# Patient Record
Sex: MALE | Race: WHITE | URBAN - METROPOLITAN AREA
[De-identification: names, ages, dates, MRNs, and addresses within clinical notes are randomized per-mention and may not be internally consistent; named-entity substitution may affect disease eponyms.]

---

## 2017-01-26 ENCOUNTER — TRANSFERRED RECORDS (OUTPATIENT)
Dept: HEALTH INFORMATION MANAGEMENT | Facility: CLINIC | Age: 57
End: 2017-01-26

## 2017-02-01 ENCOUNTER — TELEPHONE (OUTPATIENT)
Dept: TRANSPLANT | Facility: CLINIC | Age: 57
End: 2017-02-01

## 2017-02-01 NOTE — TELEPHONE ENCOUNTER
Follow up all placed to Dr. Arita. He would [facility] to [facility] to pursue TP-IAT again. He reports having to cancel last February due to the death of his father-in-law. 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 He has been there for 9 months now. He reports feeling quite ill last week which prompted his call to this RN. He has not had any new imaging, ERCP, EUS, MRI/MRCP, CT scans. He indicated that he may have some elevated lipase levels drawn at his PCP clinic. Dr. Arita reports seeing his local GI last week and was told that he has reached the end of medical management and his last option would be TP-IAT. He is having his narcotics managed through his local pain provider. He is given 120 Oxycodone pills for every  days. He is taking on most days one 5 mg tablet. He also is prescribed 37.5 mcg Fentanyl patches that he reports cutting to the size he feels he needs for pain relief. He is no longer taking Gabapentin as he felt no pain relief from this med.  He has a new insurance so will need to have this reviewed. Will update his COLIN.

## 2017-03-02 ENCOUNTER — TRANSFERRED RECORDS (OUTPATIENT)
Dept: HEALTH INFORMATION MANAGEMENT | Facility: CLINIC | Age: 57
End: 2017-03-02

## 2017-03-07 ENCOUNTER — TELEPHONE (OUTPATIENT)
Dept: TRANSPLANT | Facility: CLINIC | Age: 57
End: 2017-03-07

## 2017-03-07 NOTE — TELEPHONE ENCOUNTER
Received a call from [facility] . TP-IAT approved on [id number] review with Dr. Jaquez. Authorization number #[id number]. Requests call back when surgery date is schedule with admission date. Her contact information is [phone].

## 2017-03-15 DIAGNOSIS — K86.1 CHRONIC PANCREATITIS (H): Primary | ICD-10-CM

## 2017-03-22 ENCOUNTER — TELEPHONE (OUTPATIENT)
Dept: TRANSPLANT | Facility: CLINIC | Age: 57
End: 2017-03-22

## 2017-05-02 ENCOUNTER — TELEPHONE (OUTPATIENT)
Dept: TRANSPLANT | Facility: CLINIC | Age: 57
End: 2017-05-02

## 2017-05-02 NOTE — TELEPHONE ENCOUNTER
Spoke with Tanner to review his immunizations. Noted he has not had coverage for Meningococcal B serogroup. Asked him to look into getting this vaccine and to fax documentation to me. Provided fax number. Tanner verbalized understanding and has no questions at this time. He is at work and will look into getting this taken care of today.

## 2017-05-04 ENCOUNTER — TRANSFERRED RECORDS (OUTPATIENT)
Dept: HEALTH INFORMATION MANAGEMENT | Facility: CLINIC | Age: 57
End: 2017-05-04

## 2017-05-05 DIAGNOSIS — K85.90 PANCREATITIS: Primary | ICD-10-CM

## 2017-05-10 ENCOUNTER — RESULTS ONLY (OUTPATIENT)
Dept: LAB | Age: 57
End: 2017-05-10

## 2017-05-10 ENCOUNTER — ANESTHESIA EVENT (OUTPATIENT)
Dept: SURGERY | Facility: CLINIC | Age: 57
DRG: 406 | End: 2017-05-10
Payer: COMMERCIAL

## 2017-05-10 ENCOUNTER — OFFICE VISIT (OUTPATIENT)
Dept: SURGERY | Facility: CLINIC | Age: 57
End: 2017-05-10

## 2017-05-10 ENCOUNTER — ALLIED HEALTH/NURSE VISIT (OUTPATIENT)
Dept: SURGERY | Facility: CLINIC | Age: 57
End: 2017-05-10

## 2017-05-10 ENCOUNTER — ALLIED HEALTH/NURSE VISIT (OUTPATIENT)
Dept: EDUCATION SERVICES | Facility: CLINIC | Age: 57
End: 2017-05-10

## 2017-05-10 ENCOUNTER — TELEPHONE (OUTPATIENT)
Dept: TRANSPLANT | Facility: CLINIC | Age: 57
End: 2017-05-10

## 2017-05-10 VITALS
RESPIRATION RATE: 18 BRPM | OXYGEN SATURATION: 95 % | HEIGHT: 74 IN | TEMPERATURE: 98.1 F | HEART RATE: 61 BPM | BODY MASS INDEX: 29.2 KG/M2 | DIASTOLIC BLOOD PRESSURE: 78 MMHG | SYSTOLIC BLOOD PRESSURE: 124 MMHG | WEIGHT: 227.5 LBS

## 2017-05-10 DIAGNOSIS — Z01.818 PREOP GENERAL PHYSICAL EXAM: Primary | ICD-10-CM

## 2017-05-10 DIAGNOSIS — K85.90 PANCREATITIS: ICD-10-CM

## 2017-05-10 DIAGNOSIS — K86.1 CHRONIC PANCREATITIS (H): ICD-10-CM

## 2017-05-10 DIAGNOSIS — K86.1 OTHER CHRONIC PANCREATITIS (H): ICD-10-CM

## 2017-05-10 DIAGNOSIS — K85.80 OTHER ACUTE PANCREATITIS WITHOUT INFECTION OR NECROSIS: Primary | ICD-10-CM

## 2017-05-10 LAB
ALBUMIN SERPL-MCNC: 3.5 G/DL (ref 3.4–5)
ALBUMIN UR-MCNC: NEGATIVE MG/DL
ALP SERPL-CCNC: 79 U/L (ref 40–150)
ALT SERPL W P-5'-P-CCNC: 21 U/L (ref 0–70)
ANION GAP SERPL CALCULATED.3IONS-SCNC: 9 MMOL/L (ref 3–14)
APPEARANCE UR: CLEAR
AST SERPL W P-5'-P-CCNC: 14 U/L (ref 0–45)
BASOPHILS # BLD AUTO: 0 10E9/L (ref 0–0.2)
BASOPHILS NFR BLD AUTO: 0.7 %
BILIRUB SERPL-MCNC: 0.4 MG/DL (ref 0.2–1.3)
BILIRUB UR QL STRIP: NEGATIVE
BUN SERPL-MCNC: 14 MG/DL (ref 7–30)
C PEPTIDE SERPL-MCNC: 13.2 NG/ML (ref 0.9–6.9)
C PEPTIDE SERPL-MCNC: 3.6 NG/ML (ref 0.9–6.9)
C PEPTIDE SERPL-MCNC: 7.4 NG/ML (ref 0.9–6.9)
CALCIUM SERPL-MCNC: 8.9 MG/DL (ref 8.5–10.1)
CEA SERPL-MCNC: 0.5 UG/L (ref 0–2.5)
CHLORIDE SERPL-SCNC: 109 MMOL/L (ref 94–109)
CHOLEST SERPL-MCNC: 157 MG/DL
CO2 SERPL-SCNC: 24 MMOL/L (ref 20–32)
COLOR UR AUTO: YELLOW
CREAT SERPL-MCNC: 0.93 MG/DL (ref 0.66–1.25)
CREAT UR-MCNC: 178 MG/DL
D DIMER PPP FEU-MCNC: NORMAL UG/ML FEU (ref 0–0.5)
DIFFERENTIAL METHOD BLD: NORMAL
EOSINOPHIL # BLD AUTO: 0.2 10E9/L (ref 0–0.7)
EOSINOPHIL NFR BLD AUTO: 2.9 %
ERYTHROCYTE [DISTWIDTH] IN BLOOD BY AUTOMATED COUNT: 14.1 % (ref 10–15)
GFR SERPL CREATININE-BSD FRML MDRD: 84 ML/MIN/1.7M2
GLUCOSE SERPL-MCNC: 125 MG/DL (ref 70–99)
GLUCOSE SERPL-MCNC: 86 MG/DL (ref 70–99)
GLUCOSE SERPL-MCNC: 88 MG/DL (ref 70–99)
GLUCOSE SERPL-MCNC: 88 MG/DL (ref 70–99)
GLUCOSE UR STRIP-MCNC: NEGATIVE MG/DL
HBA1C MFR BLD: 5.6 % (ref 4.3–6)
HCT VFR BLD AUTO: 45.1 % (ref 40–53)
HDLC SERPL-MCNC: 43 MG/DL
HGB BLD-MCNC: 15.2 G/DL (ref 13.3–17.7)
HGB UR QL STRIP: NEGATIVE
IMM GRANULOCYTES # BLD: 0 10E9/L (ref 0–0.4)
IMM GRANULOCYTES NFR BLD: 0.3 %
INR PPP: 1.04 (ref 0.86–1.14)
KETONES UR STRIP-MCNC: NEGATIVE MG/DL
LDLC SERPL CALC-MCNC: 94 MG/DL
LEUKOCYTE ESTERASE UR QL STRIP: NEGATIVE
LYMPHOCYTES # BLD AUTO: 2.3 10E9/L (ref 0.8–5.3)
LYMPHOCYTES NFR BLD AUTO: 38.9 %
MCH RBC QN AUTO: 28.7 PG (ref 26.5–33)
MCHC RBC AUTO-ENTMCNC: 33.7 G/DL (ref 31.5–36.5)
MCV RBC AUTO: 85 FL (ref 78–100)
MICROALBUMIN UR-MCNC: 7 MG/L
MICROALBUMIN/CREAT UR: 4.12 MG/G CR (ref 0–17)
MONOCYTES # BLD AUTO: 0.5 10E9/L (ref 0–1.3)
MONOCYTES NFR BLD AUTO: 8.5 %
MUCOUS THREADS #/AREA URNS LPF: PRESENT /LPF
NEUTROPHILS # BLD AUTO: 2.9 10E9/L (ref 1.6–8.3)
NEUTROPHILS NFR BLD AUTO: 48.7 %
NITRATE UR QL: NEGATIVE
NONHDLC SERPL-MCNC: 113 MG/DL
NRBC # BLD AUTO: 0 10*3/UL
NRBC BLD AUTO-RTO: 0 /100
PH UR STRIP: 6.5 PH (ref 5–7)
PLATELET # BLD AUTO: 224 10E9/L (ref 150–450)
POTASSIUM SERPL-SCNC: 3.8 MMOL/L (ref 3.4–5.3)
PREALB SERPL IA-MCNC: 30 MG/DL (ref 15–45)
PROT SERPL-MCNC: 7.3 G/DL (ref 6.8–8.8)
RBC # BLD AUTO: 5.29 10E12/L (ref 4.4–5.9)
RBC #/AREA URNS AUTO: 2 /HPF (ref 0–2)
SODIUM SERPL-SCNC: 142 MMOL/L (ref 133–144)
SP GR UR STRIP: 1.02 (ref 1–1.03)
TRANS CELLS #/AREA URNS HPF: <1 /HPF (ref 0–1)
TRIGL SERPL-MCNC: 98 MG/DL
URN SPEC COLLECT METH UR: ABNORMAL
UROBILINOGEN UR STRIP-MCNC: NORMAL MG/DL (ref 0–2)
WBC # BLD AUTO: 5.9 10E9/L (ref 4–11)
WBC #/AREA URNS AUTO: <1 /HPF (ref 0–2)

## 2017-05-10 PROCEDURE — 82378 CARCINOEMBRYONIC ANTIGEN: CPT | Performed by: TRANSPLANT SURGERY

## 2017-05-10 PROCEDURE — 83036 HEMOGLOBIN GLYCOSYLATED A1C: CPT | Performed by: TRANSPLANT SURGERY

## 2017-05-10 PROCEDURE — 80053 COMPREHEN METABOLIC PANEL: CPT | Performed by: TRANSPLANT SURGERY

## 2017-05-10 PROCEDURE — 86900 BLOOD TYPING SEROLOGIC ABO: CPT | Performed by: TRANSPLANT SURGERY

## 2017-05-10 PROCEDURE — 82306 VITAMIN D 25 HYDROXY: CPT | Performed by: TRANSPLANT SURGERY

## 2017-05-10 PROCEDURE — 85025 COMPLETE CBC W/AUTO DIFF WBC: CPT | Performed by: TRANSPLANT SURGERY

## 2017-05-10 PROCEDURE — 86850 RBC ANTIBODY SCREEN: CPT | Performed by: TRANSPLANT SURGERY

## 2017-05-10 PROCEDURE — 82947 ASSAY GLUCOSE BLOOD QUANT: CPT | Performed by: TRANSPLANT SURGERY

## 2017-05-10 PROCEDURE — 81001 URINALYSIS AUTO W/SCOPE: CPT | Performed by: TRANSPLANT SURGERY

## 2017-05-10 PROCEDURE — 86901 BLOOD TYPING SEROLOGIC RH(D): CPT | Performed by: TRANSPLANT SURGERY

## 2017-05-10 PROCEDURE — 84681 ASSAY OF C-PEPTIDE: CPT | Performed by: TRANSPLANT SURGERY

## 2017-05-10 PROCEDURE — 85610 PROTHROMBIN TIME: CPT | Performed by: TRANSPLANT SURGERY

## 2017-05-10 PROCEDURE — 84590 ASSAY OF VITAMIN A: CPT | Performed by: TRANSPLANT SURGERY

## 2017-05-10 PROCEDURE — 82043 UR ALBUMIN QUANTITATIVE: CPT | Performed by: TRANSPLANT SURGERY

## 2017-05-10 PROCEDURE — 86301 IMMUNOASSAY TUMOR CA 19-9: CPT | Performed by: TRANSPLANT SURGERY

## 2017-05-10 PROCEDURE — 84134 ASSAY OF PREALBUMIN: CPT | Performed by: TRANSPLANT SURGERY

## 2017-05-10 PROCEDURE — 80061 LIPID PANEL: CPT | Performed by: TRANSPLANT SURGERY

## 2017-05-10 PROCEDURE — 87086 URINE CULTURE/COLONY COUNT: CPT | Performed by: TRANSPLANT SURGERY

## 2017-05-10 PROCEDURE — 84446 ASSAY OF VITAMIN E: CPT | Performed by: TRANSPLANT SURGERY

## 2017-05-10 ASSESSMENT — LIFESTYLE VARIABLES: TOBACCO_USE: 0

## 2017-05-10 NOTE — H&P
Pre-Operative H & P     CC:  Preoperative exam to assess for increased cardiopulmonary risk while undergoing surgery and anesthesia.    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 Jaquez on 5/15/17 at [facility].  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  He was first evaluated here at the [facility] in 2015 by Dr. Lopez in GI.  According to this note, DrJanel Arita  had quite extensive GI interventions without sustained relief before coming to the [facility] .   He reported that his quality of life deteriorated dramatically since a surgical sphincteroplasty done in [place] in 2014.  He was scheduled to have the pancreatectomy and auto islet cell transplant 1-1/2 years ago at [facility],but needed to cancel due to a death in the family.      He has a history of atypical chest pain associated with necrotizing pancreatitis in 2015.  The cardiac work-up included a normal stress test.  He reports no SOB, CP or ADDISON.  He reported having an EKG today done for research that indicated NSR.  Other history is thyroid cancer with a thyroidectomy in 2011.  He has been on levothyroxine since this time.    History is obtained from the patient.     Past Medical History  Past Medical History:   Diagnosis Date     Depression      Eye injury, penetrating 1978    left     Pancreatic disease     pancreatitis     Pancreatitis 2012    outside notes indicate lipase elevations; labs available: 4/11/12 lipase 563 (); 5/7/12 1224 ()     Sphincter of Oddi dysfunction      Thyroid cancer (H)      Thyroid disease     thyroid cancer         Past Surgical History  Past Surgical History:   Procedure Laterality Date     CHOLECYSTECTOMY  4/2012     [place], [place]     debribement eye Left 1978    for eye injury     ENDOSCOPIC RETROGRADE CHOLANGIOPANCREATOGRAM  4/18/2012    with sphincterotomy     ENDOSCOPIC ULTRASOUND UPPER GASTROINTESTINAL TRACT (GI) N/A 11/11/2015    Procedure: ENDOSCOPIC ULTRASOUND, ESOPHAGOSCOPY / UPPER GASTROINTESTINAL TRACT (GI);  Surgeon: Emeka Mcleod MD;  Location: [facility] OR     FEEDING TUBE REPLACEMENT  2/3/2015     HERNIA REPAIR, INGUINAL RT/LT Left 1980     LAPAROTOMY EXPLORATORY  12/31/2014    WITH sphincterotom (transduodenal), resection of sphincter of Oddi, lysis of adhesions     NERVE BLOCK PERIPHERAL  12/31/2014    U/S guided transversus abdominus plane peripheral field nerve block     THYROIDECTOMY  12/28/2011     transduodenal sphincteroplasty  12/31/2014         Prior to Admission Medications  Current Outpatient Prescriptions   Medication Sig Dispense Refill     LORAZEPAM PO Take 1 mg by mouth daily as needed for anxiety       LEVOTHYROXINE SODIUM PO Take 175 mcg by mouth daily       gabapentin (GRALISE) 300 MG 24hr Ext Release tab Take 300 mg by mouth 3 times daily       FLUoxetine HCl (PROZAC PO) Take 10 mg by mouth daily       fentaNYL (DURAGESIC) 25 mcg/hr patch 72 hr Place 1 patch onto the skin every 72 hours Patient uses one 25 mcg and one 12.5 mcg patch every 72 hours (37.5 mcg)       OXYCODONE HCL PO Take 5 mg by mouth 4 times daily        amylase-lipase-protease (CREON) 61684 UNITS CPEP Take 2 capsules by mouth 3 times daily (with meals) Take 1 capsule with snack.           Allergies  Review of patient's allergies indicates no known allergies.     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  He is a non-smoker and has never drank alcohol. He is a twin.            Family History  Family History   Problem Relation Age of Onset     Pancreatitis No family hx of      DIABETES No family hx of         ROS  10 point review of systems performed.  All pertinent positives noted, otherwise Negative.    1/2015 Normal vasodilator stress test. No substrate for ischemia. No defect to suggest infarction. Normal LV wall motion, volumes and function. LVEF 70%    Labs: (personally reviewed):  Lab Results   Component Value Date    HGB 13.9 11/11/2015     11/11/2015    WBC 5.7 11/11/2015    HCT 41.7 11/11/2015     11/11/2015    CRISTOBAL 8.7 11/11/2015    GLC 84 11/11/2015    CR 0.85 11/11/2015    BUN 20 11/11/2015    CO2 26 11/11/2015    ALT 24 11/11/2015    AST 12 11/11/2015    ALKPHOS 80 11/11/2015         Physical Exam:  Vital signs:  Extended Vitals not filed for this encounter.   Constitutional: Awake, alert, cooperative, no apparent distress, and appears stated age.  Eyes: Pupils equal, round and reactive to light, sclera clear, conjunctiva normal.  HENT: Normocephalic, oral pharynx with moist mucus membranes, good dentition. No goiter appreciated.   Respiratory: Clear to auscultation bilaterally, no crackles or wheezing.  Cardiovascular: Regular rate and rhythm, normal S1 and S2, and no murmur noted.   No edema. Palpable pulses to radial  DP and PT arteries.   GI: Normal bowel sounds, soft, non-distended, non-tender, no masses palpated, no hepatosplenomegaly.    Skin: Warm and dry.  No rashes at anticipated surgical site.   Musculoskeletal: Full ROM of neck. There is no redness, warmth, or swelling of the joints. Gross motor strength is normal.    Neurologic: Awake, alert, oriented to name, place and time.  Gait is normal.   Neuropsychiatric: Calm, cooperative. Normal affect.      Assessment/Plan  Sohan Arita is a 56 year old male  who is scheduled for a open pancreatectomy, auto islet cell transplant on 5/15/17 with Dr. Jaquez for chronic pancreatitis.  PAC referral for risk assessment and optimization for anesthesia with comorbid conditions of:    Pre-operative considerations:  1.  Cardiac:  Functional status MET > 4  Risk of Major Adverse Cardiac event:  0.9%  History of chest pain with necrotizing pancreatis in 2015 , cardiac work up negative at that time.   Patient denies CP or SOB with activity.    2.  Pulm:   MAGDALENE risk: High Risk.  Negative sleep study 3 years ago.  3.  GI:  Risk of PONV score = 2.  If > 2, anti-emetic intervention recommended.  4.  Chronic Pain:  Chronic opioid use:  Fentanyl 12 mcg patch-- uses 1/2 patch every 72 hours    Patient is optimized and is acceptable candidate for the proposed procedure.  No further diagnostic evaluation is needed.     Amy Flores MS, PA-C   Preoperative Assessment Center  [facility]  Phone: [phone]  Fax: [phone]

## 2017-05-10 NOTE — MR AVS SNAPSHOT
After Visit Summary   5/10/2017    Sohan Arita    MRN: [id number]           Patient Information     Date Of Birth          1960        Visit Information        Provider Department      5/10/2017 1:30 PM Radha Saavedra RN [facility]        Today's Diagnoses     Other acute pancreatitis without infection or necrosis    -  1       Follow-ups after your visit        Your next 10 appointments already scheduled     May 11, 2017 11:30 AM CDT   (Arrive by 11:15 AM)   Return Auto Islet with Rodrigo Jaquez MD   [facility] Solid Organ Transplant ([facility])    [address] [place] [id number] [phone]            May 15, 2017   Procedure with Rodrigo Jaquez MD   [facility] [place], Same Day Surgery (--)    [address] [address]   [phone]            Jun 05, 2017  9:30 AM CDT   (Arrive by 9:15 AM)   Office Visit with Evelyne Diez RD   [facility] ([facility])    [address] [place] [id number] [phone]           Bring a current list of meds and any records pertaining to this visit.  For Physicals, please bring immunization records and any forms needing to be filled out.  Please arrive 10 minutes early to complete paperwork.            Jun 05, 2017 10:30 AM CDT   (Arrive by 10:15 AM)   Office Visit with Radha Saavedra RN   [facility] ([facility])    [address] [place] [id number] [phone]           Bring a current list of meds and any records pertaining to this visit.  For Physicals, please bring immunization records and any forms needing to be filled out.  Please arrive 10 minutes early to complete paperwork.              Who to contact     Please call your clinic at [phone] to:    Ask questions about your health    Make or cancel appointments    Discuss your  medicines    Learn about your test results    Speak to your doctor   If you have compliments or concerns about an experience at your clinic, or if you wish to file a complaint, please contact [facility] Patient Relations at [phone] or email us at [email or web address]         Additional Information About Your Visit        [employer] Information     [employer] is an electronic gateway that provides easy, online access to your medical records. With [employer], you can request a clinic appointment, read your test results, renew a prescription or communicate with your care team.     To sign up for [employer] visit the website at [email or web address]   You will be asked to enter the access code listed below, as well as some personal information. Please follow the directions to create your username and password.     Your access code is: [email or web address]  Expires: 2017  6:30 AM     Your access code will  in 90 days. If you need help or a new code, please contact your [facility] or call [phone] for assistance.        Care EveryWhere ID     This is your Care EveryWhere ID. This could be used by other organizations to access your [facility] medical records  [id number]         Blood Pressure from Last 3 Encounters:   05/10/17 124/78   11/11/15 132/82   11/10/15 120/78    Weight from Last 3 Encounters:   05/10/17 103.2 kg (227 lb 8 oz)   11/11/15 100.2 kg (220 lb 14.4 oz)   11/10/15 98.8 kg (217 lb 14.4 oz)              Today, you had the following     No orders found for display       Primary Care Provider Office Phone # Fax #    Frandy Frye [phone]       [facility] [address]  [facility] [address]        Thank you!     Thank you for choosing [facility]  for your care. Our goal is always to provide you with excellent care. Hearing back from our patients is one way we can continue to improve our  services. Please take a few minutes to complete the written survey that you may receive in the mail after your visit with us. Thank you!             Your Updated Medication List - Protect others around you: Learn how to safely use, store and throw away your medicines at www.disposemymeds.org.          This list is accurate as of: 5/10/17  5:02 PM.  Always use your most recent med list.                   Brand Name Dispense Instructions for use    fentaNYL 25 mcg/hr 72 hr patch    DURAGESIC     Place 1 patch onto the skin every 72 hours Patient uses 12 mcg patch cut in half every 72 hours.       LEVOTHYROXINE SODIUM PO      Take 150 mcg by mouth every morning       PROZAC PO      Take 10 mg by mouth every morning       ZENPEP 56424 UNITS Cpep   Generic drug:  Pancrelipase (Lip-Prot-Amyl)      Take 80,000 Units by mouth 3 times daily

## 2017-05-10 NOTE — TELEPHONE ENCOUNTER
This RN faxed the extended type and screen to the listed [facility] blood bank. Fax successfully sent.

## 2017-05-10 NOTE — TELEPHONE ENCOUNTER
Spoke with Tanner between his preoperative appointments. He reports everything is going well. He and wife Maribell are staying at the [facility] in [place].  Tanner will request his last immunization Men Bexaro be faxed to me.  Tanner has no current questions of concerns.

## 2017-05-10 NOTE — ANESTHESIA PREPROCEDURE EVALUATION
Anesthesia Evaluation     . Pt has had prior anesthetic. Type: General and MAC           ROS/MED HX    ENT/Pulmonary: Comment: Neg sleep study.    (+)MAGDALENE risk factors snores loudly, daytime somnolence, , . .   (-) tobacco use   Neurologic: Comment: Occasional significant HAs - neg neurologic ROS     Cardiovascular:  - neg cardiovascular ROS   (+) ----. : . . . :. . Previous cardiac testing date:results:Stress Testdate:1/2015 results:Normal vasodilator stress test. No substrate for ischemia. No defect to suggest infarction. Normal LV wall motion, volumes and function. 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 Casanova in PAC.     date: results:          METS/Exercise Tolerance:  4 - Raking leaves, gardening   Hematologic:  - neg hematologic  ROS       Musculoskeletal:  - neg musculoskeletal ROS (+) , , other musculoskeletal- Chronic back pain. right leg numbness since 1998      GI/Hepatic:     (+) Other GI/Hepatic chronic pancreatitis      Renal/Genitourinary:  - ROS Renal section negative       Endo: Comment: A1C 5.4    (+) thyroid problem hypothyroidism, .      Psychiatric:     (+) psychiatric history depression      Infectious Disease:  - neg infectious disease ROS       Malignancy:   (+) Malignancy History of Other  Other CA thyroid, thyroidectomy 2011 Remission status post Surgery         Other:    (+) No chance of pregnancy C-spine cleared: N/A, H/O Chronic Pain,H/O chronic opiod use , no other significant disability                    Physical Exam  Normal systems: cardiovascular and pulmonary    Airway   Mallampati: I  TM distance: >3 FB  Neck ROM: full    Dental   (+) caps    Cardiovascular   Rhythm and rate: regular and normal      Pulmonary    breath sounds clear to auscultation    Other findings: Lab Results       Component                Value               Date                        HGB                      15.2                05/10/2017                 PLT                      224                 05/10/2017                 WBC                      5.9                 05/10/2017                 HCT                      45.1                05/10/2017                 INR                      1.04                05/10/2017                 NA                       142                 05/10/2017                 CRISTOBAL                      8.9                 05/10/2017                 GLC                      88                  05/10/2017                 CR                       0.93                05/10/2017                 BUN                      14                  05/10/2017                 CO2                      24                  05/10/2017                 ALT                      21                  05/10/2017                 AST                      14                  05/10/2017                 ALKPHOS                  79                  05/10/2017                         PAC Discussion and Assessment    ASA Classification: 3  Case is suitable for: [place]  Anesthetic techniques and relevant risks discussed: GA with regional block for post-op pain control  Invasive monitoring and risk discussed: No  Types:   Possibility and Risk of blood transfusion discussed: No  NPO instructions given:   Additional anesthetic preparation and risks discussed:   Needs early admission to pre-op area:   Other:     PAC Resident/NP Anesthesia Assessment:  Sohan Arita is a 56 year old male  who is scheduled for a open pancreatectomy, auto islet cell transplant on 5/15/17 with Dr. Jaquez for chronic pancreatitis.  PAC referral for risk assessment and optimization for anesthesia with comorbid conditions of:    Pre-operative considerations:  1.  Cardiac:  Functional status MET > 4  Risk of Major Adverse Cardiac event:  0.9%  History of chest pain with necrotizing pancreatis in 2015 , cardiac work  up negative at that time.   Patient denies CP or SOB with activity.    2.  Pulm:   MAGDALENE risk: Intermediate Risk.  Negative sleep study 3 years ago.  3.  GI:  Risk of PONV score = 2.  If > 2, anti-emetic intervention recommended.  4.  Chronic Pain:  Chronic opioid use:  Fentanyl 12 mcg patch-- uses 1/2 patch every 72 hours    Previous anesthesia note [facility] 11/11/15:    Peripheral IV Tolerated well   ETT  Mask Ventilation: Easy; Ease of Intubation: Easy; Airway       Patient is optimized and is acceptable candidate for the proposed procedure.  No further diagnostic evaluation is needed.     Patient also evaluated by Dr. Casanova. See recommendations below.     Amy Flores MS PA-C  05/10/17 1:19 PM            Mid-Level Provider/Resident:   Date:   Time:     Attending Anesthesiologist Anesthesia Assessment:  Pt seen qs answered.  Discussed multimodal analgesia...    Reviewed and Signed by PAC Anesthesiologist  Anesthesiologist: julien  Date: 5/11/17  Time:   Pass/Fail:   Disposition:     PAC Pharmacist Assessment:        Pharmacist:   Date:   Time:      Anesthesia Plan      History & Physical Review  History and physical reviewed and following examination; no interval change.    ASA Status:  3 .    NPO Status:  > 8 hours    Plan for General and ETT with Intravenous induction. Maintenance will be Balanced.    PONV prophylaxis:  Ondansetron (or other 5HT-3) and Dexamethasone or Solumedrol  Additional equipment: 2nd IV, Arterial Line and Central Line I have seen the patient and reviewed the chart  Plan to follow the autoislet protocol  Ketamine gtt  Sufenta gtt  Insulin gtt  Paravertebral blocks  Blood immediately available  Heparin in room  FloTrack to monitor goal directed fluid therapy    Boni Chen MD        Postoperative Care  Postoperative pain management:  IV analgesics and Peripheral nerve block (Continuous).      Consents  Anesthetic plan, risks, benefits and alternatives discussed with:  Patient.  Use of blood  products discussed: Yes.   Consented to blood products.  .                          .

## 2017-05-10 NOTE — PATIENT INSTRUCTIONS
Preparing for Your Surgery      Name:  Sohan Arita   MRN:  [id number]   :  1960   Today's Date:  5/10/2017     Arriving for surgery:  Auto islet cell transplant   Surgery date:  5/15/2017  Surgery time:  7:00 am  Arrival time:  5:00 am  Please come to:       [facility] [address]  [address] [place]  [address]    -   parking is available in front of the hospital from 5:15 am to 8:00 pm    -  Stop at the Information Desk in the lobby    -   Inform the information person that you are here for surgery. An escort to 3c will be provided. If you would not like an escort, please proceed to 3C on the 3rd floor. [phone]     What can I eat or drink?  -  You may have solid food or milk products until 8 hours prior to your surgery.  Nothing after 11 pm   -  You may have water, apple juice or 7up/Sprite until 2 hours prior to your surgery. Until 5 am arrival time     Which medicines can I take?  -  Do NOT take these medications in the morning, the day of surgery:          Adam Pep                    HOLD VITAMINS SUPPLEMENTS AND ASPIRIN FOR 1 WEEK BEFORE SURGERY       -  Please take these medications the day of surgery:        Levothyroxine        Fluoxetine          Fentanyl Patch             How do I prepare myself?  -  Take two showers: one the night before surgery; and one the morning of surgery.         Use Scrubcare or Hibiclens to wash from neck down.  You may use your own shampoo and conditioner. No other hair products.   -  Do NOT use lotion, powder, deodorant, or antiperspirant the day of your surgery.  -  Do NOT wear any makeup, fingernail polish or jewelry.  -  Begin using Incentive Spirometer 1 week prior to surgery.  Use 4 times per day, up to 5-10 breaths each time.  Bring Incentive Spirometer to hospital.  -Do not bring your own medications to the hospital, except for inhalers and eye drops.  -  Bring your ID and insurance  card.    Questions or Concerns:  If you have questions or concerns, please call the  Preoperative Assessment Center, Monday-Friday 7AM-7PM:  [phone]          AFTER YOUR SURGERY  Breathing exercises   Breathing exercises help you recover faster. Take deep breaths and let the air out slowly. This will:     Help you wake up after surgery.    Help prevent complications like pneumonia.  Preventing complications will help you go home sooner.   We may give you a breathing device (incentive spirometer) to encourage you to breathe deeply.   Nausea and vomiting   You may feel sick to your stomach after surgery; if so, let your nurse know.    Pain control:  After surgery, you may have pain. Our goal is to help you manage your pain. Pain medicine will help you feel comfortable enough to do activities that will help you heal.  These activities may include breathing exercises, walking and physical therapy.   To help your health care team treat your pain we will ask: 1) If you have pain  2) where it is located 3) describe your pain in your words  Methods of pain control include medications given by mouth, vein or by nerve block for some surgeries.  We may give you a pain control pump that will:  1) Deliver the medicine through a tube placed in your vein  2) Control the amount of medicine you receive  3) Allow you to push a button to deliver a dose of pain medicine  Sequential Compression Device (SCD) or Pneumo Boots:  You may need to wear SCD S on your legs or feet. These are wraps connected to a machine that pumps in air and releases it. The repeated pumping helps prevent blood clots from forming.   Using an Incentive Spirometer  Soon after your surgery, a nurse or therapist will teach you breathing exercises. These keep your lungs clear, strengthen your breathing muscles, and help prevent complications.  The exercises include doing a deep-breathing exercise using a device called an incentive spirometer.  To do these  exercises, you will breathe in through your mouth and not your nose. The incentive spirometer only works correctly if you breathe in through your mouth.  Four steps to clear lungs     Deep breathing expands the lungs, aids circulation, and helps prevent pneumonia.   1. Exhale normally.    Relax and breathe out.  2. Place your lips tightly around the mouthpiece.    Make sure the device is upright and not tilted.  3. Inhale as much air as you can through the mouthpiece (don't breath through your nose).    Inhale slowly and deeply.    Hold your breath long enough to keep the balls or disk raised for at least 3 seconds.    If you re inhaling too quickly, your device may make a tone. If you hear this tone, inhale more slowly.  4. Repeat the exercise regularly.    Do this exercise every hour while you're awake, or as your health care provider instructs.    You will also be taught coughing exercises and be asked to do them regularly on your own.    2491-2211 [employer]. [address] [place] [address], [place] [address]. All rights reserved. This information is not intended as a substitute for professional medical care. Always follow your healthcare professional's instructions.

## 2017-05-10 NOTE — PROGRESS NOTES
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  Sohan Arita is accompanied by spouse, Maribell    No previous history of diabetes.  Most recent a1c was 5.7 indicating pre-diabetes.   Chronic pancreatitis diagnosed about five years ago, but he thinks he has had it longer than that.     ASSESSMENT:  Patient glucose self monitoring as follows: BG meter taught today.  Name of glucose meter:  Patient has a glucose meter that he picked up prior to coming here.  He does not recall the brand name.   Todays Blood Glucose result was 95 on Accu-Chek Trudy meter.     PATIENT CONCERNS:   TP-AIT planned for Monday, May 15.  Surgeon Dr. Jaquez, Transplant coordinator Soni Whitaker       EDUCATION and INSTRUCTION PROVIDED AT THIS VISIT:     Expectations following surgery related to taking insulin, checking blood glucose, and need for excellent control in the period immediately following surgery and beyond.   Reviewed the normal endocrine function of the pancreas, and how exogenous insulin differs from endogenous insulin   Explained the difference between short and long acting insulin, including onset, duration, and action.   Explained the difference between meal dosing insulin and correction insulin, and explained how she will be using both a long and short acting insulin to control BG's following surgery.   Demonstrated and had patient do return demonstration of administration of insulin using an insulin pen. Patient was able to do this successfully.   Shown how to use a glucose meter, although the one he will be using is not here today.  Shown how to use the Accucheck Fastclix lancing device and given one to bring with him to the hospital.    Reviewed BG normals and BG goals and emphasized the importance of contacting medical team if goals are not being met.   Explained the recognition and treatment of  hypoglycemia . Will instruct in glucagon use during  followup visit after discharge from the hospital.   Patient-stated goal written and given to Sohan Arita.  Verbalized and demonstrated understanding of instructions.     PLAN:  See patient instructions  AVS printed and given to patient    FOLLOW-UP:      Follow up visit with RN and RD CDE's scheduled for 3 weeks from date of surgery.  Goal at that visit with be to teach carbohydrate counting and administration of glucagon, as well as other diabetes education    Time spent with patient at today's visit was 60 minutes.

## 2017-05-10 NOTE — MR AVS SNAPSHOT
After Visit Summary   5/10/2017    Sohan Arita    MRN: [id number]           Patient Information     Date Of Birth          1960        Visit Information        Provider Department      5/10/2017 11:30 AM Rn, [facility] Preoperative Assessment Center        Today's Diagnoses     Chronic pancreatitis (H)        Pancreatitis          Care Instructions    Preparing for Your Surgery      Name:  Sohan Arita   MRN:  [id number]   :  1960   Today's Date:  5/10/2017     Arriving for surgery:  Auto islet cell transplant   Surgery date:  5/15/2017  Surgery time:  7:00 am  Arrival time:  5:00 am  Please come to:       [facility] [address]  [address] [place]  [address]    -   parking is available in front of the hospital from 5:15 am to 8:00 pm    -  Stop at the Information Desk in the lobby    -   Inform the information person that you are here for surgery. An escort to 3c will be provided. If you would not like an escort, please proceed to 3C on the 3rd floor. [phone]     What can I eat or drink?  -  You may have solid food or milk products until 8 hours prior to your surgery.  Nothing after 11 pm   -  You may have water, apple juice or 7up/Sprite until 2 hours prior to your surgery. Until 5 am arrival time     Which medicines can I take?  -  Do NOT take these medications in the morning, the day of surgery:          Adam Pep                    HOLD VITAMINS SUPPLEMENTS AND ASPIRIN FOR 1 WEEK BEFORE SURGERY       -  Please take these medications the day of surgery:        Levothyroxine        Fluoxetine          Fentanyl Patch             How do I prepare myself?  -  Take two showers: one the night before surgery; and one the morning of surgery.         Use Scrubcare or Hibiclens to wash from neck down.  You may use your own shampoo and conditioner. No other hair products.   -  Do NOT use lotion, powder, deodorant, or  antiperspirant the day of your surgery.  -  Do NOT wear any makeup, fingernail polish or jewelry.  -  Begin using Incentive Spirometer 1 week prior to surgery.  Use 4 times per day, up to 5-10 breaths each time.  Bring Incentive Spirometer to hospital.  -Do not bring your own medications to the hospital, except for inhalers and eye drops.  -  Bring your ID and insurance card.    Questions or Concerns:  If you have questions or concerns, please call the  Preoperative Assessment Center, Monday-Friday 7AM-7PM:  [phone]          AFTER YOUR SURGERY  Breathing exercises   Breathing exercises help you recover faster. Take deep breaths and let the air out slowly. This will:     Help you wake up after surgery.    Help prevent complications like pneumonia.  Preventing complications will help you go home sooner.   We may give you a breathing device (incentive spirometer) to encourage you to breathe deeply.   Nausea and vomiting   You may feel sick to your stomach after surgery; if so, let your nurse know.    Pain control:  After surgery, you may have pain. Our goal is to help you manage your pain. Pain medicine will help you feel comfortable enough to do activities that will help you heal.  These activities may include breathing exercises, walking and physical therapy.   To help your health care team treat your pain we will ask: 1) If you have pain  2) where it is located 3) describe your pain in your words  Methods of pain control include medications given by mouth, vein or by nerve block for some surgeries.  We may give you a pain control pump that will:  1) Deliver the medicine through a tube placed in your vein  2) Control the amount of medicine you receive  3) Allow you to push a button to deliver a dose of pain medicine  Sequential Compression Device (SCD) or Pneumo Boots:  You may need to wear SCD S on your legs or feet. These are wraps connected to a machine that pumps in air and releases it. The repeated pumping  helps prevent blood clots from forming.   Using an Incentive Spirometer  Soon after your surgery, a nurse or therapist will teach you breathing exercises. These keep your lungs clear, strengthen your breathing muscles, and help prevent complications.  The exercises include doing a deep-breathing exercise using a device called an incentive spirometer.  To do these exercises, you will breathe in through your mouth and not your nose. The incentive spirometer only works correctly if you breathe in through your mouth.  Four steps to clear lungs     Deep breathing expands the lungs, aids circulation, and helps prevent pneumonia.   1. Exhale normally.    Relax and breathe out.  2. Place your lips tightly around the mouthpiece.    Make sure the device is upright and not tilted.  3. Inhale as much air as you can through the mouthpiece (don't breath through your nose).    Inhale slowly and deeply.    Hold your breath long enough to keep the balls or disk raised for at least 3 seconds.    If you re inhaling too quickly, your device may make a tone. If you hear this tone, inhale more slowly.  4. Repeat the exercise regularly.    Do this exercise every hour while you're awake, or as your health care provider instructs.    You will also be taught coughing exercises and be asked to do them regularly on your own.    3699-0573 [employer]. [address] [place] [address], [place] [address]. All rights reserved. This information is not intended as a substitute for professional medical care. Always follow your healthcare professional's instructions.              Follow-ups after your visit        Your next 10 appointments already scheduled     May 10, 2017 12:00 PM CDT   (Arrive by 11:45 AM)   PAC Anesthesia Consult with  Pac Anesthesiologist   [facility] Preoperative Assessment Center ([facility])    [address] [place] [address] [id number] [phone]            May 10, 2017  1:30  PM CDT   (Arrive by 1:15 PM)   Diabetic Education with Radha Saavedra RN   [facility] Diabetes ([facility])    [address] [place] [address]  3rd [facility] [phone]           Please bring to your appointment: 1)  2 - 3 day food journal. Write down everything you eat and drink for 2 - 3 days prior to your appointment. 2)  Your insurance card. 3)  A blood glucose meter. If you do not have one, contact your pharmacy or clinic prior to your appointment. Your pharmacist can assist you with choosing a meter suitable to your insurance needs. 4)  A list of your medications, including prescription, over the counter and herbal. Include dosage and frequency where appropriate.            May 10, 2017  2:40 PM CDT   (Arrive by 2:25 PM)   CT ABDOMEN PELVIS W CONTRAST with UCCT2   [facility] CT ([facility])    [address] [place] [address] [id number] [phone]           Please bring any scans or X-rays taken at other hospitals, if similar tests were done. Also bring a list of your medicines, including vitamins, minerals and over-the-counter drugs. It is safest to leave personal items at home.  Be sure to tell your doctor:   If you have any allergies.   If there s any chance you are pregnant.   If you are breastfeeding.   If you have any special needs.  You may have contrast for this exam. To prepare:   Do not eat or drink for 2 hours before your exam. If you need to take medicine, you may take it with small sips of water. (We may ask you to take liquid medicine as well.)   The day before your exam, drink extra fluids at least six 8-ounce glasses (unless your doctor tells you to restrict your fluids).  Patients over 70 or patients with diabetes or kidney problems:   If you haven t had a blood test (creatinine test) within the last 30 days, go to your clinic or Diagnostic Imaging Department for this test.  If you have diabetes:    If your kidney function is normal, continue taking your metformin (Avandamet, Glucophage, Glucovance, Metaglip) on the day of your exam.   If your kidney function is abnormal, wait 48 hours before restarting this medicine.  You will have oral contrast for this exam:   You will drink the contrast at home. Get this from your clinic or Diagnostic Imaging Department. Please follow the directions given.  Please wear loose clothing, such as a sweat suit or jogging clothes. Avoid snaps, zippers and other metal. We may ask you to undress and put on a hospital gown.  If you have any questions, please call the Imaging Department where you will have your exam.            May 11, 2017 11:30 AM CDT   (Arrive by 11:15 AM)   Return Auto Islet with Rodrigo Jaquez MD   [facility] Solid Organ Transplant ([facility])    [address] [place] [phone]   [phone]            May 15, 2017   Procedure with Rodrigo Jaquez MD   [facility], [place], Same Day Surgery (--)    [address] [facility] [address]   [phone]            Jun 05, 2017  9:30 AM CDT   (Arrive by 9:15 AM)   Office Visit with Evelyne Diez RD   [facility] Diabetes ([facility])    [address] [place] [id number] [phone]           Bring a current list of meds and any records pertaining to this visit.  For Physicals, please bring immunization records and any forms needing to be filled out.  Please arrive 10 minutes early to complete paperwork.            Jun 05, 2017 10:30 AM CDT   (Arrive by 10:15 AM)   Office Visit with Radha Saavedra RN   [facility] Diabetes ([facility])    [address] [place] [address] [phone]           Bring a current list of meds and any records pertaining to this visit.  For Physicals, please bring immunization records and any forms needing to be filled out.  Please  arrive 10 minutes early to complete paperwork.              Who to contact     Please call your clinic at [phone] to:    Ask questions about your health    Make or cancel appointments    Discuss your medicines    Learn about your test results    Speak to your doctor   If you have compliments or concerns about an experience at your clinic, or if you wish to file a complaint, please contact [facility] Patient Relations at [phone] or email us at [email or web address]         Additional Information About Your Visit        [employer] Information     [employer] is an electronic gateway that provides easy, online access to your medical records. With [employer], you can request a clinic appointment, read your test results, renew a prescription or communicate with your care team.     To sign up for [employer] visit the website at [email or web address]   You will be asked to enter the access code listed below, as well as some personal information. Please follow the directions to create your username and password.     Your access code is: [email or web address]  Expires: 2017  6:30 AM     Your access code will  in 90 days. If you need help or a new code, please contact your [facility] Clinic or call [phone] for assistance.        Care EveryWhere ID     This is your Care EveryWhere ID. This could be used by other organizations to access your [place] medical records  [id number]         Blood Pressure from Last 3 Encounters:   05/10/17 124/78   11/11/15 132/82   11/10/15 120/78    Weight from Last 3 Encounters:   05/10/17 103.2 kg (227 lb 8 oz)   11/11/15 100.2 kg (220 lb 14.4 oz)   11/10/15 98.8 kg (217 lb 14.4 oz)               Primary Care Provider Office Phone # Fax #    Frandy Frye [phone]       [address]  [facility] [address]        Thank you!     Thank you for choosing [facility] PREOPERATIVE  ASSESSMENT CENTER  for your care. Our goal is always to provide you with excellent care. Hearing back from our patients is one way we can continue to improve our services. Please take a few minutes to complete the written survey that you may receive in the mail after your visit with us. Thank you!             Your Updated Medication List - Protect others around you: Learn how to safely use, store and throw away your medicines at www.disposemymeds.org.          This list is accurate as of: 5/10/17 11:45 AM.  Always use your most recent med list.                   Brand Name Dispense Instructions for use    fentaNYL 25 mcg/hr 72 hr patch    DURAGESIC     Place 1 patch onto the skin every 72 hours Patient uses 12 mcg patch cut in half every 72 hours.       LEVOTHYROXINE SODIUM PO      Take 150 mcg by mouth every morning       PROZAC PO      Take 10 mg by mouth every morning       ZENPEP 10466 UNITS Cpep   Generic drug:  Pancrelipase (Lip-Prot-Amyl)      Take 80,000 Units by mouth 3 times daily

## 2017-05-11 ENCOUNTER — OFFICE VISIT (OUTPATIENT)
Dept: TRANSPLANT | Facility: CLINIC | Age: 57
End: 2017-05-11
Attending: TRANSPLANT SURGERY
Payer: COMMERCIAL

## 2017-05-11 DIAGNOSIS — K86.1 CHRONIC PANCREATITIS, UNSPECIFIED PANCREATITIS TYPE (H): Primary | ICD-10-CM

## 2017-05-11 DIAGNOSIS — Z01.818 PREOPERATIVE TESTING: ICD-10-CM

## 2017-05-11 LAB
A1AT SERPL-MCNC: 136 MG/DL (ref 80–200)
BACTERIA SPEC CULT: NO GROWTH
CANCER AG19-9 SERPL-ACNC: 4
DEPRECATED CALCIDIOL+CALCIFEROL SERPL-MC: ABNORMAL UG/L (ref 20–75)
HBV SURFACE AB SERPL IA-ACNC: 66.24 M[IU]/ML
HBV SURFACE AG SERPL QL IA: NONREACTIVE
HCV AB SERPL QL IA: NORMAL
HIV 1+2 AB+HIV1 P24 AG SERPL QL IA: NORMAL
IGA SERPL-MCNC: 224 MG/DL (ref 70–380)
Lab: NORMAL
M TB TUBERC IFN-G BLD QL: ABNORMAL
M TB TUBERC IFN-G/MITOGEN IGNF BLD: 3.07 IU/ML
MICRO REPORT STATUS: NORMAL
SPECIMEN SOURCE: NORMAL
VITAMIN D2 SERPL-MCNC: <5 UG/L
VITAMIN D3 SERPL-MCNC: 13 UG/L

## 2017-05-11 NOTE — LETTER
5/11/2017       RE: Sohan Arita  [address]  [facility] [id number]     Dear Colleague,    Thank you for referring your patient, Sohan Arita, to the [facility] at [facility]. Please see a copy of my visit note below.    Transplant Surgery H&P                                                        HPI:                                                      Mr. Arita is a 56 year old [unfilled] who comes to clinic today for preop prior to planned TPIAT. The patient was previously reviewed by the multidisciplinary selection committee and found to be medically and psychosocially appropriate for  TPIAT.        Special considerations:  PONV: no  Amish: No    MEDICAL HISTORY:                                                    There are no active problems to display for this patient.     Past Medical History:   Diagnosis Date     Depression      Eye injury, penetrating 1978    left eye, blurry vision long distance      Pancreatic disease     pancreatitis     Pancreatitis 2012    outside notes indicate lipase elevations; labs available: 4/11/12 lipase 563 (); 5/7/12 1224 ()     Thyroid cancer (H)      Thyroid disease     thyroid cancer     Ventral hernia 2017    Present for a year, no pain     Past Surgical History:   Procedure Laterality Date     CHOLECYSTECTOMY  4/2012    [place], [place]     debribement eye Left 1978    for eye injury     ENDOSCOPIC RETROGRADE CHOLANGIOPANCREATOGRAM  4/18/2012    with sphincterotomy     ENDOSCOPIC ULTRASOUND UPPER GASTROINTESTINAL TRACT (GI) N/A 11/11/2015    Procedure: ENDOSCOPIC ULTRASOUND, ESOPHAGOSCOPY / UPPER GASTROINTESTINAL TRACT (GI);  Surgeon: Emeka Mcleod MD;  Location: [facility] OR     FEEDING TUBE REPLACEMENT  2/3/2015     HERNIA REPAIR, INGUINAL RT/LT Left 1980     LAPAROTOMY EXPLORATORY  12/31/2014    WITH sphincterotom (transduodenal), resection of sphincter  of Oddi, lysis of adhesions     NERVE BLOCK PERIPHERAL  12/31/2014    U/S guided transversus abdominus plane peripheral field nerve block     THYROIDECTOMY  12/28/2011     transduodenal sphincteroplasty  12/31/2014     Current Outpatient Prescriptions   Medication Sig Dispense Refill     Pancrelipase, Lip-Prot-Amyl, (ZENPEP) 99126 UNITS CPEP Take 80,000 Units by mouth 3 times daily       LEVOTHYROXINE SODIUM PO Take 150 mcg by mouth every morning        FLUoxetine HCl (PROZAC PO) Take 10 mg by mouth every morning        fentaNYL (DURAGESIC) 25 mcg/hr patch 72 hr Place 1 patch onto the skin every 72 hours Patient uses 12 mcg patch cut in half every 72 hours.       OTC products: None, except as noted above    No Known Allergies     Social History   Substance Use Topics     Smoking status: Never Smoker     Smokeless tobacco: Never Used     Alcohol use No     OR  Social History     Social History     Marital status:      Spouse name: N/A     Number of children: N/A     Years of education: N/A     Occupational History     MD      Social History Main Topics     Smoking status: Never Smoker     Smokeless tobacco: Never Used     Alcohol use No     Drug use: No     Sexual activity: Not on file     Other Topics Concern     Not on file     Social History Narrative    Sohan is , 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  He is a non-smoker and has never drank alcohol. He is a twin.         History   Drug Use No       Family History   Problem Relation Age of Onset     Pancreatitis No family hx of      DIABETES No family hx of        REVIEW OF SYSTEMS:                                                    C: NEGATIVE for fever, chills, change in weight  I: NEGATIVE for worrisome rashes, moles or lesions  E: NEGATIVE for vision changes or irritation  E/M: NEGATIVE for ear, mouth and throat problems  R: NEGATIVE for significant cough or SOB  B: NEGATIVE for masses, tenderness or discharge  CV: NEGATIVE for chest pain,  palpitations or peripheral edema  GI: POSITIVE for abdominal pain generalized, constipation, diarrhea and poor appetite  : NEGATIVE for frequency, dysuria, or hematuria  M: NEGATIVE for significant arthralgias or myalgia  N: NEGATIVE for weakness, dizziness or paresthesias  E: NEGATIVE for temperature intolerance, skin/hair changes  H: NEGATIVE for bleeding problems  P: NEGATIVE for changes in mood or affect    EXAM:                                                         GENERAL APPEARANCE: healthy, alert and no distress     EYES: EOMI,  PERRL     HENT: ear canals and TM's normal and nose and mouth without ulcers or lesions     NECK: no adenopathy, no asymmetry, masses, or scars and thyroid normal to palpation     RESP: lungs clear to auscultation - no rales, rhonchi or wheezes     CV: regular rates and rhythm, normal S1 S2, no S3 or S4 and no murmur, click or rub     ABDOMEN:  soft, nontender, no HSM or masses and bowel sounds normal     MS: extremities normal- no gross deformities noted, no evidence of inflammation in joints, FROM in all extremities.     SKIN: no suspicious lesions or rashes     NEURO: Normal strength and tone, sensory exam grossly normal, mentation intact and speech normal     PSYCH: mentation appears normal. and affect normal/bright     LYMPHATICS: No axillary, cervical, or supraclavicular nodes      DIAGNOSTICS:                                                       Recent Results (from the past 672 hour(s))   CBC with platelets differential    Collection Time: 05/10/17  8:05 AM   Result Value Ref Range    WBC 5.9 4.0 - 11.0 10e9/L    RBC Count 5.29 4.4 - 5.9 10e12/L    Hemoglobin 15.2 13.3 - 17.7 g/dL    Hematocrit 45.1 40.0 - 53.0 %    MCV 85 78 - 100 fl    MCH 28.7 26.5 - 33.0 pg    MCHC 33.7 31.5 - 36.5 g/dL    RDW 14.1 10.0 - 15.0 %    Platelet Count 224 150 - 450 10e9/L    Diff Method Automated Method     % Neutrophils 48.7 %    % Lymphocytes 38.9 %    % Monocytes 8.5 %    % Eosinophils  2.9 %    % Basophils 0.7 %    % Immature Granulocytes 0.3 %    Nucleated RBCs 0 0 /100    Absolute Neutrophil 2.9 1.6 - 8.3 10e9/L    Absolute Lymphocytes 2.3 0.8 - 5.3 10e9/L    Absolute Monocytes 0.5 0.0 - 1.3 10e9/L    Absolute Eosinophils 0.2 0.0 - 0.7 10e9/L    Absolute Basophils 0.0 0.0 - 0.2 10e9/L    Abs Immature Granulocytes 0.0 0 - 0.4 10e9/L    Absolute Nucleated RBC 0.0    Comprehensive metabolic panel    Collection Time: 05/10/17  8:05 AM   Result Value Ref Range    Sodium 142 133 - 144 mmol/L    Potassium 3.8 3.4 - 5.3 mmol/L    Chloride 109 94 - 109 mmol/L    Carbon Dioxide 24 20 - 32 mmol/L    Anion Gap 9 3 - 14 mmol/L    Glucose 88 70 - 99 mg/dL    Urea Nitrogen 14 7 - 30 mg/dL    Creatinine 0.93 0.66 - 1.25 mg/dL    GFR Estimate 84 >60 mL/min/1.7m2    GFR Estimate If Black >90   GFR Calc   >60 mL/min/1.7m2    Calcium 8.9 8.5 - 10.1 mg/dL    Bilirubin Total 0.4 0.2 - 1.3 mg/dL    Albumin 3.5 3.4 - 5.0 g/dL    Protein Total 7.3 6.8 - 8.8 g/dL    Alkaline Phosphatase 79 40 - 150 U/L    ALT 21 0 - 70 U/L    AST 14 0 - 45 U/L   Hemoglobin A1c    Collection Time: 05/10/17  8:05 AM   Result Value Ref Range    Hemoglobin A1C 5.6 4.3 - 6.0 %   Prealbumin    Collection Time: 05/10/17  8:05 AM   Result Value Ref Range    Prealbumin 30 15 - 45 mg/dL   Lipid Profile    Collection Time: 05/10/17  8:05 AM   Result Value Ref Range    Cholesterol 157 <200 mg/dL    Triglycerides 98 <150 mg/dL    HDL Cholesterol 43 >39 mg/dL    LDL Cholesterol Calculated 94 <100 mg/dL    Non HDL Cholesterol 113 <130 mg/dL   25 Hydroxyvitamin D2 and D3    Collection Time: 05/10/17  8:05 AM   Result Value Ref Range    25 OH Vit D2 <5 ug/L    25 OH Vit D3 13 ug/L    25 OH Vit D total (L) 20 - 75 ug/L     <18  Season, race, dietary intake, and treatment affect the concentration of   25-hydroxy-Vitamin D. Values may decrease during winter months and increase   during summer months. Values 20-29 ug/L may indicate Vitamin D  insufficiency   and values <20 ug/L may indicate Vitamin D deficiency.   This test was developed and its performance characteristics determined by the   [facility],  [facility]. It has   not been cleared or approved by the FDA. The laboratory is regulated under CLIA   as qualified to perform high-complexity testing. This test is used for clinical   purposes. It should not be regarded as investigational or for research.     CEA    Collection Time: 05/10/17  8:05 AM   Result Value Ref Range    CEA 0.5 0 - 2.5 ug/L   ABO/Rh type and screen    Collection Time: 05/10/17  8:05 AM   Result Value Ref Range    ABO O     RH(D)  Pos     Antibody Screen Neg     Test Valid Only At       [facility]    Specimen Expires 05/13/2017     Blood Bank Comment       This specimen was not labeled according to requirements for establishing a blood   type for transfusion purposes.  The patient can be transfused with type O red   cells until a new specimen is obtained to confirm the patient's blood type, at   which time type-specific blood can be transfused.  Blood bank preadmit extension form received on 5/10/17. JA     INR    Collection Time: 05/10/17  8:05 AM   Result Value Ref Range    INR 1.04 0.86 - 1.14   Alpha-1-antitrypsin total    Collection Time: 05/10/17  8:05 AM   Result Value Ref Range    Alpha 1 Antitrypsin 136 80 - 200 mg/dL   Hepatitis B Surface Antibody    Collection Time: 05/10/17  8:05 AM   Result Value Ref Range    Hepatitis B Surface Antibody 66.24 (H) <8.00 m[IU]/mL   D dimer quantitative    Collection Time: 05/10/17  8:05 AM   Result Value Ref Range    D Dimer  0.0 - 0.50 ug/ml FEU     <0.3  This D-dimer assay is intended for use in conjuntion with a clinical pretest   probability assessment model to exclude pulmonary embolism (PE) and as an aid   in the diagnosis of deep venous thrombosis (DVT) in outpatients suspected of PE    or DVT. The cut-off value is 0.5 g/mL FEU.     Hepatitis B surface antigen    Collection Time: 05/10/17  8:05 AM   Result Value Ref Range    Hep B Surface Agn Nonreactive NR   Hepatitis C antibody    Collection Time: 05/10/17  8:05 AM   Result Value Ref Range    Hepatitis C Antibody  NR     Nonreactive   Assay performance characteristics have not been established for newborns,   infants, and children     HIV Antigen Antibody Combo    Collection Time: 05/10/17  8:05 AM   Result Value Ref Range    HIV Antigen Antibody Combo  NR     Nonreactive   HIV-1 p24 Ag & HIV-1/HIV-2 Ab Not Detected     IgA    Collection Time: 05/10/17  8:05 AM   Result Value Ref Range     70 - 380 mg/dL   M Tuberculosis by Quantiferon    Collection Time: 05/10/17  8:05 AM   Result Value Ref Range    M Tuberculosis Result (A) NEG     Positive   The magnitude of the measured IFN-gamma level cannot be correlated to stage or   degree of infection, level of immune responsiveness, or likelihood for   progression to active disease.      M Tuberculosis Antigen Value 3.07 IU/mL   C-peptide    Collection Time: 05/10/17  8:19 AM   Result Value Ref Range    C Peptide 3.6 0.9 - 6.9 ng/mL   Glucose    Collection Time: 05/10/17  8:19 AM   Result Value Ref Range    Glucose 88 70 - 99 mg/dL   C-peptide    Collection Time: 05/10/17  9:20 AM   Result Value Ref Range    C Peptide 13.2 (H) 0.9 - 6.9 ng/mL   Glucose    Collection Time: 05/10/17  9:20 AM   Result Value Ref Range    Glucose 125 (H) 70 - 99 mg/dL   Routine UA with microscopic    Collection Time: 05/10/17 10:15 AM   Result Value Ref Range    Color Urine Yellow     Appearance Urine Clear     Glucose Urine Negative NEG mg/dL    Bilirubin Urine Negative NEG    Ketones Urine Negative NEG mg/dL    Specific Gravity Urine 1.022 1.003 - 1.035    Blood Urine Negative NEG    pH Urine 6.5 5.0 - 7.0 pH    Protein Albumin Urine Negative NEG mg/dL    Urobilinogen mg/dL Normal 0.0 - 2.0 mg/dL    Nitrite Urine  Negative NEG    Leukocyte Esterase Urine Negative NEG    Source Midstream Urine     WBC Urine <1 0 - 2 /HPF    RBC Urine 2 0 - 2 /HPF    Transitional Epi <1 0 - 1 /HPF    Mucous Urine Present (A) NEG /LPF   Microalbumin quantitative random urine    Collection Time: 05/10/17 10:15 AM   Result Value Ref Range    Creatinine Urine 178 mg/dL    Albumin Urine mg/L 7 mg/L    Albumin Urine mg/g Cr 4.12 0 - 17 mg/g Cr   Urine Culture Aerobic Bacterial    Collection Time: 05/10/17 10:15 AM   Result Value Ref Range    Specimen Description Midstream Urine     Special Requests Specimen received in preservative     Culture Micro No growth     Micro Report Status FINAL 05/11/2017    C-peptide    Collection Time: 05/10/17 10:20 AM   Result Value Ref Range    C Peptide 7.4 (H) 0.9 - 6.9 ng/mL   Glucose    Collection Time: 05/10/17 10:20 AM   Result Value Ref Range    Glucose 86 70 - 99 mg/dL     Labs Resulted Today:   Results for orders placed or performed in visit on 05/10/17   CT Abdomen Pelvis w Contrast    Narrative    EXAMINATION: CT ABDOMEN PELVIS W CONTRAST, 5/10/2017 3:02 PM    TECHNIQUE:  Helical CT images from the lung bases through the  symphysis pubis were obtained with IV contrast. Contrast dose: Isovue  370 135cc    COMPARISON: CT abdomen 9/9/2015.    HISTORY: Other chronic pancreatitis    FINDINGS:    Abdomen and pelvis: Persistent predominantly left intrahepatic biliary  ducts and common bile duct pneumobilia, secondary to sphincterotomy.  Gallbladder is surgically absent. Liver, spleen, adrenal glands,  kidneys are within normal limits. Unchanged right simple renal cyst.  Mild pancreatic tail and body atrophy without calcification or  enhancing lesion. No overt pancreatic duct dilatation. No bowel  obstruction. No focal bowel wall thickening. Sigmoid diverticulosis  without acute diverticulitis. No suspicious retroperitoneal or pelvic  adenopathy.    Lung bases: No pleural effusion. Stable 4 mm left lower  pulmonary  nodule, since 9/9/2015, statistically benign (series 2, image 11).    Bones and soft tissues: Small fat-containing periumbilical hernia. No  suspicious osseous lesion. Moderate degenerative changes of lower  lumbar spine with multiple anterior osteophyte of the lower thoracic  spine.       Impression    IMPRESSION:   1. Persistent left intrahepatic biliary and common bile duct  pneumobilia, secondary to sphincterotomy.  2. Mild atrophy of pancreatic tail and body without pancreatic  calcification or ductal dilatation.    I have personally reviewed the examination and initial interpretation  and I agree with the findings.    SEFERINO LAYTON MD     Labs Drawn and in Process:   Unresulted Labs Ordered in the Past 30 Days of this Admission     Date and Time Order Name Status Description    5/10/2017 0843 VITAMIN E In process     5/10/2017 0843 CANCER ANTIGEN 19-9 In process     5/10/2017 0843 VITAMIN A In process           Recent Labs   Lab Test  05/10/17   0805  11/11/15   1342  09/10/15   0910   HGB  15.2  13.9  14.3   PLT  224  176  198   INR  1.04   --    --    NA  142  141  141   POTASSIUM  3.8  4.0  4.2   CR  0.93  0.85  0.92   A1C  5.6   --   5.4     Assessment/Plan                                                      Mr. Arita is a 56 year old male who is appropriate for TPIAT    1. Labs reviewed. Findings requiring additional evaluation before surgery: No  2. Consent: Done  3. 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  He continues to feel miserable with CP pain and nausea.  He has no more questions.  Will proceed on Monday with TPIAT, reconstruction, splenectomy and G-J tube.  Consented.  See Eileen Santana note.    Again, thank you for allowing me to participate in the care of your patient.       Sincerely,    Rodrigo Jaquez MD

## 2017-05-11 NOTE — PROGRESS NOTES
Transplant Surgery H&P                                                        HPI:                                                      Mr. Arita is a 56 year old [unfilled] who comes to clinic today for preop prior to planned TPIAT. The patient was previously reviewed by the multidisciplinary selection committee and found to be medically and psychosocially appropriate for  TPIAT.        Special considerations:  PONV: no  Confucianist: No    MEDICAL HISTORY:                                                    There are no active problems to display for this patient.     Past Medical History:   Diagnosis Date     Depression      Eye injury, penetrating 1978    left eye, blurry vision long distance      Pancreatic disease     pancreatitis     Pancreatitis 2012    outside notes indicate lipase elevations; labs available: 4/11/12 lipase 563 (); 5/7/12 1224 ()     Thyroid cancer (H)      Thyroid disease     thyroid cancer     Ventral hernia 2017    Present for a year, no pain     Past Surgical History:   Procedure Laterality Date     CHOLECYSTECTOMY  4/2012    [facility] eye Left 1978    for eye injury     ENDOSCOPIC RETROGRADE CHOLANGIOPANCREATOGRAM  4/18/2012    with sphincterotomy     ENDOSCOPIC ULTRASOUND UPPER GASTROINTESTINAL TRACT (GI) N/A 11/11/2015    Procedure: ENDOSCOPIC ULTRASOUND, ESOPHAGOSCOPY / UPPER GASTROINTESTINAL TRACT (GI);  Surgeon: Emeka Mcleod MD;  Location: [facility] OR     FEEDING TUBE REPLACEMENT  2/3/2015     HERNIA REPAIR, INGUINAL RT/LT Left 1980     LAPAROTOMY EXPLORATORY  12/31/2014    WITH sphincterotom (transduodenal), resection of sphincter of Oddi, lysis of adhesions     NERVE BLOCK PERIPHERAL  12/31/2014    U/S guided transversus abdominus plane peripheral field nerve block     THYROIDECTOMY  12/28/2011     transduodenal sphincteroplasty  12/31/2014     Current Outpatient Prescriptions   Medication Sig Dispense Refill     Pancrelipase,  Lip-Prot-Amyl, (ZENPEP) 06149 UNITS CPEP Take 80,000 Units by mouth 3 times daily       LEVOTHYROXINE SODIUM PO Take 150 mcg by mouth every morning        FLUoxetine HCl (PROZAC PO) Take 10 mg by mouth every morning        fentaNYL (DURAGESIC) 25 mcg/hr patch 72 hr Place 1 patch onto the skin every 72 hours Patient uses 12 mcg patch cut in half every 72 hours.       OTC products: None, except as noted above    No Known Allergies     Social History   Substance Use Topics     Smoking status: Never Smoker     Smokeless tobacco: Never Used     Alcohol use No     OR  Social History     Social History     Marital status:      Spouse name: N/A     Number of children: N/A     Years of education: N/A     Occupational History     MD      Social History Main Topics     Smoking status: Never Smoker     Smokeless tobacco: Never Used     Alcohol use No     Drug use: No     Sexual activity: Not on file     Other Topics Concern     Not on file     Social History Narrative    Sohan is , 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  He is a non-smoker and has never drank alcohol. He is a twin.         History   Drug Use No       Family History   Problem Relation Age of Onset     Pancreatitis No family hx of      DIABETES No family hx of        REVIEW OF SYSTEMS:                                                    C: NEGATIVE for fever, chills, change in weight  I: NEGATIVE for worrisome rashes, moles or lesions  E: NEGATIVE for vision changes or irritation  E/M: NEGATIVE for ear, mouth and throat problems  R: NEGATIVE for significant cough or SOB  B: NEGATIVE for masses, tenderness or discharge  CV: NEGATIVE for chest pain, palpitations or peripheral edema  GI: POSITIVE for abdominal pain generalized, constipation, diarrhea and poor appetite  : NEGATIVE for frequency, dysuria, or hematuria  M: NEGATIVE for significant arthralgias or myalgia  N: NEGATIVE for weakness, dizziness or paresthesias  E: NEGATIVE for temperature  intolerance, skin/hair changes  H: NEGATIVE for bleeding problems  P: NEGATIVE for changes in mood or affect    EXAM:                                                         GENERAL APPEARANCE: healthy, alert and no distress     EYES: EOMI,  PERRL     HENT: ear canals and TM's normal and nose and mouth without ulcers or lesions     NECK: no adenopathy, no asymmetry, masses, or scars and thyroid normal to palpation     RESP: lungs clear to auscultation - no rales, rhonchi or wheezes     CV: regular rates and rhythm, normal S1 S2, no S3 or S4 and no murmur, click or rub     ABDOMEN:  soft, nontender, no HSM or masses and bowel sounds normal     MS: extremities normal- no gross deformities noted, no evidence of inflammation in joints, FROM in all extremities.     SKIN: no suspicious lesions or rashes     NEURO: Normal strength and tone, sensory exam grossly normal, mentation intact and speech normal     PSYCH: mentation appears normal. and affect normal/bright     LYMPHATICS: No axillary, cervical, or supraclavicular nodes      DIAGNOSTICS:                                                       Recent Results (from the past 672 hour(s))   CBC with platelets differential    Collection Time: 05/10/17  8:05 AM   Result Value Ref Range    WBC 5.9 4.0 - 11.0 10e9/L    RBC Count 5.29 4.4 - 5.9 10e12/L    Hemoglobin 15.2 13.3 - 17.7 g/dL    Hematocrit 45.1 40.0 - 53.0 %    MCV 85 78 - 100 fl    MCH 28.7 26.5 - 33.0 pg    MCHC 33.7 31.5 - 36.5 g/dL    RDW 14.1 10.0 - 15.0 %    Platelet Count 224 150 - 450 10e9/L    Diff Method Automated Method     % Neutrophils 48.7 %    % Lymphocytes 38.9 %    % Monocytes 8.5 %    % Eosinophils 2.9 %    % Basophils 0.7 %    % Immature Granulocytes 0.3 %    Nucleated RBCs 0 0 /100    Absolute Neutrophil 2.9 1.6 - 8.3 10e9/L    Absolute Lymphocytes 2.3 0.8 - 5.3 10e9/L    Absolute Monocytes 0.5 0.0 - 1.3 10e9/L    Absolute Eosinophils 0.2 0.0 - 0.7 10e9/L    Absolute Basophils 0.0 0.0 - 0.2  10e9/L    Abs Immature Granulocytes 0.0 0 - 0.4 10e9/L    Absolute Nucleated RBC 0.0    Comprehensive metabolic panel    Collection Time: 05/10/17  8:05 AM   Result Value Ref Range    Sodium 142 133 - 144 mmol/L    Potassium 3.8 3.4 - 5.3 mmol/L    Chloride 109 94 - 109 mmol/L    Carbon Dioxide 24 20 - 32 mmol/L    Anion Gap 9 3 - 14 mmol/L    Glucose 88 70 - 99 mg/dL    Urea Nitrogen 14 7 - 30 mg/dL    Creatinine 0.93 0.66 - 1.25 mg/dL    GFR Estimate 84 >60 mL/min/1.7m2    GFR Estimate If Black >90   GFR Calc   >60 mL/min/1.7m2    Calcium 8.9 8.5 - 10.1 mg/dL    Bilirubin Total 0.4 0.2 - 1.3 mg/dL    Albumin 3.5 3.4 - 5.0 g/dL    Protein Total 7.3 6.8 - 8.8 g/dL    Alkaline Phosphatase 79 40 - 150 U/L    ALT 21 0 - 70 U/L    AST 14 0 - 45 U/L   Hemoglobin A1c    Collection Time: 05/10/17  8:05 AM   Result Value Ref Range    Hemoglobin A1C 5.6 4.3 - 6.0 %   Prealbumin    Collection Time: 05/10/17  8:05 AM   Result Value Ref Range    Prealbumin 30 15 - 45 mg/dL   Lipid Profile    Collection Time: 05/10/17  8:05 AM   Result Value Ref Range    Cholesterol 157 <200 mg/dL    Triglycerides 98 <150 mg/dL    HDL Cholesterol 43 >39 mg/dL    LDL Cholesterol Calculated 94 <100 mg/dL    Non HDL Cholesterol 113 <130 mg/dL   25 Hydroxyvitamin D2 and D3    Collection Time: 05/10/17  8:05 AM   Result Value Ref Range    25 OH Vit D2 <5 ug/L    25 OH Vit D3 13 ug/L    25 OH Vit D total (L) 20 - 75 ug/L     <18  Season, race, dietary intake, and treatment affect the concentration of   25-hydroxy-Vitamin D. Values may decrease during winter months and increase   during summer months. Values 20-29 ug/L may indicate Vitamin D insufficiency   and values <20 ug/L may indicate Vitamin D deficiency.   This test was developed and its performance characteristics determined by the   [facility],  Special [facility] Laboratory. It has   not been cleared or approved by the FDA. The laboratory is  regulated under CLIA   as qualified to perform high-complexity testing. This test is used for clinical   purposes. It should not be regarded as investigational or for research.     CEA    Collection Time: 05/10/17  8:05 AM   Result Value Ref Range    CEA 0.5 0 - 2.5 ug/L   ABO/Rh type and screen    Collection Time: 05/10/17  8:05 AM   Result Value Ref Range    ABO O     RH(D)  Pos     Antibody Screen Neg     Test Valid Only At       [facility]    Specimen Expires 05/13/2017     Blood Bank Comment       This specimen was not labeled according to requirements for establishing a blood   type for transfusion purposes.  The patient can be transfused with type O red   cells until a new specimen is obtained to confirm the patient's blood type, at   which time type-specific blood can be transfused.  Blood bank preadmit extension form received on 5/10/17. WAQAR     INR    Collection Time: 05/10/17  8:05 AM   Result Value Ref Range    INR 1.04 0.86 - 1.14   Alpha-1-antitrypsin total    Collection Time: 05/10/17  8:05 AM   Result Value Ref Range    Alpha 1 Antitrypsin 136 80 - 200 mg/dL   Hepatitis B Surface Antibody    Collection Time: 05/10/17  8:05 AM   Result Value Ref Range    Hepatitis B Surface Antibody 66.24 (H) <8.00 m[IU]/mL   D dimer quantitative    Collection Time: 05/10/17  8:05 AM   Result Value Ref Range    D Dimer  0.0 - 0.50 ug/ml FEU     <0.3  This D-dimer assay is intended for use in conjuntion with a clinical pretest   probability assessment model to exclude pulmonary embolism (PE) and as an aid   in the diagnosis of deep venous thrombosis (DVT) in outpatients suspected of PE   or DVT. The cut-off value is 0.5 g/mL FEU.     Hepatitis B surface antigen    Collection Time: 05/10/17  8:05 AM   Result Value Ref Range    Hep B Surface Agn Nonreactive NR   Hepatitis C antibody    Collection Time: 05/10/17  8:05 AM   Result Value Ref Range    Hepatitis C Antibody  NR      Nonreactive   Assay performance characteristics have not been established for newborns,   infants, and children     HIV Antigen Antibody Combo    Collection Time: 05/10/17  8:05 AM   Result Value Ref Range    HIV Antigen Antibody Combo  NR     Nonreactive   HIV-1 p24 Ag & HIV-1/HIV-2 Ab Not Detected     IgA    Collection Time: 05/10/17  8:05 AM   Result Value Ref Range     70 - 380 mg/dL   M Tuberculosis by Quantiferon    Collection Time: 05/10/17  8:05 AM   Result Value Ref Range    M Tuberculosis Result (A) NEG     Positive   The magnitude of the measured IFN-gamma level cannot be correlated to stage or   degree of infection, level of immune responsiveness, or likelihood for   progression to active disease.      M Tuberculosis Antigen Value 3.07 IU/mL   C-peptide    Collection Time: 05/10/17  8:19 AM   Result Value Ref Range    C Peptide 3.6 0.9 - 6.9 ng/mL   Glucose    Collection Time: 05/10/17  8:19 AM   Result Value Ref Range    Glucose 88 70 - 99 mg/dL   C-peptide    Collection Time: 05/10/17  9:20 AM   Result Value Ref Range    C Peptide 13.2 (H) 0.9 - 6.9 ng/mL   Glucose    Collection Time: 05/10/17  9:20 AM   Result Value Ref Range    Glucose 125 (H) 70 - 99 mg/dL   Routine UA with microscopic    Collection Time: 05/10/17 10:15 AM   Result Value Ref Range    Color Urine Yellow     Appearance Urine Clear     Glucose Urine Negative NEG mg/dL    Bilirubin Urine Negative NEG    Ketones Urine Negative NEG mg/dL    Specific Gravity Urine 1.022 1.003 - 1.035    Blood Urine Negative NEG    pH Urine 6.5 5.0 - 7.0 pH    Protein Albumin Urine Negative NEG mg/dL    Urobilinogen mg/dL Normal 0.0 - 2.0 mg/dL    Nitrite Urine Negative NEG    Leukocyte Esterase Urine Negative NEG    Source Midstream Urine     WBC Urine <1 0 - 2 /HPF    RBC Urine 2 0 - 2 /HPF    Transitional Epi <1 0 - 1 /HPF    Mucous Urine Present (A) NEG /LPF   Microalbumin quantitative random urine    Collection Time: 05/10/17 10:15 AM   Result  Value Ref Range    Creatinine Urine 178 mg/dL    Albumin Urine mg/L 7 mg/L    Albumin Urine mg/g Cr 4.12 0 - 17 mg/g Cr   Urine Culture Aerobic Bacterial    Collection Time: 05/10/17 10:15 AM   Result Value Ref Range    Specimen Description Midstream Urine     Special Requests Specimen received in preservative     Culture Micro No growth     Micro Report Status FINAL 05/11/2017    C-peptide    Collection Time: 05/10/17 10:20 AM   Result Value Ref Range    C Peptide 7.4 (H) 0.9 - 6.9 ng/mL   Glucose    Collection Time: 05/10/17 10:20 AM   Result Value Ref Range    Glucose 86 70 - 99 mg/dL     Labs Resulted Today:   Results for orders placed or performed in visit on 05/10/17   CT Abdomen Pelvis w Contrast    Narrative    EXAMINATION: CT ABDOMEN PELVIS W CONTRAST, 5/10/2017 3:02 PM    TECHNIQUE:  Helical CT images from the lung bases through the  symphysis pubis were obtained with IV contrast. Contrast dose: Isovue  370 135cc    COMPARISON: CT abdomen 9/9/2015.    HISTORY: Other chronic pancreatitis    FINDINGS:    Abdomen and pelvis: Persistent predominantly left intrahepatic biliary  ducts and common bile duct pneumobilia, secondary to sphincterotomy.  Gallbladder is surgically absent. Liver, spleen, adrenal glands,  kidneys are within normal limits. Unchanged right simple renal cyst.  Mild pancreatic tail and body atrophy without calcification or  enhancing lesion. No overt pancreatic duct dilatation. No bowel  obstruction. No focal bowel wall thickening. Sigmoid diverticulosis  without acute diverticulitis. No suspicious retroperitoneal or pelvic  adenopathy.    Lung bases: No pleural effusion. Stable 4 mm left lower pulmonary  nodule, since 9/9/2015, statistically benign (series 2, image 11).    Bones and soft tissues: Small fat-containing periumbilical hernia. No  suspicious osseous lesion. Moderate degenerative changes of lower  lumbar spine with multiple anterior osteophyte of the lower thoracic  spine.        Impression    IMPRESSION:   1. Persistent left intrahepatic biliary and common bile duct  pneumobilia, secondary to sphincterotomy.  2. Mild atrophy of pancreatic tail and body without pancreatic  calcification or ductal dilatation.    I have personally reviewed the examination and initial interpretation  and I agree with the findings.    SEFERINO LAYTON MD     Labs Drawn and in Process:   Unresulted Labs Ordered in the Past 30 Days of this Admission     Date and Time Order Name Status Description    5/10/2017 0843 VITAMIN E In process     5/10/2017 0843 CANCER ANTIGEN 19-9 In process     5/10/2017 0843 VITAMIN A In process           Recent Labs   Lab Test  05/10/17   0805  11/11/15   1342  09/10/15   0910   HGB  15.2  13.9  14.3   PLT  224  176  198   INR  1.04   --    --    NA  142  141  141   POTASSIUM  3.8  4.0  4.2   CR  0.93  0.85  0.92   A1C  5.6   --   5.4     Assessment/Plan                                                      Mr. Arita is a 56 year old male who is appropriate for TPIAT    1. Labs reviewed. Findings requiring additional evaluation before surgery: No  2. Consent: Done  3. Outstanding issues: None    Signed Electronically by: Eileen Santana, CNP

## 2017-05-11 NOTE — MR AVS SNAPSHOT
After Visit Summary   5/11/2017    Sohan Arita    MRN: [id number]           Patient Information     Date Of Birth          1960        Visit Information        Provider Department      5/11/2017 11:30 AM Rodrigo Jaquez MD [facility] Solid Organ Transplant        Today's Diagnoses     Chronic pancreatitis, unspecified pancreatitis type (H)    -  1    Preoperative testing           Follow-ups after your visit        Your next 10 appointments already scheduled     Jun 22, 2017 12:00 PM CDT   (Arrive by 11:45 AM)   Post-Op with Rodrigo Jaquez MD   [facility] Solid Organ Transplant ([facility])    [address] [place] [id number]   [phone]            Jul 12, 2017  9:30 AM CDT   (Arrive by 9:15 AM)   Office Visit with Evelyne Diez RD   [facility] Diabetes ([facility])    [address] [place] [id number] [phone]           Bring a current list of meds and any records pertaining to this visit.  For Physicals, please bring immunization records and any forms needing to be filled out.  Please arrive 10 minutes early to complete paperwork.            Jul 18, 2017  8:30 AM CDT   (Arrive by 8:15 AM)   RETURN DIABETES with Lexy Brunson PA-C   [facility] Endocrinology ([facility])    [address] [place] [id number] [phone]              Future tests that were ordered for you today     Open Future Orders        Priority Expected Expires Ordered    INR Routine  7/19/2017 6/19/2017    CT Abdomen Pelvis w Contrast Routine  6/19/2018 6/19/2017    US Lower Extremity Venous Duplex Bilateral Today  6/19/2018 6/19/2017    TSH with free T4 reflex Routine  6/19/2018 6/19/2017            Who to contact     If you have questions or need follow up information about today's clinic visit or your schedule please contact [facility]  "[facility] directly at [phone].  Normal or non-critical lab and imaging results will be communicated to you by MyChart, letter or phone within 4 business days after the clinic has received the results. If you do not hear from us within 7 days, please contact the clinic through [employer] or phone. If you have a critical or abnormal lab result, we will notify you by phone as soon as possible.  Submit refill requests through Snap[employer] or call your pharmacy and they will forward the refill request to us. Please allow 3 business days for your refill to be completed.          Additional Information About Your Visit        [employer] Mobile Information     [employer]apdeal lets you send messages to your doctor, view your test results, renew your prescriptions, schedule appointments and more. To sign up, go to [email or web address] . Click on \"Log in\" on the left side of the screen, which will take you to the Welcome page. Then click on \"Sign up Now\" on the right side of the page.     You will be asked to enter the access code listed below, as well as some personal information. Please follow the directions to create your username and password.     Your access code is: [email or web address]  Expires: 2017  6:30 AM     Your access code will  in 90 days. If you need help or a new code, please call your [facility] clinic or [phone].        Care EveryWhere ID     This is your Care EveryWhere ID. This could be used by other organizations to access your [facility] medical records  [id number]         Blood Pressure from Last 3 Encounters:   17 128/79   06/15/17 123/78   17 126/74    Weight from Last 3 Encounters:   17 100.9 kg (222 lb 8 oz)   17 100.9 kg (222 lb 6.4 oz)   17 104.9 kg (231 lb 4.8 oz)              Today, you had the following     No orders found for display       Primary Care Provider Office Phone # Fax #    Frandy Frye [phone]       [address]  [facility] [address]        Thank you!     Thank you for choosing [facility]  for your care. Our goal is always to provide you with excellent care. Hearing back from our patients is one way we can continue to improve our services. Please take a few minutes to complete the written survey that you may receive in the mail after your visit with us. Thank you!             Your Updated Medication List - Protect others around you: Learn how to safely use, store and throw away your medicines at www.disposemymeds.org.          This list is accurate as of: 5/11/17 11:59 PM.  Always use your most recent med list.                   Brand Name Dispense Instructions for use    amylase-lipase-protease 65482 UNITS Cpep    CREON 12    270 capsule    Take 3-4 capsules (36,000-48,000 Units) by mouth every hour as needed (with snacks)       BD SHARPS  Misc     1 each    1 Container as needed       blood glucose monitoring lancets     1 Box    Use to test blood sugar 8 times daily or as directed.       FLUoxetine 20 MG/5ML solution    PROzac    75 mL    2.5 mLs (10 mg) by Per J Tube route daily       glucose 40 % Gel gel     3 Tube    Take 15-30 g by mouth every 15 minutes as needed for low blood sugar       insulin pen needle 32G X 4 MM     100 each    Use 8 pen needles daily or as directed.       levothyroxine 25 mcg/mL Susp    SYNTHROID    180 mL    6 mLs (150 mcg) by Per J Tube route every morning (before breakfast)       magnesium hydroxide 400 MG/5ML suspension    MILK OF MAGNESIA    105 mL    30 mLs by Per Feeding Tube route 2 times daily as needed for constipation       pantoprazole Susp suspension    PROTONIX    1200 mL    20 mLs (40 mg) by Oral or J tube route 2 times daily       sennosides 8.8 MG/5ML syrup    SENOKOT    600 mL    10 mLs by Per Feeding Tube route 2 times daily

## 2017-05-13 LAB
A-TOCOPHEROL VIT E SERPL-MCNC: 8.2
ANNOTATION COMMENT IMP: NORMAL
BETA+GAMMA TOCOPHEROL SERPL-MCNC: 1.4 MG/DL
RETINYL PALMITATE SERPL-MCNC: NORMAL UG/ML
VIT A SERPL-MCNC: 0.62 UG/ML

## 2017-05-15 ENCOUNTER — HOSPITAL ENCOUNTER (INPATIENT)
Facility: CLINIC | Age: 57
LOS: 11 days | Discharge: HOME HEALTH | DRG: 406 | End: 2017-05-26
Attending: TRANSPLANT SURGERY | Admitting: ANESTHESIOLOGY
Payer: COMMERCIAL

## 2017-05-15 ENCOUNTER — APPOINTMENT (OUTPATIENT)
Dept: GENERAL RADIOLOGY | Facility: CLINIC | Age: 57
DRG: 406 | End: 2017-05-15
Attending: TRANSPLANT SURGERY
Payer: COMMERCIAL

## 2017-05-15 ENCOUNTER — ANESTHESIA (OUTPATIENT)
Dept: SURGERY | Facility: CLINIC | Age: 57
DRG: 406 | End: 2017-05-15
Payer: COMMERCIAL

## 2017-05-15 DIAGNOSIS — Z90.410 POST-PANCREATECTOMY DIABETES (H): ICD-10-CM

## 2017-05-15 DIAGNOSIS — Z90.410 HISTORY OF PANCREATECTOMY: ICD-10-CM

## 2017-05-15 DIAGNOSIS — E89.1 POST-PANCREATECTOMY DIABETES (H): ICD-10-CM

## 2017-05-15 DIAGNOSIS — Z90.410 ACQUIRED TOTAL ABSENCE OF PANCREAS: ICD-10-CM

## 2017-05-15 DIAGNOSIS — K59.09 OTHER CONSTIPATION: ICD-10-CM

## 2017-05-15 DIAGNOSIS — Z78.9 ON ENTERAL NUTRITION: ICD-10-CM

## 2017-05-15 DIAGNOSIS — K86.1 CHRONIC PANCREATITIS, UNSPECIFIED PANCREATITIS TYPE (H): Primary | ICD-10-CM

## 2017-05-15 DIAGNOSIS — E83.39 HYPOPHOSPHATEMIA: ICD-10-CM

## 2017-05-15 DIAGNOSIS — E13.9 POST-PANCREATECTOMY DIABETES (H): ICD-10-CM

## 2017-05-15 LAB
ABO + RH BLD: NORMAL
ALBUMIN SERPL-MCNC: 3 G/DL (ref 3.4–5)
ALP SERPL-CCNC: 34 U/L (ref 40–150)
ALT SERPL W P-5'-P-CCNC: 163 U/L (ref 0–70)
ANION GAP SERPL CALCULATED.3IONS-SCNC: 8 MMOL/L (ref 3–14)
APTT PPP: 104 SEC (ref 22–37)
AST SERPL W P-5'-P-CCNC: 164 U/L (ref 0–45)
BASE DEFICIT BLDA-SCNC: 0.8 MMOL/L
BASE DEFICIT BLDA-SCNC: 1.9 MMOL/L
BASE DEFICIT BLDA-SCNC: 3.2 MMOL/L
BASE DEFICIT BLDA-SCNC: 3.9 MMOL/L
BASE DEFICIT BLDA-SCNC: 4.6 MMOL/L
BASE DEFICIT BLDA-SCNC: 5.6 MMOL/L
BASE DEFICIT BLDA-SCNC: 6.9 MMOL/L
BASE DEFICIT BLDA-SCNC: 7.1 MMOL/L
BILIRUB SERPL-MCNC: 1.5 MG/DL (ref 0.2–1.3)
BLD GP AB SCN SERPL QL: NORMAL
BLD GP AB SCN SERPL QL: NORMAL
BLOOD BANK CMNT PATIENT-IMP: NORMAL
BUN SERPL-MCNC: 16 MG/DL (ref 7–30)
CA-I BLD-MCNC: 4.3 MG/DL (ref 4.4–5.2)
CA-I BLD-MCNC: 4.4 MG/DL (ref 4.4–5.2)
CA-I BLD-MCNC: 4.5 MG/DL (ref 4.4–5.2)
CA-I BLD-MCNC: 4.5 MG/DL (ref 4.4–5.2)
CA-I BLD-MCNC: 4.6 MG/DL (ref 4.4–5.2)
CA-I BLD-MCNC: 4.7 MG/DL (ref 4.4–5.2)
CALCIUM SERPL-MCNC: 7.4 MG/DL (ref 8.5–10.1)
CHLORIDE SERPL-SCNC: 116 MMOL/L (ref 94–109)
CO2 SERPL-SCNC: 21 MMOL/L (ref 20–32)
CREAT SERPL-MCNC: 0.93 MG/DL (ref 0.66–1.25)
ERYTHROCYTE [DISTWIDTH] IN BLOOD BY AUTOMATED COUNT: 13.8 % (ref 10–15)
FIBRINOGEN PPP-MCNC: 262 MG/DL (ref 200–420)
GFR SERPL CREATININE-BSD FRML MDRD: 84 ML/MIN/1.7M2
GLUCOSE BLD-MCNC: 101 MG/DL (ref 70–99)
GLUCOSE BLD-MCNC: 114 MG/DL (ref 70–99)
GLUCOSE BLD-MCNC: 115 MG/DL (ref 70–99)
GLUCOSE BLD-MCNC: 115 MG/DL (ref 70–99)
GLUCOSE BLD-MCNC: 118 MG/DL (ref 70–99)
GLUCOSE BLD-MCNC: 119 MG/DL (ref 70–99)
GLUCOSE BLD-MCNC: 130 MG/DL (ref 70–99)
GLUCOSE BLD-MCNC: 98 MG/DL (ref 70–99)
GLUCOSE BLDC GLUCOMTR-MCNC: 101 MG/DL (ref 70–99)
GLUCOSE BLDC GLUCOMTR-MCNC: 103 MG/DL (ref 70–99)
GLUCOSE BLDC GLUCOMTR-MCNC: 106 MG/DL (ref 70–99)
GLUCOSE BLDC GLUCOMTR-MCNC: 111 MG/DL (ref 70–99)
GLUCOSE BLDC GLUCOMTR-MCNC: 120 MG/DL (ref 70–99)
GLUCOSE BLDC GLUCOMTR-MCNC: 131 MG/DL (ref 70–99)
GLUCOSE BLDC GLUCOMTR-MCNC: 87 MG/DL (ref 70–99)
GLUCOSE BLDC GLUCOMTR-MCNC: 98 MG/DL (ref 70–99)
GLUCOSE BLDC GLUCOMTR-MCNC: 99 MG/DL (ref 70–99)
GLUCOSE SERPL-MCNC: 95 MG/DL (ref 70–99)
GRAM STN SPEC: NORMAL
GRAM STN SPEC: NORMAL
HCO3 BLD-SCNC: 18 MMOL/L (ref 21–28)
HCO3 BLD-SCNC: 19 MMOL/L (ref 21–28)
HCO3 BLD-SCNC: 19 MMOL/L (ref 21–28)
HCO3 BLD-SCNC: 21 MMOL/L (ref 21–28)
HCO3 BLD-SCNC: 23 MMOL/L (ref 21–28)
HCO3 BLD-SCNC: 24 MMOL/L (ref 21–28)
HCT VFR BLD AUTO: 33.1 % (ref 40–53)
HGB BLD-MCNC: 10.3 G/DL (ref 13.3–17.7)
HGB BLD-MCNC: 10.6 G/DL (ref 13.3–17.7)
HGB BLD-MCNC: 10.7 G/DL (ref 13.3–17.7)
HGB BLD-MCNC: 11.2 G/DL (ref 13.3–17.7)
HGB BLD-MCNC: 11.5 G/DL (ref 13.3–17.7)
HGB BLD-MCNC: 11.6 G/DL (ref 13.3–17.7)
HGB BLD-MCNC: 11.7 G/DL (ref 13.3–17.7)
HGB BLD-MCNC: 12.6 G/DL (ref 13.3–17.7)
HGB BLD-MCNC: 13.7 G/DL (ref 13.3–17.7)
INR PPP: 1.66 (ref 0.86–1.14)
INTERPRETATION ECG - MUSE: NORMAL
LACTATE BLD-SCNC: 1.2 MMOL/L (ref 0.7–2.1)
LACTATE BLD-SCNC: 1.8 MMOL/L (ref 0.7–2.1)
LACTATE BLD-SCNC: 3.5 MMOL/L (ref 0.7–2.1)
LACTATE BLD-SCNC: 3.9 MMOL/L (ref 0.7–2.1)
MCH RBC QN AUTO: 28.1 PG (ref 26.5–33)
MCHC RBC AUTO-ENTMCNC: 32.3 G/DL (ref 31.5–36.5)
MCV RBC AUTO: 87 FL (ref 78–100)
MICRO REPORT STATUS: NORMAL
MICRO REPORT STATUS: NORMAL
O2/TOTAL GAS SETTING VFR VENT: 29 %
O2/TOTAL GAS SETTING VFR VENT: 30 %
O2/TOTAL GAS SETTING VFR VENT: 30 %
O2/TOTAL GAS SETTING VFR VENT: 31 %
O2/TOTAL GAS SETTING VFR VENT: 35 %
O2/TOTAL GAS SETTING VFR VENT: 60 %
PCO2 BLD: 34 MM HG (ref 35–45)
PCO2 BLD: 35 MM HG (ref 35–45)
PCO2 BLD: 36 MM HG (ref 35–45)
PCO2 BLD: 37 MM HG (ref 35–45)
PCO2 BLD: 38 MM HG (ref 35–45)
PCO2 BLD: 42 MM HG (ref 35–45)
PH BLD: 7.31 PH (ref 7.35–7.45)
PH BLD: 7.32 PH (ref 7.35–7.45)
PH BLD: 7.33 PH (ref 7.35–7.45)
PH BLD: 7.35 PH (ref 7.35–7.45)
PH BLD: 7.38 PH (ref 7.35–7.45)
PH BLD: 7.39 PH (ref 7.35–7.45)
PH BLD: 7.4 PH (ref 7.35–7.45)
PH BLD: 7.4 PH (ref 7.35–7.45)
PLATELET # BLD AUTO: 140 10E9/L (ref 150–450)
PO2 BLD: 123 MM HG (ref 80–105)
PO2 BLD: 131 MM HG (ref 80–105)
PO2 BLD: 135 MM HG (ref 80–105)
PO2 BLD: 140 MM HG (ref 80–105)
PO2 BLD: 152 MM HG (ref 80–105)
PO2 BLD: 152 MM HG (ref 80–105)
PO2 BLD: 231 MM HG (ref 80–105)
PO2 BLD: 40 MM HG (ref 80–105)
POTASSIUM BLD-SCNC: 3.6 MMOL/L (ref 3.4–5.3)
POTASSIUM BLD-SCNC: 3.6 MMOL/L (ref 3.4–5.3)
POTASSIUM BLD-SCNC: 3.8 MMOL/L (ref 3.4–5.3)
POTASSIUM BLD-SCNC: 4.1 MMOL/L (ref 3.4–5.3)
POTASSIUM BLD-SCNC: 4.4 MMOL/L (ref 3.4–5.3)
POTASSIUM BLD-SCNC: 4.5 MMOL/L (ref 3.4–5.3)
POTASSIUM SERPL-SCNC: 4.4 MMOL/L (ref 3.4–5.3)
PROT SERPL-MCNC: 4.7 G/DL (ref 6.8–8.8)
RBC # BLD AUTO: 3.81 10E12/L (ref 4.4–5.9)
SODIUM BLD-SCNC: 139 MMOL/L (ref 133–144)
SODIUM BLD-SCNC: 140 MMOL/L (ref 133–144)
SODIUM BLD-SCNC: 141 MMOL/L (ref 133–144)
SODIUM SERPL-SCNC: 145 MMOL/L (ref 133–144)
SPECIMEN EXP DATE BLD: NORMAL
SPECIMEN EXP DATE BLD: NORMAL
SPECIMEN SOURCE: NORMAL
SPECIMEN SOURCE: NORMAL
WBC # BLD AUTO: 10.9 10E9/L (ref 4–11)

## 2017-05-15 PROCEDURE — 37000008 ZZH ANESTHESIA TECHNICAL FEE, 1ST 30 MIN: Performed by: TRANSPLANT SURGERY

## 2017-05-15 PROCEDURE — 25000128 H RX IP 250 OP 636: Performed by: STUDENT IN AN ORGANIZED HEALTH CARE EDUCATION/TRAINING PROGRAM

## 2017-05-15 PROCEDURE — 82330 ASSAY OF CALCIUM: CPT | Performed by: TRANSPLANT SURGERY

## 2017-05-15 PROCEDURE — 87103 BLOOD FUNGUS CULTURE: CPT | Performed by: TRANSPLANT SURGERY

## 2017-05-15 PROCEDURE — 87040 BLOOD CULTURE FOR BACTERIA: CPT | Performed by: TRANSPLANT SURGERY

## 2017-05-15 PROCEDURE — 88307 TISSUE EXAM BY PATHOLOGIST: CPT | Performed by: TRANSPLANT SURGERY

## 2017-05-15 PROCEDURE — 25000128 H RX IP 250 OP 636: Performed by: NURSE ANESTHETIST, CERTIFIED REGISTERED

## 2017-05-15 PROCEDURE — 36000062 ZZH SURGERY LEVEL 4 1ST 30 MIN - UMMC: Performed by: TRANSPLANT SURGERY

## 2017-05-15 PROCEDURE — 85730 THROMBOPLASTIN TIME PARTIAL: CPT | Performed by: SURGERY

## 2017-05-15 PROCEDURE — 87186 SC STD MICRODIL/AGAR DIL: CPT | Performed by: TRANSPLANT SURGERY

## 2017-05-15 PROCEDURE — 25000125 ZZHC RX 250: Performed by: ANESTHESIOLOGY

## 2017-05-15 PROCEDURE — 25000125 ZZHC RX 250: Performed by: NURSE ANESTHETIST, CERTIFIED REGISTERED

## 2017-05-15 PROCEDURE — 40000014 ZZH STATISTIC ARTERIAL MONITORING DAILY

## 2017-05-15 PROCEDURE — 0FTG0ZZ RESECTION OF PANCREAS, OPEN APPROACH: ICD-10-PCS | Performed by: TRANSPLANT SURGERY

## 2017-05-15 PROCEDURE — 93010 ELECTROCARDIOGRAM REPORT: CPT | Performed by: INTERNAL MEDICINE

## 2017-05-15 PROCEDURE — 93005 ELECTROCARDIOGRAM TRACING: CPT

## 2017-05-15 PROCEDURE — 0DHA0UZ INSERTION OF FEEDING DEVICE INTO JEJUNUM, OPEN APPROACH: ICD-10-PCS | Performed by: TRANSPLANT SURGERY

## 2017-05-15 PROCEDURE — 36000064 ZZH SURGERY LEVEL 4 EA 15 ADDTL MIN - UMMC: Performed by: TRANSPLANT SURGERY

## 2017-05-15 PROCEDURE — 25000125 ZZHC RX 250: Performed by: STUDENT IN AN ORGANIZED HEALTH CARE EDUCATION/TRAINING PROGRAM

## 2017-05-15 PROCEDURE — P9041 ALBUMIN (HUMAN),5%, 50ML: HCPCS | Performed by: TRANSPLANT SURGERY

## 2017-05-15 PROCEDURE — 0JN80ZZ RELEASE ABDOMEN SUBCUTANEOUS TISSUE AND FASCIA, OPEN APPROACH: ICD-10-PCS | Performed by: TRANSPLANT SURGERY

## 2017-05-15 PROCEDURE — 82947 ASSAY GLUCOSE BLOOD QUANT: CPT | Performed by: TRANSPLANT SURGERY

## 2017-05-15 PROCEDURE — 86900 BLOOD TYPING SEROLOGIC ABO: CPT | Performed by: ANESTHESIOLOGY

## 2017-05-15 PROCEDURE — 88305 TISSUE EXAM BY PATHOLOGIST: CPT | Performed by: TRANSPLANT SURGERY

## 2017-05-15 PROCEDURE — 27110038 ZZH RX 271: Performed by: ANESTHESIOLOGY

## 2017-05-15 PROCEDURE — 87077 CULTURE AEROBIC IDENTIFY: CPT | Performed by: TRANSPLANT SURGERY

## 2017-05-15 PROCEDURE — 71000017 ZZH RECOVERY PHASE 1 LEVEL 3 EA ADDTL HR: Performed by: TRANSPLANT SURGERY

## 2017-05-15 PROCEDURE — 71000016 ZZH RECOVERY PHASE 1 LEVEL 3 FIRST HR: Performed by: TRANSPLANT SURGERY

## 2017-05-15 PROCEDURE — 86901 BLOOD TYPING SEROLOGIC RH(D): CPT | Performed by: ANESTHESIOLOGY

## 2017-05-15 PROCEDURE — 84295 ASSAY OF SERUM SODIUM: CPT | Performed by: TRANSPLANT SURGERY

## 2017-05-15 PROCEDURE — 85384 FIBRINOGEN ACTIVITY: CPT | Performed by: SURGERY

## 2017-05-15 PROCEDURE — 82803 BLOOD GASES ANY COMBINATION: CPT | Performed by: TRANSPLANT SURGERY

## 2017-05-15 PROCEDURE — 37000009 ZZH ANESTHESIA TECHNICAL FEE, EACH ADDTL 15 MIN: Performed by: TRANSPLANT SURGERY

## 2017-05-15 PROCEDURE — 84132 ASSAY OF SERUM POTASSIUM: CPT | Performed by: TRANSPLANT SURGERY

## 2017-05-15 PROCEDURE — 25000128 H RX IP 250 OP 636: Performed by: SURGERY

## 2017-05-15 PROCEDURE — 25000565 ZZH ISOFLURANE, EA 15 MIN: Performed by: TRANSPLANT SURGERY

## 2017-05-15 PROCEDURE — P9041 ALBUMIN (HUMAN),5%, 50ML: HCPCS | Performed by: NURSE ANESTHETIST, CERTIFIED REGISTERED

## 2017-05-15 PROCEDURE — 25000128 H RX IP 250 OP 636: Performed by: ANESTHESIOLOGY

## 2017-05-15 PROCEDURE — 40000940 XR CHEST PORT 1 VW

## 2017-05-15 PROCEDURE — 27210794 ZZH OR GENERAL SUPPLY STERILE: Performed by: TRANSPLANT SURGERY

## 2017-05-15 PROCEDURE — 40000275 ZZH STATISTIC RCP TIME EA 10 MIN

## 2017-05-15 PROCEDURE — 25800025 ZZH RX 258: Performed by: ANESTHESIOLOGY

## 2017-05-15 PROCEDURE — 85610 PROTHROMBIN TIME: CPT | Performed by: SURGERY

## 2017-05-15 PROCEDURE — 85027 COMPLETE CBC AUTOMATED: CPT | Performed by: SURGERY

## 2017-05-15 PROCEDURE — 83605 ASSAY OF LACTIC ACID: CPT | Performed by: TRANSPLANT SURGERY

## 2017-05-15 PROCEDURE — 20000004 ZZH R&B ICU UMMC

## 2017-05-15 PROCEDURE — 0FB00ZX EXCISION OF LIVER, OPEN APPROACH, DIAGNOSTIC: ICD-10-PCS | Performed by: TRANSPLANT SURGERY

## 2017-05-15 PROCEDURE — 25000128 H RX IP 250 OP 636: Performed by: TRANSPLANT SURGERY

## 2017-05-15 PROCEDURE — 87205 SMEAR GRAM STAIN: CPT | Performed by: TRANSPLANT SURGERY

## 2017-05-15 PROCEDURE — 25000128 H RX IP 250 OP 636: Performed by: NURSE PRACTITIONER

## 2017-05-15 PROCEDURE — 3E033U0 INTRODUCTION OF AUTOLOGOUS PANCREATIC ISLET CELLS INTO PERIPHERAL VEIN, PERCUTANEOUS APPROACH: ICD-10-PCS | Performed by: TRANSPLANT SURGERY

## 2017-05-15 PROCEDURE — 00000146 ZZHCL STATISTIC GLUCOSE BY METER IP

## 2017-05-15 PROCEDURE — 40000196 ZZH STATISTIC RAPCV CVP MONITORING

## 2017-05-15 PROCEDURE — 07TP0ZZ RESECTION OF SPLEEN, OPEN APPROACH: ICD-10-PCS | Performed by: TRANSPLANT SURGERY

## 2017-05-15 PROCEDURE — 40000171 ZZH STATISTIC PRE-PROCEDURE ASSESSMENT III: Performed by: TRANSPLANT SURGERY

## 2017-05-15 PROCEDURE — 71010 XR CHEST PORT 1 VW: CPT

## 2017-05-15 PROCEDURE — 86850 RBC ANTIBODY SCREEN: CPT | Performed by: ANESTHESIOLOGY

## 2017-05-15 PROCEDURE — 80053 COMPREHEN METABOLIC PANEL: CPT | Performed by: SURGERY

## 2017-05-15 PROCEDURE — 81100006 ZZH ACQUISITON PANCREAS AUTOLOGOUS ISLET

## 2017-05-15 PROCEDURE — 25000125 ZZHC RX 250: Performed by: SURGERY

## 2017-05-15 RX ORDER — LIDOCAINE 40 MG/G
CREAM TOPICAL
Status: DISCONTINUED | OUTPATIENT
Start: 2017-05-15 | End: 2017-05-15 | Stop reason: HOSPADM

## 2017-05-15 RX ORDER — FENTANYL CITRATE 50 UG/ML
25-50 INJECTION, SOLUTION INTRAMUSCULAR; INTRAVENOUS
Status: DISCONTINUED | OUTPATIENT
Start: 2017-05-15 | End: 2017-05-15 | Stop reason: HOSPADM

## 2017-05-15 RX ORDER — ALBUMIN, HUMAN INJ 5% 5 %
SOLUTION INTRAVENOUS CONTINUOUS PRN
Status: DISCONTINUED | OUTPATIENT
Start: 2017-05-15 | End: 2017-05-15

## 2017-05-15 RX ORDER — NALOXONE HYDROCHLORIDE 0.4 MG/ML
.1-.4 INJECTION, SOLUTION INTRAMUSCULAR; INTRAVENOUS; SUBCUTANEOUS
Status: DISCONTINUED | OUTPATIENT
Start: 2017-05-15 | End: 2017-05-16

## 2017-05-15 RX ORDER — LEVOFLOXACIN 5 MG/ML
500 INJECTION, SOLUTION INTRAVENOUS ONCE
Status: COMPLETED | OUTPATIENT
Start: 2017-05-15 | End: 2017-05-15

## 2017-05-15 RX ORDER — NALOXONE HYDROCHLORIDE 0.4 MG/ML
.1-.4 INJECTION, SOLUTION INTRAMUSCULAR; INTRAVENOUS; SUBCUTANEOUS
Status: DISCONTINUED | OUTPATIENT
Start: 2017-05-15 | End: 2017-05-26 | Stop reason: HOSPADM

## 2017-05-15 RX ORDER — ONDANSETRON 2 MG/ML
4 INJECTION INTRAMUSCULAR; INTRAVENOUS EVERY 30 MIN PRN
Status: DISCONTINUED | OUTPATIENT
Start: 2017-05-15 | End: 2017-05-15 | Stop reason: HOSPADM

## 2017-05-15 RX ORDER — KETAMINE HYDROCHLORIDE 10 MG/ML
INJECTION, SOLUTION INTRAMUSCULAR; INTRAVENOUS PRN
Status: DISCONTINUED | OUTPATIENT
Start: 2017-05-15 | End: 2017-05-15

## 2017-05-15 RX ORDER — CALCIUM CHLORIDE 100 MG/ML
INJECTION INTRAVENOUS; INTRAVENTRICULAR PRN
Status: DISCONTINUED | OUTPATIENT
Start: 2017-05-15 | End: 2017-05-15

## 2017-05-15 RX ORDER — NICOTINE POLACRILEX 4 MG
15-30 LOZENGE BUCCAL
Status: DISCONTINUED | OUTPATIENT
Start: 2017-05-15 | End: 2017-05-26 | Stop reason: HOSPADM

## 2017-05-15 RX ORDER — GABAPENTIN 250 MG/5ML
300 SOLUTION ORAL 2 TIMES DAILY
Status: DISCONTINUED | OUTPATIENT
Start: 2017-05-16 | End: 2017-05-18

## 2017-05-15 RX ORDER — ONDANSETRON 4 MG/1
4 TABLET, ORALLY DISINTEGRATING ORAL EVERY 30 MIN PRN
Status: DISCONTINUED | OUTPATIENT
Start: 2017-05-15 | End: 2017-05-15 | Stop reason: HOSPADM

## 2017-05-15 RX ORDER — LABETALOL HYDROCHLORIDE 5 MG/ML
10 INJECTION, SOLUTION INTRAVENOUS
Status: DISCONTINUED | OUTPATIENT
Start: 2017-05-15 | End: 2017-05-15 | Stop reason: HOSPADM

## 2017-05-15 RX ORDER — ERTAPENEM 1 G/1
1 INJECTION, POWDER, LYOPHILIZED, FOR SOLUTION INTRAMUSCULAR; INTRAVENOUS EVERY 24 HOURS
Status: DISCONTINUED | OUTPATIENT
Start: 2017-05-16 | End: 2017-05-18

## 2017-05-15 RX ORDER — DEXTROSE, SODIUM CHLORIDE, SODIUM LACTATE, POTASSIUM CHLORIDE, AND CALCIUM CHLORIDE 5; .6; .31; .03; .02 G/100ML; G/100ML; G/100ML; G/100ML; G/100ML
INJECTION, SOLUTION INTRAVENOUS CONTINUOUS
Status: DISCONTINUED | OUTPATIENT
Start: 2017-05-15 | End: 2017-05-17

## 2017-05-15 RX ORDER — KETAMINE HYDROCHLORIDE 10 MG/ML
5 INJECTION, SOLUTION INTRAMUSCULAR; INTRAVENOUS EVERY 4 HOURS PRN
Status: DISCONTINUED | OUTPATIENT
Start: 2017-05-15 | End: 2017-05-16

## 2017-05-15 RX ORDER — SODIUM CHLORIDE, SODIUM LACTATE, POTASSIUM CHLORIDE, CALCIUM CHLORIDE 600; 310; 30; 20 MG/100ML; MG/100ML; MG/100ML; MG/100ML
INJECTION, SOLUTION INTRAVENOUS CONTINUOUS
Status: DISCONTINUED | OUTPATIENT
Start: 2017-05-15 | End: 2017-05-15 | Stop reason: HOSPADM

## 2017-05-15 RX ORDER — SODIUM CHLORIDE 9 MG/ML
INJECTION, SOLUTION INTRAVENOUS CONTINUOUS PRN
Status: DISCONTINUED | OUTPATIENT
Start: 2017-05-15 | End: 2017-05-15

## 2017-05-15 RX ORDER — PROPOFOL 10 MG/ML
INJECTION, EMULSION INTRAVENOUS PRN
Status: DISCONTINUED | OUTPATIENT
Start: 2017-05-15 | End: 2017-05-15

## 2017-05-15 RX ORDER — ONDANSETRON 2 MG/ML
4 INJECTION INTRAMUSCULAR; INTRAVENOUS EVERY 6 HOURS PRN
Status: DISCONTINUED | OUTPATIENT
Start: 2017-05-15 | End: 2017-05-16

## 2017-05-15 RX ORDER — NEOSTIGMINE METHYLSULFATE 1 MG/ML
VIAL (ML) INJECTION PRN
Status: DISCONTINUED | OUTPATIENT
Start: 2017-05-15 | End: 2017-05-15

## 2017-05-15 RX ORDER — ONDANSETRON 4 MG/1
4 TABLET, ORALLY DISINTEGRATING ORAL EVERY 6 HOURS PRN
Status: DISCONTINUED | OUTPATIENT
Start: 2017-05-15 | End: 2017-05-16

## 2017-05-15 RX ORDER — ONDANSETRON 2 MG/ML
INJECTION INTRAMUSCULAR; INTRAVENOUS PRN
Status: DISCONTINUED | OUTPATIENT
Start: 2017-05-15 | End: 2017-05-15

## 2017-05-15 RX ORDER — GLYCOPYRROLATE 0.2 MG/ML
INJECTION, SOLUTION INTRAMUSCULAR; INTRAVENOUS PRN
Status: DISCONTINUED | OUTPATIENT
Start: 2017-05-15 | End: 2017-05-15

## 2017-05-15 RX ORDER — LIDOCAINE HYDROCHLORIDE AND EPINEPHRINE 15; 5 MG/ML; UG/ML
INJECTION, SOLUTION EPIDURAL PRN
Status: DISCONTINUED | OUTPATIENT
Start: 2017-05-15 | End: 2017-05-15

## 2017-05-15 RX ORDER — HYDROMORPHONE HYDROCHLORIDE 1 MG/ML
.3-.5 INJECTION, SOLUTION INTRAMUSCULAR; INTRAVENOUS; SUBCUTANEOUS EVERY 5 MIN PRN
Status: DISCONTINUED | OUTPATIENT
Start: 2017-05-15 | End: 2017-05-15 | Stop reason: HOSPADM

## 2017-05-15 RX ORDER — LIDOCAINE 40 MG/G
CREAM TOPICAL
Status: DISCONTINUED | OUTPATIENT
Start: 2017-05-15 | End: 2017-05-26 | Stop reason: HOSPADM

## 2017-05-15 RX ORDER — FLUMAZENIL 0.1 MG/ML
0.2 INJECTION, SOLUTION INTRAVENOUS
Status: DISCONTINUED | OUTPATIENT
Start: 2017-05-15 | End: 2017-05-15 | Stop reason: HOSPADM

## 2017-05-15 RX ORDER — DEXMEDETOMIDINE HYDROCHLORIDE 4 UG/ML
0.2-1.2 INJECTION, SOLUTION INTRAVENOUS CONTINUOUS
Status: DISCONTINUED | OUTPATIENT
Start: 2017-05-15 | End: 2017-05-15 | Stop reason: HOSPADM

## 2017-05-15 RX ORDER — ALBUMIN, HUMAN INJ 5% 5 %
25 SOLUTION INTRAVENOUS ONCE
Status: COMPLETED | OUTPATIENT
Start: 2017-05-16 | End: 2017-05-16

## 2017-05-15 RX ORDER — ERTAPENEM 1 G/1
1 INJECTION, POWDER, LYOPHILIZED, FOR SOLUTION INTRAMUSCULAR; INTRAVENOUS EVERY 24 HOURS
Status: DISCONTINUED | OUTPATIENT
Start: 2017-05-15 | End: 2017-05-15 | Stop reason: HOSPADM

## 2017-05-15 RX ORDER — PROCHLORPERAZINE MALEATE 5 MG
5-10 TABLET ORAL EVERY 6 HOURS PRN
Status: DISCONTINUED | OUTPATIENT
Start: 2017-05-15 | End: 2017-05-26 | Stop reason: HOSPADM

## 2017-05-15 RX ORDER — EPHEDRINE SULFATE 50 MG/ML
INJECTION, SOLUTION INTRAMUSCULAR; INTRAVENOUS; SUBCUTANEOUS PRN
Status: DISCONTINUED | OUTPATIENT
Start: 2017-05-15 | End: 2017-05-15

## 2017-05-15 RX ORDER — DEXTROSE MONOHYDRATE 25 G/50ML
25-50 INJECTION, SOLUTION INTRAVENOUS
Status: DISCONTINUED | OUTPATIENT
Start: 2017-05-15 | End: 2017-05-26 | Stop reason: HOSPADM

## 2017-05-15 RX ORDER — LEVOFLOXACIN 5 MG/ML
500 INJECTION, SOLUTION INTRAVENOUS EVERY 24 HOURS
Status: DISCONTINUED | OUTPATIENT
Start: 2017-05-16 | End: 2017-05-16

## 2017-05-15 RX ORDER — LIDOCAINE HYDROCHLORIDE 20 MG/ML
INJECTION, SOLUTION INFILTRATION; PERINEURAL PRN
Status: DISCONTINUED | OUTPATIENT
Start: 2017-05-15 | End: 2017-05-15

## 2017-05-15 RX ORDER — FLUOXETINE 20 MG/5ML
10 SOLUTION ORAL DAILY
Status: DISCONTINUED | OUTPATIENT
Start: 2017-05-16 | End: 2017-05-26 | Stop reason: HOSPADM

## 2017-05-15 RX ORDER — NALOXONE HYDROCHLORIDE 0.4 MG/ML
.1-.4 INJECTION, SOLUTION INTRAMUSCULAR; INTRAVENOUS; SUBCUTANEOUS
Status: DISCONTINUED | OUTPATIENT
Start: 2017-05-15 | End: 2017-05-15 | Stop reason: HOSPADM

## 2017-05-15 RX ORDER — HEPARIN SODIUM 1000 [USP'U]/ML
INJECTION, SOLUTION INTRAVENOUS; SUBCUTANEOUS PRN
Status: DISCONTINUED | OUTPATIENT
Start: 2017-05-15 | End: 2017-05-15

## 2017-05-15 RX ORDER — FENTANYL CITRATE 50 UG/ML
INJECTION, SOLUTION INTRAMUSCULAR; INTRAVENOUS PRN
Status: DISCONTINUED | OUTPATIENT
Start: 2017-05-15 | End: 2017-05-15

## 2017-05-15 RX ADMIN — Medication 3 MG: at 20:51

## 2017-05-15 RX ADMIN — CALCIUM CHLORIDE 500 MG: 100 INJECTION, SOLUTION INTRAVENOUS at 12:10

## 2017-05-15 RX ADMIN — MIDAZOLAM HYDROCHLORIDE 2 MG: 1 INJECTION, SOLUTION INTRAMUSCULAR; INTRAVENOUS at 07:03

## 2017-05-15 RX ADMIN — FENTANYL CITRATE 50 MCG: 50 INJECTION INTRAMUSCULAR; INTRAVENOUS at 21:43

## 2017-05-15 RX ADMIN — FENTANYL CITRATE 50 MCG: 50 INJECTION INTRAMUSCULAR; INTRAVENOUS at 21:29

## 2017-05-15 RX ADMIN — ALBUMIN (HUMAN) 25 G: 12.5 SOLUTION INTRAVENOUS at 23:47

## 2017-05-15 RX ADMIN — HYDROMORPHONE HYDROCHLORIDE 0.5 MG: 1 INJECTION, SOLUTION INTRAMUSCULAR; INTRAVENOUS; SUBCUTANEOUS at 21:49

## 2017-05-15 RX ADMIN — PHENYLEPHRINE HYDROCHLORIDE 100 MCG: 10 INJECTION, SOLUTION INTRAMUSCULAR; INTRAVENOUS; SUBCUTANEOUS at 12:09

## 2017-05-15 RX ADMIN — DEXMEDETOMIDINE HYDROCHLORIDE 0.5 MCG/KG/HR: 4 INJECTION, SOLUTION INTRAVENOUS at 21:43

## 2017-05-15 RX ADMIN — SODIUM CHLORIDE: 9 INJECTION, SOLUTION INTRAVENOUS at 13:52

## 2017-05-15 RX ADMIN — FENTANYL CITRATE 50 MCG: 50 INJECTION, SOLUTION INTRAMUSCULAR; INTRAVENOUS at 21:02

## 2017-05-15 RX ADMIN — SODIUM CHLORIDE: 9 INJECTION, SOLUTION INTRAVENOUS at 07:45

## 2017-05-15 RX ADMIN — PROPOFOL 50 MG: 10 INJECTION, EMULSION INTRAVENOUS at 13:42

## 2017-05-15 RX ADMIN — PHENYLEPHRINE HYDROCHLORIDE 100 MCG: 10 INJECTION, SOLUTION INTRAMUSCULAR; INTRAVENOUS; SUBCUTANEOUS at 11:29

## 2017-05-15 RX ADMIN — ROCURONIUM BROMIDE 50 MG: 10 INJECTION INTRAVENOUS at 15:18

## 2017-05-15 RX ADMIN — Medication 5 MG: at 07:45

## 2017-05-15 RX ADMIN — PHENYLEPHRINE HYDROCHLORIDE 0.3 MCG/KG/MIN: 10 INJECTION, SOLUTION INTRAMUSCULAR; INTRAVENOUS; SUBCUTANEOUS at 19:26

## 2017-05-15 RX ADMIN — MIDAZOLAM HYDROCHLORIDE 1 MG: 1 INJECTION, SOLUTION INTRAMUSCULAR; INTRAVENOUS at 15:51

## 2017-05-15 RX ADMIN — LIDOCAINE HYDROCHLORIDE 100 MG: 20 INJECTION, SOLUTION INFILTRATION; PERINEURAL at 07:16

## 2017-05-15 RX ADMIN — LEVOFLOXACIN 500 MG: 5 INJECTION, SOLUTION INTRAVENOUS at 08:38

## 2017-05-15 RX ADMIN — Medication 5 MG: at 09:19

## 2017-05-15 RX ADMIN — KETAMINE HYDROCHLORIDE 25 MG: 10 INJECTION, SOLUTION INTRAMUSCULAR; INTRAVENOUS at 08:54

## 2017-05-15 RX ADMIN — ONDANSETRON 4 MG: 2 INJECTION INTRAMUSCULAR; INTRAVENOUS at 07:03

## 2017-05-15 RX ADMIN — MIDAZOLAM HYDROCHLORIDE 1 MG: 1 INJECTION, SOLUTION INTRAMUSCULAR; INTRAVENOUS at 11:24

## 2017-05-15 RX ADMIN — ALBUMIN (HUMAN): 12.5 SOLUTION INTRAVENOUS at 12:59

## 2017-05-15 RX ADMIN — Medication 5 MG: at 19:10

## 2017-05-15 RX ADMIN — Medication 14 ML/HR: at 09:22

## 2017-05-15 RX ADMIN — ROCURONIUM BROMIDE 50 MG: 10 INJECTION INTRAVENOUS at 09:09

## 2017-05-15 RX ADMIN — SUFENTANIL CITRATE 0.25 MCG/KG/HR: 50 INJECTION EPIDURAL; INTRAVENOUS at 08:52

## 2017-05-15 RX ADMIN — PHENYLEPHRINE HYDROCHLORIDE 100 MCG: 10 INJECTION, SOLUTION INTRAMUSCULAR; INTRAVENOUS; SUBCUTANEOUS at 19:20

## 2017-05-15 RX ADMIN — SODIUM CHLORIDE, SODIUM LACTATE, POTASSIUM CHLORIDE, CALCIUM CHLORIDE AND DEXTROSE MONOHYDRATE: 5; 600; 310; 30; 20 INJECTION, SOLUTION INTRAVENOUS at 22:01

## 2017-05-15 RX ADMIN — ERTAPENEM SODIUM 1 G: 1 INJECTION, POWDER, LYOPHILIZED, FOR SOLUTION INTRAMUSCULAR; INTRAVENOUS at 08:43

## 2017-05-15 RX ADMIN — SODIUM CHLORIDE: 9 INJECTION, SOLUTION INTRAVENOUS at 07:03

## 2017-05-15 RX ADMIN — MIDAZOLAM HYDROCHLORIDE 1 MG: 1 INJECTION, SOLUTION INTRAMUSCULAR; INTRAVENOUS at 15:18

## 2017-05-15 RX ADMIN — PHENYLEPHRINE HYDROCHLORIDE 100 MCG: 10 INJECTION, SOLUTION INTRAMUSCULAR; INTRAVENOUS; SUBCUTANEOUS at 11:28

## 2017-05-15 RX ADMIN — SODIUM CHLORIDE: 9 INJECTION, SOLUTION INTRAVENOUS at 12:03

## 2017-05-15 RX ADMIN — ALBUMIN (HUMAN): 12.5 SOLUTION INTRAVENOUS at 10:00

## 2017-05-15 RX ADMIN — GLYCOPYRROLATE 0.6 MG: 0.2 INJECTION, SOLUTION INTRAMUSCULAR; INTRAVENOUS at 20:51

## 2017-05-15 RX ADMIN — PROPOFOL 200 MG: 10 INJECTION, EMULSION INTRAVENOUS at 07:13

## 2017-05-15 RX ADMIN — ALBUMIN (HUMAN): 12.5 SOLUTION INTRAVENOUS at 11:00

## 2017-05-15 RX ADMIN — ONDANSETRON 4 MG: 2 INJECTION INTRAMUSCULAR; INTRAVENOUS at 20:12

## 2017-05-15 RX ADMIN — ERTAPENEM SODIUM 1 G: 1 INJECTION, POWDER, LYOPHILIZED, FOR SOLUTION INTRAMUSCULAR; INTRAVENOUS at 16:35

## 2017-05-15 RX ADMIN — PHENYLEPHRINE HYDROCHLORIDE 100 MCG: 10 INJECTION, SOLUTION INTRAMUSCULAR; INTRAVENOUS; SUBCUTANEOUS at 15:52

## 2017-05-15 RX ADMIN — CALCIUM CHLORIDE 500 MG: 100 INJECTION, SOLUTION INTRAVENOUS at 15:07

## 2017-05-15 RX ADMIN — HYDROMORPHONE HYDROCHLORIDE 0.2 MG: 10 INJECTION, SOLUTION INTRAMUSCULAR; INTRAVENOUS; SUBCUTANEOUS at 22:00

## 2017-05-15 RX ADMIN — Medication 5 MG: at 08:15

## 2017-05-15 RX ADMIN — FENTANYL CITRATE 50 MCG: 50 INJECTION, SOLUTION INTRAMUSCULAR; INTRAVENOUS at 09:31

## 2017-05-15 RX ADMIN — ROCURONIUM BROMIDE 50 MG: 10 INJECTION INTRAVENOUS at 10:26

## 2017-05-15 RX ADMIN — PHENYLEPHRINE HYDROCHLORIDE 100 MCG: 10 INJECTION, SOLUTION INTRAMUSCULAR; INTRAVENOUS; SUBCUTANEOUS at 19:05

## 2017-05-15 RX ADMIN — PHENYLEPHRINE HYDROCHLORIDE 100 MCG: 10 INJECTION, SOLUTION INTRAMUSCULAR; INTRAVENOUS; SUBCUTANEOUS at 19:12

## 2017-05-15 RX ADMIN — MIDAZOLAM HYDROCHLORIDE 1 MG: 1 INJECTION, SOLUTION INTRAMUSCULAR; INTRAVENOUS at 13:17

## 2017-05-15 RX ADMIN — KETAMINE HYDROCHLORIDE 1 MCG/KG/MIN: 100 INJECTION, SOLUTION, CONCENTRATE INTRAMUSCULAR; INTRAVENOUS at 09:03

## 2017-05-15 RX ADMIN — PHENYLEPHRINE HYDROCHLORIDE 100 MCG: 10 INJECTION, SOLUTION INTRAMUSCULAR; INTRAVENOUS; SUBCUTANEOUS at 09:59

## 2017-05-15 RX ADMIN — DEXMEDETOMIDINE HYDROCHLORIDE 0.5 MCG/KG/HR: 100 INJECTION, SOLUTION INTRAVENOUS at 21:32

## 2017-05-15 RX ADMIN — FENTANYL CITRATE 100 MCG: 50 INJECTION, SOLUTION INTRAMUSCULAR; INTRAVENOUS at 07:10

## 2017-05-15 RX ADMIN — HYDROMORPHONE HYDROCHLORIDE 0.5 MG: 1 INJECTION, SOLUTION INTRAMUSCULAR; INTRAVENOUS; SUBCUTANEOUS at 22:40

## 2017-05-15 RX ADMIN — ROCURONIUM BROMIDE 100 MG: 10 INJECTION INTRAVENOUS at 07:16

## 2017-05-15 RX ADMIN — DEXMEDETOMIDINE HYDROCHLORIDE 0.3 MCG/KG/HR: 100 INJECTION, SOLUTION INTRAVENOUS at 18:27

## 2017-05-15 RX ADMIN — LEVOFLOXACIN 500 MG: 5 INJECTION, SOLUTION INTRAVENOUS at 16:35

## 2017-05-15 RX ADMIN — Medication 5 MG: at 09:17

## 2017-05-15 RX ADMIN — ROCURONIUM BROMIDE 50 MG: 10 INJECTION INTRAVENOUS at 12:12

## 2017-05-15 RX ADMIN — HUMAN INSULIN 1 UNITS/HR: 100 INJECTION, SOLUTION SUBCUTANEOUS at 09:51

## 2017-05-15 RX ADMIN — SODIUM CHLORIDE, POTASSIUM CHLORIDE, SODIUM LACTATE AND CALCIUM CHLORIDE: 600; 310; 30; 20 INJECTION, SOLUTION INTRAVENOUS at 08:50

## 2017-05-15 RX ADMIN — Medication 3500 UNITS: at 19:08

## 2017-05-15 RX ADMIN — SODIUM CHLORIDE, POTASSIUM CHLORIDE, SODIUM LACTATE AND CALCIUM CHLORIDE: 600; 310; 30; 20 INJECTION, SOLUTION INTRAVENOUS at 21:20

## 2017-05-15 RX ADMIN — ROCURONIUM BROMIDE 50 MG: 10 INJECTION INTRAVENOUS at 13:42

## 2017-05-15 RX ADMIN — ALBUMIN (HUMAN): 12.5 SOLUTION INTRAVENOUS at 09:45

## 2017-05-15 RX ADMIN — ALBUMIN (HUMAN): 12.5 SOLUTION INTRAVENOUS at 09:11

## 2017-05-15 RX ADMIN — PHENYLEPHRINE HYDROCHLORIDE 100 MCG: 10 INJECTION, SOLUTION INTRAMUSCULAR; INTRAVENOUS; SUBCUTANEOUS at 19:17

## 2017-05-15 RX ADMIN — LIDOCAINE HYDROCHLORIDE,EPINEPHRINE BITARTRATE 4 ML: 15; .005 INJECTION, SOLUTION EPIDURAL; INFILTRATION; INTRACAUDAL; PERINEURAL at 08:30

## 2017-05-15 RX ADMIN — HYDROMORPHONE HYDROCHLORIDE 0.5 MG: 1 INJECTION, SOLUTION INTRAMUSCULAR; INTRAVENOUS; SUBCUTANEOUS at 21:34

## 2017-05-15 RX ADMIN — PHENYLEPHRINE HYDROCHLORIDE 100 MCG: 10 INJECTION, SOLUTION INTRAMUSCULAR; INTRAVENOUS; SUBCUTANEOUS at 12:27

## 2017-05-15 ASSESSMENT — PAIN DESCRIPTION - DESCRIPTORS
DESCRIPTORS: ACHING
DESCRIPTORS: ACHING
DESCRIPTORS: SHARP
DESCRIPTORS: ACHING
DESCRIPTORS: ACHING;SHARP

## 2017-05-15 NOTE — ANESTHESIA PROCEDURE NOTES
Central Line Procedure Note  Staff:     Anesthesiologist:  ARMANDO WHITLOCK  Location: In OR after induction  Procedure Start/Stop Times:     patient identified, IV checked, site marked, risks and benefits discussed, informed consent, pre-op evaluation and at physician/surgeon's request      Correct Patient: Yes      Correct Position: Yes      Correct Site: Yes      Correct Procedure: Yes      Correct Laterality:  N/A    Site Marked:  N/A  Line Placement:     Procedure:  Central Line    Insertion laterality:  Right    Insertion site:  Internal Jugular    Position:  Trendelenburg      Maximal Sterile Barriers: All elements of maximal sterile barrier technique followed      (Maximal sterile barriers include:   Sterile gown, Sterile Gloves, Mask, Cap, Whole body draped, hand hygiene and acceptable skin prep).Skin Prep: Chloraprep         Injection Technique:  Ultrasound guided    Sterile Ultrasound Technique:  Sterile probe cover and Sterile gel    Vein evaluated via U/S for patency/adequacy of catheter insertion and is adequate.  Using realtime U/S imaging the vein was punctured, and needle was observed entering vein on U/S      Permanent Image entered into patient's record      Catheter size:  12 Fr, 3 lumen, 20 cm    Catheter length at skin (cm):  15    Cath secured with: suture      Dressing:  Tegaderm and Biopatch    Complications:  None obvious    Blood aspirated all lumens: Yes      All Lumens Flushed: Yes      Verification method:  Placement to be verified post-op    Person verifying:  Gustavo  Assessment/Narrative:      EZ placement without break in sterile technique, one pass TWN, wire verified in IJ with US

## 2017-05-15 NOTE — LETTER
Transition Communication Hand-off for Care Transitions to Next Level of Care Provider    Name: Sohan Arita  MRN #: [id number]  Primary Care Provider: Frandy Frye     Primary Clinic: [place] [address]  [facility] [address]     Reason for Hospitalization:  Chronic Pancreatitis   Chronic pancreatitis (H)  Admit Date/Time: 5/15/2017  5:03 AM  Discharge Date: 5.26.17  Payor Source: Payor: COMMERCIAL / Plan: OTHER / Product Type: Indemnity /              Reason for Communication Hand-off Referral: Other see dx    Discharge Plan:       Concern for non-adherence with plan of care:   Y/N N  Discharge Needs Assessment:  Needs       Most Recent Value    Equipment Currently Used at Home glucometer, nutrition supplies    Transportation Available family or friend will provide    Home Infusion Provider [facility] [phone], Fax: [phone]            Follow-up specialty is recommended: Yes    Follow-up plan:  Future Appointments  Date Time Provider Department Center   5/27/2017 12:00 PM UC SPEC INFUSION [facility]   5/30/2017 10:45 AM UC LAB UCLAB [facility]   5/30/2017 11:30 AM [facility]   6/5/2017 9:30 AM Evelyne Diez RD [facility]   6/5/2017 10:30 AM Radha Saavedra RN [facility]       Any outstanding tests or procedures:        Referrals     Future Labs/Procedures    Home infusion referral     Comments:    Your provider has referred you to: [facility]: [facility] [place] [phone]   [email or web address]  S/p TPAIT--DR Rodrigo Jaquez  New Enteral feeds    Local Address (if different from home address): [facility]  Room:  Address: [address] [place] [address]  Phone: [phone]  [place] cell: [phone]  [place] cell: [phone]    [place], [place]    Anticipated Length of Therapy: 2-8 weeks    Home Infusion Pharmacist to adjust therapy based on labs and clinical  assessments: Yes    Labs:  May draw labs from Venous Catheter: No  Home Infusion Pharmacist to order labs based on therapy type and clinical assessments: Yes  Call/Fax Lab Results to: Outpatient Care Coordinator: Soni Aleman  Main Phone: [phone]  Ph: [phone]  Fax: [phone]    Agency Staff to assess nursing needs for Infusion Therapy.    Access Device Management:  IV Access Type: n/a  Flush with Heparin and Normal Saline IVP PRN and routine site care (per agency protocol) to maintain access device? No     GJT            [place] Recommendations:      Vikki Mckeon    AVS/Discharge Summary is the source of truth; this is a helpful guide for improved communication of patient story

## 2017-05-15 NOTE — OR NURSING
Pt CAN NOT get discharge medication from the hospital pharmacy. His insurance company will not cover unless it goes to a [facility] or [facility].

## 2017-05-15 NOTE — H&P
SURGICAL ICU ADMISSION NOTE  5/15/2017    PRIMARY TEAM: Transplant Surgery   PRIMARY PHYSICIAN: Rodrigo Jaquez MD    REASON FOR CRITICAL CARE ADMISSION: Post operative monitoring    ADMITTING PHYSICIAN: Dr. Mcdonough    ASSESSMENT:  Sohan Arita is a 56 year old male with history of chronic pain, chronic pancreatitis s/p lap cholecystectomy 4/2012, s/p biliary and pancreatic sphincterotomies and a temporary pancreatic stent 2012, s/p transduodenal sphincteroplasty 12/2014 with resultant leak and phlegmonous pancreatitis who underwent open TPAIT 5/15/17 with Dr. Jaquez.     PLAN:   Neuro/ pain/ sedation:  #Post operative pain   #Chronic pain - PTA meds include oxycodone 5mg QID, gabapentin 300mg TID, and fentanyl patches 25mcg/72hr.  #Depression/anxiety - PTA meds include fluoxetine 10mg daily and lorazepam 1mg daily prn  -Monitor neurological status  - Pain: Precedex, Gabapentin 300mg BID, Dilaudid PCA, Ropivacaine paravertebral cath   - Fluoxetine 10mg daily      Pulmonary care:   -Supplemental oxygen to keep saturation above 92 %.  -Incentive spirometer every 15- 30 minutes when awake.     Cardiovascular:     -No acute issues   -Hemodynamic monitoring. Arterial line in place.     GI/Nutrition:   #Chronic pancreatitis s/p TPAIT 5/15/17  -Creon PRN once PO   -LFTs elevated, continue to monitor   -Monitoring Lipase and amylase   -NPO      Renal/Fluids/ Electrolytes:   -Monitor I&O   -ICU electrolyte replacement protocol  -D5LR 150ml/hr  -Phyllis     Endocrine:    #Thyroid Cancer s/p thyroidectomy - PTA meds include 175 mcg levothyroxine daily  #Hypothyroidism  -Levothyroxine 150mcg daily   -Insulin gtt     ID/ Antibiotics:  -Levofloxacin 500mg/24h as post op abx   -Ertapenem 1g/24h as post op abx      Heme:  -Hgb 13.7 pre op to 9.2 post op, continue to monitor      Prophylaxis:   -Mechanical prophylaxis for DVT.  -No chemical DVT prophylaxis. Holding heparin.   -Protonix BID     MSK:    -PT and OT consulted.  Appreciate recs.     Lines/ tubes/ drains:  -Right IJ, PIV x2, A line, NG, Ferguson, MAURICIO drain, gastrojejunostomy tube      Disposition:  -Surgical ICU.    Patient discussed with Surgical ICU staff.    Chano Mccall MD  General Surgery PGY-2  Pager [phone]     - - - - - - - - - - - - - - - - - - - - - - - - - - - - - - - - - - - - - - - - - - - - - - - - - - - - - - - - - - - - - - - - - - - - - - - -     HPI:  Sohan Arita is a 56 year old male with chronic pain, acute recurrent pancreatitis who presents to [facility] for TPAIT 5/15/17. He has had intermittent intractable pain and acute recurrent pancreatitis beginning in 2012. He was thought to have gall bladder dyskinesia and underwent a lap cholecystectomy 4/2012, but continued to have abdominal pain. He was treated with sphincterotomy and was then underwent biliary and pancreatic sphincterotomies and a temporary pancreatic stent at [facility]. In 2014 he had an open transduodenal sphincteroplasty, with resultant leak and phlegmonous pancreatitis. 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 He was initially evaluated at [facility] in 2015 and was scheduled for TPAIT 2/8/16 but cancelled due to a death in the family. He now returns for TPAIT with Dr. Jaquez.    He arrived to the SICU extubated and hemodynamically stable. He is on precedex gtt for pain/sedation and a dilaudid PCA. For his case, he received 5100cc crystalloid, 1750cc colloid. EBL 500cc and UOP 1060cc.     REVIEW OF SYSTEMS:   10 point ROS was negative except for items mentioned in HPI.    PAST MEDICAL HISTORY:    has a past medical history of Depression; Eye injury, penetrating (1978); Pancreatic disease; Pancreatitis (2012); Thyroid cancer (H); Thyroid disease; and Ventral hernia (2017).    SURGICAL HISTORY:    has a past surgical history that includes Thyroidectomy (12/28/2011); debribement eye (Left, 1978); hernia repair, inguinal rt/lt (Left,  1980); Cholecystectomy (4/2012); Endoscopic retrograde cholangiopancreatogram (4/18/2012); Feeding tube replacement (2/3/2015); Nerve block peripheral (12/31/2014); Laparotomy exploratory (12/31/2014); transduodenal sphincteroplasty (12/31/2014); Endoscopic ultrasound upper gastrointestinal tract (GI) (N/A, 11/11/2015); and  (N/A, 11/11/2015).    SOCIAL HISTORY:    reports that he has never smoked. He has never used smokeless tobacco. He reports that he does not drink alcohol or use illicit drugs.    FAMILY HISTORY:  No bleeding/clotting disorders.    ALLERGIES:    No Known Allergies    MEDICATIONS:    No current facility-administered medications on file prior to encounter.   Current Outpatient Prescriptions on File Prior to Encounter:  LEVOTHYROXINE SODIUM PO Take 150 mcg by mouth every morning    FLUoxetine HCl (PROZAC PO) Take 10 mg by mouth every morning    fentaNYL (DURAGESIC) 25 mcg/hr patch 72 hr Place 1 patch onto the skin every 72 hours Patient uses 12 mcg patch cut in half every 72 hours.       PHYSICAL EXAMINATION:  Temp:  [98  F (36.7  C)] 98  F (36.7  C)  Resp:  [18] 18  BP: (123)/(77) 123/77  SpO2:  [98 %-100 %] 98 %    General/Neuro: No acute distress, pleasant, drowsy but awakens easily to voice   Pulm: Non labored breathing, clear breath sounds throughout anterior lung fields   Abd: soft; NT, ND, midline dressing with serosanguineous markings, no induration or erythema noted   Extremities: no edema, PT palpable b/l. SCDs on.  Skin: warm and well-perfused      LABS: Reviewed.   Arterial Blood Gases     Recent Labs  Lab 05/15/17  1501 05/15/17  1241 05/15/17  1118 05/15/17  0931   PH 7.38 7.39 7.40 7.40   PCO2 36 36 37 38   PO2 135* 140* 123* 231*   HCO3 21 21 23 24     Complete Blood Count     Recent Labs  Lab 05/15/17  1501 05/15/17  1241 05/15/17  1118 05/15/17  0931 05/10/17  0805   WBC  --   --   --   --  5.9   HGB 11.2* 11.6* 12.6* 13.7 15.2   PLT  --   --   --   --  224     Basic Metabolic  Panel    Recent Labs  Lab 05/15/17  1501 05/15/17  1241 05/15/17  1118 05/15/17  0931  05/10/17  0805    140 141 140  --  142   POTASSIUM 4.5 3.8 3.6 3.6  --  3.8   CHLORIDE  --   --   --   --   --  109   CO2  --   --   --   --   --  24   BUN  --   --   --   --   --  14   CR  --   --   --   --   --  0.93   * 115* 115* 130*  < > 88   < > = values in this interval not displayed.  Liver Function Tests    Recent Labs  Lab 05/10/17  0805   AST 14   ALT 21   ALKPHOS 79   BILITOTAL 0.4   ALBUMIN 3.5   INR 1.04     Pancreatic Enzymes  No lab results found in last 7 days.  Coagulation Profile    Recent Labs  Lab 05/10/17  0805   INR 1.04     Lactate  Invalid input(s): LACTATE    IMAGING:  No results found for this or any previous visit (from the past 24 hour(s)).

## 2017-05-15 NOTE — IP AVS SNAPSHOT
[address] [address]    Phone:  [phone]                                       After Visit Summary   5/15/2017    Sohan Arita    MRN: [id number]           After Visit Summary Signature Page     I have received my discharge instructions, and my questions have been answered. I have discussed any challenges I see with this plan with the nurse or doctor.    ..........................................................................................................................................  Patient/Patient Representative Signature      ..........................................................................................................................................  Patient Representative Print Name and Relationship to Patient    ..................................................               ................................................  Date                                            Time    ..........................................................................................................................................  Reviewed by Signature/Title    ...................................................              ..............................................  Date                                                            Time

## 2017-05-15 NOTE — OR NURSING
Patient has a positive TB quantiferon. Dr. Jackson page with no response. Dr. Jaquez text paged with no response. Infectious disease called. Pt is not having any symptoms. Pt doesn't need to be in isolation.

## 2017-05-15 NOTE — IP AVS SNAPSHOT
"    [address] [facility]: [phone]                                              INTERAGENCY TRANSFER FORM - PHYSICIAN ORDERS   5/15/2017                    Hospital Admission Date: 5/15/2017  FINESSE OCONNELL   : 1960  Sex: Male        Attending Provider: Rodrigo Jaquez MD     Allergies:  No Known Allergies    Infection:  None   Service:  TRANSPLANT    Ht:  1.88 m (6' 2\")   Wt:  109.2 kg (240 lb 12.8 oz)   Admission Wt:  100.7 kg (222 lb 0.1 oz)    BMI:  30.92 kg/m 2   BSA:  2.39 m 2            Patient PCP Information     Provider PCP Type    Frandy Frye MD General      ED Clinical Impression     Diagnosis Description Comment Added By Time Added    Chronic pancreatitis, unspecified pancreatitis type (H) [K86.1] Chronic pancreatitis, unspecified pancreatitis type (H) [K86.1]  Eileen Santana NP 2017 11:20 AM    Other constipation [K59.09] Other constipation [K59.09]  Areli Montanez, APRHUGO CNP 2017  7:30 AM    Hypophosphatemia [E83.39] Hypophosphatemia [E83.39]  Areli Montanez, APRHUGO CNP 2017  8:18 AM    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 pancreatectomy [Z90.410]  Areli Montanez, CHUY CNP 2017  9:50 AM    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.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] 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.410]  Vikki Mckeon RN 2017  2:18 PM    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, 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, 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, 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, 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]  Vikki Mckeon, MARLON 2017  2:18 PM    On enteral nutrition [Z78.9] On enteral nutrition [Z78.9]  Vikki Mckeon, MARLON 2017  2:18 PM      Hospital Problems as of 2017              Priority Class Noted POA    Chronic pancreatitis (H) Medium  5/15/2017 Yes      Non-Hospital Problems as of 2017     None      Code Status History     Date Active Date Inactive Code Status Order ID Comments User Context    2017 12:18 PM  Full Code [id number]  Vero Rader PA-C Outpatient       "   Medication Review      START taking        Dose / Directions Comments    acetaminophen 32 mg/mL solution   Commonly known as:  TYLENOL   Used for:  Acquired total absence of pancreas        Dose:  500 mg   Take 15.65 mLs (500 mg) by mouth every 6 hours for 14 days   Quantity:  900 mL   Refills:  3        Alcohol Swabs Pads   Used for:  Post-pancreatectomy diabetes (H)        Dose:  1 pad   1 pad as needed   Quantity:  100 each   Refills:  3        BD SHARPS  Misc   Used for:  Post-pancreatectomy diabetes (H)        Dose:  1 Container   1 Container as needed   Quantity:  1 each   Refills:  3        blood glucose monitoring lancets   Used for:  Acquired total absence of pancreas        Use to test blood sugar 8 times daily or as directed.   Quantity:  1 Box   Refills:  3        blood glucose monitoring test strip   Commonly known as:  FREESTYLE LITE   Used for:  Post-pancreatectomy diabetes (H)        Use to test blood sugars 8 times daily or as directed.   Quantity:  100 strip   Refills:  11        cholecalciferol 400 UNIT/ML Liqd liquid   Commonly known as:  vitamin D/D-VI-SOL   Used for:  Acquired total absence of pancreas        Dose:  2000 Units   5 mLs (2,000 Units) by Per J Tube route daily   Quantity:  150 mL   Refills:  1        fentaNYL 100 mcg/hr 72 hr patch   Commonly known as:  DURAGESIC   Used for:  Acquired total absence of pancreas   Replaces:  fentaNYL 25 mcg/hr 72 hr patch        Dose:  1 patch   Place 1 patch onto the skin every 72 hours for 14 days   Quantity:  4 patch   Refills:  0        FLUoxetine 20 MG/5ML solution   Commonly known as:  PROzac   Used for:  Acquired total absence of pancreas   Replaces:  PROZAC PO        Dose:  10 mg   2.5 mLs (10 mg) by Per J Tube route daily   Quantity:  75 mL   Refills:  3        glucose 40 % Gel gel   Used for:  Post-pancreatectomy diabetes (H)        Dose:  15-30 g   Take 15-30 g by mouth every 15 minutes as needed for low blood sugar   Quantity:   3 Tube   Refills:  1        HYDROmorphone 1 MG/ML Liqd liquid   Commonly known as:  DILAUDID   Used for:  Acquired total absence of pancreas        Dose:  1-2 mg   1-2 mLs (1-2 mg) by Per J Tube route every 4 hours   Quantity:  168 mL   Refills:  0        insulin aspart 100 UNIT/ML injection   Commonly known as:  NovoLOG PEN   Used for:  Post-pancreatectomy diabetes (H)        Correction aspart 2 units per 20 >120 every 4 hours   Quantity:  3 mL   Refills:  3        * insulin glargine 100 UNIT/ML injection   Commonly known as:  LANTUS   Used for:  Post-pancreatectomy diabetes (H)        Dose:  70 Units   Inject 70 Units Subcutaneous every evening   Quantity:  3 mL   Refills:  3        * insulin glargine 100 UNIT/ML injection   Commonly known as:  LANTUS   Used for:  Post-pancreatectomy diabetes (H)        Dose:  90 Units   Inject 90 Units Subcutaneous every morning   Quantity:  3 mL   Refills:  3        insulin pen needle 32G X 4 MM   Used for:  Acquired total absence of pancreas        Use 8 pen needles daily or as directed.   Quantity:  100 each   Refills:  3        levothyroxine 25 mcg/mL Susp   Commonly known as:  SYNTHROID   Used for:  Acquired total absence of pancreas   Replaces:  LEVOTHYROXINE SODIUM PO        Dose:  150 mcg   6 mLs (150 mcg) by Per J Tube route every morning (before breakfast)   Quantity:  180 mL   Refills:  1        magnesium hydroxide 400 MG/5ML suspension   Commonly known as:  MILK OF MAGNESIA   Used for:  Other constipation        Dose:  30 mL   30 mLs by Per Feeding Tube route 2 times daily as needed for constipation   Quantity:  105 mL   Refills:  1        multivitamins with minerals Liqd liquid   Used for:  Acquired total absence of pancreas        Dose:  15 mL   15 mLs by Per Feeding Tube route daily   Quantity:  450 mL   Refills:  1        nystatin 481790 UNIT/ML suspension   Commonly known as:  MYCOSTATIN   Used for:  Acquired total absence of pancreas        Dose:  877944 Units    Take 5 mLs (500,000 Units) by mouth 4 times daily for 7 days   Quantity:  280 mL   Refills:  1        ondansetron 4 MG ODT tab   Commonly known as:  ZOFRAN-ODT   Used for:  Post-pancreatectomy diabetes (H)        Dose:  4-8 mg   Take 1-2 tablets (4-8 mg) by mouth every 6 hours as needed for nausea   Quantity:  30 tablet   Refills:  1        pantoprazole Susp suspension   Commonly known as:  PROTONIX   Used for:  Acquired total absence of pancreas        Dose:  40 mg   20 mLs (40 mg) by Oral or J tube route 2 times daily   Quantity:  1200 mL   Refills:  1        pregabalin 20 MG/ML solution   Commonly known as:  LYRICA   Used for:  History of pancreatectomy        Dose:  25 mg   1.25 mLs (25 mg) by Per Feeding Tube route 2 times daily   Quantity:  70 mL   Refills:  3        sennosides 8.8 MG/5ML syrup   Commonly known as:  SENOKOT   Used for:  Other constipation        Dose:  10 mL   10 mLs by Per Feeding Tube route 2 times daily   Quantity:  600 mL   Refills:  3        * Notice:  This list has 2 medication(s) that are the same as other medications prescribed for you. Read the directions carefully, and ask your doctor or other care provider to review them with you.      CONTINUE these medications which may have CHANGED, or have new prescriptions. If we are uncertain of the size of tablets/capsules you have at home, strength may be listed as something that might have changed.        Dose / Directions Comments    amylase-lipase-protease 94411 UNITS Cpep   Commonly known as:  CREON 12   This may have changed:    - medication strength  - how much to take  - when to take this  - reasons to take this   Used for:  Acquired total absence of pancreas        Dose:  3-4 capsule   Take 3-4 capsules (36,000-48,000 Units) by mouth every hour as needed (with snacks)   Quantity:  270 capsule   Refills:  3          STOP taking     fentaNYL 25 mcg/hr 72 hr patch   Commonly known as:  DURAGESIC   Replaced by:  fentaNYL 100 mcg/hr 72  hr patch           LEVOTHYROXINE SODIUM PO   Replaced by:  levothyroxine 25 mcg/mL Susp           PROZAC PO   Replaced by:  FLUoxetine 20 MG/5ML solution                     Further instructions from your care team       ________________________________________________________  Discharge RN please fax discharge orders to home care agency: [facility]    --they need signed discharge orders by 12 noon on the day of discharge  ---page mark Shital Salas @ [phone] Monday-Friday  ---weekends call office [phone]  ________________________________________________________    Tube Feeding Regimen:  Vivonex TEN @ 100 ml/hr + 4 pkts of ProSource TF daily    Tube Feeding Administration:  Mix 8 packets of Vivonex TEN formula + 2000 ml water.  This will yield 2400 ml of formula.  Mix well and store in the refrigerator in an empty milk jug container or pitcher.    Every 4 hours:    1. Remove formula from fridge and shake/mix well.    2. Pour 400 ml tube feeding to graduated cylinder.      Four times daily - provide 1 packet (45 ounces) of ProSource TF via syringe into J-tube.  Flush tube with 15-30 ml water before and after.     You will require at least 600 ml (~2.5 cups) of additional water/fluid daily by mouth or via J-tube.  If unable to consume this by mouth, please provide water flushes via J-tube.      Vitamin/Mineral Recommendations:  Multivitamin with minerals (Certavite) through feeding tube daily  Vitamin D (Cholecalciferol) 2000 IU through feeding tube daily  When okay to switch to oral medications -> okay to take oral forms of these medications.     Recommend checking vitamin A, D, E, K and B12 every 1-2 years following surgery due to risk for deficiencies.          When discharged, will need prescriptions for:  1.  Freestyle Lite test strips  2.  Accu-chek FastClix lancet drums  3.  Insulin pen needles (4mm, 32 gauge)  4.  Alcohol swabs  5.  Sharps disposal container  6.  Glucose gel     Roxie Waller MS RN CDE  [facility]  [phone]     Call the diabetes management team with questions regarding your treatment plan after discharge.  Yazmin Poon PA-C (available Friday through Monday) and Orquidea COX (available Monday through Thursday) both available at [phone], Joanna Longoria NP (available Monday through Friday) [phone], or the on-call endocrinologist can be paged by the hospital  [phone].          Summary of Visit     Reason for your hospital stay       Total pancreatectomy  Post pancreatectomy diabetes  Acute malnutrition s/p pancreatectomy due to slow gastric emptying  Acute on chronic pain             After Care     Activity       Your activity upon discharge: Do not lift more that 10 pounds for 6 weeks.  You may shower and get incisions wet but do not scrub incisions or submerge wounds (aka, bath, pool, hot tub, ect.). Do not drive until you can withstand pressing the break peddle quickly and fully without abdominal pain. Do not drive while taking narcotic or sedating medications. Walk at least 4 times per day. If traveling by car or plane, please take short breaks to walk every 45 minutes to prevent blood clots.       Diet       Follow this diet upon discharge:       Adult Formula Drip Feeding: Continuous Vivonex Ten; Jejunostomy; Goal Rate: 100 ml/hr; mL/hr; Medication - Tube Feeding Flush Frequency: At least 15-30 mL water before and after medication administration and with tube clogging      Clear Liquid       Monitor and record       weight every day       Tubes and drains       G tube clamped. Open to gravity as needed for nausea with vomiting or abdominal distension  J tube, flush with free water 15-30 cc pre and post medications.       Wound care and dressings       Instructions to care for your wound at home: Clean wound in shower or with NS. Pack with moist gauze. Change 2 times a day.             Referrals     Home infusion referral       Your provider has referred you to: [facility]  [facility]fusion - [place] [phone]   [email or web address]  S/p TPAIT--DR Rodrigo Jaquez  New Enteral feeds    Local Address (if different from home address): [facility]  Room:  Address: [address] [place] [address]  Phone: [phone]  [place] cell: [phone]  [place] cell: [phone]    [place], [place]    Anticipated Length of Therapy: 2-8 weeks    Home Infusion Pharmacist to adjust therapy based on labs and clinical assessments: Yes    Labs:  May draw labs from Venous Catheter: No  Home Infusion Pharmacist to order labs based on therapy type and clinical assessments: Yes  Call/Fax Lab Results to: Outpatient Care Coordinator: Soni Aleman  Main Phone: [phone]  Ph: [phone]  Fax: [phone]    Agency Staff to assess nursing needs for Infusion Therapy.    Access Device Management:  IV Access Type: n/a  Flush with Heparin and Normal Saline IVP PRN and routine site care (per agency protocol) to maintain access device? No     GJT             Blood Bank Orders     ABO/Rh Type and Screen           Red blood cell have available       ADULT: One unit is approximately 300 mL and will increase hemoglobin approximately 1 g/dL in 70 kg adult.  PEDIATRIC/: 10-15 mL/kg will increase hemoglobin 2-3 g/dL.  Recommend single unit transfusion, then reassess patient symptoms.  If anemia is chronic, rule out treatable causes of anemia first if clinically appropriate-including iron, folate and/or Vitamin B12 deficiency.  REQUIRED DOCUMENTATION: Provider must obtain and document informed consent at least once per admission (once per year for outpatient).   IRRADIATION is NOT Indicated for: (Scroll to see all items.)  Adults only: Acute or chronic leukemia                      Adults only: Non-Hodgkin's lymphoma  Patients on high dose steroids                                     Solid organ transplant patients  Severe leukopenia, lymphopenia,  pancytopenia    To prevent HLA alloimmunization  Use of immune suppressants such as                         HIV  Infection/AIDS      Azathioprine, cyclosporine, MMF  Do NOT change or add to this text.  Use additional instructions field.             Your next 10 appointments already scheduled     May 27, 2017 12:00 PM CDT   Infusion 240 with UC SPEC INFUSION, UC 43 ATC   [facility] Advanced [facility] [place] [facility] ([facility])    [address] [place] [address]  2nd [facility] [address]   [phone]            May 30, 2017 10:45 AM CDT   Lab with [facility]   [facility] Lab ([facility])    [address] [place] [address]  1st [facility] [address]   [phone]            May 30, 2017 11:30 AM CDT   (Arrive by 11:15 AM)   Return [place] with Rodrigo Jaquez MD   [facility] Solid Organ Transplant ([facility])    [address] [place] [address]   [phone]            Jun 05, 2017  9:30 AM CDT   (Arrive by 9:15 AM)   Office Visit with Evelyne Diez RD   [facility] Diabetes ([facility])    [address] [place] [id number] [phone]           Bring a current list of meds and any records pertaining to this visit.  For Physicals, please bring immunization records and any forms needing to be filled out.  Please arrive 10 minutes early to complete paperwork.            Jun 05, 2017 10:30 AM CDT   (Arrive by 10:15 AM)   Office Visit with Radha Saavedra RN   [facility] Diabetes ([facility])    [address] [place] [address]   [phone]           Bring a current list of meds and any records pertaining to this visit.  For Physicals, please bring immunization records and any forms needing to be filled out.  Please arrive 10 minutes early to complete paperwork.              Follow-Up Appointment  Instructions     Future Labs/Procedures    Adult [facility] Follow-up and recommended labs and tests     Comments:    1. Advanced treatment [place] [facility] 2nd floor, arrive at 1200 am, for assess/medication management.   2. Labs: CMP, prealbumin, CBC on Mondays  3. Follow up with Dr. Jaquez, Surgery, [facility] and [facility] next week  4. Follow up with Endocrinology, next week  5. Follow up with DM education and dietitian, see appointments    Appointments on [place] and/or [facility] (with [facility] or [facility] provider or service). Call [phone] if you haven't heard regarding these appointments within 7 days of discharge.      Follow-Up Appointment Instructions     Adult [facility] Follow-up and recommended labs and tests       1. Advanced treatment [facility] 2nd floor, arrive at 1200 am, for assess/medication management.   2. Labs: CMP, prealbumin, CBC on Mondays  3. Follow up with Dr. Jaquez, Surgery, [facility] and [facility] ([facility] next week  4. Follow up with Endocrinology, next week  5. Follow up with DM education and dietitian, see appointments    Appointments on [place] and/or [facility] (with [facility] or [facility] provider or service). Call [phone] if you haven't heard regarding these appointments within 7 days of discharge.             Statement of Approval     Ordered          05/26/17 1218  I have reviewed and agree with all the recommendations and orders detailed in this document.     Approved and electronically signed by:  Vero Rader PA-C

## 2017-05-15 NOTE — PROGRESS NOTES
SPIRITUAL HEALTH SERVICES  [facility] 3C   PRE-SURGERY VISIT    Had pre-surgery visit with pt.  Provided spiritual support, prayer.  Would like follow up.                                                                                                                                         Dieudonne Amor     Pager [phone]

## 2017-05-15 NOTE — IP AVS SNAPSHOT
MRN:[id number]                      After Visit Summary   5/15/2017    Sohan Arita    MRN: [id number]           Thank you!     Thank you for choosing [facility] for your care. Our goal is always to provide you with excellent care. Hearing back from our patients is one way we can continue to improve our services. Please take a few minutes to complete the written survey that you may receive in the mail after you visit with us. Thank you!        Patient Information     Date Of Birth          1960        Designated Caregiver       Most Recent Value    Caregiver    Will someone help with your care after discharge? yes    Name of designated caregiver Maribell Arita [wife]    Phone number of caregiver see facesheet    Caregiver address See facesheet      About your hospital stay     You were admitted on:  May 15, 2017 You last received care in the:  [address] [facility] [facility]    You were discharged on:  May 26, 2017        Reason for your hospital stay       Total pancreatectomy  Post pancreatectomy diabetes  Acute malnutrition s/p pancreatectomy due to slow gastric emptying  Acute on chronic pain                  Who to Call     For medical emergencies, please call 911.  For non-urgent questions about your medical care, please call your primary care provider or clinic, [phone]  For questions related to your surgery, please call your surgery clinic        Attending Provider     Provider Rodrigo Egan MD Transplant       Primary Care Provider Office Phone # Fax #    Frandy Y MD Uday [phone]       [address]  [facility] [address]         When to contact your care team       Please contact transplant coordinator if you have a temperature > 100.5, redness, swelling or drainage from surgical incision, increased pain, dehydration-increased thirst, decreased urine output, lightheaded, unable to transplant medications for ANY  reason.    Your transplant coordinator is Soni Aleman. Phone: [phone], Fax: [phone]                  After Care Instructions     Activity       Your activity upon discharge: Do not lift more that 10 pounds for 6 weeks.  You may shower and get incisions wet but do not scrub incisions or submerge wounds (aka, bath, pool, hot tub, ect.). Do not drive until you can withstand pressing the break peddle quickly and fully without abdominal pain. Do not drive while taking narcotic or sedating medications. Walk at least 4 times per day. If traveling by car or plane, please take short breaks to walk every 45 minutes to prevent blood clots.            Diet       Follow this diet upon discharge:       Adult Formula Drip Feeding: Continuous Vivonex Ten; Jejunostomy; Goal Rate: 100 ml/hr; mL/hr; Medication - Tube Feeding Flush Frequency: At least 15-30 mL water before and after medication administration and with tube clogging      Clear Liquid            Monitor and record       weight every day            Tubes and drains       G tube clamped. Open to gravity as needed for nausea with vomiting or abdominal distension  J tube, flush with free water 15-30 cc pre and post medications.            Wound care and dressings       Instructions to care for your wound at home: Clean wound in shower or with NS. Pack with moist gauze. Change 2 times a day.                  Follow-up Appointments     Adult [facility] Follow-up and recommended labs and tests       1. Advanced treatment center (ATC), [facility] 2nd floor, arrive at 1200 pm on Saturday 5/26, for assess/medication management.   2. Labs: CMP, prealbumin, CBC on Mondays  3. Follow up with Dr. Jaquez, Surgery, Clinic and surgery center ([facility]), next week  4. Follow up with Endocrinology, next week  5. Follow up with DM education and dietitian, see appointments    Appointments on [place] and/or [facility] (with [facility] or [facility] provider or service). Call [phone] if  you haven't heard regarding these appointments within 7 days of discharge.                  Your next 10 appointments already scheduled     May 27, 2017 12:00 PM CDT   Infusion 240 with UC SPEC INFUSION, UC 43 ATC   [facility] Specialty and Procedure ([facility])    [address] [place] [address]  [address] [facility] [address]   [phone]            May 30, 2017 10:45 AM CDT   Lab with UC LAB   [facility] Lab ([facility])    [address] [place] [address]  [address] [facility] [address]   [phone]            May 30, 2017 11:30 AM CDT   (Arrive by 11:15 AM)   Return Auto [place] with Rodrigo Jaquez MD   [facility] Solid Organ Transplant ([facility])    [address] [place] [address]   [phone]            Jun 05, 2017  9:30 AM CDT   (Arrive by 9:15 AM)   Office Visit with Evelyne Diez RD   [facility] Diabetes ([facility])    [address] [place] [id number] [phone]           Bring a current list of meds and any records pertaining to this visit.  For Physicals, please bring immunization records and any forms needing to be filled out.  Please arrive 10 minutes early to complete paperwork.            Jun 05, 2017 10:30 AM CDT   (Arrive by 10:15 AM)   Office Visit with Radha Saavedra RN   [facility] Diabetes ([facility])    [address] [place] [id number] [phone]           Bring a current list of meds and any records pertaining to this visit.  For Physicals, please bring immunization records and any forms needing to be filled out.  Please arrive 10 minutes early to complete paperwork.              Additional Services     Home infusion referral       Your provider has referred you to: EDILBERTO: [facility] [phone]    [email or web address]  S/p TPAIT--DR Rodrigo Jaquez  New Enteral feeds    Local Address (if different from home address): [facility]  Room:  Address: [address] [place] [address]  Phone: [phone]  [place] cell: [phone]  [place] cell: [phone]    [place], [place]    Anticipated Length of Therapy: 2-8 weeks    Home Infusion Pharmacist to adjust therapy based on labs and clinical assessments: Yes    Labs:  May draw labs from Venous Catheter: No  Home Infusion Pharmacist to order labs based on therapy type and clinical assessments: Yes  Call/Fax Lab Results to: Outpatient Care Coordinator: Soni Aleman  Main Phone: [phone]  Ph: [phone]  Fax: [phone]    Agency Staff to assess nursing needs for Infusion Therapy.    Access Device Management:  IV Access Type: n/a  Flush with Heparin and Normal Saline IVP PRN and routine site care (per agency protocol) to maintain access device? No     GJT                  Future tests that were ordered for you     ABO/Rh Type and Screen           Red blood cell have available                 Further instructions from your care team       ________________________________________________________  Discharge RN please fax discharge orders to home care agency: [facility]    --they need signed discharge orders by 12 noon on the day of discharge  ---page mark Salas @ [phone] Monday-Friday  ---weekends call office [phone]  ________________________________________________________    Tube Feeding Regimen:  Vivonex TEN @ 100 ml/hr + 4 pkts of ProSource TF daily    Tube Feeding Administration:  Mix 8 packets of Vivonex TEN formula + 2000 ml water.  This will yield 2400 ml of formula.  Mix well and store in the refrigerator in an empty milk jug container or pitcher.    Every 4 hours:    1. Remove formula from fridge and shake/mix well.    2. Pour 400 ml tube feeding to graduated cylinder.      Four  times daily - provide 1 packet (45 ounces) of ProSource TF via syringe into J-tube.  Flush tube with 15-30 ml water before and after.     You will require at least 600 ml (~2.5 cups) of additional water/fluid daily by mouth or via J-tube.  If unable to consume this by mouth, please provide water flushes via J-tube.      Vitamin/Mineral Recommendations:  Multivitamin with minerals (Certavite) through feeding tube daily  Vitamin D (Cholecalciferol) 2000 IU through feeding tube daily  When okay to switch to oral medications -> okay to take oral forms of these medications.     Recommend checking vitamin A, D, E, K and B12 every 1-2 years following surgery due to risk for deficiencies.          When discharged, will need prescriptions for:  1.  Freestyle Lite test strips  2.  Accu-chek FastClix lancet drums  3.  Insulin pen needles (4mm, 32 gauge)  4.  Alcohol swabs  5.  Sharps disposal container  6.  Glucose gel     Roxie Waller MS RN CDE [facility]  [phone]     Call the diabetes management team with questions regarding your treatment plan after discharge.  Yazmin Poon PA-C (available Friday through Monday) and Orquidea [facility] (available Monday through Thursday) both available at [phone], Joanna Longoria NP (available Monday through Friday) [phone], or the on-call endocrinologist can be paged by the hospital  [phone].          Pending Results     Date and Time Order Name Status Description    5/26/2017 1224 Catheter Tip Culture Aerobic Bacterial In process     5/15/2017 1800 Blood culture special Preliminary     5/15/2017 1800 Blood culture yeast Preliminary     5/15/2017 1321 Blood culture yeast Preliminary             Statement of Approval     Ordered          05/26/17 1218  I have reviewed and agree with all the recommendations and orders detailed in this document.     Approved and electronically signed by:  Vero Rader PA-C             Admission Information     Date & Time Provider Department  "Dept. Phone    5/15/2017 Rodrigo Jaquez MD [address] [facility] [phone]      Your Vitals Were     Blood Pressure Pulse Temperature Respirations Height Weight    113/64 (BP Location: Right arm) 80 99.7  F (37.6  C) (Oral) 16 1.88 m (6' 2\") 109.2 kg (240 lb 12.8 oz)    Pulse Oximetry BMI (Body Mass Index)                95% 30.92 kg/m2          [employer] Information     [employer] lets you send messages to your doctor, view your test results, renew your prescriptions, schedule appointments and more. To sign up, go to [email or web address] . Click on \"Log in\" on the left side of the screen, which will take you to the Welcome page. Then click on \"Sign up Now\" on the right side of the page.     You will be asked to enter the access code listed below, as well as some personal information. Please follow the directions to create your username and password.     Your access code is: [email or web address]  Expires: 2017  6:30 AM     Your access code will  in 90 days. If you need help or a new code, please call your [facility] clinic or [phone].        Care EveryWhere ID     This is your Care EveryWhere ID. This could be used by other organizations to access your [facility] medical records  [id number]           Review of your medicines      START taking        Dose / Directions    acetaminophen 32 mg/mL solution   Commonly known as:  TYLENOL   Used for:  Acquired total absence of pancreas        Dose:  500 mg   Take 15.65 mLs (500 mg) by mouth every 6 hours for 14 days   Quantity:  900 mL   Refills:  3       Alcohol Swabs Pads   Used for:  Post-pancreatectomy diabetes (H)        Dose:  1 pad   1 pad as needed   Quantity:  100 each   Refills:  3       BD SHARPS  Misc   Used for:  Post-pancreatectomy diabetes (H)        Dose:  1 Container   1 Container as needed   Quantity:  1 each   Refills:  3       blood glucose monitoring lancets   Used for:  Acquired total absence of pancreas        Use to test blood " sugar 8 times daily or as directed.   Quantity:  1 Box   Refills:  3       blood glucose monitoring test strip   Commonly known as:  FREESTYLE LITE   Used for:  Post-pancreatectomy diabetes (H)        Use to test blood sugars 8 times daily or as directed.   Quantity:  100 strip   Refills:  11       cholecalciferol 400 UNIT/ML Liqd liquid   Commonly known as:  vitamin D/D-VI-SOL   Used for:  Acquired total absence of pancreas        Dose:  2000 Units   5 mLs (2,000 Units) by Per J Tube route daily   Quantity:  150 mL   Refills:  1       fentaNYL 100 mcg/hr 72 hr patch   Commonly known as:  DURAGESIC   Used for:  Acquired total absence of pancreas   Replaces:  fentaNYL 25 mcg/hr 72 hr patch        Dose:  1 patch   Place 1 patch onto the skin every 72 hours for 14 days   Quantity:  4 patch   Refills:  0       FLUoxetine 20 MG/5ML solution   Commonly known as:  PROzac   Used for:  Acquired total absence of pancreas   Replaces:  PROZAC PO        Dose:  10 mg   2.5 mLs (10 mg) by Per J Tube route daily   Quantity:  75 mL   Refills:  3       glucose 40 % Gel gel   Used for:  Post-pancreatectomy diabetes (H)        Dose:  15-30 g   Take 15-30 g by mouth every 15 minutes as needed for low blood sugar   Quantity:  3 Tube   Refills:  1       HYDROmorphone 1 MG/ML Liqd liquid   Commonly known as:  DILAUDID   Used for:  Acquired total absence of pancreas        Dose:  1-2 mg   1-2 mLs (1-2 mg) by Per J Tube route every 4 hours   Quantity:  168 mL   Refills:  0       insulin aspart 100 UNIT/ML injection   Commonly known as:  NovoLOG PEN   Used for:  Post-pancreatectomy diabetes (H)        Correction aspart 2 units per 20 >120 every 4 hours   Quantity:  3 mL   Refills:  3       * insulin glargine 100 UNIT/ML injection   Commonly known as:  LANTUS   Used for:  Post-pancreatectomy diabetes (H)        Dose:  70 Units   Inject 70 Units Subcutaneous every evening   Quantity:  3 mL   Refills:  3       * insulin glargine 100 UNIT/ML  injection   Commonly known as:  LANTUS   Used for:  Post-pancreatectomy diabetes (H)        Dose:  90 Units   Inject 90 Units Subcutaneous every morning   Quantity:  3 mL   Refills:  3       insulin pen needle 32G X 4 MM   Used for:  Acquired total absence of pancreas        Use 8 pen needles daily or as directed.   Quantity:  100 each   Refills:  3       levothyroxine 25 mcg/mL Susp   Commonly known as:  SYNTHROID   Used for:  Acquired total absence of pancreas   Replaces:  LEVOTHYROXINE SODIUM PO        Dose:  150 mcg   6 mLs (150 mcg) by Per J Tube route every morning (before breakfast)   Quantity:  180 mL   Refills:  1       magnesium hydroxide 400 MG/5ML suspension   Commonly known as:  MILK OF MAGNESIA   Used for:  Other constipation        Dose:  30 mL   30 mLs by Per Feeding Tube route 2 times daily as needed for constipation   Quantity:  105 mL   Refills:  1       multivitamins with minerals Liqd liquid   Used for:  Acquired total absence of pancreas        Dose:  15 mL   15 mLs by Per Feeding Tube route daily   Quantity:  450 mL   Refills:  1       nystatin 616549 UNIT/ML suspension   Commonly known as:  MYCOSTATIN   Used for:  Acquired total absence of pancreas        Dose:  277572 Units   Take 5 mLs (500,000 Units) by mouth 4 times daily for 7 days   Quantity:  280 mL   Refills:  1       ondansetron 4 MG ODT tab   Commonly known as:  ZOFRAN-ODT   Used for:  Post-pancreatectomy diabetes (H)        Dose:  4-8 mg   Take 1-2 tablets (4-8 mg) by mouth every 6 hours as needed for nausea   Quantity:  30 tablet   Refills:  1       pantoprazole Susp suspension   Commonly known as:  PROTONIX   Used for:  Acquired total absence of pancreas        Dose:  40 mg   20 mLs (40 mg) by Oral or J tube route 2 times daily   Quantity:  1200 mL   Refills:  1       pregabalin 20 MG/ML solution   Commonly known as:  LYRICA   Used for:  History of pancreatectomy        Dose:  25 mg   1.25 mLs (25 mg) by Per Feeding Tube route 2  times daily   Quantity:  70 mL   Refills:  3       sennosides 8.8 MG/5ML syrup   Commonly known as:  SENOKOT   Used for:  Other constipation        Dose:  10 mL   10 mLs by Per Feeding Tube route 2 times daily   Quantity:  600 mL   Refills:  3       * Notice:  This list has 2 medication(s) that are the same as other medications prescribed for you. Read the directions carefully, and ask your doctor or other care provider to review them with you.      CONTINUE these medicines which may have CHANGED, or have new prescriptions. If we are uncertain of the size of tablets/capsules you have at home, strength may be listed as something that might have changed.        Dose / Directions    amylase-lipase-protease 65183 UNITS Cpep   Commonly known as:  CREON 12   This may have changed:    - medication strength  - how much to take  - when to take this  - reasons to take this   Used for:  Acquired total absence of pancreas        Dose:  3-4 capsule   Take 3-4 capsules (36,000-48,000 Units) by mouth every hour as needed (with snacks)   Quantity:  270 capsule   Refills:  3         STOP taking     fentaNYL 25 mcg/hr 72 hr patch   Commonly known as:  DURAGESIC   Replaced by:  fentaNYL 100 mcg/hr 72 hr patch           LEVOTHYROXINE SODIUM PO   Replaced by:  levothyroxine 25 mcg/mL Susp           PROZAC PO   Replaced by:  FLUoxetine 20 MG/5ML solution                Where to get your medicines      These medications were sent to [facility] [place], [place] - [address] [address]     Phone:  [phone]     acetaminophen 32 mg/mL solution    Alcohol Swabs Pads    amylase-lipase-protease 27284 UNITS Cpep    BD SHARPS  Misc    blood glucose monitoring lancets    blood glucose monitoring test strip    cholecalciferol 400 UNIT/ML Liqd liquid    FLUoxetine 20 MG/5ML solution    glucose 40 % Gel gel    insulin aspart 100 UNIT/ML injection    insulin glargine 100 UNIT/ML  injection    insulin glargine 100 UNIT/ML injection    insulin pen needle 32G X 4 MM    levothyroxine 25 mcg/mL Susp    magnesium hydroxide 400 MG/5ML suspension    multivitamins with minerals Liqd liquid    nystatin 901204 UNIT/ML suspension    ondansetron 4 MG ODT tab    pantoprazole Susp suspension    sennosides 8.8 MG/5ML syrup         Some of these will need a paper prescription and others can be bought over the counter. Ask your nurse if you have questions.     Bring a paper prescription for each of these medications     fentaNYL 100 mcg/hr 72 hr patch    HYDROmorphone 1 MG/ML Liqd liquid    pregabalin 20 MG/ML solution                Protect others around you: Learn how to safely use, store and throw away your medicines at www.disposemymeds.org.             Medication List: This is a list of all your medications and when to take them. Check marks below indicate your daily home schedule. Keep this list as a reference.      Medications           Morning Afternoon Evening Bedtime As Needed    acetaminophen 32 mg/mL solution   Commonly known as:  TYLENOL   Take 15.65 mLs (500 mg) by mouth every 6 hours for 14 days   Last time this was given:  500 mg on 5/25/2017 11:47 PM                                Alcohol Swabs Pads   1 pad as needed                                amylase-lipase-protease 63709 UNITS Cpep   Commonly known as:  CREON 12   Take 3-4 capsules (36,000-48,000 Units) by mouth every hour as needed (with snacks)   Last time this was given:  72,000 Units on 5/21/2017 12:16 PM                                BD SHARPS  Misc   1 Container as needed                                blood glucose monitoring lancets   Use to test blood sugar 8 times daily or as directed.                                blood glucose monitoring test strip   Commonly known as:  FREESTYLE LITE   Use to test blood sugars 8 times daily or as directed.                                cholecalciferol 400 UNIT/ML Liqd liquid    Commonly known as:  vitamin D/D-VI-SOL   5 mLs (2,000 Units) by Per J Tube route daily   Last time this was given:  2,000 Units on 5/26/2017  8:34 AM                                fentaNYL 100 mcg/hr 72 hr patch   Commonly known as:  DURAGESIC   Place 1 patch onto the skin every 72 hours for 14 days   Last time this was given:  1 patch on 5/25/2017 12:26 PM                                FLUoxetine 20 MG/5ML solution   Commonly known as:  PROzac   2.5 mLs (10 mg) by Per J Tube route daily   Last time this was given:  10 mg on 5/26/2017  8:37 AM                                glucose 40 % Gel gel   Take 15-30 g by mouth every 15 minutes as needed for low blood sugar                                HYDROmorphone 1 MG/ML Liqd liquid   Commonly known as:  DILAUDID   1-2 mLs (1-2 mg) by Per J Tube route every 4 hours   Last time this was given:  1 mg on 5/26/2017 12:48 PM                                insulin aspart 100 UNIT/ML injection   Commonly known as:  NovoLOG PEN   Correction aspart 2 units per 20 >120 every 4 hours   Last time this was given:  8 Units on 5/26/2017 12:28 PM                                * insulin glargine 100 UNIT/ML injection   Commonly known as:  LANTUS   Inject 70 Units Subcutaneous every evening   Last time this was given:  90 Units on 5/26/2017  8:50 AM                                * insulin glargine 100 UNIT/ML injection   Commonly known as:  LANTUS   Inject 90 Units Subcutaneous every morning   Last time this was given:  90 Units on 5/26/2017  8:50 AM                                insulin pen needle 32G X 4 MM   Use 8 pen needles daily or as directed.                                levothyroxine 25 mcg/mL Susp   Commonly known as:  SYNTHROID   6 mLs (150 mcg) by Per J Tube route every morning (before breakfast)   Last time this was given:  150 mcg on 5/26/2017  7:34 AM                                magnesium hydroxide 400 MG/5ML suspension   Commonly known as:  MILK OF MAGNESIA    30 mLs by Per Feeding Tube route 2 times daily as needed for constipation   Last time this was given:  30 mLs on 5/24/2017  4:34 PM                                multivitamins with minerals Liqd liquid   15 mLs by Per Feeding Tube route daily   Last time this was given:  15 mLs on 5/26/2017  8:37 AM                                nystatin 348026 UNIT/ML suspension   Commonly known as:  MYCOSTATIN   Take 5 mLs (500,000 Units) by mouth 4 times daily for 7 days   Last time this was given:  500,000 Units on 5/26/2017 12:48 PM                                ondansetron 4 MG ODT tab   Commonly known as:  ZOFRAN-ODT   Take 1-2 tablets (4-8 mg) by mouth every 6 hours as needed for nausea                                pantoprazole Susp suspension   Commonly known as:  PROTONIX   20 mLs (40 mg) by Oral or J tube route 2 times daily   Last time this was given:  40 mg on 5/26/2017  8:36 AM                                pregabalin 20 MG/ML solution   Commonly known as:  LYRICA   1.25 mLs (25 mg) by Per Feeding Tube route 2 times daily   Last time this was given:  25 mg on 5/26/2017  8:30 AM                                sennosides 8.8 MG/5ML syrup   Commonly known as:  SENOKOT   10 mLs by Per Feeding Tube route 2 times daily   Last time this was given:  10 mLs on 5/25/2017  8:48 AM                                * Notice:  This list has 2 medication(s) that are the same as other medications prescribed for you. Read the directions carefully, and ask your doctor or other care provider to review them with you.

## 2017-05-16 ENCOUNTER — APPOINTMENT (OUTPATIENT)
Dept: PHYSICAL THERAPY | Facility: CLINIC | Age: 57
DRG: 406 | End: 2017-05-16
Attending: TRANSPLANT SURGERY
Payer: COMMERCIAL

## 2017-05-16 ENCOUNTER — APPOINTMENT (OUTPATIENT)
Dept: ULTRASOUND IMAGING | Facility: CLINIC | Age: 57
DRG: 406 | End: 2017-05-16
Attending: PHYSICIAN ASSISTANT
Payer: COMMERCIAL

## 2017-05-16 LAB
ALBUMIN SERPL-MCNC: 2.9 G/DL (ref 3.4–5)
ALP SERPL-CCNC: 29 U/L (ref 40–150)
ALT SERPL W P-5'-P-CCNC: 128 U/L (ref 0–70)
AMYLASE SERPL-CCNC: 790 U/L (ref 30–110)
ANION GAP SERPL CALCULATED.3IONS-SCNC: 8 MMOL/L (ref 3–14)
AST SERPL W P-5'-P-CCNC: 133 U/L (ref 0–45)
BASOPHILS # BLD AUTO: 0 10E9/L (ref 0–0.2)
BASOPHILS NFR BLD AUTO: 0.1 %
BILIRUB DIRECT SERPL-MCNC: 0.3 MG/DL (ref 0–0.2)
BILIRUB SERPL-MCNC: 1.1 MG/DL (ref 0.2–1.3)
BUN SERPL-MCNC: 15 MG/DL (ref 7–30)
CALCIUM SERPL-MCNC: 7.5 MG/DL (ref 8.5–10.1)
CHLORIDE SERPL-SCNC: 114 MMOL/L (ref 94–109)
CO2 SERPL-SCNC: 22 MMOL/L (ref 20–32)
CREAT SERPL-MCNC: 0.94 MG/DL (ref 0.66–1.25)
DIFFERENTIAL METHOD BLD: ABNORMAL
EOSINOPHIL # BLD AUTO: 0 10E9/L (ref 0–0.7)
EOSINOPHIL NFR BLD AUTO: 0.1 %
ERYTHROCYTE [DISTWIDTH] IN BLOOD BY AUTOMATED COUNT: 13.9 % (ref 10–15)
ERYTHROCYTE [DISTWIDTH] IN BLOOD BY AUTOMATED COUNT: 14.1 % (ref 10–15)
ERYTHROCYTE [DISTWIDTH] IN BLOOD BY AUTOMATED COUNT: 14.1 % (ref 10–15)
GFR SERPL CREATININE-BSD FRML MDRD: 83 ML/MIN/1.7M2
GLUCOSE BLDC GLUCOMTR-MCNC: 101 MG/DL (ref 70–99)
GLUCOSE BLDC GLUCOMTR-MCNC: 105 MG/DL (ref 70–99)
GLUCOSE BLDC GLUCOMTR-MCNC: 110 MG/DL (ref 70–99)
GLUCOSE BLDC GLUCOMTR-MCNC: 113 MG/DL (ref 70–99)
GLUCOSE BLDC GLUCOMTR-MCNC: 115 MG/DL (ref 70–99)
GLUCOSE BLDC GLUCOMTR-MCNC: 117 MG/DL (ref 70–99)
GLUCOSE BLDC GLUCOMTR-MCNC: 119 MG/DL (ref 70–99)
GLUCOSE BLDC GLUCOMTR-MCNC: 119 MG/DL (ref 70–99)
GLUCOSE BLDC GLUCOMTR-MCNC: 127 MG/DL (ref 70–99)
GLUCOSE BLDC GLUCOMTR-MCNC: 130 MG/DL (ref 70–99)
GLUCOSE BLDC GLUCOMTR-MCNC: 131 MG/DL (ref 70–99)
GLUCOSE BLDC GLUCOMTR-MCNC: 134 MG/DL (ref 70–99)
GLUCOSE BLDC GLUCOMTR-MCNC: 137 MG/DL (ref 70–99)
GLUCOSE BLDC GLUCOMTR-MCNC: 140 MG/DL (ref 70–99)
GLUCOSE BLDC GLUCOMTR-MCNC: 142 MG/DL (ref 70–99)
GLUCOSE BLDC GLUCOMTR-MCNC: 145 MG/DL (ref 70–99)
GLUCOSE BLDC GLUCOMTR-MCNC: 79 MG/DL (ref 70–99)
GLUCOSE BLDC GLUCOMTR-MCNC: 85 MG/DL (ref 70–99)
GLUCOSE BLDC GLUCOMTR-MCNC: 87 MG/DL (ref 70–99)
GLUCOSE BLDC GLUCOMTR-MCNC: 91 MG/DL (ref 70–99)
GLUCOSE BLDC GLUCOMTR-MCNC: 96 MG/DL (ref 70–99)
GLUCOSE BLDC GLUCOMTR-MCNC: 96 MG/DL (ref 70–99)
GLUCOSE SERPL-MCNC: 142 MG/DL (ref 70–99)
HBA1C MFR BLD: 5.3 % (ref 4.3–6)
HCT VFR BLD AUTO: 28.2 % (ref 40–53)
HCT VFR BLD AUTO: 29.2 % (ref 40–53)
HCT VFR BLD AUTO: 30.2 % (ref 40–53)
HGB BLD-MCNC: 10.6 G/DL (ref 13.3–17.7)
HGB BLD-MCNC: 9 G/DL (ref 13.3–17.7)
HGB BLD-MCNC: 9.2 G/DL (ref 13.3–17.7)
HGB BLD-MCNC: 9.5 G/DL (ref 13.3–17.7)
HGB BLD-MCNC: 9.6 G/DL (ref 13.3–17.7)
HGB BLD-MCNC: 9.6 G/DL (ref 13.3–17.7)
IMM GRANULOCYTES # BLD: 0 10E9/L (ref 0–0.4)
IMM GRANULOCYTES NFR BLD: 0.2 %
LIPASE SERPL-CCNC: 9517 U/L (ref 73–393)
LYMPHOCYTES # BLD AUTO: 1 10E9/L (ref 0.8–5.3)
LYMPHOCYTES NFR BLD AUTO: 9.6 %
MAGNESIUM SERPL-MCNC: 2 MG/DL (ref 1.6–2.3)
MCH RBC QN AUTO: 27.7 PG (ref 26.5–33)
MCH RBC QN AUTO: 28 PG (ref 26.5–33)
MCH RBC QN AUTO: 28.4 PG (ref 26.5–33)
MCHC RBC AUTO-ENTMCNC: 31.8 G/DL (ref 31.5–36.5)
MCHC RBC AUTO-ENTMCNC: 31.9 G/DL (ref 31.5–36.5)
MCHC RBC AUTO-ENTMCNC: 32.9 G/DL (ref 31.5–36.5)
MCV RBC AUTO: 86 FL (ref 78–100)
MCV RBC AUTO: 87 FL (ref 78–100)
MCV RBC AUTO: 88 FL (ref 78–100)
MONOCYTES # BLD AUTO: 0.8 10E9/L (ref 0–1.3)
MONOCYTES NFR BLD AUTO: 7.9 %
NEUTROPHILS # BLD AUTO: 8.1 10E9/L (ref 1.6–8.3)
NEUTROPHILS NFR BLD AUTO: 82.1 %
NRBC # BLD AUTO: 0 10*3/UL
NRBC BLD AUTO-RTO: 0 /100
PHOSPHATE SERPL-MCNC: 1.9 MG/DL (ref 2.5–4.5)
PHOSPHATE SERPL-MCNC: 2.7 MG/DL (ref 2.5–4.5)
PLATELET # BLD AUTO: 121 10E9/L (ref 150–450)
PLATELET # BLD AUTO: 133 10E9/L (ref 150–450)
PLATELET # BLD AUTO: 144 10E9/L (ref 150–450)
POTASSIUM SERPL-SCNC: 4.1 MMOL/L (ref 3.4–5.3)
PROT SERPL-MCNC: 4.8 G/DL (ref 6.8–8.8)
RBC # BLD AUTO: 3.22 10E12/L (ref 4.4–5.9)
RBC # BLD AUTO: 3.38 10E12/L (ref 4.4–5.9)
RBC # BLD AUTO: 3.47 10E12/L (ref 4.4–5.9)
SODIUM SERPL-SCNC: 144 MMOL/L (ref 133–144)
WBC # BLD AUTO: 11.5 10E9/L (ref 4–11)
WBC # BLD AUTO: 13.1 10E9/L (ref 4–11)
WBC # BLD AUTO: 9.9 10E9/L (ref 4–11)

## 2017-05-16 PROCEDURE — 80048 BASIC METABOLIC PNL TOTAL CA: CPT | Performed by: STUDENT IN AN ORGANIZED HEALTH CARE EDUCATION/TRAINING PROGRAM

## 2017-05-16 PROCEDURE — 80076 HEPATIC FUNCTION PANEL: CPT | Performed by: STUDENT IN AN ORGANIZED HEALTH CARE EDUCATION/TRAINING PROGRAM

## 2017-05-16 PROCEDURE — 85018 HEMOGLOBIN: CPT | Performed by: STUDENT IN AN ORGANIZED HEALTH CARE EDUCATION/TRAINING PROGRAM

## 2017-05-16 PROCEDURE — 25000132 ZZH RX MED GY IP 250 OP 250 PS 637: Performed by: NURSE PRACTITIONER

## 2017-05-16 PROCEDURE — 25000128 H RX IP 250 OP 636: Performed by: SURGERY

## 2017-05-16 PROCEDURE — 85025 COMPLETE CBC W/AUTO DIFF WBC: CPT | Performed by: STUDENT IN AN ORGANIZED HEALTH CARE EDUCATION/TRAINING PROGRAM

## 2017-05-16 PROCEDURE — 27210436 ZZH NUTRITION PRODUCT SEMIELEM INTERMED CAN

## 2017-05-16 PROCEDURE — 83036 HEMOGLOBIN GLYCOSYLATED A1C: CPT | Performed by: STUDENT IN AN ORGANIZED HEALTH CARE EDUCATION/TRAINING PROGRAM

## 2017-05-16 PROCEDURE — 20000004 ZZH R&B ICU UMMC

## 2017-05-16 PROCEDURE — 85018 HEMOGLOBIN: CPT | Performed by: TRANSPLANT SURGERY

## 2017-05-16 PROCEDURE — 83735 ASSAY OF MAGNESIUM: CPT | Performed by: STUDENT IN AN ORGANIZED HEALTH CARE EDUCATION/TRAINING PROGRAM

## 2017-05-16 PROCEDURE — 40000193 ZZH STATISTIC PT WARD VISIT

## 2017-05-16 PROCEDURE — 25000125 ZZHC RX 250: Performed by: SURGERY

## 2017-05-16 PROCEDURE — 84100 ASSAY OF PHOSPHORUS: CPT | Performed by: NURSE PRACTITIONER

## 2017-05-16 PROCEDURE — 87081 CULTURE SCREEN ONLY: CPT | Performed by: INTERNAL MEDICINE

## 2017-05-16 PROCEDURE — 25000125 ZZHC RX 250: Performed by: TRANSPLANT SURGERY

## 2017-05-16 PROCEDURE — 25000132 ZZH RX MED GY IP 250 OP 250 PS 637: Performed by: SURGERY

## 2017-05-16 PROCEDURE — 25000128 H RX IP 250 OP 636: Performed by: PHYSICIAN ASSISTANT

## 2017-05-16 PROCEDURE — 25000125 ZZHC RX 250: Performed by: NURSE PRACTITIONER

## 2017-05-16 PROCEDURE — 25000128 H RX IP 250 OP 636: Performed by: NURSE PRACTITIONER

## 2017-05-16 PROCEDURE — 25000128 H RX IP 250 OP 636: Performed by: STUDENT IN AN ORGANIZED HEALTH CARE EDUCATION/TRAINING PROGRAM

## 2017-05-16 PROCEDURE — 87186 SC STD MICRODIL/AGAR DIL: CPT | Performed by: INTERNAL MEDICINE

## 2017-05-16 PROCEDURE — 00000146 ZZHCL STATISTIC GLUCOSE BY METER IP

## 2017-05-16 PROCEDURE — 84100 ASSAY OF PHOSPHORUS: CPT | Performed by: STUDENT IN AN ORGANIZED HEALTH CARE EDUCATION/TRAINING PROGRAM

## 2017-05-16 PROCEDURE — 97530 THERAPEUTIC ACTIVITIES: CPT | Mod: GP

## 2017-05-16 PROCEDURE — 93975 VASCULAR STUDY: CPT | Mod: TC

## 2017-05-16 PROCEDURE — 25000125 ZZHC RX 250: Performed by: STUDENT IN AN ORGANIZED HEALTH CARE EDUCATION/TRAINING PROGRAM

## 2017-05-16 PROCEDURE — 85027 COMPLETE CBC AUTOMATED: CPT | Performed by: PHYSICIAN ASSISTANT

## 2017-05-16 PROCEDURE — 82150 ASSAY OF AMYLASE: CPT | Performed by: STUDENT IN AN ORGANIZED HEALTH CARE EDUCATION/TRAINING PROGRAM

## 2017-05-16 PROCEDURE — 99291 CRITICAL CARE FIRST HOUR: CPT | Performed by: NURSE PRACTITIONER

## 2017-05-16 PROCEDURE — 40000014 ZZH STATISTIC ARTERIAL MONITORING DAILY

## 2017-05-16 PROCEDURE — 85027 COMPLETE CBC AUTOMATED: CPT | Performed by: NURSE PRACTITIONER

## 2017-05-16 PROCEDURE — 97162 PT EVAL MOD COMPLEX 30 MIN: CPT | Mod: GP

## 2017-05-16 PROCEDURE — 25000125 ZZHC RX 250

## 2017-05-16 PROCEDURE — 83690 ASSAY OF LIPASE: CPT | Performed by: STUDENT IN AN ORGANIZED HEALTH CARE EDUCATION/TRAINING PROGRAM

## 2017-05-16 RX ORDER — ACETAMINOPHEN 10 MG/ML
1000 INJECTION, SOLUTION INTRAVENOUS EVERY 6 HOURS
Status: COMPLETED | OUTPATIENT
Start: 2017-05-16 | End: 2017-05-17

## 2017-05-16 RX ORDER — POTASSIUM CHLORIDE 7.45 MG/ML
10 INJECTION INTRAVENOUS
Status: DISCONTINUED | OUTPATIENT
Start: 2017-05-16 | End: 2017-05-26 | Stop reason: HOSPADM

## 2017-05-16 RX ORDER — MAGNESIUM SULFATE HEPTAHYDRATE 40 MG/ML
4 INJECTION, SOLUTION INTRAVENOUS EVERY 4 HOURS PRN
Status: DISCONTINUED | OUTPATIENT
Start: 2017-05-16 | End: 2017-05-26 | Stop reason: HOSPADM

## 2017-05-16 RX ORDER — KETOROLAC TROMETHAMINE 15 MG/ML
15 INJECTION, SOLUTION INTRAMUSCULAR; INTRAVENOUS EVERY 6 HOURS PRN
Status: DISCONTINUED | OUTPATIENT
Start: 2017-05-16 | End: 2017-05-17

## 2017-05-16 RX ORDER — POTASSIUM CHLORIDE 750 MG/1
20-40 TABLET, EXTENDED RELEASE ORAL
Status: DISCONTINUED | OUTPATIENT
Start: 2017-05-16 | End: 2017-05-26 | Stop reason: HOSPADM

## 2017-05-16 RX ORDER — HEPARIN SODIUM 10000 [USP'U]/100ML
200 INJECTION, SOLUTION INTRAVENOUS CONTINUOUS
Status: DISCONTINUED | OUTPATIENT
Start: 2017-05-16 | End: 2017-05-16

## 2017-05-16 RX ORDER — FENTANYL CITRATE 50 UG/ML
INJECTION, SOLUTION INTRAMUSCULAR; INTRAVENOUS
Status: COMPLETED
Start: 2017-05-16 | End: 2017-05-16

## 2017-05-16 RX ORDER — ONDANSETRON 2 MG/ML
4-8 INJECTION INTRAMUSCULAR; INTRAVENOUS EVERY 6 HOURS PRN
Status: DISCONTINUED | OUTPATIENT
Start: 2017-05-16 | End: 2017-05-26 | Stop reason: HOSPADM

## 2017-05-16 RX ORDER — SODIUM CHLORIDE, SODIUM LACTATE, POTASSIUM CHLORIDE, CALCIUM CHLORIDE 600; 310; 30; 20 MG/100ML; MG/100ML; MG/100ML; MG/100ML
INJECTION, SOLUTION INTRAVENOUS
Status: DISCONTINUED
Start: 2017-05-16 | End: 2017-05-17 | Stop reason: HOSPADM

## 2017-05-16 RX ORDER — HYDROMORPHONE HYDROCHLORIDE 1 MG/ML
INJECTION, SOLUTION INTRAMUSCULAR; INTRAVENOUS; SUBCUTANEOUS
Status: DISCONTINUED
Start: 2017-05-16 | End: 2017-05-17 | Stop reason: HOSPADM

## 2017-05-16 RX ORDER — HEPARIN SODIUM 10000 [USP'U]/100ML
400 INJECTION, SOLUTION INTRAVENOUS CONTINUOUS
Status: DISCONTINUED | OUTPATIENT
Start: 2017-05-16 | End: 2017-05-20

## 2017-05-16 RX ORDER — KETAMINE HYDROCHLORIDE 10 MG/ML
5 INJECTION, SOLUTION INTRAMUSCULAR; INTRAVENOUS EVERY 4 HOURS
Status: DISCONTINUED | OUTPATIENT
Start: 2017-05-16 | End: 2017-05-16

## 2017-05-16 RX ORDER — ONDANSETRON 4 MG/1
4-8 TABLET, ORALLY DISINTEGRATING ORAL EVERY 6 HOURS PRN
Status: DISCONTINUED | OUTPATIENT
Start: 2017-05-16 | End: 2017-05-26 | Stop reason: HOSPADM

## 2017-05-16 RX ORDER — POTASSIUM CL/LIDO/0.9 % NACL 10MEQ/0.1L
10 INTRAVENOUS SOLUTION, PIGGYBACK (ML) INTRAVENOUS
Status: DISCONTINUED | OUTPATIENT
Start: 2017-05-16 | End: 2017-05-26 | Stop reason: HOSPADM

## 2017-05-16 RX ORDER — HYDROMORPHONE HCL/0.9% NACL/PF 0.2MG/0.2
0.2 SYRINGE (ML) INTRAVENOUS ONCE
Status: COMPLETED | OUTPATIENT
Start: 2017-05-16 | End: 2017-05-16

## 2017-05-16 RX ORDER — DIAZEPAM 10 MG/2ML
5 INJECTION, SOLUTION INTRAMUSCULAR; INTRAVENOUS EVERY 6 HOURS PRN
Status: DISCONTINUED | OUTPATIENT
Start: 2017-05-16 | End: 2017-05-17

## 2017-05-16 RX ORDER — POTASSIUM CHLORIDE 1.5 G/1.58G
20-40 POWDER, FOR SOLUTION ORAL
Status: DISCONTINUED | OUTPATIENT
Start: 2017-05-16 | End: 2017-05-26 | Stop reason: HOSPADM

## 2017-05-16 RX ORDER — KETAMINE HYDROCHLORIDE 10 MG/ML
10 INJECTION, SOLUTION INTRAMUSCULAR; INTRAVENOUS EVERY 4 HOURS
Status: DISCONTINUED | OUTPATIENT
Start: 2017-05-16 | End: 2017-05-17

## 2017-05-16 RX ORDER — SODIUM BICARBONATE 325 MG/1
325 TABLET ORAL EVERY 4 HOURS
Status: DISCONTINUED | OUTPATIENT
Start: 2017-05-16 | End: 2017-05-17

## 2017-05-16 RX ORDER — POTASSIUM CHLORIDE 29.8 MG/ML
20 INJECTION INTRAVENOUS
Status: DISCONTINUED | OUTPATIENT
Start: 2017-05-16 | End: 2017-05-26 | Stop reason: HOSPADM

## 2017-05-16 RX ORDER — FENTANYL CITRATE 50 UG/ML
100 INJECTION, SOLUTION INTRAMUSCULAR; INTRAVENOUS ONCE
Status: COMPLETED | OUTPATIENT
Start: 2017-05-16 | End: 2017-05-16

## 2017-05-16 RX ORDER — BUPIVACAINE HYDROCHLORIDE 2.5 MG/ML
6 INJECTION, SOLUTION EPIDURAL; INFILTRATION; INTRACAUDAL ONCE
Status: COMPLETED | OUTPATIENT
Start: 2017-05-16 | End: 2017-05-16

## 2017-05-16 RX ADMIN — Medication 2 G: at 10:05

## 2017-05-16 RX ADMIN — DEXMEDETOMIDINE 0.3 MCG/KG/HR: 100 INJECTION, SOLUTION, CONCENTRATE INTRAVENOUS at 11:52

## 2017-05-16 RX ADMIN — Medication 2.5 ML: at 09:58

## 2017-05-16 RX ADMIN — Medication 5 MG: at 20:41

## 2017-05-16 RX ADMIN — Medication 5 MG: at 12:29

## 2017-05-16 RX ADMIN — ERTAPENEM SODIUM 1 G: 1 INJECTION, POWDER, LYOPHILIZED, FOR SOLUTION INTRAMUSCULAR; INTRAVENOUS at 15:15

## 2017-05-16 RX ADMIN — DEXMEDETOMIDINE 0.6 MCG/KG/HR: 100 INJECTION, SOLUTION, CONCENTRATE INTRAVENOUS at 04:35

## 2017-05-16 RX ADMIN — Medication 10 MG: at 15:49

## 2017-05-16 RX ADMIN — FLUOXETINE HYDROCHLORIDE 10 MG: 20 SOLUTION ORAL at 09:58

## 2017-05-16 RX ADMIN — SODIUM CHLORIDE, SODIUM LACTATE, POTASSIUM CHLORIDE, CALCIUM CHLORIDE AND DEXTROSE MONOHYDRATE: 5; 600; 310; 30; 20 INJECTION, SOLUTION INTRAVENOUS at 20:17

## 2017-05-16 RX ADMIN — FENTANYL CITRATE 100 MCG: 50 INJECTION, SOLUTION INTRAMUSCULAR; INTRAVENOUS at 14:50

## 2017-05-16 RX ADMIN — HUMAN INSULIN 3 UNITS/HR: 100 INJECTION, SOLUTION SUBCUTANEOUS at 04:38

## 2017-05-16 RX ADMIN — DEXMEDETOMIDINE 0.6 MCG/KG/HR: 100 INJECTION, SOLUTION, CONCENTRATE INTRAVENOUS at 18:54

## 2017-05-16 RX ADMIN — SODIUM BICARBONATE 325 MG: 325 TABLET ORAL at 16:56

## 2017-05-16 RX ADMIN — PANTOPRAZOLE SODIUM 40 MG: 40 INJECTION, POWDER, FOR SOLUTION INTRAVENOUS at 02:41

## 2017-05-16 RX ADMIN — PANCRELIPASE 24000 UNITS: 24000; 76000; 120000 CAPSULE, DELAYED RELEASE PELLETS ORAL at 16:57

## 2017-05-16 RX ADMIN — GABAPENTIN 300 MG: 250 SOLUTION ORAL at 20:42

## 2017-05-16 RX ADMIN — GABAPENTIN 300 MG: 250 SOLUTION ORAL at 09:56

## 2017-05-16 RX ADMIN — ONDANSETRON 4 MG: 2 INJECTION INTRAMUSCULAR; INTRAVENOUS at 08:55

## 2017-05-16 RX ADMIN — SODIUM CHLORIDE, SODIUM LACTATE, POTASSIUM CHLORIDE, CALCIUM CHLORIDE AND DEXTROSE MONOHYDRATE: 5; 600; 310; 30; 20 INJECTION, SOLUTION INTRAVENOUS at 11:12

## 2017-05-16 RX ADMIN — BUPIVACAINE HYDROCHLORIDE 15 MG: 2.5 INJECTION, SOLUTION EPIDURAL; INFILTRATION; INTRACAUDAL at 11:30

## 2017-05-16 RX ADMIN — HYDROMORPHONE HYDROCHLORIDE 0.2 MG: 1 INJECTION, SOLUTION INTRAMUSCULAR; INTRAVENOUS; SUBCUTANEOUS at 13:42

## 2017-05-16 RX ADMIN — LEVOTHYROXINE SODIUM 150 MCG: 300 TABLET ORAL at 08:45

## 2017-05-16 RX ADMIN — SODIUM CHLORIDE, SODIUM LACTATE, POTASSIUM CHLORIDE, CALCIUM CHLORIDE AND DEXTROSE MONOHYDRATE: 5; 600; 310; 30; 20 INJECTION, SOLUTION INTRAVENOUS at 04:04

## 2017-05-16 RX ADMIN — ACETAMINOPHEN 1000 MG: 10 INJECTION, SOLUTION INTRAVENOUS at 16:37

## 2017-05-16 RX ADMIN — PROCHLORPERAZINE EDISYLATE 2.5 MG: 5 INJECTION INTRAMUSCULAR; INTRAVENOUS at 10:55

## 2017-05-16 RX ADMIN — ACETAMINOPHEN 1000 MG: 10 INJECTION, SOLUTION INTRAVENOUS at 20:41

## 2017-05-16 RX ADMIN — SODIUM CHLORIDE, POTASSIUM CHLORIDE, SODIUM LACTATE AND CALCIUM CHLORIDE 1000 ML: 600; 310; 30; 20 INJECTION, SOLUTION INTRAVENOUS at 08:35

## 2017-05-16 RX ADMIN — HUMAN INSULIN 1.5 UNITS/HR: 100 INJECTION, SOLUTION SUBCUTANEOUS at 20:09

## 2017-05-16 RX ADMIN — ONDANSETRON 4 MG: 2 INJECTION INTRAMUSCULAR; INTRAVENOUS at 14:08

## 2017-05-16 RX ADMIN — SODIUM PHOSPHATE, MONOBASIC, MONOHYDRATE AND SODIUM PHOSPHATE, DIBASIC, ANHYDROUS 10 MMOL: 276; 142 INJECTION, SOLUTION INTRAVENOUS at 20:43

## 2017-05-16 RX ADMIN — PANTOPRAZOLE SODIUM 40 MG: 40 INJECTION, POWDER, FOR SOLUTION INTRAVENOUS at 20:41

## 2017-05-16 RX ADMIN — SODIUM CHLORIDE, POTASSIUM CHLORIDE, SODIUM LACTATE AND CALCIUM CHLORIDE 1000 ML: 600; 310; 30; 20 INJECTION, SOLUTION INTRAVENOUS at 23:00

## 2017-05-16 RX ADMIN — HEPARIN SODIUM 200 UNITS/HR: 10000 INJECTION, SOLUTION INTRAVENOUS at 10:29

## 2017-05-16 RX ADMIN — SODIUM PHOSPHATE, MONOBASIC, MONOHYDRATE AND SODIUM PHOSPHATE, DIBASIC, ANHYDROUS 20 MMOL: 276; 142 INJECTION, SOLUTION INTRAVENOUS at 11:43

## 2017-05-16 RX ADMIN — PANTOPRAZOLE SODIUM 40 MG: 40 INJECTION, POWDER, FOR SOLUTION INTRAVENOUS at 08:07

## 2017-05-16 RX ADMIN — Medication 5 MG: at 08:39

## 2017-05-16 RX ADMIN — FENTANYL CITRATE 100 MCG: 50 INJECTION INTRAMUSCULAR; INTRAVENOUS at 14:50

## 2017-05-16 ASSESSMENT — PAIN DESCRIPTION - DESCRIPTORS
DESCRIPTORS: ACHING
DESCRIPTORS: ACHING;CRAMPING
DESCRIPTORS: ACHING
DESCRIPTORS: ACHING
DESCRIPTORS: SHARP
DESCRIPTORS: ACHING;CRAMPING;SHARP
DESCRIPTORS: ACHING;CRAMPING

## 2017-05-16 NOTE — PROGRESS NOTES
REGIONAL ANESTHESIA PAIN SERVICE CONTINUOUS NERVE INFUSION NOTE  SUBJECTIVE:  Interval History: Pt reports inadequate pain control via continuous peripheral nerve block (CPNB) infusion and PCA HYDROmorphone.  Denies any weakness, paresthesias, circumoral numbness, metallic taste or tinnitus.  Pt has not been OOB yet this morning.  Patient is currently with nausea no vomiting. Treated with zofran and compazine.  Pt reports severe abdominal pain when IV fluid bolus infusion.  Patient is NPO.       Clinically Aligned Pain Assessment (CAPA):   Comfort (How is your pain?): intolerable with discomfort  Change in Pain (Since your last medication/intervention?): About the same  Pain Control (How are your pain treatments working?):  ineffective pain control  Functioning (Are you able to do activities to get better?) : Can do most things, but pain gets in the way of some   Sleep (Does your pain management allow you to sleep or rest?): Awake with occasional pain     Numerical Rating Scale:  10/10 at rest and 10/10 with movement.        Anticoagulation:  Heparin 200 units/hr    OBJECTIVE:    Diagnostic:  Lab Results   Component Value Date    WBC 9.9 05/16/2017     Lab Results   Component Value Date    RBC 3.47 05/16/2017     Lab Results   Component Value Date    HGB 9.5 05/16/2017     Lab Results   Component Value Date    HCT 30.2 05/16/2017     Lab Results   Component Value Date     05/16/2017         Vitals:    Temp:  [97.7  F (36.5  C)-98.6  F (37  C)] 98.6  F (37  C)  Heart Rate:  [75-85] 75  Resp:  [12-20] 14  BP: ()/(49-72) 98/58  MAP:  [60 mmHg-253 mmHg] 61 mmHg  Arterial Line BP: ()/() 86/46  SpO2:  [94 %-100 %] 96 %    Exam:    Strength 5/5 and symmetric grossly in bilateral LE   B/Lparavertebral (PV) catheter sites with dressing c/d/i, no tenderness, erythema, heme, edema      ASSESSMENT/PLAN:    Sohan Arita is a 56 year old male POD #1 s/p COMBINED PANCREATECTOMY, TRANSPLANT AUTO  ISLET CELL and placement of B/L T6-7 PV catheters for analgesia.  Pt is receiving adequate analgesia with Ropivacaine 0.2%, total infusion 10mL/hour-5mL each catheter.  Pt has not been OOB since surgery.  No weakness or paresthesias.  No evidence of adverse side effects associated with local anesthetic.  Pt using PCA HYDROmorphone PRN.    - 0810 increased ropivacaine infusion to 14mL/hour - 7mL each catheter - RN just gave ketamine  - 1040 bolused B/L PV catheters with 6mL 0.125% bupivacaine - Pt reporting worsening pain.   RN to chart BP & P q 5 min for 30 min - MAP >60  - 1300 RN reports pt was able to sleep after the bolus - fluid bolus infusing - MAPS>60 per RN  - will continue to follow and adjust as needed  - expected change of next On-Q pump is 2 days  - discussed plan with attending anesthesiologist    CHUY Lundberg CNP  Regional Anesthesia Pain Service  5/16/2017 7:28 AM    24 hour Job Code Pager.  For in-house use only.     [place]:  * * [phone]  [place]: * * [phone]  Peds: * * [phone]  Enter call-back number and #      This pager only accepts text messages through [employer]  1.

## 2017-05-16 NOTE — PROGRESS NOTES
[facility], [place]    Surgical Intensive Care Unit (SICU) Service  Progress Note    Date of Service (when I saw the patient): 05/16/2017     Assessment & Plan   Sohan Arita is a 56 year old male with history of chronic pain, chronic pancreatitis s/p lap cholecystectomy 4/2012, s/p biliary and pancreatic sphincterotomies and a temporary pancreatic stent 2012, s/p transduodenal sphincteroplasty 12/2014 with resultant leak and phlegmonous pancreatitis who underwent open TPAIT 5/15/17 with Dr. Jaquez.       Today's changes:  - acute hand parasthesias - discontinue arterial line  - start trophic TFs w/ pancreatic enzymes  - heparin straight rate 200 units/hr  - liver US  - continue to work on multimodal pain control, increase block, scheduled ketamine for pain control  - recheck CBC at 1600      PROBLEM LIST:  # chronic pancreatitis s/p total pancreatectomy, splenectomy, gastrojejunostomy, Dhara-en-Y, feeding j-tube placement  # post operative pain control on chronic pain  # depression/anxiety  # thyroid CA s/p thyroidectomy (2011)    PLAN BY SYSTEMS:    HEMODYNAMICS:  - HR; 70-80; SBP: ; MAP: > 60  - CVP 6  - 1 L LR bolus this am    NEUROLOGIC:  - anesthesia consulted and assisting with pain control  - Ketamine, Dilaudid PCA, Precedex, Ropivacaine paravertebral nerve block, Gabapentin 300 BID  - continue home Fluoxetine 10 mg PO daily  - holding home Fentanyl patch 12 mcg/hr    PULMONARY:  - O2 sats appropriate  - continue aggressive pulmonary hygiene    RENAL:  - D5LR @ 150 mL/hr  - UOP: 1.3 L yesterday; 815 mL overnight  - continue to follow electrolytes, renal function, and urine output closely    ID:  - afebrile  - WBC: 9.9  - abx: Ertapenem, Levofloxacin; will stop Levo given no increased risk of pseudomonas    HEME:  - Hgb: 9.5  - Plts: 133    VASCULAR ACCESS:  - R IJ CVC  - L radial art, will remove given L hand numbness    GI/NUTRITION:  - will start trophic TF  today via J tube  - continue pancreatic enzymes    ENDOCRINE:  - glucose 120-140   - continue insulin gtt  - continue home Levothyroxine 150 mcg PO daily    MSK:  - PT/OT  - mobilize    SKIN:  - no issues    PROPHYLAXIS:  - DVT: heparin 200 units/hr per transplant team  - GI: pantoprazole 40 BID    CODE STATUS:  - FULL CODE    DISPOSITION:  - SICU    GENERAL CARES  DVT Prophylaxis: Pneumatic Compression Devices and heparin 200 units/hr  GI Prophylaxis: PPI  Restraints: Restraints for medical healing needed: NO  Family update by me today: Yes   Current lines are required for patient management    CHUY Sawyer, CNP    Time Spent on this Encounter   Billing:  I spent 60 minutes bedside and on the inpatient unit today managing the critical care of Sohan Arita in relation to the issues listed in this note.    Main Plans for Today   - see above    Interval History   POD 1 TPAIT.  Pain control issues.      Physical Exam   Temp: 98.7  F (37.1  C) Temp src: Axillary Temp  Min: 97.7  F (36.5  C)  Max: 98.7  F (37.1  C) BP: (!) 86/55   Heart Rate: 73 Resp: 15 SpO2: 95 % O2 Device: Nasal cannula Oxygen Delivery: 2 LPM  Vitals:    05/15/17 0600 05/16/17 0500   Weight: 100.7 kg (222 lb 0.1 oz) 102.5 kg (225 lb 15.5 oz)     I/O last 3 completed shifts:  In: 8729.59 [I.V.:6479.59]  Out: 2180 [Urine:2150; Emesis/NG output:30]    GEN: resting when left alone  EYES: PERRL, Anicteric sclera.   HEENT:  Normocephalic, atraumatic, trachea midline=  CV: RRR, no gallops, rubs, or murmurs  PULM/CHEST: Clear breath sounds bilaterally without rhonchi, crackles or wheeze, symmetric chest rise  GI: rare bowel sounds, soft, non-tender, no rebound tenderness or guarding, no masses  : king catheter in place, urine yellow and clear  EXTREMITIES: generalized peripheral edema, moving all extremities, peripheral pulses intact  NEURO: Cranial nerves II-XII grossly intact, no motor-sensory deficits noted  SKIN: No rashes, sores or  ulcerations  PSYCH:  Affect: appropriate, no hallucinations  Imaging personally reviewed:  ECG    Medications     heparin 200 Units/hr (05/16/17 1300)     - MEDICATION INSTRUCTIONS -       - MEDICATION INSTRUCTIONS -       dextrose 5% lactated ringers 150 mL/hr at 05/16/17 1300     dexmedetomidine 0.2 mcg/kg/hr (05/16/17 1300)     IV fluid REPLACEMENT ONLY       insulin (regular) 0.5 Units/hr (05/16/17 1300)       ketamine  5 mg Intravenous Q4H     HYDROmorphone   Intravenous PCA     bupivacaine HCl  6 mL INTRAPLEURAL Once     disposable pump w/ anesthetic (select flow)   Topical Elast Pump     sodium chloride (PF)  3 mL Intracatheter Q8H     amylase-lipase-protease  3-4 capsule Oral TID w/meals     gabapentin  300 mg Oral or J tube BID     multivitamin CF formula  2.5 mL Oral or J tube Daily     ertapenem (INVanz) IV  1 g Intravenous Q24H     pantoprazole  40 mg Intravenous BID     FLUoxetine  10 mg Per J Tube Daily     [facility]othyroxine  150 mcg [facility]       Data     Recent Labs  Lab 05/16/17  0912 05/16/17  0558 05/16/17  0355  05/15/17  2134 05/15/17  2002  05/10/17  0805   WBC 11.5*  --  9.9  --  10.9  --   --  5.9   HGB 9.6* 9.5* 9.6*  < > 10.7* 10.6*  < > 15.2   MCV 86  --  87  --  87  --   --  85   *  --  133*  --  140*  --   --  224   INR  --   --   --   --  1.66*  --   --  1.04   NA  --   --  144  --  145* 140  < > 142   POTASSIUM  --   --  4.1  --  4.4 4.4  < > 3.8   CHLORIDE  --   --  114*  --  116*  --   --  109   CO2  --   --  22  --  21  --   --  24   BUN  --   --  15  --  16  --   --  14   CR  --   --  0.94  --  0.93  --   --  0.93   ANIONGAP  --   --  8  --  8  --   --  9   CRISTOBAL  --   --  7.5*  --  7.4*  --   --  8.9   GLC  --   --  142*  --  95 101*  < > 88   ALBUMIN  --   --  2.9*  --  3.0*  --   --  3.5   PROTTOTAL  --   --  4.8*  --  4.7*  --   --  7.3   BILITOTAL  --   --  1.1  --  1.5*  --   --  0.4   ALKPHOS  --   --  29*  --  34*  --   --  79   ALT  --   --  128*  --  163*  --    --  21   AST  --   --  133*  --  164*  --   --  14   < > = values in this interval not displayed.  Recent Results (from the past 24 hour(s))   XR Chest Port 1 View    Narrative    EXAM: XR CHEST PORT 1 VW  5/15/2017 9:30 PM      HISTORY: post-op    COMPARISON: CT abdomen 5/10/2017    FINDINGS: Right IJ central venous catheter tip projecting over the low  SVC. Bilateral paravertebral anesthesia catheters. Gastric tube tip  and sidehole projecting over the stomach. Multiple surgical clips  projecting over the upper abdomen.    Cardiac silhouette is within normal limits. Prominent pulmonary  vasculature, likely exaggerated by low lung volumes. No pleural  effusion or pneumothorax.  No focal airspace opacities.       Impression    IMPRESSION:   1. Right IJ central line tip at the low SVC. Gastric tube tip and  sidehole projecting over the stomach.  2. Low lung volumes without focal airspace opacities.    I have personally reviewed the examination and initial interpretation  and I agree with the findings.    RASHARD WILKINS MD

## 2017-05-16 NOTE — ANESTHESIA POSTPROCEDURE EVALUATION
Patient: Sohan Arita    Procedure(s):  Open Pancreatectomy, Splenectomy, Gastrojejunostomy Tube Placement , Auto Islet Cell Transplant - Wound Class: II-Clean Contaminated    Diagnosis:Chronic Pancreatitis   Diagnosis Additional Information: No value filed.    Anesthesia Type:  General, ETT    Note:  Anesthesia Post Evaluation    Patient location during evaluation: PACU  Patient participation: Able to fully participate in evaluation  Level of consciousness: awake  Pain management: adequate  Airway patency: patent  Cardiovascular status: acceptable  Respiratory status: acceptable  Hydration status: acceptable  PONV: none             Last vitals:  Vitals:    05/15/17 2245 05/15/17 2300 05/15/17 2315   BP: 99/58 93/57 95/56   Resp: 18 18 18   Temp:      SpO2: 98% 100% 100%         Electronically Signed By: Seth Newton MD  May 15, 2017  11:34 PM

## 2017-05-16 NOTE — OP NOTE
Transplant Surgery  Operative Note    PREOPERATIVE DIAGNOSES: Chronic pancreatitis secondary to Hereditary Pancreatitis.  POSTOPERATIVE DIAGNOSES: Same  PROCEDURE: total pancreatectomy, islet cell autotransplant, splenectomy, duodenojejunostomy, Dhara-Y reconstruction, liver biopsy (needle core), gastro-jejunostomy tube and lysis of adhesions > 60 minutes  SURGEON: Rodrigo Jaquez MD  ASSISTANT:  Jas Jackson MD (fellow), Jeanine Peralta MD (resident).   ANESTHESIA:  General endotracheal.   SPECIMENS: pancreas to the islet lab, pancreatic biopsies and spleen  DRAINS: NGT, Peewee drain, Ferguson and GJtube.  EBL: 500 ml  UO: 1060 ml  FLUIDS: 6.9L IVF fluids  COMPLICATIONS: None.  FINDINGS: Significant amount of scar tissues due to prior surgical procedure. Normal anatomy. Third bag of islets into the peritoneum (pelvis, above liver, LUQ)  Autotransplant data:   Patient weight: 100 kg  Tissue mass: 26 ml  Total Islet number: [id number],400.  Total Islet number/k.  Islet equivalents: [id number],700  Islet equivalents/kilogram: 4347  Pre-infusion portal pressure: 1 cm/H2O, Mean blood flow was 400 ml/min.  Portal pressure after 1st ba cm/H2O, Mean blood flow was 400 ml/min.  Portal pressure after 2nd ba cm/H2O, Mean blood flow was 200 ml/min      INDICATIONS OF THE PROCEDURE: chronic abdominal pain with narcotic dependence   DESCRIPTION OF THE PROCEDURE: After obtaining informed consent, the patient was brought to the operative room and placed in a supine position. General endotracheal intubation and anesthesia was induced. After this, an arterial line and a central line were placed under sterile condition by the anesthesia team. A briefing was performed. SCD's and Ferguson catheter were placed. The abdomen was prepped and draped in the usual fashion. The patient received preoperative IV  antibiotics. A pause for the cause was performed.    The prior upper abdominal midline incision was opened and carried down through  the subcutaneous tissue. The peritoneum was opened under direct visualization and after this, we performed lysis of adhesions for the next > 60 minutes. After this, the abdominal retractor was placed in the upper abdomen. We proceeded to open the lesser sac and found the pancreas to be firm. The short gastric vessels and splenic attachments to the colon were divided. We then mobilized the tail, body and neck of the pancreas. During this maneouver we were able to identify the splenic artery, splenic vein, SMV, IMV and portal vein. The extrapancreatic portions of the splenic artery and vein were circumferentially cleared of investing tissue, and the anterior surface of the portal vein was cleared. The brunilda hepatis dissection was quite tedious due to his prior surgical procedure on the Sphincter of Oddi and cholecystectomy. The duodenum was Kocherized, completely mobilizing the head of the pancreas. At the superior border of the pancreas, the distal common bile duct was divided and gastroduodenal artery was isolated. The proximal duodenum was divided distal to the pylorus at the level of the pancreas and the jejunum was transected near the ligament of Treitz. The mesentery of the duodenum was divided and the fourth portion of the duodenum mobilized to the right of the SMV. The uncinate process of the pancreas was then dissected free from the retroperitoneum.  At this point, the pancreas was essentially only attached through the vessels and the retroperitoneum/ganglia parallel to the SMA. This was transected with the Eschelon stapler after confirming normal SMA flow after the stapler was closed. The GDA was then ligated and divided. The splenic artery and the splenic vein were both ligated and divided and the pancreas was transferred into ice cold saline for further preparation on the back table. We verified that the operative field was hemostatic.   The pancreas was prepared by retrograde flushing the blood from the  pancreas,  resecting all the duodenum and spleen.  Biopsies of the pancreas were taken. 50cc of preservation solution containing 0.5 mg/ml alpha-1-antitrypsin was instilled into the duct to distend the gland.  A couple capsular leaks were closed and the gland had good distention.  The pancreas was packed in iced cold preservation solution and transferred to the awaiting islet team.   We then performed a thorough irrigation and complete hemostasis and began the reconstruction. We first reinforced the staple line parallel to the SMA using running 4-0 prolene. The proximal jejunum was approximated to the first portion of the duodenum, which was introduced through the colonic mesentery to the right of the middle colic vessels. A two layer hand sewn end- to-end anastamosis was constructed.  Prior to the anterior wall being completed, a feeding gastro-jejunostomy tube was introduced through a 4-0 prolene purse string through the greater curve, brought across the anastomosis and fed into the jejunum. After this, a Dhara was constructed by dividing the jejunum approximately 30 cm distally and creating an end to side REYNA anastamosis.  The enterotomy was closed with a TA staple. The Dhara limb was brought in a retrocolic fashion and was anastomosed to the bile duct in an end to side fashion with running 6-0 PDS. The size of the bile duct was approximately 6 mm. We noted healthy bile flow. A biliary stent was placed. The gallbladder was confirmed to have been previously removed.  After this, we pexied the serosa of the duodenum, jejunum and stomach to the mesentery of the colon and closed any mesenteric defects so as to prevent internal hernia of the small bowel.    Irrigation and hemostasis of the abdomen was completed and we waited for the arrival of the islets.    When they were ready, an 8F feeding tube was introduced through the splenic vein stump. Through this, we infused the islet cells into the portal vein. Due to portal  pressures and flow, we stopped infusing islets after the first two bags. See findings for blood flow and portal pressure measurements.  The catheter was removed and the splenic vein was re-secured. Abdomen was copiously irrigated and hemostasis was achieved. We placed a Peewee drain into the right upper quadrant of the abdomen and positioned it behind the choledochojejunostomy, secured with Nylon stitch and was tied off as the remainder of the islet cells were infused into the peritoneal cavity.  A needle core liver biopsy was done post infusion of the islets..  The abdomen was then closed with 0 looped PDS, the subcutaneous tissue was irrigated, hemostasis was obtained and the skin was stapled. At the end of the case, all the sponge and needle counts were correct. Faculty was present during the entire procedure. The patient tolerated the procedure well and  was transferred in stable and satisfactory condition to the PACU.

## 2017-05-16 NOTE — PHARMACY-CONSULT NOTE
Pharmacy Tube Feeding Consult    Medication reviewed for administration by feeding tube and for potential food/drug interactions.    Recommendation: No changes are needed at this time.     Pharmacy will continue to follow as new medications are ordered.  silvestre SalgueroD

## 2017-05-16 NOTE — PROGRESS NOTES
Pancreatitis Service - Daily Progress Note  05/16/2017    Assessment & Plan: 56 year old male with chronic pancreatitis s/p lap cholecystectomy 4/2012, s/p biliary and pancreatic sphincterotomies and a temporary pancreatic stent 2012, s/p transduodenal sphincteroplasty 12/2014 with resultant leak and phlegmonous pancreatitis. S/p TPAIT 5/15/17     Liver US:  Impression: The left portal vein, left hepatic vein, and left hepatic  artery are obscured by overlying bowel gas. The splenic vein is also  not visualized. The remaining hepatic vasculature is patent with  antegrade flow.    Cardiorespiratory: Respiratory status stable on 2L NC. SBP , CVP 6-8 now 11. Received fluid bolus.    GI/Nutrition: His g tube is currently draining to gravity and his j tube is running tube is running tube feeds at 10 cc/h plus meds. MAURICIO tied.    Zofran and Compazine PRN  Renal/Fluid/Electrolytes: Cr < 1. D5  cc/hr. Replete electrolytes  : Remove ikng tomorrow  Post-pancreatectomy diabetes: Continue insulin gtt until tube feeding at goal, appreciate Endocrine consult.  Infection: Surgical ppx: Ertapenem and Levaquin. Will follow cultures and narrow/stop abx.   Prophylaxis: PPI, Anticoagulation: start heparin 200 cc/hr.   Pain control: poor  Ropivacaine On Q 14 mL  Precedex gtt, decreased due to hypotension  Dilaudid PCA. Continue bumps 0.2-0.3 mg. Increase basal to 0.2 mg/hr  Schedule ketamine 10 mg every 4 hours  Start acetaminophen 1g IV every 4 hours x 24 hrs.   Start diazepam 5 mg IV every 6 hours PRN   Activity: OOB to chair  Anticipated LOS/Discharge: TBD    Medical Decision Making: Medium  Subsequent visit 54944 (moderate level decision making)    GELACIO/Fellow/Resident Provider: Vero Rader PA-C     Faculty: Rodrigo Jaquez MD  __________________________________________________________________  Transplant History: Admitted 5/15/2017 for TP AIT  5/15/2017 (Islet), Postoperative day: 1     Interval History: History  "is obtained from the patient  Overnight events: Poor pain control.     ROS:   A 10-point review of systems was negative except as noted above.    Curent Meds:    ketamine  5 mg Intravenous Q4H     HYDROmorphone   Intravenous PCA     bupivacaine HCl  6 mL INTRAPLEURAL Once     multivitamins with minerals  15 mL Per Feeding Tube Daily     cholecalciferol  800 Units Per J Tube Daily     amylase-lipase-protease  1-2 capsule Per J Tube Q4H    And     sodium bicarbonate  325 mg Per J Tube Q4H     disposable pump w/ anesthetic (select flow)   Topical Elast Pump     sodium chloride (PF)  3 mL Intracatheter Q8H     gabapentin  300 mg Oral or J tube BID     ertapenem (INVanz) IV  1 g Intravenous Q24H     pantoprazole  40 mg Intravenous BID     FLUoxetine  10 mg Per J Tube Daily     levothyroxine  150 mcg Per J Tube QAM AC       Physical Exam:     Admit Weight: 100.7 kg (222 lb 0.1 oz)    Current Vitals:   /65  Temp 98.7  F (37.1  C) (Axillary)  Resp 29  Ht 1.88 m (6' 2\")  Wt 102.5 kg (225 lb 15.5 oz)  SpO2 94%  BMI 29.01 kg/m2    CVP (mmHg): 11 mmHg    Vital sign ranges:    Temp:  [97.7  F (36.5  C)-98.7  F (37.1  C)] 98.7  F (37.1  C)  Heart Rate:  [69-85] 69  Resp:  [9-29] 29  BP: ()/(49-72) 101/65  MAP:  [58 mmHg-253 mmHg] 58 mmHg  Arterial Line BP: ()/() 82/44  SpO2:  [94 %-100 %] 94 %  Patient Vitals for the past 24 hrs:   BP Temp Temp src Heart Rate Resp SpO2 Weight   05/16/17 1400 101/65 - - 69 29 94 % -   05/16/17 1300 97/56 - - 74 12 94 % -   05/16/17 1245 (!) 86/55 - - 73 15 95 % -   05/16/17 1230 (!) 86/52 - - 69 18 98 % -   05/16/17 1215 (!) 85/49 - - 70 18 97 % -   05/16/17 1205 (!) 84/54 98.7  F (37.1  C) Axillary 69 15 98 % -   05/16/17 1200 (!) 82/49 - - 70 17 98 % -   05/16/17 1100 92/55 - - 73 9 97 % -   05/16/17 1000 91/50 - - 75 11 97 % -   05/16/17 0900 93/54 - - 75 16 97 % -   05/16/17 0800 99/59 97.9  F (36.6  C) Oral 76 19 97 % -   05/16/17 0700 98/58 - - 75 14 96 % - "   05/16/17 0629 - - - 78 19 97 % -   05/16/17 0600 101/59 - - 77 20 97 % -   05/16/17 0500 91/49 - - 77 15 96 % 102.5 kg (225 lb 15.5 oz)   05/16/17 0400 102/61 98.6  F (37  C) Oral 81 18 97 % -   05/16/17 0357 - - - 84 12 97 % -   05/16/17 0300 98/57 - - 82 17 96 % -   05/16/17 0230 - - - 85 16 97 % -   05/16/17 0200 102/63 - - 79 20 98 % -   05/16/17 0130 99/65 - - 79 16 97 % -   05/16/17 0115 103/61 - - 76 16 98 % -   05/16/17 0100 105/61 - - 75 17 96 % -   05/16/17 0045 100/61 - - 75 16 97 % -   05/16/17 0030 (!) 87/72 - - 76 - 94 % -   05/16/17 0015 97/59 - - 75 - 98 % -   05/16/17 0000 95/54 98.5  F (36.9  C) Axillary 78 16 98 % -   05/15/17 2345 94/58 98.3  F (36.8  C) Oral 75 16 100 % -   05/15/17 2330 97/58 - - 77 16 100 % -   05/15/17 2315 95/56 - - 76 18 100 % -   05/15/17 2300 93/57 - - 76 18 100 % -   05/15/17 2245 99/58 - - 77 18 98 % -   05/15/17 2230 96/60 - - 78 16 100 % -   05/15/17 2215 100/60 - - 78 16 100 % -   05/15/17 2200 100/59 - - 80 14 98 % -   05/15/17 2145 108/65 97.7  F (36.5  C) Oral 76 16 100 % -   05/15/17 2130 104/63 - - 76 16 100 % -   05/15/17 2115 117/68 97.7  F (36.5  C) Oral 75 16 100 % -     General Appearance: in moderate distress due to pain.   Skin: moist  Heart: NSR  Lungs: NLB on RA  Abdomen: The abdomen is rounded, and  moderately tender. The wound is well approximated. Tube/Drain sites are: G tube to gravity and J Tube: TF running.  MAURICIO: tied   : king is present.    Extremities: edema: absent.      Data:   CMP  Recent Labs  Lab 05/16/17  0355 05/15/17  2134 05/15/17  2002 05/15/17  1900    145* 140 139   POTASSIUM 4.1 4.4 4.4 4.1   CHLORIDE 114* 116*  --   --    CO2 22 21  --   --    * 95 101* 98   BUN 15 16  --   --    CR 0.94 0.93  --   --    GFRESTIMATED 83 84  --   --    GFRESTBLACK >90African American GFR Calc >90African American GFR Calc  --   --    CRISTOBAL 7.5* 7.4*  --   --    ICAW  --   --  4.6 4.5   MAG 2.0  --   --   --    PHOS 1.9*  --   --   --     AMYLASE 790*  --   --   --    LIPASE 9517*  --   --   --    ALBUMIN 2.9* 3.0*  --   --    BILITOTAL 1.1 1.5*  --   --    ALKPHOS 29* 34*  --   --    * 164*  --   --    * 163*  --   --      CBC  Recent Labs  Lab 05/16/17  0912 05/16/17  0558 05/16/17  0355   HGB 9.6* 9.5* 9.6*   WBC 11.5*  --  9.9   *  --  133*   A1C  --   --  5.3     Coags  Recent Labs  Lab 05/15/17  2134 05/10/17  0805   INR 1.66* 1.04   *  --       Urinalysis  Recent Labs   Lab Test  05/10/17   1015   COLOR  Yellow   APPEARANCE  Clear   URINEGLC  Negative   URINEBILI  Negative   URINEKETONE  Negative   SG  1.022   UBLD  Negative   URINEPH  6.5   PROTEIN  Negative   NITRITE  Negative   LEUKEST  Negative   RBCU  2   WBCU  <1   Attestation:  Patient has not been seen and evaluated by me.   Vital signs, labs, medications and orders were reviewed.   When obtained, diagnostic images were reviewed by me and interpreted as above.    The care plan was discussed with the multidisciplinary team and I agree with the findings and plan in this note, with any differences recorded in blue.    .

## 2017-05-16 NOTE — BRIEF OP NOTE
[facility], [place]    Brief Operative Note    Pre-operative diagnosis: Chronic Pancreatitis   Post-operative diagnosis * No post-op diagnosis entered *  Procedure: Procedure(s):  Open Pancreatectomy, Splenectomy, Gastrojejunostomy Tube Placement , Auto Islet Cell Transplant - Wound Class: II-Clean Contaminated  Surgeon: Surgeon(s) and Role:     * Rodrigo Jaquez MD - Primary     * Jas Jackson MD - Assisting     * Jeanine Peralta MD - Resident - Assisting  Anesthesia: Combined General with Block   Estimated blood loss: 500 ml  Drains: Roberto-Oliveira  Specimens:   ID Type Source Tests Collected by Time Destination   A : Duodenum Tissue Small Intestine, Duodenum SURGICAL PATHOLOGY EXAM Rodrigo Jaquez MD 5/15/2017 12:40 PM    B : Head of Pancreas Tissue Pancreas SURGICAL PATHOLOGY EXAM Rodrigo Jaquez MD 5/15/2017 12:40 PM    C : Neck of Pancreas Tissue Pancreas SURGICAL PATHOLOGY EXAM Rodrigo Jaquez MD 5/15/2017 12:40 PM    D : Body of Pancreas Tissue Pancreas SURGICAL PATHOLOGY EXAM Rodrigo Jaquez MD 5/15/2017 12:40 PM    E : Tail of Pancreas Tissue Pancreas SURGICAL PATHOLOGY EXAM Rodrigo Jaquez MD 5/15/2017 12:40 PM    F : Uncinate Process Tissue Pancreas SURGICAL PATHOLOGY EXAM Rodrigo Jaquez MD 5/15/2017 12:40 PM    G : Spleen Organ Spleen SURGICAL PATHOLOGY EXAM Rodrigo Jaquez MD 5/15/2017 12:40 PM    H : Left liver lobe biopsy Biopsy Liver SURGICAL PATHOLOGY EXAM Rodrigo Jaquez MD 5/15/2017  8:01 PM    I : Right liver lobe biopsy Biopsy Liver SURGICAL PATHOLOGY EXAM Rodrigo Jaquez MD 5/15/2017  8:03 PM      Findings:   See dictation for details.  Complications: None.  Implants: None.

## 2017-05-16 NOTE — OR NURSING
Pt stable while under writer's care. Dr Jackson called and was updated. CVP's 2-3 and Dr. Jackson ordered 5% Albumin to be given. Albumin started and running when Pt transferred to . Vitals stable. Pt complaining of pain in abdomen and at apex of lungs/shoulders. Pain meds given and Pt started using PCA. Pt transferred to SICU without incident.

## 2017-05-16 NOTE — ANESTHESIA POST-OP FOLLOW-UP NOTE
[facility]AL ANESTHESIA PAIN SERVICE CONTINUOUS NERVE INFUSION NOTE  SUBJECTIVE:  Interval History: Pt reports moderate pain control via continuous peripheral nerve block (CPNB) infusion. Denies any weakness, paresthesias, circumoral numbness, metallic taste or tinnitus. Pt has not been OOB yet this morning. Patient is currently without nausea or vomiting. Patient is NPO.      Clinically Aligned Pain Assessment (CAPA):   Comfort (How is your pain?): Tolerable with discomfort  Change in Pain (Since your last medication/intervention?): About the same  Pain Control (How are your pain treatments working?): Partially effective pain control  Functioning (Are you able to do activities to get better?) : Can do most things, but pain gets in the way of some   Sleep (Does your pain management allow you to sleep or rest?): Awake with occasional pain          Anticoagulation: Heparin 200 units/hr     OBJECTIVE:     Diagnostic:        Lab Results   Component Value Date     WBC 9.9 05/16/2017            Lab Results   Component Value Date     RBC 3.47 05/16/2017            Lab Results   Component Value Date     HGB 9.5 05/16/2017            Lab Results   Component Value Date     HCT 30.2 05/16/2017            Lab Results   Component Value Date      05/16/2017            Vitals:    Temp: [97.7  F (36.5  C)-98.6  F (37  C)] 98.6  F (37  C)  Heart Rate: [75-85] 75  Resp: [12-20] 14  BP: ()/(49-72) 98/58  MAP: [60 mmHg-253 mmHg] 61 mmHg  Arterial Line BP: ()/() 86/46  SpO2: [94 %-100 %] 96 %     Exam:   Strength 5/5 and symmetric grossly in bilateral LE  B/Lparavertebral (PV) catheter sites with dressing c/d/i, no tenderness, erythema, heme, edema        ASSESSMENT/PLAN:   Sohan Arita is a 56 year old male POD #1 s/p COMBINED PANCREATECTOMY, TRANSPLANT AUTO ISLET CELL and placement of B/L T6-7 PV catheters for analgesia. Pt is receiving adequate analgesia with Ropivacaine 0.2%, total infusion  10mL/hour-5mL each catheter. Pt has not been OOB since surgery. No weakness or paresthesias. No evidence of adverse side effects associated with local anesthetic.      - 0810 increased ropivacaine infusion to 14mL/hour - 7mL each catheter  - 1040 bolused B/L PV catheters with 6mL 0.125% bupivacaine  RN to chart BP & P q 5 min for 30 min - MAP >60  - will continue to follow and adjust as needed  - expected change of next On-Q pump is 2 days      Demarcus Evans DO  [facility] Anest[facility] Pain Service  5/16/2017    24 hour Job Code Pager. For in-house use only.   [place]: * * *[phone]  [place]: * * *[phone]  Peds: * * [phone]  Enter call-back number and #   This pager only accepts text messages through [facility]

## 2017-05-16 NOTE — CONSULTS
Reason for visit/consult: s/p TPAIT on 5/15/17    Primary care provider: Frandy Frye    HPI:  57 y/o M with hx of chronic pain, papillary thyroid cancer s/p thyroidectomy with hypothyroidism, chronic pancreatitis s/p lap lin, biliary and pancreatic sphincterotomies, transduodenal sphincteroplasty with resultant leak and phlegmonous pancreatitis who is now s/p open TPAIT on 5/15/17 by Dr. Jaquez. Pt received 4347 ie/kg in transplant, primarily intraportally but with some islets to peritoneum.    Pt has required chronic opioids for abdominal pain management and has had limited tolerance for oral nutrition (mainly simple carbohydrates- did require enteral feeds intermittently in the past) and generally experienced diminished quality of life.  Pt has been discussing this for the past couple years, and finally felt comfortable proceeding with the procedure now. Pt has had no major post-op complications thus far. He was extubated shortly following the surgery and has been weaned off all pressors. Pt remains NPO with plans to start trophic TFs with pancreatic enzymes on 5/16, on an insulin gtt and D5LR, and is currently on precedex, a dilaudid PCA, and scheduled ketamine. Pt continues to complain about post-op pain, and remains a little drowsy.     Pt underwent pre-op diabetes education and has been taught how to give insulin as well as check and monitor blood sugars. Pt has never been diagnosed with diabetes previously, and last HbA1c was 5.3 on 5/16/17, previously 5.6 on 5/10/2017.  Mixed meal c-peptide stimulation performed twice with good glucose tolerance demonstrated.  Glutamic acid decarboxylase, islet cell, and insulin antibodies were all negative.    Past Medical/Surgical History:  Past Medical History:   Diagnosis Date     Depression      Eye injury, penetrating 1978    left eye, blurry vision long distance      Pancreatic disease     pancreatitis     Pancreatitis 2012    outside notes indicate lipase  elevations; labs available: 4/11/12 lipase 563 (); 5/7/12 1224 ()     Thyroid cancer (H)      Thyroid disease     thyroid cancer     Ventral hernia 2017    Present for a year, no pain     Past Surgical History:   Procedure Laterality Date     CHOLECYSTECTOMY  4/2012    [place], [place]     debribement eye Left 1978    for eye injury     ENDOSCOPIC RETROGRADE CHOLANGIOPANCREATOGRAM  4/18/2012    with sphincterotomy     ENDOSCOPIC ULTRASOUND UPPER GASTROINTESTINAL TRACT (GI) N/A 11/11/2015    Procedure: ENDOSCOPIC ULTRASOUND, ESOPHAGOSCOPY / UPPER GASTROINTESTINAL TRACT (GI);  Surgeon: Emeka Mcleod MD;  Location: [facility] OR     FEEDING TUBE REPLACEMENT  2/3/2015     HERNIA REPAIR, INGUINAL RT/LT Left 1980     LAPAROTOMY EXPLORATORY  12/31/2014    WITH sphincterotom (transduodenal), resection of sphincter of Oddi, lysis of adhesions     NERVE BLOCK PERIPHERAL  12/31/2014    U/S guided transversus abdominus plane peripheral field nerve block     PANCREATECTOMY, TRANSPLANT AUTO ISLET CELL, COMBINED N/A 5/15/2017    Procedure: COMBINED PANCREATECTOMY, TRANSPLANT AUTO ISLET CELL;  Open Pancreatectomy, Splenectomy, Gastrojejunostomy Tube Placement , Auto Islet Cell Transplant;  Surgeon: Rodrigo Jaquez MD;  Location: [facility] OR     THYROIDECTOMY  12/28/2011     transduodenal sphincteroplasty  12/31/2014       Allergies:  No Known Allergies    Current Medications   Current Facility-Administered Medications   Medication     potassium chloride SA (K-DUR/KLOR-CON M) CR tablet 20-40 mEq     potassium chloride (KLOR-CON) Packet 20-40 mEq     potassium chloride 10 mEq in 100 mL intermittent infusion     potassium chloride 10 mEq in 100 mL intermittent infusion with 10 mg lidocaine     potassium chloride 20 mEq in 50 mL intermittent infusion     magnesium sulfate 2 g in NS intermittent infusion (PharMEDium or FV Cmpd)     magnesium sulfate 4 g in 100 mL sterile water (premade)     sodium phosphate 10 mmol in  D5W intermittent infusion     sodium phosphate 15 mmol in D5W intermittent infusion     sodium phosphate 20 mmol in D5W intermittent infusion     sodium phosphate 25 mmol in D5W intermittent infusion     HYDROmorphone (DILAUDID) PCA 1 mg/mL     lactated ringers BOLUS 1,000 mL     ketamine (KETALAR) injection 5 mg     - MEDICATION INSTRUCTIONS -     No Anticoagulation unless approved by Anesthesia Provider.     naloxone (NARCAN) injection 0.1-0.4 mg     ROPivacaine 0.2% (NAROPIN) 750 mL in ON-Q C-Bloc select flow (AT1284 holds 600-750 mL) dual cath disposable pump     lidocaine 1 % 1 mL     lidocaine (LMX4) kit     sodium chloride (PF) 0.9% PF flush 3 mL     sodium chloride (PF) 0.9% PF flush 3 mL     dextrose 5% in lactated ringers infusion     amylase-lipase-protease (CREON 12) 15689 UNITS per capsule 36,000-48,000 Units     amylase-lipase-protease (CREON 12) 97393 UNITS per capsule 36,000-48,000 Units     gabapentin (NEURONTIN) solution 300 mg     multivitamin CF formula (AquADEKS) liquid 2.5 mL     ondansetron (ZOFRAN-ODT) ODT tab 4 mg    Or     ondansetron (ZOFRAN) injection 4 mg     prochlorperazine (COMPAZINE) injection 5-10 mg    Or     prochlorperazine (COMPAZINE) tablet 5-10 mg     dexmedetomidine (PRECEDEX) 400 mcg in NaCl 0.9 % 100 mL infusion     levofloxacin (LEVAQUIN) infusion 500 mg     ertapenem (INVanz) 1 g vial to attach to  mL bag     dextrose 10 % 1,000 mL infusion     insulin 1 unit/mL in saline (NovoLIN, HumuLIN Regular) drip - ADULT IV Infusion     glucose 40 % gel 15-30 g    Or     dextrose 50 % injection 25-50 mL    Or     glucagon injection 1 mg     pantoprazole (PROTONIX) 40 mg IV push injection     FLUoxetine (PROzac) solution 10 mg     levothyroxine (SYNTHROID) suspension 150 mcg       Family History:  Brother DM for past year  Sister DM for past two years    Social History:  Social History   Substance Use Topics     Smoking status: Never Smoker     Smokeless tobacco: Never Used  "    Alcohol use No   Physician (ER and Family practice)    ROS:  Negative except as above.    Exam  Blood pressure 99/59, temperature 97.9  F (36.6  C), temperature source Oral, resp. rate 19, height 1.88 m (6' 2\"), weight 102.5 kg (225 lb 15.5 oz), SpO2 96 %.  Gen: drowsy, but interactive, NAD. Wife at bedside, supportive.    Ext: no peripheral edema  Neuro: alert and oriented, no focal deficits    Labs/Imaging  Component      Latest Ref Rng & Units 9/10/2015 9/10/2015 9/10/2015           9:10 AM 10:21 AM 11:13 AM   Hemoglobin A1C      4.3 - 6.0 % 5.4     Glutamic Acid Decarboxylase Antibody       <5.0 . . .     Islet Cell Antibody IgG       <1:4 . . .     Insulin Antibodies       <0.4 . . .     C-Peptide      0.9 - 6.9 ng/mL 2.1 5.3 5.2   Glucose      70 - 99 mg/dL  84 81     Component      Latest Ref Rng & Units 5/10/2017 5/10/2017 5/10/2017           8:05 AM  8:19 AM  9:20 AM   Hemoglobin A1C      4.3 - 6.0 % 5.6     Glutamic Acid Decarboxylase Antibody            Islet Cell Antibody IgG            Insulin Antibodies            C-Peptide      0.9 - 6.9 ng/mL  3.6 13.2 (H)   Glucose      70 - 99 mg/dL  88 125 (H)     Component      Latest Ref Rng & Units 5/10/2017 5/16/2017          10:20 AM    Hemoglobin A1C      4.3 - 6.0 %  5.3   Glutamic Acid Decarboxylase Antibody           Islet Cell Antibody IgG           Insulin Antibodies           C-Peptide      0.9 - 6.9 ng/mL 7.4 (H)    Glucose      70 - 99 mg/dL 86      Lab Results   Component Value Date    WBC 9.9 05/16/2017    HGB 9.5 (L) 05/16/2017    HCT 30.2 (L) 05/16/2017     (L) 05/16/2017     05/16/2017    POTASSIUM 4.1 05/16/2017    CHLORIDE 114 (H) 05/16/2017    CO2 22 05/16/2017    BUN 15 05/16/2017    CR 0.94 05/16/2017     (H) 05/16/2017    DD  05/10/2017     <0.3  This D-dimer assay is intended for use in conjuntion with a clinical pretest   probability assessment model to exclude pulmonary embolism (PE) and as an aid   in the " diagnosis of deep venous thrombosis (DVT) in outpatients suspected of PE   or DVT. The cut-off value is 0.5 g/mL FEU.       (H) 05/16/2017     (H) 05/16/2017    ALKPHOS 29 (L) 05/16/2017    BILITOTAL 1.1 05/16/2017    INR 1.66 (H) 05/15/2017       Assessment and Plan  57 yo M with hx of chronic pain, papillary thyroid cancer s/p thyroidectomy with hypothyroidism, chronic pancreatitis s/p lap lin, biliary and pancreatic sphincterotomies, transduodenal sphincteroplasty with resultant leak and phlegmonous pancreatitis who is now s/p open TPAIT on 5/15/17.    # Pst-pancreatectomy diabetes, s/p TPAIT on 5/15/17 for chronic pancreatitis  Prior to admission, last HbA1c was 5.6, today 5/16/17 A1c is 5.3. Glucose controlled mostly to target today.  Pt underwent pre-op diabetes education as an outpatient discussing potential need for insulin long-term, how to administer both long and short-acting insulin, and how and when to check blood glucose.  - Continue insulin gtt for now with glucose target 100-120 mg/dL  - Once goal rate enteral feeds tolerated, will determine SQ insulin regimen  - Pt will need more diabetes education once more alert and interactive        Thank you for this interesting consult, we will continue to follow this patient with you. Please do not hesitate to contact us with any questions or concerns.    Scribe Disclosure:   I, Orquidea Cadet MD, am serving as a scribe; to document services personally performed by BROOKE Petty- -based on data collection and the provider's statements to me.     Provider Disclosure:  I agree with above History, Review of Systems, Physical exam and Plan.  I have reviewed the content of the documentation and have edited it as needed. I have personally performed the services documented here and the documentation accurately represents those services and the decisions I have made.      Electronically signed by:  Orquidea -[id number]

## 2017-05-16 NOTE — PROGRESS NOTES
CLINICAL NUTRITION SERVICES - ASSESSMENT NOTE    RECOMMENDATIONS FOR MDs/PROVIDERS TO ORDER:  When ready to adv TF beyond trophic feeds, recommend advance by 10 ml q 24 hrs (and ONLY advance rate upon MD approval after daily evaluation) to goal Impact Peptide @ goal 75 ml/hr (1800 ml/day) to provide 2700 kcals, 169 g PRO, 1386 ml free H2O,115 g Fat (50% from MCTs), 252 g CHO and no Fiber daily.     Adjust to non-dextrose containing IVF per team discretion as appropriate. Pt may be at refeeding risk.    Malnutrition  Unable to assess due to patient busy during attempts to visit    Recommendations already ordered by Registered Dietitian (RD):  1.  Once confirm JT placement/approval for use post surgery and if K+/Mg++ >/= nrml and phos >1.9 , begin TF with Impact Peptide @ 10 ml/hr. Per team, no plans for further advancement today. This provides 360 kcal, 23 g PRO, 34 g CHO, 15 g fat, no fiber, and 185 mL free water daily  2.  Once begin TFs, order multivitamin/mineral (15 ml/day via JT) to help ensure micronutrient needs being met with suspected hypermetabolic demands, anticipated slow adv of TFs to goal infusion and potential interruptions to TF infusions.  3.  Once begin TFs, order the following additional micronutrient supplementation: 800 international units per day Vitamin D (D-Vi-Sol via J-tube) given documented deficiency  4.  Once begin TFs, order to obtain every Monday negative phase PRO (prealb) to evaluate trend with duration/volumes of immune-modulating TF received and ongoing approp of specialized TF therapy vs. approp to change to maintenance-type formula.  5.  Once begin TFs, begin the following pancreatic enzyme regimen and recommend order each of the following:   A) Sodium Bicarb tablet (325 mg), 1 tablet q 4 hrs via Jtube. Administration Instructions: Crush 1 tablet and mix into 15 ml of warm water and use this solution to mix with Creon pancreatic enzymes. DO NOT administer directly into Jtube (to  be mixed into TF formula with Creon enzyme - see Creon enzyme order)   B) Creon 24, 1- 2 capsules q 4 hrs via Jtube. Administration Instructions: If TF rate is running @ 10-30 ml/hr, administer 1 capsule q 4 hrs; once TF rate advances 40-75 ml/hr, increase to 2 capsules q 4 hrs.  Open capsule and empty contents into 15 ml sodium bicarb solution (see sodium bicarb order), let dissolve for about 20-30 minutes and then add this solution to the amount of TF formula hung in TF bag every 4 hrs (i.e., once TF @ goal infusion 75 ml/hr will mix 2 capsules into 300 ml of TF formula every 4 hrs).   *Note: this enzyme regimen with TF @ goal infusion will provide approx 2504 units of lipase/gram of total Fat daily and approp dosing initially for pancreatic insufficiency with more elemental TF formula.     Future/Additional Recommendations:  None at this time     REASON FOR ASSESSMENT  Sohan Arita is a 56 year old male seen by Registered Dietitian for Provider Order - Registered Dietitian to Assess and Order TF per Medical Nutrition protocol (Comments: Please start TF via J, fixed rate of 10 mL/hr    NUTRITION HISTORY  -Per review of EMR, pt does not appear to have been previously seen by outpatient RD   -Unable to obtain info from patient today due to pt busy during attempts to visit  -Per pre-op H&P on 5/10, pt was taking 2 capsules Creon 36 TID w/ meals (712 units lipase/kg/meal which is within recommended dosing range for pancreatic insufficiency)    CURRENT NUTRITION ORDERS  Diet:  NPO -  anticipate abdominal pain/prolonged NPO status post AIT    LABS  -Prealb 30 mg/dL (baseline, 5/10/17) - anticipate negative phase PROs to decline postop and good candidate for immune-modulating TF therapy.  -Vitamin A 0.62 (WNL, but low end of normal) per 5/10 lab and anticipate will not require addt'l supplementation > MVI/minerals and vitamin A in TF formula  -Vitamin E 8.2 (WNL, but low end of normal) per 5/10 lab and  "anticipate will not require addt'l supplementation > MVI/minerals  -Vitamin D <18 per 5/10 lab and may require addt'l supplementation > MVI/minerals  - K+ and Mg++ WNL; Phos 1.9 (L)    MEDICATIONS  - AquADEK - anticipate does not need based on above vitamin lab levels and recommend discontinue this supplementation (continue only with a regular multivitamin/mineral).  - Creon 12 (3-4 capsules orally) - recommend discontinue oral enzyme dosing and change to alternative regimen via TF formula (see recs above)  - D5 IVF @ 150 mL/hr (3600 mL/day = 180 g dextrose = 612 kcal per day)  - Insulin gtt  - K+/Mg++/Phos IV replacement protocols    ANTHROPOMETRICS  Height: 6' 2\"    Most Recent Weight: 225 lbs 15.54 oz   IBW: 86.4 kg   BMI: Overweight BMI 25-29.9  Weight History: Limited wt hx available but weight is up over the past 2+ years  Wt Readings from Last 10 Encounters:   05/16/17 102.5 kg (225 lb 15.5 oz)   05/10/17 103.2 kg (227 lb 8 oz)   11/11/15 100.2 kg (220 lb 14.4 oz)   11/10/15 98.8 kg (217 lb 14.4 oz)   09/10/15 97 kg (213 lb 12.8 oz)   09/10/15 97 kg (213 lb 12.8 oz)   09/09/15 96.5 kg (212 lb 12.8 oz)   09/09/15 96.6 kg (213 lb)      Dosing Weight: 101 kg (actual/lowest wt from 5/15)    ASSESSED NUTRITION NEEDS  Estimated Energy Needs: 1197-2091 kcals (25-30 Kcal/Kg)  Justification: post-op and pending EN absorption post total pancreatectomy  Estimated Protein Needs: 131-182 grams protein (1.3-1.8 g pro/Kg)  Justification: post-op and pending EN absorption post total pancreatectomy  Estimated Fluid Needs: 1 mL/Kcal or per MD pending fluid status    PHYSICAL FINDINGS  See malnutrition section below.    MALNUTRITION  % Intake:  Unable to assess  % Weight Loss:  None noted  Subcutaneous Fat Loss:   Unable to assess  Muscle Loss:  Unable to assess  Fluid Accumulation/Edema:  None noted per chart review  Malnutrition Diagnosis: Unable to determine due to patient busy during attempts to visit    NUTRITION " DIAGNOSIS  Predicted suboptimal nutrient intake r/t anticipate slow advancement of TF post AIT    INTERVENTIONS  Implementation  -Nutrition education: Unable to complete due to patient busy during attempts to visit  -Collaboration and Referral of Nutrition care - Discussed plan for FEN/GI on rounds with MDs who request start TF @ 10 mL/hr via J  With no plans for advancement today. Discussed recs to switch to non-dextrose containing IVF with team if appropriate given refeeding risk. Discussed replacing Phos with RN  -Enteral Nutrition  -Free water flushes  -PERT    Goals  1.  Tolerate adv of TF to goal infusion without sx of refeeding within the next 2-3 days.  2.  Once TF at goal, total ave EN intakes provide minimum of 25 kcal/kg/day and 1.3 g/kg/day (per 101 kg)      Monitoring/Evaluation  Progress toward goals will be monitored and evaluated per protocol.     Jane Kim, RD, LD   SICU RD pager: [phone]

## 2017-05-16 NOTE — PROGRESS NOTES
05/16/17 1600   Quick Adds   Type of Visit Initial PT Evaluation   Living Environment   Lives With spouse   Living Arrangements hotel/motel   Home Accessibility no concerns   Number of Stairs to Enter Home 0   Number of Stairs Within Home 0   Transportation Available family or friend will provide   Living Environment Comment Pt a resident of [place] but [place] hotel [place] with wife.   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 Enjoys outdoor activities and trading stocks/bonds.   Functional Level Prior   Ambulation 0-->independent   Transferring 0-->independent   Toileting 0-->independent   Bathing 0-->independent   Dressing 0-->independent   Eating 0-->independent   Communication 0-->understands/communicates without difficulty   Swallowing 0-->swallows foods/liquids without difficulty   Cognition 0 - no cognition issues reported   Fall history within last six months no   Which of the above functional risks had a recent onset or change? ambulation;transferring   Prior Functional Level Comment Indep with mobility   General Information   Onset of Illness/Injury or Date of Surgery - Date 05/15/17   Patient/Family Goals Statement Decrease pain   Pertinent History of Current Problem (include personal factors and/or comorbidities that impact the POC) Pt admitted for TPAIT and is POD #1.   Precautions/Limitations abdominal precautions   General Observations Pt supine in bed; agreeable to session.   Cognitive Status Examination   Orientation orientation to person, place and time   Level of Consciousness alert;lethargic/somnolent   Follows Commands and Answers Questions 100% of the time   Personal Safety and Judgment intact   Memory intact   Pain Assessment   Patient Currently in Pain Yes, see Vital Sign flowsheet  "  Integumentary/Edema   Integumentary/Edema Comments None noted.   Posture    Posture Forward head position;Protracted shoulders;Kyphosis   Range of Motion (ROM)   ROM Comment B UE/LE AROM Grossly WFL.   Strength   Strength Comments B UE/LE strength grossly WFL. Miild deconditioning noted postop.   Bed Mobility   Bed Mobility Comments Supine>sit: mod A x 2    Transfer Skills   Transfer Comments Sit>stand: min A x 2 and no AD.   Gait   Gait Comments Not formally assessed this date due to pain/lines.   Balance   Balance Comments CGA-min A for standing dynamic/static balance.   Sensory Examination   Sensory Perception no deficits were identified   General Therapy Interventions   Planned Therapy Interventions balance training;bed mobility training;gait training;neuromuscular re-education;strengthening;stretching;transfer training;home program guidelines;progressive activity/exercise   Clinical Impression   Criteria for Skilled Therapeutic Intervention yes, treatment indicated   PT Diagnosis Impaired functional mobility   Influenced by the following impairments Pain, deconditioning, precautions, posture   Functional limitations due to impairments Bed mobility, transfers, gait, balance, endurance.   Clinical Presentation Evolving/Changing   Clinical Presentation Rationale Clincial judgement   Clinical Decision Making (Complexity) Moderate complexity   Therapy Frequency` daily   Predicted Duration of Therapy Intervention (days/wks) 2 weeks   Anticipated Discharge Disposition Home with Assist   Risk & Benefits of therapy have been explained Yes   Patient, Family & other staff in agreement with plan of care Yes   [facility] AM-PAC  \"6 Clicks\" V.2 Basic Mobility Inpatient Short Form   1. Turning from your back to your side while in a flat bed without using bedrails? 2 - A Lot   2. Moving from lying on your back to sitting on the side of a flat bed without using bedrails? 2 - A Lot   3. Moving to and from a bed to a " chair (including a wheelchair)? 3 - A Little   4. Standing up from a chair using your arms (e.g., wheelchair, or bedside chair)? 3 - A Little   5. To walk in hospital room? 2 - A Lot   6. Climbing 3-5 steps with a railing? 1 - Total   Basic Mobility Raw Score (Score out of 24.Lower scores equate to lower levels of function) 13

## 2017-05-16 NOTE — ANESTHESIA CARE TRANSFER NOTE
Patient: Sohan Arita    Procedure(s):  Open Pancreatectomy, Splenectomy, Gastrojejunostomy Tube Placement , Auto Islet Cell Transplant - Wound Class: II-Clean Contaminated    Diagnosis: Chronic Pancreatitis   Diagnosis Additional Information: No value filed.    Anesthesia Type:   General, ETT     Note:  Airway :Face Mask  Patient transferred to:PACU  Comments: Airway :Face Mask  Patient transferred to:PACU  Comments: Prior to extubation, patient was breathing spontaneously with appropriate respiratory rate and tidal volume. The patient was following commands, warm and demonstrated adequate strength. Patient was suctioned and extubated without complication.   Transported to PACU on 6L O2 via face mask.   VSS upon arrival to PACU.  Patient denies nausea at this time.  Patient endorsed pain, given 50 mcg fentanyl.  Care transfer plan communicated and patient care transferred to PACU RN     Josue Josue Jr., MD  Anesthesia Resident - [facility]    5/15/2017  9:10 PM        Vitals: (Last set prior to Anesthesia Care Transfer)    CRNA VITALS  5/15/2017 2030 - 5/15/2017 2110      5/15/2017             Pulse: 81    SpO2: 100 %    Resp Rate (set): 10                Electronically Signed By: Josue Josue MD  May 15, 2017  9:10 PM

## 2017-05-16 NOTE — PLAN OF CARE
Problem: Goal Outcome Summary  Goal: Goal Outcome Summary  Outcome: No Change  D=Pt came back from PACU at [facility] ,S/P auto islet transplant.Pt lethargic ,wakes up easily orientedx4. Heart rate in 70s no ectopy,Cuff pressure correlates w/ Left arterial a line..500cc of Albumin 5%  Infusing.Precedex infusing at 0.5mcg/kg/hr,Insulin drip infusing at 0.5units/hr,On Q pump infusing at 5cc/hr on each port (Total of 10cc),dsg to On  Q pump site at the back dry intact.,abdominal dsg dry intact w/ abdominal binder on,Rt abdominal MAURICIO  To bulb suction w/ scant serosanguinous drainage in tube/(knotted). JT to gravity,GT clamped.NGT to low intermittent suction w/ scant thick tan/darkred drainage Urine output 75-100cc/hr.  A=Afebrile, vitals stable. Precedex drip increased to 0.6mcg/kg/hr,Pt encouraged to push/use PCA Dialudid.Dr Mccall  Came ,ordered to start a basal rate of 0.1mg/hr of dilaudid and 0.2mg PCA every 10 minutes. Precedex was weaned down to 0.5mcg/kg/hr Anesthesia for pain control was paged  To evaluate pt Will see pt   P=Continue to closely monitor pt

## 2017-05-17 ENCOUNTER — APPOINTMENT (OUTPATIENT)
Dept: PHYSICAL THERAPY | Facility: CLINIC | Age: 57
DRG: 406 | End: 2017-05-17
Attending: TRANSPLANT SURGERY
Payer: COMMERCIAL

## 2017-05-17 LAB
ALBUMIN SERPL-MCNC: 2.3 G/DL (ref 3.4–5)
ALP SERPL-CCNC: 32 U/L (ref 40–150)
ALT SERPL W P-5'-P-CCNC: 93 U/L (ref 0–70)
ANION GAP SERPL CALCULATED.3IONS-SCNC: 6 MMOL/L (ref 3–14)
APTT PPP: 42 SEC (ref 22–37)
AST SERPL W P-5'-P-CCNC: 74 U/L (ref 0–45)
BASOPHILS # BLD AUTO: 0.1 10E9/L (ref 0–0.2)
BASOPHILS NFR BLD AUTO: 0.9 %
BILIRUB DIRECT SERPL-MCNC: 0.1 MG/DL (ref 0–0.2)
BILIRUB SERPL-MCNC: 0.5 MG/DL (ref 0.2–1.3)
BUN SERPL-MCNC: 17 MG/DL (ref 7–30)
CALCIUM SERPL-MCNC: 7.4 MG/DL (ref 8.5–10.1)
CHLORIDE SERPL-SCNC: 113 MMOL/L (ref 94–109)
CO2 SERPL-SCNC: 26 MMOL/L (ref 20–32)
CREAT SERPL-MCNC: 1.05 MG/DL (ref 0.66–1.25)
DIFFERENTIAL METHOD BLD: ABNORMAL
EOSINOPHIL # BLD AUTO: 0.4 10E9/L (ref 0–0.7)
EOSINOPHIL NFR BLD AUTO: 2.6 %
ERYTHROCYTE [DISTWIDTH] IN BLOOD BY AUTOMATED COUNT: 14.3 % (ref 10–15)
GFR SERPL CREATININE-BSD FRML MDRD: 73 ML/MIN/1.7M2
GLUCOSE BLDC GLUCOMTR-MCNC: 102 MG/DL (ref 70–99)
GLUCOSE BLDC GLUCOMTR-MCNC: 103 MG/DL (ref 70–99)
GLUCOSE BLDC GLUCOMTR-MCNC: 104 MG/DL (ref 70–99)
GLUCOSE BLDC GLUCOMTR-MCNC: 105 MG/DL (ref 70–99)
GLUCOSE BLDC GLUCOMTR-MCNC: 108 MG/DL (ref 70–99)
GLUCOSE BLDC GLUCOMTR-MCNC: 109 MG/DL (ref 70–99)
GLUCOSE BLDC GLUCOMTR-MCNC: 112 MG/DL (ref 70–99)
GLUCOSE BLDC GLUCOMTR-MCNC: 114 MG/DL (ref 70–99)
GLUCOSE BLDC GLUCOMTR-MCNC: 118 MG/DL (ref 70–99)
GLUCOSE BLDC GLUCOMTR-MCNC: 118 MG/DL (ref 70–99)
GLUCOSE BLDC GLUCOMTR-MCNC: 121 MG/DL (ref 70–99)
GLUCOSE BLDC GLUCOMTR-MCNC: 95 MG/DL (ref 70–99)
GLUCOSE SERPL-MCNC: 116 MG/DL (ref 70–99)
HCT VFR BLD AUTO: 26.1 % (ref 40–53)
HGB BLD-MCNC: 8.3 G/DL (ref 13.3–17.7)
LYMPHOCYTES # BLD AUTO: 1 10E9/L (ref 0.8–5.3)
LYMPHOCYTES NFR BLD AUTO: 6 %
MAGNESIUM SERPL-MCNC: 2.2 MG/DL (ref 1.6–2.3)
MCH RBC QN AUTO: 27.9 PG (ref 26.5–33)
MCHC RBC AUTO-ENTMCNC: 31.8 G/DL (ref 31.5–36.5)
MCV RBC AUTO: 88 FL (ref 78–100)
MONOCYTES # BLD AUTO: 0.1 10E9/L (ref 0–1.3)
MONOCYTES NFR BLD AUTO: 0.9 %
NEUTROPHILS # BLD AUTO: 14.5 10E9/L (ref 1.6–8.3)
NEUTROPHILS NFR BLD AUTO: 89.6 %
PHOSPHATE SERPL-MCNC: 2.4 MG/DL (ref 2.5–4.5)
PLATELET # BLD AUTO: 153 10E9/L (ref 150–450)
PLATELET # BLD EST: ABNORMAL 10*3/UL
POTASSIUM SERPL-SCNC: 4.1 MMOL/L (ref 3.4–5.3)
PROT SERPL-MCNC: 4.5 G/DL (ref 6.8–8.8)
RBC # BLD AUTO: 2.97 10E12/L (ref 4.4–5.9)
RBC MORPH BLD: NORMAL
SODIUM SERPL-SCNC: 145 MMOL/L (ref 133–144)
WBC # BLD AUTO: 16.2 10E9/L (ref 4–11)

## 2017-05-17 PROCEDURE — 83735 ASSAY OF MAGNESIUM: CPT | Performed by: STUDENT IN AN ORGANIZED HEALTH CARE EDUCATION/TRAINING PROGRAM

## 2017-05-17 PROCEDURE — 80076 HEPATIC FUNCTION PANEL: CPT | Performed by: STUDENT IN AN ORGANIZED HEALTH CARE EDUCATION/TRAINING PROGRAM

## 2017-05-17 PROCEDURE — 25000125 ZZHC RX 250: Performed by: NURSE PRACTITIONER

## 2017-05-17 PROCEDURE — 25000128 H RX IP 250 OP 636: Performed by: PHYSICIAN ASSISTANT

## 2017-05-17 PROCEDURE — 27110038 ZZH RX 271: Performed by: ANESTHESIOLOGY

## 2017-05-17 PROCEDURE — 25000125 ZZHC RX 250: Performed by: PHYSICIAN ASSISTANT

## 2017-05-17 PROCEDURE — 25000132 ZZH RX MED GY IP 250 OP 250 PS 637: Performed by: SURGERY

## 2017-05-17 PROCEDURE — 84100 ASSAY OF PHOSPHORUS: CPT | Performed by: STUDENT IN AN ORGANIZED HEALTH CARE EDUCATION/TRAINING PROGRAM

## 2017-05-17 PROCEDURE — 00000146 ZZHCL STATISTIC GLUCOSE BY METER IP

## 2017-05-17 PROCEDURE — 25000128 H RX IP 250 OP 636: Performed by: STUDENT IN AN ORGANIZED HEALTH CARE EDUCATION/TRAINING PROGRAM

## 2017-05-17 PROCEDURE — 27210436 ZZH NUTRITION PRODUCT SEMIELEM INTERMED CAN

## 2017-05-17 PROCEDURE — 12000025 ZZH R&B TRANSPLANT INTERMEDIATE

## 2017-05-17 PROCEDURE — 25000128 H RX IP 250 OP 636: Performed by: SURGERY

## 2017-05-17 PROCEDURE — 25000125 ZZHC RX 250: Performed by: STUDENT IN AN ORGANIZED HEALTH CARE EDUCATION/TRAINING PROGRAM

## 2017-05-17 PROCEDURE — 25000128 H RX IP 250 OP 636: Performed by: NURSE PRACTITIONER

## 2017-05-17 PROCEDURE — 25000125 ZZHC RX 250: Performed by: ANESTHESIOLOGY

## 2017-05-17 PROCEDURE — 25000132 ZZH RX MED GY IP 250 OP 250 PS 637: Performed by: PHYSICIAN ASSISTANT

## 2017-05-17 PROCEDURE — 25000132 ZZH RX MED GY IP 250 OP 250 PS 637: Performed by: TRANSPLANT SURGERY

## 2017-05-17 PROCEDURE — 85025 COMPLETE CBC W/AUTO DIFF WBC: CPT | Performed by: STUDENT IN AN ORGANIZED HEALTH CARE EDUCATION/TRAINING PROGRAM

## 2017-05-17 PROCEDURE — 85730 THROMBOPLASTIN TIME PARTIAL: CPT | Performed by: STUDENT IN AN ORGANIZED HEALTH CARE EDUCATION/TRAINING PROGRAM

## 2017-05-17 PROCEDURE — 25000132 ZZH RX MED GY IP 250 OP 250 PS 637: Performed by: NURSE PRACTITIONER

## 2017-05-17 PROCEDURE — 40000193 ZZH STATISTIC PT WARD VISIT

## 2017-05-17 PROCEDURE — 25800025 ZZH RX 258: Performed by: TRANSPLANT SURGERY

## 2017-05-17 PROCEDURE — 25000125 ZZHC RX 250: Performed by: SURGERY

## 2017-05-17 PROCEDURE — 99291 CRITICAL CARE FIRST HOUR: CPT | Performed by: PHYSICIAN ASSISTANT

## 2017-05-17 PROCEDURE — 97530 THERAPEUTIC ACTIVITIES: CPT | Mod: GP

## 2017-05-17 PROCEDURE — S5010 5% DEXTROSE AND 0.45% SALINE: HCPCS | Performed by: TRANSPLANT SURGERY

## 2017-05-17 PROCEDURE — 80048 BASIC METABOLIC PNL TOTAL CA: CPT | Performed by: STUDENT IN AN ORGANIZED HEALTH CARE EDUCATION/TRAINING PROGRAM

## 2017-05-17 RX ORDER — DIAZEPAM 10 MG/2ML
2.5 INJECTION, SOLUTION INTRAMUSCULAR; INTRAVENOUS EVERY 6 HOURS PRN
Status: DISCONTINUED | OUTPATIENT
Start: 2017-05-17 | End: 2017-05-19

## 2017-05-17 RX ORDER — SODIUM BICARBONATE 325 MG/1
325 TABLET ORAL EVERY 4 HOURS
Status: DISCONTINUED | OUTPATIENT
Start: 2017-05-17 | End: 2017-05-21 | Stop reason: ALTCHOICE

## 2017-05-17 RX ORDER — KETAMINE HYDROCHLORIDE 10 MG/ML
2.5-5 INJECTION, SOLUTION INTRAMUSCULAR; INTRAVENOUS EVERY 4 HOURS
Status: DISCONTINUED | OUTPATIENT
Start: 2017-05-17 | End: 2017-05-17

## 2017-05-17 RX ORDER — KETOROLAC TROMETHAMINE 15 MG/ML
15 INJECTION, SOLUTION INTRAMUSCULAR; INTRAVENOUS ONCE
Status: COMPLETED | OUTPATIENT
Start: 2017-05-17 | End: 2017-05-17

## 2017-05-17 RX ORDER — DIPHENHYDRAMINE HCL 25 MG
25 CAPSULE ORAL EVERY 6 HOURS PRN
Status: DISCONTINUED | OUTPATIENT
Start: 2017-05-17 | End: 2017-05-25

## 2017-05-17 RX ORDER — KETOROLAC TROMETHAMINE 15 MG/ML
15 INJECTION, SOLUTION INTRAMUSCULAR; INTRAVENOUS EVERY 6 HOURS PRN
Status: DISCONTINUED | OUTPATIENT
Start: 2017-05-17 | End: 2017-05-18

## 2017-05-17 RX ORDER — KETAMINE HYDROCHLORIDE 10 MG/ML
5 INJECTION, SOLUTION INTRAMUSCULAR; INTRAVENOUS EVERY 4 HOURS
Status: DISCONTINUED | OUTPATIENT
Start: 2017-05-17 | End: 2017-05-17

## 2017-05-17 RX ORDER — KETAMINE HYDROCHLORIDE 10 MG/ML
2.5 INJECTION, SOLUTION INTRAMUSCULAR; INTRAVENOUS EVERY 4 HOURS
Status: DISCONTINUED | OUTPATIENT
Start: 2017-05-17 | End: 2017-05-18

## 2017-05-17 RX ADMIN — SODIUM BICARBONATE 325 MG: 325 TABLET ORAL at 05:29

## 2017-05-17 RX ADMIN — GABAPENTIN 300 MG: 250 SOLUTION ORAL at 20:29

## 2017-05-17 RX ADMIN — DIAZEPAM 5 MG: 5 INJECTION, SOLUTION INTRAMUSCULAR; INTRAVENOUS at 07:47

## 2017-05-17 RX ADMIN — PANCRELIPASE 24000 UNITS: 24000; 76000; 120000 CAPSULE, DELAYED RELEASE PELLETS ORAL at 13:03

## 2017-05-17 RX ADMIN — DEXMEDETOMIDINE 0.6 MCG/KG/HR: 100 INJECTION, SOLUTION, CONCENTRATE INTRAVENOUS at 02:08

## 2017-05-17 RX ADMIN — Medication 5 MG: at 05:29

## 2017-05-17 RX ADMIN — SODIUM BICARBONATE 325 MG: 325 TABLET ORAL at 09:37

## 2017-05-17 RX ADMIN — ERTAPENEM SODIUM 1 G: 1 INJECTION, POWDER, LYOPHILIZED, FOR SOLUTION INTRAMUSCULAR; INTRAVENOUS at 15:27

## 2017-05-17 RX ADMIN — PANCRELIPASE 24000 UNITS: 24000; 76000; 120000 CAPSULE, DELAYED RELEASE PELLETS ORAL at 09:38

## 2017-05-17 RX ADMIN — PANCRELIPASE 24000 UNITS: 24000; 76000; 120000 CAPSULE, DELAYED RELEASE PELLETS ORAL at 05:30

## 2017-05-17 RX ADMIN — ACETAMINOPHEN 1000 MG: 10 INJECTION, SOLUTION INTRAVENOUS at 02:35

## 2017-05-17 RX ADMIN — GABAPENTIN 300 MG: 250 SOLUTION ORAL at 07:59

## 2017-05-17 RX ADMIN — DEXTROSE AND SODIUM CHLORIDE: 5; 450 INJECTION, SOLUTION INTRAVENOUS at 20:24

## 2017-05-17 RX ADMIN — SODIUM BICARBONATE 325 MG: 325 TABLET ORAL at 13:04

## 2017-05-17 RX ADMIN — KETOROLAC TROMETHAMINE 15 MG: 15 INJECTION, SOLUTION INTRAMUSCULAR; INTRAVENOUS at 11:13

## 2017-05-17 RX ADMIN — PANTOPRAZOLE SODIUM 40 MG: 40 TABLET, DELAYED RELEASE ORAL at 20:28

## 2017-05-17 RX ADMIN — Medication 2.5 MG: at 20:25

## 2017-05-17 RX ADMIN — Medication 2.5 MG: at 16:56

## 2017-05-17 RX ADMIN — SODIUM PHOSPHATE, MONOBASIC, MONOHYDRATE AND SODIUM PHOSPHATE, DIBASIC, ANHYDROUS 15 MMOL: 276; 142 INJECTION, SOLUTION INTRAVENOUS at 03:45

## 2017-05-17 RX ADMIN — Medication 5 MG: at 02:30

## 2017-05-17 RX ADMIN — Medication 800 UNITS: at 08:01

## 2017-05-17 RX ADMIN — KETOROLAC TROMETHAMINE 15 MG: 15 INJECTION, SOLUTION INTRAMUSCULAR; INTRAVENOUS at 15:34

## 2017-05-17 RX ADMIN — HYDROMORPHONE HYDROCHLORIDE: 10 INJECTION, SOLUTION INTRAMUSCULAR; INTRAVENOUS; SUBCUTANEOUS at 13:07

## 2017-05-17 RX ADMIN — PANCRELIPASE 24000 UNITS: 24000; 76000; 120000 CAPSULE, DELAYED RELEASE PELLETS ORAL at 02:08

## 2017-05-17 RX ADMIN — Medication 2.5 MG: at 12:48

## 2017-05-17 RX ADMIN — SODIUM BICARBONATE 325 MG: 325 TABLET ORAL at 17:45

## 2017-05-17 RX ADMIN — SODIUM CHLORIDE, POTASSIUM CHLORIDE, SODIUM LACTATE AND CALCIUM CHLORIDE 1000 ML: 600; 310; 30; 20 INJECTION, SOLUTION INTRAVENOUS at 03:43

## 2017-05-17 RX ADMIN — Medication: at 16:25

## 2017-05-17 RX ADMIN — Medication 5 MG: at 00:12

## 2017-05-17 RX ADMIN — FLUOXETINE HYDROCHLORIDE 10 MG: 20 SOLUTION ORAL at 08:00

## 2017-05-17 RX ADMIN — LEVOTHYROXINE SODIUM 150 MCG: 300 TABLET ORAL at 07:46

## 2017-05-17 RX ADMIN — PANCRELIPASE 24000 UNITS: 24000; 76000; 120000 CAPSULE, DELAYED RELEASE PELLETS ORAL at 17:46

## 2017-05-17 RX ADMIN — ACETAMINOPHEN 1000 MG: 10 INJECTION, SOLUTION INTRAVENOUS at 08:00

## 2017-05-17 RX ADMIN — MULTIVIT AND MINERALS-FERROUS GLUCONATE 9 MG IRON/15 ML ORAL LIQUID 15 ML: at 07:59

## 2017-05-17 RX ADMIN — DIAZEPAM 2.5 MG: 5 INJECTION, SOLUTION INTRAMUSCULAR; INTRAVENOUS at 14:43

## 2017-05-17 RX ADMIN — SODIUM BICARBONATE 325 MG: 325 TABLET ORAL at 02:08

## 2017-05-17 RX ADMIN — PANTOPRAZOLE SODIUM 40 MG: 40 TABLET, DELAYED RELEASE ORAL at 07:59

## 2017-05-17 ASSESSMENT — PAIN DESCRIPTION - DESCRIPTORS: DESCRIPTORS: ACHING

## 2017-05-17 NOTE — PLAN OF CARE
"Problem: Pain, Chronic (Adult)  Goal: Identify Related Risk Factors and Signs and Symptoms  Related risk factors and signs and symptoms are identified upon initiation of Human Response Clinical Practice Guideline (CPG)   D/I:  Abd US done this AM.  Pt's left hand was numb from thumb to 4th finger.  Only finger not numb was pink on left hand.  SICU and transplant notified.  L radial a-line removed.  Pt reports that numbness has not yet improved.  Afebrile.  MAPs 58-65 today. UOP marginal 30-40cc/hr.  SICU notified.  1L bolus of LR given slowly and even with bolus given at a rate of 250cc/hr, Pt complaining that bolus was causing his abdominal pain to worsen. CVPs 8 and 10.  Pt c/o pain throughout the day, intensified after Pt up in recliner x 33 min.  After getting back to bed, Pt decided to stop using dilaudid PCA pump b/c of past experiences with medication and he did not think it was helping with his pain control.  SICU and transplant notified.  Pt given one time dose of 100 mcg of fentanyl with temporary improvement in pain control.  Ketamine increased from 5mg q 4 hours to 10mg q 4 hours.  Per Pt, this did not help his pain at all and c/o feeling detached from his body and seeing \"psychedelic colors\" with his eyes closed.  Pt able to rest after receiving a dose of IV tylenol.  Precedex titrated from 0.6 to 0.2 mcg/kg/min to assist with blood pressures, so Pt could get a bolus on OnQ pump.  Bolus accomplished.  Precedex titrated back up to 0.6 mcg/kg/min to help with agitation related to pain.  OnQ pump running at 7cc/hr x 2 catheters.  PRN zofran and compazine given for intermittent nausea with improvement.  NGT removed by transplant.  G-tube to gravity.  J-tube remains clamped.  Multiple attempts made to secure a feeding pump.  Phos replaced to acceptable level to start trophic tube feeds with enzymes.  Will start tube feeds when pump available.  Per supply, pumps on back order and not available at this time.  " Insulin gtt on from 1.5-3 units/hr.  No BM.        A/P:  Will continue to monitor and notify MD of concerns.

## 2017-05-17 NOTE — PROGRESS NOTES
REGIONAL ANESTHESIA PAIN SERVICE CONTINUOUS NERVE INFUSION NOTE  SUBJECTIVE:  Interval History: Pt reports adequate pain control via continuous peripheral nerve block (CPNB) infusion, dexmedetomidine infusion, PCA HYDROmorphone, ketamine, diazepam, acetaminophen and gabapentin.  Denies any weakness, paresthesias, circumoral numbness, metallic taste or tinnitus.  Pt tranfers to the chair with assistance.  Patient is currently without nausea or vomiting. Patient is tolerating TF.  Pt reports ketamine is effective in reducing the pain however he endorses hallucinations when he closes his eyes.  Pt reports side effects of ketamine are tolerable.     Clinically Aligned Pain Assessment (CAPA):   Comfort (How is your pain?): Tolerable with discomfort  Change in Pain (Since your last medication/intervention?): Getting better  Pain Control (How are your pain treatments working?):  Partially effective pain control  Functioning (Are you able to do activities to get better?) : Can do most things, but pain gets in the way of some   Sleep (Does your pain management allow you to sleep or rest?): Awake with occasional pain     Numerical Rating Scale:  4/10 at rest and 6/10 with movement.        Anticoagulation:  Heparin 400units/hr      OBJECTIVE:    Diagnostic:  Lab Results   Component Value Date    WBC 16.2 05/17/2017     Lab Results   Component Value Date    RBC 2.97 05/17/2017     Lab Results   Component Value Date    HGB 8.3 05/17/2017     Lab Results   Component Value Date    HCT 26.1 05/17/2017     Lab Results   Component Value Date     05/17/2017         Vitals:    Temp:  [97.9  F (36.6  C)-98.7  F (37.1  C)] 98.5  F (36.9  C)  Heart Rate:  [64-79] 66  Resp:  [9-29] 20  BP: ()/(42-69) 95/55  SpO2:  [50 %-98 %] 89 %    Exam:    Strength 5/5 and symmetric grossly in bilateral LE      B/L paravertebral (PV) catheter sites with dressing c/d/i, no tenderness, erythema, heme, edema      ASSESSMENT/PLAN:    Sohan  Gio Arita is a 56 year old male POD #2 s/p COMBINED PANCREATECTOMY, TRANSPLANT AUTO ISLET CELL and placement of B/L Thoracic PV catheters for analgesia.  Pt is receiving adequate analgesia with Ropivacaine 0.2%, total infusion 14mL/hour - 7mL each catheter.  Pt transfers to the chair with assistanct.  No weakness or paresthesias.  No evidence of adverse side effects associated with local anesthetic. MAP high 50's to low 60's.    - continue current total infusion of 14mL/hour  - will continue to follow and adjust as needed  - expected change of next On-Q pump is today - RN aware  - discussed plan with attending anesthesiologist    CHUY Lundberg CNP  Regional Anesthesia Pain Service  5/17/2017 10:11 AM    24 hour Job Code Pager.  For in-house use only.     [place]:  * * [phone]  [place]: * * [phone]  Peds: * * [phone]  Enter call-back number and #      This pager only accepts text messages through [facility]

## 2017-05-17 NOTE — PLAN OF CARE
Problem: Goal Outcome Summary  Goal: Goal Outcome Summary  PT 4A: Engaged pt in bed mobility min A, sit <> stand CGA, pivot transfer CGA with standing marching at CGA. Pt limited most by fatigue/drowsiness rather than pain today. PT anticipating discharge to local hotel with wife dependent on progression of gait and stair training.

## 2017-05-17 NOTE — ANESTHESIA POST-OP FOLLOW-UP NOTE
REGIONAL ANESTHESIA PAIN SERVICE CONTINUOUS NERVE INFUSION NOTE  SUBJECTIVE:  Interval History: Pt reports adequate pain control via continuous peripheral nerve block (CPNB) infusion, dexmedetomidine infusion, PCA HYDROmorphone, ketamine, diazepam, acetaminophen and gabapentin. Denies any weakness, paresthesias, circumoral numbness, metallic taste or tinnitus. Pt tranfers to the chair with assistance. Patient is currently without nausea or vomiting. Patient is tolerating TF. Pt reports ketamine is effective in reducing the pain however he endorses hallucinations when he closes his eyes. Pt reports side effects of ketamine are tolerable.     Clinically Aligned Pain Assessment (CAPA):   Comfort (How is your pain?): Tolerable with discomfort  Change in Pain (Since your last medication/intervention?): Getting better  Pain Control (How are your pain treatments working?): Partially effective pain control  Functioning (Are you able to do activities to get better?) : Can do most things, but pain gets in the way of some   Sleep (Does your pain management allow you to sleep or rest?): Awake with occasional pain     Numerical Rating Scale:  4/10 at rest and 6/10 with movement.         Anticoagulation: Heparin 400units/hr        OBJECTIVE:     Diagnostic:        Lab Results   Component Value Date     WBC 16.2 05/17/2017            Lab Results   Component Value Date     RBC 2.97 05/17/2017            Lab Results   Component Value Date     HGB 8.3 05/17/2017            Lab Results   Component Value Date     HCT 26.1 05/17/2017            Lab Results   Component Value Date      05/17/2017            Vitals:    Temp: [97.9  F (36.6  C)-98.7  F (37.1  C)] 98.5  F (36.9  C)  Heart Rate: [64-79] 66  Resp: [9-29] 20  BP: ()/(42-69) 95/55  SpO2: [50 %-98 %] 89 %     Exam:   Strength 5/5 and symmetric grossly in bilateral LE      B/L paravertebral (PV) catheter sites with dressing c/d/i, no tenderness, erythema, heme,  edema        ASSESSMENT/PLAN:   Sohan Arita is a 56 year old male POD #2 s/p COMBINED PANCREATECTOMY, TRANSPLANT AUTO ISLET CELL and placement of B/L Thoracic PV catheters for analgesia. Pt is receiving adequate analgesia with Ropivacaine 0.2%, total infusion 14mL/hour - 7mL each catheter. Pt transfers to the chair with assistanct. No weakness or paresthesias. No evidence of adverse side effects associated with local anesthetic. MAP high 50's to low 60's.     - continue current total infusion of 14mL/hour  - will continue to follow and adjust as needed  - expected change of next On-Q pump is today - RN aware      Demarcus Evans, DO  Regional Anesthesia Pain Service  5/17/2017      24 hour Job Code Pager. For in-house use only.   [place]: * * [phone]  [place]: * * [phone]  Peds: * * [phone]  Enter call-back number and #   This pager only accepts text messages through [facility]

## 2017-05-17 NOTE — PLAN OF CARE
Problem: Goal Outcome Summary  Goal: Goal Outcome Summary  Outcome: Improving  D/I/A:  Neuro: Patient is alert and oriented x4.  Endorses pain, somewhat controlled with ordered pain meds.  Uses PCA infrequently and takes half the scheduled dose of ketamine.  Expresses concern over taking too much pain medication.  Anxious at times, precedex drip in use.  Continues to c/o numbness in left hand, from thumb to ring finger. Dressing removed and ROM performed.  Teams aware.  CV: Sinus rhythm, no ectopy, afebrile.  Pressures soft, 2x 1 liter bolus of LR administered.  CVP 8-10.  Map goal over 60.  Pulm:  2L NC, sats mid 90's.  Lungs clear, slightly diminished in bases.  GI:  Tube feeding initiated at 10 mL per hour.  Tolerating.  Bowel sounds hypoactive.    :  King with marginal output.  Jeana in color, 30-75 per hour.  Endo:   Blood glucose goal .  Maintaining with insulin drip at 2.  Lines: Triple lumen IJ, PIV, king, vertebral block.  Gtts: mIV at 100, heparin at 400, precedex at 0.6, dilaudid 0.3mg with 0.2mg bolus prn, total 14mL ropivicaine, TKO.  Skin: Midline dressing intact, no increase in drainage since beginning of shift     P: Continue to monitor patient closely; update team with any changes or concerns.     Please see flow sheets for further information.

## 2017-05-17 NOTE — PROGRESS NOTES
Nutrition Progress Note - f/u for progress towards previous nutrition POC (see previous 5/16 assessment for details)    -EN: Impact Peptide @ 10 ml/hr orders to increase to 20 ml/hr; noted previously rec goal rate of 75 ml/hr (will provide 115 g Fat daily, 50% from MCT and 50% from LCT).    -Labs: Vitamin D <18 (5/10/17) - note started on initial Vitamin D3 (800 units/day) but given lab indicative of deficiency even pre-pancreatectomy, would opt to increase fat soluble vitamin supplementation.    -Meds: Creon 24 (1-2 capsules q 4 hrs; with goal TF/fat above will provide ~2500 units of lipase/gram of total fat).  Note previous reports that pt taking Creon 36 TID with meals when had pancreatic insufficiency.  Now, given total pancreatectomy and higher goal rate, may need to dose enzymes at higher end of recommended range.    Interventions:  Collaboration and Referral of Nutrition care - Discussed plan for FEN/GI on rounds with Providers who approved RD to adjust EN regimen as follows:   --Ordered to advance TF by 10 ml q 24 hrs to goal Impact Peptide @ 75 ml/hr (see 5/16 note for details/provisions of this regimen) via "[employer].  Submitted request for approp formula supply to do enzyme mixing into TF formula.  --Changed to Creon 24, 1-3 capsules q 4 hrs (1 capsule with TF rate @ 10-30, 2 capsules with TF rate @ 40-50 and 3 capsules with TF rate @ 60-75 ml/hr).  Once TF at goal 75 ml/hr, will provide 3757 units of lipase/gram of total Fat in goal TF regimen.  --Increased Vitamin D3 to 2000 units/day via "[employer].    Orquidea Spence, RD, LD, CNSC (pgr [phone])

## 2017-05-17 NOTE — PROGRESS NOTES
Pancreatitis Service - Daily Progress Note  05/17/2017    Assessment & Plan: 56 year old male with chronic pancreatitis s/p lap cholecystectomy 4/2012, s/p biliary and pancreatic sphincterotomies and a temporary pancreatic stent 2012, s/p transduodenal sphincteroplasty 12/2014 with resultant leak and phlegmonous pancreatitis. S/p TPAIT 5/15/17     Liver US:  Impression: The left portal vein, left hepatic vein, and left hepatic  artery are obscured by overlying bowel gas. The splenic vein is also  not visualized. The remaining hepatic vasculature is patent with  antegrade flow.    Repeat LFTs today    Cardiorespiratory: Respiratory status stable on 2L NC. SBP , CVP 6-8 now 11. Received fluid bolus.    GI/Nutrition: His g tube is currently draining to gravity and his j tube is running tube is running tube feeds at 10 cc/h plus meds. MAURICIO, change to bulb suction.    Zofran and Compazine PRN  Renal/Fluid/Electrolytes: Cr < 1. D5  cc/hr. Replete electrolytes  : Remove king  Post-pancreatectomy diabetes: Continue insulin gtt until tube feeding at goal, appreciate Endocrine consult.  Infection: Surgical ppx: Ertapenem. Will follow cultures and narrow/stop abx. Islet cx no growth  Prophylaxis: PPI, Anticoagulation: continue heparin 400 cc/hr.   Pain control: poor  Ropivacaine On Q 14 mL  Precedex gtt, taper as appropriate  Dilaudid PCA. Continue bumps 0.2-0.3 mg. Increased basal to 0.2 mg/hr  Scheduled ketamine, decreased from 10 mg to 2.5 mg every 4 hours  Switch acetaminophen to susp, 650 mg every 6 hours   Gabapentin susp  Decrease  To 2.5 mg IV every 6 hours PRN   MSK: Pain 2/2 radial art line, pain improved with removal of line on 5/16. Continues to have numbness left digits, thumb, 2nd, 3rd. Monitor.   Activity: OOB to chair  Anticipated LOS/Discharge: TBD    Medical Decision Making: Medium  Subsequent visit 56313 (moderate level decision making)    GELACIO/Fellow/Resident Provider: Vero Rader PA-C  "    Faculty: Rodrigo Jaquez MD  __________________________________________________________________  Transplant History: Admitted 5/15/2017 for TP AIT  5/15/2017 (Islet), Postoperative day: 2     Interval History: History is obtained from the patient  Overnight events: Pain control better. Plan to work with therapy and get OOB to chair today. Tolerating TF at 10 cc/hr.  Ketamine was effective in reducing pain but he did not like SE.     ROS:   A 10-point review of systems was negative except as noted above.    Curent Meds:    pantoprazole  40 mg Oral or J tube BID     acetaminophen  650 mg Oral Q6H     ketamine  2.5 mg Intravenous Q4H     multivitamins with minerals  15 mL Per Feeding Tube Daily     cholecalciferol  800 Units Per J Tube Daily     amylase-lipase-protease  1-2 capsule Per J Tube Q4H    And     sodium bicarbonate  325 mg Per J Tube Q4H     HYDROmorphone   Intravenous PCA     disposable pump w/ anesthetic (select flow)   Topical Elast Pump     sodium chloride (PF)  3 mL Intracatheter Q8H     gabapentin  300 mg Oral or J tube BID     ertapenem (INVanz) IV  1 g Intravenous Q24H     FLUoxetine  10 mg Per J Tube Daily     levothyroxine  150 mcg Per J Tube QAM AC       Physical Exam:     Admit Weight: 100.7 kg (222 lb 0.1 oz)    Current Vitals:   BP (!) 71/49  Temp 98.5  F (36.9  C) (Oral)  Resp 13  Ht 1.88 m (6' 2\")  Wt 108.2 kg (238 lb 8.6 oz)  SpO2 91%  BMI 30.63 kg/m2        Vital sign ranges:    Temp:  [97.9  F (36.6  C)-98.7  F (37.1  C)] 98.5  F (36.9  C)  Heart Rate:  [64-79] 67  Resp:  [9-29] 13  BP: ()/(42-69) 71/49  SpO2:  [88 %-97 %] 91 %  Patient Vitals for the past 24 hrs:   BP Temp Temp src Heart Rate Resp SpO2 Weight   05/17/17 1000 (!) 71/49 - - 67 13 91 % -   05/17/17 0945 (!) 81/54 - - 65 14 94 % -   05/17/17 0930 (!) 70/42 - - 67 14 91 % -   05/17/17 0915 95/55 - - 66 20 (!) 89 % -   05/17/17 0900 - - - 74 27 93 % -   05/17/17 0845 (!) 85/52 - - 68 9 94 % -   05/17/17 0830 " 90/52 - - 65 14 94 % -   05/17/17 0800 92/49 98.5  F (36.9  C) Oral 68 15 93 % -   05/17/17 0745 90/49 - - 70 16 94 % -   05/17/17 0730 90/51 - - 70 15 92 % -   05/17/17 0715 (!) 87/50 - - 65 14 94 % -   05/17/17 0700 (!) 88/50 - - 68 14 94 % -   05/17/17 0645 90/49 - - 66 15 93 % -   05/17/17 0630 91/48 - - 68 20 93 % -   05/17/17 0615 90/53 - - 73 14 (!) 88 % -   05/17/17 0600 91/48 - - 73 16 (!) 89 % 108.2 kg (238 lb 8.6 oz)   05/17/17 0500 (!) 89/51 - - 67 16 94 % -   05/17/17 0400 (!) 88/47 97.9  F (36.6  C) Oral 66 16 92 % -   05/17/17 0300 (!) 86/42 - - 67 27 91 % -   05/17/17 0200 (!) 84/47 - - 72 20 92 % -   05/17/17 0100 92/50 - - 67 15 93 % -   05/17/17 0000 (!) 86/55 98.2  F (36.8  C) Oral 66 13 95 % -   05/16/17 2300 (!) 77/46 - - 64 15 94 % -   05/16/17 2200 105/51 - - 68 17 94 % -   05/16/17 2100 103/51 - - 70 15 91 % -   05/16/17 2000 (!) 87/56 97.9  F (36.6  C) Oral 67 15 96 % -   05/16/17 1900 (!) 87/52 - - 66 15 96 % -   05/16/17 1845 (!) 85/56 - - 68 15 97 % -   05/16/17 1815 (!) 83/51 - - 67 16 97 % -   05/16/17 1800 (!) 85/55 - - 67 9 97 % -   05/16/17 1745 (!) 88/54 - - 66 14 96 % -   05/16/17 1730 (!) 86/54 - - 67 14 97 % -   05/16/17 1715 (!) 88/54 - - 68 12 97 % -   05/16/17 1700 (!) 88/54 - - 68 16 96 % -   05/16/17 1645 (!) 84/56 - - 69 16 96 % -   05/16/17 1630 (!) 89/58 - - 70 17 96 % -   05/16/17 1615 90/56 - - 70 18 95 % -   05/16/17 1600 (!) 88/56 98.7  F (37.1  C) Axillary 71 12 95 % -   05/16/17 1545 (!) 85/54 - - 73 24 92 % -   05/16/17 1530 (!) 76/49 - - 71 18 95 % -   05/16/17 1515 (!) 75/69 - - 73 17 96 % -   05/16/17 1500 (!) 88/57 - - 72 19 95 % -   05/16/17 1445 90/57 - - 73 21 97 % -   05/16/17 1430 101/61 - - 74 24 95 % -   05/16/17 1415 90/57 - - 71 21 94 % -   05/16/17 1400 101/65 - - 69 29 94 % -   05/16/17 1345 (!) 87/58 - - 71 25 94 % -   05/16/17 1330 (!) 81/53 - - 72 12 96 % -   05/16/17 1315 - - - 79 26 (!) 89 % -   05/16/17 1300 97/56 - - 72 20 94 % -   05/16/17  1245 (!) 86/55 - - 72 19 95 % -     General Appearance: NAD, sedated.   Skin: normal, dry  Heart: NSR  Lungs: NLB on RA  Abdomen: The abdomen is rounded, and tender around surgical sites. The wound is well approximated. Tube/Drain sites are: G tube to gravity and J Tube: TF running.  MAURICIO: tied   : king is present.    Extremities: edema: absent. B/l equal  strength. Numbness left thumb, 2nd and 3rd digits. Well perfused      Data:   CMP  Recent Labs  Lab 05/17/17 0214 05/16/17  1601 05/16/17 0355  05/15/17  2002 05/15/17  1900   *  --  144  < > 140 139   POTASSIUM 4.1  --  4.1  < > 4.4 4.1   CHLORIDE 113*  --  114*  < >  --   --    CO2 26  --  22  < >  --   --    *  --  142*  < > 101* 98   BUN 17  --  15  < >  --   --    CR 1.05  --  0.94  < >  --   --    GFRESTIMATED 73  --  83  < >  --   --    GFRESTBLACK 88  --  >90African American GFR Calc  < >  --   --    CRISTOBAL 7.4*  --  7.5*  < >  --   --    ICAW  --   --   --   --  4.6 4.5   MAG 2.2  --  2.0  --   --   --    PHOS 2.4* 2.7 1.9*  < >  --   --    AMYLASE  --   --  790*  --   --   --    LIPASE  --   --  9517*  --   --   --    ALBUMIN 2.3*  --  2.9*  < >  --   --    BILITOTAL 0.5  --  1.1  < >  --   --    ALKPHOS 32*  --  29*  < >  --   --    AST 74*  --  133*  < >  --   --    ALT 93*  --  128*  < >  --   --    < > = values in this interval not displayed.  CBC  Recent Labs  Lab 05/17/17 0214 05/16/17  1601 05/16/17  0355   HGB 8.3* 9.0*  < > 9.6*   WBC 16.2* 13.1*  < > 9.9    144*  < > 133*   A1C  --   --   --  5.3   < > = values in this interval not displayed.  Coags    Recent Labs  Lab 05/17/17  0214 05/15/17  2134   INR  --  1.66*   PTT 42* 104*      Urinalysis  Recent Labs   Lab Test  05/10/17   1015   COLOR  Yellow   APPEARANCE  Clear   URINEGLC  Negative   URINEBILI  Negative   URINEKETONE  Negative   SG  1.022   UBLD  Negative   URINEPH  6.5   PROTEIN  Negative   NITRITE  Negative   LEUKEST  Negative   RBCU  2   WBCU  <1    Attestation:  Patient has been seen and evaluated by me.   Vital signs, labs, medications and orders were reviewed.   When obtained, diagnostic images were reviewed by me and interpreted as above.    The care plan was discussed with the multidisciplinary team and I agree with the findings and plan in this note, with any differences recorded in blue.    .

## 2017-05-17 NOTE — PROGRESS NOTES
[facility], [place]    Surgical Intensive Care Unit (SICU) Service  Progress Note    Date of Service (when I saw the patient): 05/17/2017     Assessment & Plan  Sohan Arita is a 56 year old male with history of chronic pain, chronic pancreatitis s/p lap cholecystectomy 4/2012, s/p biliary and pancreatic sphincterotomies and a temporary pancreatic stent 2012, s/p transduodenal sphincteroplasty 12/2014 with resultant leak and phlegmonous pancreatitis who underwent open TPAIT 5/15/17 with Dr. Jaquez.     Today's changes:  - continue dilaudid PCA  - decrease  Ketamine, toradol dosing and valium per transplant   - monitor UOP  - wean precedex to floor   - anticipate transfer to floor when Precedex off       NEUROLOGIC:  # post operative pain   #  Hx of chronic pain (from pancreatitis)   # depression/anxiety  - anesthesia consulted and assisting with pain control--Ropivacaine paravertebral nerve block per anesthesia   - Pain control has been challenging to obtain adequate balance of pain with side effects/sedation. Pain medications decreased today,  Ketamine decreased to 2.5mg every 6 hours, Dilaudid PCA,, Gabapentin 300 BID. Roradol 15 mg 6 hrs prn.  - continue home Fluoxetine 10 mg PO daily  - holding home Fentanyl patch 12 mcg/hr at this time, resume when appropriate     PULMONARY:  - O2 sats appropriate  - continue aggressive pulmonary hygiene, incentive spirometry. Deep breathing and coughing exercise.     GI/NUTRITION:  # calorie and protein deficit malnutririon  - TF today via J tube. Feeds increased to 20cc/hr. Flushes 20 every 2 hours--pt not able to tolerated larger flushes at this time.   - RD following along. Appreciate cares and recommendations.   - continue pancreatic enzymes.       RENAL:  #Hypernatremia, Na 145  - likely due to decrease free water overnight. Free water rate adjusted to meet total.   - D5LR @ 150 mL/hr  - continue to follow electrolytes, renal  "function, and urine output closely    ID:  - afebrile. WBC, 16K today.   - abx: Ertapenem for now.  Discussed perseverion pancreas fluids results dicussed with Pancreas team this am.  with Follow culture results.     HEME:  - Goal to keep hgb >7.0. No outward signs of bleeding. Threshold for transfusion if hypoperfusion or signs of bleeding.       ENDOCRINE:  # s/p total pancreectomy, auto islet 5/17/17, hx of Chronic pancreatitis s/p biliary and pancreatic sphincterotomies and a temporary pancreatic stent 2012, s/p transduodenal sphincteroplasty 12/2014 with resultant leak and phlegmonous pancreatitis.   # hypothyroidism, s/p  thyroid CA with thyroidectomy (2011)  - continue insulin gtt for now. Do not turn off drip unitl TF's at goal. DM team following along.    - continue home Levothyroxine 150 mcg PO daily    MSK:   # left digit 1-3 weakness  - arterial line removed, some subjective improvement. primary team aware, arterial line removed, monitor for now   - PT/OT consults. OOB and up to chair when able     SKIN:  - no issues    GENERAL PROPHYLAXIS:  - DVT: heparin 400 units/hr per transplant team and PCD's   - GI: pantoprazole 40 BID    Lines/Access  - R IJ CVC    DISPOSITION:  - SICU, floor later today     Emile Purcell PA-C     Time spent on billing   Billing:  I spent 45 minutes bedside and on the inpatient unit today managing the critical care of Sohan Arita in relation to the issues listed in this note.    Interval History  Course reviewed. Pain control difficult in past 24 hours. Pt reports feeling the \"same as yesterday. \"Discussed with bedside RN, pain control appears improved today. Ketamine and dilaudid effective but clouding sensorium and reports of visual hallucinations with higher ketamine dosing. Denies fever or chill or sweats. This am up to chair, plans to walk with therapies later today.       Physical Exam   Temp: 98.6  F (37  C) Temp src: Oral Temp  Min: 97.9  F (36.6  C)  Max: " 98.7  F (37.1  C) BP: (!) 86/50   Heart Rate: 73 Resp: 20 SpO2: 95 % O2 Device: Nasal cannula Oxygen Delivery: 2 LPM  Vitals:    05/15/17 0600 05/16/17 0500 05/17/17 0600   Weight: 100.7 kg (222 lb 0.1 oz) 102.5 kg (225 lb 15.5 oz) 108.2 kg (238 lb 8.6 oz)     I/O last 3 completed shifts:  In: 4964.09 [I.V.:3709.09; NG/GT:45; IV Piggyback:1000]  Out: 945 [Urine:945]    GEN: resting when left alone, appears comfortable this am.   EYES: PERRL, Anicteric sclera.   HEENT:  Normocephalic, atraumatic, OP clear, no thrush or exudate.   CV: RRR, no gallops, rubs, or murmurs  PULM/CHEST: Clear breath sounds bilaterally without rhonchi, crackles or wheezes, on supplemental oxygen-- NC  GI: abdomen soft, tender around incision site, G tube to gravity with thin bilious drainage, J tube with TF's infusing   : king catheter in place, urine yellow and clear  EXTREMITIES: 1+ generalized peripheral edema, moving all extremities, calves soft and non-tender, peripheral pulses intact 2+  NEURO: Cranial nerves II-XII grossly intact, some dysesthesia in left hands digits 1-2, range of motion and motor appears intact.   PSYCH:  Affect: appropriate, no hallucinations this am     Medications     heparin 400 Units/hr (05/17/17 0200)     IV fluid REPLACEMENT ONLY       - MEDICATION INSTRUCTIONS -       - MEDICATION INSTRUCTIONS -       dextrose 5% lactated ringers 100 mL/hr at 05/16/17 2017     IV fluid REPLACEMENT ONLY       insulin (regular) 1.5 Units/hr (05/17/17 1342)       pantoprazole  40 mg Oral or J tube BID     acetaminophen  650 mg Oral Q6H     ketamine  2.5 mg Intravenous Q4H     multivitamins with minerals  15 mL Per Feeding Tube Daily     cholecalciferol  800 Units Per J Tube Daily     amylase-lipase-protease  1-2 capsule Per J Tube Q4H    And     sodium bicarbonate  325 mg Per J Tube Q4H     HYDROmorphone   Intravenous PCA     disposable pump w/ anesthetic (select flow)   Topical Elast Pump     sodium chloride (PF)  3 mL  Intracatheter Q8H     gabapentin  300 mg Oral or J tube BID     ertapenem (INVanz) IV  1 g Intravenous Q24H     FLUoxetine  10 mg Per J Tube Daily     [facility]othyroxine  150 mcg [facility]       Data     Recent Labs  Lab 05/17/17  0214 05/16/17  1601 05/16/17  0912 05/16/17  0355 05/15/17  2134   WBC 16.2* 13.1* 11.5* 9.9 10.9   HGB 8.3* 9.0* 9.6* 9.6* 10.7*   MCV 88 88 86 87 87    144* 121* 133* 140*   INR  --   --   --   --  1.66*   *  --   --  144 145*   POTASSIUM 4.1  --   --  4.1 4.4   CHLORIDE 113*  --   --  114* 116*   CO2 26  --   --  22 21   BUN 17  --   --  15 16   CR 1.05  --   --  0.94 0.93   ANIONGAP 6  --   --  8 8   CRISTOBAL 7.4*  --   --  7.5* 7.4*   *  --   --  142* 95   ALBUMIN 2.3*  --   --  2.9* 3.0*   PROTTOTAL 4.5*  --   --  4.8* 4.7*   BILITOTAL 0.5  --   --  1.1 1.5*   ALKPHOS 32*  --   --  29* 34*   ALT 93*  --   --  128* 163*   AST 74*  --   --  133* 164*   < > = values in this interval not displayed.  No results found for this or any previous visit (from the past 24 hour(s)).

## 2017-05-18 ENCOUNTER — APPOINTMENT (OUTPATIENT)
Dept: PHYSICAL THERAPY | Facility: CLINIC | Age: 57
DRG: 406 | End: 2017-05-18
Attending: TRANSPLANT SURGERY
Payer: COMMERCIAL

## 2017-05-18 LAB
ANION GAP SERPL CALCULATED.3IONS-SCNC: 3 MMOL/L (ref 3–14)
BACTERIA SPEC CULT: ABNORMAL
BACTERIA SPEC CULT: ABNORMAL
BASOPHILS # BLD AUTO: 0 10E9/L (ref 0–0.2)
BASOPHILS NFR BLD AUTO: 0 %
BUN SERPL-MCNC: 17 MG/DL (ref 7–30)
CALCIUM SERPL-MCNC: 7.9 MG/DL (ref 8.5–10.1)
CHLORIDE SERPL-SCNC: 110 MMOL/L (ref 94–109)
CO2 SERPL-SCNC: 31 MMOL/L (ref 20–32)
CREAT SERPL-MCNC: 0.93 MG/DL (ref 0.66–1.25)
DIFFERENTIAL METHOD BLD: ABNORMAL
EOSINOPHIL # BLD AUTO: 0.9 10E9/L (ref 0–0.7)
EOSINOPHIL NFR BLD AUTO: 4.4 %
ERYTHROCYTE [DISTWIDTH] IN BLOOD BY AUTOMATED COUNT: 14.7 % (ref 10–15)
GFR SERPL CREATININE-BSD FRML MDRD: 84 ML/MIN/1.7M2
GLUCOSE BLDC GLUCOMTR-MCNC: 103 MG/DL (ref 70–99)
GLUCOSE BLDC GLUCOMTR-MCNC: 103 MG/DL (ref 70–99)
GLUCOSE BLDC GLUCOMTR-MCNC: 104 MG/DL (ref 70–99)
GLUCOSE BLDC GLUCOMTR-MCNC: 109 MG/DL (ref 70–99)
GLUCOSE BLDC GLUCOMTR-MCNC: 110 MG/DL (ref 70–99)
GLUCOSE BLDC GLUCOMTR-MCNC: 112 MG/DL (ref 70–99)
GLUCOSE BLDC GLUCOMTR-MCNC: 114 MG/DL (ref 70–99)
GLUCOSE BLDC GLUCOMTR-MCNC: 114 MG/DL (ref 70–99)
GLUCOSE BLDC GLUCOMTR-MCNC: 119 MG/DL (ref 70–99)
GLUCOSE BLDC GLUCOMTR-MCNC: 121 MG/DL (ref 70–99)
GLUCOSE BLDC GLUCOMTR-MCNC: 122 MG/DL (ref 70–99)
GLUCOSE BLDC GLUCOMTR-MCNC: 125 MG/DL (ref 70–99)
GLUCOSE BLDC GLUCOMTR-MCNC: 126 MG/DL (ref 70–99)
GLUCOSE BLDC GLUCOMTR-MCNC: 128 MG/DL (ref 70–99)
GLUCOSE BLDC GLUCOMTR-MCNC: 136 MG/DL (ref 70–99)
GLUCOSE BLDC GLUCOMTR-MCNC: 137 MG/DL (ref 70–99)
GLUCOSE BLDC GLUCOMTR-MCNC: 140 MG/DL (ref 70–99)
GLUCOSE BLDC GLUCOMTR-MCNC: 143 MG/DL (ref 70–99)
GLUCOSE BLDC GLUCOMTR-MCNC: 148 MG/DL (ref 70–99)
GLUCOSE BLDC GLUCOMTR-MCNC: 152 MG/DL (ref 70–99)
GLUCOSE BLDC GLUCOMTR-MCNC: 159 MG/DL (ref 70–99)
GLUCOSE BLDC GLUCOMTR-MCNC: 93 MG/DL (ref 70–99)
GLUCOSE BLDC GLUCOMTR-MCNC: 98 MG/DL (ref 70–99)
GLUCOSE SERPL-MCNC: 105 MG/DL (ref 70–99)
HCT VFR BLD AUTO: 23.5 % (ref 40–53)
HGB BLD-MCNC: 7.5 G/DL (ref 13.3–17.7)
LYMPHOCYTES # BLD AUTO: 1.6 10E9/L (ref 0.8–5.3)
LYMPHOCYTES NFR BLD AUTO: 7.9 %
Lab: ABNORMAL
MAGNESIUM SERPL-MCNC: 2.5 MG/DL (ref 1.6–2.3)
MCH RBC QN AUTO: 28.2 PG (ref 26.5–33)
MCHC RBC AUTO-ENTMCNC: 31.9 G/DL (ref 31.5–36.5)
MCV RBC AUTO: 88 FL (ref 78–100)
MICRO REPORT STATUS: ABNORMAL
MICRO REPORT STATUS: ABNORMAL
MICROORGANISM SPEC CULT: ABNORMAL
MONOCYTES # BLD AUTO: 0.9 10E9/L (ref 0–1.3)
MONOCYTES NFR BLD AUTO: 4.4 %
MYELOCYTES # BLD: 0.2 10E9/L
MYELOCYTES NFR BLD MANUAL: 0.9 %
NEUTROPHILS # BLD AUTO: 16.4 10E9/L (ref 1.6–8.3)
NEUTROPHILS NFR BLD AUTO: 82.4 %
NRBC # BLD AUTO: 0.2 10*3/UL
NRBC BLD AUTO-RTO: 1 /100
PHOSPHATE SERPL-MCNC: 1.9 MG/DL (ref 2.5–4.5)
PLATELET # BLD AUTO: 216 10E9/L (ref 150–450)
PLATELET # BLD EST: ABNORMAL 10*3/UL
POTASSIUM SERPL-SCNC: 3.8 MMOL/L (ref 3.4–5.3)
RBC # BLD AUTO: 2.66 10E12/L (ref 4.4–5.9)
RBC MORPH BLD: NORMAL
SODIUM SERPL-SCNC: 144 MMOL/L (ref 133–144)
SPECIMEN SOURCE: ABNORMAL
SPECIMEN SOURCE: ABNORMAL
WBC # BLD AUTO: 19.9 10E9/L (ref 4–11)

## 2017-05-18 PROCEDURE — 94640 AIRWAY INHALATION TREATMENT: CPT

## 2017-05-18 PROCEDURE — 27210436 ZZH NUTRITION PRODUCT SEMIELEM INTERMED CAN

## 2017-05-18 PROCEDURE — 25000128 H RX IP 250 OP 636: Performed by: STUDENT IN AN ORGANIZED HEALTH CARE EDUCATION/TRAINING PROGRAM

## 2017-05-18 PROCEDURE — 25000132 ZZH RX MED GY IP 250 OP 250 PS 637: Performed by: NURSE PRACTITIONER

## 2017-05-18 PROCEDURE — 97530 THERAPEUTIC ACTIVITIES: CPT | Mod: GP

## 2017-05-18 PROCEDURE — 12000024 ZZH R&B TRANSPLANT CRITICAL

## 2017-05-18 PROCEDURE — 25000128 H RX IP 250 OP 636: Performed by: PHYSICIAN ASSISTANT

## 2017-05-18 PROCEDURE — 40000193 ZZH STATISTIC PT WARD VISIT

## 2017-05-18 PROCEDURE — 84100 ASSAY OF PHOSPHORUS: CPT | Performed by: PHYSICIAN ASSISTANT

## 2017-05-18 PROCEDURE — 00000146 ZZHCL STATISTIC GLUCOSE BY METER IP

## 2017-05-18 PROCEDURE — 80048 BASIC METABOLIC PNL TOTAL CA: CPT | Performed by: PHYSICIAN ASSISTANT

## 2017-05-18 PROCEDURE — 40000275 ZZH STATISTIC RCP TIME EA 10 MIN

## 2017-05-18 PROCEDURE — 85025 COMPLETE CBC W/AUTO DIFF WBC: CPT | Performed by: PHYSICIAN ASSISTANT

## 2017-05-18 PROCEDURE — 25000125 ZZHC RX 250: Performed by: STUDENT IN AN ORGANIZED HEALTH CARE EDUCATION/TRAINING PROGRAM

## 2017-05-18 PROCEDURE — 25000128 H RX IP 250 OP 636: Performed by: NURSE PRACTITIONER

## 2017-05-18 PROCEDURE — 25000132 ZZH RX MED GY IP 250 OP 250 PS 637: Performed by: STUDENT IN AN ORGANIZED HEALTH CARE EDUCATION/TRAINING PROGRAM

## 2017-05-18 PROCEDURE — 25000125 ZZHC RX 250: Performed by: NURSE PRACTITIONER

## 2017-05-18 PROCEDURE — 25000125 ZZHC RX 250: Performed by: TRANSPLANT SURGERY

## 2017-05-18 PROCEDURE — 25000128 H RX IP 250 OP 636: Performed by: TRANSPLANT SURGERY

## 2017-05-18 PROCEDURE — 25000125 ZZHC RX 250: Performed by: PHYSICIAN ASSISTANT

## 2017-05-18 PROCEDURE — 97116 GAIT TRAINING THERAPY: CPT | Mod: GP

## 2017-05-18 PROCEDURE — 25000132 ZZH RX MED GY IP 250 OP 250 PS 637: Performed by: TRANSPLANT SURGERY

## 2017-05-18 PROCEDURE — 25000132 ZZH RX MED GY IP 250 OP 250 PS 637: Performed by: PHYSICIAN ASSISTANT

## 2017-05-18 PROCEDURE — 25000132 ZZH RX MED GY IP 250 OP 250 PS 637: Performed by: SURGERY

## 2017-05-18 PROCEDURE — 83735 ASSAY OF MAGNESIUM: CPT | Performed by: PHYSICIAN ASSISTANT

## 2017-05-18 PROCEDURE — 36592 COLLECT BLOOD FROM PICC: CPT | Performed by: PHYSICIAN ASSISTANT

## 2017-05-18 RX ORDER — NYSTATIN 100000/ML
500000 SUSPENSION, ORAL (FINAL DOSE FORM) ORAL 4 TIMES DAILY
Status: DISCONTINUED | OUTPATIENT
Start: 2017-05-18 | End: 2017-05-26 | Stop reason: HOSPADM

## 2017-05-18 RX ORDER — KETAMINE HYDROCHLORIDE 10 MG/ML
2.5 INJECTION, SOLUTION INTRAMUSCULAR; INTRAVENOUS EVERY 4 HOURS PRN
Status: DISCONTINUED | OUTPATIENT
Start: 2017-05-18 | End: 2017-05-20

## 2017-05-18 RX ORDER — FUROSEMIDE 10 MG/ML
10 INJECTION INTRAMUSCULAR; INTRAVENOUS ONCE
Status: COMPLETED | OUTPATIENT
Start: 2017-05-18 | End: 2017-05-18

## 2017-05-18 RX ORDER — PREGABALIN 20 MG/ML
25 SOLUTION ORAL 2 TIMES DAILY
Status: DISCONTINUED | OUTPATIENT
Start: 2017-05-18 | End: 2017-05-26 | Stop reason: HOSPADM

## 2017-05-18 RX ORDER — KETOROLAC TROMETHAMINE 15 MG/ML
15 INJECTION, SOLUTION INTRAMUSCULAR; INTRAVENOUS EVERY 6 HOURS
Status: DISPENSED | OUTPATIENT
Start: 2017-05-18 | End: 2017-05-23

## 2017-05-18 RX ORDER — BUPIVACAINE HYDROCHLORIDE 2.5 MG/ML
5 INJECTION, SOLUTION EPIDURAL; INFILTRATION; INTRACAUDAL ONCE
Status: COMPLETED | OUTPATIENT
Start: 2017-05-18 | End: 2017-05-18

## 2017-05-18 RX ORDER — ALBUTEROL SULFATE 0.83 MG/ML
2.5 SOLUTION RESPIRATORY (INHALATION) EVERY 6 HOURS PRN
Status: DISCONTINUED | OUTPATIENT
Start: 2017-05-18 | End: 2017-05-26 | Stop reason: HOSPADM

## 2017-05-18 RX ORDER — PIPERACILLIN SODIUM, TAZOBACTAM SODIUM 3; .375 G/15ML; G/15ML
3.38 INJECTION, POWDER, LYOPHILIZED, FOR SOLUTION INTRAVENOUS EVERY 6 HOURS
Status: DISCONTINUED | OUTPATIENT
Start: 2017-05-18 | End: 2017-05-25

## 2017-05-18 RX ADMIN — SODIUM BICARBONATE 325 MG: 325 TABLET ORAL at 04:22

## 2017-05-18 RX ADMIN — Medication 5 ML: at 20:06

## 2017-05-18 RX ADMIN — Medication 2000 UNITS: at 14:12

## 2017-05-18 RX ADMIN — SODIUM BICARBONATE 325 MG: 325 TABLET ORAL at 08:35

## 2017-05-18 RX ADMIN — Medication 2.5 MG: at 05:20

## 2017-05-18 RX ADMIN — MAGNESIUM HYDROXIDE 30 ML: 400 SUSPENSION ORAL at 20:06

## 2017-05-18 RX ADMIN — BUPIVACAINE HYDROCHLORIDE 12.5 MG: 2.5 INJECTION, SOLUTION EPIDURAL; INFILTRATION; INTRACAUDAL at 09:00

## 2017-05-18 RX ADMIN — MAGNESIUM HYDROXIDE 30 ML: 400 SUSPENSION ORAL at 08:36

## 2017-05-18 RX ADMIN — SODIUM BICARBONATE 325 MG: 325 TABLET ORAL at 19:02

## 2017-05-18 RX ADMIN — Medication 2.5 MG: at 08:56

## 2017-05-18 RX ADMIN — LEVOTHYROXINE SODIUM 150 MCG: 300 TABLET ORAL at 08:36

## 2017-05-18 RX ADMIN — Medication 5 ML: at 08:36

## 2017-05-18 RX ADMIN — ACETAMINOPHEN 500 MG: 325 SOLUTION ORAL at 08:36

## 2017-05-18 RX ADMIN — DIAZEPAM 2.5 MG: 5 INJECTION, SOLUTION INTRAMUSCULAR; INTRAVENOUS at 12:48

## 2017-05-18 RX ADMIN — MULTIVIT AND MINERALS-FERROUS GLUCONATE 9 MG IRON/15 ML ORAL LIQUID 15 ML: at 08:37

## 2017-05-18 RX ADMIN — PANCRELIPASE 24000 UNITS: 24000; 76000; 120000 CAPSULE, DELAYED RELEASE PELLETS ORAL at 19:02

## 2017-05-18 RX ADMIN — NYSTATIN 500000 UNITS: 500000 SUSPENSION ORAL at 20:06

## 2017-05-18 RX ADMIN — PANCRELIPASE 24000 UNITS: 24000; 76000; 120000 CAPSULE, DELAYED RELEASE PELLETS ORAL at 09:06

## 2017-05-18 RX ADMIN — KETOROLAC TROMETHAMINE 15 MG: 15 INJECTION, SOLUTION INTRAMUSCULAR; INTRAVENOUS at 19:03

## 2017-05-18 RX ADMIN — ACETAMINOPHEN 500 MG: 325 SOLUTION ORAL at 20:06

## 2017-05-18 RX ADMIN — HYDROMORPHONE HYDROCHLORIDE: 10 INJECTION, SOLUTION INTRAMUSCULAR; INTRAVENOUS; SUBCUTANEOUS at 01:06

## 2017-05-18 RX ADMIN — PIPERACILLIN SODIUM,TAZOBACTAM SODIUM 3.38 G: 3; .375 INJECTION, POWDER, FOR SOLUTION INTRAVENOUS at 16:47

## 2017-05-18 RX ADMIN — SODIUM BICARBONATE 325 MG: 325 TABLET ORAL at 00:20

## 2017-05-18 RX ADMIN — PANTOPRAZOLE SODIUM 40 MG: 40 TABLET, DELAYED RELEASE ORAL at 08:37

## 2017-05-18 RX ADMIN — SODIUM BICARBONATE 325 MG: 325 TABLET ORAL at 23:01

## 2017-05-18 RX ADMIN — Medication 2.5 MG: at 01:03

## 2017-05-18 RX ADMIN — ALBUTEROL SULFATE 2.5 MG: 2.5 SOLUTION RESPIRATORY (INHALATION) at 22:24

## 2017-05-18 RX ADMIN — ACETAMINOPHEN 500 MG: 325 SOLUTION ORAL at 14:12

## 2017-05-18 RX ADMIN — GABAPENTIN 300 MG: 250 SOLUTION ORAL at 08:36

## 2017-05-18 RX ADMIN — ONDANSETRON 4 MG: 2 INJECTION INTRAMUSCULAR; INTRAVENOUS at 18:04

## 2017-05-18 RX ADMIN — FLUOXETINE HYDROCHLORIDE 10 MG: 20 SOLUTION ORAL at 08:36

## 2017-05-18 RX ADMIN — PIPERACILLIN SODIUM,TAZOBACTAM SODIUM 3.38 G: 3; .375 INJECTION, POWDER, FOR SOLUTION INTRAVENOUS at 10:19

## 2017-05-18 RX ADMIN — HUMAN INSULIN 1.5 UNITS/HR: 100 INJECTION, SOLUTION SUBCUTANEOUS at 23:15

## 2017-05-18 RX ADMIN — PANCRELIPASE 24000 UNITS: 24000; 76000; 120000 CAPSULE, DELAYED RELEASE PELLETS ORAL at 00:20

## 2017-05-18 RX ADMIN — HYDROMORPHONE HYDROCHLORIDE: 10 INJECTION, SOLUTION INTRAMUSCULAR; INTRAVENOUS; SUBCUTANEOUS at 15:50

## 2017-05-18 RX ADMIN — HUMAN INSULIN 1.5 UNITS/HR: 100 INJECTION, SOLUTION SUBCUTANEOUS at 08:31

## 2017-05-18 RX ADMIN — FUROSEMIDE 10 MG: 10 INJECTION, SOLUTION INTRAVENOUS at 08:55

## 2017-05-18 RX ADMIN — HYDROMORPHONE HYDROCHLORIDE: 10 INJECTION, SOLUTION INTRAMUSCULAR; INTRAVENOUS; SUBCUTANEOUS at 02:16

## 2017-05-18 RX ADMIN — PANTOPRAZOLE SODIUM 40 MG: 40 TABLET, DELAYED RELEASE ORAL at 20:06

## 2017-05-18 RX ADMIN — SODIUM BICARBONATE 325 MG: 325 TABLET ORAL at 14:40

## 2017-05-18 RX ADMIN — PANCRELIPASE 24000 UNITS: 24000; 76000; 120000 CAPSULE, DELAYED RELEASE PELLETS ORAL at 23:01

## 2017-05-18 RX ADMIN — ACETAMINOPHEN 500 MG: 325 SOLUTION ORAL at 02:15

## 2017-05-18 RX ADMIN — POTASSIUM PHOSPHATE, MONOBASIC AND POTASSIUM PHOSPHATE, DIBASIC 20 MMOL: 224; 236 INJECTION, SOLUTION INTRAVENOUS at 11:17

## 2017-05-18 RX ADMIN — KETOROLAC TROMETHAMINE 15 MG: 15 INJECTION, SOLUTION INTRAMUSCULAR; INTRAVENOUS at 09:03

## 2017-05-18 RX ADMIN — PIPERACILLIN SODIUM,TAZOBACTAM SODIUM 3.38 G: 3; .375 INJECTION, POWDER, FOR SOLUTION INTRAVENOUS at 22:20

## 2017-05-18 RX ADMIN — PANCRELIPASE 24000 UNITS: 24000; 76000; 120000 CAPSULE, DELAYED RELEASE PELLETS ORAL at 04:22

## 2017-05-18 RX ADMIN — PANCRELIPASE 24000 UNITS: 24000; 76000; 120000 CAPSULE, DELAYED RELEASE PELLETS ORAL at 14:15

## 2017-05-18 RX ADMIN — KETOROLAC TROMETHAMINE 15 MG: 15 INJECTION, SOLUTION INTRAMUSCULAR; INTRAVENOUS at 02:16

## 2017-05-18 RX ADMIN — HYDROMORPHONE HYDROCHLORIDE: 10 INJECTION, SOLUTION INTRAMUSCULAR; INTRAVENOUS; SUBCUTANEOUS at 22:05

## 2017-05-18 RX ADMIN — PREGABALIN 25 MG: 20 SOLUTION ORAL at 20:06

## 2017-05-18 ASSESSMENT — PAIN DESCRIPTION - DESCRIPTORS
DESCRIPTORS: ACHING
DESCRIPTORS: ACHING

## 2017-05-18 NOTE — PLAN OF CARE
Problem: Goal Outcome Summary  Goal: Goal Outcome Summary  Outcome: Improving  Discharged to 7A 112 at 2130.

## 2017-05-18 NOTE — PLAN OF CARE
Problem: Goal Outcome Summary  Goal: Goal Outcome Summary  Outcome: Improving  Neuro: Intact other than left hand numbness that is baseline after Art line removal. MD aware.     CV: Hemodynamically stable.     Resp: Sounds clear, sating 92 > on 2L NC.     GI: Hypoactive bowel sounds. Gtube draining to gravity, JTube feeds running 20ml/hr. Midline surgical incision CDI. RMidline abdominal MAURICIO to bulb suction. Serosanguinous drainage 70ml.     : Ferguson pulled at 1800.      Skin intact.      Access: RIJ 3x lumen and left forearm PIV.     Heparin at 400 units/hr, Dilaudid basal rate 0.2 mg with 0.3mg/10min PCA, Insulin at 1.5 units/hr, D51/2Ns running at 50ml/hr and ropivacaint on Q CBloc running at total of 14ml/hr.     Plan: Pain management and hemodynamic monitoring.

## 2017-05-18 NOTE — PROGRESS NOTES
REGIONAL ANESTHESIA PAIN SERVICE CONTINUOUS NERVE INFUSION NOTE  SUBJECTIVE:  Interval History: Pt reports moderte pain control via continuous peripheral nerve block (CPNB) infusion and PCA HYDROmorphone.  Pt reports bolus last night was helpful in reducing the pain.  Pt is requesting a repeat bolus this morning. Denies any weakness, paresthesias, circumoral numbness, metallic taste or tinnitus.  Pt is ambulating with assistance.  Patient is currently without nausea or vomiting. Patient is tolerating TF.       Clinically Aligned Pain Assessment (CAPA):   Comfort (How is your pain?): Tolerable with discomfort  Change in Pain (Since your last medication/intervention?): Getting better  Pain Control (How are your pain treatments working?):  Partially effective pain control  Functioning (Are you able to do activities to get better?) : Can do most things, but pain gets in the way of some   Sleep (Does your pain management allow you to sleep or rest?): Awake with occasional pain     Numerical Rating Scale:  4/10 at rest and 6/10 with movement.        Anticoagulation:     heparin drip 25,000 units in 0.45% NaCl 250 mL 400 Units/hr, IV, Continuous Rate/Dose Verify: 05/18 0815            OBJECTIVE:    Diagnostic:  Lab Results   Component Value Date    WBC 19.9 05/18/2017     Lab Results   Component Value Date    RBC 2.66 05/18/2017     Lab Results   Component Value Date    HGB 7.5 05/18/2017     Lab Results   Component Value Date    HCT 23.5 05/18/2017     Lab Results   Component Value Date     05/18/2017         Vitals:    Temp:  [98  F (36.7  C)-100.2  F (37.9  C)] 98.2  F (36.8  C)  Pulse:  [78-86] 78  Heart Rate:  [64-87] 87  Resp:  [9-30] 16  BP: ()/(42-70) 112/70  SpO2:  [87 %-97 %] 96 %    Exam:    Strength 5/5 and symmetric grossly in bilateral LE      B/L paravertebral (PV) catheter sites with dressing c/d/i, no tenderness, erythema, heme, edema      ASSESSMENT/PLAN:    Sohan Arita is a 56  year old male POD #2 s/p COMBINED PANCREATECTOMY, TRANSPLANT AUTO ISLET CELL and placement of B/L Thoracic PV catheters for analgesia.  Pt is receiving adequate analgesia with Ropivacaine 0.2%, total infusion 14mL/hour - 7mL each catheter.  Pt is ambulating without difficulty.  No weakness or paresthesias.  No evidence of adverse side effects associated with local anesthetic.  Last bolus was helpful 5/17/17 ~2200.     - continue current total infusion of 14mL/hour - 7mL each catheter  - 0900 B/L PV catheters were bolused with 5mL 0.125% PF Bupivacaine    RN to chart BP and P q 5 min for 30 min - page #[phone] if MAP <60   RN can page #[phone] q 12 hrs for pain assessment and possible bolus' via catheters  - 1100 pt reports decrease in pain since bolus given  - will continue to follow and adjust as needed  - discussed plan with attending anesthesiologist    CHUY Lundberg CNP  Regional Anesthesia Pain Service  5/18/2017 8:26 AM    24 hour Job Code Pager.  For in-house use only.     [place]:  * * *[phone]  [place]: * * *[phone]  Peds: * * [phone]  Enter call-back number and #      This pager only accepts text messages through [facility]

## 2017-05-18 NOTE — PROGRESS NOTES
Patient c/o pain in his abdomen and back. I bolused his b/l PVCs with 5mL .125% bupivacaine PF on each side, 10mL total. VS remained stable. No sx of LAST reported. RN aware to check BP q5 minutes for 30 minutes.      Mary Hobson MD  CA-2/PGY-3  5/17/2017  9:57 PM      24 hour Job Code Pager.  For in-house use only.     [place]:  * * *[phone]  Enter call-back number and #      May text page using [employer], but NOT [employer].

## 2017-05-18 NOTE — PLAN OF CARE
"Problem: Goal Outcome Summary  Goal: Goal Outcome Summary  Outcome: No Change  Pt requires 3L by Oxymask to maintain sats above 87%, OVSS. Reporting constant abdominal pain that is partly improved with PCA dilaudid 0.2mg basal with 0.3mg bumps q10 minutes, scheduled ketamine, scheduled tylenol, On-Q at 7/7, and prn Toradol x1. Denies nausea; tolerating TF at 20mL/hr with 30mL water flushes every 4 hours through J tube. Otherwise is NPO. G tube to gravity with green/bilious output. BGs  requiring 1-2 units of insulin/hr, following algorithm 2. Heparin gtt SR at 400 units/hr. D5 1/2NS running at 50mL/hr. Incision covered, c/d/i. MAURICIO with small serosanguinous output. Pt was unable to void after Ferguson d/c'd at 1800; bladder scanned for 250mL at 0100. Pt requested to \"get it over with\" and to go ahead for straight cath as he was reporting flank pain. Straight cathed for 400mL. Passing minimal gas, no BM. Resting with call light in reach and calling appropriately. Will continue to monitor and follow plan of care.       "

## 2017-05-18 NOTE — PLAN OF CARE
Problem: Pain, Chronic (Adult)  Goal: Identify Related Risk Factors and Signs and Symptoms  Related risk factors and signs and symptoms are identified upon initiation of Human Response Clinical Practice Guideline (CPG)      D/I: Tmax 100.2.  Dr. Chase aware.  Overall, Pt's pain better controlled today than yesterday.  No boluses given via OnQ pump.  2 catheters on OnQ continue to run at 7cc/hr.  OnQ ball changed.  PRN valium and toradol given with good pain relief.  Pt continues to need encouragement to use dilaudid PCA when in pain.  Basal rate at 0.2mg/hr.  PCA bumps set at 0.3mg of dilaudid.  Sat in the recliner x 2 today.  Ambulated in the hallway x 1.  Uses IS independently to 2000.  Ferguson catheter removed at 1800.  Pt has not voided yet since catheter removed.     A/P: Will transfer to  when bed available.

## 2017-05-18 NOTE — PROGRESS NOTES
Pancreatitis Service - Daily Progress Note  05/18/2017    Assessment & Plan: 56 year old male with chronic pancreatitis s/p lap cholecystectomy 4/2012, s/p biliary and pancreatic sphincterotomies and a temporary pancreatic stent 2012, s/p transduodenal sphincteroplasty 12/2014 with resultant leak and phlegmonous pancreatitis. S/p TPAIT 5/15/17.    Liver US:  Impression: The left portal vein, left hepatic vein, and left hepatic  artery are obscured by overlying bowel gas. The splenic vein is also  not visualized. The remaining hepatic vasculature is patent with  antegrade flow.    TPAIT: POD #3  Cardiorespiratory: Hypoxia, remains on O2 per NC.  Likely due to hypervolemia.  Continue IS.   GI/Nutrition: His g tube is currently draining to gravity and his j tube is running tube is running tube feeds at 20 cc/h plus meds. Advance TF per protocol.  Add senna and MOM.  Zofran and Compazine PRN  Renal/Fluid/Electrolytes: Hypervolemia, decrease IVF today and add lasix 10mg.  Hypophosphatemia, replace today.  : Acute urinary retention overnight, replace king if unable to void this morning.  Post-pancreatectomy diabetes: Continue insulin gtt until tube feeding at goal, appreciate Endocrine consult.  Infection:  Afebrile but WBC increased to 19.9 and diaphoretic.  Surgical ppx: Ertapenem, d/c today.  Will cover enterobacter and enterococcus growing in panc pres solution.  Start Zosyn.   Prophylaxis: PPI, Anticoagulation: continue heparin 400 cc/hr.   Pain control: Fair  Ropivacaine On Q   Dilaudid PCA. Continue bumps 0.2-0.3 mg and basal to 0.2 mg/hr.    Ketamine 2.5 mg every 4 hours, change to PRN  Acetaminophen to susp, 650 mg every 6 hours   Gabapentin susp, d/c today due to pt report of previous intolerance (hematological issue per pt)  Lyrica, start today  PTA:  Pt had weaned down to fent 6mcg (half of 12mcg patch) prior to admission  MSK: Pain 2/2 radial art line, pain improved with removal of line on 5/16. Continues  "to have numbness left digits, thumb, 2nd, 3rd. Monitor.   Activity: OOB to chair  Anticipated LOS/Discharge: TBD    Medical Decision Making: Medium  Subsequent visit 86144 (moderate level decision making)    GELACIO/Fellow/Resident Provider: Areli Montanez NP [id number]    Faculty: Ian Mendez MD  __________________________________________________________________  Transplant History: Admitted 5/15/2017 for TP AIT  5/15/2017 ([place]), Postoperative day: 3     Interval History: History is obtained from the patient  Overnight events: Pain control fair.  Dislikes \"loopy\" feeling with ketamine.  St cath x1 overnight.  Drenching sweats w/o chills.  Left hand numbness improving.  Mid-back muscle spasms    ROS:   A 10-point review of systems was negative except as noted above.    Curent Meds:    sennosides  5 mL Per Feeding Tube BID     magnesium hydroxide  30 mL Per Feeding Tube BID     ketorolac  15 mg Intravenous Q6H     potassium phosphate (KPHOS) in D5W IV  20 mmol Intravenous Once     bupivacaine HCl  5 mL Perineural Once     piperacillin-tazobactam  3.375 g Intravenous Q6H     pantoprazole  40 mg Oral or J tube BID     amylase-lipase-protease  1-3 capsule Per J Tube Q4H    And     sodium bicarbonate  325 mg Per J Tube Q4H     cholecalciferol  2,000 Units Per J Tube Daily     acetaminophen  500 mg Oral Q6H     multivitamins with minerals  15 mL Per Feeding Tube Daily     HYDROmorphone   Intravenous PCA     disposable pump w/ anesthetic (select flow)   Topical Elast Pump     sodium chloride (PF)  3 mL Intracatheter Q8H     gabapentin  300 mg Oral or J tube BID     FLUoxetine  10 mg Per J Tube Daily     levothyroxine  150 mcg Per J Tube QAM AC       Physical Exam:     Admit Weight: 100.7 kg (222 lb 0.1 oz)    Current Vitals:   /70 (BP Location: Left arm)  Pulse 78  Temp 98.2  F (36.8  C) (Oral)  Resp 16  Ht 1.88 m (6' 2\")  Wt 108.2 kg (238 lb 8.6 oz)  SpO2 96%  BMI 30.63 kg/m2  Vital sign ranges:    Temp:  [98  F " (36.7  C)-100.2  F (37.9  C)] 98.2  F (36.8  C)  Pulse:  [78-86] 78  Heart Rate:  [64-87] 87  Resp:  [9-30] 16  BP: ()/(50-70) 112/70  SpO2:  [87 %-97 %] 96 %  Patient Vitals for the past 24 hrs:   BP Temp Temp src Pulse Heart Rate Resp SpO2   05/18/17 0809 112/70 98.2  F (36.8  C) Oral 78 - 16 96 %   05/18/17 0344 124/63 98  F (36.7  C) Oral 86 - 16 91 %   05/18/17 0010 - - - - - - 95 %   05/18/17 0000 106/66 98.8  F (37.1  C) Oral - 87 20 (!) 87 %   05/17/17 2100 102/67 - - - - - 92 %   05/17/17 2000 105/68 98.8  F (37.1  C) Oral - - 16 92 %   05/17/17 1900 112/63 - - - - - 92 %   05/17/17 1745 - - - - 85 18 -   05/17/17 1730 - - - - 83 15 94 %   05/17/17 1715 91/57 - - - 77 15 94 %   05/17/17 1700 94/57 - - - 80 24 94 %   05/17/17 1645 95/58 - - - 80 14 93 %   05/17/17 1630 99/60 - - - 80 18 93 %   05/17/17 1615 101/54 - - - 77 20 94 %   05/17/17 1600 93/56 100.2  F (37.9  C) Axillary - 75 23 96 %   05/17/17 1545 94/56 - - - 74 26 97 %   05/17/17 1500 (!) 86/50 - - - 73 20 95 %   05/17/17 1445 95/54 - - - 76 30 94 %   05/17/17 1430 - - - - 77 21 93 %   05/17/17 1415 90/57 - - - 72 14 94 %   05/17/17 1400 (!) 86/59 - - - 74 17 95 %   05/17/17 1345 91/57 - - - 73 24 95 %   05/17/17 1330 93/63 - - - 68 15 95 %   05/17/17 1315 (!) 83/50 - - - - - -   05/17/17 1300 98/52 - - - 64 11 96 %   05/17/17 1245 92/56 - - - 67 14 96 %   05/17/17 1230 98/58 - - - 69 15 95 %   05/17/17 1215 (!) 82/51 - - - 75 12 93 %   05/17/17 1200 90/54 98.6  F (37  C) Oral - 68 14 95 %   05/17/17 1145 95/52 - - - 66 13 96 %   05/17/17 1130 103/56 - - - 70 21 94 %   05/17/17 1115 93/55 - - - 71 16 94 %   05/17/17 1100 (!) 86/56 - - - 69 13 97 %   05/17/17 1045 (!) 85/52 - - - 68 16 96 %   05/17/17 1030 (!) 86/58 - - - 66 16 95 %   05/17/17 1015 (!) 88/62 - - - 64 9 96 %     General Appearance: NAD, mildly sedated.   Skin: normal, diaphoretic  Heart: RRR  Lungs: NLB on 3L O2  Abdomen: The abdomen is rounded, and tender around surgical  sites. The wound is well approximated. Tube/Drain sites are: G tube to gravity and J Tube: TF running.  MAURICIO: SS   : No king  Extremities: edema: tr to +1 generalized.   Neuro: B/l equal  strength. Numbness left thumb, 2nd and 3rd digits all improved. Well perfused.  Oriented but limited concentration      Data:   CMP  Recent Labs  Lab 05/18/17  0707 05/17/17  0214  05/16/17  0355  05/15/17  2002 05/15/17  1900    145*  --  144  < > 140 139   POTASSIUM 3.8 4.1  --  4.1  < > 4.4 4.1   CHLORIDE 110* 113*  --  114*  < >  --   --    CO2 31 26  --  22  < >  --   --    * 116*  --  142*  < > 101* 98   BUN 17 17  --  15  < >  --   --    CR 0.93 1.05  --  0.94  < >  --   --    GFRESTIMATED 84 73  --  83  < >  --   --    GFRESTBLACK >90African American GFR Calc 88  --  >90African American GFR Calc  < >  --   --    CRISTOBAL 7.9* 7.4*  --  7.5*  < >  --   --    ICAW  --   --   --   --   --  4.6 4.5   MAG 2.5* 2.2  --  2.0  < >  --   --    PHOS 1.9* 2.4*  < > 1.9*  --   --   --    AMYLASE  --   --   --  790*  --   --   --    LIPASE  --   --   --  9517*  --   --   --    ALBUMIN  --  2.3*  --  2.9*  < >  --   --    BILITOTAL  --  0.5  --  1.1  < >  --   --    ALKPHOS  --  32*  --  29*  < >  --   --    AST  --  74*  --  133*  < >  --   --    ALT  --  93*  --  128*  < >  --   --    < > = values in this interval not displayed.  CBC  Recent Labs  Lab 05/18/17 0707 05/17/17 0214 05/16/17 0355   HGB 7.5* 8.3*  < > 9.6*   WBC 19.9* 16.2*  < > 9.9    153  < > 133*   A1C  --   --   --  5.3   < > = values in this interval not displayed.  Coags    Recent Labs  Lab 05/17/17  0214 05/15/17  2134   INR  --  1.66*   PTT 42* 104*      Urinalysis  Recent Labs   Lab Test  05/10/17   1015   COLOR  Yellow   APPEARANCE  Clear   URINEGLC  Negative   URINEBILI  Negative   URINEKETONE  Negative   SG  1.022   UBLD  Negative   URINEPH  6.5   PROTEIN  Negative   NITRITE  Negative   LEUKEST  Negative   RBCU  2   WBCU  <1

## 2017-05-18 NOTE — PLAN OF CARE
Problem: Goal Outcome Summary  Goal: Goal Outcome Summary  PT / 7A: Pt performs sit <> stand transfers with CGA-SBA. Pt ambulates 275ft with bilateral UE on IV pole for support. Pt with slow pace, small step length, and mild unsteadiness however no overt loss of balance. Pt most limited most by fatigue/drowsiness and pain this date.   REC: Anticipate discharge to local hotel with wife pending progress of gait and stair training.

## 2017-05-18 NOTE — PLAN OF CARE
Problem: Goal Outcome Summary  Goal: Goal Outcome Summary  Outcome: Improving  VSS.  Sats well on 3-4 L O2 per nc. On insulin drip with hourly checks.  BGs < 150 on 1-2.5 units /hour.  On dilaudid PCA with basal rate and bumps.  Got Ketamine this AM but patient c/o effect.  Npw PRN. Also got Toradol dose but patient not sure about effect.  Refused 2pm dose.  Valium IV x 1.  TF up to 30 cc/hr.  No c/o nausea this shift. Heparin drip ongoing at straight rate.  Patient up for walk this AM.  Able to sit in chair and NA helped to clean up.  Unable to void.  Bladder scanned for small amt.  King replaced per MD.  500 cc. Out king.  MAURICIO drain on Rt. Putting out moderate amt. Potassium IV replaced per order.  Continue to monitor, support.  Advance activity as tolerated..

## 2017-05-18 NOTE — ANESTHESIA POST-OP FOLLOW-UP NOTE
REGIONAL ANESTHESIA PAIN SERVICE CONTINUOUS NERVE INFUSION NOTE  SUBJECTIVE:  Interval History: Pt reports moderte pain control via continuous peripheral nerve block (CPNB) infusion and PCA HYDROmorphone. Pt reports bolus last night was helpful in reducing the pain. Pt is requesting a repeat bolus this morning. Denies any weakness, paresthesias, circumoral numbness, metallic taste or tinnitus. Pt is ambulating with assistance. Patient is currently without nausea or vomiting. Patient is tolerating TF.      Clinically Aligned Pain Assessment (CAPA):   Comfort (How is your pain?): Tolerable with discomfort  Change in Pain (Since your last medication/intervention?): Getting better  Pain Control (How are your pain treatments working?): Partially effective pain control  Functioning (Are you able to do activities to get better?) : Can do most things, but pain gets in the way of some   Sleep (Does your pain management allow you to sleep or rest?): Awake with occasional pain     Numerical Rating Scale:  4/10 at rest and 6/10 with movement.         Anticoagulation:     heparin drip 25,000 units in 0.45% NaCl 250 mL 400 Units/hr, IV, Continuous Rate/Dose Verify: 05/18 0815                 OBJECTIVE:     Diagnostic:        Lab Results   Component Value Date     WBC 19.9 05/18/2017            Lab Results   Component Value Date     RBC 2.66 05/18/2017            Lab Results   Component Value Date     HGB 7.5 05/18/2017            Lab Results   Component Value Date     HCT 23.5 05/18/2017            Lab Results   Component Value Date      05/18/2017            Vitals:    Temp: [98  F (36.7  C)-100.2  F (37.9  C)] 98.2  F (36.8  C)  Pulse: [78-86] 78  Heart Rate: [64-87] 87  Resp: [9-30] 16  BP: ()/(42-70) 112/70  SpO2: [87 %-97 %] 96 %     Exam:   Strength 5/5 and symmetric grossly in bilateral LE      B/L paravertebral (PV) catheter sites with dressing c/d/i, no tenderness, erythema, heme,  edema        ASSESSMENT/PLAN:   Sohan Arita is a 56 year old male POD #2 s/p COMBINED PANCREATECTOMY, TRANSPLANT AUTO ISLET CELL and placement of B/L Thoracic PV catheters for analgesia. Pt is receiving adequate analgesia with Ropivacaine 0.2%, total infusion 14mL/hour - 7mL each catheter. Pt is ambulating without difficulty. No weakness or paresthesias. No evidence of adverse side effects associated with local anesthetic. Last bolus was helpful 5/17/17 ~2200.      - continue current total infusion of 14mL/hour - 7mL each catheter  - 0900 B/L PV catheters were bolused with 5mL 0.125% PF Bupivacaine  RN to chart BP and P q 5 min for 30 min - page #[phone] if MAP <60  RN can page #[phone] q 12 hrs for pain assessment and possible bolus' via catheters  - 1100 pt reports decrease in pain since bolus given  - will continue to follow and adjust as needed      Demarcus Evans,   [facility]  5/18/2017      24 hour Job Code Pager. For in-house use only.   [place]: * * [phone]  [place]: * * [phone]  Peds: * * [phone]  Enter call-back number and #   This pager only accepts text messages through [facility]

## 2017-05-19 LAB
ANION GAP SERPL CALCULATED.3IONS-SCNC: 3 MMOL/L (ref 3–14)
APTT PPP: 38 SEC (ref 22–37)
BASOPHILS # BLD AUTO: 0 10E9/L (ref 0–0.2)
BASOPHILS NFR BLD AUTO: 0 %
BUN SERPL-MCNC: 18 MG/DL (ref 7–30)
CALCIUM SERPL-MCNC: 8 MG/DL (ref 8.5–10.1)
CHLORIDE SERPL-SCNC: 111 MMOL/L (ref 94–109)
CO2 SERPL-SCNC: 33 MMOL/L (ref 20–32)
COPATH REPORT: NORMAL
CREAT SERPL-MCNC: 0.8 MG/DL (ref 0.66–1.25)
DIFFERENTIAL METHOD BLD: ABNORMAL
EOSINOPHIL # BLD AUTO: 1.3 10E9/L (ref 0–0.7)
EOSINOPHIL NFR BLD AUTO: 7.1 %
ERYTHROCYTE [DISTWIDTH] IN BLOOD BY AUTOMATED COUNT: 15 % (ref 10–15)
ERYTHROCYTE [DISTWIDTH] IN BLOOD BY AUTOMATED COUNT: 15.1 % (ref 10–15)
ERYTHROCYTE [DISTWIDTH] IN BLOOD BY AUTOMATED COUNT: 15.4 % (ref 10–15)
GFR SERPL CREATININE-BSD FRML MDRD: ABNORMAL ML/MIN/1.7M2
GLUCOSE BLDC GLUCOMTR-MCNC: 101 MG/DL (ref 70–99)
GLUCOSE BLDC GLUCOMTR-MCNC: 104 MG/DL (ref 70–99)
GLUCOSE BLDC GLUCOMTR-MCNC: 107 MG/DL (ref 70–99)
GLUCOSE BLDC GLUCOMTR-MCNC: 109 MG/DL (ref 70–99)
GLUCOSE BLDC GLUCOMTR-MCNC: 111 MG/DL (ref 70–99)
GLUCOSE BLDC GLUCOMTR-MCNC: 116 MG/DL (ref 70–99)
GLUCOSE BLDC GLUCOMTR-MCNC: 117 MG/DL (ref 70–99)
GLUCOSE BLDC GLUCOMTR-MCNC: 118 MG/DL (ref 70–99)
GLUCOSE BLDC GLUCOMTR-MCNC: 119 MG/DL (ref 70–99)
GLUCOSE BLDC GLUCOMTR-MCNC: 122 MG/DL (ref 70–99)
GLUCOSE BLDC GLUCOMTR-MCNC: 125 MG/DL (ref 70–99)
GLUCOSE BLDC GLUCOMTR-MCNC: 127 MG/DL (ref 70–99)
GLUCOSE BLDC GLUCOMTR-MCNC: 127 MG/DL (ref 70–99)
GLUCOSE BLDC GLUCOMTR-MCNC: 128 MG/DL (ref 70–99)
GLUCOSE BLDC GLUCOMTR-MCNC: 130 MG/DL (ref 70–99)
GLUCOSE BLDC GLUCOMTR-MCNC: 135 MG/DL (ref 70–99)
GLUCOSE BLDC GLUCOMTR-MCNC: 145 MG/DL (ref 70–99)
GLUCOSE BLDC GLUCOMTR-MCNC: 148 MG/DL (ref 70–99)
GLUCOSE BLDC GLUCOMTR-MCNC: 150 MG/DL (ref 70–99)
GLUCOSE BLDC GLUCOMTR-MCNC: 85 MG/DL (ref 70–99)
GLUCOSE BLDC GLUCOMTR-MCNC: 92 MG/DL (ref 70–99)
GLUCOSE BLDC GLUCOMTR-MCNC: 92 MG/DL (ref 70–99)
GLUCOSE SERPL-MCNC: 101 MG/DL (ref 70–99)
HCT VFR BLD AUTO: 22.3 % (ref 40–53)
HCT VFR BLD AUTO: 22.7 % (ref 40–53)
HCT VFR BLD AUTO: 23.2 % (ref 40–53)
HGB BLD-MCNC: 7.2 G/DL (ref 13.3–17.7)
HGB BLD-MCNC: 7.3 G/DL (ref 13.3–17.7)
HGB BLD-MCNC: 7.5 G/DL (ref 13.3–17.7)
LYMPHOCYTES # BLD AUTO: 1.1 10E9/L (ref 0.8–5.3)
LYMPHOCYTES NFR BLD AUTO: 6.2 %
MCH RBC QN AUTO: 28 PG (ref 26.5–33)
MCH RBC QN AUTO: 28.3 PG (ref 26.5–33)
MCH RBC QN AUTO: 28.3 PG (ref 26.5–33)
MCHC RBC AUTO-ENTMCNC: 32.2 G/DL (ref 31.5–36.5)
MCHC RBC AUTO-ENTMCNC: 32.3 G/DL (ref 31.5–36.5)
MCHC RBC AUTO-ENTMCNC: 32.3 G/DL (ref 31.5–36.5)
MCV RBC AUTO: 87 FL (ref 78–100)
MCV RBC AUTO: 88 FL (ref 78–100)
MCV RBC AUTO: 88 FL (ref 78–100)
MONOCYTES # BLD AUTO: 0.5 10E9/L (ref 0–1.3)
MONOCYTES NFR BLD AUTO: 2.7 %
NEUTROPHILS # BLD AUTO: 15.3 10E9/L (ref 1.6–8.3)
NEUTROPHILS NFR BLD AUTO: 84 %
NRBC # BLD AUTO: 0.3 10*3/UL
NRBC BLD AUTO-RTO: 2 /100
PHOSPHATE SERPL-MCNC: 1.4 MG/DL (ref 2.5–4.5)
PHOSPHATE SERPL-MCNC: 1.4 MG/DL (ref 2.5–4.5)
PLATELET # BLD AUTO: 275 10E9/L (ref 150–450)
PLATELET # BLD AUTO: 328 10E9/L (ref 150–450)
PLATELET # BLD AUTO: 339 10E9/L (ref 150–450)
PLATELET # BLD EST: ABNORMAL 10*3/UL
POTASSIUM SERPL-SCNC: 3.7 MMOL/L (ref 3.4–5.3)
RBC # BLD AUTO: 2.54 10E12/L (ref 4.4–5.9)
RBC # BLD AUTO: 2.61 10E12/L (ref 4.4–5.9)
RBC # BLD AUTO: 2.65 10E12/L (ref 4.4–5.9)
RBC MORPH BLD: NORMAL
SODIUM SERPL-SCNC: 147 MMOL/L (ref 133–144)
WBC # BLD AUTO: 13.5 10E9/L (ref 4–11)
WBC # BLD AUTO: 18.2 10E9/L (ref 4–11)
WBC # BLD AUTO: 19.2 10E9/L (ref 4–11)

## 2017-05-19 PROCEDURE — 84100 ASSAY OF PHOSPHORUS: CPT | Performed by: TRANSPLANT SURGERY

## 2017-05-19 PROCEDURE — 85027 COMPLETE CBC AUTOMATED: CPT | Performed by: PHYSICIAN ASSISTANT

## 2017-05-19 PROCEDURE — 25000125 ZZHC RX 250: Performed by: NURSE PRACTITIONER

## 2017-05-19 PROCEDURE — 25000132 ZZH RX MED GY IP 250 OP 250 PS 637: Performed by: TRANSPLANT SURGERY

## 2017-05-19 PROCEDURE — 25000132 ZZH RX MED GY IP 250 OP 250 PS 637: Performed by: STUDENT IN AN ORGANIZED HEALTH CARE EDUCATION/TRAINING PROGRAM

## 2017-05-19 PROCEDURE — 25000125 ZZHC RX 250: Performed by: TRANSPLANT SURGERY

## 2017-05-19 PROCEDURE — 25000128 H RX IP 250 OP 636: Performed by: NURSE PRACTITIONER

## 2017-05-19 PROCEDURE — 25000128 H RX IP 250 OP 636: Performed by: PHYSICIAN ASSISTANT

## 2017-05-19 PROCEDURE — 85730 THROMBOPLASTIN TIME PARTIAL: CPT | Performed by: PHYSICIAN ASSISTANT

## 2017-05-19 PROCEDURE — 84100 ASSAY OF PHOSPHORUS: CPT | Performed by: PHYSICIAN ASSISTANT

## 2017-05-19 PROCEDURE — 27110038 ZZH RX 271

## 2017-05-19 PROCEDURE — 27210436 ZZH NUTRITION PRODUCT SEMIELEM INTERMED CAN

## 2017-05-19 PROCEDURE — 25000125 ZZHC RX 250: Performed by: STUDENT IN AN ORGANIZED HEALTH CARE EDUCATION/TRAINING PROGRAM

## 2017-05-19 PROCEDURE — 25000128 H RX IP 250 OP 636: Performed by: STUDENT IN AN ORGANIZED HEALTH CARE EDUCATION/TRAINING PROGRAM

## 2017-05-19 PROCEDURE — 12000025 ZZH R&B TRANSPLANT INTERMEDIATE

## 2017-05-19 PROCEDURE — 25000132 ZZH RX MED GY IP 250 OP 250 PS 637: Performed by: NURSE PRACTITIONER

## 2017-05-19 PROCEDURE — 80048 BASIC METABOLIC PNL TOTAL CA: CPT | Performed by: PHYSICIAN ASSISTANT

## 2017-05-19 PROCEDURE — 36592 COLLECT BLOOD FROM PICC: CPT | Performed by: PHYSICIAN ASSISTANT

## 2017-05-19 PROCEDURE — 25000132 ZZH RX MED GY IP 250 OP 250 PS 637: Performed by: SURGERY

## 2017-05-19 PROCEDURE — 27110038 ZZH RX 271: Performed by: ANESTHESIOLOGY

## 2017-05-19 PROCEDURE — 36592 COLLECT BLOOD FROM PICC: CPT | Performed by: STUDENT IN AN ORGANIZED HEALTH CARE EDUCATION/TRAINING PROGRAM

## 2017-05-19 PROCEDURE — 85025 COMPLETE CBC W/AUTO DIFF WBC: CPT | Performed by: PHYSICIAN ASSISTANT

## 2017-05-19 PROCEDURE — 25000132 ZZH RX MED GY IP 250 OP 250 PS 637: Performed by: PHYSICIAN ASSISTANT

## 2017-05-19 PROCEDURE — 00000146 ZZHCL STATISTIC GLUCOSE BY METER IP

## 2017-05-19 PROCEDURE — 25000125 ZZHC RX 250

## 2017-05-19 PROCEDURE — 25000125 ZZHC RX 250: Performed by: ANESTHESIOLOGY

## 2017-05-19 PROCEDURE — 36415 COLL VENOUS BLD VENIPUNCTURE: CPT | Performed by: PHYSICIAN ASSISTANT

## 2017-05-19 PROCEDURE — 85027 COMPLETE CBC AUTOMATED: CPT | Performed by: STUDENT IN AN ORGANIZED HEALTH CARE EDUCATION/TRAINING PROGRAM

## 2017-05-19 RX ORDER — ATROPA BELLADONNA AND OPIUM 16.2; 6 MG/1; MG/1
60 SUPPOSITORY RECTAL EVERY 8 HOURS PRN
Status: DISCONTINUED | OUTPATIENT
Start: 2017-05-19 | End: 2017-05-19

## 2017-05-19 RX ORDER — FUROSEMIDE 10 MG/ML
10 INJECTION INTRAMUSCULAR; INTRAVENOUS ONCE
Status: COMPLETED | OUTPATIENT
Start: 2017-05-19 | End: 2017-05-19

## 2017-05-19 RX ORDER — BISACODYL 10 MG
10 SUPPOSITORY, RECTAL RECTAL ONCE
Status: DISCONTINUED | OUTPATIENT
Start: 2017-05-19 | End: 2017-05-22 | Stop reason: CLARIF

## 2017-05-19 RX ORDER — BUPIVACAINE HYDROCHLORIDE 2.5 MG/ML
10 INJECTION, SOLUTION INFILTRATION; PERINEURAL ONCE
Status: COMPLETED | OUTPATIENT
Start: 2017-05-19 | End: 2017-05-19

## 2017-05-19 RX ORDER — NALOXONE HYDROCHLORIDE 0.4 MG/ML
.1-.4 INJECTION, SOLUTION INTRAMUSCULAR; INTRAVENOUS; SUBCUTANEOUS
Status: CANCELLED | OUTPATIENT
Start: 2017-05-19

## 2017-05-19 RX ORDER — FENTANYL 50 UG/1
50 PATCH TRANSDERMAL
Status: COMPLETED | OUTPATIENT
Start: 2017-05-19 | End: 2017-05-19

## 2017-05-19 RX ORDER — DIAZEPAM 10 MG/2ML
2 INJECTION, SOLUTION INTRAMUSCULAR; INTRAVENOUS EVERY 6 HOURS PRN
Status: DISCONTINUED | OUTPATIENT
Start: 2017-05-19 | End: 2017-05-24

## 2017-05-19 RX ADMIN — SODIUM BICARBONATE 325 MG: 325 TABLET ORAL at 11:03

## 2017-05-19 RX ADMIN — Medication 2.5 MG: at 12:12

## 2017-05-19 RX ADMIN — DIAZEPAM 2.5 MG: 5 INJECTION, SOLUTION INTRAMUSCULAR; INTRAVENOUS at 13:28

## 2017-05-19 RX ADMIN — ACETAMINOPHEN 500 MG: 160 LIQUID ORAL at 13:58

## 2017-05-19 RX ADMIN — PANTOPRAZOLE SODIUM 40 MG: 40 TABLET, DELAYED RELEASE ORAL at 08:07

## 2017-05-19 RX ADMIN — PANCRELIPASE 24000 UNITS: 24000; 76000; 120000 CAPSULE, DELAYED RELEASE PELLETS ORAL at 07:05

## 2017-05-19 RX ADMIN — SODIUM BICARBONATE 325 MG: 325 TABLET ORAL at 03:20

## 2017-05-19 RX ADMIN — PREGABALIN 25 MG: 20 SOLUTION ORAL at 20:06

## 2017-05-19 RX ADMIN — PANCRELIPASE 48000 UNITS: 24000; 76000; 120000 CAPSULE, DELAYED RELEASE PELLETS ORAL at 19:35

## 2017-05-19 RX ADMIN — NYSTATIN 500000 UNITS: 500000 SUSPENSION ORAL at 16:27

## 2017-05-19 RX ADMIN — NYSTATIN 500000 UNITS: 500000 SUSPENSION ORAL at 11:30

## 2017-05-19 RX ADMIN — Medication 2.5 MG: at 00:36

## 2017-05-19 RX ADMIN — PIPERACILLIN SODIUM,TAZOBACTAM SODIUM 3.38 G: 3; .375 INJECTION, POWDER, FOR SOLUTION INTRAVENOUS at 04:07

## 2017-05-19 RX ADMIN — Medication: at 08:11

## 2017-05-19 RX ADMIN — HUMAN INSULIN 4.5 UNITS/HR: 100 INJECTION, SOLUTION SUBCUTANEOUS at 23:17

## 2017-05-19 RX ADMIN — MULTIVIT AND MINERALS-FERROUS GLUCONATE 9 MG IRON/15 ML ORAL LIQUID 15 ML: at 08:07

## 2017-05-19 RX ADMIN — POTASSIUM CHLORIDE 20 MEQ: 29.8 INJECTION, SOLUTION INTRAVENOUS at 10:58

## 2017-05-19 RX ADMIN — SODIUM BICARBONATE 325 MG: 325 TABLET ORAL at 19:35

## 2017-05-19 RX ADMIN — ACETAMINOPHEN 500 MG: 160 LIQUID ORAL at 20:06

## 2017-05-19 RX ADMIN — KETOROLAC TROMETHAMINE 15 MG: 15 INJECTION, SOLUTION INTRAMUSCULAR; INTRAVENOUS at 07:54

## 2017-05-19 RX ADMIN — SODIUM PHOSPHATE, MONOBASIC, MONOHYDRATE AND SODIUM PHOSPHATE, DIBASIC, ANHYDROUS 20 MMOL: 276; 142 INJECTION, SOLUTION INTRAVENOUS at 14:02

## 2017-05-19 RX ADMIN — BUPIVACAINE HYDROCHLORIDE 25 MG: 2.5 INJECTION, SOLUTION INFILTRATION; PERINEURAL at 08:51

## 2017-05-19 RX ADMIN — PIPERACILLIN SODIUM,TAZOBACTAM SODIUM 3.38 G: 3; .375 INJECTION, POWDER, FOR SOLUTION INTRAVENOUS at 16:27

## 2017-05-19 RX ADMIN — NYSTATIN 500000 UNITS: 500000 SUSPENSION ORAL at 20:07

## 2017-05-19 RX ADMIN — KETOROLAC TROMETHAMINE 15 MG: 15 INJECTION, SOLUTION INTRAMUSCULAR; INTRAVENOUS at 02:13

## 2017-05-19 RX ADMIN — ACETAMINOPHEN 500 MG: 325 SOLUTION ORAL at 02:13

## 2017-05-19 RX ADMIN — PANCRELIPASE 48000 UNITS: 24000; 76000; 120000 CAPSULE, DELAYED RELEASE PELLETS ORAL at 11:03

## 2017-05-19 RX ADMIN — SODIUM BICARBONATE 325 MG: 325 TABLET ORAL at 23:03

## 2017-05-19 RX ADMIN — NYSTATIN 500000 UNITS: 500000 SUSPENSION ORAL at 08:04

## 2017-05-19 RX ADMIN — LEVOTHYROXINE SODIUM 150 MCG: 300 TABLET ORAL at 08:06

## 2017-05-19 RX ADMIN — PIPERACILLIN SODIUM,TAZOBACTAM SODIUM 3.38 G: 3; .375 INJECTION, POWDER, FOR SOLUTION INTRAVENOUS at 22:21

## 2017-05-19 RX ADMIN — PANTOPRAZOLE SODIUM 40 MG: 40 TABLET, DELAYED RELEASE ORAL at 20:07

## 2017-05-19 RX ADMIN — FLUOXETINE HYDROCHLORIDE 10 MG: 20 SOLUTION ORAL at 08:07

## 2017-05-19 RX ADMIN — ONDANSETRON 4 MG: 2 INJECTION INTRAMUSCULAR; INTRAVENOUS at 08:37

## 2017-05-19 RX ADMIN — Medication 2000 UNITS: at 08:06

## 2017-05-19 RX ADMIN — KETOROLAC TROMETHAMINE 15 MG: 15 INJECTION, SOLUTION INTRAMUSCULAR; INTRAVENOUS at 13:34

## 2017-05-19 RX ADMIN — Medication: at 23:03

## 2017-05-19 RX ADMIN — ACETAMINOPHEN 500 MG: 325 SOLUTION ORAL at 08:07

## 2017-05-19 RX ADMIN — Medication: at 18:25

## 2017-05-19 RX ADMIN — SODIUM BICARBONATE 325 MG: 325 TABLET ORAL at 07:05

## 2017-05-19 RX ADMIN — PANCRELIPASE 48000 UNITS: 24000; 76000; 120000 CAPSULE, DELAYED RELEASE PELLETS ORAL at 23:03

## 2017-05-19 RX ADMIN — KETOROLAC TROMETHAMINE 15 MG: 15 INJECTION, SOLUTION INTRAMUSCULAR; INTRAVENOUS at 20:07

## 2017-05-19 RX ADMIN — Medication 2.5 MG: at 06:27

## 2017-05-19 RX ADMIN — MAGNESIUM HYDROXIDE 30 ML: 400 SUSPENSION ORAL at 08:07

## 2017-05-19 RX ADMIN — Medication: at 18:19

## 2017-05-19 RX ADMIN — PIPERACILLIN SODIUM,TAZOBACTAM SODIUM 3.38 G: 3; .375 INJECTION, POWDER, FOR SOLUTION INTRAVENOUS at 10:48

## 2017-05-19 RX ADMIN — PANCRELIPASE 24000 UNITS: 24000; 76000; 120000 CAPSULE, DELAYED RELEASE PELLETS ORAL at 03:20

## 2017-05-19 RX ADMIN — FUROSEMIDE 10 MG: 10 INJECTION, SOLUTION INTRAVENOUS at 10:36

## 2017-05-19 RX ADMIN — FENTANYL TRANSDERMAL 1 PATCH: 50 PATCH, EXTENDED RELEASE TRANSDERMAL at 13:39

## 2017-05-19 RX ADMIN — Medication 5 ML: at 08:07

## 2017-05-19 NOTE — PROVIDER NOTIFICATION
On call resident for anesthesia/On-Q paged at [phone] as one side of pt's PV block is leaking.    Spoke with on call resident who stated she was unable to come at the time of this page, but ordered this writer to clamp the side that was leaking and to assess integrity of catheter. Catheter appears intact and secure. Awaiting provider to assess.

## 2017-05-19 NOTE — PLAN OF CARE
Problem: Goal Outcome Summary  Goal: Goal Outcome Summary  Outcome: Improving  Afebrile, VSS on 2 L 02. BG between 103 and 121. Currently on 1 unit /hr using algorithm 2. Pain manageable with PCA and toradol. Zofran given for nausea. Anesthesia came to look at block d/t some drainage from R insertion site. TF running at 30 ml. Adequate output from king. Small amount of output from MAURICIO and G tube. Up with assist of 1.

## 2017-05-19 NOTE — PROGRESS NOTES
SPIRITUAL HEALTH SERVICES  SPIRITUAL ASSESSMENT Progress Note  [facility] ([place]) 7A    Chart note stated Tim would value additional visits. Today Tim had just reecived pain medication and said he felt drowsy and could not talk today. Another visit will be attempted next week.      Raimundo Tsang  Chaplain Resident  Pager [phone]

## 2017-05-19 NOTE — PROVIDER NOTIFICATION
Notified anesthesia about drainage from R insertion site and tubing connection. Will come to see pt.

## 2017-05-19 NOTE — ANESTHESIA POST-OP FOLLOW-UP NOTE
REGIONAL ANESTHESIA PAIN SERVICE CONTINUOUS NERVE INFUSION NOTE  SUBJECTIVE:  Interval History: Pt reports moderate pain control via continuous peripheral nerve block (CPNB) infusion. Denies any weakness, paresthesias, circumoral numbness, metallic taste or tinnitus. Pt is ambulating with assistance. Patient is currently without nausea or vomiting. Patient is tolerating TF.      Clinically Aligned Pain Assessment (CAPA):   Comfort (How is your pain?): Tolerable with discomfort  Change in Pain (Since your last medication/intervention?): About the same  Pain Control (How are your pain treatments working?): Partially effective pain control  Functioning (Are you able to do activities to get better?) : Can do most things, but pain gets in the way of some   Sleep (Does your pain management allow you to sleep or rest?): Awake with occasional pain        Anticoagulation:      heparin drip 25,000 units in 0.45% NaCl 250 mL 400 Units/hr, IV, Continuous Rate/Dose Verify: 05/18 0815            OBJECTIVE:     Diagnostic:        Lab Results   Component Value Date     WBC 18.2 05/19/2017            Lab Results   Component Value Date     RBC 2.61 05/19/2017            Lab Results   Component Value Date     HGB 7.3 05/19/2017            Lab Results   Component Value Date     HCT 22.7 05/19/2017      Lab Results   Component Value Date      05/19/2017            Vitals:    Temp: [98  F (36.7  C)-99  F (37.2  C)] 98.1  F (36.7  C)  Pulse: [75-88] 88  Heart Rate: [70-73] 73  Resp: [12-18] 12  BP: (105-122)/(62-76) 120/76  SpO2: [92 %-97 %] 97 %     Exam:   Strength 5/5 and symmetric grossly in bilateral LE      B/L paravertebral (PV) catheter sites with dressing c/d/i, no tenderness, erythema, heme, edema        ASSESSMENT/PLAN:   Sohan Arita is a 56 year old male POD #4 s/p COMBINED PANCREATECTOMY, TRANSPLANT AUTO ISLET CELL and placement of B/L Thoracic PV catheters for analgesia. Pt is receiving adequate analgesia  with Ropivacaine 0.2%, total infusion 14mL/hour - 7mL each catheter. Pt is ambulating without difficulty. No weakness or paresthesias. No evidence of adverse side effects associated with local anesthetic.      - 0845continue current total infusion of 14mL/hour  B/L PV catheters were bolused with 5mL 0.25% bupivacaine   RN to chart BP & P q 5 min for 30 min - page #[phone] if MAP < 60   - 1030 pt sitting in chair and reports pain decreased 50% since bolus  - will continue to follow and adjust as needed  Jovan Clay MD  May 19, 2017

## 2017-05-19 NOTE — PROGRESS NOTES
Care Coordinator- Discharge Planning     Admission Date/Time:  5/15/2017  Attending MD:  Rodrigo Jaquez MD     Data  Date of initial CC assessment:  5.19.17  Chart reviewed, discussed with interdisciplinary team.   Patient was admitted for:   1. Chronic pancreatitis, unspecified pancreatitis type (H)    2. Other constipation    3. Hypophosphatemia    4. History of pancreatectomy    56 year old male with chronic pancreatitis s/p lap cholecystectomy 4/2012, s/p biliary and pancreatic sphincterotomies and a temporary pancreatic stent 2012, s/p transduodenal sphincteroplasty 12/2014 with resultant leak and phlegmonous pancreatitis. S/p TPAIT 5/15/17--per ARABELLA Arriaza, note today. Dr Rodrigo Jaquez surgeon. Dr Yin endocrinologist.     Assessment  Concerns with insurance coverage for discharge needs: None--per Maribell, they have many medical bills, as they have both been sick the last 4 years--Maribell is now recovered and doing very well. Maribell is primary caretaker.  Current Living Situation: Patient lives with spouse. Staying at [facility].  Support System: Supportive and Involved  Services Involved: Home Infusion  Transportation: hotel shuttle or will use Uber  Barriers to Discharge: post op recovery/pain control/glucose control      Coordination of Care and Referrals: Provided patient/family with options for Home Infusion. Maribell chose to use [facility] and referral made to mark Salas--she will see him on Monday, 5/22/17. I informed Maribell that they will come to the ATC clinic the day after discharge and the [facility] nurse will visit them at the hotel the day after that. I met with Maribell and Dr Arita, in his room. Dr Arita is having pain and is sleepy so I explained my role and went over the discharge routine to Maribell. Tanner is an ER and Family practice physician in [place]. Maribell has no medical background but will learn. She will bring glucometer to the hospital on Monday. I explained Roxie  LAURA Waller, will see them next week. Tanner has never been on tube feedings or enzymes before. I have called [facility] [phone] and left a message requesting a teach on Monday 5/22 (order placed). I encouraged Maribell to work with the bedside nurses with [facility] and giving meds, dissolving the enzymes and mixing them with enteral formula and she will.    Plan  Anticipated Discharge Date:  Tuesday 5/23  Anticipated Discharge Plan:  To local [facility] with Maribell, dave or Uber to ATC visit the day after discharge then [facility] nurse to visit at [facility] with many RTC appointments( surgeon, dietician, endocrinology).    CTS Handoff completed:  YES OP CC: Soni Aleman--pt and Maribell have not met her yet--I will send her this note and request that she meet them on Monday 5/22.    Vikki Mckeon RN

## 2017-05-19 NOTE — PLAN OF CARE
Problem: Goal Outcome Summary  Goal: Goal Outcome Summary  PT / 7A: Cancel. Upon check-in, patient declining PT this date 2/2 fatigue and frequent bowel movements. Pt agreeable to reschedule for 5/20/17.

## 2017-05-19 NOTE — PLAN OF CARE
Problem: Goal Outcome Summary  Goal: Goal Outcome Summary  Outcome: No Change  3253-3178: Pt requires 3L O2 by Oxymask to maintain sats above 92%; sats 87% on RA. OVSS. Pain adequately controlled with PCA dilaudid 0.2mg continuous with 0.3mg bumps q10 minutes, On-Q PV block at 7mL/7mL, scheduled toradol, scheduled tylenol, and prn ketamine x2. On-Q block had been leaking (see provider notification note), and on call anesthesia resident paged, who assessed pt and fixed the leak. Denies nausea, reporting some abdominal/possible gas discomfort. TF at 30mL/hr through J tube with 30mL water flushes every 4 hours. G tube with minimal output; flushed for patency with water return. BGs  (spikes noticed following medication administration) requiring 1-5.5 units of insulin following algorithm 3-4. Ferguson with adequate yosvany UOP. Incision covered, c/d/i. MAURICIO with small amount of serosanguinous output. TLIJ with TKO D5 1/2NS + hep SR at 400 units/hr + PCA; TKO NS + ins gtt; other lumen SL. Resting with call light in reach and calling appropriately. Will continue to monitor.

## 2017-05-19 NOTE — PROGRESS NOTES
REGIONAL ANESTHESIA PAIN SERVICE CONTINUOUS NERVE INFUSION NOTE  SUBJECTIVE:  Interval History: Pt reports moderate pain control via continuous peripheral nerve block (CPNB) infusion.  Denies any weakness, paresthesias, circumoral numbness, metallic taste or tinnitus.  Pt is ambulating with assistance.  Patient is currently without nausea or vomiting. Patient is tolerating TF.       Clinically Aligned Pain Assessment (CAPA):   Comfort (How is your pain?): Tolerable with discomfort  Change in Pain (Since your last medication/intervention?): About the same  Pain Control (How are your pain treatments working?):  Partially effective pain control  Functioning (Are you able to do activities to get better?) : Can do most things, but pain gets in the way of some   Sleep (Does your pain management allow you to sleep or rest?): Awake with occasional pain      Anticoagulation:      heparin drip 25,000 units in 0.45% NaCl 250 mL 400 Units/hr, IV, Continuous Rate/Dose Verify: 05/18 0815         OBJECTIVE:    Diagnostic:  Lab Results   Component Value Date    WBC 18.2 05/19/2017     Lab Results   Component Value Date    RBC 2.61 05/19/2017     Lab Results   Component Value Date    HGB 7.3 05/19/2017     Lab Results   Component Value Date    HCT 22.7 05/19/2017     Lab Results   Component Value Date     05/19/2017         Vitals:    Temp:  [98  F (36.7  C)-99  F (37.2  C)] 98.1  F (36.7  C)  Pulse:  [75-88] 88  Heart Rate:  [70-73] 73  Resp:  [12-18] 12  BP: (105-122)/(62-76) 120/76  SpO2:  [92 %-97 %] 97 %    Exam:    Strength 5/5 and symmetric grossly in bilateral LE      B/L paravertebral (PV) catheter sites with dressing c/d/i, no tenderness, erythema, heme, edema      ASSESSMENT/PLAN:    Sohan Arita is a 56 year old male POD #4 s/p COMBINED PANCREATECTOMY, TRANSPLANT AUTO ISLET CELL and placement of B/L Thoracic PV catheters for analgesia.  Pt is receiving adequate analgesia with Ropivacaine 0.2%, total  infusion 14mL/hour - 7mL each catheter.  Pt is ambulating without difficulty.  No weakness or paresthesias.  No evidence of adverse side effects associated with local anesthetic.     - 0845continue current total infusion of 14mL/hour   B/L PV catheters were bolused with 5mL 0.25% bupivacaine     RN to chart BP & P q 5 min for 30 min - page #[phone] if MAP < 60   - 1030 pt sitting in chair and reports pain decreased 50% since bolus  - will continue to follow and adjust as needed  - discussed plan with attending anesthesiologist    CHUY Lundberg CNP  [facility]  5/19/2017 7:48 AM    24 hour Job Code Pager.  For in-house use only.     [place]:  * * [phone]  [place]: * * [phone]  Peds: * * [phone]  Enter call-back number and #      This pager only accepts text messages through [facility]

## 2017-05-19 NOTE — PLAN OF CARE
Problem: Individualization  Goal: Patient Preferences  Oxygen saturations in the mid-90's on 2.5 liters.  IS used.  AOVSS.   Diazepam and Ketamine given PRN for pain along with the Dilaudid PCA and scheduled Toradol.  He did decline his scheduled Lyrica when his pain was good, then it caught up to him.  Pt agrees that he will not skip and scheduled medications.  Fentanyl patch applied as ordered.  Plan is to stop basil rate of PCA around 10 pm; see order.  Ropivacaine bolus given at 0845 with good results.  Zofran 4 mg given IV with good results.   Blood glucose monitored hourly as ordered.  I increased to Algorithm 4 at 1500, otherwise he was on Alg 3 all shift.  Tylenol solution is now sugar free per a trend of BG spikes seen after administration.    Pt was up walking in the morning.  Bowel movements X 4 after morning meds..  Suppository was not needed.  Adequate UOP via king. Lasix 10 mg given.   Tube feeding increased to 40 ml/ hr and pt is tolerating it well.   Potassium corrected with 20 mEq.  Recheck is in the AM.   Phosphorus corrected with Sodium Phosphate 20 mmol.  Recheck 2 hours after completion.

## 2017-05-19 NOTE — PROGRESS NOTES
Pancreatitis Service - Daily Progress Note  05/19/2017    Assessment & Plan: 56 year old male with chronic pancreatitis s/p lap cholecystectomy 4/2012, s/p biliary and pancreatic sphincterotomies and a temporary pancreatic stent 2012, s/p transduodenal sphincteroplasty 12/2014 with resultant leak and phlegmonous pancreatitis. S/p TPAIT 5/15/17.    Liver US:  Impression: The left portal vein, left hepatic vein, and left hepatic  artery are obscured by overlying bowel gas. The splenic vein is also  not visualized. The remaining hepatic vasculature is patent with  antegrade flow.    TPAIT: POD #5  Cardiorespiratory: Hypoxia, remains on O2 per NC.  Likely due to hypervolemia. Repeat lasix  Continue IS.   GI/Nutrition: His g tube is currently draining to gravity and his j tube is running tube is running tube feeds at 40 cc/h plus meds. Advance TF by 10 cc every 12 hours as tolerated to goal.  Continue senna and MOM. Dulcolax supp today. PLE if no BM after dulcolax.   Zofran and Compazine PRN  Renal/Fluid/Electrolytes: Hypervolemia, repeat lasix 10mg.  TKO. Hypophosphatemia, replace today. Na 147 and CO2 33, will increase free water per j tube.   : Acute urinary retention overnight, replace king if unable to void this morning.  Post-pancreatectomy diabetes: Continue insulin gtt until tube feeding at goal, appreciate Endocrine consult.  Infection:  Afebrile, WBC decreased to 18.2 from 19.2. Continues to be diaphoretic.  Surgical ppx: Ertapenem, stopped 5/18.  Will cover enterobacter and enterococcus growing in panc pres solution.  Continue Zosyn x 7 days, stop 5/24.   Prophylaxis: PPI, Anticoagulation: stop heparin 400 cc/hr and start Lovenox 40 q12  Pain control: Fair  Ropivacaine On Q. Plan to remove POD 7.  Dilaudid PCA. Continue bumps 0.3 mg. Start fentanyl 50 mcg patch and stop PCA basal rate at 2200.   Ketamine 2.5 mg every 4 hours, PRN  Acetaminophen to susp, 650 mg every 6 hours   Gabapentin susp, discontinued  due to pt report of previous intolerance (hematological issue per pt)  Lyrica 25 mg BID  PTA:  Pt had weaned down to fent 6mcg (half of 12mcg patch) prior to admission  MSK: Pain 2/2 radial art line, pain improved with removal of line on 5/16. Continues to have numbness left digits, thumb, 2nd, 3rd. Monitor.   Activity: OOB to chair  Anticipated LOS/Discharge: TBD    Medical Decision Making: Medium  Subsequent visit 02200 (moderate level decision making)    GELACIO/Fellow/Resident Provider: Vero Rader PAC [id number]    Faculty: Ian Mendez MD  __________________________________________________________________  Transplant History: Admitted 5/15/2017 for TP AIT  5/15/2017 ([facility]), Postoperative day: 4     Interval History: History is obtained from the patient  Overnight events: Pain control fair.  Does not like neuro side effects from pain medications. No c/o of nausea. No flatus or BM since surgery. Per patient is always diaphoretic.       ROS:   A 10-point review of systems was negative except as noted above.    Curent Meds:    bisacodyl  10 mg Rectal Once     acetaminophen  500 mg Oral Q6H     fentaNYL   Transdermal Q8H     [START ON 5/22/2017] fentaNYL   Transdermal Q72H     fentaNYL  50 mcg Transdermal Q72H     HYDROmorphone   Intravenous PCA     HYDROmorphone   Intravenous PCA     sennosides  5 mL Per Feeding Tube BID     magnesium hydroxide  30 mL Per Feeding Tube BID     ketorolac  15 mg Intravenous Q6H     piperacillin-tazobactam  3.375 g Intravenous Q6H     pregabalin  25 mg Per Feeding Tube BID     nystatin  500,000 Units Oral 4x Daily     pantoprazole  40 mg Oral or J tube BID     amylase-lipase-protease  1-3 capsule Per J Tube Q4H    And     sodium bicarbonate  325 mg Per J Tube Q4H     cholecalciferol  2,000 Units Per J Tube Daily     multivitamins with minerals  15 mL Per Feeding Tube Daily     disposable pump w/ anesthetic (select flow)   Topical Elast Pump     sodium chloride (PF)  3 mL Intracatheter Q8H  "    FLUoxetine  10 mg Per J Tube Daily     levothyroxine  150 mcg Per J Tube QAM AC       Physical Exam:     Admit Weight: 100.7 kg (222 lb 0.1 oz)    Current Vitals:   /70 (BP Location: Left arm)  Pulse 73  Temp 98.1  F (36.7  C) (Oral)  Resp 15  Ht 1.88 m (6' 2\")  Wt 110.7 kg (244 lb 1.6 oz)  SpO2 97%  BMI 31.34 kg/m2  Vital sign ranges:    Temp:  [98.1  F (36.7  C)-99  F (37.2  C)] 98.1  F (36.7  C)  Pulse:  [73-88] 73  Heart Rate:  [70-82] 73  Resp:  [12-18] 15  BP: (105-122)/(62-78) 115/70  SpO2:  [92 %-97 %] 97 %  Patient Vitals for the past 24 hrs:   BP Temp Temp src Pulse Heart Rate Resp SpO2   05/19/17 1220 115/70 98.1  F (36.7  C) Oral 73 73 15 -   05/19/17 0910 121/78 - - - 82 - -   05/19/17 0905 120/74 - - - 81 - -   05/19/17 0900 120/73 - - - 74 - -   05/19/17 0855 122/68 - - - 77 - -   05/19/17 0850 119/75 - - - 74 - -   05/19/17 0845 120/69 - - - 77 - -   05/19/17 0842 119/73 - - - 79 - -   05/19/17 0841 119/73 - - - 77 - -   05/19/17 0730 120/76 98.1  F (36.7  C) - - 73 12 97 %   05/19/17 0410 111/74 98.4  F (36.9  C) Oral - 70 18 97 %   05/18/17 2351 122/73 98.2  F (36.8  C) Oral 88 - 18 94 %   05/18/17 2010 120/72 99  F (37.2  C) Oral 84 - 16 92 %   05/18/17 1900 - - - - - - 95 %   05/18/17 1532 105/62 98.4  F (36.9  C) Oral - 73 16 94 %     General Appearance: NAD, mildly sedated.   Skin: normal, diaphoretic  Heart: RRR   Lungs: B/l decreased bases. NLB on 3L O2  Abdomen: The abdomen is rounded, and tender around surgical sites. The wound is well approximated. Tube/Drain sites are: G tube to gravity and J Tube: TF running.  MAURICIO: SS   : No king  Extremities: edema: tr to +1 generalized.   Neuro: B/l equal  strength. Numbness left thumb, 2nd and 3rd digits all improved. Well perfused.  Oriented but limited concentration      Data:   CMP  Recent Labs  Lab 05/19/17  0656 05/18/17  0707 05/17/17  0214  05/16/17  0355  05/15/17  2002 05/15/17  1900   * 144 145*  --  144  < > 140 " 139   POTASSIUM 3.7 3.8 4.1  --  4.1  < > 4.4 4.1   CHLORIDE 111* 110* 113*  --  114*  < >  --   --    CO2 33* 31 26  --  22  < >  --   --    * 105* 116*  --  142*  < > 101* 98   BUN 18 17 17  --  15  < >  --   --    CR 0.80 0.93 1.05  --  0.94  < >  --   --    GFRESTIMATED >90Non  GFR Calc 84 73  --  83  < >  --   --    GFRESTBLACK >90African American GFR Calc >90African American GFR Calc 88  --  >90African American GFR Calc  < >  --   --    CRISTOBAL 8.0* 7.9* 7.4*  --  7.5*  < >  --   --    ICAW  --   --   --   --   --   --  4.6 4.5   MAG  --  2.5* 2.2  --  2.0  < >  --   --    PHOS 1.4* 1.9* 2.4*  < > 1.9*  --   --   --    AMYLASE  --   --   --   --  790*  --   --   --    LIPASE  --   --   --   --  9517*  --   --   --    ALBUMIN  --   --  2.3*  --  2.9*  < >  --   --    BILITOTAL  --   --  0.5  --  1.1  < >  --   --    ALKPHOS  --   --  32*  --  29*  < >  --   --    AST  --   --  74*  --  133*  < >  --   --    ALT  --   --  93*  --  128*  < >  --   --    < > = values in this interval not displayed.  CBC  Recent Labs  Lab 05/19/17  0656 05/19/17  0037  05/16/17  0355   HGB 7.3* 7.5*  < > 9.6*   WBC 18.2* 19.2*  < > 9.9    275  < > 133*   A1C  --   --   --  5.3   < > = values in this interval not displayed.  Coags    Recent Labs  Lab 05/19/17  0656 05/17/17  0214 05/15/17  2134   INR  --   --  1.66*   PTT 38* 42* 104*      Urinalysis  Recent Labs   Lab Test  05/10/17   1015   COLOR  Yellow   APPEARANCE  Clear   URINEGLC  Negative   URINEBILI  Negative   URINEKETONE  Negative   SG  1.022   UBLD  Negative   URINEPH  6.5   PROTEIN  Negative   NITRITE  Negative   LEUKEST  Negative   RBCU  2   WBCU  <1

## 2017-05-20 LAB
ALBUMIN SERPL-MCNC: 1.8 G/DL (ref 3.4–5)
ALP SERPL-CCNC: 84 U/L (ref 40–150)
ALT SERPL W P-5'-P-CCNC: 43 U/L (ref 0–70)
ANION GAP SERPL CALCULATED.3IONS-SCNC: 4 MMOL/L (ref 3–14)
ANION GAP SERPL CALCULATED.3IONS-SCNC: 5 MMOL/L (ref 3–14)
ANISOCYTOSIS BLD QL SMEAR: SLIGHT
APTT PPP: 35 SEC (ref 22–37)
AST SERPL W P-5'-P-CCNC: 20 U/L (ref 0–45)
BASOPHILS # BLD AUTO: 0.2 10E9/L (ref 0–0.2)
BASOPHILS NFR BLD AUTO: 1.7 %
BILIRUB DIRECT SERPL-MCNC: 0.1 MG/DL (ref 0–0.2)
BILIRUB SERPL-MCNC: 0.4 MG/DL (ref 0.2–1.3)
BUN SERPL-MCNC: 20 MG/DL (ref 7–30)
BUN SERPL-MCNC: 21 MG/DL (ref 7–30)
CALCIUM SERPL-MCNC: 7.6 MG/DL (ref 8.5–10.1)
CALCIUM SERPL-MCNC: 7.8 MG/DL (ref 8.5–10.1)
CHLORIDE SERPL-SCNC: 112 MMOL/L (ref 94–109)
CHLORIDE SERPL-SCNC: 112 MMOL/L (ref 94–109)
CO2 SERPL-SCNC: 34 MMOL/L (ref 20–32)
CO2 SERPL-SCNC: 35 MMOL/L (ref 20–32)
CREAT SERPL-MCNC: 0.74 MG/DL (ref 0.66–1.25)
CREAT SERPL-MCNC: 0.82 MG/DL (ref 0.66–1.25)
DIFFERENTIAL METHOD BLD: ABNORMAL
EOSINOPHIL # BLD AUTO: 1.2 10E9/L (ref 0–0.7)
EOSINOPHIL NFR BLD AUTO: 9.6 %
ERYTHROCYTE [DISTWIDTH] IN BLOOD BY AUTOMATED COUNT: 15.6 % (ref 10–15)
GFR SERPL CREATININE-BSD FRML MDRD: ABNORMAL ML/MIN/1.7M2
GFR SERPL CREATININE-BSD FRML MDRD: ABNORMAL ML/MIN/1.7M2
GLUCOSE BLDC GLUCOMTR-MCNC: 102 MG/DL (ref 70–99)
GLUCOSE BLDC GLUCOMTR-MCNC: 107 MG/DL (ref 70–99)
GLUCOSE BLDC GLUCOMTR-MCNC: 113 MG/DL (ref 70–99)
GLUCOSE BLDC GLUCOMTR-MCNC: 121 MG/DL (ref 70–99)
GLUCOSE BLDC GLUCOMTR-MCNC: 124 MG/DL (ref 70–99)
GLUCOSE BLDC GLUCOMTR-MCNC: 126 MG/DL (ref 70–99)
GLUCOSE BLDC GLUCOMTR-MCNC: 127 MG/DL (ref 70–99)
GLUCOSE BLDC GLUCOMTR-MCNC: 128 MG/DL (ref 70–99)
GLUCOSE BLDC GLUCOMTR-MCNC: 128 MG/DL (ref 70–99)
GLUCOSE BLDC GLUCOMTR-MCNC: 129 MG/DL (ref 70–99)
GLUCOSE BLDC GLUCOMTR-MCNC: 133 MG/DL (ref 70–99)
GLUCOSE BLDC GLUCOMTR-MCNC: 135 MG/DL (ref 70–99)
GLUCOSE BLDC GLUCOMTR-MCNC: 138 MG/DL (ref 70–99)
GLUCOSE BLDC GLUCOMTR-MCNC: 140 MG/DL (ref 70–99)
GLUCOSE BLDC GLUCOMTR-MCNC: 151 MG/DL (ref 70–99)
GLUCOSE BLDC GLUCOMTR-MCNC: 160 MG/DL (ref 70–99)
GLUCOSE BLDC GLUCOMTR-MCNC: 81 MG/DL (ref 70–99)
GLUCOSE BLDC GLUCOMTR-MCNC: 83 MG/DL (ref 70–99)
GLUCOSE BLDC GLUCOMTR-MCNC: 84 MG/DL (ref 70–99)
GLUCOSE BLDC GLUCOMTR-MCNC: 95 MG/DL (ref 70–99)
GLUCOSE SERPL-MCNC: 114 MG/DL (ref 70–99)
GLUCOSE SERPL-MCNC: 127 MG/DL (ref 70–99)
HCT VFR BLD AUTO: 22.3 % (ref 40–53)
HGB BLD-MCNC: 6.9 G/DL (ref 13.3–17.7)
HGB BLD-MCNC: 7.5 G/DL (ref 13.3–17.7)
LIPASE SERPL-CCNC: 93 U/L (ref 73–393)
LYMPHOCYTES # BLD AUTO: 1.3 10E9/L (ref 0.8–5.3)
LYMPHOCYTES NFR BLD AUTO: 10.4 %
MACROCYTES BLD QL SMEAR: PRESENT
MAGNESIUM SERPL-MCNC: 2.7 MG/DL (ref 1.6–2.3)
MCH RBC QN AUTO: 27.5 PG (ref 26.5–33)
MCHC RBC AUTO-ENTMCNC: 30.9 G/DL (ref 31.5–36.5)
MCV RBC AUTO: 89 FL (ref 78–100)
METAMYELOCYTES # BLD: 0.4 10E9/L
METAMYELOCYTES NFR BLD MANUAL: 3.5 %
MONOCYTES # BLD AUTO: 0.7 10E9/L (ref 0–1.3)
MONOCYTES NFR BLD AUTO: 5.2 %
MYELOCYTES # BLD: 0.3 10E9/L
MYELOCYTES NFR BLD MANUAL: 2.6 %
NEUTROPHILS # BLD AUTO: 8.3 10E9/L (ref 1.6–8.3)
NEUTROPHILS NFR BLD AUTO: 66.1 %
NRBC # BLD AUTO: 0.7 10*3/UL
NRBC BLD AUTO-RTO: 5 /100
PHOSPHATE SERPL-MCNC: 1.7 MG/DL (ref 2.5–4.5)
PHOSPHATE SERPL-MCNC: 1.7 MG/DL (ref 2.5–4.5)
PLATELET # BLD AUTO: 383 10E9/L (ref 150–450)
PLATELET # BLD EST: ABNORMAL 10*3/UL
POTASSIUM SERPL-SCNC: 3.4 MMOL/L (ref 3.4–5.3)
POTASSIUM SERPL-SCNC: 3.4 MMOL/L (ref 3.4–5.3)
PROMYELOCYTES # BLD MANUAL: 0.1 10E9/L
PROMYELOCYTES NFR BLD MANUAL: 0.9 %
PROT SERPL-MCNC: 5.2 G/DL (ref 6.8–8.8)
RBC # BLD AUTO: 2.51 10E12/L (ref 4.4–5.9)
SODIUM SERPL-SCNC: 150 MMOL/L (ref 133–144)
SODIUM SERPL-SCNC: 152 MMOL/L (ref 133–144)
WBC # BLD AUTO: 12.5 10E9/L (ref 4–11)

## 2017-05-20 PROCEDURE — 25000132 ZZH RX MED GY IP 250 OP 250 PS 637: Performed by: SURGERY

## 2017-05-20 PROCEDURE — 83735 ASSAY OF MAGNESIUM: CPT | Performed by: PHYSICIAN ASSISTANT

## 2017-05-20 PROCEDURE — 36592 COLLECT BLOOD FROM PICC: CPT | Performed by: PHYSICIAN ASSISTANT

## 2017-05-20 PROCEDURE — 25000125 ZZHC RX 250: Performed by: STUDENT IN AN ORGANIZED HEALTH CARE EDUCATION/TRAINING PROGRAM

## 2017-05-20 PROCEDURE — 84100 ASSAY OF PHOSPHORUS: CPT | Performed by: PHYSICIAN ASSISTANT

## 2017-05-20 PROCEDURE — 80048 BASIC METABOLIC PNL TOTAL CA: CPT | Performed by: PHYSICIAN ASSISTANT

## 2017-05-20 PROCEDURE — 84100 ASSAY OF PHOSPHORUS: CPT | Performed by: TRANSPLANT SURGERY

## 2017-05-20 PROCEDURE — 36592 COLLECT BLOOD FROM PICC: CPT | Performed by: TRANSPLANT SURGERY

## 2017-05-20 PROCEDURE — 83690 ASSAY OF LIPASE: CPT | Performed by: PHYSICIAN ASSISTANT

## 2017-05-20 PROCEDURE — 25000132 ZZH RX MED GY IP 250 OP 250 PS 637: Performed by: TRANSPLANT SURGERY

## 2017-05-20 PROCEDURE — 25000128 H RX IP 250 OP 636: Performed by: STUDENT IN AN ORGANIZED HEALTH CARE EDUCATION/TRAINING PROGRAM

## 2017-05-20 PROCEDURE — 00000146 ZZHCL STATISTIC GLUCOSE BY METER IP

## 2017-05-20 PROCEDURE — 25000132 ZZH RX MED GY IP 250 OP 250 PS 637: Performed by: NURSE PRACTITIONER

## 2017-05-20 PROCEDURE — 36592 COLLECT BLOOD FROM PICC: CPT | Performed by: SURGERY

## 2017-05-20 PROCEDURE — 80076 HEPATIC FUNCTION PANEL: CPT | Performed by: PHYSICIAN ASSISTANT

## 2017-05-20 PROCEDURE — 25000128 H RX IP 250 OP 636: Performed by: NURSE PRACTITIONER

## 2017-05-20 PROCEDURE — 25000132 ZZH RX MED GY IP 250 OP 250 PS 637: Performed by: PHYSICIAN ASSISTANT

## 2017-05-20 PROCEDURE — 80048 BASIC METABOLIC PNL TOTAL CA: CPT | Performed by: STUDENT IN AN ORGANIZED HEALTH CARE EDUCATION/TRAINING PROGRAM

## 2017-05-20 PROCEDURE — 12000025 ZZH R&B TRANSPLANT INTERMEDIATE

## 2017-05-20 PROCEDURE — 80048 BASIC METABOLIC PNL TOTAL CA: CPT | Performed by: SURGERY

## 2017-05-20 PROCEDURE — 25000125 ZZHC RX 250: Performed by: TRANSPLANT SURGERY

## 2017-05-20 PROCEDURE — 85025 COMPLETE CBC W/AUTO DIFF WBC: CPT | Performed by: PHYSICIAN ASSISTANT

## 2017-05-20 PROCEDURE — 27210436 ZZH NUTRITION PRODUCT SEMIELEM INTERMED CAN

## 2017-05-20 PROCEDURE — 85018 HEMOGLOBIN: CPT | Performed by: TRANSPLANT SURGERY

## 2017-05-20 PROCEDURE — 25000132 ZZH RX MED GY IP 250 OP 250 PS 637: Performed by: STUDENT IN AN ORGANIZED HEALTH CARE EDUCATION/TRAINING PROGRAM

## 2017-05-20 PROCEDURE — 25000128 H RX IP 250 OP 636: Performed by: SURGERY

## 2017-05-20 PROCEDURE — 85730 THROMBOPLASTIN TIME PARTIAL: CPT | Performed by: PHYSICIAN ASSISTANT

## 2017-05-20 RX ORDER — FENTANYL 100 UG/1
100 PATCH TRANSDERMAL
Status: DISCONTINUED | OUTPATIENT
Start: 2017-05-22 | End: 2017-05-26 | Stop reason: HOSPADM

## 2017-05-20 RX ORDER — HYDROMORPHONE HCL/0.9% NACL/PF 0.2MG/0.2
0.2 SYRINGE (ML) INTRAVENOUS ONCE
Status: DISCONTINUED | OUTPATIENT
Start: 2017-05-20 | End: 2017-05-22 | Stop reason: CLARIF

## 2017-05-20 RX ORDER — FENTANYL 50 UG/1
50 PATCH TRANSDERMAL ONCE
Status: COMPLETED | OUTPATIENT
Start: 2017-05-20 | End: 2017-05-20

## 2017-05-20 RX ORDER — FUROSEMIDE 10 MG/ML
20 INJECTION INTRAMUSCULAR; INTRAVENOUS
Status: DISCONTINUED | OUTPATIENT
Start: 2017-05-20 | End: 2017-05-20

## 2017-05-20 RX ADMIN — DIAZEPAM 2 MG: 5 INJECTION, SOLUTION INTRAMUSCULAR; INTRAVENOUS at 20:03

## 2017-05-20 RX ADMIN — PANCRELIPASE 36000 UNITS: 24000; 76000; 120000 CAPSULE, DELAYED RELEASE PELLETS ORAL at 19:19

## 2017-05-20 RX ADMIN — ACETAMINOPHEN 500 MG: 160 LIQUID ORAL at 01:53

## 2017-05-20 RX ADMIN — SODIUM BICARBONATE 325 MG: 325 TABLET ORAL at 03:47

## 2017-05-20 RX ADMIN — KETOROLAC TROMETHAMINE 15 MG: 15 INJECTION, SOLUTION INTRAMUSCULAR; INTRAVENOUS at 20:14

## 2017-05-20 RX ADMIN — PREGABALIN 25 MG: 20 SOLUTION ORAL at 20:14

## 2017-05-20 RX ADMIN — Medication 1.4 MG: at 06:48

## 2017-05-20 RX ADMIN — SODIUM BICARBONATE 325 MG: 325 TABLET ORAL at 11:10

## 2017-05-20 RX ADMIN — SODIUM PHOSPHATE, MONOBASIC, MONOHYDRATE AND SODIUM PHOSPHATE, DIBASIC, ANHYDROUS 20 MMOL: 276; 142 INJECTION, SOLUTION INTRAVENOUS at 12:39

## 2017-05-20 RX ADMIN — PANCRELIPASE 48000 UNITS: 24000; 76000; 120000 CAPSULE, DELAYED RELEASE PELLETS ORAL at 06:52

## 2017-05-20 RX ADMIN — PANCRELIPASE 48000 UNITS: 24000; 76000; 120000 CAPSULE, DELAYED RELEASE PELLETS ORAL at 03:47

## 2017-05-20 RX ADMIN — ENOXAPARIN SODIUM 30 MG: 30 INJECTION SUBCUTANEOUS at 16:53

## 2017-05-20 RX ADMIN — PANCRELIPASE 72000 UNITS: 24000; 76000; 120000 CAPSULE, DELAYED RELEASE PELLETS ORAL at 11:15

## 2017-05-20 RX ADMIN — POTASSIUM CHLORIDE 20 MEQ: 29.8 INJECTION, SOLUTION INTRAVENOUS at 09:17

## 2017-05-20 RX ADMIN — SODIUM BICARBONATE 325 MG: 325 TABLET ORAL at 14:46

## 2017-05-20 RX ADMIN — PIPERACILLIN SODIUM,TAZOBACTAM SODIUM 3.38 G: 3; .375 INJECTION, POWDER, FOR SOLUTION INTRAVENOUS at 03:47

## 2017-05-20 RX ADMIN — ACETAMINOPHEN 500 MG: 160 LIQUID ORAL at 14:24

## 2017-05-20 RX ADMIN — PANTOPRAZOLE SODIUM 40 MG: 40 TABLET, DELAYED RELEASE ORAL at 09:16

## 2017-05-20 RX ADMIN — NYSTATIN 500000 UNITS: 500000 SUSPENSION ORAL at 16:26

## 2017-05-20 RX ADMIN — Medication 2000 UNITS: at 08:02

## 2017-05-20 RX ADMIN — PANTOPRAZOLE SODIUM 40 MG: 40 TABLET, DELAYED RELEASE ORAL at 20:14

## 2017-05-20 RX ADMIN — MULTIVIT AND MINERALS-FERROUS GLUCONATE 9 MG IRON/15 ML ORAL LIQUID 15 ML: at 09:16

## 2017-05-20 RX ADMIN — Medication: at 14:36

## 2017-05-20 RX ADMIN — KETOROLAC TROMETHAMINE 15 MG: 15 INJECTION, SOLUTION INTRAMUSCULAR; INTRAVENOUS at 14:24

## 2017-05-20 RX ADMIN — SODIUM BICARBONATE 325 MG: 325 TABLET ORAL at 23:05

## 2017-05-20 RX ADMIN — PANCRELIPASE 72000 UNITS: 24000; 76000; 120000 CAPSULE, DELAYED RELEASE PELLETS ORAL at 23:05

## 2017-05-20 RX ADMIN — FUROSEMIDE 20 MG: 10 INJECTION, SOLUTION INTRAVENOUS at 10:44

## 2017-05-20 RX ADMIN — PIPERACILLIN SODIUM,TAZOBACTAM SODIUM 3.38 G: 3; .375 INJECTION, POWDER, FOR SOLUTION INTRAVENOUS at 10:43

## 2017-05-20 RX ADMIN — HUMAN INSULIN 5.5 UNITS/HR: 100 INJECTION, SOLUTION SUBCUTANEOUS at 22:27

## 2017-05-20 RX ADMIN — PIPERACILLIN SODIUM,TAZOBACTAM SODIUM 3.38 G: 3; .375 INJECTION, POWDER, FOR SOLUTION INTRAVENOUS at 16:25

## 2017-05-20 RX ADMIN — KETOROLAC TROMETHAMINE 15 MG: 15 INJECTION, SOLUTION INTRAMUSCULAR; INTRAVENOUS at 08:05

## 2017-05-20 RX ADMIN — SODIUM BICARBONATE 325 MG: 325 TABLET ORAL at 06:52

## 2017-05-20 RX ADMIN — ACETAMINOPHEN 500 MG: 160 LIQUID ORAL at 20:14

## 2017-05-20 RX ADMIN — FENTANYL 1 PATCH: 50 PATCH, EXTENDED RELEASE TRANSDERMAL at 10:38

## 2017-05-20 RX ADMIN — NYSTATIN 500000 UNITS: 500000 SUSPENSION ORAL at 09:15

## 2017-05-20 RX ADMIN — KETOROLAC TROMETHAMINE 15 MG: 15 INJECTION, SOLUTION INTRAMUSCULAR; INTRAVENOUS at 01:51

## 2017-05-20 RX ADMIN — FLUOXETINE HYDROCHLORIDE 10 MG: 20 SOLUTION ORAL at 09:16

## 2017-05-20 RX ADMIN — PANCRELIPASE 72000 UNITS: 24000; 76000; 120000 CAPSULE, DELAYED RELEASE PELLETS ORAL at 14:45

## 2017-05-20 RX ADMIN — PREGABALIN 25 MG: 20 SOLUTION ORAL at 08:01

## 2017-05-20 RX ADMIN — SODIUM BICARBONATE 325 MG: 325 TABLET ORAL at 19:19

## 2017-05-20 RX ADMIN — LEVOTHYROXINE SODIUM 150 MCG: 300 TABLET ORAL at 09:15

## 2017-05-20 RX ADMIN — NYSTATIN 500000 UNITS: 500000 SUSPENSION ORAL at 12:34

## 2017-05-20 RX ADMIN — Medication: at 07:59

## 2017-05-20 RX ADMIN — ACETAMINOPHEN 500 MG: 160 LIQUID ORAL at 09:14

## 2017-05-20 RX ADMIN — SODIUM PHOSPHATE, MONOBASIC, MONOHYDRATE AND SODIUM PHOSPHATE, DIBASIC, ANHYDROUS 20 MMOL: 276; 142 INJECTION, SOLUTION INTRAVENOUS at 00:54

## 2017-05-20 RX ADMIN — PIPERACILLIN SODIUM,TAZOBACTAM SODIUM 3.38 G: 3; .375 INJECTION, POWDER, FOR SOLUTION INTRAVENOUS at 22:19

## 2017-05-20 NOTE — PROGRESS NOTES
Pancreatitis Service - Daily Progress Note  05/20/2017    Assessment & Plan: 56 year old male with chronic pancreatitis s/p lap cholecystectomy 4/2012, s/p biliary and pancreatic sphincterotomies and a temporary pancreatic stent 2012, s/p transduodenal sphincteroplasty 12/2014 with resultant leak and phlegmonous pancreatitis. S/p TPAIT 5/15/17.    Liver US:  Impression: The left portal vein, left hepatic vein, and left hepatic  artery are obscured by overlying bowel gas. The splenic vein is also  not visualized. The remaining hepatic vasculature is patent with  antegrade flow.    TPAIT: POD #5  Cardiorespiratory: Hypoxia, remains on O2 per NC.  Likely due to hypervolemia. Lasix 20mg IV x2 doses today. Continue IS.   GI/Nutrition: His g tube is currently draining to gravity and his j tube is running tube is running tube feeds at 50 cc/h plus meds. Advance TF by 10 cc every 12 hours as tolerated to goal.  Continue senna and MOM. Zofran and Compazine PRN  Renal/Fluid/Electrolytes: Hypervolemia, Lasix 20mg IV x2 doses today.  TKO. Hypophosphatemia, replace today. Na 141.  : Fegruson in place, replaced 5/18 for retention.  Post-pancreatectomy diabetes: Continue insulin gtt until tube feeding at goal, appreciate Endocrine consult.  Infection:  Afebrile, WBC decreased to 12.5 from 18.2. Surgical ppx: Ertapenem, stopped 5/18.  Will cover enterobacter and enterococcus growing in panc pres solution.  Continue Zosyn x 7 days, stop 5/24.   Prophylaxis: PPI, Anticoagulation: stop heparin 400 cc/hr and start Lovenox 40 q12  Pain control: Fair  Ropivacaine On Q. Plan to remove POD 7.  Dilaudid PCA. Continue bumps 0.3 mg. Increase fentanyl patch to 100 mcg and stop PCA basal rate.  D/C PRN Ketamine, PRN Acetaminophen to susp, 650 mg every 6 hours Gabapentin susp, discontinued due to pt report of previous intolerance (hematological issue per pt) Lyrica 25 mg BID  PTA:  Pt had weaned down to fent 6mcg (half of 12mcg patch) prior to  admission  Activity: OOB to chair  Anticipated LOS/Discharge: TBD    Medical Decision Making: Medium  Subsequent visit 04266 (moderate level decision making)    GELACIO/Fellow/Resident Provider: Jeanine Peralta MD    Faculty: Ian Mendez MD  __________________________________________________________________  Transplant History: Admitted 5/15/2017 for TP AIT  5/15/2017 ([facility]), Postoperative day: 5     Interval History: History is obtained from the patient  Overnight events: Pain control fair. No c/o of nausea. Had a BM. No new complaints.  Was up walking this morning.       ROS:   A 10-point review of systems was negative except as noted above.    Curent Meds:    HYDROmorphone  0.2 mg Intravenous Once     enoxaparin  30 mg Subcutaneous Q12H     [START ON 5/22/2017] fentaNYL  100 mcg Transdermal Q72H     HYDROmorphone   Intravenous PCA     furosemide  20 mg Intravenous BID     bisacodyl  10 mg Rectal Once     acetaminophen  500 mg Oral Q6H     fentaNYL   Transdermal Q8H     [START ON 5/22/2017] fentaNYL   Transdermal Q72H     disposable pump w/ anesthetic (select flow)   Topical Elast Pump     sennosides  5 mL Per Feeding Tube BID     magnesium hydroxide  30 mL Per Feeding Tube BID     ketorolac  15 mg Intravenous Q6H     piperacillin-tazobactam  3.375 g Intravenous Q6H     pregabalin  25 mg Per Feeding Tube BID     nystatin  500,000 Units Oral 4x Daily     pantoprazole  40 mg Oral or J tube BID     amylase-lipase-protease  1-3 capsule Per J Tube Q4H    And     sodium bicarbonate  325 mg Per J Tube Q4H     cholecalciferol  2,000 Units Per J Tube Daily     multivitamins with minerals  15 mL Per Feeding Tube Daily     sodium chloride (PF)  3 mL Intracatheter Q8H     FLUoxetine  10 mg Per J Tube Daily     levothyroxine  150 mcg Per J Tube QAM AC       Physical Exam:     Admit Weight: 100.7 kg (222 lb 0.1 oz)    Current Vitals:   /68 (BP Location: Right arm)  Pulse 74  Temp 98.7  F (37.1  C) (Oral)  Resp 16   "Ht 1.88 m (6' 2\")  Wt 111.9 kg (246 lb 12.8 oz)  SpO2 91%  BMI 31.69 kg/m2  Vital sign ranges:    Temp:  [98  F (36.7  C)-99.6  F (37.6  C)] 98.7  F (37.1  C)  Pulse:  [73-74] 74  Heart Rate:  [72-84] 81  Resp:  [15-20] 16  BP: (102-125)/(63-71) 112/68  SpO2:  [82 %-97 %] 91 %  Patient Vitals for the past 24 hrs:   BP Temp Temp src Pulse Heart Rate Resp SpO2 Weight   05/20/17 0800 - - - - - - 91 % 111.9 kg (246 lb 12.8 oz)   05/20/17 0700 112/68 98.7  F (37.1  C) Oral 74 81 16 97 % -   05/20/17 0400 102/63 98.8  F (37.1  C) Oral - 84 16 96 % -   05/20/17 0055 104/66 99.6  F (37.6  C) Axillary - 72 16 94 % -   05/19/17 1910 120/71 98.8  F (37.1  C) Oral - 81 20 93 % -   05/19/17 1521 125/70 98  F (36.7  C) Oral - 72 18 95 % -   05/19/17 1405 - - - - - - 95 % -   05/19/17 1400 - - - - - - (!) 82 % -   05/19/17 1220 115/70 98.1  F (36.7  C) Oral 73 73 15 - -     General Appearance: NAD, alert.   Skin: normal, dry  Heart: RRR   Lungs: NLB on NC  Abdomen: The abdomen is soft, appropriate incisional ttp, and tender around surgical sites. Mild erythema along incision, removed 2 staples and expressed fluid consistent with fat necrosis. Tube/Drain sites are: G tube to gravity and J Tube: TF running.  MAURICIO: SS   : king with clear yellow urine.  Extremities: edema: mild  Neuro:  Alert, oriented      Data:   CMP  Recent Labs  Lab 05/20/17  0716 05/19/17  2138 05/19/17  0656 05/18/17  0707 05/17/17  0214  05/16/17  0355  05/15/17  2002 05/15/17  1900   *  --  147* 144 145*  --  144  < > 140 139   POTASSIUM 3.4  --  3.7 3.8 4.1  --  4.1  < > 4.4 4.1   CHLORIDE 112*  --  111* 110* 113*  --  114*  < >  --   --    CO2 35*  --  33* 31 26  --  22  < >  --   --    *  --  101* 105* 116*  --  142*  < > 101* 98   BUN 20  --  18 17 17  --  15  < >  --   --    CR 0.74  --  0.80 0.93 1.05  --  0.94  < >  --   --    GFRESTIMATED >90Non  GFR Calc  --  >90Non  GFR Calc 84 73  --  83  < >  --   -- "    GFRESTBLACK >90African American GFR Calc  --  >90African American GFR Calc >90African American GFR Calc 88  --  >90African American GFR Calc  < >  --   --    CRISTOBAL 7.8*  --  8.0* 7.9* 7.4*  --  7.5*  < >  --   --    ICAW  --   --   --   --   --   --   --   --  4.6 4.5   MAG 2.7*  --   --  2.5* 2.2  --  2.0  < >  --   --    PHOS 1.7* 1.4* 1.4* 1.9* 2.4*  < > 1.9*  --   --   --    AMYLASE  --   --   --   --   --   --  790*  --   --   --    LIPASE 93  --   --   --   --   --  9517*  --   --   --    ALBUMIN 1.8*  --   --   --  2.3*  --  2.9*  < >  --   --    BILITOTAL 0.4  --   --   --  0.5  --  1.1  < >  --   --    ALKPHOS 84  --   --   --  32*  --  29*  < >  --   --    AST 20  --   --   --  74*  --  133*  < >  --   --    ALT 43  --   --   --  93*  --  128*  < >  --   --    < > = values in this interval not displayed.  CBC  Recent Labs  Lab 05/20/17  0910 05/20/17  0716 05/19/17 2138  05/16/17  0355   HGB 7.5* 6.9* 7.2*  < > 9.6*   WBC  --  12.5* 13.5*  < > 9.9   PLT  --  383 339  < > 133*   A1C  --   --   --   --  5.3   < > = values in this interval not displayed.  Coags    Recent Labs  Lab 05/20/17  0716 05/19/17  0656  05/15/17  2134   INR  --   --   --  1.66*   PTT 35 38*  < > 104*   < > = values in this interval not displayed.   Urinalysis  Recent Labs   Lab Test  05/10/17   1015   COLOR  Yellow   APPEARANCE  Clear   URINEGLC  Negative   URINEBILI  Negative   URINEKETONE  Negative   SG  1.022   UBLD  Negative   URINEPH  6.5   PROTEIN  Negative   NITRITE  Negative   LEUKEST  Negative   RBCU  2   WBCU  <1

## 2017-05-20 NOTE — PLAN OF CARE
Problem: Individualization  Goal: Patient Preferences  Oxygen saturation was 91% on 2 liters oxygen.  Writer increased portable tank to 3 liters while pt was on a walk.  AOVSS.  Pain controlled with PCA and scheduled meds only.  Fentanyl total dose was increased to 100 mcg. Writer applied a 50 mcg patch today.  See MAR.  PCA basil rate discontinued at 1515 with shift change. See Dr. Peralta's note regarding pain.  Ropivacaine bolus given around 0900.  Pressures monitored and MAP stayed in the 80's.     Denied nausea.   Blood glucose monitored hourly on the Insulin drip.  Writer used Algorithm 4, but needed to increase it at 1400 d/t pt getting regular Tylenol solution.  Pt is suppose to be getting sugar free Tylenol so Writer spoke to pharmacist per the MAR stating the solution was in the pyxis instead of from the main pharmacy.     Tube feeding increased to 60 ml/ hr at 1200.  Three enzymes used.  Water replacement was programmed into the TF pump at 38 cc/ hr to give 152 ml every 4 hours.  Pt is tolerating this well.  Continue to monitor sodium levels.   Potassium replaced with 20 mEq.  Recheck in the AM.   Phosphorus replaced with 20 mmol.  Recheck 2 hours after infusion.   Hgb recheck was 7.5.  Continue to monitor.   Heparin drip discontinued.  Lovenox injections to start at 1600.   MAURICIO is an opaque serosanguinous color.  This was told to Dr. aJckson during bedside rounding.  Continue to monitor for a chylus leak as the tube feeding rate is increased.    Two loose stool this shift.  No bowel meds given this am.  Good UOP via king after 20 mg IV Lasix.   Pt was able to cough a medium amount of creamy/ tan sputum this afternoon.  Continue to encourage IS use.   He also walked three times this shift.

## 2017-05-20 NOTE — PROGRESS NOTES
[facility]IN [facility] CONTINUOUS NERVE INFUSION NOTE  SUBJECTIVE:  Interval History: Pt reports moderate pain control via continuous peripheral nerve block (CPNB) infusion.  Denies any weakness, paresthesias, circumoral numbness, metallic taste or tinnitus.  Pt is ambulating with assistance.  Patient is currently without nausea or vomiting. Patient is tolerating TF.       Clinically Aligned Pain Assessment (CAPA):   Comfort (How is your pain?): Tolerable with discomfort  Change in Pain (Since your last medication/intervention?): About the same  Pain Control (How are your pain treatments working?):  Partially effective pain control  Functioning (Are you able to do activities to get better?) : Can do most things, but pain gets in the way of some   Sleep (Does your pain management allow you to sleep or rest?): Awake with occasional pain      Anticoagulation:    Heparin gtt -- turned off at 0840; being switched to Lovenox    OBJECTIVE:    Vitals:    Temp:  [36.7  C (98  F)-37.6  C (99.6  F)] 36.8  C (98.2  F)  Pulse:  [73-83] 83  Heart Rate:  [72-84] 81  Resp:  [15-20] 16  BP: (102-125)/(63-71) 114/69  SpO2:  [82 %-97 %] 91 %    Exam:    Strength 5/5 and symmetric grossly in bilateral LE      B/L paravertebral (PV) catheter sites with dressing c/d/i, no tenderness, erythema, heme, edema    Lab Results   Component Value Date    WBC 12.5 05/20/2017     Lab Results   Component Value Date    RBC 2.51 05/20/2017     Lab Results   Component Value Date    HGB 7.5 05/20/2017     Lab Results   Component Value Date    HCT 22.3 05/20/2017     No components found for: MCT  Lab Results   Component Value Date    MCV 89 05/20/2017     Lab Results   Component Value Date    MCH 27.5 05/20/2017     Lab Results   Component Value Date    MCHC 30.9 05/20/2017     Lab Results   Component Value Date    RDW 15.6 05/20/2017     Lab Results   Component Value Date     05/20/2017         ASSESSMENT/PLAN:    Sohan Powers  Juan J is a 56 year old male POD #5 s/p COMBINED PANCREATECTOMY, TRANSPLANT AUTO ISLET CELL and placement of B/L Thoracic PV catheters for analgesia.  Pt is receiving adequate analgesia with right Ropivacaine 0.2%, total infusion 10mL/hour. Pt is ambulating without difficulty.  No weakness or paresthesias.  No evidence of adverse side effects associated with local anesthetic. Left catheter to be pulled d/t malfunctioning catheter.     - Right PVC bolused w 8mL 0.125% bupi, BP checked q5min for 30 min    -- improvement in pain  - Left PVC to be d/c'd after 1040 (heparin gtt off at 0840)    -- PVC pulled without issues, tip intact, site cleaned and covered with Primapore    -- RN to start to Lovenox in 4 hours (1640)  - cont infusion of ropi 0.2% at 10 mL/hr through right PVC  - will continue to follow and adjust as needed  - discussed plan with attending anesthesiologist    Sheryl Hernandez MD  [facility]  May 20, 2017  12:00 PM    24 hour Job Code Pager.  For in-house use only.     [place]:  * * [phone]  [employer] [employer]: * * [phone]  Peds: * * [phone]  Enter call-back number and #      This pager only accepts text messages through [employer]    I have personally seen and evaluated patient. He was up walking in [place] today, reports that his pain control is not adequate.  He did receive a bolus via his PV catheters this am, and did not report significant relief.  He had a BMx3 this am, and did report some relief after that.  His left PV catheter was removed today because it continued to leak despite being changed, cut, redressed etc.  He now has the one catheter remaining.  Will continue to follow. He is on a multimodal pain regimen per the primary service.     Darlin Castillo MD  May 20, 2017

## 2017-05-20 NOTE — PLAN OF CARE
Problem: Individualization  Goal: Patient Preferences  Outcome: No Change     RN:  AVSS, at the start of the shift, abdominal pain intolerable, MD on call was notified, PCA Dilaudid was changed to 0.2mg every 10mins interval and started basal rate @0.2mg/hr, per patient, pain much more tolerable. TF@50cc/hr, to be increase to 60cc/hr at 11AM, blood glucose 's, at present, Insulin gtt @4.5units/hr. MAURICIO with small amount of serosanguinous output, Urethral catheter with adequate urine output, no BM during the shift. Patient resting between cares, will continue to monitor.

## 2017-05-20 NOTE — PLAN OF CARE
Problem: Goal Outcome Summary  Goal: Goal Outcome Summary  Outcome: No Change  VSS on 2L O2. C/o abdominal pain and intermittent nausea. Pt states that he feels very tired and weak. MD was notified this shift and some changes were then made to the pain management plan. The Dilaudid PCA was changed to 0.2mg Q10min, basal rate dc'd. Fentanyl 50mcg patch in place. One of the On Q catheters broke at the hub and therefore can not be used. Pt now has a single R catheter with rate of 10ml/hr. Pt received zofran for nausea once. Hgb=7.2 at 2140 along with phos=1.4. Phos 20mmols has been ordered up from pharmacy. BG 40=868.  TF chaged to 50ml/hr at 2300. Heparin gtt straight rated at 400cc/hr. Incision CDI. MAURICIO= 50cc. WI=[id number]. Continues to have loose stools- bowel meds held. Pt ambulated once this shift- SCD's on while pt in bed. Continue to monitor and notify MD as needed.

## 2017-05-20 NOTE — PROGRESS NOTES
"Diabetes Consult Daily Progress Note    Assessment/Plan:    55 yo M with hx of chronic pain, papillary thyroid cancer s/p thyroidectomy with hypothyroidism, chronic pancreatitis s/p lap lin, biliary and pancreatic sphincterotomies, transduodenal sphincteroplasty with resultant leak and phlegmonous pancreatitis who is now s/p open TPAIT on 5/15/17.     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 Glucose controlled mostly to target today. Pt underwent pre-op diabetes education as an outpatient discussing potential need for insulin long-term, how to administer both long and short-acting insulin, and how and when to check blood glucose.    Patient on tube feeding at 50ml/h  (to be increased to 60 ml/hr) per his nurse. Goal is 75 ml/hr, currently on insulin drip.  Plan is to continue the insulin drip until nutrition/TF is at a goal.     Plan:   - Continue insulin gtt for now with glucose target 100-120 mg/dL  - Once goal rate enteral feeds tolerated, will determine SQ insulin regimen  - Pt will need more diabetes education before discharge.         Patient was Discussed with Dr. Villalobos.     Sheri Moise MD  Endocrinology Fellow  Pager [phone]    Interval History:      Met with his wife; patient not in the room.  Pain at the incision site per report.  Frequent BM.      Active Diet Order      NPO      Exam:      /68 (BP Location: Right arm)  Pulse 74  Temp 98.7  F (37.1  C) (Oral)  Resp 16  Ht 1.88 m (6' 2\")  Wt 111.9 kg (246 lb 12.8 oz)  SpO2 91%  BMI 31.69 kg/m2      Recent Labs  Lab 05/20/17  1040 05/20/17  0909 05/20/17  0809 05/20/17  0716 05/20/17  0707 05/20/17  0600 05/20/17  0503  05/19/17  0656  05/18/17  0707  05/17/17  0214  05/16/17  0355  05/15/17  2134   GLC  --   --   --  127*  --   --   --   --  101*  --  105*  --  116*  --  142*  --  95   * 81 126*  --  124* 128* 127*  < >  --   < >  --   < >  --   < " >  --   < >  --    < > = values in this interval not displayed.  Lab Results   Component Value Date    A1C 5.3 05/16/2017    A1C 5.6 05/10/2017    A1C 5.4 09/10/2015     --- Addendum----  I reviewed and discussed the patient with endocrine fellow Dr. Moise. Agree above note and plan.     Makayla Villalobos MD  Staff Physician  Endocrinology and Metabolism  [facility]  License: [id number]  Pager: [phone]

## 2017-05-21 LAB
ANION GAP SERPL CALCULATED.3IONS-SCNC: 2 MMOL/L (ref 3–14)
ANION GAP SERPL CALCULATED.3IONS-SCNC: 2 MMOL/L (ref 3–14)
ANION GAP SERPL CALCULATED.3IONS-SCNC: 3 MMOL/L (ref 3–14)
ANION GAP SERPL CALCULATED.3IONS-SCNC: 5 MMOL/L (ref 3–14)
APTT PPP: 31 SEC (ref 22–37)
BUN SERPL-MCNC: 21 MG/DL (ref 7–30)
BUN SERPL-MCNC: 22 MG/DL (ref 7–30)
CALCIUM SERPL-MCNC: 7.5 MG/DL (ref 8.5–10.1)
CALCIUM SERPL-MCNC: 7.5 MG/DL (ref 8.5–10.1)
CALCIUM SERPL-MCNC: 7.7 MG/DL (ref 8.5–10.1)
CALCIUM SERPL-MCNC: 7.7 MG/DL (ref 8.5–10.1)
CHLORIDE SERPL-SCNC: 111 MMOL/L (ref 94–109)
CHLORIDE SERPL-SCNC: 112 MMOL/L (ref 94–109)
CO2 SERPL-SCNC: 33 MMOL/L (ref 20–32)
CO2 SERPL-SCNC: 34 MMOL/L (ref 20–32)
CO2 SERPL-SCNC: 35 MMOL/L (ref 20–32)
CO2 SERPL-SCNC: 36 MMOL/L (ref 20–32)
CREAT SERPL-MCNC: 0.67 MG/DL (ref 0.66–1.25)
CREAT SERPL-MCNC: 0.74 MG/DL (ref 0.66–1.25)
CREAT SERPL-MCNC: 0.76 MG/DL (ref 0.66–1.25)
CREAT SERPL-MCNC: 0.79 MG/DL (ref 0.66–1.25)
ERYTHROCYTE [DISTWIDTH] IN BLOOD BY AUTOMATED COUNT: 16.1 % (ref 10–15)
GFR SERPL CREATININE-BSD FRML MDRD: ABNORMAL ML/MIN/1.7M2
GLUCOSE BLDC GLUCOMTR-MCNC: 100 MG/DL (ref 70–99)
GLUCOSE BLDC GLUCOMTR-MCNC: 110 MG/DL (ref 70–99)
GLUCOSE BLDC GLUCOMTR-MCNC: 112 MG/DL (ref 70–99)
GLUCOSE BLDC GLUCOMTR-MCNC: 112 MG/DL (ref 70–99)
GLUCOSE BLDC GLUCOMTR-MCNC: 113 MG/DL (ref 70–99)
GLUCOSE BLDC GLUCOMTR-MCNC: 114 MG/DL (ref 70–99)
GLUCOSE BLDC GLUCOMTR-MCNC: 115 MG/DL (ref 70–99)
GLUCOSE BLDC GLUCOMTR-MCNC: 116 MG/DL (ref 70–99)
GLUCOSE BLDC GLUCOMTR-MCNC: 116 MG/DL (ref 70–99)
GLUCOSE BLDC GLUCOMTR-MCNC: 118 MG/DL (ref 70–99)
GLUCOSE BLDC GLUCOMTR-MCNC: 121 MG/DL (ref 70–99)
GLUCOSE BLDC GLUCOMTR-MCNC: 121 MG/DL (ref 70–99)
GLUCOSE BLDC GLUCOMTR-MCNC: 123 MG/DL (ref 70–99)
GLUCOSE BLDC GLUCOMTR-MCNC: 123 MG/DL (ref 70–99)
GLUCOSE BLDC GLUCOMTR-MCNC: 128 MG/DL (ref 70–99)
GLUCOSE BLDC GLUCOMTR-MCNC: 129 MG/DL (ref 70–99)
GLUCOSE BLDC GLUCOMTR-MCNC: 131 MG/DL (ref 70–99)
GLUCOSE BLDC GLUCOMTR-MCNC: 139 MG/DL (ref 70–99)
GLUCOSE BLDC GLUCOMTR-MCNC: 147 MG/DL (ref 70–99)
GLUCOSE BLDC GLUCOMTR-MCNC: 150 MG/DL (ref 70–99)
GLUCOSE BLDC GLUCOMTR-MCNC: 95 MG/DL (ref 70–99)
GLUCOSE BLDC GLUCOMTR-MCNC: 99 MG/DL (ref 70–99)
GLUCOSE SERPL-MCNC: 111 MG/DL (ref 70–99)
GLUCOSE SERPL-MCNC: 123 MG/DL (ref 70–99)
GLUCOSE SERPL-MCNC: 140 MG/DL (ref 70–99)
GLUCOSE SERPL-MCNC: 94 MG/DL (ref 70–99)
HCT VFR BLD AUTO: 23.7 % (ref 40–53)
HGB BLD-MCNC: 7.4 G/DL (ref 13.3–17.7)
MCH RBC QN AUTO: 28.1 PG (ref 26.5–33)
MCHC RBC AUTO-ENTMCNC: 31.2 G/DL (ref 31.5–36.5)
MCV RBC AUTO: 90 FL (ref 78–100)
PHOSPHATE SERPL-MCNC: 2.6 MG/DL (ref 2.5–4.5)
PLATELET # BLD AUTO: 481 10E9/L (ref 150–450)
POTASSIUM SERPL-SCNC: 3.4 MMOL/L (ref 3.4–5.3)
POTASSIUM SERPL-SCNC: 3.4 MMOL/L (ref 3.4–5.3)
POTASSIUM SERPL-SCNC: 3.6 MMOL/L (ref 3.4–5.3)
POTASSIUM SERPL-SCNC: 3.8 MMOL/L (ref 3.4–5.3)
RBC # BLD AUTO: 2.63 10E12/L (ref 4.4–5.9)
SODIUM SERPL-SCNC: 148 MMOL/L (ref 133–144)
SODIUM SERPL-SCNC: 149 MMOL/L (ref 133–144)
SODIUM SERPL-SCNC: 149 MMOL/L (ref 133–144)
SODIUM SERPL-SCNC: 152 MMOL/L (ref 133–144)
WBC # BLD AUTO: 17.6 10E9/L (ref 4–11)

## 2017-05-21 PROCEDURE — 25000125 ZZHC RX 250: Performed by: TRANSPLANT SURGERY

## 2017-05-21 PROCEDURE — 25000132 ZZH RX MED GY IP 250 OP 250 PS 637: Performed by: TRANSPLANT SURGERY

## 2017-05-21 PROCEDURE — 25000128 H RX IP 250 OP 636: Performed by: SURGERY

## 2017-05-21 PROCEDURE — 85730 THROMBOPLASTIN TIME PARTIAL: CPT | Performed by: PHYSICIAN ASSISTANT

## 2017-05-21 PROCEDURE — 25000125 ZZHC RX 250: Performed by: STUDENT IN AN ORGANIZED HEALTH CARE EDUCATION/TRAINING PROGRAM

## 2017-05-21 PROCEDURE — 25000132 ZZH RX MED GY IP 250 OP 250 PS 637: Performed by: NURSE PRACTITIONER

## 2017-05-21 PROCEDURE — 25000132 ZZH RX MED GY IP 250 OP 250 PS 637: Performed by: STUDENT IN AN ORGANIZED HEALTH CARE EDUCATION/TRAINING PROGRAM

## 2017-05-21 PROCEDURE — 25000128 H RX IP 250 OP 636: Performed by: NURSE PRACTITIONER

## 2017-05-21 PROCEDURE — 25000132 ZZH RX MED GY IP 250 OP 250 PS 637: Performed by: PHYSICIAN ASSISTANT

## 2017-05-21 PROCEDURE — 25000125 ZZHC RX 250: Performed by: PHYSICIAN ASSISTANT

## 2017-05-21 PROCEDURE — 27210443 ZZH NUTRITION PRODUCT SPECIALIZED PACKET

## 2017-05-21 PROCEDURE — 80048 BASIC METABOLIC PNL TOTAL CA: CPT | Performed by: TRANSPLANT SURGERY

## 2017-05-21 PROCEDURE — 85027 COMPLETE CBC AUTOMATED: CPT | Performed by: SURGERY

## 2017-05-21 PROCEDURE — 36592 COLLECT BLOOD FROM PICC: CPT | Performed by: TRANSPLANT SURGERY

## 2017-05-21 PROCEDURE — 80048 BASIC METABOLIC PNL TOTAL CA: CPT | Performed by: PHYSICIAN ASSISTANT

## 2017-05-21 PROCEDURE — 36415 COLL VENOUS BLD VENIPUNCTURE: CPT | Performed by: SURGERY

## 2017-05-21 PROCEDURE — 25000128 H RX IP 250 OP 636: Performed by: STUDENT IN AN ORGANIZED HEALTH CARE EDUCATION/TRAINING PROGRAM

## 2017-05-21 PROCEDURE — 25000132 ZZH RX MED GY IP 250 OP 250 PS 637: Performed by: SURGERY

## 2017-05-21 PROCEDURE — 12000025 ZZH R&B TRANSPLANT INTERMEDIATE

## 2017-05-21 PROCEDURE — 25000128 H RX IP 250 OP 636: Performed by: TRANSPLANT SURGERY

## 2017-05-21 PROCEDURE — 00000146 ZZHCL STATISTIC GLUCOSE BY METER IP

## 2017-05-21 PROCEDURE — 84100 ASSAY OF PHOSPHORUS: CPT | Performed by: PHYSICIAN ASSISTANT

## 2017-05-21 PROCEDURE — 36592 COLLECT BLOOD FROM PICC: CPT | Performed by: PHYSICIAN ASSISTANT

## 2017-05-21 RX ORDER — DEXTROSE MONOHYDRATE 50 MG/ML
INJECTION, SOLUTION INTRAVENOUS CONTINUOUS
Status: DISCONTINUED | OUTPATIENT
Start: 2017-05-21 | End: 2017-05-21

## 2017-05-21 RX ORDER — TAMSULOSIN HYDROCHLORIDE 0.4 MG/1
0.4 CAPSULE ORAL DAILY
Status: DISCONTINUED | OUTPATIENT
Start: 2017-05-21 | End: 2017-05-25

## 2017-05-21 RX ORDER — HYDROMORPHONE HYDROCHLORIDE 1 MG/ML
1 SOLUTION ORAL EVERY 4 HOURS
Status: DISCONTINUED | OUTPATIENT
Start: 2017-05-21 | End: 2017-05-22

## 2017-05-21 RX ADMIN — PANCRELIPASE 72000 UNITS: 24000; 76000; 120000 CAPSULE, DELAYED RELEASE PELLETS ORAL at 08:21

## 2017-05-21 RX ADMIN — MULTIVIT AND MINERALS-FERROUS GLUCONATE 9 MG IRON/15 ML ORAL LIQUID 15 ML: at 09:33

## 2017-05-21 RX ADMIN — SODIUM BICARBONATE 325 MG: 325 TABLET ORAL at 03:36

## 2017-05-21 RX ADMIN — HYDROMORPHONE HYDROCHLORIDE 1 MG: 1 SOLUTION ORAL at 20:26

## 2017-05-21 RX ADMIN — SENNOSIDES A AND B 10 ML: 415.36 LIQUID ORAL at 20:25

## 2017-05-21 RX ADMIN — PREGABALIN 25 MG: 20 SOLUTION ORAL at 09:32

## 2017-05-21 RX ADMIN — NYSTATIN 500000 UNITS: 500000 SUSPENSION ORAL at 20:25

## 2017-05-21 RX ADMIN — HUMAN INSULIN 3 UNITS/HR: 100 INJECTION, SOLUTION SUBCUTANEOUS at 21:03

## 2017-05-21 RX ADMIN — POTASSIUM CHLORIDE: 2 INJECTION, SOLUTION, CONCENTRATE INTRAVENOUS at 08:33

## 2017-05-21 RX ADMIN — Medication 1 PACKET: at 16:14

## 2017-05-21 RX ADMIN — LEVOTHYROXINE SODIUM 150 MCG: 300 TABLET ORAL at 07:56

## 2017-05-21 RX ADMIN — Medication: at 14:17

## 2017-05-21 RX ADMIN — NYSTATIN 500000 UNITS: 500000 SUSPENSION ORAL at 16:13

## 2017-05-21 RX ADMIN — ENOXAPARIN SODIUM 30 MG: 30 INJECTION SUBCUTANEOUS at 09:34

## 2017-05-21 RX ADMIN — KETOROLAC TROMETHAMINE 15 MG: 15 INJECTION, SOLUTION INTRAMUSCULAR; INTRAVENOUS at 01:36

## 2017-05-21 RX ADMIN — Medication 1 PACKET: at 20:29

## 2017-05-21 RX ADMIN — PREGABALIN 25 MG: 20 SOLUTION ORAL at 20:26

## 2017-05-21 RX ADMIN — PIPERACILLIN SODIUM,TAZOBACTAM SODIUM 3.38 G: 3; .375 INJECTION, POWDER, FOR SOLUTION INTRAVENOUS at 10:43

## 2017-05-21 RX ADMIN — SODIUM PHOSPHATE, MONOBASIC, MONOHYDRATE AND SODIUM PHOSPHATE, DIBASIC, ANHYDROUS 20 MMOL: 276; 142 INJECTION, SOLUTION INTRAVENOUS at 01:18

## 2017-05-21 RX ADMIN — ACETAMINOPHEN 500 MG: 160 LIQUID ORAL at 09:34

## 2017-05-21 RX ADMIN — PANTOPRAZOLE SODIUM 40 MG: 40 TABLET, DELAYED RELEASE ORAL at 09:33

## 2017-05-21 RX ADMIN — ACETAMINOPHEN 500 MG: 160 LIQUID ORAL at 20:26

## 2017-05-21 RX ADMIN — PANCRELIPASE 72000 UNITS: 24000; 76000; 120000 CAPSULE, DELAYED RELEASE PELLETS ORAL at 03:36

## 2017-05-21 RX ADMIN — ACETAMINOPHEN 500 MG: 160 LIQUID ORAL at 01:35

## 2017-05-21 RX ADMIN — MAGNESIUM HYDROXIDE 15 ML: 400 SUSPENSION ORAL at 20:25

## 2017-05-21 RX ADMIN — PANTOPRAZOLE SODIUM 40 MG: 40 TABLET, DELAYED RELEASE ORAL at 20:37

## 2017-05-21 RX ADMIN — ENOXAPARIN SODIUM 30 MG: 30 INJECTION SUBCUTANEOUS at 20:24

## 2017-05-21 RX ADMIN — NYSTATIN 500000 UNITS: 500000 SUSPENSION ORAL at 12:18

## 2017-05-21 RX ADMIN — PIPERACILLIN SODIUM,TAZOBACTAM SODIUM 3.38 G: 3; .375 INJECTION, POWDER, FOR SOLUTION INTRAVENOUS at 16:14

## 2017-05-21 RX ADMIN — NYSTATIN 500000 UNITS: 500000 SUSPENSION ORAL at 09:33

## 2017-05-21 RX ADMIN — HYDROMORPHONE HYDROCHLORIDE 1 MG: 1 SOLUTION ORAL at 16:13

## 2017-05-21 RX ADMIN — Medication: at 09:35

## 2017-05-21 RX ADMIN — POTASSIUM CHLORIDE 20 MEQ: 29.8 INJECTION, SOLUTION INTRAVENOUS at 08:06

## 2017-05-21 RX ADMIN — HYDROMORPHONE HYDROCHLORIDE 1 MG: 1 SOLUTION ORAL at 13:27

## 2017-05-21 RX ADMIN — ACETAMINOPHEN 500 MG: 160 LIQUID ORAL at 14:16

## 2017-05-21 RX ADMIN — PIPERACILLIN SODIUM,TAZOBACTAM SODIUM 3.38 G: 3; .375 INJECTION, POWDER, FOR SOLUTION INTRAVENOUS at 21:39

## 2017-05-21 RX ADMIN — SODIUM BICARBONATE 325 MG: 325 TABLET ORAL at 08:20

## 2017-05-21 RX ADMIN — KETOROLAC TROMETHAMINE 15 MG: 15 INJECTION, SOLUTION INTRAMUSCULAR; INTRAVENOUS at 07:59

## 2017-05-21 RX ADMIN — TAMSULOSIN HYDROCHLORIDE 0.4 MG: 0.4 CAPSULE ORAL at 20:25

## 2017-05-21 RX ADMIN — KETOROLAC TROMETHAMINE 15 MG: 15 INJECTION, SOLUTION INTRAMUSCULAR; INTRAVENOUS at 20:25

## 2017-05-21 RX ADMIN — Medication 2.6 MG: at 05:41

## 2017-05-21 RX ADMIN — SODIUM BICARBONATE 325 MG: 325 TABLET ORAL at 12:17

## 2017-05-21 RX ADMIN — PANCRELIPASE 72000 UNITS: 24000; 76000; 120000 CAPSULE, DELAYED RELEASE PELLETS ORAL at 12:16

## 2017-05-21 RX ADMIN — KETOROLAC TROMETHAMINE 15 MG: 15 INJECTION, SOLUTION INTRAMUSCULAR; INTRAVENOUS at 14:17

## 2017-05-21 RX ADMIN — PIPERACILLIN SODIUM,TAZOBACTAM SODIUM 3.38 G: 3; .375 INJECTION, POWDER, FOR SOLUTION INTRAVENOUS at 03:29

## 2017-05-21 RX ADMIN — FLUOXETINE HYDROCHLORIDE 10 MG: 20 SOLUTION ORAL at 09:33

## 2017-05-21 RX ADMIN — Medication 2000 UNITS: at 09:33

## 2017-05-21 RX ADMIN — POTASSIUM CHLORIDE 20 MEQ: 1.5 POWDER, FOR SOLUTION ORAL at 20:26

## 2017-05-21 NOTE — PROGRESS NOTES
"REGIONAL ANESTHESIA PAIN SERVICE CONTINUOUS NERVE INFUSION NOTE  SUBJECTIVE:  Interval History: Pt reports moderate pain control via continuous peripheral nerve block (CPNB) infusion. Denies any weakness, paresthesias, circumoral numbness, metallic taste or tinnitus. Pt is ambulating with assistance. Patient is currently without nausea or vomiting. Patient is tolerating TF.      Clinically Aligned Pain Assessment (CAPA):   Comfort (How is your pain?): Tolerable with discomfort  Change in Pain (Since your last medication/intervention?): About the same  Pain Control (How are your pain treatments working?): Partially effective pain control  Functioning (Are you able to do activities to get better?) : Can do most things, but pain gets in the way of some   Sleep (Does your pain management allow you to sleep or rest?): Awake with occasional pain        Anticoagulation:   Lovenox 30 mg q 12 hours     OBJECTIVE:     Vital signs:  Temp: 37.2  C (98.9  F) Temp src: Oral BP: 122/73 (Ropivicaine bolus given) Pulse: 78 Heart Rate: 74 Resp: 15 SpO2: 98 % O2 Device: Nasal cannula Oxygen Delivery: 3 LPM Height: 188 cm (6' 2\") Weight: 111.9 kg (246 lb 12.8 oz)  Estimated body mass index is 31.69 kg/(m^2) as calculated from the following:    Height as of this encounter: 1.88 m (6' 2\").    Weight as of this encounter: 111.9 kg (246 lb 12.8 oz).      Exam:   Strength 5/5 and symmetric grossly in bilateral LE      B/L paravertebral (PV) catheter sites with dressing c/d/i, no tenderness, erythema, heme, edema     Lab Results   Component Value Date    WBC 12.5 (H) 05/20/2017    WBC 13.5 (H) 05/19/2017    WBC 18.2 (H) 05/19/2017    HGB 7.5 (L) 05/20/2017    HGB 6.9 (LL) 05/20/2017    HGB 7.2 (L) 05/19/2017    HCT 22.3 (L) 05/20/2017    HCT 22.3 (L) 05/19/2017    HCT 22.7 (L) 05/19/2017     05/20/2017     05/19/2017     05/19/2017     (H) 05/21/2017     (H) 05/20/2017     (H) 05/20/2017    POTASSIUM " 3.4 05/21/2017    POTASSIUM 3.4 05/20/2017    POTASSIUM 3.4 05/20/2017    CHLORIDE 112 (H) 05/21/2017    CHLORIDE 112 (H) 05/20/2017    CHLORIDE 112 (H) 05/20/2017    CO2 36 (H) 05/21/2017    CO2 35 (H) 05/20/2017    CO2 34 (H) 05/20/2017    BUN 21 05/21/2017    BUN 21 05/20/2017    BUN 21 05/20/2017    CR 0.79 05/21/2017    CR 0.76 05/20/2017    CR 0.82 05/20/2017    GLC 94 05/21/2017     (H) 05/20/2017     (H) 05/20/2017    DD  05/10/2017     <0.3  This D-dimer assay is intended for use in conjuntion with a clinical pretest   probability assessment model to exclude pulmonary embolism (PE) and as an aid   in the diagnosis of deep venous thrombosis (DVT) in outpatients suspected of PE   or DVT. The cut-off value is 0.5 g/mL FEU.      AST 20 05/20/2017    AST 74 (H) 05/17/2017     (H) 05/16/2017    ALT 43 05/20/2017    ALT 93 (H) 05/17/2017     (H) 05/16/2017    ALKPHOS 84 05/20/2017    ALKPHOS 32 (L) 05/17/2017    ALKPHOS 29 (L) 05/16/2017    BILITOTAL 0.4 05/20/2017    BILITOTAL 0.5 05/17/2017    BILITOTAL 1.1 05/16/2017    INR 1.66 (H) 05/15/2017    INR 1.04 05/10/2017           ASSESSMENT/PLAN:   Sohan Arita is a 56 year old male POD #6 s/p COMBINED PANCREATECTOMY, TRANSPLANT AUTO ISLET CELL and placement of B/L Thoracic PV catheters for analgesia. Pt is receiving adequate analgesia with right Ropivacaine 0.2%, total infusion 10mL/hour. Pt is ambulating without difficulty. No weakness or paresthesias. No evidence of adverse side effects associated with local anesthetic. Left catheter pulled d/t malfunctioning catheter. Patient now with remaining Right PVC.     - continue current infusion @ 5 mL/hour  - 5 cc bolus of 0.125% bupivacaine at 10:45 PM  - will continue to follow and adjust as needed     Eduardo Campa MD  Regional Anesthesia Pain Service  5/21/17 11:00 AM     24 hour Job Code Pager. For in-house use only.   [place]: * * [phone]  [facility]: * *  [phone]  Peds: * * [phone]  Enter call-back number and #   This pager only accepts text messages through [employer]          I have personally seen and evaluated patient.  He has only the right catheter in place at this time.  Reports pain in his abdomen, somewhat tolerable, he is able to ambulate, but still reports significant pain.  Will continue right PV catheter.  He has a multimodal pain regimen per his primary service.      Darlin Castillo MD  May 21, 2017

## 2017-05-21 NOTE — PROGRESS NOTES
CLINICAL NUTRITION SERVICES    Team requesting switch to very low fat formula d/t milky MAURICIO output.      Interventions:  Adjusted TF orders to the following:  Vivonex TEN @ 100 ml/hr + 4 pkts ProSource (protein modular) daily.  This provides 2560 kcal (25 kcal/kg), 135 grams protein (1.3g/kg), 490 grams CHO, 1992 ml free water.    Notified Endo team re: formula change.     Notified RN re: new protein modular/TF change.     Discontinued pancreatic enzymes with TF as not necessary with elemental formulation.     Recommendations:  Free water needs are likely to decrease with formula change d/t high water content of new formula.  Maintenance needs with new formula would be ~100 ml q 4 hours.     Orquidea Tate MS, RD, LD  Pager [phone]

## 2017-05-21 NOTE — PLAN OF CARE
Problem: Goal Outcome Summary  Goal: Goal Outcome Summary  Outcome: No Change  Afebrile; VSS on 2L NC.  -121; insulin drip titrated per algorithm 4+.  Pain partially controlled on current pain regimen; pt needs encouragement to use PCA.  Denies nausea.  Pt c/o of abdominal discomfort and fullness with change to Vivonex at 85mL/hr.  TF stopped for one our at 1800 and restarted at 75mL/hr per pt request.  Ferguson patent with good output.  No BM this shift but pt is passing gas.  MAURICIO with milky pink output.  Incision covered; c/d/i.  Up with Ax1.  Continue with plan of care and notify MD with changes.

## 2017-05-21 NOTE — PLAN OF CARE
Problem: Goal Outcome Summary  Goal: Goal Outcome Summary  7A PT: CANCEL- per pt, is very frustrated with multiple providers coming in all day- reports hasn't slept and is very tearful. Declines walking today and all PT. Scheduled session at 10 AM tomorrow so he will be aware when therapy will be coming and can prepare.

## 2017-05-21 NOTE — PLAN OF CARE
Problem: Individualization  Goal: Patient Preferences  Outcome: No Change     RN:  AVSS, O2 sat 96-98% @2L/NC, abdominal pain minimally controlled with PCA Dilaudid @0.2mg every 10mins interval, scheduled Tylenol and Toradol. TF@75cc/hr, goal rate, blood glucose 's, at present Insulin gtt @6.5units/hr. NaPhos 20mmol per replacement protocol. Urethral catheter with adequate urine output. G tube with 175cc of yellow/tan milky output, MAURICIO with moderate amount of yellowish/pinkish milky output.

## 2017-05-21 NOTE — PLAN OF CARE
Problem: Individualization  Goal: Patient Preferences  VSS on 3 liters oxygen nasal cannula.  Pain controlled with PCA, but Writer did have to encourage pt to use it again.  No change in settings.  Ropivicaine bolus given with good effect.  A new order for Dilaudid solution was given.    Borderline nausea this shift.  No meds given.   Blood glucose monitored and corrected using an adapted algorithm 4.    Potassium replaced with 20 mEq.  Recheck in the am.  D5W with 20 K+ started and is running at 10 ml/ hr per order.    TF is at goal of 75 ml/ hr..  Pt is feeling full.  MAURICIO has milky output and MD changed TF formula to Vivonex, but it wasn't going to be delivered until 1530.  No change this shift.   Pt walked X 2.    Ferguson had good output.  One small bowel movement this shift.

## 2017-05-21 NOTE — PROGRESS NOTES
Transplant Surgery Pancreas Service - Daily Progress Note  05/21/2017    Assessment & Plan: 56 year old male with chronic pancreatitis s/p lap cholecystectomy 4/2012, s/p biliary and pancreatic sphincterotomies and a temporary pancreatic stent 2012, s/p transduodenal sphincteroplasty 12/2014 with resultant leak and phlegmonous pancreatitis. S/p TPAIT 5/15/17.    Liver US:  Impression: The left portal vein, left hepatic vein, and left hepatic  artery are obscured by overlying bowel gas. The splenic vein is also  not visualized. The remaining hepatic vasculature is patent with  antegrade flow.    TPAIT: POD #6  Cardiorespiratory: Hypoxia, remains on O2 per NC.  Likely due to hypervolemia (up 11kg from admission).  Will not diurese today due to ongoing hypernatremia. Continue IS.   GI/Nutrition: His g tube is currently draining to gravity and his j tube is running tube is running tube feeds at 75 cc/h plus meds. 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  Continue senna and MOM (decrease MOM dose due to loose stools). Zofran and Compazine PRN.  Clamp g-tube at night.  Begin sugar-free clear liquid diet.  Renal/Fluid/Electrolytes: Hypernatremia (Na 152)- continue free water flushes through feeding tube 150ml Q4h, add D5W IV 75ml/hr.  Q6h BMP.  : King in place, replaced 5/18 for retention.  Add flomax today, will consider removing king tomorrow.  Post-pancreatectomy diabetes: Continue insulin gtt, appreciate Endocrine consult.  Infection:  Afebrile. Surgical ppx: Ertapenem, stopped 5/18.  Will cover enterobacter and enterococcus growing in panc pres solution.  Continue Zosyn x 10 days.   Prophylaxis: PPI, Anticoagulation: Lovenox 40 q12  Pain control: Fair  Ropivacaine On Q. Plan to remove POD 7.  Dilaudid PCA. Continue bumps 0.2 mg.Fentanyl patch 100 mcg.  Add dilaudid 1mg Q4h through J-tube.  PRN Acetaminophen to susp, 650 mg every 6 hours Gabapentin susp, discontinued due to pt report of  previous intolerance (hematological issue per pt) Lyrica 25 mg BID  PTA:  Pt had weaned down to fent 6mcg (half of 12mcg patch) prior to admission  Activity: ambulate QID  Anticipated LOS/Discharge: TBD    Medical Decision Making: Medium  Subsequent visit 35364 (moderate level decision making)    GELACIO/Fellow/Resident Provider: Jeanine Peralta MD    Faculty: Ian Mendez MD  __________________________________________________________________  Transplant History: Admitted 5/15/2017 for TP AIT  5/15/2017 ([place]), Postoperative day: 6     Interval History: History is obtained from the patient  Overnight events: Pain control fair, reports he got behind on his pain yesterday afternoon. No c/o of nausea. Had loose BMs. No new complaints.      ROS:   A 10-point review of systems was negative except as noted above.    Curent Meds:    sennosides  10 mL Per Feeding Tube BID     magnesium hydroxide  15 mL Per Feeding Tube BID     tamsulosin  400 mcg Per J Tube Daily     HYDROmorphone  1 mg Per J Tube Q4H     HYDROmorphone  0.2 mg Intravenous Once     enoxaparin  30 mg Subcutaneous Q12H     [START ON 5/22/2017] fentaNYL  100 mcg Transdermal Q72H     HYDROmorphone   Intravenous PCA     bisacodyl  10 mg Rectal Once     acetaminophen  500 mg Oral Q6H     fentaNYL   Transdermal Q8H     [START ON 5/22/2017] fentaNYL   Transdermal Q72H     disposable pump w/ anesthetic (select flow)   Topical Elast Pump     ketorolac  15 mg Intravenous Q6H     piperacillin-tazobactam  3.375 g Intravenous Q6H     pregabalin  25 mg Per Feeding Tube BID     nystatin  500,000 Units Oral 4x Daily     pantoprazole  40 mg Oral or J tube BID     amylase-lipase-protease  1-3 capsule Per J Tube Q4H    And     sodium bicarbonate  325 mg Per J Tube Q4H     cholecalciferol  2,000 Units Per J Tube Daily     multivitamins with minerals  15 mL Per Feeding Tube Daily     sodium chloride (PF)  3 mL Intracatheter Q8H     FLUoxetine  10 mg Per J Tube Daily      "levothyroxine  150 mcg Per J Tube [facility]       Physical Exam:     Admit Weight: 100.7 kg (222 lb 0.1 oz)    Current Vitals:   /69  Pulse 76  Temp 98.9  F (37.2  C) (Oral)  Resp 16  Ht 1.88 m (6' 2\")  Wt 111.9 kg (246 lb 12.8 oz)  SpO2 98%  BMI 31.69 kg/m2  Vital sign ranges:    Temp:  [98.5  F (36.9  C)-98.9  F (37.2  C)] 98.9  F (37.2  C)  Pulse:  [71-81] 76  Heart Rate:  [70-78] 76  Resp:  [15-16] 16  BP: (106-132)/(56-75) 121/69  SpO2:  [95 %-98 %] 98 %  Patient Vitals for the past 24 hrs:   BP Temp Temp src Pulse Heart Rate Resp SpO2   05/21/17 1127 121/69 98.9  F (37.2  C) Oral - 76 16 98 %   05/21/17 1105 127/69 - - 76 - - -   05/21/17 1100 124/70 - - 81 - - -   05/21/17 1055 124/75 - - 78 - - -   05/21/17 1050 122/73 - - 78 - 15 -   05/21/17 0946 - - - - - - 98 %   05/21/17 0746 132/71 98.9  F (37.2  C) Oral - 74 16 97 %   05/21/17 0400 122/65 98.7  F (37.1  C) Oral - 71 16 98 %   05/21/17 0003 119/71 98.5  F (36.9  C) Oral 71 70 16 96 %   05/20/17 2100 107/56 - - - - - -   05/20/17 2055 108/67 - - - - - -   05/20/17 2050 106/63 - - - - - -   05/20/17 2045 111/61 - - - - - -   05/20/17 2040 111/60 - - - - - -   05/20/17 2035 114/57 - - - - - -   05/20/17 2030 111/62 - - - - - -   05/20/17 1952 122/73 98.9  F (37.2  C) Oral - 78 16 97 %   05/20/17 1547 106/62 98.5  F (36.9  C) Oral - 72 16 95 %     General Appearance: NAD, alert.   Skin: normal, dry  Heart: RRR   Lungs: NLB on NC  Abdomen: The abdomen is soft, appropriate incisional ttp, and tender around surgical sites. Mild erythema along incision, removed 2 staples and expressed fluid consistent with fat necrosis. Tube/Drain sites are: G tube to gravity and J Tube: TF running.  MAURICIO: cloudy.   : king with clear yellow urine.  Extremities: edema: mild  Neuro:  Alert, oriented      Data:   CMP  Recent Labs  Lab 05/21/17  0558 05/20/17  2337 05/20/17 1952 05/20/17  0716  05/18/17  0707 05/17/17  0214  05/16/17  0355  05/15/17  2002 " 05/15/17  1900   * 149*  --   < > 152*  < > 144 145*  --  144  < > 140 139   POTASSIUM 3.4 3.4  --   < > 3.4  < > 3.8 4.1  --  4.1  < > 4.4 4.1   CHLORIDE 112* 112*  --   < > 112*  < > 110* 113*  --  114*  < >  --   --    CO2 36* 35*  --   < > 35*  < > 31 26  --  22  < >  --   --    GLC 94 123*  --   < > 127*  < > 105* 116*  --  142*  < > 101* 98   BUN 21 21  --   < > 20  < > 17 17  --  15  < >  --   --    CR 0.79 0.76  --   < > 0.74  < > 0.93 1.05  --  0.94  < >  --   --    GFRESTIMATED >90Non  GFR Calc >90Non  GFR Calc  --   < > >90Non  GFR Calc  < > 84 73  --  83  < >  --   --    GFRESTBLACK >90African American GFR Calc >90African American GFR Calc  --   < > >90African American GFR Calc  < > >90African American GFR Calc 88  --  >90African American GFR Calc  < >  --   --    CRISTOBAL 7.5* 7.5*  --   < > 7.8*  < > 7.9* 7.4*  --  7.5*  < >  --   --    ICAW  --   --   --   --   --   --   --   --   --   --   --  4.6 4.5   MAG  --   --   --   --  2.7*  --  2.5* 2.2  --  2.0  < >  --   --    PHOS 2.6  --  1.7*  --  1.7*  < > 1.9* 2.4*  < > 1.9*  --   --   --    AMYLASE  --   --   --   --   --   --   --   --   --  790*  --   --   --    LIPASE  --   --   --   --  93  --   --   --   --  9517*  --   --   --    ALBUMIN  --   --   --   --  1.8*  --   --  2.3*  --  2.9*  < >  --   --    BILITOTAL  --   --   --   --  0.4  --   --  0.5  --  1.1  < >  --   --    ALKPHOS  --   --   --   --  84  --   --  32*  --  29*  < >  --   --    AST  --   --   --   --  20  --   --  74*  --  133*  < >  --   --    ALT  --   --   --   --  43  --   --  93*  --  128*  < >  --   --    < > = values in this interval not displayed.  CBC  Recent Labs  Lab 05/20/17  0910 05/20/17  0716 05/19/17  2138  05/16/17  0355   HGB 7.5* 6.9* 7.2*  < > 9.6*   WBC  --  12.5* 13.5*  < > 9.9   PLT  --  383 339  < > 133*   A1C  --   --   --   --  5.3   < > = values in this interval not displayed.  Coags    Recent  Labs  Lab 05/21/17  0558 05/20/17  0716  05/15/17  2134   INR  --   --   --  1.66*   PTT 31 35  < > 104*   < > = values in this interval not displayed.   Urinalysis  Recent Labs   Lab Test  05/10/17   1015   COLOR  Yellow   APPEARANCE  Clear   URINEGLC  Negative   URINEBILI  Negative   URINEKETONE  Negative   SG  1.022   UBLD  Negative   URINEPH  6.5   PROTEIN  Negative   NITRITE  Negative   LEUKEST  Negative   RBCU  2   WBCU  <1

## 2017-05-21 NOTE — PROGRESS NOTES
Regional Anesthesia Pain Service    Pt c/o pain around midsection and requesting repeat bolus. Catheter bolused with 0.125% bupi 8mL. BP being checked q 5min for 30 min. Pt denying sx of LAST.     Sheryl Hernandez MD  Job Code * * * [phone]    5/20/2017  8:34 PM

## 2017-05-21 NOTE — ADDENDUM NOTE
Addendum  created 05/21/17 1107 by Katheryn Clark MD    Pend clinical note, Sign clinical note

## 2017-05-21 NOTE — PROGRESS NOTES
"Diabetes Consult Daily Progress Note    Assessment/Plan:    57 yo M with hx of chronic pain, papillary thyroid cancer s/p thyroidectomy with hypothyroidism, chronic pancreatitis s/p lap lin, biliary and pancreatic sphincterotomies, transduodenal sphincteroplasty with resultant leak and phlegmonous pancreatitis who is now s/p open TPAIT on 5/15/17.      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 Glucose controlled to target.      Patient on tube feeding at 75ml/h; rate changed today. He is at goal is 75 ml/hr, currently on insulin drip. If tolerates current rate well and stable; plan on changing to SQ in am.  Continue insulin drip for now.  Plan discussed with patient and nurse.      Patient seems to be in a lot of pain and fatigued. Primary team managing pain and nurse by the bedside.     Plan:   - Continue insulin gtt for now with glucose target 100-120 mg/dL  - if well tolerated and stable next 12 hours, will determine SQ insulin regimen.  - Pt will need more diabetes education before discharge.         Patient was Discussed with Dr. Villalobos.     Sheri Moise MD  Endocrinology Fellow  Pager [phone]    Interval History:      Patient seems to be in a lot of pain and fatigued. Complains of abdominal cramps.       Active Diet Order      NPO      Exam:      /71 (BP Location: Right arm)  Pulse 71  Temp 98.9  F (37.2  C) (Oral)  Resp 16  Ht 1.88 m (6' 2\")  Wt 111.9 kg (246 lb 12.8 oz)  SpO2 98%  BMI 31.69 kg/m2      Data:        Recent Labs  Lab 05/21/17  0931 05/21/17  0801 05/21/17  0644 05/21/17  0558 05/21/17  0538 05/21/17  0424 05/21/17  0329  05/20/17  2337  05/20/17  1637  05/20/17  0716  05/19/17  0656  05/18/17  0707   GLC  --   --   --  94  --   --   --   --  123*  --  114*  --  127*  --  101*  --  105*   * 100* 95  --  99 114* 121*  < >  --   < >  --   < >  --   < >  --   < >  --    < > = values in this " interval not displayed.  Lab Results   Component Value Date    A1C 5.3 05/16/2017    A1C 5.6 05/10/2017    A1C 5.4 09/10/2015     --- Addendum----  I reviewed and discussed the case with endocrine fellow Dr. Moise. Agree above note and plan.     Makayla Villalobos MD  Staff Physician  Endocrinology and Metabolism  [facility]  License: [place] [id number]  Pager: [phone]

## 2017-05-22 ENCOUNTER — APPOINTMENT (OUTPATIENT)
Dept: PHYSICAL THERAPY | Facility: CLINIC | Age: 57
DRG: 406 | End: 2017-05-22
Attending: TRANSPLANT SURGERY
Payer: COMMERCIAL

## 2017-05-22 LAB
ANION GAP SERPL CALCULATED.3IONS-SCNC: 3 MMOL/L (ref 3–14)
ANION GAP SERPL CALCULATED.3IONS-SCNC: 4 MMOL/L (ref 3–14)
ANION GAP SERPL CALCULATED.3IONS-SCNC: 5 MMOL/L (ref 3–14)
ANION GAP SERPL CALCULATED.3IONS-SCNC: 5 MMOL/L (ref 3–14)
APTT PPP: 33 SEC (ref 22–37)
BUN SERPL-MCNC: 20 MG/DL (ref 7–30)
CALCIUM SERPL-MCNC: 7.6 MG/DL (ref 8.5–10.1)
CALCIUM SERPL-MCNC: 7.7 MG/DL (ref 8.5–10.1)
CHLORIDE SERPL-SCNC: 109 MMOL/L (ref 94–109)
CHLORIDE SERPL-SCNC: 111 MMOL/L (ref 94–109)
CHLORIDE SERPL-SCNC: 112 MMOL/L (ref 94–109)
CHLORIDE SERPL-SCNC: 113 MMOL/L (ref 94–109)
CO2 SERPL-SCNC: 31 MMOL/L (ref 20–32)
CO2 SERPL-SCNC: 31 MMOL/L (ref 20–32)
CO2 SERPL-SCNC: 32 MMOL/L (ref 20–32)
CO2 SERPL-SCNC: 32 MMOL/L (ref 20–32)
CREAT SERPL-MCNC: 0.69 MG/DL (ref 0.66–1.25)
CREAT SERPL-MCNC: 0.76 MG/DL (ref 0.66–1.25)
CREAT SERPL-MCNC: 0.76 MG/DL (ref 0.66–1.25)
CREAT SERPL-MCNC: 0.81 MG/DL (ref 0.66–1.25)
CRP SERPL-MCNC: 81 MG/L (ref 0–8)
ERYTHROCYTE [DISTWIDTH] IN BLOOD BY AUTOMATED COUNT: 16.3 % (ref 10–15)
GFR SERPL CREATININE-BSD FRML MDRD: ABNORMAL ML/MIN/1.7M2
GLUCOSE BLDC GLUCOMTR-MCNC: 102 MG/DL (ref 70–99)
GLUCOSE BLDC GLUCOMTR-MCNC: 102 MG/DL (ref 70–99)
GLUCOSE BLDC GLUCOMTR-MCNC: 109 MG/DL (ref 70–99)
GLUCOSE BLDC GLUCOMTR-MCNC: 115 MG/DL (ref 70–99)
GLUCOSE BLDC GLUCOMTR-MCNC: 116 MG/DL (ref 70–99)
GLUCOSE BLDC GLUCOMTR-MCNC: 117 MG/DL (ref 70–99)
GLUCOSE BLDC GLUCOMTR-MCNC: 123 MG/DL (ref 70–99)
GLUCOSE BLDC GLUCOMTR-MCNC: 125 MG/DL (ref 70–99)
GLUCOSE BLDC GLUCOMTR-MCNC: 126 MG/DL (ref 70–99)
GLUCOSE BLDC GLUCOMTR-MCNC: 130 MG/DL (ref 70–99)
GLUCOSE BLDC GLUCOMTR-MCNC: 132 MG/DL (ref 70–99)
GLUCOSE BLDC GLUCOMTR-MCNC: 135 MG/DL (ref 70–99)
GLUCOSE BLDC GLUCOMTR-MCNC: 86 MG/DL (ref 70–99)
GLUCOSE BLDC GLUCOMTR-MCNC: 87 MG/DL (ref 70–99)
GLUCOSE BLDC GLUCOMTR-MCNC: 92 MG/DL (ref 70–99)
GLUCOSE BLDC GLUCOMTR-MCNC: 92 MG/DL (ref 70–99)
GLUCOSE BLDC GLUCOMTR-MCNC: 94 MG/DL (ref 70–99)
GLUCOSE BLDC GLUCOMTR-MCNC: 95 MG/DL (ref 70–99)
GLUCOSE BLDC GLUCOMTR-MCNC: 97 MG/DL (ref 70–99)
GLUCOSE BLDC GLUCOMTR-MCNC: 98 MG/DL (ref 70–99)
GLUCOSE BLDC GLUCOMTR-MCNC: 99 MG/DL (ref 70–99)
GLUCOSE SERPL-MCNC: 136 MG/DL (ref 70–99)
GLUCOSE SERPL-MCNC: 81 MG/DL (ref 70–99)
GLUCOSE SERPL-MCNC: 90 MG/DL (ref 70–99)
GLUCOSE SERPL-MCNC: 97 MG/DL (ref 70–99)
HCT VFR BLD AUTO: 23.8 % (ref 40–53)
HGB BLD-MCNC: 7.3 G/DL (ref 13.3–17.7)
MCH RBC QN AUTO: 27.7 PG (ref 26.5–33)
MCHC RBC AUTO-ENTMCNC: 30.7 G/DL (ref 31.5–36.5)
MCV RBC AUTO: 90 FL (ref 78–100)
PHOSPHATE SERPL-MCNC: 2.2 MG/DL (ref 2.5–4.5)
PLATELET # BLD AUTO: 549 10E9/L (ref 150–450)
POTASSIUM SERPL-SCNC: 3.4 MMOL/L (ref 3.4–5.3)
POTASSIUM SERPL-SCNC: 3.7 MMOL/L (ref 3.4–5.3)
POTASSIUM SERPL-SCNC: 3.7 MMOL/L (ref 3.4–5.3)
POTASSIUM SERPL-SCNC: 4 MMOL/L (ref 3.4–5.3)
PREALB SERPL IA-MCNC: 9 MG/DL (ref 15–45)
RBC # BLD AUTO: 2.64 10E12/L (ref 4.4–5.9)
SODIUM SERPL-SCNC: 144 MMOL/L (ref 133–144)
SODIUM SERPL-SCNC: 147 MMOL/L (ref 133–144)
SODIUM SERPL-SCNC: 147 MMOL/L (ref 133–144)
SODIUM SERPL-SCNC: 148 MMOL/L (ref 133–144)
WBC # BLD AUTO: 17.8 10E9/L (ref 4–11)

## 2017-05-22 PROCEDURE — 36415 COLL VENOUS BLD VENIPUNCTURE: CPT | Performed by: TRANSPLANT SURGERY

## 2017-05-22 PROCEDURE — 25000125 ZZHC RX 250: Performed by: PHYSICIAN ASSISTANT

## 2017-05-22 PROCEDURE — 00000146 ZZHCL STATISTIC GLUCOSE BY METER IP

## 2017-05-22 PROCEDURE — 25000128 H RX IP 250 OP 636: Performed by: STUDENT IN AN ORGANIZED HEALTH CARE EDUCATION/TRAINING PROGRAM

## 2017-05-22 PROCEDURE — S0020 INJECTION, BUPIVICAINE HYDRO: HCPCS | Performed by: NURSE PRACTITIONER

## 2017-05-22 PROCEDURE — 25000125 ZZHC RX 250: Performed by: TRANSPLANT SURGERY

## 2017-05-22 PROCEDURE — 25000132 ZZH RX MED GY IP 250 OP 250 PS 637: Performed by: STUDENT IN AN ORGANIZED HEALTH CARE EDUCATION/TRAINING PROGRAM

## 2017-05-22 PROCEDURE — 40000193 ZZH STATISTIC PT WARD VISIT: Performed by: REHABILITATION PRACTITIONER

## 2017-05-22 PROCEDURE — 25000132 ZZH RX MED GY IP 250 OP 250 PS 637: Performed by: NURSE PRACTITIONER

## 2017-05-22 PROCEDURE — 36415 COLL VENOUS BLD VENIPUNCTURE: CPT | Performed by: PHYSICIAN ASSISTANT

## 2017-05-22 PROCEDURE — 85730 THROMBOPLASTIN TIME PARTIAL: CPT | Performed by: PHYSICIAN ASSISTANT

## 2017-05-22 PROCEDURE — 25000132 ZZH RX MED GY IP 250 OP 250 PS 637: Performed by: TRANSPLANT SURGERY

## 2017-05-22 PROCEDURE — 85027 COMPLETE CBC AUTOMATED: CPT | Performed by: TRANSPLANT SURGERY

## 2017-05-22 PROCEDURE — 25000132 ZZH RX MED GY IP 250 OP 250 PS 637: Performed by: PHYSICIAN ASSISTANT

## 2017-05-22 PROCEDURE — 86140 C-REACTIVE PROTEIN: CPT | Performed by: PHYSICIAN ASSISTANT

## 2017-05-22 PROCEDURE — 25000132 ZZH RX MED GY IP 250 OP 250 PS 637: Performed by: SURGERY

## 2017-05-22 PROCEDURE — 12000024 ZZH R&B TRANSPLANT CRITICAL

## 2017-05-22 PROCEDURE — 25000125 ZZHC RX 250: Performed by: STUDENT IN AN ORGANIZED HEALTH CARE EDUCATION/TRAINING PROGRAM

## 2017-05-22 PROCEDURE — 80048 BASIC METABOLIC PNL TOTAL CA: CPT | Performed by: PHYSICIAN ASSISTANT

## 2017-05-22 PROCEDURE — 25000128 H RX IP 250 OP 636: Performed by: PHYSICIAN ASSISTANT

## 2017-05-22 PROCEDURE — 84100 ASSAY OF PHOSPHORUS: CPT | Performed by: PHYSICIAN ASSISTANT

## 2017-05-22 PROCEDURE — 25000128 H RX IP 250 OP 636: Performed by: NURSE PRACTITIONER

## 2017-05-22 PROCEDURE — 80048 BASIC METABOLIC PNL TOTAL CA: CPT | Performed by: TRANSPLANT SURGERY

## 2017-05-22 PROCEDURE — 36592 COLLECT BLOOD FROM PICC: CPT | Performed by: TRANSPLANT SURGERY

## 2017-05-22 PROCEDURE — 97530 THERAPEUTIC ACTIVITIES: CPT | Mod: GP | Performed by: REHABILITATION PRACTITIONER

## 2017-05-22 PROCEDURE — 25000125 ZZHC RX 250: Performed by: NURSE PRACTITIONER

## 2017-05-22 PROCEDURE — 27210443 ZZH NUTRITION PRODUCT SPECIALIZED PACKET

## 2017-05-22 PROCEDURE — 25000128 H RX IP 250 OP 636: Performed by: SURGERY

## 2017-05-22 PROCEDURE — 84134 ASSAY OF PREALBUMIN: CPT | Performed by: PHYSICIAN ASSISTANT

## 2017-05-22 PROCEDURE — 97116 GAIT TRAINING THERAPY: CPT | Mod: GP | Performed by: REHABILITATION PRACTITIONER

## 2017-05-22 RX ORDER — HYDROMORPHONE HYDROCHLORIDE 1 MG/ML
2 SOLUTION ORAL EVERY 4 HOURS
Status: DISCONTINUED | OUTPATIENT
Start: 2017-05-22 | End: 2017-05-26 | Stop reason: HOSPADM

## 2017-05-22 RX ORDER — FUROSEMIDE 10 MG/ML
20 INJECTION INTRAMUSCULAR; INTRAVENOUS ONCE
Status: COMPLETED | OUTPATIENT
Start: 2017-05-22 | End: 2017-05-22

## 2017-05-22 RX ORDER — BUPIVACAINE HYDROCHLORIDE 2.5 MG/ML
5 INJECTION, SOLUTION EPIDURAL; INFILTRATION; INTRACAUDAL ONCE
Status: COMPLETED | OUTPATIENT
Start: 2017-05-22 | End: 2017-05-22

## 2017-05-22 RX ORDER — HYDROMORPHONE HCL/0.9% NACL/PF 0.2MG/0.2
0.2 SYRINGE (ML) INTRAVENOUS
Status: DISPENSED | OUTPATIENT
Start: 2017-05-22 | End: 2017-05-23

## 2017-05-22 RX ADMIN — KETOROLAC TROMETHAMINE 15 MG: 15 INJECTION, SOLUTION INTRAMUSCULAR; INTRAVENOUS at 02:45

## 2017-05-22 RX ADMIN — PREGABALIN 25 MG: 20 SOLUTION ORAL at 20:04

## 2017-05-22 RX ADMIN — KETOROLAC TROMETHAMINE 15 MG: 15 INJECTION, SOLUTION INTRAMUSCULAR; INTRAVENOUS at 14:04

## 2017-05-22 RX ADMIN — ACETAMINOPHEN 500 MG: 160 LIQUID ORAL at 17:02

## 2017-05-22 RX ADMIN — POTASSIUM CHLORIDE 20 MEQ: 29.8 INJECTION, SOLUTION INTRAVENOUS at 09:12

## 2017-05-22 RX ADMIN — HYDROMORPHONE HYDROCHLORIDE 1 MG: 1 SOLUTION ORAL at 04:48

## 2017-05-22 RX ADMIN — PIPERACILLIN SODIUM,TAZOBACTAM SODIUM 3.38 G: 3; .375 INJECTION, POWDER, FOR SOLUTION INTRAVENOUS at 18:26

## 2017-05-22 RX ADMIN — PIPERACILLIN SODIUM,TAZOBACTAM SODIUM 3.38 G: 3; .375 INJECTION, POWDER, FOR SOLUTION INTRAVENOUS at 04:48

## 2017-05-22 RX ADMIN — FENTANYL 1 PATCH: 100 PATCH, EXTENDED RELEASE TRANSDERMAL at 14:05

## 2017-05-22 RX ADMIN — HUMAN INSULIN 6 UNITS/HR: 100 INJECTION, SOLUTION SUBCUTANEOUS at 18:09

## 2017-05-22 RX ADMIN — HUMAN INSULIN 4.5 UNITS/HR: 100 INJECTION, SOLUTION SUBCUTANEOUS at 04:58

## 2017-05-22 RX ADMIN — ACETAMINOPHEN 500 MG: 160 LIQUID ORAL at 02:45

## 2017-05-22 RX ADMIN — NYSTATIN 500000 UNITS: 500000 SUSPENSION ORAL at 16:21

## 2017-05-22 RX ADMIN — SODIUM PHOSPHATE, MONOBASIC, MONOHYDRATE AND SODIUM PHOSPHATE, DIBASIC, ANHYDROUS 15 MMOL: 276; 142 INJECTION, SOLUTION INTRAVENOUS at 14:44

## 2017-05-22 RX ADMIN — LEVOTHYROXINE SODIUM 150 MCG: 300 TABLET ORAL at 08:58

## 2017-05-22 RX ADMIN — ENOXAPARIN SODIUM 30 MG: 30 INJECTION SUBCUTANEOUS at 20:22

## 2017-05-22 RX ADMIN — ENOXAPARIN SODIUM 30 MG: 30 INJECTION SUBCUTANEOUS at 14:43

## 2017-05-22 RX ADMIN — SENNOSIDES A AND B 5 ML: 415.36 LIQUID ORAL at 09:06

## 2017-05-22 RX ADMIN — MAGNESIUM HYDROXIDE 30 ML: 400 SUSPENSION ORAL at 20:03

## 2017-05-22 RX ADMIN — HYDROMORPHONE HYDROCHLORIDE 1 MG: 1 SOLUTION ORAL at 08:42

## 2017-05-22 RX ADMIN — SENNOSIDES A AND B 10 ML: 415.36 LIQUID ORAL at 20:03

## 2017-05-22 RX ADMIN — ACETAMINOPHEN 500 MG: 160 LIQUID ORAL at 23:12

## 2017-05-22 RX ADMIN — BUPIVACAINE HYDROCHLORIDE 12.5 MG: 2.5 INJECTION, SOLUTION EPIDURAL; INFILTRATION; INTRACAUDAL at 08:55

## 2017-05-22 RX ADMIN — HYDROMORPHONE HYDROCHLORIDE 0.2 MG: 10 INJECTION, SOLUTION INTRAMUSCULAR; INTRAVENOUS; SUBCUTANEOUS at 19:17

## 2017-05-22 RX ADMIN — ACETAMINOPHEN 500 MG: 160 LIQUID ORAL at 09:07

## 2017-05-22 RX ADMIN — MULTIVIT AND MINERALS-FERROUS GLUCONATE 9 MG IRON/15 ML ORAL LIQUID 15 ML: at 09:07

## 2017-05-22 RX ADMIN — PANTOPRAZOLE SODIUM 40 MG: 40 TABLET, DELAYED RELEASE ORAL at 20:02

## 2017-05-22 RX ADMIN — MAGNESIUM HYDROXIDE 15 ML: 400 SUSPENSION ORAL at 08:59

## 2017-05-22 RX ADMIN — KETOROLAC TROMETHAMINE 15 MG: 15 INJECTION, SOLUTION INTRAMUSCULAR; INTRAVENOUS at 08:45

## 2017-05-22 RX ADMIN — KETOROLAC TROMETHAMINE 15 MG: 15 INJECTION, SOLUTION INTRAMUSCULAR; INTRAVENOUS at 20:21

## 2017-05-22 RX ADMIN — TAMSULOSIN HYDROCHLORIDE 0.4 MG: 0.4 CAPSULE ORAL at 18:28

## 2017-05-22 RX ADMIN — Medication 1 PACKET: at 16:21

## 2017-05-22 RX ADMIN — HYDROMORPHONE HYDROCHLORIDE 1 MG: 1 SOLUTION ORAL at 00:31

## 2017-05-22 RX ADMIN — Medication 2000 UNITS: at 08:43

## 2017-05-22 RX ADMIN — HYDROMORPHONE HYDROCHLORIDE 2 MG: 1 SOLUTION ORAL at 16:21

## 2017-05-22 RX ADMIN — ONDANSETRON 4 MG: 2 INJECTION INTRAMUSCULAR; INTRAVENOUS at 08:59

## 2017-05-22 RX ADMIN — POTASSIUM & SODIUM PHOSPHATES POWDER PACK 280-160-250 MG 1 PACKET: 280-160-250 PACK at 09:07

## 2017-05-22 RX ADMIN — NYSTATIN 500000 UNITS: 500000 SUSPENSION ORAL at 20:03

## 2017-05-22 RX ADMIN — PIPERACILLIN SODIUM,TAZOBACTAM SODIUM 3.38 G: 3; .375 INJECTION, POWDER, FOR SOLUTION INTRAVENOUS at 11:30

## 2017-05-22 RX ADMIN — POTASSIUM & SODIUM PHOSPHATES POWDER PACK 280-160-250 MG 1 PACKET: 280-160-250 PACK at 14:51

## 2017-05-22 RX ADMIN — FLUOXETINE HYDROCHLORIDE 10 MG: 20 SOLUTION ORAL at 09:06

## 2017-05-22 RX ADMIN — NYSTATIN 500000 UNITS: 500000 SUSPENSION ORAL at 09:07

## 2017-05-22 RX ADMIN — HYDROMORPHONE HYDROCHLORIDE 0.2 MG: 10 INJECTION, SOLUTION INTRAMUSCULAR; INTRAVENOUS; SUBCUTANEOUS at 15:13

## 2017-05-22 RX ADMIN — PREGABALIN 25 MG: 20 SOLUTION ORAL at 08:42

## 2017-05-22 RX ADMIN — PANTOPRAZOLE SODIUM 40 MG: 40 TABLET, DELAYED RELEASE ORAL at 09:07

## 2017-05-22 RX ADMIN — Medication 1 PACKET: at 09:24

## 2017-05-22 RX ADMIN — NYSTATIN 500000 UNITS: 500000 SUSPENSION ORAL at 12:50

## 2017-05-22 RX ADMIN — HYDROMORPHONE HYDROCHLORIDE 2 MG: 1 SOLUTION ORAL at 20:04

## 2017-05-22 RX ADMIN — HYDROMORPHONE HYDROCHLORIDE 2 MG: 1 SOLUTION ORAL at 12:39

## 2017-05-22 RX ADMIN — Medication 1 PACKET: at 12:50

## 2017-05-22 RX ADMIN — FUROSEMIDE 20 MG: 10 INJECTION, SOLUTION INTRAVENOUS at 11:39

## 2017-05-22 NOTE — PLAN OF CARE
Problem: Individualization  Goal: Patient Preferences  VSS on RA now.  Oxygen Saturation in the mid-90's on RA.  Dilaudid PCA and Ropivicaine block discontinued today.  Dilaudid solution increased to 2 mg every 4 hours.    PRN Dilaudid 0.2 mg given at change of shift.  Fentanyl patch changed today; on left deltoid now.   Blood glucose monitored and corrected as charted.  Zofran 4 mg given once for slight queasiness with good results.. G-tube clamped most of this shift. Vented X 1.    Ate 3 bites of SF jello.  Sip of water X 2 with Writer.   TF are at 85 ml/ hr.  See order.  Rate increased at 1100.  Free water order changed. See I&O.   Potassium and Phosphorus was replaced IV.  Neutra Phos also started via J-tube.    Incision was opened a little more today.  Dressing done by MD.    MAURICIO output is lower and more clear with change of TF formula.   No bowel movement this shift.  Passing gas.  Bowel meds given.   Good UOP via king.  Lasix 20 mg given.  See I&O flowsheet.  King removed at 1500.

## 2017-05-22 NOTE — PROGRESS NOTES
Transplant Surgery Pancreas Service - Daily Progress Note  05/22/2017    Assessment & Plan: 56 year old male with chronic pancreatitis s/p lap cholecystectomy 4/2012, s/p biliary and pancreatic sphincterotomies and a temporary pancreatic stent 2012, s/p transduodenal sphincteroplasty 12/2014 with resultant leak and phlegmonous pancreatitis. S/p TPAIT 5/15/17.    Liver US:  Impression: The left portal vein, left hepatic vein, and left hepatic  artery are obscured by overlying bowel gas. The splenic vein is also  not visualized. The remaining hepatic vasculature is patent with  antegrade flow.    TPAIT: POD #7  Cardiorespiratory: Hypoxia, resolved. Continue pulmonary toilet and IS. Ambulate and OOB to chair  GI/Nutrition: G tube clamped, tolerating. Tube feeds at 85 cc/h plus meds. 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 Drain clear today. Bowel regimen: senna and MOM.  Zofran and Compazine PRN. OK to have carb free fluids.  Renal/Fluid/Electrolytes: Hypernatremia, 148. Free water 40 cc per hour today. Lasix 20 mg IV x 1 due to hypervolemia. Monitor BMP q 6hr.   : Ferguson removed yesterday. Continue flomax, started due to urinary retention this admission.  Post-pancreatectomy diabetes: Endocrine consulted. Continue insulin gtt.  Infection: Afebrile. Surgical ppx: Ertapenem, stopped 5/18. Will cover enterobacter and enterococcus growing in panc pres solution. Continue Zosyn x 10 days.   Prophylaxis: PPI, Anticoagulation: Lovenox 40 q12  Pain control: Fair  Ropivacaine On Q removed POD 7.  Fentanyl patch 100 mcg.   Stop PCA  Increase to Dilaudid 2 mg Q4h through J-tube.  Dilaudid IV PRN.  Acetaminophen 500 mg susp every 6 hours   Lyrica 25 mg BID  Valium IV PRN - not using  Toradol plan to stop tomorrow  Activity: ambulate QID  Anticipated LOS/Discharge: TBD.   Medical Decision Making: Medium  Subsequent visit 81380 (moderate level decision making)    GELACIO/Fellow/Resident Provider: Vero Rader  "PAC    Faculty: ANDRES Jackson MD  __________________________________________________________________  Transplant History: Admitted 5/15/2017 for TP AIT  5/15/2017 (Islet), Postoperative day: 7     Interval History: History is obtained from the patient  Overnight events: Pain control fair. Denies nausea. BM x 2, small. Difficultly tolerating tube feeding and free water overnight. Rates reduced, patient tolerating. Patient agreeable to titrate to goal as tolerated. Denies SOB. Ambulating.      ROS:   A 10-point review of systems was negative except as noted above.    Curent Meds:   HYDROmorphone  2 mg Per J Tube Q4H    magnesium hydroxide  30 mL Per Feeding Tube BID    sennosides  10 mL Per Feeding Tube BID    Prosource - non-formulary dietary supplement (from nutrition services)  1 packet Jejunal Tube 4x Daily    tamsulosin  0.4 mg Oral Daily    enoxaparin  30 mg Subcutaneous Q12H    fentaNYL  100 mcg Transdermal Q72H    acetaminophen  500 mg Oral Q6H    fentaNYL   Transdermal Q8H    fentaNYL   Transdermal Q72H    ketorolac  15 mg Intravenous Q6H    piperacillin-tazobactam  3.375 g Intravenous Q6H    pregabalin  25 mg Per Feeding Tube BID    nystatin  500,000 Units Oral 4x Daily    pantoprazole  40 mg Oral or J tube BID    cholecalciferol  2,000 Units Per J Tube Daily    multivitamins with minerals  15 mL Per Feeding Tube Daily    sodium chloride (PF)  3 mL Intracatheter Q8H    FLUoxetine  10 mg Per J Tube Daily    levothyroxine  150 mcg Per J Tube QAM AC       Physical Exam:     Admit Weight: 100.7 kg (222 lb 0.1 oz)    Current Vitals:   /77 (BP Location: Right arm)  Pulse 70  Temp 98.7  F (37.1  C) (Oral)  Resp 18  Ht 1.88 m (6' 2\")  Wt 113.4 kg (250 lb)  SpO2 94%  BMI 32.1 kg/m2  Vital sign ranges:    Temp:  [98.1  F (36.7  C)-98.8  F (37.1  C)] 98.7  F (37.1  C)  Pulse:  [65-75] 70  Heart Rate:  [65-80] 70  Resp:  [16-18] 18  BP: (109-127)/(67-77) 116/77  SpO2:  [90 %-99 %] 94 %  Patient Vitals for the " past 24 hrs:   BP Temp Temp src Pulse Heart Rate Resp SpO2 Weight   05/22/17 1401 - - - - - - 94 % -   05/22/17 1105 116/77 98.7  F (37.1  C) Oral 70 70 18 90 % -   05/22/17 0805 116/72 98.4  F (36.9  C) Oral 65 65 18 97 % -   05/22/17 0402 111/68 98.1  F (36.7  C) Oral 75 - 18 99 % 113.4 kg (250 lb)   05/21/17 2300 109/67 98.8  F (37.1  C) Oral - 68 16 98 % -   05/21/17 2000 127/73 98.2  F (36.8  C) Oral - 80 16 96 % -   05/21/17 1630 - - - - - - 97 % -     General Appearance: NAD, alert. General edema.  Skin: normal, dry  Heart: RRR   Lungs: BCTA. NLB on RA  Abdomen: The abdomen is soft, appropriate incisional ttp, and tender around surgical sites. Mild erythema along incision, removed additional staples, no drainage, wound +fibrinous tissue. Tube/Drain sites are: G tube clamped and J Tube: TF running.  MAURICIO: cloudy.   : king with clear yellow urine.  Extremities: edema: mild  Neuro:  Alert, oriented      Data:   CMP  Recent Labs  Lab 05/22/17  1128 05/22/17  0643  05/21/17  0558  05/20/17  0716  05/18/17  0707 05/17/17  0214  05/16/17  0355  05/15/17  2002 05/15/17  1900   * 147*  < > 152*  < > 152*  < > 144 145*  --  144  < > 140 139   POTASSIUM 4.0 3.7  < > 3.4  < > 3.4  < > 3.8 4.1  --  4.1  < > 4.4 4.1   CHLORIDE 113* 111*  < > 112*  < > 112*  < > 110* 113*  --  114*  < >  --   --    CO2 32 31  < > 36*  < > 35*  < > 31 26  --  22  < >  --   --    * 97  < > 94  < > 127*  < > 105* 116*  --  142*  < > 101* 98   BUN 20 20  < > 21  < > 20  < > 17 17  --  15  < >  --   --    CR 0.69 0.76  < > 0.79  < > 0.74  < > 0.93 1.05  --  0.94  < >  --   --    GFRESTIMATED >90Non  GFR Calc >90Non  GFR Calc  < > >90Non  GFR Calc  < > >90Non  GFR Calc  < > 84 73  --  83  < >  --   --    GFRESTBLACK >90African American GFR Calc >90African American GFR Calc  < > >90African American GFR Calc  < > >90African American GFR Calc  < > >90African American GFR  Calc 88  --  >90African American GFR Calc  < >  --   --    CRISTOBAL 7.6* 7.7*  < > 7.5*  < > 7.8*  < > 7.9* 7.4*  --  7.5*  < >  --   --    ICAW  --   --   --   --   --   --   --   --   --   --   --   --  4.6 4.5   MAG  --   --   --   --   --  2.7*  --  2.5* 2.2  --  2.0  < >  --   --    PHOS  --  2.2*  --  2.6  < > 1.7*  < > 1.9* 2.4*  < > 1.9*  --   --   --    AMYLASE  --   --   --   --   --   --   --   --   --   --  790*  --   --   --    LIPASE  --   --   --   --   --  93  --   --   --   --  9517*  --   --   --    ALBUMIN  --   --   --   --   --  1.8*  --   --  2.3*  --  2.9*  < >  --   --    BILITOTAL  --   --   --   --   --  0.4  --   --  0.5  --  1.1  < >  --   --    ALKPHOS  --   --   --   --   --  84  --   --  32*  --  29*  < >  --   --    AST  --   --   --   --   --  20  --   --  74*  --  133*  < >  --   --    ALT  --   --   --   --   --  43  --   --  93*  --  128*  < >  --   --    < > = values in this interval not displayed.  CBC  Recent Labs  Lab 05/22/17  0643 05/21/17  1430  05/16/17  0355   HGB 7.3* 7.4*  < > 9.6*   WBC 17.8* 17.6*  < > 9.9   * 481*  < > 133*   A1C  --   --   --  5.3   < > = values in this interval not displayed.  Coags    Recent Labs  Lab 05/22/17  0643 05/21/17  0558  05/15/17  2134   INR  --   --   --  1.66*   PTT 33 31  < > 104*   < > = values in this interval not displayed.   Urinalysis  Recent Labs   Lab Test  05/10/17   1015   COLOR  Yellow   APPEARANCE  Clear   URINEGLC  Negative   URINEBILI  Negative   URINEKETONE  Negative   SG  1.022   UBLD  Negative   URINEPH  6.5   PROTEIN  Negative   NITRITE  Negative   LEUKEST  Negative   RBCU  2   WBCU  <1       Administratively Closed by Health Information Management. prateek

## 2017-05-22 NOTE — PLAN OF CARE
Problem: Discharge Planning  Goal: Discharge Planning (Adult, OB, Behavioral, Peds)  05/22/17 4746     Isidra Leonard-Registered Nurse (Nursing)  Patients wife, Maribell, attended feeding tube class alone. RD correctly on model with all cares including site care, flushing, medication administration and use of Ky infusion pump. Received written material related to all content taught. Was attentive, asking very specific questions. States she hopes patient will come to [facility] and learn skills himself. States she understands all information presented

## 2017-05-22 NOTE — PROGRESS NOTES
Pt is an auto islet from the 5/15/17. VSS and afebrile. BG ranging from 143-87. Pt on an insulin drip and loosely following Algo 4.  Pt on 2L NC. GJ G to gravity from 8pm to 6am and clamped during the day G tube to be flushed q shift. J tube to continuous tube feeds at 35ml/hr. Pt requested not to have the tube feed increased throughout the night due to feeling too bloated and full. Pt also wants the feed to be turned off again due to abdominal discomfort at 0600. Recheck pt at 0630 and pt allowed tube feed to be increased to 45ml/hr. K+ was 3.6 and was replaced with 20 mEq. Pt has a king with adequate outputs and MAURICIO with yellow milky drainage. Pt has a PCA 0.2mg q 10. Dressing was changed on pt abdomen at 2000 5/21/17 and 0630 5/22/17. Due to be changed 2 times daily and packed with dry gauze. Incision stapled with some erythema and some serosanguineous drainage. Pt passed large amounts of gas and passed a small amount of stool. Pt went for one walk during shift. Will continue to monitor and follow plan of care.

## 2017-05-22 NOTE — PLAN OF CARE
Problem: Goal Outcome Summary  Goal: Goal Outcome Summary  PT - per plan established by the Physical Therapist, according to functional mobility the  discharge recommendation is hotel with wife has pt progresses  With functional mobility. Pt willing to get up with PT today but limited by pain weakness and needing to take frequent trips to the bathroom.  Pt needing CGA for all bed mob and sit to stand mostly from low seated ht surfaces. Pt amb up to 200'x 1 with WW at slow but stable pace with slightly flexed posture. Pt needing to return to room due to bathroom needs, unable to demo steps today, PT plan for tomorrow. Is stairs.

## 2017-05-22 NOTE — PROGRESS NOTES
REGIONAL ANESTHESIA PAIN SERVICE CONTINUOUS NERVE INFUSION NOTE  Subjective and Interval History: Pt reports good pain control via continuous peripheral nerve block (CPNB) infusion. He verbalizes understanding that the right catheter will be removed today.  Pt reports he didn't notice any difference in pain control after left catheter removed yesterday.  He is requesting local anesthetic bolus prior to catheter removal.  Denies any weakness, paresthesias, circumoral numbness, metallic taste or tinnitus.  Pt is ambulating with assistance.  Patient is currently tolerating tube feedings and nausea controlled with ondansetron as ordered.       Clinically Aligned Pain Assessment (CAPA):   Comfort (How is your pain?): Comfortably manageable  Change in Pain (Since your last medication/intervention?): Getting better  Pain Control (How are your pain treatments working?):  Partially effective pain control  Functioning (Are you able to do activities to get better?) : Can do most things, but pain gets in the way of some        Numerical Rating Scale:  3/10 at rest and 5/10 with movement.        Anticoagulation:     enoxaparin (LOVENOX) injection 30 mg 30 mg, SC, Q12H Given: 05/21 2024           OBJECTIVE:    Diagnostic:  CBC RESULTS:   Recent Labs   Lab Test  05/22/17   0643   WBC  17.8*   RBC  2.64*   HGB  7.3*   HCT  23.8*   MCV  90   MCH  27.7   MCHC  30.7*   RDW  16.3*   PLT  549*         Vitals:    Temp:  [98.1  F (36.7  C)-98.9  F (37.2  C)] 98.4  F (36.9  C)  Pulse:  [65-81] 65  Heart Rate:  [65-81] 65  Resp:  [15-18] 18  BP: (109-127)/(67-75) 116/72  SpO2:  [96 %-99 %] 97 %    Exam:    Strength 5/5 and symmetric grossly in bilateral LE   Right paravertebral (PV) catheter site with dressing c/d/i, no tenderness, erythema, heme, edema   Small 0.5 cm skin abrasion inferior taped border from PV catheter site.  Covered with bandaid by bedside nurse.      ASSESSMENT/PLAN:    Sohan Arita is a 56 year old male POD  #7 s/p COMBINED PANCREATECTOMY, TRANSPLANT AUTO ISLET CELL and placement of Bilateral thoracic PV catheters, Left catheter removed yesterday.  Plans to remove Right PV catheter today after nerve block local anesthetic bolus.  Pt is receiving adequate analgesia with Ropivacaine 0.2%, total infusion 10 mL/hour.  Pt is ambulating without difficulty.  No weakness or paresthesias, adequate sensory block.  No evidence of adverse side effects associated with local anesthetic.     - 0855 local anesthetic bolus PF bupivacaine 0.25%, 5mL via RIGHT PV catheter.  BP, P and MAP monitoring Q 5 min x 30 min, call if MAP less than 60 - bedside nurse aware.  - PV catheter removed without complication following bolus, dark tip intact, bandaid applied to insertion site  -  may resume enoxaparin in 2 hrs-bedside nurse aware  - will sign off    - discussed plan with attending anesthesiologist    CHUY Lay [facility]  Regional Anesthesia Pain Service  5/22/2017 9:24 AM    24 hour Job Code Pager.  For in-house use only.     [place]:  * * [phone]  [place]: * * *[phone]  Peds: * * [phone]  Enter call-back number and #      This pager only accepts text messages through [facility]

## 2017-05-23 ENCOUNTER — APPOINTMENT (OUTPATIENT)
Dept: PHYSICAL THERAPY | Facility: CLINIC | Age: 57
DRG: 406 | End: 2017-05-23
Attending: TRANSPLANT SURGERY
Payer: COMMERCIAL

## 2017-05-23 LAB
ANION GAP SERPL CALCULATED.3IONS-SCNC: 4 MMOL/L (ref 3–14)
ANION GAP SERPL CALCULATED.3IONS-SCNC: 5 MMOL/L (ref 3–14)
ANION GAP SERPL CALCULATED.3IONS-SCNC: 5 MMOL/L (ref 3–14)
APTT PPP: 34 SEC (ref 22–37)
BUN SERPL-MCNC: 15 MG/DL (ref 7–30)
BUN SERPL-MCNC: 19 MG/DL (ref 7–30)
BUN SERPL-MCNC: 20 MG/DL (ref 7–30)
CALCIUM SERPL-MCNC: 7.7 MG/DL (ref 8.5–10.1)
CALCIUM SERPL-MCNC: 7.7 MG/DL (ref 8.5–10.1)
CALCIUM SERPL-MCNC: 7.8 MG/DL (ref 8.5–10.1)
CHLORIDE SERPL-SCNC: 105 MMOL/L (ref 94–109)
CHLORIDE SERPL-SCNC: 108 MMOL/L (ref 94–109)
CHLORIDE SERPL-SCNC: 108 MMOL/L (ref 94–109)
CO2 SERPL-SCNC: 30 MMOL/L (ref 20–32)
CO2 SERPL-SCNC: 32 MMOL/L (ref 20–32)
CO2 SERPL-SCNC: 32 MMOL/L (ref 20–32)
CREAT SERPL-MCNC: 0.76 MG/DL (ref 0.66–1.25)
CREAT SERPL-MCNC: 0.77 MG/DL (ref 0.66–1.25)
CREAT SERPL-MCNC: 0.79 MG/DL (ref 0.66–1.25)
ERYTHROCYTE [DISTWIDTH] IN BLOOD BY AUTOMATED COUNT: 16.5 % (ref 10–15)
GFR SERPL CREATININE-BSD FRML MDRD: ABNORMAL ML/MIN/1.7M2
GLUCOSE BLDC GLUCOMTR-MCNC: 102 MG/DL (ref 70–99)
GLUCOSE BLDC GLUCOMTR-MCNC: 108 MG/DL (ref 70–99)
GLUCOSE BLDC GLUCOMTR-MCNC: 111 MG/DL (ref 70–99)
GLUCOSE BLDC GLUCOMTR-MCNC: 112 MG/DL (ref 70–99)
GLUCOSE BLDC GLUCOMTR-MCNC: 115 MG/DL (ref 70–99)
GLUCOSE BLDC GLUCOMTR-MCNC: 118 MG/DL (ref 70–99)
GLUCOSE BLDC GLUCOMTR-MCNC: 120 MG/DL (ref 70–99)
GLUCOSE BLDC GLUCOMTR-MCNC: 122 MG/DL (ref 70–99)
GLUCOSE BLDC GLUCOMTR-MCNC: 123 MG/DL (ref 70–99)
GLUCOSE BLDC GLUCOMTR-MCNC: 125 MG/DL (ref 70–99)
GLUCOSE BLDC GLUCOMTR-MCNC: 127 MG/DL (ref 70–99)
GLUCOSE BLDC GLUCOMTR-MCNC: 130 MG/DL (ref 70–99)
GLUCOSE BLDC GLUCOMTR-MCNC: 132 MG/DL (ref 70–99)
GLUCOSE BLDC GLUCOMTR-MCNC: 140 MG/DL (ref 70–99)
GLUCOSE BLDC GLUCOMTR-MCNC: 141 MG/DL (ref 70–99)
GLUCOSE BLDC GLUCOMTR-MCNC: 142 MG/DL (ref 70–99)
GLUCOSE BLDC GLUCOMTR-MCNC: 152 MG/DL (ref 70–99)
GLUCOSE BLDC GLUCOMTR-MCNC: 159 MG/DL (ref 70–99)
GLUCOSE BLDC GLUCOMTR-MCNC: 159 MG/DL (ref 70–99)
GLUCOSE BLDC GLUCOMTR-MCNC: 162 MG/DL (ref 70–99)
GLUCOSE BLDC GLUCOMTR-MCNC: 180 MG/DL (ref 70–99)
GLUCOSE BLDC GLUCOMTR-MCNC: 89 MG/DL (ref 70–99)
GLUCOSE SERPL-MCNC: 117 MG/DL (ref 70–99)
GLUCOSE SERPL-MCNC: 121 MG/DL (ref 70–99)
GLUCOSE SERPL-MCNC: 153 MG/DL (ref 70–99)
HCT VFR BLD AUTO: 24.3 % (ref 40–53)
HGB BLD-MCNC: 7.6 G/DL (ref 13.3–17.7)
MAGNESIUM SERPL-MCNC: 2 MG/DL (ref 1.6–2.3)
MCH RBC QN AUTO: 27.9 PG (ref 26.5–33)
MCHC RBC AUTO-ENTMCNC: 31.3 G/DL (ref 31.5–36.5)
MCV RBC AUTO: 89 FL (ref 78–100)
PHOSPHATE SERPL-MCNC: 2.4 MG/DL (ref 2.5–4.5)
PLATELET # BLD AUTO: 594 10E9/L (ref 150–450)
POTASSIUM SERPL-SCNC: 3.5 MMOL/L (ref 3.4–5.3)
RBC # BLD AUTO: 2.72 10E12/L (ref 4.4–5.9)
SODIUM SERPL-SCNC: 142 MMOL/L (ref 133–144)
SODIUM SERPL-SCNC: 143 MMOL/L (ref 133–144)
SODIUM SERPL-SCNC: 144 MMOL/L (ref 133–144)
WBC # BLD AUTO: 19 10E9/L (ref 4–11)

## 2017-05-23 PROCEDURE — 80048 BASIC METABOLIC PNL TOTAL CA: CPT | Performed by: PHYSICIAN ASSISTANT

## 2017-05-23 PROCEDURE — 80048 BASIC METABOLIC PNL TOTAL CA: CPT | Performed by: TRANSPLANT SURGERY

## 2017-05-23 PROCEDURE — 25000125 ZZHC RX 250: Performed by: PHYSICIAN ASSISTANT

## 2017-05-23 PROCEDURE — 25000132 ZZH RX MED GY IP 250 OP 250 PS 637: Performed by: TRANSPLANT SURGERY

## 2017-05-23 PROCEDURE — 84100 ASSAY OF PHOSPHORUS: CPT | Performed by: SURGERY

## 2017-05-23 PROCEDURE — 25000125 ZZHC RX 250: Performed by: TRANSPLANT SURGERY

## 2017-05-23 PROCEDURE — 97110 THERAPEUTIC EXERCISES: CPT | Mod: GP

## 2017-05-23 PROCEDURE — 25000132 ZZH RX MED GY IP 250 OP 250 PS 637: Performed by: STUDENT IN AN ORGANIZED HEALTH CARE EDUCATION/TRAINING PROGRAM

## 2017-05-23 PROCEDURE — 97530 THERAPEUTIC ACTIVITIES: CPT | Mod: GP

## 2017-05-23 PROCEDURE — 25000132 ZZH RX MED GY IP 250 OP 250 PS 637: Performed by: NURSE PRACTITIONER

## 2017-05-23 PROCEDURE — 80048 BASIC METABOLIC PNL TOTAL CA: CPT | Performed by: SURGERY

## 2017-05-23 PROCEDURE — 83735 ASSAY OF MAGNESIUM: CPT | Performed by: SURGERY

## 2017-05-23 PROCEDURE — 00000146 ZZHCL STATISTIC GLUCOSE BY METER IP

## 2017-05-23 PROCEDURE — 36592 COLLECT BLOOD FROM PICC: CPT | Performed by: PHYSICIAN ASSISTANT

## 2017-05-23 PROCEDURE — 97116 GAIT TRAINING THERAPY: CPT | Mod: GP

## 2017-05-23 PROCEDURE — 85027 COMPLETE CBC AUTOMATED: CPT | Performed by: SURGERY

## 2017-05-23 PROCEDURE — 25000132 ZZH RX MED GY IP 250 OP 250 PS 637: Performed by: PHYSICIAN ASSISTANT

## 2017-05-23 PROCEDURE — 85730 THROMBOPLASTIN TIME PARTIAL: CPT | Performed by: PHYSICIAN ASSISTANT

## 2017-05-23 PROCEDURE — 27210443 ZZH NUTRITION PRODUCT SPECIALIZED PACKET

## 2017-05-23 PROCEDURE — 25000128 H RX IP 250 OP 636: Performed by: PHYSICIAN ASSISTANT

## 2017-05-23 PROCEDURE — 12000026 ZZH R&B TRANSPLANT

## 2017-05-23 PROCEDURE — 25000128 H RX IP 250 OP 636: Performed by: SURGERY

## 2017-05-23 PROCEDURE — 25000132 ZZH RX MED GY IP 250 OP 250 PS 637: Performed by: SURGERY

## 2017-05-23 PROCEDURE — 25000128 H RX IP 250 OP 636: Performed by: NURSE PRACTITIONER

## 2017-05-23 PROCEDURE — 25000131 ZZH RX MED GY IP 250 OP 636 PS 637: Performed by: CLINICAL NURSE SPECIALIST

## 2017-05-23 PROCEDURE — 40000193 ZZH STATISTIC PT WARD VISIT

## 2017-05-23 RX ORDER — MAGNESIUM CARB/ALUMINUM HYDROX 105-160MG
30 TABLET,CHEWABLE ORAL DAILY
Status: DISCONTINUED | OUTPATIENT
Start: 2017-05-23 | End: 2017-05-23

## 2017-05-23 RX ORDER — FUROSEMIDE 10 MG/ML
20 INJECTION INTRAMUSCULAR; INTRAVENOUS ONCE
Status: COMPLETED | OUTPATIENT
Start: 2017-05-23 | End: 2017-05-23

## 2017-05-23 RX ORDER — MAGNESIUM CARB/ALUMINUM HYDROX 105-160MG
30 TABLET,CHEWABLE ORAL DAILY PRN
Status: DISCONTINUED | OUTPATIENT
Start: 2017-05-23 | End: 2017-05-26 | Stop reason: HOSPADM

## 2017-05-23 RX ORDER — HYDROMORPHONE HCL/0.9% NACL/PF 0.2MG/0.2
0.2 SYRINGE (ML) INTRAVENOUS
Status: DISCONTINUED | OUTPATIENT
Start: 2017-05-23 | End: 2017-05-24

## 2017-05-23 RX ORDER — BISACODYL 10 MG
10 SUPPOSITORY, RECTAL RECTAL DAILY PRN
Status: DISCONTINUED | OUTPATIENT
Start: 2017-05-23 | End: 2017-05-25

## 2017-05-23 RX ADMIN — HYDROMORPHONE HYDROCHLORIDE 2 MG: 1 SOLUTION ORAL at 08:09

## 2017-05-23 RX ADMIN — ENOXAPARIN SODIUM 30 MG: 30 INJECTION SUBCUTANEOUS at 07:53

## 2017-05-23 RX ADMIN — ACETAMINOPHEN 500 MG: 160 LIQUID ORAL at 23:10

## 2017-05-23 RX ADMIN — HUMAN INSULIN 5 UNITS/HR: 100 INJECTION, SOLUTION SUBCUTANEOUS at 14:54

## 2017-05-23 RX ADMIN — Medication 1 PACKET: at 20:14

## 2017-05-23 RX ADMIN — HYDROMORPHONE HYDROCHLORIDE 2 MG: 1 SOLUTION ORAL at 20:14

## 2017-05-23 RX ADMIN — HYDROMORPHONE HYDROCHLORIDE 2 MG: 1 SOLUTION ORAL at 04:22

## 2017-05-23 RX ADMIN — MAGNESIUM HYDROXIDE 30 ML: 400 SUSPENSION ORAL at 07:52

## 2017-05-23 RX ADMIN — SENNOSIDES A AND B 10 ML: 415.36 LIQUID ORAL at 07:51

## 2017-05-23 RX ADMIN — MULTIVIT AND MINERALS-FERROUS GLUCONATE 9 MG IRON/15 ML ORAL LIQUID 15 ML: at 07:51

## 2017-05-23 RX ADMIN — ACETAMINOPHEN 500 MG: 160 LIQUID ORAL at 11:22

## 2017-05-23 RX ADMIN — TAMSULOSIN HYDROCHLORIDE 0.4 MG: 0.4 CAPSULE ORAL at 17:29

## 2017-05-23 RX ADMIN — NYSTATIN 500000 UNITS: 500000 SUSPENSION ORAL at 07:51

## 2017-05-23 RX ADMIN — PIPERACILLIN SODIUM,TAZOBACTAM SODIUM 3.38 G: 3; .375 INJECTION, POWDER, FOR SOLUTION INTRAVENOUS at 17:29

## 2017-05-23 RX ADMIN — PIPERACILLIN SODIUM,TAZOBACTAM SODIUM 3.38 G: 3; .375 INJECTION, POWDER, FOR SOLUTION INTRAVENOUS at 00:41

## 2017-05-23 RX ADMIN — Medication 1 PACKET: at 17:30

## 2017-05-23 RX ADMIN — Medication 1 PACKET: at 11:55

## 2017-05-23 RX ADMIN — ACETAMINOPHEN 500 MG: 160 LIQUID ORAL at 17:29

## 2017-05-23 RX ADMIN — Medication 1 PACKET: at 07:55

## 2017-05-23 RX ADMIN — KETOROLAC TROMETHAMINE 15 MG: 15 INJECTION, SOLUTION INTRAMUSCULAR; INTRAVENOUS at 01:49

## 2017-05-23 RX ADMIN — PIPERACILLIN SODIUM,TAZOBACTAM SODIUM 3.38 G: 3; .375 INJECTION, POWDER, FOR SOLUTION INTRAVENOUS at 11:53

## 2017-05-23 RX ADMIN — HYDROMORPHONE HYDROCHLORIDE 2 MG: 1 SOLUTION ORAL at 00:37

## 2017-05-23 RX ADMIN — PANTOPRAZOLE SODIUM 40 MG: 40 TABLET, DELAYED RELEASE ORAL at 20:13

## 2017-05-23 RX ADMIN — FLUOXETINE HYDROCHLORIDE 10 MG: 20 SOLUTION ORAL at 07:51

## 2017-05-23 RX ADMIN — INSULIN GLARGINE 52 UNITS: 100 INJECTION, SOLUTION SUBCUTANEOUS at 12:54

## 2017-05-23 RX ADMIN — NYSTATIN 500000 UNITS: 500000 SUSPENSION ORAL at 16:05

## 2017-05-23 RX ADMIN — HYDROMORPHONE HYDROCHLORIDE 2 MG: 1 SOLUTION ORAL at 11:54

## 2017-05-23 RX ADMIN — HYDROMORPHONE HYDROCHLORIDE 2 MG: 1 SOLUTION ORAL at 16:05

## 2017-05-23 RX ADMIN — LEVOTHYROXINE SODIUM 150 MCG: 300 TABLET ORAL at 07:51

## 2017-05-23 RX ADMIN — NYSTATIN 500000 UNITS: 500000 SUSPENSION ORAL at 20:14

## 2017-05-23 RX ADMIN — NYSTATIN 500000 UNITS: 500000 SUSPENSION ORAL at 11:54

## 2017-05-23 RX ADMIN — PIPERACILLIN SODIUM,TAZOBACTAM SODIUM 3.38 G: 3; .375 INJECTION, POWDER, FOR SOLUTION INTRAVENOUS at 06:30

## 2017-05-23 RX ADMIN — PREGABALIN 25 MG: 20 SOLUTION ORAL at 20:14

## 2017-05-23 RX ADMIN — ACETAMINOPHEN 500 MG: 160 LIQUID ORAL at 04:22

## 2017-05-23 RX ADMIN — PREGABALIN 25 MG: 20 SOLUTION ORAL at 07:50

## 2017-05-23 RX ADMIN — PANTOPRAZOLE SODIUM 40 MG: 40 TABLET, DELAYED RELEASE ORAL at 07:54

## 2017-05-23 RX ADMIN — Medication 2000 UNITS: at 07:51

## 2017-05-23 RX ADMIN — FUROSEMIDE 20 MG: 10 INJECTION, SOLUTION INTRAVENOUS at 12:57

## 2017-05-23 RX ADMIN — ENOXAPARIN SODIUM 30 MG: 30 INJECTION SUBCUTANEOUS at 20:13

## 2017-05-23 RX ADMIN — PIPERACILLIN SODIUM,TAZOBACTAM SODIUM 3.38 G: 3; .375 INJECTION, POWDER, FOR SOLUTION INTRAVENOUS at 23:10

## 2017-05-23 RX ADMIN — BISACODYL 10 MG: 10 SUPPOSITORY RECTAL at 11:38

## 2017-05-23 RX ADMIN — HYDROMORPHONE HYDROCHLORIDE 0.2 MG: 10 INJECTION, SOLUTION INTRAMUSCULAR; INTRAVENOUS; SUBCUTANEOUS at 14:03

## 2017-05-23 RX ADMIN — SENNOSIDES A AND B 10 ML: 415.36 LIQUID ORAL at 20:14

## 2017-05-23 ASSESSMENT — PAIN DESCRIPTION - DESCRIPTORS
DESCRIPTORS: ACHING;CONSTANT
DESCRIPTORS: ACHING

## 2017-05-23 NOTE — PROGRESS NOTES
"Diabetes Consult Daily Progress Note    Assessment/Plan:    57 yo M with hx of chronic pain, papillary thyroid cancer s/p thyroidectomy with hypothyroidism, chronic pancreatitis s/p lap lin, biliary and pancreatic sphincterotomies, transduodenal sphincteroplasty with resultant leak and phlegmonous pancreatitis who is now s/p open TPAIT on 5/15/17, received 4000+ ie/kg in transplant.      Glucose control improving after adjustments made for increased TF rate around midnight.  Since insulin needs are high, will start BID glargine.    Plan:     - glargine 52 units BID, stopping insulin drip after second dose tonight  -aspart 2 per 30 >120 q4h to start after the insulin drip is off  - Pt will need more diabetes education before discharge-- update CDE on transition to subcut         -plan discussed w/ pt, RN, primary team, pharmD    Interval History:    Last 24 h RN and progress notes reviewed  Patient reports tolerating goal rate Vivonex TEN @100cc/h since 2300 last night.  BG 120s since 0400, average hourly need 4.25.  Believes he hasn't had any antiemetic either.  Walking often- maybe >4 times/day.  Discussed insulin plans.  Pt asking for calorie info on the current TF formula.  Wife has meter at local accommodations.  She is anxious to learn about the insulin.      Active Diet Order      Combination Diet Clear Liquid      Exam:      /57 (BP Location: Right arm)  Pulse 65  Temp 98.8  F (37.1  C) (Oral)  Resp 16  Ht 1.88 m (6' 2\")  Wt 113.2 kg (249 lb 8 oz)  SpO2 94%  BMI 32.03 kg/m2  Gen:  Alert, resting in bed.  Wife at bedside and supportive  Resp: unlabored  GI: g-tube to gravity  Neuro: responding appropriately, asking good questions    Data:        Recent Labs  Lab 05/23/17  1229 05/23/17  1200 05/23/17  1114 05/23/17  1008 05/23/17  0902 05/23/17  0822 05/23/17  0804  05/22/17  2050  05/22/17  1128  05/22/17  0643  05/22/17  0025  05/21/17  2339   GLC  --   --   --   --   --  117*  --   --  81  -- "  136*  --  97  --  121*  --  90   * 132* 89 125* 127*  --  111*  < >  --   < >  --   < >  --   < >  --   < >  --    < > = values in this interval not displayed.  Lab Results   Component Value Date    A1C 5.3 05/16/2017    A1C 5.6 05/10/2017    A1C 5.4 09/10/2015     Recent Labs   Lab Test  05/23/17   0822  05/22/17   2050   NA  143  144   POTASSIUM  3.5  3.4   CHLORIDE  108  109   CO2  30  31   ANIONGAP  5  5   GLC  117*  81   BUN  19  20   CR  0.79  0.81   CRISTOBAL  7.7*  7.6*     CBC RESULTS:   Recent Labs   Lab Test  05/23/17   0822   WBC  19.0*   RBC  2.72*   HGB  7.6*   HCT  24.3*   MCV  89   MCH  27.9   MCHC  31.3*   RDW  16.5*   PLT  594*     Orquidea Platapton APRN [facility] [phone]  Diabetes Management Team job code: [phone]

## 2017-05-23 NOTE — PLAN OF CARE
Problem: Goal Outcome Summary  Goal: Goal Outcome Summary  Outcome: Improving  VSS, on RA. Pain fairly well controlled with scheduled medications--PRN IV dilaudid given x1. Voiding adequately since king removed at 3pm. Small BM x1. No c/o nausea, G-tube clamped the entire shift. J-tube with TF, turned to goal of 100cc/hr at 2300. Na recheck 144 (147)--continues to get 40cc water flushes through J tube q 1 hour. Abdominal dressing/packing changed. Insulin gtt remains on alg #4+, although needs decreasing. BG , currently running at 3 units/hr.

## 2017-05-23 NOTE — PROGRESS NOTES
Transplant Surgery Pancreas Service - Daily Progress Note  05/23/2017    Assessment & Plan: 56 year old male with chronic pancreatitis s/p lap cholecystectomy 4/2012, s/p biliary and pancreatic sphincterotomies and a temporary pancreatic stent 2012, s/p transduodenal sphincteroplasty 12/2014 with resultant leak and phlegmonous pancreatitis. S/p TPAIT 5/15/17.    Liver US:  Impression: The left portal vein, left hepatic vein, and left hepatic  artery are obscured by overlying bowel gas. The splenic vein is also  not visualized. The remaining hepatic vasculature is patent with  antegrade flow.    TPAIT: POD #8  Cardiorespiratory: Hypoxia, resolved.  Continue pulmonary toilet and IS. Ambulate and OOB to chair  GI/Nutrition: G tube clamped, tolerating.  Tube feeds at 100 cc/h plus meds. 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  Drain clear today. Bowel regimen: senna and MOM. Given mineral oil and dulcolax supp today. Zofran and Compazine PRN.  OK to have carb free fluids.  Renal/Fluid/Electrolytes: Hypernatremia,  Improved. Na 144. Continue free water 40 cc per hour today, will decrease to 25 cc /hr tomorrow. Lasix 20 mg IV x 1 due to hypervolemia. Repeat BMP this evening.   : Ferguson removed yesterday.  Continue flomax, started due to urinary retention this admission.  Post-pancreatectomy diabetes: Endocrine consulted. Plan to transition to SQ insulin today.  Infection:  Afebrile. Surgical ppx: Ertapenem, stopped 5/18.  Will cover enterobacter and enterococcus growing in panc pres solution.  Continue Zosyn x 10 days.   Prophylaxis: PPI, Anticoagulation: Lovenox 40 q12  Pain control: Fair  Ropivacaine On Q removed POD 7.  Fentanyl patch 100 mcg.    Dilaudid 2 mg Q4h through J-tube.  Acetaminophen 500 mg susp every 6 hours   Lyrica 25 mg BID  Valium IV PRN - not using  Toradol stopped today  Continue dilaudid IV PRN today.  Activity: ambulate QID  Anticipated LOS/Discharge: TBD.     Medical  "Decision Making: Medium  Subsequent visit 30472 (moderate level decision making)    GELACIO/Fellow/Resident Provider: TAY Arriaza    Faculty: ANDRES Jackson MD  __________________________________________________________________  Transplant History: Admitted 5/15/2017 for TP AIT  5/15/2017 (Islet), Postoperative day: 8     Interval History: History is obtained from the patient  Overnight events: Pain control fair. Denies SOB. +Flatus, +BM. Ambulating.      ROS:   A 10-point review of systems was negative except as noted above.    Curent Meds:   insulin glargine  52 Units Subcutaneous Q24H    insulin glargine  52 Units Subcutaneous QPM    [START ON 5/24/2017] insulin aspart  2-16 Units Subcutaneous Q4H    - MEDICATION INSTRUCTIONS -   Does not apply Once    furosemide  20 mg Intravenous Once    HYDROmorphone  2 mg Per J Tube Q4H    magnesium hydroxide  30 mL Per Feeding Tube BID    sennosides  10 mL Per Feeding Tube BID    Prosource - non-formulary dietary supplement (from nutrition services)  1 packet Jejunal Tube 4x Daily    tamsulosin  0.4 mg Oral Daily    enoxaparin  30 mg Subcutaneous Q12H    fentaNYL  100 mcg Transdermal Q72H    acetaminophen  500 mg Oral Q6H    fentaNYL   Transdermal Q8H    fentaNYL   Transdermal Q72H    piperacillin-tazobactam  3.375 g Intravenous Q6H    pregabalin  25 mg Per Feeding Tube BID    nystatin  500,000 Units Oral 4x Daily    pantoprazole  40 mg Oral or J tube BID    cholecalciferol  2,000 Units Per J Tube Daily    multivitamins with minerals  15 mL Per Feeding Tube Daily    sodium chloride (PF)  3 mL Intracatheter Q8H    FLUoxetine  10 mg Per J Tube Daily    levothyroxine  150 mcg Per J Tube QAM AC       Physical Exam:     Admit Weight: 100.7 kg (222 lb 0.1 oz)    Current Vitals:   /57 (BP Location: Right arm)  Pulse 65  Temp 98.8  F (37.1  C) (Oral)  Resp 16  Ht 1.88 m (6' 2\")  Wt 113.2 kg (249 lb 8 oz)  SpO2 94%  BMI 32.03 kg/m2  Vital sign ranges:    Temp:  [96.2  F (35.7 "  C)-98.8  F (37.1  C)] 98.8  F (37.1  C)  Pulse:  [65-73] 65  Heart Rate:  [70-80] 79  Resp:  [16-18] 16  BP: (111-125)/(57-76) 112/57  SpO2:  [93 %-97 %] 94 %  Patient Vitals for the past 24 hrs:   BP Temp Temp src Pulse Heart Rate Resp SpO2 Weight   05/23/17 1228 112/57 98.8  F (37.1  C) Oral - 79 16 94 % -   05/23/17 0803 124/68 98.4  F (36.9  C) Oral - 70 16 97 % -   05/23/17 0447 124/69 98.3  F (36.8  C) Oral - 75 16 95 % -   05/23/17 0432 - - - - - - - 113.2 kg (249 lb 8 oz)   05/23/17 0312 111/64 98.5  F (36.9  C) Oral 65 - 18 95 % -   05/23/17 0031 119/73 98.8  F (37.1  C) Oral 73 - 18 93 % -   05/22/17 1903 125/76 98.3  F (36.8  C) Oral - 77 18 96 % -   05/22/17 1604 121/75 96.2  F (35.7  C) Oral - 80 18 94 % -   05/22/17 1401 - - - - - - 94 % -     General Appearance: NAD, alert. General edema.  Skin: normal, dry  Heart: RRR   Lungs: BCTA. NLB on RA  Abdomen: The abdomen is soft, appropriate incisional ttp, and tender around surgical sites. Mild erythema along incision, removed additional staples, no drainage, wound +fibrinous tissue. Tube/Drain sites are: G tube clamped and J Tube: TF running.  MAURICIO: clear.   : king removed.  Extremities: edema: mild  Neuro:  Alert, oriented      Data:   CMP  Recent Labs  Lab 05/23/17  0822 05/22/17  2050  05/22/17  0643  05/20/17  0716  05/17/17  0214    144  < > 147*  < > 152*  < > 145*   POTASSIUM 3.5 3.4  < > 3.7  < > 3.4  < > 4.1   CHLORIDE 108 109  < > 111*  < > 112*  < > 113*   CO2 30 31  < > 31  < > 35*  < > 26   * 81  < > 97  < > 127*  < > 116*   BUN 19 20  < > 20  < > 20  < > 17   CR 0.79 0.81  < > 0.76  < > 0.74  < > 1.05   GFRESTIMATED >90Non  GFR Calc >90Non  GFR Calc  < > >90Non  GFR Calc  < > >90Non  GFR Calc  < > 73   GFRESTBLACK >90African American GFR Calc >90African American GFR Calc  < > >90African American GFR Calc  < > >90African American GFR Calc  < > 88   CRISTOBAL 7.7* 7.6*  <  > 7.7*  < > 7.8*  < > 7.4*   MAG 2.0  --   --   --   --  2.7*  < > 2.2   PHOS 2.4*  --   --  2.2*  < > 1.7*  < > 2.4*   LIPASE  --   --   --   --   --  93  --   --    ALBUMIN  --   --   --   --   --  1.8*  --  2.3*   BILITOTAL  --   --   --   --   --  0.4  --  0.5   ALKPHOS  --   --   --   --   --  84  --  32*   AST  --   --   --   --   --  20  --  74*   ALT  --   --   --   --   --  43  --  93*   < > = values in this interval not displayed.  CBC    Recent Labs  Lab 05/23/17  0822 05/22/17  0643   HGB 7.6* 7.3*   WBC 19.0* 17.8*   * 549*     Coags    Recent Labs  Lab 05/23/17  0822 05/22/17  0643   PTT 34 33      Urinalysis  Recent Labs   Lab Test  05/10/17   1015   COLOR  Yellow   APPEARANCE  Clear   URINEGLC  Negative   URINEBILI  Negative   URINEKETONE  Negative   SG  1.022   UBLD  Negative   URINEPH  6.5   PROTEIN  Negative   NITRITE  Negative   LEUKEST  Negative   RBCU  2   WBCU  <1       Administratively Closed by Health Information Management. prateek

## 2017-05-23 NOTE — PLAN OF CARE
Patient tolerating tube feeds at goal of 100cc/hr, will be starting on BID Lantus and insulin drip can come off tonight. Patient passing a lot of gas, Dulcolax suppository X1, has Pink Lady enema if no BM. MAURICIO with serous output. Midline abdominal dressing changed (now wet to dry BID), MAURICIO and G/JT dressings changed. Patient is voiding without difficulty in the urinal at the bedside. His wife practiced with JT medication administration this afternoon and observed insulin administration.Blood sugars labile, , difficult to keep within range this afternoon.   1400 Update: Medium loose BM this afternoon.

## 2017-05-23 NOTE — PROGRESS NOTES
Home Infusion  Sohan is expected to dc within the next few days and will be going home on continuous enteral therapy (will be on Vivonex).  He  has done home enteral therapy before and he and his wife, Maribell have been to the [facility] for some teaching.  Met with Tanner and Maribell at bedside and informed them about [facility] services and plan for discharge .  Informed them that formula and supplies will be delivered to the hospital and I would return to assist with hook up and provide some additional teaching and information.  Explained about  plan for SNV and 24/7 availability of [facility] staff while on enteral therapy.   Tanner and Maribell will be staying locally at [facility] after discharge.  Tanner and Maribell verbalized understanding of all information given.   They are willing and able to learn and manage home enteral therapy.  Questions answered.  Will continue to follow until discharge and update Tanner and Maribell with final plans.    Shital LOPES  [facility] Liaison  [phone] pager

## 2017-05-23 NOTE — PLAN OF CARE
Problem: Pain, Chronic (Adult)  Goal: Identify Related Risk Factors and Signs and Symptoms  Related risk factors and signs and symptoms are identified upon initiation of Human Response Clinical Practice Guideline (CPG)   Outcome: Improving  Pt with fair pain control on dilaudid 2mg every 4 hours, tylenol, fentanyl patch  and toradol.  Pt ambulated in the halls. Voiding adequately.  Tolerating tube feeds at 100cc/hr and 40cc/hr water flushes.  Up to commode, passed gas only.  G-tube has remained clamped all night.

## 2017-05-23 NOTE — PROGRESS NOTES
CLINICAL NUTRITION SERVICES - REASSESSMENT NOTE     Nutrition Prescription    RECOMMENDATIONS FOR MDs/PROVIDERS TO ORDER:  Do not recommend using Vivonex TEN alone for >2-3 weeks.  If patient requires ongoing low fat formula use for longer than this, please order IV lipids 2x/week to prevent essential fatty acid deficiency.  If able to switch back to higher fat formula -> recommend running Impact Peptide 1.5 at 75 ml/hr (goal) and resuming pancreatic enzymes mixed into the feedings.   If re-starting higher fat TFs, begin the following pancreatic enzyme regimen and recommend order each of the following:   A) Sodium Bicarb tablet (325 mg), 1 tablet q 4 hrs via Jtube. Administration Instructions: Crush 1 tablet and mix into 15 ml of warm water and use this solution to mix with Creon pancreatic enzymes. DO NOT administer directly into Jtube (to be mixed into TF formula with Creon enzyme - see Creon enzyme order)   B) Creon 24, 1-3 capsules q 4 hrs via Jtube. Administration Instructions: If TF rate is running @ 10-30 ml/hr, administer 1 capsule q 4 hrs; once TF rate advances 35-60 ml/hr, increase to 2 capsules q 4 hrs and if rate 60 ml/hr or greater provide 3 capsules q 4 hours. Open capsule and empty contents into 15 ml sodium bicarb solution (see sodium bicarb order), let dissolve for about 20-30 minutes and then add this solution to the amount of TF formula hung in TF bag every 4 hrs.  Malnutrition Status:    Non-severe malnutrition in the context of acute illness    Recommendations already ordered by Registered Dietitian (RD):  Continue Vivonex TEN @ 100 ml/hr + ProSource 1 pkt QID daily     Future/Additional Recommendations:  Free water recommendations: When ready to d/c recommend providing free water of 25 ml q 1 hour for maintenance needs (if G-tube remains clamped).  Recommend adding directly to TF formula.  Increase total pump rate to 125 ml/hr (100 ml/hr TF + 25 ml/hr water).       EVALUATION OF THE PROGRESS  TOWARD GOALS   Diet: NPO    Nutrition Support: Vivonex TEN @ 100 ml/hr via J-tube + 4 pkts ProSource (protein modular) -> 2560 kcal (25 kcal/kg), 135 grams protein (1.3g/kg), 490 grams CHO, 7 grams total fat.  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      Intake: Has received a 7 day average of 1188 kcal (47% needs), 73 grams protein (56% needs) per day.      NEW FINDINGS   Has been feeling bloated, passing a lot of gas/BM.     Weight up ~12 kg from admission weight.  Patient is concerned about his fluid retention and swelling.     Has been tolerating G-tube clamped since yesterday.     MALNUTRITION  % Intake: < 75% for > 7 days (non-severe)  % Weight Loss: None noted  Subcutaneous Fat Loss: None observed  Muscle Loss: None observed  Fluid Accumulation/Edema: Generalized (mild)  Malnutrition Diagnosis: Non-severe malnutrition in the context of acute illness    Previous Goals   Goals  1.  Tolerate adv of TF to goal infusion without sx of refeeding within the next 2-3 days. - NOT MET    2.  Once TF at goal, total ave EN intakes provide minimum of 25 kcal/kg/day and 1.3 g/kg/day (per 101 kg)  Evaluation: Met    Previous Nutrition Diagnosis  Predicted suboptimal nutrient intake  Evaluation: Declining    CURRENT NUTRITION DIAGNOSIS  Inadequate enteral nutrition infusion related to GI status post-pancreatectomy as evidenced by patient meeting 50-60% of assessed needs from TF over the last 7 days.     INTERVENTIONS  Implementation  Nutrition education for nutrition relationship to health/disease -> Patient with questions re: his TF formula.  Discussed the formula and protein modular and their contents in detail.  Discussed plan for further education as it gets closer to discharge.     Goals  Total avg nutritional intake to meet a minimum of 25 kcal/kg and 1.3 g PRO/kg daily (per dosing wt 101 kg).    Monitoring/Evaluation  Progress toward goals will be monitored and evaluated per protocol.    Orquidea Tate MS,  RD, BISI  Pager [phone]

## 2017-05-23 NOTE — PLAN OF CARE
Problem: Goal Outcome Summary  Goal: Goal Outcome Summary  PT 7A: Pt with improved mobility and activity tolerance on this date. Engaged in bed mobility, transfers, gait training, stair negotiation, and functional strength training. Pt mod-I with supine<>sit with HOB raised, IND with sit<>stand transfers, and SBA for gait with 1 UE support on IV pole up to ~330'. Limited 2/2 to pain, R hand N/T, and overall decrease in activity tolerance from baseline. Continues to benefit from skilled PT to address stated deficits in order to return to IND and safe PLOF.     REC: D/c to local hotel once medically stable with A from wife PRN.     Recommending that edema consult be placed for pt d/t inc edema and discomfort in R UE and LACEY LEs.

## 2017-05-24 ENCOUNTER — APPOINTMENT (OUTPATIENT)
Dept: PHYSICAL THERAPY | Facility: CLINIC | Age: 57
DRG: 406 | End: 2017-05-24
Attending: TRANSPLANT SURGERY
Payer: COMMERCIAL

## 2017-05-24 LAB
ANION GAP SERPL CALCULATED.3IONS-SCNC: 6 MMOL/L (ref 3–14)
APTT PPP: 37 SEC (ref 22–37)
BUN SERPL-MCNC: 13 MG/DL (ref 7–30)
CALCIUM SERPL-MCNC: 7.6 MG/DL (ref 8.5–10.1)
CHLORIDE SERPL-SCNC: 106 MMOL/L (ref 94–109)
CO2 SERPL-SCNC: 29 MMOL/L (ref 20–32)
CREAT SERPL-MCNC: 0.83 MG/DL (ref 0.66–1.25)
ERYTHROCYTE [DISTWIDTH] IN BLOOD BY AUTOMATED COUNT: 16.5 % (ref 10–15)
GFR SERPL CREATININE-BSD FRML MDRD: ABNORMAL ML/MIN/1.7M2
GLUCOSE BLDC GLUCOMTR-MCNC: 137 MG/DL (ref 70–99)
GLUCOSE BLDC GLUCOMTR-MCNC: 146 MG/DL (ref 70–99)
GLUCOSE BLDC GLUCOMTR-MCNC: 156 MG/DL (ref 70–99)
GLUCOSE BLDC GLUCOMTR-MCNC: 164 MG/DL (ref 70–99)
GLUCOSE BLDC GLUCOMTR-MCNC: 170 MG/DL (ref 70–99)
GLUCOSE BLDC GLUCOMTR-MCNC: 173 MG/DL (ref 70–99)
GLUCOSE BLDC GLUCOMTR-MCNC: 198 MG/DL (ref 70–99)
GLUCOSE SERPL-MCNC: 171 MG/DL (ref 70–99)
HCT VFR BLD AUTO: 22.7 % (ref 40–53)
HGB BLD-MCNC: 7.1 G/DL (ref 13.3–17.7)
MCH RBC QN AUTO: 27.8 PG (ref 26.5–33)
MCHC RBC AUTO-ENTMCNC: 31.3 G/DL (ref 31.5–36.5)
MCV RBC AUTO: 89 FL (ref 78–100)
PLATELET # BLD AUTO: 666 10E9/L (ref 150–450)
POTASSIUM SERPL-SCNC: 3.9 MMOL/L (ref 3.4–5.3)
RBC # BLD AUTO: 2.55 10E12/L (ref 4.4–5.9)
SODIUM SERPL-SCNC: 141 MMOL/L (ref 133–144)
WBC # BLD AUTO: 18.6 10E9/L (ref 4–11)

## 2017-05-24 PROCEDURE — 80048 BASIC METABOLIC PNL TOTAL CA: CPT | Performed by: PHYSICIAN ASSISTANT

## 2017-05-24 PROCEDURE — 85730 THROMBOPLASTIN TIME PARTIAL: CPT | Performed by: PHYSICIAN ASSISTANT

## 2017-05-24 PROCEDURE — 25000125 ZZHC RX 250: Performed by: STUDENT IN AN ORGANIZED HEALTH CARE EDUCATION/TRAINING PROGRAM

## 2017-05-24 PROCEDURE — 25000132 ZZH RX MED GY IP 250 OP 250 PS 637: Performed by: SURGERY

## 2017-05-24 PROCEDURE — 25000132 ZZH RX MED GY IP 250 OP 250 PS 637: Performed by: TRANSPLANT SURGERY

## 2017-05-24 PROCEDURE — 25000125 ZZHC RX 250: Performed by: PHYSICIAN ASSISTANT

## 2017-05-24 PROCEDURE — 85027 COMPLETE CBC AUTOMATED: CPT | Performed by: PHYSICIAN ASSISTANT

## 2017-05-24 PROCEDURE — 36592 COLLECT BLOOD FROM PICC: CPT | Performed by: PHYSICIAN ASSISTANT

## 2017-05-24 PROCEDURE — 27210443 ZZH NUTRITION PRODUCT SPECIALIZED PACKET

## 2017-05-24 PROCEDURE — 25000128 H RX IP 250 OP 636: Performed by: TRANSPLANT SURGERY

## 2017-05-24 PROCEDURE — 97110 THERAPEUTIC EXERCISES: CPT | Mod: GP

## 2017-05-24 PROCEDURE — 25000132 ZZH RX MED GY IP 250 OP 250 PS 637: Performed by: NURSE PRACTITIONER

## 2017-05-24 PROCEDURE — 12000026 ZZH R&B TRANSPLANT

## 2017-05-24 PROCEDURE — 25000131 ZZH RX MED GY IP 250 OP 636 PS 637: Performed by: CLINICAL NURSE SPECIALIST

## 2017-05-24 PROCEDURE — 25000128 H RX IP 250 OP 636: Performed by: PHYSICIAN ASSISTANT

## 2017-05-24 PROCEDURE — 97116 GAIT TRAINING THERAPY: CPT | Mod: GP

## 2017-05-24 PROCEDURE — 40000193 ZZH STATISTIC PT WARD VISIT

## 2017-05-24 PROCEDURE — 97530 THERAPEUTIC ACTIVITIES: CPT | Mod: GP

## 2017-05-24 PROCEDURE — 25000132 ZZH RX MED GY IP 250 OP 250 PS 637: Performed by: PHYSICIAN ASSISTANT

## 2017-05-24 PROCEDURE — 00000146 ZZHCL STATISTIC GLUCOSE BY METER IP

## 2017-05-24 PROCEDURE — 25000128 H RX IP 250 OP 636: Performed by: NURSE PRACTITIONER

## 2017-05-24 PROCEDURE — 25000128 H RX IP 250 OP 636: Performed by: SURGERY

## 2017-05-24 PROCEDURE — 25000132 ZZH RX MED GY IP 250 OP 250 PS 637: Performed by: STUDENT IN AN ORGANIZED HEALTH CARE EDUCATION/TRAINING PROGRAM

## 2017-05-24 RX ORDER — FUROSEMIDE 10 MG/ML
20 INJECTION INTRAMUSCULAR; INTRAVENOUS ONCE
Status: COMPLETED | OUTPATIENT
Start: 2017-05-24 | End: 2017-05-24

## 2017-05-24 RX ORDER — HYDROMORPHONE HCL/0.9% NACL/PF 0.2MG/0.2
0.2 SYRINGE (ML) INTRAVENOUS
Status: DISCONTINUED | OUTPATIENT
Start: 2017-05-24 | End: 2017-05-25

## 2017-05-24 RX ORDER — DIAZEPAM ORAL SOLUTION (CONCENTRATE) 5 MG/ML
2 SOLUTION ORAL EVERY 8 HOURS PRN
Status: DISCONTINUED | OUTPATIENT
Start: 2017-05-24 | End: 2017-05-24

## 2017-05-24 RX ORDER — HYDROMORPHONE HYDROCHLORIDE 1 MG/ML
1 SOLUTION ORAL EVERY 4 HOURS PRN
Status: DISCONTINUED | OUTPATIENT
Start: 2017-05-24 | End: 2017-05-26 | Stop reason: HOSPADM

## 2017-05-24 RX ADMIN — MULTIVIT AND MINERALS-FERROUS GLUCONATE 9 MG IRON/15 ML ORAL LIQUID 15 ML: at 09:16

## 2017-05-24 RX ADMIN — Medication 1 PACKET: at 09:17

## 2017-05-24 RX ADMIN — NYSTATIN 500000 UNITS: 500000 SUSPENSION ORAL at 12:29

## 2017-05-24 RX ADMIN — SENNOSIDES A AND B 10 ML: 415.36 LIQUID ORAL at 19:51

## 2017-05-24 RX ADMIN — INSULIN ASPART 2 UNITS: 100 INJECTION, SOLUTION INTRAVENOUS; SUBCUTANEOUS at 00:15

## 2017-05-24 RX ADMIN — HYDROMORPHONE HYDROCHLORIDE 2 MG: 1 SOLUTION ORAL at 00:35

## 2017-05-24 RX ADMIN — PIPERACILLIN SODIUM,TAZOBACTAM SODIUM 3.38 G: 3; .375 INJECTION, POWDER, FOR SOLUTION INTRAVENOUS at 17:49

## 2017-05-24 RX ADMIN — Medication 1 PACKET: at 12:41

## 2017-05-24 RX ADMIN — HYDROMORPHONE HYDROCHLORIDE 2 MG: 1 SOLUTION ORAL at 04:55

## 2017-05-24 RX ADMIN — NYSTATIN 500000 UNITS: 500000 SUSPENSION ORAL at 09:16

## 2017-05-24 RX ADMIN — HYDROMORPHONE HYDROCHLORIDE 2 MG: 1 SOLUTION ORAL at 23:57

## 2017-05-24 RX ADMIN — LEVOTHYROXINE SODIUM 150 MCG: 300 TABLET ORAL at 08:19

## 2017-05-24 RX ADMIN — PIPERACILLIN SODIUM,TAZOBACTAM SODIUM 3.38 G: 3; .375 INJECTION, POWDER, FOR SOLUTION INTRAVENOUS at 12:01

## 2017-05-24 RX ADMIN — FUROSEMIDE 20 MG: 10 INJECTION, SOLUTION INTRAVENOUS at 14:40

## 2017-05-24 RX ADMIN — Medication 1 PACKET: at 16:10

## 2017-05-24 RX ADMIN — Medication 2000 UNITS: at 09:16

## 2017-05-24 RX ADMIN — PANTOPRAZOLE SODIUM 40 MG: 40 TABLET, DELAYED RELEASE ORAL at 09:16

## 2017-05-24 RX ADMIN — Medication 1 PACKET: at 20:00

## 2017-05-24 RX ADMIN — SENNOSIDES A AND B 10 ML: 415.36 LIQUID ORAL at 09:17

## 2017-05-24 RX ADMIN — FLUOXETINE HYDROCHLORIDE 10 MG: 20 SOLUTION ORAL at 09:17

## 2017-05-24 RX ADMIN — ACETAMINOPHEN 500 MG: 160 LIQUID ORAL at 23:57

## 2017-05-24 RX ADMIN — HYDROMORPHONE HYDROCHLORIDE 2 MG: 1 SOLUTION ORAL at 09:11

## 2017-05-24 RX ADMIN — HYDROMORPHONE HYDROCHLORIDE 2 MG: 1 SOLUTION ORAL at 12:29

## 2017-05-24 RX ADMIN — PIPERACILLIN SODIUM,TAZOBACTAM SODIUM 3.38 G: 3; .375 INJECTION, POWDER, FOR SOLUTION INTRAVENOUS at 04:55

## 2017-05-24 RX ADMIN — INSULIN ASPART 6 UNITS: 100 INJECTION, SOLUTION INTRAVENOUS; SUBCUTANEOUS at 09:04

## 2017-05-24 RX ADMIN — HYDROMORPHONE HYDROCHLORIDE 2 MG: 1 SOLUTION ORAL at 16:09

## 2017-05-24 RX ADMIN — ACETAMINOPHEN 500 MG: 160 LIQUID ORAL at 12:29

## 2017-05-24 RX ADMIN — NYSTATIN 500000 UNITS: 500000 SUSPENSION ORAL at 19:51

## 2017-05-24 RX ADMIN — MAGNESIUM HYDROXIDE 30 ML: 400 SUSPENSION ORAL at 16:34

## 2017-05-24 RX ADMIN — TAMSULOSIN HYDROCHLORIDE 0.4 MG: 0.4 CAPSULE ORAL at 17:49

## 2017-05-24 RX ADMIN — POTASSIUM CHLORIDE 20 MEQ: 29.8 INJECTION, SOLUTION INTRAVENOUS at 09:27

## 2017-05-24 RX ADMIN — INSULIN ASPART 4 UNITS: 100 INJECTION, SOLUTION INTRAVENOUS; SUBCUTANEOUS at 16:10

## 2017-05-24 RX ADMIN — HYDROMORPHONE HYDROCHLORIDE 2 MG: 1 SOLUTION ORAL at 19:51

## 2017-05-24 RX ADMIN — ENOXAPARIN SODIUM 30 MG: 30 INJECTION SUBCUTANEOUS at 19:51

## 2017-05-24 RX ADMIN — PREGABALIN 25 MG: 20 SOLUTION ORAL at 09:11

## 2017-05-24 RX ADMIN — Medication: at 16:32

## 2017-05-24 RX ADMIN — ACETAMINOPHEN 500 MG: 160 LIQUID ORAL at 04:55

## 2017-05-24 RX ADMIN — DIAZEPAM 2 MG: 5 SOLUTION ORAL at 23:58

## 2017-05-24 RX ADMIN — INSULIN ASPART 4 UNITS: 100 INJECTION, SOLUTION INTRAVENOUS; SUBCUTANEOUS at 04:55

## 2017-05-24 RX ADMIN — HYDROMORPHONE HYDROCHLORIDE 1 MG: 1 SOLUTION ORAL at 21:39

## 2017-05-24 RX ADMIN — ACETAMINOPHEN 500 MG: 160 LIQUID ORAL at 17:49

## 2017-05-24 RX ADMIN — PREGABALIN 25 MG: 20 SOLUTION ORAL at 19:51

## 2017-05-24 RX ADMIN — ENOXAPARIN SODIUM 30 MG: 30 INJECTION SUBCUTANEOUS at 09:17

## 2017-05-24 RX ADMIN — INSULIN ASPART 4 UNITS: 100 INJECTION, SOLUTION INTRAVENOUS; SUBCUTANEOUS at 12:43

## 2017-05-24 RX ADMIN — PANTOPRAZOLE SODIUM 40 MG: 40 TABLET, DELAYED RELEASE ORAL at 20:00

## 2017-05-24 NOTE — PROGRESS NOTES
"Diabetes Consult Daily Progress Note    Assessment/Plan:    55 yo M with hx of chronic pain, papillary thyroid cancer s/p thyroidectomy with hypothyroidism, chronic pancreatitis s/p lap lin, biliary and pancreatic sphincterotomies, transduodenal sphincteroplasty with resultant leak and phlegmonous pancreatitis who is now s/p open TPAIT on 5/15/17, received 4347 ie/kg in transplant.      Glucose trending above target.  Receiving high carbohydrate tube feed, but subcut insulin in excess of previous IV needs, so would expect better control.    Plan:     - increased glargine 62 units BID, and give evening dose early (1630)  - increase correction to aspart 2 per 20 >120 q4h   - diabetes educ for pt wife took place today         -plan discussed w/ pt, RN, primary team    Interval History:    Last 24 h RN and progress notes reviewed  Vivonex TEN @100cc/h since 2300 5/22.  Off insulin infusion last night with glargine 52 uinits BID on board.  BG up to high 100s.    Walking often and was up to shower.  Even w/ TF off 30 min for shower, BG still 150s afterward.      Active Diet Order      Combination Diet Clear Liquid      Exam:      /68 (BP Location: Right arm)  Pulse 69  Temp 98.5  F (36.9  C) (Oral)  Resp 16  Ht 1.88 m (6' 2\")  Wt 112.1 kg (247 lb 1.6 oz)  SpO2 96%  BMI 31.73 kg/m2  Gen:  Alert, resting in bed.  Wife at bedside  Resp: unlabored  GI: g-tube to gravity  Neuro: tired, brief responses    Data:        Recent Labs  Lab 05/24/17  1546 05/24/17  1243 05/24/17  1122 05/24/17  0904 05/24/17  0704 05/24/17  0454 05/24/17  0013  05/23/17  1757  05/23/17  0822  05/22/17  2050  05/22/17  1128  05/22/17  0643   GLC  --   --   --   --  171*  --   --   --  153*  --  117*  --  81  --  136*  --  97   * 156* 170* 198*  --  173* 137*  < >  --   < >  --   < >  --   < >  --   < >  --    < > = values in this interval not displayed.  Lab Results   Component Value Date    A1C 5.3 05/16/2017    A1C 5.6 " 05/10/2017    A1C 5.4 09/10/2015     Recent Labs   Lab Test  05/24/17   0704  05/23/17   1757   NA  141  142   POTASSIUM  3.9  3.5   CHLORIDE  106  105   CO2  29  32   ANIONGAP  6  5   GLC  171*  153*   BUN  13  15   CR  0.83  0.76   CRISTOBAL  7.6*  7.8*     CBC RESULTS:   Recent Labs   Lab Test  05/24/17   0704   WBC  18.6*   RBC  2.55*   HGB  7.1*   HCT  22.7*   MCV  89   MCH  27.8   MCHC  31.3*   RDW  16.5*   PLT  666*     Orquidea Irineo APRN [facility] [phone]  Diabetes Management Team job code: [phone]

## 2017-05-24 NOTE — PLAN OF CARE
"Problem: Goal Outcome Summary  Goal: Goal Outcome Summary  Outcome: No Change  VSS on RA. Pain controlled with scheduled pain medications. Pt reports \"not feeling good\" but denies nausea and declines antiemetics. G tube clamped, J with TF @ 100ml/hr. Free water flushes stopped per verbal order d/t Na+ being WNL. Started on BID lantus. Evening dose given @ 2000, insulin gtt stopped @ 2200 per orders. MAURICIO with minimal serous output; site leaking - dressing changed x1. Midline abdominal dressing changed per orders. Voiding frequently, passing gas frequently. Will continue to monitor and notify of any changes.       "

## 2017-05-24 NOTE — CONSULTS
Diabetes Education  Received consult request to see this 56 year old male and his wife for diabetes education.  Patient is s/p TPIAT.  He is on Vivonex TEN at 100 cc/hr.  He was transitioned from IV insulin infusion to subcutaneous insulin yesterday.  Lantus increased from initial dose of 52 units bid to 62 units bid.  Correction scale is 2/30>120 mg/dL every 4 hours.    Met with patient and wife.  Worked primarily with wife.  Patient listening, with eyes closed.  Provided with a printed treatment plan and reviewed.  Demonstrated insulin pen technique; wife practiced with training pen and injection pillow and did well.  Wife brought patient's Freestyle Lite monitor kit and supplies.  He will be using an Accu-chek FastClix lancing device and lancet drums rather than the device which comes with the Freestyle kit.  She did a blood glucose reading for herself and did well.  Reviewed hypoglycemia signs/symptoms and treatment.  As patient is on a clear liquid diet, he can either take the 4 oz of apple juice by mouth or via feeding tube.    Left home pen needles in room, so wife can practice with RN.    When discharged, will need prescriptions for:  1.  Freestyle Lite test strips  2.  Accu-chek FastClix lancet drums  3.  Insulin pen needles (4mm, 32 gauge)  4.  Alcohol swabs  5.  Sharps disposal container  6.  Glucose gel    Roxie Waller MS RN CDE [facility]  [phone]

## 2017-05-24 NOTE — PLAN OF CARE
Problem: Goal Outcome Summary  Goal: Goal Outcome Summary  Outcome: Improving  Pt A/Ox4. VSS on RA. SBA. Clear liquid, sugar-free diet and Vivanex tube feeds running at 100mL/hr; tolerating well. Pt passing gas, but no BM this shift. Urinal at the bedside.  and 176; insulin administered via sliding scale order. G-tube clamped; denies nausea. R MAURICIO with minimal serous output. Pain managed with scheduled medications; pt resting comfortably. Continue with POC.

## 2017-05-24 NOTE — PLAN OF CARE
Problem: Goal Outcome Summary  Goal: Goal Outcome Summary  Outcome: Improving  Afebrile; VSS on RA.  -198; Novolog sliding scale increased and evening Lantus administered early.  Potassium 3.9; 20mEq replaced.  Pt reports adequate pain control on current regimen.  Denies nausea; G-tube clamped.  Tolerating small amounts of sugar-free clears.  TF at goal 100mL/hr.  Voiding good amounts; lasix administered this afternoon.  BMx1; prn MOM and scheduled senna administered.  MAURICIO with 30mL serous output.  Incision packed; dressing changedx1.  Up ambulating in hallx2 and showered with Ax1.  Diabetic education completed; med card provided and reviewed; and pt's wife educated on TF and medication administration.  Continue with plan of care and notify MD with changes.

## 2017-05-24 NOTE — PLAN OF CARE
Problem: Goal Outcome Summary  Goal: Goal Outcome Summary  PT / 7A: Upon check-in, pt resting in bed and reporting abdominal pain. Pt declines PT, reporting he had taken a walk this AM. Pt educated on importance and benefits of working with therapy, pt requests PT return later today. Will re-attempt as schedule allows.

## 2017-05-24 NOTE — PROGRESS NOTES
Transplant Surgery Pancreas Service - Daily Progress Note  05/24/2017    Assessment & Plan: 56 year old male with chronic pancreatitis s/p lap cholecystectomy 4/2012, s/p biliary and pancreatic sphincterotomies and a temporary pancreatic stent 2012, s/p transduodenal sphincteroplasty 12/2014 with resultant leak and phlegmonous pancreatitis. S/p TPAIT 5/15/17.    Liver US:  Impression: The left portal vein, left hepatic vein, and left hepatic  artery are obscured by overlying bowel gas. The splenic vein is also  not visualized. The remaining hepatic vasculature is patent with  antegrade flow.    TPAIT: POD #9  Cardiorespiratory:  Stable. Continue pulmonary toilet and IS. Ambulate and OOB to chair  GI/Nutrition: G tube clamped, tolerating.  Tube feeds at 100 cc/h plus meds. 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  Drain clear now, continue drain for monitoring. Bowel regimen: senna BID and MOM BID PRN. Tolerating clear fluids.  Renal/Fluid/Electrolytes: Hypernatremia, resolved. OK to stop FW as patient taking in clear fluids (requirments ~ 600 cc per day fluids, TF will provide fluid as well). Allow patient to self diurese, will consider lasix dose later today if needed.   : Ferguson now removed.  Voiding without issues. Continue flomax, started due to urinary retention this admission.  Post-pancreatectomy diabetes: Endocrine consulted. Transitioned to SQ. Plan for DM education today.  Infection:  Afebrile. Surgical ppx: Ertapenem, stopped 5/18.  Will cover enterobacter and enterococcus growing in panc pres solution.  Continue Zosyn x 10 days.   Prophylaxis: PPI, Anticoagulation: Lovenox 40 q12  Pain control: Fair  Ropivacaine On Q removed POD 7.  Fentanyl patch 100 mcg.    Dilaudid 2 mg Q4h through J-tube.  Acetaminophen 500 mg susp every 6 hours   Lyrica 25 mg BID  Valium susp PRN - not using  Continue dilaudid IV PRN today. Will discuss with staff regarding starting oral dilaudid PRN.  "  Activity: ambulate QID  Anticipated LOS/Discharge: TBD.     Medical Decision Making: Medium  Subsequent visit 97116 (moderate level decision making)    GELACIO/Fellow/Resident Provider: TAY Arriaza    Faculty: ANDRES Jackson MD  __________________________________________________________________  Transplant History: Admitted 5/15/2017 for TP AIT  5/15/2017 ([facility]), Postoperative day: 9     Interval History: History is obtained from the patient  Overnight events: Pain control fair. Denies SOB. +Flatus, +BM. Ambulating frequently.      ROS:   A 10-point review of systems was negative except as noted above.    Curent Meds:    insulin glargine  62 Units Subcutaneous Q24H     insulin glargine  62 Units Subcutaneous QPM     insulin aspart  2-16 Units Subcutaneous Q4H     - MEDICATION INSTRUCTIONS -   Does not apply Once     HYDROmorphone  2 mg Per J Tube Q4H     sennosides  10 mL Per Feeding Tube BID     Prosource - non-formulary dietary supplement (from nutrition services)  1 packet Jejunal Tube 4x Daily     tamsulosin  0.4 mg Oral Daily     enoxaparin  30 mg Subcutaneous Q12H     fentaNYL  100 mcg Transdermal Q72H     acetaminophen  500 mg Oral Q6H     fentaNYL   Transdermal Q8H     fentaNYL   Transdermal Q72H     piperacillin-tazobactam  3.375 g Intravenous Q6H     pregabalin  25 mg Per Feeding Tube BID     nystatin  500,000 Units Oral 4x Daily     pantoprazole  40 mg Oral or J tube BID     cholecalciferol  2,000 Units Per J Tube Daily     multivitamins with minerals  15 mL Per Feeding Tube Daily     sodium chloride (PF)  3 mL Intracatheter Q8H     FLUoxetine  10 mg Per J Tube Daily     levothyroxine  150 mcg Per J Tube QAM AC       Physical Exam:     Admit Weight: 100.7 kg (222 lb 0.1 oz)    Current Vitals:   /65 (BP Location: Right arm)  Pulse 69  Temp 98.6  F (37  C) (Oral)  Resp 16  Ht 1.88 m (6' 2\")  Wt 112.1 kg (247 lb 1.6 oz)  SpO2 94%  BMI 31.73 kg/m2  Vital sign ranges:    Temp:  [97.9  F (36.6 "  C)-99.3  F (37.4  C)] 98.6  F (37  C)  Pulse:  [69] 69  Heart Rate:  [70-85] 70  Resp:  [16] 16  BP: (112-126)/(57-76) 112/65  SpO2:  [92 %-94 %] 94 %  Patient Vitals for the past 24 hrs:   BP Temp Temp src Pulse Heart Rate Resp SpO2 Weight   05/24/17 0700 112/65 98.6  F (37  C) Oral 69 70 16 94 % -   05/24/17 0045 - - - - - - - 112.1 kg (247 lb 1.6 oz)   05/24/17 0008 122/72 99.3  F (37.4  C) Oral - 82 16 92 % -   05/23/17 1936 126/66 98.4  F (36.9  C) Oral - 84 16 94 % -   05/23/17 1542 123/76 97.9  F (36.6  C) Oral - 85 16 92 % -   05/23/17 1228 112/57 98.8  F (37.1  C) Oral - 79 16 94 % -     General Appearance: NAD, alert. General edema.  Skin: normal, dry  Heart: well perfused  Lungs: NLB on RA  Abdomen: The abdomen is soft, appropriate incisional ttp, and tender around surgical sites. Mild erythema along incision, removed additional staples, scant SS drainage, wound +fibrinous tissue. Tube/Drain sites are: G tube clamped and J Tube: TF running.  MAURICIO: clear.   : king removed.  Extremities: edema: mild  Neuro:  Alert, oriented      Data:   CMP  Recent Labs  Lab 05/24/17  0704 05/23/17  1757 05/23/17  0822  05/22/17  0643  05/20/17  0716    142 143  < > 147*  < > 152*   POTASSIUM 3.9 3.5 3.5  < > 3.7  < > 3.4   CHLORIDE 106 105 108  < > 111*  < > 112*   CO2 29 32 30  < > 31  < > 35*   * 153* 117*  < > 97  < > 127*   BUN 13 15 19  < > 20  < > 20   CR 0.83 0.76 0.79  < > 0.76  < > 0.74   GFRESTIMATED >90Non  GFR Calc >90Non  GFR Calc >90Non  GFR Calc  < > >90Non  GFR Calc  < > >90Non  GFR Calc   GFRESTBLACK >90African American GFR Calc >90African American GFR Calc >90African American GFR Calc  < > >90African American GFR Calc  < > >90African American GFR Calc   CRISTOBAL 7.6* 7.8* 7.7*  < > 7.7*  < > 7.8*   MAG  --   --  2.0  --   --   --  2.7*   PHOS  --   --  2.4*  --  2.2*  < > 1.7*   LIPASE  --   --   --   --   --   --   93   ALBUMIN  --   --   --   --   --   --  1.8*   BILITOTAL  --   --   --   --   --   --  0.4   ALKPHOS  --   --   --   --   --   --  84   AST  --   --   --   --   --   --  20   ALT  --   --   --   --   --   --  43   < > = values in this interval not displayed.  CBC    Recent Labs  Lab 05/24/17  0704 05/23/17  0822   HGB 7.1* 7.6*   WBC 18.6* 19.0*   * 594*     Coags    Recent Labs  Lab 05/24/17  0704 05/23/17  0822   PTT 37 34      Urinalysis  Recent Labs   Lab Test  05/10/17   1015   COLOR  Yellow   APPEARANCE  Clear   URINEGLC  Negative   URINEBILI  Negative   URINEKETONE  Negative   SG  1.022   UBLD  Negative   URINEPH  6.5   PROTEIN  Negative   NITRITE  Negative   LEUKEST  Negative   RBCU  2   WBCU  <1   Attestation:  Patient has been seen and evaluated by me.   Vital signs, labs, medications and orders were reviewed.   When obtained, diagnostic images were reviewed by me and interpreted as above.    The care plan was discussed with the multidisciplinary team and I agree with the findings and plan in this note, with any differences recorded in blue.    .

## 2017-05-24 NOTE — PLAN OF CARE
Problem: Goal Outcome Summary  Goal: Goal Outcome Summary  PT / 7A: Pt continues to improve with mobility and activity tolerance. Engaged in transfers, gait, stairs, and LE strengthening. Pt IND with sit<>stand transfers. Pt SBA for gait with no AD for ~400ft, pt with slow pace however overall steady on feet with no LOB. Pt ascends/descends 16 stairs with bilateral handrails + SBA. Pt performed standing LE HEP. Pt most limited by pain, strength, balance, and endurance.   REC: D/C to local [facility] once medically stable with A from wife PRN.

## 2017-05-25 ENCOUNTER — APPOINTMENT (OUTPATIENT)
Dept: PHYSICAL THERAPY | Facility: CLINIC | Age: 57
DRG: 406 | End: 2017-05-25
Attending: TRANSPLANT SURGERY
Payer: COMMERCIAL

## 2017-05-25 DIAGNOSIS — K86.89 PANCREATIC INSUFFICIENCY: ICD-10-CM

## 2017-05-25 DIAGNOSIS — Z90.410 POST-PANCREATECTOMY DIABETES (H): Primary | ICD-10-CM

## 2017-05-25 DIAGNOSIS — E89.1 POST-PANCREATECTOMY DIABETES (H): Primary | ICD-10-CM

## 2017-05-25 DIAGNOSIS — E13.9 POST-PANCREATECTOMY DIABETES (H): Primary | ICD-10-CM

## 2017-05-25 LAB
ANION GAP SERPL CALCULATED.3IONS-SCNC: 6 MMOL/L (ref 3–14)
APTT PPP: 35 SEC (ref 22–37)
BUN SERPL-MCNC: 14 MG/DL (ref 7–30)
CALCIUM SERPL-MCNC: 8.3 MG/DL (ref 8.5–10.1)
CHLORIDE SERPL-SCNC: 106 MMOL/L (ref 94–109)
CO2 SERPL-SCNC: 28 MMOL/L (ref 20–32)
CREAT SERPL-MCNC: 0.84 MG/DL (ref 0.66–1.25)
ERYTHROCYTE [DISTWIDTH] IN BLOOD BY AUTOMATED COUNT: 16.4 % (ref 10–15)
GFR SERPL CREATININE-BSD FRML MDRD: ABNORMAL ML/MIN/1.7M2
GLUCOSE BLDC GLUCOMTR-MCNC: 115 MG/DL (ref 70–99)
GLUCOSE BLDC GLUCOMTR-MCNC: 119 MG/DL (ref 70–99)
GLUCOSE BLDC GLUCOMTR-MCNC: 148 MG/DL (ref 70–99)
GLUCOSE BLDC GLUCOMTR-MCNC: 151 MG/DL (ref 70–99)
GLUCOSE BLDC GLUCOMTR-MCNC: 157 MG/DL (ref 70–99)
GLUCOSE BLDC GLUCOMTR-MCNC: 177 MG/DL (ref 70–99)
GLUCOSE BLDC GLUCOMTR-MCNC: 190 MG/DL (ref 70–99)
GLUCOSE SERPL-MCNC: 162 MG/DL (ref 70–99)
HCT VFR BLD AUTO: 22.8 % (ref 40–53)
HGB BLD-MCNC: 7.2 G/DL (ref 13.3–17.7)
MCH RBC QN AUTO: 27.8 PG (ref 26.5–33)
MCHC RBC AUTO-ENTMCNC: 31.6 G/DL (ref 31.5–36.5)
MCV RBC AUTO: 88 FL (ref 78–100)
PLATELET # BLD AUTO: 653 10E9/L (ref 150–450)
POTASSIUM SERPL-SCNC: 4.2 MMOL/L (ref 3.4–5.3)
RBC # BLD AUTO: 2.59 10E12/L (ref 4.4–5.9)
SODIUM SERPL-SCNC: 140 MMOL/L (ref 133–144)
WBC # BLD AUTO: 18.7 10E9/L (ref 4–11)

## 2017-05-25 PROCEDURE — 97116 GAIT TRAINING THERAPY: CPT | Mod: GP

## 2017-05-25 PROCEDURE — 25000128 H RX IP 250 OP 636: Performed by: TRANSPLANT SURGERY

## 2017-05-25 PROCEDURE — 25000128 H RX IP 250 OP 636: Performed by: SURGERY

## 2017-05-25 PROCEDURE — 40000193 ZZH STATISTIC PT WARD VISIT

## 2017-05-25 PROCEDURE — 25000125 ZZHC RX 250: Performed by: PHYSICIAN ASSISTANT

## 2017-05-25 PROCEDURE — 85027 COMPLETE CBC AUTOMATED: CPT | Performed by: PHYSICIAN ASSISTANT

## 2017-05-25 PROCEDURE — 25000132 ZZH RX MED GY IP 250 OP 250 PS 637: Performed by: TRANSPLANT SURGERY

## 2017-05-25 PROCEDURE — 85730 THROMBOPLASTIN TIME PARTIAL: CPT | Performed by: PHYSICIAN ASSISTANT

## 2017-05-25 PROCEDURE — 25000132 ZZH RX MED GY IP 250 OP 250 PS 637: Performed by: NURSE PRACTITIONER

## 2017-05-25 PROCEDURE — 25000132 ZZH RX MED GY IP 250 OP 250 PS 637: Performed by: PHYSICIAN ASSISTANT

## 2017-05-25 PROCEDURE — 97530 THERAPEUTIC ACTIVITIES: CPT | Mod: GP

## 2017-05-25 PROCEDURE — 97110 THERAPEUTIC EXERCISES: CPT | Mod: GP

## 2017-05-25 PROCEDURE — 27210443 ZZH NUTRITION PRODUCT SPECIALIZED PACKET

## 2017-05-25 PROCEDURE — 80048 BASIC METABOLIC PNL TOTAL CA: CPT | Performed by: PHYSICIAN ASSISTANT

## 2017-05-25 PROCEDURE — 12000026 ZZH R&B TRANSPLANT

## 2017-05-25 PROCEDURE — 25000132 ZZH RX MED GY IP 250 OP 250 PS 637: Performed by: SURGERY

## 2017-05-25 PROCEDURE — 00000146 ZZHCL STATISTIC GLUCOSE BY METER IP

## 2017-05-25 PROCEDURE — 25000132 ZZH RX MED GY IP 250 OP 250 PS 637: Performed by: STUDENT IN AN ORGANIZED HEALTH CARE EDUCATION/TRAINING PROGRAM

## 2017-05-25 PROCEDURE — 36592 COLLECT BLOOD FROM PICC: CPT | Performed by: PHYSICIAN ASSISTANT

## 2017-05-25 RX ADMIN — Medication 1 PACKET: at 08:52

## 2017-05-25 RX ADMIN — FLUOXETINE HYDROCHLORIDE 10 MG: 20 SOLUTION ORAL at 08:48

## 2017-05-25 RX ADMIN — Medication 1 PACKET: at 12:31

## 2017-05-25 RX ADMIN — PIPERACILLIN SODIUM,TAZOBACTAM SODIUM 3.38 G: 3; .375 INJECTION, POWDER, FOR SOLUTION INTRAVENOUS at 05:48

## 2017-05-25 RX ADMIN — PIPERACILLIN SODIUM,TAZOBACTAM SODIUM 3.38 G: 3; .375 INJECTION, POWDER, FOR SOLUTION INTRAVENOUS at 12:24

## 2017-05-25 RX ADMIN — HYDROMORPHONE HYDROCHLORIDE 1 MG: 1 SOLUTION ORAL at 16:10

## 2017-05-25 RX ADMIN — NYSTATIN 500000 UNITS: 500000 SUSPENSION ORAL at 20:45

## 2017-05-25 RX ADMIN — HYDROMORPHONE HYDROCHLORIDE 2 MG: 1 SOLUTION ORAL at 04:13

## 2017-05-25 RX ADMIN — ENOXAPARIN SODIUM 30 MG: 30 INJECTION SUBCUTANEOUS at 20:46

## 2017-05-25 RX ADMIN — MULTIVIT AND MINERALS-FERROUS GLUCONATE 9 MG IRON/15 ML ORAL LIQUID 15 ML: at 08:48

## 2017-05-25 RX ADMIN — HYDROMORPHONE HYDROCHLORIDE 2 MG: 1 SOLUTION ORAL at 08:48

## 2017-05-25 RX ADMIN — LEVOTHYROXINE SODIUM 150 MCG: 300 TABLET ORAL at 07:41

## 2017-05-25 RX ADMIN — PREGABALIN 25 MG: 20 SOLUTION ORAL at 08:48

## 2017-05-25 RX ADMIN — PIPERACILLIN SODIUM,TAZOBACTAM SODIUM 3.38 G: 3; .375 INJECTION, POWDER, FOR SOLUTION INTRAVENOUS at 00:07

## 2017-05-25 RX ADMIN — PANTOPRAZOLE SODIUM 40 MG: 40 TABLET, DELAYED RELEASE ORAL at 20:46

## 2017-05-25 RX ADMIN — NYSTATIN 500000 UNITS: 500000 SUSPENSION ORAL at 08:48

## 2017-05-25 RX ADMIN — ACETAMINOPHEN 500 MG: 160 LIQUID ORAL at 18:32

## 2017-05-25 RX ADMIN — HYDROMORPHONE HYDROCHLORIDE 1 MG: 1 SOLUTION ORAL at 23:47

## 2017-05-25 RX ADMIN — NYSTATIN 500000 UNITS: 500000 SUSPENSION ORAL at 16:10

## 2017-05-25 RX ADMIN — SENNOSIDES A AND B 10 ML: 415.36 LIQUID ORAL at 08:48

## 2017-05-25 RX ADMIN — PANTOPRAZOLE SODIUM 40 MG: 40 TABLET, DELAYED RELEASE ORAL at 08:48

## 2017-05-25 RX ADMIN — NYSTATIN 500000 UNITS: 500000 SUSPENSION ORAL at 12:30

## 2017-05-25 RX ADMIN — Medication 2000 UNITS: at 08:48

## 2017-05-25 RX ADMIN — PREGABALIN 25 MG: 20 SOLUTION ORAL at 20:45

## 2017-05-25 RX ADMIN — ENOXAPARIN SODIUM 30 MG: 30 INJECTION SUBCUTANEOUS at 08:47

## 2017-05-25 RX ADMIN — ACETAMINOPHEN 500 MG: 160 LIQUID ORAL at 23:47

## 2017-05-25 RX ADMIN — ACETAMINOPHEN 500 MG: 160 LIQUID ORAL at 12:31

## 2017-05-25 RX ADMIN — FENTANYL 1 PATCH: 100 PATCH, EXTENDED RELEASE TRANSDERMAL at 12:26

## 2017-05-25 RX ADMIN — Medication 1 PACKET: at 16:11

## 2017-05-25 RX ADMIN — Medication 1 PACKET: at 20:47

## 2017-05-25 RX ADMIN — HYDROMORPHONE HYDROCHLORIDE 1 MG: 1 SOLUTION ORAL at 12:31

## 2017-05-25 RX ADMIN — HYDROMORPHONE HYDROCHLORIDE 1 MG: 1 SOLUTION ORAL at 20:47

## 2017-05-25 NOTE — PROGRESS NOTES
Reason for Assessment:  Nutrition education regarding TF and enzyme regimen, administration.    Diet History:  Patient has used enzymes orally before (Zenpep 40), however has not used TF in the past.  Currently on Vivonex TEN @ 100 ml/hr d/t suspected chyle leak, switched on 5/21/17.    Nutrition Diagnosis:  Food- and nutrition-related knowledge deficit r/t no previous knowledge of TF and enzyme regimen, administration AEB patient verbal report.   Interventions:  Provided instruction on TF regimen/schedule, water flushes/total water required, and vitamin/mineral requirements.     Provided handouts - handout with patient s individual TF regimen, enzyme preparation described  Tube Feeding Regimen:  Vivonex TEN @ 100 ml/hr + 4 pkts of ProSource TF daily    Tube Feeding Administration:  Mix 8 packets of Vivonex TEN formula + 2000 ml water.  This will yield 2400 ml of formula.  Mix well and store in the refrigerator in an empty milk jug container or pitcher.    Every 4 hours:    1. Remove formula from fridge and shake/mix well.    2. Pour 400 ml tube feeding to graduated cylinder.      Four times daily - provide 1 packet (45 ounces) of ProSource TF via syringe into J-tube.  Flush tube with 15-30 ml water before and after.     You will require at least 600 ml (~2.5 cups) of additional water/fluid daily by mouth or via J-tube.  If unable to consume this by mouth, please provide water flushes via J-tube.     Vitamin/Mineral Recommendations:  Multivitamin with minerals (Certavite) through feeding tube daily  Vitamin D (Cholecalciferol) 2000 IU through feeding tube daily  When okay to switch to oral medications -> okay to take oral forms of these medications.     Recommend checking vitamin A, D, E, K and B12 every 1-2 years following surgery due to risk for deficiencies.     Goals:   Patient will verbalize understanding of TF and enzyme regimen, administration  Patient asked appropriate questions and following education  denied further questions.    Follow-up:    Patient to ask any further nutrition-related questions before discharge.  In addition, pt may request outpatient RD appointment.    Recommendations:  Patient requires at least 600 ml additional fluid daily (PO vs flushes via J-tube).      **If unable to switch TF formula back to Impact Peptide 1.5 OR start full liquid diet by 6/4/17, please  start IV lipids 2x/week to avoid essential fatty acid deficiency.     If able to switch back to higher fat containing TF formula ->   Recommend Impact Peptide 1.5 @ 75 ml/hr (goal) to provide full needs.    If re-starting higher fat TFs, begin the following pancreatic enzyme regimen and recommend order each of the following (patient wishes to keep using Zenpep 40 as he does have a stock of this at home):   A) Sodium Bicarb tablet (325 mg), 1 tablet q 4 hrs via Jtube. Administration Instructions: Crush 1 tablet and mix into 15 ml of warm water and use this solution to mix with Creon pancreatic enzymes. DO NOT administer directly into Jtube (to be mixed into TF formula with Creon enzyme - see Creon enzyme order)   B) Zenpep 40, 1 capsule q 4 hrs via Jtube. Administration Instructions: Open capsule and empty contents into 15 ml sodium bicarb solution (see sodium bicarb order), let dissolve for about 20-30 minutes and then add this solution to the amount of TF formula hung in TF bag every 4 hrs.    Orquidea Tate MS, RD, LD  Pager [phone]

## 2017-05-25 NOTE — PROGRESS NOTES
Care Coordinator  D/I: Per Dr Jackson, plan for d/c tomorrow --on GJTTF, suspension meds, wound packing, will change IVAB to suspension. Pt and wife ,Maribell, aware.  P: OP CC: Soni Aleman here to see pt and Maribell. I have inbasket messaged ATC that he will need a Sat 5/27 later morning visit. I have informed [facility] mark Salas.

## 2017-05-25 NOTE — DISCHARGE INSTRUCTIONS
________________________________________________________  Discharge RN please fax discharge orders to home care agency: [facility]    --they need signed discharge orders by 12 noon on the day of discharge---5/26/17 aware at 10am  ---page janimerritt Salas @ [phone] Monday-Friday---done 5/26 aware  ---weekends call office [phone]  ________________________________________________________    Tube Feeding Regimen:  Vivonex TEN @ 100 ml/hr + 4 pkts of ProSource TF daily    Tube Feeding Administration:  Mix 8 packets of Vivonex TEN formula + 2000 ml water.  This will yield 2400 ml of formula.  Mix well and store in the refrigerator in an empty milk jug container or pitcher.    Every 4 hours:    1. Remove formula from fridge and shake/mix well.    2. Pour 400 ml tube feeding to graduated cylinder.      Four times daily - provide 1 packet (45 ounces) of ProSource TF via syringe into J-tube.  Flush tube with 15-30 ml water before and after.     You will require at least 600 ml (~2.5 cups) of additional water/fluid daily by mouth or via J-tube.  If unable to consume this by mouth, please provide water flushes via J-tube.      Vitamin/Mineral Recommendations:  Multivitamin with minerals (Certavite) through feeding tube daily  Vitamin D (Cholecalciferol) 2000 IU through feeding tube daily  When okay to switch to oral medications -> okay to take oral forms of these medications.     Recommend checking vitamin A, D, E, K and B12 every 1-2 years following surgery due to risk for deficiencies.          When discharged, will need prescriptions for:  1.  Freestyle Lite test strips  2.  Accu-chek FastClix lancet drums  3.  Insulin pen needles (4mm, 32 gauge)  4.  Alcohol swabs  5.  Sharps disposal container  6.  Glucose gel     Roxie Waller MS RN CDE CD  [phone]     Call the diabetes management team with questions regarding your treatment plan after discharge.  Yazmin Poon PA-C (available Friday through Monday) and Orquidea  Irineo [facility] (available Monday through Thursday) both available at [phone], Joanna Longoria NP (available Monday through Friday) [phone], or the on-call endocrinologist can be paged by the hospital  [phone].

## 2017-05-25 NOTE — PLAN OF CARE
Problem: Individualization  Goal: Patient Preferences  Outcome: Improving     RN:  Tmax 99.4 (O), patient on Zosyn, OVSS, O2 sat 92-93% on room air. Abdominal pain controlled with scheduled Dilaudid and Tylenol suspension. TF @100cc/hr, goal rate, patient refused free water flush, blood glucose 119 and 177, SS given. Adequate urine output, no BM during the shift. Right MAURICIO with small amount of serous output. Up ad hattie in the room, will continue to monitor.

## 2017-05-25 NOTE — PLAN OF CARE
Problem: Goal Outcome Summary  Goal: Goal Outcome Summary  Outcome: Improving  Pt is AxOx4. VSS. Wound care was done for GJ, wound, and drain. Pt and wife each took a turn with insulin administration and medication administration. Pt stated he does not like the side effects of Dilaudid. 1mg of his 2mg dose was given. Pt voiding well. Pt washed hair today with his wife. No water flushes continuously with TF due to nausea and fullness.   We will continue to monitor. Possible DC tomorrow.

## 2017-05-25 NOTE — PROGRESS NOTES
"Diabetes Consult Daily Progress Note    Assessment/Plan:    57 yo M with hx of chronic pain, papillary thyroid cancer s/p thyroidectomy with hypothyroidism, chronic pancreatitis s/p lap lin, biliary and pancreatic sphincterotomies, transduodenal sphincteroplasty with resultant leak and phlegmonous pancreatitis who is now s/p open TPAIT on 5/15/17, received 4347 ie/kg in transplant.      Glucose trending above target.  Receiving high carbohydrate tube feed, but subcut insulin need remains far in excess of previous IV needs.    Plan:     - increased glargine to 82 units this morning and continue 62 units tonight  ADDm --->  Increase PM dose to 70 units  - correction aspart 2 per 20 >120 q4h   - has clear diet, but sugar-free only  - diabetes educ done 5/24  *If his tube feed formula changes, insulin will need to be proactively revised to prevent hypoglycemia      - care coordinator note indicates pt will follow up with Dr. Yin, but no appt on his schedule  - outpt diabetes educ/nutrition appts are scheduled     - plan discussed w/ pt, RN, primary team    Interval History:    Last 24 h RN and progress notes reviewed  Vivonex TEN @100cc/h since 2300 5/22.  IV insulin needs were 1.25 units/h.  To glargine 52 BID to start.    Hydromorphone is q4h, and had some PRN but timing not associated with BG highest level.  Pt used to dissolve his tablet meds in water.  He complains of increased discomfort after meds given via FT.  Walking at fairly regular intervals, until midnight yesterday.      Active Diet Order      Combination Diet Clear Liquid      Exam:      /66 (BP Location: Right arm)  Pulse 69  Temp 98.9  F (37.2  C) (Oral)  Resp 18  Ht 1.88 m (6' 2\")  Wt 109.2 kg (240 lb 12.8 oz)  SpO2 96%  BMI 30.92 kg/m2  Gen:  Alert, resting in bed.    Resp: unlabored  GI: g-tube to gravity  Neuro: alert, oriented, communicating clearly    Data:        Recent Labs  Lab 05/25/17  1222 05/25/17  1211 05/25/17  0730 " 05/25/17  0412 05/24/17  2353 05/24/17  1932 05/24/17  1546  05/24/17  0704  05/23/17  1757  05/23/17  0822  05/22/17  2050  05/22/17  1128   GLC  --   --  162*  --   --   --   --   --  171*  --  153*  --  117*  --  81  --  136*   * 151*  --  177* 119* 146* 164*  < >  --   < >  --   < >  --   < >  --   < >  --    < > = values in this interval not displayed.  Lab Results   Component Value Date    A1C 5.3 05/16/2017    A1C 5.6 05/10/2017    A1C 5.4 09/10/2015     Recent Labs   Lab Test  05/25/17   0730  05/24/17   0704   NA  140  141   POTASSIUM  4.2  3.9   CHLORIDE  106  106   CO2  28  29   ANIONGAP  6  6   GLC  162*  171*   BUN  14  13   CR  0.84  0.83   CRISTOBAL  8.3*  7.6*     CBC RESULTS:   Recent Labs   Lab Test  05/25/17   0730   WBC  18.7*   RBC  2.59*   HGB  7.2*   HCT  22.8*   MCV  88   MCH  27.8   MCHC  31.6   RDW  16.4*   PLT  653*     Orquidea Cabello APRN [facility] [phone]  Diabetes Management Team job code: [phone]

## 2017-05-25 NOTE — PLAN OF CARE
Problem: Goal Outcome Summary  Goal: Goal Outcome Summary  Outcome: Improving  Afebrile; VSS on RA.  -190; Novolog administered per ss and Lantus doses increased.  Pain well-controlled on current pain regimen; pt opting to only take 1mg scheduled dilaudid q4h.  Denies nausea; g-tube clamped.  Clear sugar-free liquid diet ordered; pt tolerating sips.  IJ saline locked.  TF at goal 100mL/hr.  Voiding good amounts.  Scheduled senna and prn MOM administered; pt hard large BMx2.  MAURICIO with 30mL serous output.  Midline dressing changedx3; wife educated.  Ambulated in hallway independentlyx3.  Wife and pt demonstrated understanding of insulin and medication administration and TF set-up.  Flomax and zosyn d/c'd.  Plan for discharge tomorrow.  Continue to monitor and notify MD with changes.

## 2017-05-25 NOTE — PLAN OF CARE
Problem: Goal Outcome Summary  Goal: Goal Outcome Summary  PT / 7A: Pt with good improvement in strength, mobility, and endurance. Engaged in bed mobility, transfers, gait, stairs, and LE strengthening. Pt IND with bed mobility and sit<>stand transfers. Pt IND for gait with no AD for ~600ft. Pt with good pace, step length, and no LOB. Pt completed 20 stairs with bilateral handrails Mod I. Pt demonstrated independence with performance of LE home exercise program. Pain is pt's primary limiting factor.   REC: D/C to local [facility] once medically stable with A from wife PRN.

## 2017-05-25 NOTE — PLAN OF CARE
Problem: Goal Outcome Summary  Goal: Goal Outcome Summary  Physical Therapy Discharge Summary     Reason for therapy discharge:    All goals and outcomes met, no further needs identified.     Progress towards therapy goal(s). See goals on Care Plan in [facility] electronic health record for goal details.  Goals met     Therapy recommendation(s):    Continue home exercise program.

## 2017-05-25 NOTE — PROGRESS NOTES
Transplant Surgery Pancreas Service - Daily Progress Note  05/25/2017    Assessment & Plan: 56 year old male with chronic pancreatitis s/p lap cholecystectomy 4/2012, s/p biliary and pancreatic sphincterotomies and a temporary pancreatic stent 2012, s/p transduodenal sphincteroplasty 12/2014 with resultant leak and phlegmonous pancreatitis. S/p TPAIT 5/15/17.    Liver US:  Impression: The left portal vein, left hepatic vein, and left hepatic  artery are obscured by overlying bowel gas. The splenic vein is also  not visualized. The remaining hepatic vasculature is patent with  antegrade flow.    TPAIT: POD #10  Cardiorespiratory:  Stable. Continue pulmonary toilet and IS. Ambulate and OOB to chair  GI/Nutrition: G tube clamped, tolerating.  Tube feeds at 100 cc/h plus meds. 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  Drain clear now, continue drain for monitoring. Bowel regimen: senna BID and MOM BID PRN. Tolerating clear fluids.  Renal/Fluid/Electrolytes: Hypernatremia, resolved. OK to stop FW as patient taking in clear fluids (requirments ~ 600 cc per day fluids, TF will provide fluid as well). Allow patient to self diurese, no plans for lasix. Wt 100kg => 113 => 109 kg.   : Ferguson now removed.  Voiding without issues. Continue flomax, started due to urinary retention this admission, will stop Flomax today.  Post-pancreatectomy diabetes: Endocrine consulted. Transitioned to SQ. Plan for DM education today.  Infection:  Afebrile. Wbc ~ 18. Islets cultures negative - has received Ertapenem x 3 days and Zosyn x 7 days. Stop antibiotics today.   Prophylaxis: PPI, Anticoagulation: Lovenox 40 q12  Pain control: Fair  Ropivacaine On Q removed POD 7.  Fentanyl patch 100 mcg.    Dilaudid 2 mg Q4h through J-tube.  Acetaminophen 500 mg susp every 6 hours   Lyrica 25 mg BID  Valium susp PRN - not using  Stop dilaudid IV. Continue NJ dilaudid PRN.   Activity: ambulate QID  Anticipated LOS/Discharge:  "Potential discharge tomorrow.    Medical Decision Making: Medium  Subsequent visit 98142 (moderate level decision making)    GELACIO/Fellow/Resident Provider:  Jas Jackson MD, MPH  Transplant Fellow  Pager# [phone]    Faculty: Rodrigo Jaquez MD  __________________________________________________________________  Transplant History: Admitted 5/15/2017 for TP AIT  5/15/2017 ([place]), Postoperative day: 10     Interval History: History is obtained from the patient  Overnight events: Pain control fair. Denies SOB. +Flatus, +BM. Ambulating frequently.      ROS:   A 10-point review of systems was negative except as noted above.    Curent Meds:    insulin glargine  82 Units Subcutaneous Q24H     insulin glargine  62 Units Subcutaneous QPM     insulin aspart  2-20 Units Subcutaneous Q4H     HYDROmorphone  2 mg Per J Tube Q4H     sennosides  10 mL Per Feeding Tube BID     Prosource - non-formulary dietary supplement (from nutrition services)  1 packet Jejunal Tube 4x Daily     enoxaparin  30 mg Subcutaneous Q12H     fentaNYL  100 mcg Transdermal Q72H     acetaminophen  500 mg Oral Q6H     fentaNYL   Transdermal Q8H     fentaNYL   Transdermal Q72H     pregabalin  25 mg Per Feeding Tube BID     nystatin  500,000 Units Oral 4x Daily     pantoprazole  40 mg Oral or J tube BID     cholecalciferol  2,000 Units Per J Tube Daily     multivitamins with minerals  15 mL Per Feeding Tube Daily     sodium chloride (PF)  3 mL Intracatheter Q8H     FLUoxetine  10 mg Per J Tube Daily     levothyroxine  150 mcg Per J Tube QAM AC       Physical Exam:     Admit Weight: 100.7 kg (222 lb 0.1 oz)    Current Vitals:   /66 (BP Location: Right arm)  Pulse 69  Temp 98.9  F (37.2  C) (Oral)  Resp 18  Ht 1.88 m (6' 2\")  Wt 109.2 kg (240 lb 12.8 oz)  SpO2 96%  BMI 30.92 kg/m2  Vital sign ranges:    Temp:  [98.3  F (36.8  C)-99.5  F (37.5  C)] 98.9  F (37.2  C)  Heart Rate:  [72-78] 78  Resp:  [16-20] 18  BP: (104-115)/(61-68) 111/66  SpO2:  " [92 %-96 %] 96 %  Patient Vitals for the past 24 hrs:   BP Temp Temp src Heart Rate Resp SpO2 Weight   05/25/17 1209 111/66 98.9  F (37.2  C) Oral 78 18 96 % -   05/25/17 0806 112/64 98.5  F (36.9  C) Oral 72 18 94 % 109.2 kg (240 lb 12.8 oz)   05/25/17 0400 104/67 98.8  F (37.1  C) Oral 74 20 93 % -   05/25/17 0000 115/61 99.5  F (37.5  C) Oral 74 16 92 % -   05/24/17 1930 115/65 98.3  F (36.8  C) Oral 78 16 94 % -   05/24/17 1543 111/68 98.5  F (36.9  C) Oral 75 16 96 % -     General Appearance: NAD, alert. General edema.  Skin: normal, dry  Heart: well perfused  Lungs: NLB on RA  Abdomen: The abdomen is soft, appropriate incisional ttp, and tender around surgical sites. Mild erythema along incision, removed additional staples, scant SS drainage, wound +fibrinous tissue, wound base with healthy granulating tissue, no erytherma/purulence. Tube/Drain sites are: G tube clamped and J Tube: TF running.  MAURICIO: clear.   : king removed.  Extremities: edema: mild  Neuro:  Alert, oriented      Data:   CMP  Recent Labs  Lab 05/25/17  0730 05/24/17  0704  05/23/17  0822  05/22/17  0643  05/20/17  0716    141  < > 143  < > 147*  < > 152*   POTASSIUM 4.2 3.9  < > 3.5  < > 3.7  < > 3.4   CHLORIDE 106 106  < > 108  < > 111*  < > 112*   CO2 28 29  < > 30  < > 31  < > 35*   * 171*  < > 117*  < > 97  < > 127*   BUN 14 13  < > 19  < > 20  < > 20   CR 0.84 0.83  < > 0.79  < > 0.76  < > 0.74   GFRESTIMATED >90Non  GFR Calc >90Non  GFR Calc  < > >90Non  GFR Calc  < > >90Non  GFR Calc  < > >90Non  GFR Calc   GFRESTBLACK >90African American GFR Calc >90African American GFR Calc  < > >90African American GFR Calc  < > >90African American GFR Calc  < > >90African American GFR Calc   CRISTOBAL 8.3* 7.6*  < > 7.7*  < > 7.7*  < > 7.8*   MAG  --   --   --  2.0  --   --   --  2.7*   PHOS  --   --   --  2.4*  --  2.2*  < > 1.7*   LIPASE  --   --   --   --   --    --   --  93   ALBUMIN  --   --   --   --   --   --   --  1.8*   BILITOTAL  --   --   --   --   --   --   --  0.4   ALKPHOS  --   --   --   --   --   --   --  84   AST  --   --   --   --   --   --   --  20   ALT  --   --   --   --   --   --   --  43   < > = values in this interval not displayed.  CBC    Recent Labs  Lab 05/25/17 0730 05/24/17  0704   HGB 7.2* 7.1*   WBC 18.7* 18.6*   * 666*     Coags    Recent Labs  Lab 05/25/17 0730 05/24/17  0704   PTT 35 37      Urinalysis  Recent Labs   Lab Test  05/10/17   1015   COLOR  Yellow   APPEARANCE  Clear   URINEGLC  Negative   URINEBILI  Negative   URINEKETONE  Negative   SG  1.022   UBLD  Negative   URINEPH  6.5   PROTEIN  Negative   NITRITE  Negative   LEUKEST  Negative   RBCU  2   WBCU  <1     Attestation:  Patient has been seen and evaluated by me.   Vital signs, labs, medications and orders were reviewed.   When obtained, diagnostic images were reviewed by me and interpreted as above.    The care plan was discussed with the multidisciplinary team and I agree with the findings and plan in this note, with any differences recorded in blue.    .

## 2017-05-26 VITALS
RESPIRATION RATE: 16 BRPM | HEART RATE: 80 BPM | TEMPERATURE: 99.7 F | OXYGEN SATURATION: 95 % | WEIGHT: 240.8 LBS | DIASTOLIC BLOOD PRESSURE: 64 MMHG | SYSTOLIC BLOOD PRESSURE: 113 MMHG | BODY MASS INDEX: 30.9 KG/M2 | HEIGHT: 74 IN

## 2017-05-26 PROBLEM — E89.1 POST-PANCREATECTOMY DIABETES (H): Status: ACTIVE | Noted: 2017-05-26

## 2017-05-26 PROBLEM — Z90.410 POST-PANCREATECTOMY DIABETES (H): Status: ACTIVE | Noted: 2017-05-26

## 2017-05-26 PROBLEM — E13.9 POST-PANCREATECTOMY DIABETES (H): Status: ACTIVE | Noted: 2017-05-26

## 2017-05-26 PROBLEM — G89.18 ACUTE POSTOPERATIVE PAIN OF ABDOMEN: Status: ACTIVE | Noted: 2017-05-26

## 2017-05-26 PROBLEM — R10.9 ACUTE POSTOPERATIVE PAIN OF ABDOMEN: Status: ACTIVE | Noted: 2017-05-26

## 2017-05-26 PROBLEM — Z90.410 ACQUIRED TOTAL ABSENCE OF PANCREAS: Status: ACTIVE | Noted: 2017-05-26

## 2017-05-26 LAB
ANION GAP SERPL CALCULATED.3IONS-SCNC: 4 MMOL/L (ref 3–14)
APTT PPP: 41 SEC (ref 22–37)
BUN SERPL-MCNC: 14 MG/DL (ref 7–30)
CALCIUM SERPL-MCNC: 8.2 MG/DL (ref 8.5–10.1)
CHLORIDE SERPL-SCNC: 106 MMOL/L (ref 94–109)
CO2 SERPL-SCNC: 28 MMOL/L (ref 20–32)
CREAT SERPL-MCNC: 0.82 MG/DL (ref 0.66–1.25)
ERYTHROCYTE [DISTWIDTH] IN BLOOD BY AUTOMATED COUNT: 16.3 % (ref 10–15)
GFR SERPL CREATININE-BSD FRML MDRD: ABNORMAL ML/MIN/1.7M2
GLUCOSE BLDC GLUCOMTR-MCNC: 116 MG/DL (ref 70–99)
GLUCOSE BLDC GLUCOMTR-MCNC: 128 MG/DL (ref 70–99)
GLUCOSE BLDC GLUCOMTR-MCNC: 128 MG/DL (ref 70–99)
GLUCOSE BLDC GLUCOMTR-MCNC: 186 MG/DL (ref 70–99)
GLUCOSE SERPL-MCNC: 94 MG/DL (ref 70–99)
HCT VFR BLD AUTO: 23.6 % (ref 40–53)
HGB BLD-MCNC: 7.2 G/DL (ref 13.3–17.7)
MCH RBC QN AUTO: 27.2 PG (ref 26.5–33)
MCHC RBC AUTO-ENTMCNC: 30.5 G/DL (ref 31.5–36.5)
MCV RBC AUTO: 89 FL (ref 78–100)
PLATELET # BLD AUTO: 730 10E9/L (ref 150–450)
POTASSIUM SERPL-SCNC: 4.1 MMOL/L (ref 3.4–5.3)
RBC # BLD AUTO: 2.65 10E12/L (ref 4.4–5.9)
SODIUM SERPL-SCNC: 138 MMOL/L (ref 133–144)
WBC # BLD AUTO: 16.7 10E9/L (ref 4–11)

## 2017-05-26 PROCEDURE — 87070 CULTURE OTHR SPECIMN AEROBIC: CPT | Performed by: PHYSICIAN ASSISTANT

## 2017-05-26 PROCEDURE — 36592 COLLECT BLOOD FROM PICC: CPT | Performed by: PHYSICIAN ASSISTANT

## 2017-05-26 PROCEDURE — 25000131 ZZH RX MED GY IP 250 OP 636 PS 637: Performed by: PHYSICIAN ASSISTANT

## 2017-05-26 PROCEDURE — 25000132 ZZH RX MED GY IP 250 OP 250 PS 637: Performed by: TRANSPLANT SURGERY

## 2017-05-26 PROCEDURE — 25000132 ZZH RX MED GY IP 250 OP 250 PS 637: Performed by: NURSE PRACTITIONER

## 2017-05-26 PROCEDURE — 80048 BASIC METABOLIC PNL TOTAL CA: CPT | Performed by: PHYSICIAN ASSISTANT

## 2017-05-26 PROCEDURE — 85027 COMPLETE CBC AUTOMATED: CPT | Performed by: PHYSICIAN ASSISTANT

## 2017-05-26 PROCEDURE — 25000132 ZZH RX MED GY IP 250 OP 250 PS 637: Performed by: PHYSICIAN ASSISTANT

## 2017-05-26 PROCEDURE — 00000146 ZZHCL STATISTIC GLUCOSE BY METER IP

## 2017-05-26 PROCEDURE — 25000132 ZZH RX MED GY IP 250 OP 250 PS 637: Performed by: STUDENT IN AN ORGANIZED HEALTH CARE EDUCATION/TRAINING PROGRAM

## 2017-05-26 PROCEDURE — 25000132 ZZH RX MED GY IP 250 OP 250 PS 637: Performed by: SURGERY

## 2017-05-26 PROCEDURE — 25000125 ZZHC RX 250: Performed by: PHYSICIAN ASSISTANT

## 2017-05-26 PROCEDURE — 85730 THROMBOPLASTIN TIME PARTIAL: CPT | Performed by: PHYSICIAN ASSISTANT

## 2017-05-26 PROCEDURE — 27210443 ZZH NUTRITION PRODUCT SPECIALIZED PACKET

## 2017-05-26 RX ORDER — CONTAINER,EMPTY
1 EACH MISCELLANEOUS PRN
Qty: 1 EACH | Refills: 3 | Status: SHIPPED | OUTPATIENT
Start: 2017-05-26

## 2017-05-26 RX ORDER — BLOOD PRESSURE TEST KIT
1 KIT MISCELLANEOUS PRN
Qty: 100 EACH | Refills: 3 | Status: ON HOLD | OUTPATIENT
Start: 2017-05-26 | End: 2017-06-14

## 2017-05-26 RX ORDER — FLUOXETINE 20 MG/5ML
10 SOLUTION ORAL DAILY
Qty: 75 ML | Refills: 3 | Status: SHIPPED | OUTPATIENT
Start: 2017-05-26 | End: 2017-06-25

## 2017-05-26 RX ORDER — NICOTINE POLACRILEX 4 MG
15-30 LOZENGE BUCCAL
Qty: 3 TUBE | Refills: 1 | Status: SHIPPED | OUTPATIENT
Start: 2017-05-26

## 2017-05-26 RX ORDER — PREGABALIN 20 MG/ML
25 SOLUTION ORAL 2 TIMES DAILY
Qty: 70 ML | Refills: 3 | Status: ON HOLD | OUTPATIENT
Start: 2017-05-26 | End: 2017-06-14

## 2017-05-26 RX ORDER — NYSTATIN 100000/ML
500000 SUSPENSION, ORAL (FINAL DOSE FORM) ORAL 4 TIMES DAILY
Qty: 280 ML | Refills: 1 | Status: ON HOLD | OUTPATIENT
Start: 2017-05-26 | End: 2017-06-14

## 2017-05-26 RX ORDER — HYDROMORPHONE HYDROCHLORIDE 1 MG/ML
1-2 SOLUTION ORAL EVERY 4 HOURS
Qty: 168 ML | Refills: 0 | Status: ON HOLD | OUTPATIENT
Start: 2017-05-26 | End: 2017-06-14

## 2017-05-26 RX ORDER — LANCETS
EACH MISCELLANEOUS
Qty: 1 BOX | Refills: 3 | Status: SHIPPED | OUTPATIENT
Start: 2017-05-26

## 2017-05-26 RX ORDER — ONDANSETRON 4 MG/1
4-8 TABLET, ORALLY DISINTEGRATING ORAL EVERY 6 HOURS PRN
Qty: 30 TABLET | Refills: 1 | Status: ON HOLD | OUTPATIENT
Start: 2017-05-26 | End: 2017-06-14

## 2017-05-26 RX ORDER — FENTANYL 100 UG/1
1 PATCH TRANSDERMAL
Qty: 4 PATCH | Refills: 0 | Status: ON HOLD | OUTPATIENT
Start: 2017-05-26 | End: 2017-06-14

## 2017-05-26 RX ADMIN — HYDROMORPHONE HYDROCHLORIDE 1 MG: 1 SOLUTION ORAL at 04:22

## 2017-05-26 RX ADMIN — Medication 1 PACKET: at 08:39

## 2017-05-26 RX ADMIN — NYSTATIN 500000 UNITS: 500000 SUSPENSION ORAL at 08:36

## 2017-05-26 RX ADMIN — HYDROMORPHONE HYDROCHLORIDE 1 MG: 1 SOLUTION ORAL at 08:30

## 2017-05-26 RX ADMIN — LEVOTHYROXINE SODIUM 150 MCG: 300 TABLET ORAL at 07:34

## 2017-05-26 RX ADMIN — NYSTATIN 500000 UNITS: 500000 SUSPENSION ORAL at 12:48

## 2017-05-26 RX ADMIN — MULTIVIT AND MINERALS-FERROUS GLUCONATE 9 MG IRON/15 ML ORAL LIQUID 15 ML: at 08:37

## 2017-05-26 RX ADMIN — Medication 2000 UNITS: at 08:34

## 2017-05-26 RX ADMIN — PREGABALIN 25 MG: 20 SOLUTION ORAL at 08:30

## 2017-05-26 RX ADMIN — INSULIN GLARGINE 90 UNITS: 100 INJECTION, SOLUTION SUBCUTANEOUS at 08:50

## 2017-05-26 RX ADMIN — FLUOXETINE HYDROCHLORIDE 10 MG: 20 SOLUTION ORAL at 08:37

## 2017-05-26 RX ADMIN — Medication 1 PACKET: at 12:49

## 2017-05-26 RX ADMIN — PANTOPRAZOLE SODIUM 40 MG: 40 TABLET, DELAYED RELEASE ORAL at 08:36

## 2017-05-26 RX ADMIN — HYDROMORPHONE HYDROCHLORIDE 1 MG: 1 SOLUTION ORAL at 12:48

## 2017-05-26 NOTE — PROGRESS NOTES
Discharge note:    All vital signs stable, patient reports adequate pain control. [facility] home infusion at bedside to set up home tube feeds. Discharge instructions reviewed with patient and he expressed understanding. Discharge meds picked up from discharge pharmacy. Patient has a noon appointment tomorrow in clinic. Patient left unit via wheelchair at approximately 16:15. [facility]

## 2017-05-26 NOTE — PROGRESS NOTES
"Diabetes Consult Daily Progress Note    Assessment/Plan:    55 yo M with hx of chronic pain, papillary thyroid cancer s/p thyroidectomy with hypothyroidism, chronic pancreatitis s/p lap lin, biliary and pancreatic sphincterotomies, transduodenal sphincteroplasty with resultant leak and phlegmonous pancreatitis who is now s/p open TPAIT on 5/15/17, received 4347 ie/kg in transplant.      Glucoses improved with increase in glargine yesterday, but still in the 120s until correction aspart is given.    Plan:     - increased glargine to 90 units qAM this morning and continue 70 units qPM  - correction aspart 2 per 20 >120 q4h   - has clear diet, but sugar-free only  - diabetes educ done 5/24  *If his tube feed formula changes, insulin will need to be proactively revised to prevent hypoglycemia      - outpt diabetes educ/nutrition appts are scheduled- June 5  -care coordinator to schedule f/u with adult endocrinologist  -pt can contact our IP DM team after discharge if questions.     - plan discussed w/ pt, his wife and primary team    Interval History:    Last 24 h RN and progress notes reviewed  Vivonex TEN @100cc/h since 2300 5/22.  Tanner is tolerating enteral feeds and clears (sugar free).  SC insulin requirements are considerably higher than his IV insulin requirements.  Plan is for discharge to hotel today.  He has f/u appointments with DM educator and RD on June 5, appointments with adult endocrinologist to be scheduled.    Tanner is tired, but otherwise no complaints.      Active Diet Order      Combination Diet Clear Liquid      Exam:      /66 (BP Location: Right arm)  Pulse 71  Temp 99  F (37.2  C) (Oral)  Resp 16  Ht 1.88 m (6' 2\")  Wt 109.2 kg (240 lb 12.8 oz)  SpO2 96%  BMI 30.92 kg/m2  Gen:  Alert, up in bathroom shaving, wife is at bedside.   Resp: unlabored  Neuro: alert, oriented, communicating clearly    Data:        Recent Labs  Lab 05/26/17  0852 05/26/17  0747 05/26/17  0653 05/26/17  0421 " 05/25/17  2345 05/25/17  2022 05/25/17  1551  05/25/17  0730  05/24/17  0704  05/23/17  1757  05/23/17  0822  05/22/17  2050   GLC  --   --  94  --   --   --   --   --  162*  --  171*  --  153*  --  117*  --  81   * 116*  --  128* 115* 157* 190*  < >  --   < >  --   < >  --   < >  --   < >  --    < > = values in this interval not displayed.  Lab Results   Component Value Date    A1C 5.3 05/16/2017    A1C 5.6 05/10/2017    A1C 5.4 09/10/2015     Recent Labs   Lab Test  05/26/17   0653  05/25/17   0730   NA  138  140   POTASSIUM  4.1  4.2   CHLORIDE  106  106   CO2  28  28   ANIONGAP  4  6   GLC  94  162*   BUN  14  14   CR  0.82  0.84   CRISTOBAL  8.2*  8.3*     CBC RESULTS:   Recent Labs   Lab Test  05/26/17   0653   WBC  16.7*   RBC  2.65*   HGB  7.2*   HCT  23.6*   MCV  89   MCH  27.2   MCHC  30.5*   RDW  16.3*   PLT  730*     Yazmin Poon PA-C [phone]  Diabetes Management Team job code: [phone]

## 2017-05-26 NOTE — PLAN OF CARE
Problem: Goal Outcome Summary  Goal: Goal Outcome Summary  Outcome: Improving  Pt is AxOx4. VSS. Pt will be DC today. Awaiting final orders, medications, and education. Wife is here and will transport him. Pt had a slight increase in fever this afternoon. Tylenol was held to further monitor. CVC tip was sent to lab, when pulled. Pt voiding in the bathroom. Pt emptied hat himself. Reported good output. Pt showered today independently with the observation of his wife. Pt blood sugar increased this afternoon. We will continue to monitor. Pt will be DCing with tube feeding and his MAURICIO drain. Drain has minimal output at this time. Pt continues to take 1mg Dilaudid when scheduled 2mg. Does not like the side effects of narcotics. Pt continues to give himself medications. Wife performed his dressing changes with nursing present. No issues with their administrations.

## 2017-05-26 NOTE — PROGRESS NOTES
Home Infusion  Sohan is discharging today with his continuous enteral therapy.  He has been on TFs 2 other times in the past back in [place] and he and his wife have been to the [facility].  He needs a hook up of his enteral infusion for discharge.  Met with Tanner and his wife, Maribell at bedside once supplies arrived and assisted with set up and initiation of his vivonex infusion via the Ky pump.   Had Maribell program the pump and together we set up and got the infusion running.   Instructed in 4 hr hang time for formula (may do 8 hrs at night) and to use a new bag q 24 hrs.  Provided information about supplies and ongoing supply deliveries, storage of formula and 24/7 availability of \[facility]\"" staff.   Maribell was taught med administration via the J port by [facility] nurse she feels comfortable managing that.   I reinforced water flushing for patency   Tanner and Maribell verbalized understanding of information given.  Maribell demonstrated  good technique with set up and verbalized good understanding of process.  Stated she feels comfortable with managing the feedings independently   Tanner is ready for discharge from \[facility]\"" perspective.    Shital Calzada Home Infusion Liaison  [phone] pager

## 2017-05-26 NOTE — PLAN OF CARE
Problem: Individualization  Goal: Patient Preferences  Outcome: Improving     RN:  Tmax. 99.3(O), OVSS, abdominal pain controlled with Dilaudid 1mg every 4 hours, per patient's request, and scheduled Tylenol, but declines 0600 dose. TF@100cc/hr, blood glucose 115 and 128, SS and Lantus given. G tube clamped, right MAURICIO with small amount of serous output. Adequate urine output, patient not saving, BM x1 at PM shift. Up ad hattie in the room and hallways, possible discharge today? Will continue to monitor.

## 2017-05-26 NOTE — DISCHARGE SUMMARY
[facility], [place]    Discharge Summary  Transplant Surgery    Date of Admission:  5/15/2017  Date of Discharge:  5/26/2017  Discharging Provider: Vero Rader PA-C  Date of Service (when I saw the patient): 05/26/17    Discharge Diagnoses   Active Problems:    Chronic pancreatitis (H)    Acquired total absence of pancreas    Post-pancreatectomy diabetes (H)    Acute postoperative pain of abdomen      Procedure/Surgery Information   Procedure: Procedure(s):  Open Pancreatectomy, Splenectomy, Gastrojejunostomy Tube Placement , Auto Islet Cell Transplant - Wound Class: II-Clean Contaminated   Surgeon(s): Surgeon(s) and Role:     * Rodrigo Jaquez MD - Primary     * Jas Jackson MD - Assisting     * Jeanine Peralta MD - Resident - Assisting       Non-operative procedures None performed     History of Present Illness   Sohan Arita is a 56 year old male with chronic pancreatitis s/p lap cholecystectomy 4/2012, s/p biliary and pancreatic sphincterotomies and a temporary pancreatic stent 2012, s/p transduodenal sphincteroplasty 12/2014 with resultant leak and phlegmonous pancreatitis. S/p TPAIT 5/15/17.    Hospital Course   Sohan Arita was admitted on 5/15/2017.  The following problems were addressed during his hospitalization:    Cardiorespiratory:  Stable.  GI/Nutrition: G tube clamped, tolerating.  Tube feeds at 100 cc/h plus meds. 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  Drain clear now, continue drain for monitoring. Bowel regimen: senna BID and MOM BID PRN. Tolerating clear fluids, carb free.  Renal/Fluid/Electrolytes: Hypernatremia, resolved. Free water stopped as patient taking in clear fluids (requirments ~ 600 cc per day fluids, TF will provide fluid as well). Allow patient to self diurese.   : Ferguson now removed.  Voiding without issues. Flomax stopped (was started this admission for urinary retention post  op).  Post-pancreatectomy diabetes: Endocrine consulted this admission. Discharged on Lantus 90 units morning and 70 units evening. Blood glucose up to 186, lantus dose was not adjusted due to blood glucose down to 94 on this dose. High BG was ~ 1200, previous day this BG was also high.   Infection:  Afebrile. Wbc decreased today. Islets cultures negative - has received Ertapenem x 3 days and Zosyn x 7 days.    Prophylaxis: PPI, Anticoagulation: Heparin gtt and then lovenox. Anticoagulation stopped on day of discharge.  Pain control: Good  Ropivacaine On Q removed POD 7.  Fentanyl patch 100 mcg.    Dilaudid 1-2 mg Q4h through J-tube.  Acetaminophen 500 mg susp every 6 hours   Lyrica 25 mg BID    Vero Rader PA-C    Discharge Disposition   Discharged to home (local)  Condition at discharge: Stable      Primary Care Physician   Frandy Frye    General Appearance: NAD, alert. General edema.  Skin: normal, dry  Heart: well perfused  Lungs: NLB on RA  Abdomen: The abdomen is soft, appropriate incisional ttp, and tender around surgical sites. Mild erythema along incision, removed additional staples, scant SS drainage, wound +fibrinous tissue, wound base with healthy granulating tissue, no erytherma/purulence. Tube/Drain sites are: G tube clamped and J Tube: TF running.  MAURICIO: clear.   : king removed.  Extremities: edema: mild  Neuro:  Alert, oriented    Consultations This Hospital Stay   ENDOCRINOLOGY IP CONSULT  PHYSICAL THERAPY ADULT IP CONSULT  CNS DIABETES IP CONSULT  NUTRITION SERVICES ADULT IP CONSULT  PHARMACY IP CONSULT  NUTRITION SERVICES ADULT IP CONSULT  NUTRITION SERVICES ADULT IP CONSULT  PHARMACY IP CONSULT  PATIENT LEARNING CENTER IP CONSULT    Time Spent on this Encounter   I have spent greater than 30 minutes on this discharge.    Discharge Orders   Discharge Medications   Current Discharge Medication List      START taking these medications    Details   fentaNYL (DURAGESIC) 100 mcg/hr 72 hr  patch Place 1 patch onto the skin every 72 hours for 14 days  Qty: 4 patch, Refills: 0    Associated Diagnoses: Acquired total absence of pancreas      HYDROmorphone (DILAUDID) 1 MG/ML LIQD liquid 1-2 mLs (1-2 mg) by Per J Tube route every 4 hours  Qty: 168 mL, Refills: 0    Associated Diagnoses: Acquired total absence of pancreas      acetaminophen (TYLENOL) 32 mg/mL solution Take 15.65 mLs (500 mg) by mouth every 6 hours for 14 days  Qty: 900 mL, Refills: 3    Associated Diagnoses: Acquired total absence of pancreas      pregabalin (LYRICA) 20 MG/ML solution 1.25 mLs (25 mg) by Per Feeding Tube route 2 times daily  Qty: 70 mL, Refills: 3    Associated Diagnoses: History of pancreatectomy      FLUoxetine (PROZAC) 20 MG/5ML solution 2.5 mLs (10 mg) by Per J Tube route daily  Qty: 75 mL, Refills: 3    Associated Diagnoses: Acquired total absence of pancreas      glucose 40 % GEL gel Take 15-30 g by mouth every 15 minutes as needed for low blood sugar  Qty: 3 Tube, Refills: 1    Associated Diagnoses: Post-pancreatectomy diabetes (H)      insulin aspart (NOVOLOG PEN) 100 UNIT/ML injection Correction aspart 2 units per 20 >120 every 4 hours  Qty: 3 mL, Refills: 3    Associated Diagnoses: Post-pancreatectomy diabetes (H)      !! insulin glargine (LANTUS) 100 UNIT/ML injection Inject 70 Units Subcutaneous every evening  Qty: 3 mL, Refills: 3    Associated Diagnoses: Post-pancreatectomy diabetes (H)      !! insulin glargine (LANTUS) 100 UNIT/ML injection Inject 90 Units Subcutaneous every morning  Qty: 3 mL, Refills: 3    Associated Diagnoses: Post-pancreatectomy diabetes (H)      ondansetron (ZOFRAN-ODT) 4 MG ODT tab Take 1-2 tablets (4-8 mg) by mouth every 6 hours as needed for nausea  Qty: 30 tablet, Refills: 1    Associated Diagnoses: Post-pancreatectomy diabetes (H)      magnesium hydroxide (MILK OF MAGNESIA) 400 MG/5ML suspension 30 mLs by Per Feeding Tube route 2 times daily as needed for constipation  Qty: 105 mL,  Refills: 1    Associated Diagnoses: Other constipation      sennosides (SENOKOT) 8.8 MG/5ML syrup 10 mLs by Per Feeding Tube route 2 times daily  Qty: 600 mL, Refills: 3    Associated Diagnoses: Other constipation      nystatin (MYCOSTATIN) 929139 UNIT/ML suspension Take 5 mLs (500,000 Units) by mouth 4 times daily for 7 days  Qty: 280 mL, Refills: 1    Associated Diagnoses: Acquired total absence of pancreas      multivitamins with minerals (CERTAVITE/CEROVITE) LIQD liquid 15 mLs by Per Feeding Tube route daily  Qty: 450 mL, Refills: 1    Associated Diagnoses: Acquired total absence of pancreas      levothyroxine (SYNTHROID) 25 mcg/mL SUSP 6 mLs (150 mcg) by Per J Tube route every morning (before breakfast)  Qty: 180 mL, Refills: 1    Associated Diagnoses: Acquired total absence of pancreas      pantoprazole (PROTONIX) SUSP suspension 20 mLs (40 mg) by Oral or J tube route 2 times daily  Qty: 1200 mL, Refills: 1    Associated Diagnoses: Acquired total absence of pancreas      cholecalciferol (VITAMIN D/D-VI-SOL) 400 UNIT/ML LIQD liquid 5 mLs (2,000 Units) by Per J Tube route daily  Qty: 150 mL, Refills: 1    Associated Diagnoses: Acquired total absence of pancreas      Alcohol Swabs PADS 1 pad as needed  Qty: 100 each, Refills: 3    Associated Diagnoses: Post-pancreatectomy diabetes (H)      Sharps Container (BD SHARPS ) MISC 1 Container as needed  Qty: 1 each, Refills: 3    Associated Diagnoses: Post-pancreatectomy diabetes (H)      blood glucose monitoring (FREESTYLE LITE) test strip Use to test blood sugars 8 times daily or as directed.  Qty: 100 strip, Refills: 11    Associated Diagnoses: Post-pancreatectomy diabetes (H)      insulin pen needle 32G X 4 MM Use 8 pen needles daily or as directed.  Qty: 100 each, Refills: 3    Associated Diagnoses: Acquired total absence of pancreas      blood glucose monitoring (ACCU-CHEK FASTCLIX) lancets Use to test blood sugar 8 times daily or as directed.  Qty: 1  Box, Refills: 3    Associated Diagnoses: Acquired total absence of pancreas       !! - Potential duplicate medications found. Please discuss with provider.      CONTINUE these medications which have CHANGED    Details   amylase-lipase-protease (CREON 12) 07408 UNITS CPEP Take 3-4 capsules (36,000-48,000 Units) by mouth every hour as needed (with snacks)  Qty: 270 capsule, Refills: 3    Associated Diagnoses: Acquired total absence of pancreas         STOP taking these medications       LEVOTHYROXINE SODIUM PO Comments:   Reason for Stopping:         FLUoxetine HCl (PROZAC PO) Comments:   Reason for Stopping:         fentaNYL (DURAGESIC) 25 mcg/hr patch 72 hr Comments:   Reason for Stopping:                 Home infusion referral     Reason for your hospital stay   Total pancreatectomy  Post pancreatectomy diabetes  Acute malnutrition s/p pancreatectomy due to slow gastric emptying  Acute on chronic pain     Adult [facility] Follow-up and recommended labs and tests   1. Advanced treatment center (ATC), [facility] 2nd floor, arrive at 1200 am, for assess/medication management.   2. Labs: CMP, prealbumin, CBC on Mondays  3. Follow up with Dr. Jaquez, Surgery, Clinic and surgery center ([facility]), next week  4. Follow up with Endocrinology, next week  5. Follow up with DM education and dietitian, see appointments    Appointments on [place] and/or [facility] (with [facility] or [facility] provider or service). Call [phone] if you haven't heard regarding these appointments within 7 days of discharge.     Activity   Your activity upon discharge: Do not lift more that 10 pounds for 6 weeks.  You may shower and get incisions wet but do not scrub incisions or submerge wounds (aka, bath, pool, hot tub, ect.). Do not drive until you can withstand pressing the break peddle quickly and fully without abdominal pain. Do not drive while taking narcotic or sedating medications. Walk at least 4 times per day. If traveling by car or plane, please  take short breaks to walk every 45 minutes to prevent blood clots.     Monitor and record   weight every day     When to contact your care team   Please contact transplant coordinator if you have a temperature > 100.5, redness, swelling or drainage from surgical incision, increased pain, dehydration-increased thirst, decreased urine output, lightheaded, unable to transplant medications for ANY reason.    Your transplant coordinator is Soni Aleman. Phone: [phone], Fax: [phone]     Wound care and dressings   Instructions to care for your wound at home: Clean wound in shower or with NS. Pack with moist gauze. Change 2 times a day.     Tubes and drains   G tube clamped. Open to gravity as needed for nausea with vomiting or abdominal distension  J tube, flush with free water 15-30 cc pre and post medications.     Full Code     ABO/Rh Type and Screen     Red blood cell have available     Diet   Follow this diet upon discharge:      Adult Formula Drip Feeding: Continuous Vivonex Ten; Jejunostomy; Goal Rate: 100 ml/hr; mL/hr; Medication - Tube Feeding Flush Frequency: At least 15-30 mL water before and after medication administration and with tube clogging     Clear Liquid           Data   Most Recent 3 CBC's:  Recent Labs   Lab Test  05/26/17   0653  05/25/17   0730  05/24/17   0704   WBC  16.7*  18.7*  18.6*   HGB  7.2*  7.2*  7.1*   MCV  89  88  89   PLT  730*  653*  666*      Most Recent 3 BMP's:  Recent Labs   Lab Test  05/26/17   0653  05/25/17   0730  05/24/17   0704   NA  138  140  141   POTASSIUM  4.1  4.2  3.9   CHLORIDE  106  106  106   CO2  28  28  29   BUN  14  14  13   CR  0.82  0.84  0.83   ANIONGAP  4  6  6   CRISTOBAL  8.2*  8.3*  7.6*   GLC  94  162*  171*     Most Recent 2 LFT's:  Recent Labs   Lab Test  05/20/17   0716  05/17/17   0214   AST  20  74*   ALT  43  93*   ALKPHOS  84  32*   BILITOTAL  0.4  0.5     Most Recent INR's and Anticoagulation Dosing History:  Anticoagulation Dose History      Recent Dosing and Labs Latest Ref Rng & Units 5/10/2017 5/15/2017    INR 0.86 - 1.14 1.04 1.66(H)        Most Recent 3 Troponin's:No lab results found.  Most Recent Cholesterol Panel:  Recent Labs   Lab Test  05/10/17   0805   CHOL  157   LDL  94   HDL  43   TRIG  98     Most Recent 6 Bacteria Isolates From Any Culture (See EPIC Reports for Culture Details):  Recent Labs   Lab Test  05/16/17   0013  05/15/17   1800  05/15/17   1321  05/10/17   1015   CULT  Light growth Staphylococcus aureus NOT MRSA*  No growth after 11 days  No growth after11 days  Cultured on the 1st day of incubation: Enterococcus faecium  Critical Value/Significant Value, preliminary result only, called to and read   back by PAULA WONG RN 5/16/17 0940 DEBBIE  Cultured on the 1st day of incubation: Enterobacter cloacae complex  Critical Value/Significant Value, preliminary result only, called to and read   back by MIKAYLA NATH RN 5/16/17 1330 DEBBIE  *  Culture negative monitoring continues  No growth     Most Recent TSH, T4 and A1c Labs:  Recent Labs   Lab Test  05/16/17   0355   A1C  5.3       Lab Results   Component Value Date    LIPASE 93 05/20/2017    LIPASE 9517 (H) 05/16/2017    LIPASE 109 11/11/2015    LIPASE 93 09/10/2015     Lab Results   Component Value Date    AMYLASE 790 (H) 05/16/2017    AMYLASE 38 11/11/2015    AMYLASE 36 09/10/2015     Attestation:  Patient has been seen and evaluated by me.   Vital signs, labs, medications and orders were reviewed.   When obtained, diagnostic images were reviewed by me and interpreted as above.    The care plan was discussed with the multidisciplinary team and I agree with the findings and plan in this note, with any differences recorded in blue.    .

## 2017-05-27 ENCOUNTER — INFUSION THERAPY VISIT (OUTPATIENT)
Dept: INFUSION THERAPY | Facility: CLINIC | Age: 57
End: 2017-05-27
Attending: TRANSPLANT SURGERY
Payer: COMMERCIAL

## 2017-05-27 VITALS
OXYGEN SATURATION: 95 % | SYSTOLIC BLOOD PRESSURE: 123 MMHG | DIASTOLIC BLOOD PRESSURE: 62 MMHG | WEIGHT: 232.7 LBS | TEMPERATURE: 98 F | HEART RATE: 80 BPM | BODY MASS INDEX: 29.88 KG/M2

## 2017-05-27 DIAGNOSIS — Z90.410 ACQUIRED TOTAL ABSENCE OF PANCREAS: Primary | ICD-10-CM

## 2017-05-27 LAB
ANION GAP SERPL CALCULATED.3IONS-SCNC: 8 MMOL/L (ref 3–14)
BASOPHILS # BLD AUTO: 0.1 10E9/L (ref 0–0.2)
BASOPHILS NFR BLD AUTO: 0.4 %
BUN SERPL-MCNC: 16 MG/DL (ref 7–30)
CALCIUM SERPL-MCNC: 8.1 MG/DL (ref 8.5–10.1)
CHLORIDE SERPL-SCNC: 104 MMOL/L (ref 94–109)
CO2 SERPL-SCNC: 28 MMOL/L (ref 20–32)
CREAT SERPL-MCNC: 0.81 MG/DL (ref 0.66–1.25)
DIFFERENTIAL METHOD BLD: ABNORMAL
EOSINOPHIL # BLD AUTO: 1.7 10E9/L (ref 0–0.7)
EOSINOPHIL NFR BLD AUTO: 11.2 %
ERYTHROCYTE [DISTWIDTH] IN BLOOD BY AUTOMATED COUNT: 15.6 % (ref 10–15)
GFR SERPL CREATININE-BSD FRML MDRD: ABNORMAL ML/MIN/1.7M2
GLUCOSE SERPL-MCNC: 132 MG/DL (ref 70–99)
HCT VFR BLD AUTO: 23.1 % (ref 40–53)
HGB BLD-MCNC: 7.3 G/DL (ref 13.3–17.7)
IMM GRANULOCYTES # BLD: 0.3 10E9/L (ref 0–0.4)
IMM GRANULOCYTES NFR BLD: 2.2 %
LYMPHOCYTES # BLD AUTO: 1.8 10E9/L (ref 0.8–5.3)
LYMPHOCYTES NFR BLD AUTO: 12 %
MCH RBC QN AUTO: 28 PG (ref 26.5–33)
MCHC RBC AUTO-ENTMCNC: 31.6 G/DL (ref 31.5–36.5)
MCV RBC AUTO: 89 FL (ref 78–100)
MONOCYTES # BLD AUTO: 1.9 10E9/L (ref 0–1.3)
MONOCYTES NFR BLD AUTO: 12.7 %
NEUTROPHILS # BLD AUTO: 9.2 10E9/L (ref 1.6–8.3)
NEUTROPHILS NFR BLD AUTO: 61.5 %
NRBC # BLD AUTO: 0 10*3/UL
NRBC BLD AUTO-RTO: 0 /100
PLATELET # BLD AUTO: 862 10E9/L (ref 150–450)
POTASSIUM SERPL-SCNC: 4.7 MMOL/L (ref 3.4–5.3)
RBC # BLD AUTO: 2.61 10E12/L (ref 4.4–5.9)
SODIUM SERPL-SCNC: 140 MMOL/L (ref 133–144)
WBC # BLD AUTO: 15 10E9/L (ref 4–11)

## 2017-05-27 PROCEDURE — 80048 BASIC METABOLIC PNL TOTAL CA: CPT | Performed by: TRANSPLANT SURGERY

## 2017-05-27 PROCEDURE — 85025 COMPLETE CBC W/AUTO DIFF WBC: CPT | Performed by: TRANSPLANT SURGERY

## 2017-05-27 PROCEDURE — 99215 OFFICE O/P EST HI 40 MIN: CPT

## 2017-05-27 PROCEDURE — 36415 COLL VENOUS BLD VENIPUNCTURE: CPT

## 2017-05-27 NOTE — MR AVS SNAPSHOT
After Visit Summary   5/27/2017    Sohan Arita    MRN: [id number]           Patient Information     Date Of Birth          1960        Visit Information        Provider Department      5/27/2017 12:00 PM UC [facility] ATC; [facility] SPEC INFUSION [facility] Advanced Treatment [place] Specialty and Procedure        Today's Diagnoses     Acquired total absence of pancreas    -  1       Follow-ups after your visit        Your next 10 appointments already scheduled     May 30, 2017 10:45 AM CDT   Lab with [facility]   [facility] ([facility])    [address] [place] [id number]   [phone]            May 30, 2017 11:30 AM CDT   (Arrive by 11:15 AM)   Return Auto Islet with Rodrigo Jaquez MD   [facility] Solid Organ Transplant ([facility])    [address] [place] [id number]   [phone]            Jun 05, 2017  9:30 AM CDT   (Arrive by 9:15 AM)   Office Visit with Evelyne Diez RD   [facility] Diabetes ([facility])    [address] [place] [id number] [phone]           Bring a current list of meds and any records pertaining to this visit.  For Physicals, please bring immunization records and any forms needing to be filled out.  Please arrive 10 minutes early to complete paperwork.            Jun 05, 2017 10:30 AM CDT   (Arrive by 10:15 AM)   Office Visit with Radha Saavedra RN   [facility] Diabetes ([facility])    [address] [place] [id number] [phone]           Bring a current list of meds and any records pertaining to this visit.  For Physicals, please bring immunization records and any forms needing to be filled out.  Please arrive 10 minutes early to complete paperwork.              Who to contact     If you have questions or need follow up information about today's clinic visit or  "your schedule please contact [facility] SPECIALTY AND PROCEDURE directly at [phone].  Normal or non-critical lab and imaging results will be communicated to you by MyChart, letter or phone within 4 business days after the clinic has received the results. If you do not hear from us within 7 days, please contact the clinic through ShareSquarehart or phone. If you have a critical or abnormal lab result, we will notify you by phone as soon as possible.  Submit refill requests through BlueKite or call your pharmacy and they will forward the refill request to us. Please allow 3 business days for your refill to be completed.          Additional Information About Your Visit        ShareSquareharuberVU Information     BlueKite lets you send messages to your doctor, view your test results, renew your prescriptions, schedule appointments and more. To sign up, go to [email or web address] . Click on \"Log in\" on the left side of the screen, which will take you to the Welcome page. Then click on \"Sign up Now\" on the right side of the page.     You will be asked to enter the access code listed below, as well as some personal information. Please follow the directions to create your username and password.     Your access code is: [email or web address]  Expires: 2017  6:30 AM     Your access code will  in 90 days. If you need help or a new code, please call your [facility] clinic or [phone].        Care EveryWhere ID     This is your Care EveryWhere ID. This could be used by other organizations to access your [facility] medical records  [id number]        Your Vitals Were     Pulse Temperature Pulse Oximetry BMI (Body Mass Index)          80 98  F (36.7  C) (Tympanic) 95% 29.88 kg/m2         Blood Pressure from Last 3 Encounters:   17 123/62   17 113/64   05/10/17 124/78    Weight from Last 3 Encounters:   17 105.6 kg (232 lb 11.2 oz)   17 109.2 kg (240 lb 12.8 oz)   05/10/17 103.2 kg (227 lb 8 oz) "              We Performed the Following     Basic metabolic panel     CBC with platelets differential        Primary Care Provider Office Phone # Fax #    Frandy Frye [phone]       [address]  [facility] [address]        Thank you!     Thank you for choosing [facility] SPECIALTY AND PROCEDURE  for your care. Our goal is always to provide you with excellent care. Hearing back from our patients is one way we can continue to improve our services. Please take a few minutes to complete the written survey that you may receive in the mail after your visit with us. Thank you!             Your Updated Medication List - Protect others around you: Learn how to safely use, store and throw away your medicines at www.disposemymeds.org.          This list is accurate as of: 5/27/17  3:11 PM.  Always use your most recent med list.                   Brand Name Dispense Instructions for use    acetaminophen 32 mg/mL solution    TYLENOL    900 mL    Take 15.65 mLs (500 mg) by mouth every 6 hours for 14 days       Alcohol Swabs Pads     100 each    1 pad as needed       amylase-lipase-protease 84137 UNITS Cpep    CREON 12    270 capsule    Take 3-4 capsules (36,000-48,000 Units) by mouth every hour as needed (with snacks)       BD SHARPS  Misc     1 each    1 Container as needed       blood glucose monitoring lancets     1 Box    Use to test blood sugar 8 times daily or as directed.       blood glucose monitoring test strip    FREESTYLE LITE    100 strip    Use to test blood sugars 8 times daily or as directed.       cholecalciferol 400 UNIT/ML Liqd liquid    vitamin D/D-VI-SOL    150 mL    5 mLs (2,000 Units) by Per J Tube route daily       fentaNYL 100 mcg/hr 72 hr patch    DURAGESIC    4 patch    Place 1 patch onto the skin every 72 hours for 14 days       FLUoxetine 20 MG/5ML solution    PROzac    75 mL    2.5 mLs (10 mg) by Per J Tube route  daily       glucose 40 % Gel gel     3 Tube    Take 15-30 g by mouth every 15 minutes as needed for low blood sugar       HYDROmorphone 1 MG/ML Liqd liquid    DILAUDID    168 mL    1-2 mLs (1-2 mg) by Per J Tube route every 4 hours       insulin aspart 100 UNIT/ML injection    NovoLOG PEN    3 mL    Correction aspart 2 units per 20 >120 every 4 hours       * insulin glargine 100 UNIT/ML injection    LANTUS    3 mL    Inject 70 Units Subcutaneous every evening       * insulin glargine 100 UNIT/ML injection    LANTUS    3 mL    Inject 90 Units Subcutaneous every morning       insulin pen needle 32G X 4 MM     100 each    Use 8 pen needles daily or as directed.       levothyroxine 25 mcg/mL Susp    SYNTHROID    180 mL    6 mLs (150 mcg) by Per J Tube route every morning (before breakfast)       magnesium hydroxide 400 MG/5ML suspension    MILK OF MAGNESIA    105 mL    30 mLs by Per Feeding Tube route 2 times daily as needed for constipation       multivitamins with minerals Liqd liquid     450 mL    15 mLs by Per Feeding Tube route daily       nystatin 003411 UNIT/ML suspension    MYCOSTATIN    280 mL    Take 5 mLs (500,000 Units) by mouth 4 times daily for 7 days       ondansetron 4 MG ODT tab    ZOFRAN-ODT    30 tablet    Take 1-2 tablets (4-8 mg) by mouth every 6 hours as needed for nausea       pantoprazole Susp suspension    PROTONIX    1200 mL    20 mLs (40 mg) by Oral or J tube route 2 times daily       pregabalin 20 MG/ML solution    LYRICA    70 mL    1.25 mLs (25 mg) by Per Feeding Tube route 2 times daily       sennosides 8.8 MG/5ML syrup    SENOKOT    600 mL    10 mLs by Per Feeding Tube route 2 times daily       * Notice:  This list has 2 medication(s) that are the same as other medications prescribed for you. Read the directions carefully, and ask your doctor or other care provider to review them with you.

## 2017-05-27 NOTE — PROGRESS NOTES
"Sohan Arita came to [facility] today for a lab and assess following an auto islet transplant on 5/15/17.      Discharge date: 5/26/17  Transplant coordinator: Soni Aleman      Physical Assessment:  See physical assessment located under \"Document Flowsheets\".  Incision site: midline staples. Open in middle of incision. Area cleaned and wet-dry dressing appled. Pt's wife is changing twice daily.  Lines: G/J tube. J tube with feedings and medications and G-tube clamped  Hydration: pt able to have small amount of clears. No nausea  Nutrition: tube feeds; tolerating   Last BM: yesterday  Pain: controlled with prn tylenol and dilaudid     Labs drawn by [facility] staff Yes    Plan of care for today: RN assessed pt along with vitals and labs. MD specifically concerned about sodium level which was 140 today. Pt and wife doing very well with medications. Questions about transitioning tube meds to oral and also combining medications when giving in tube. Spoke with Dr. Jackson as well as pharmacy team and recommended not combining medications going into tube. Dr. Jackson said coordinator will be working on transitioning pt to oral medications this week. MAURICIO drain output was 20cc in 24 hours. Dr. Jackson reported that this needs to stay in until pt eating. Platelets over 800,000 today but MD did not want to start aspirin at this time. Pt supplied with all products needed for dressing change. Plan to return to clinic on Tuesday for labs and Dr. Jaquez visit.    Medication changes: none    Medications administered:      Patient education:    The following teaching topics were addressed: Incisional care, Medications (purposes, doses and times of administration) and Plan of care   Patient and wife verbalized understanding and all questions answered.    Face to face time: 25  Discharge Plan    Pt will follow up with clinic on Tuesday  Discharge instructions reviewed with patient: YES  Patient/Representative verbalized understanding, " all questions answered: YES    Discharged from unit at 1415 with whom: wife to local hotel.    Leti Braga RN

## 2017-05-28 LAB
BACTERIA SPEC CULT: NO GROWTH
MICRO REPORT STATUS: NORMAL
SPECIMEN SOURCE: NORMAL

## 2017-05-29 LAB
BACTERIA SPEC CULT: NORMAL
Lab: NORMAL
Lab: NORMAL
MICRO REPORT STATUS: NORMAL
MICRO REPORT STATUS: NORMAL
SPECIMEN SOURCE: NORMAL
SPECIMEN SOURCE: NORMAL
YEAST SPEC QL CULT: NO GROWTH

## 2017-05-30 ENCOUNTER — CARE COORDINATION (OUTPATIENT)
Dept: CARE COORDINATION | Facility: CLINIC | Age: 57
End: 2017-05-30

## 2017-05-30 ENCOUNTER — DOCUMENTATION ONLY (OUTPATIENT)
Dept: TRANSPLANT | Facility: CLINIC | Age: 57
End: 2017-05-30

## 2017-05-30 ENCOUNTER — TELEPHONE (OUTPATIENT)
Dept: TRANSPLANT | Facility: CLINIC | Age: 57
End: 2017-05-30

## 2017-05-30 ENCOUNTER — OFFICE VISIT (OUTPATIENT)
Dept: TRANSPLANT | Facility: CLINIC | Age: 57
End: 2017-05-30
Attending: TRANSPLANT SURGERY
Payer: COMMERCIAL

## 2017-05-30 VITALS
WEIGHT: 228.3 LBS | BODY MASS INDEX: 29.3 KG/M2 | HEIGHT: 74 IN | OXYGEN SATURATION: 98 % | RESPIRATION RATE: 20 BRPM | SYSTOLIC BLOOD PRESSURE: 171 MMHG | DIASTOLIC BLOOD PRESSURE: 62 MMHG | HEART RATE: 86 BPM | TEMPERATURE: 97.6 F

## 2017-05-30 DIAGNOSIS — E89.1 POST-PANCREATECTOMY DIABETES (H): Primary | ICD-10-CM

## 2017-05-30 DIAGNOSIS — K86.89 PANCREATIC INSUFFICIENCY: ICD-10-CM

## 2017-05-30 DIAGNOSIS — E13.9 POST-PANCREATECTOMY DIABETES (H): Primary | ICD-10-CM

## 2017-05-30 DIAGNOSIS — K86.1 CHRONIC PANCREATITIS, UNSPECIFIED PANCREATITIS TYPE (H): ICD-10-CM

## 2017-05-30 DIAGNOSIS — Z90.410 POST-PANCREATECTOMY DIABETES (H): Primary | ICD-10-CM

## 2017-05-30 DIAGNOSIS — Z90.410 POST-PANCREATECTOMY DIABETES (H): ICD-10-CM

## 2017-05-30 DIAGNOSIS — E89.1 POST-PANCREATECTOMY DIABETES (H): ICD-10-CM

## 2017-05-30 DIAGNOSIS — Z90.410 HISTORY OF PANCREATECTOMY: ICD-10-CM

## 2017-05-30 DIAGNOSIS — E13.9 POST-PANCREATECTOMY DIABETES (H): ICD-10-CM

## 2017-05-30 DIAGNOSIS — Z90.410 HISTORY OF PANCREATECTOMY: Primary | ICD-10-CM

## 2017-05-30 LAB
ABO + RH BLD: NORMAL
ABO + RH BLD: NORMAL
ALBUMIN SERPL-MCNC: 2.6 G/DL (ref 3.4–5)
ALP SERPL-CCNC: 108 U/L (ref 40–150)
ALT SERPL W P-5'-P-CCNC: 24 U/L (ref 0–70)
ANION GAP SERPL CALCULATED.3IONS-SCNC: 6 MMOL/L (ref 3–14)
AST SERPL W P-5'-P-CCNC: 20 U/L (ref 0–45)
BILIRUB SERPL-MCNC: 0.3 MG/DL (ref 0.2–1.3)
BLD GP AB SCN SERPL QL: NORMAL
BLOOD BANK CMNT PATIENT-IMP: NORMAL
BUN SERPL-MCNC: 17 MG/DL (ref 7–30)
CALCIUM SERPL-MCNC: 8.8 MG/DL (ref 8.5–10.1)
CHLORIDE SERPL-SCNC: 102 MMOL/L (ref 94–109)
CO2 SERPL-SCNC: 29 MMOL/L (ref 20–32)
CREAT SERPL-MCNC: 0.82 MG/DL (ref 0.66–1.25)
ERYTHROCYTE [DISTWIDTH] IN BLOOD BY AUTOMATED COUNT: 15.3 % (ref 10–15)
GFR SERPL CREATININE-BSD FRML MDRD: ABNORMAL ML/MIN/1.7M2
GLUCOSE SERPL-MCNC: 90 MG/DL (ref 70–99)
HCT VFR BLD AUTO: 26.9 % (ref 40–53)
HGB BLD-MCNC: 8.3 G/DL (ref 13.3–17.7)
Lab: NORMAL
MCH RBC QN AUTO: 27.2 PG (ref 26.5–33)
MCHC RBC AUTO-ENTMCNC: 30.9 G/DL (ref 31.5–36.5)
MCV RBC AUTO: 88 FL (ref 78–100)
MICRO REPORT STATUS: NORMAL
PLATELET # BLD AUTO: 1308 10E9/L (ref 150–450)
POTASSIUM SERPL-SCNC: 4 MMOL/L (ref 3.4–5.3)
PREALB SERPL IA-MCNC: 12 MG/DL (ref 15–45)
PROT SERPL-MCNC: 7 G/DL (ref 6.8–8.8)
RBC # BLD AUTO: 3.05 10E12/L (ref 4.4–5.9)
RETICS # AUTO: 124.9 10E9/L (ref 25–95)
RETICS/RBC NFR AUTO: 4.2 % (ref 0.5–2)
SODIUM SERPL-SCNC: 137 MMOL/L (ref 133–144)
SPECIMEN EXP DATE BLD: NORMAL
SPECIMEN SOURCE: NORMAL
WBC # BLD AUTO: 12.8 10E9/L (ref 4–11)
YEAST SPEC QL CULT: NO GROWTH

## 2017-05-30 PROCEDURE — 85027 COMPLETE CBC AUTOMATED: CPT | Performed by: TRANSPLANT SURGERY

## 2017-05-30 PROCEDURE — 99215 OFFICE O/P EST HI 40 MIN: CPT

## 2017-05-30 PROCEDURE — 36415 COLL VENOUS BLD VENIPUNCTURE: CPT | Performed by: NURSE PRACTITIONER

## 2017-05-30 PROCEDURE — 86850 RBC ANTIBODY SCREEN: CPT | Performed by: NURSE PRACTITIONER

## 2017-05-30 PROCEDURE — 80053 COMPREHEN METABOLIC PANEL: CPT | Performed by: TRANSPLANT SURGERY

## 2017-05-30 PROCEDURE — 84134 ASSAY OF PREALBUMIN: CPT | Performed by: TRANSPLANT SURGERY

## 2017-05-30 PROCEDURE — 86900 BLOOD TYPING SEROLOGIC ABO: CPT | Performed by: NURSE PRACTITIONER

## 2017-05-30 PROCEDURE — 86901 BLOOD TYPING SEROLOGIC RH(D): CPT | Performed by: NURSE PRACTITIONER

## 2017-05-30 PROCEDURE — 85045 AUTOMATED RETICULOCYTE COUNT: CPT | Performed by: TRANSPLANT SURGERY

## 2017-05-30 ASSESSMENT — PAIN SCALES - GENERAL: PAINLEVEL: MODERATE PAIN (5)

## 2017-05-30 NOTE — LETTER
"5/30/2017       RE: Sohan Arita  [address]  [facility] [address]     Dear Colleague,    Thank you for referring your patient, Sohan Arita, to the [facility] at [facility]. Please see a copy of my visit note below.    SP TPIAT 5/15/2017.  Has been discharged for 4 days.  Doing ok, has occasional times when he feels poorly.  Endurance is still low.  However, no need to uncap G tube.  No excessive BM or diarrhea.  Gluc ok with current insulin regimen  MAURICIO drainage < 25 ml/24 hours.  Decreasing use of narcotics: has recently stopped oral diluadid.    /62  Pulse 86  Temp 97.6  F (36.4  C) (Oral)  Resp 20  Ht 1.88 m (6' 2\")  Wt 103.6 kg (228 lb 4.8 oz)  SpO2 98%  BMI 29.31 kg/m2  Abd. Soft and nontender.  Wound is ok.  Upper opening is without erythema.  No real drainage.    Orders Only on 05/30/2017   Component Date Value Ref Range Status     ABO 05/30/2017 O   Final     RH(D) 05/30/2017  Pos   Final     Antibody Screen 05/30/2017 Neg   Final     Test Valid Only At 05/30/2017 [facility]   Final     Specimen Expires 05/30/2017 06/02/2017   Final     WBC 05/30/2017 12.8* 4.0 - 11.0 10e9/L Final     RBC Count 05/30/2017 3.05* 4.4 - 5.9 10e12/L Final     Hemoglobin 05/30/2017 8.3* 13.3 - 17.7 g/dL Final     Hematocrit 05/30/2017 26.9* 40.0 - 53.0 % Final     MCV 05/30/2017 88  78 - 100 fl Final     MCH 05/30/2017 27.2  26.5 - 33.0 pg Final     MCHC 05/30/2017 30.9* 31.5 - 36.5 g/dL Final     RDW 05/30/2017 15.3* 10.0 - 15.0 % Final     Platelet Count 05/30/2017 1308* 150 - 450 10e9/L Final    Comment: This result has been called to NICK DHALIWAL RN TXC by Matilda Vasquez on 05 30 2017   at   1134, and has been read back.       Sodium 05/30/2017 137  133 - 144 mmol/L Final     Potassium 05/30/2017 4.0  3.4 - 5.3 mmol/L Final     Chloride 05/30/2017 102  94 - 109 mmol/L Final     Carbon " Dioxide 05/30/2017 29  20 - 32 mmol/L Final     Anion Gap 05/30/2017 6  3 - 14 mmol/L Final     Glucose 05/30/2017 90  70 - 99 mg/dL Final     Urea Nitrogen 05/30/2017 17  7 - 30 mg/dL Final     Creatinine 05/30/2017 0.82  0.66 - 1.25 mg/dL Final     GFR Estimate 05/30/2017   >60 mL/min/1.7m2 Final                    Value:>90  Non  GFR Calc       GFR Estimate If Black 05/30/2017   >60 mL/min/1.7m2 Final                    Value:>90   GFR Calc       Calcium 05/30/2017 8.8  8.5 - 10.1 mg/dL Final     Bilirubin Total 05/30/2017 0.3  0.2 - 1.3 mg/dL Final     Albumin 05/30/2017 2.6* 3.4 - 5.0 g/dL Final     Protein Total 05/30/2017 7.0  6.8 - 8.8 g/dL Final     Alkaline Phosphatase 05/30/2017 108  40 - 150 U/L Final     ALT 05/30/2017 24  0 - 70 U/L Final     AST 05/30/2017 20  0 - 45 U/L Final     Prealbumin 05/30/2017 12* 15 - 45 mg/dL Final     % Retic 05/30/2017 4.2* 0.5 - 2.0 % Final     Absolute Retic 05/30/2017 124.9* 25 - 95 10e9/L Final       Imp/Plan:  Looks quite well. Hgb rising and retics increasing.  Should help with fatigue.  Glucose well controlled at present, will see the diabetic educators.  Needs to start oral feeds when insulin ok.  Overall, looks good.  RTC in 1 week.    Again, thank you for allowing me to participate in the care of your patient.      Sincerely,    Rodrigo Jaquez MD

## 2017-05-30 NOTE — TELEPHONE ENCOUNTER
"Spoke with Maribell. She reports Tanner has been doing well and they are getting their routine in place well. She did say Tanner has been running an occasional fever. Tnaner's temp at 8:45 today was 99.2. His temperature has ranged from .4. Maribell noted that his blood sugars have been rising a bit, as high as 145. She will bring his log to clinic today. Maribell reports the wound dressing changes are going well and that the lower part of the incision where the staples remain looked \"a bit goopy yesterday\". Asked what this meant and she stated the pad was wet and that it had some yellow drainage. Will assess in clinic today. Per Maribell, the opened incisional area looks good. Her only complaint is doing the formula every four hours. Maribell will begin sending Tanner's blood glucose, temperature, and oral fluid intake logs to this RN beginning 5/31. She had no questions or other concerns. She is aware of clinic appointment times today.  "

## 2017-05-30 NOTE — PROGRESS NOTES
Patient has clinic visit today 5/30 so no post DC follow up call is needed            May 30, 2017 10:45 AM CDT   Lab with  LAB   [facility] ([facility])     [address] [place] [id number] [phone]                 May 30, 2017 11:30 AM CDT   (Arrive by 11:15 AM)   Return Auto Islet with Rodrigo Jaquez MD   [facility] ([facility])     [address] [place] [address] [phone]

## 2017-05-30 NOTE — PROGRESS NOTES
DATE:  5/30/2017   TIME OF RECEIPT FROM LAB:  11:36  LAB TEST:  Platelets  LAB VALUE:  1308   RESULTS GIVEN WITH READ-BACK TO (PROVIDER):  Web Paged Soni Aleman     TIME LAB VALUE REPORTED TO PROVIDER: 11:39

## 2017-05-30 NOTE — MR AVS SNAPSHOT
After Visit Summary   5/30/2017    Sohan Arita    MRN: [id number]           Patient Information     Date Of Birth          1960        Visit Information        Provider Department      5/30/2017 11:30 AM Rodrigo Jaquez MD [facility]        Today's Diagnoses     Post-pancreatectomy diabetes (H)    -  1    Pancreatic insufficiency           Follow-ups after your visit        Your next 10 appointments already scheduled     Jun 05, 2017  9:30 AM CDT   (Arrive by 9:15 AM)   Office Visit with Evelyne Diez RD   [facility] ([facility])    [address] [place] [id number] [phone]           Bring a current list of meds and any records pertaining to this visit.  For Physicals, please bring immunization records and any forms needing to be filled out.  Please arrive 10 minutes early to complete paperwork.            Jun 05, 2017 10:30 AM CDT   (Arrive by 10:15 AM)   Office Visit with Radha Saavedra RN   [facility] ([facility])    [address] [place] [id number] [phone]           Bring a current list of meds and any records pertaining to this visit.  For Physicals, please bring immunization records and any forms needing to be filled out.  Please arrive 10 minutes early to complete paperwork.            Jun 08, 2017  9:45 AM CDT   LAB with  LAB   [facility] ([facility])    [address] [place] [id number]   [phone]           Patient must bring picture ID.  Patient should be prepared to give a urine specimen  Please do not eat 10-12 hours before your appointment if you are coming in fasting for labs on lipids, cholesterol, or glucose (sugar).  Pregnant women should follow their Care Team instructions. Water with medications is okay. Do not drink coffee or other fluids.   If you  "have concerns about taking  your medications, please ask at office or if scheduling via [employer], send a message by clicking on Secure Messaging, Message Your Care Team.            Jun 08, 2017 10:30 AM CDT   (Arrive by 10:15 AM)   Return Auto Islet with Rodrigo Jaquez MD   [facility] ([facility])    [address] [address] [phone]              Future tests that were ordered for you today     Open Future Orders        Priority Expected Expires Ordered    CBC with platelets differential Routine 6/8/2017 7/30/2017 5/30/2017    Comprehensive metabolic panel Routine 6/8/2017 7/30/2017 5/30/2017    Prealbumin Routine 6/8/2017 7/30/2017 5/30/2017            Who to contact     If you have questions or need follow up information about today's clinic visit or your schedule please contact [facility]GAN TRANSPL[facility] directly at [phone].  Normal or non-critical lab and imaging results will be communicated to you by [employer], letter or phone within 4 business days after the clinic has received the results. If you do not hear from us within 7 days, please contact the clinic through [employer] or phone. If you have a critical or abnormal lab result, we will notify you by phone as soon as possible.  Submit refill requests through [employer] or call your pharmacy and they will forward the refill request to us. Please allow 3 business days for your refill to be completed.          Additional Information About Your Visit        Un[employer] Information     [employer] lets you send messages to your doctor, view your test results, renew your prescriptions, schedule appointments and more. To sign up, go to [email or web address] . Click on \"Log in\" on the left side of the screen, which will take you to the Welcome page. Then click on \"Sign up Now\" on the right side of the page.     You will be asked to enter the access code listed below, as well as " "some personal information. Please follow the directions to create your username and password.     Your access code is: [email or web address]  Expires: 2017  6:30 AM     Your access code will  in 90 days. If you need help or a new code, please call your [facility] or [phone].        Care EveryWhere ID     This is your Care EveryWhere ID. This could be used by other organizations to access your [place] medical records  [id number]        Your Vitals Were     Pulse Temperature Respirations Height Pulse Oximetry BMI (Body Mass Index)    86 97.6  F (36.4  C) (Oral) 20 1.88 m (6' 2\") 98% 29.31 kg/m2       Blood Pressure from Last 3 Encounters:   17 171/62   17 123/62   17 113/64    Weight from Last 3 Encounters:   17 103.6 kg (228 lb 4.8 oz)   17 105.6 kg (232 lb 11.2 oz)   17 109.2 kg (240 lb 12.8 oz)              Today, you had the following     No orders found for display       Primary Care Provider Office Phone # Fax #    Frandy Frye [phone]       [address]  [facility] [address]        Thank you!     Thank you for choosing [facility]GAN TRANSPL[facility]  for your care. Our goal is always to provide you with excellent care. Hearing back from our patients is one way we can continue to improve our services. Please take a few minutes to complete the written survey that you may receive in the mail after your visit with us. Thank you!             Your Updated Medication List - Protect others around you: Learn how to safely use, store and throw away your medicines at www.disposemymeds.org.          This list is accurate as of: 17  2:40 PM.  Always use your most recent med list.                   Brand Name Dispense Instructions for use    acetaminophen 32 mg/mL solution    TYLENOL    900 mL    Take 15.65 mLs (500 mg) by mouth every 6 hours for 14 days       Alcohol Swabs Pads     100 each    1 pad as needed       " amylase-lipase-protease 89133 UNITS Cpep    CREON 12    270 capsule    Take 3-4 capsules (36,000-48,000 Units) by mouth every hour as needed (with snacks)       BD SHARPS  Misc     1 each    1 Container as needed       blood glucose monitoring lancets     1 Box    Use to test blood sugar 8 times daily or as directed.       blood glucose monitoring test strip    FREESTYLE LITE    100 strip    Use to test blood sugars 8 times daily or as directed.       cholecalciferol 400 UNIT/ML Liqd liquid    vitamin D/D-VI-SOL    150 mL    5 mLs (2,000 Units) by Per J Tube route daily       fentaNYL 100 mcg/hr 72 hr patch    DURAGESIC    4 patch    Place 1 patch onto the skin every 72 hours for 14 days       FLUoxetine 20 MG/5ML solution    PROzac    75 mL    2.5 mLs (10 mg) by Per J Tube route daily       glucose 40 % Gel gel     3 Tube    Take 15-30 g by mouth every 15 minutes as needed for low blood sugar       HYDROmorphone 1 MG/ML Liqd liquid    DILAUDID    168 mL    1-2 mLs (1-2 mg) by Per J Tube route every 4 hours       insulin aspart 100 UNIT/ML injection    NovoLOG PEN    3 mL    Correction aspart 2 units per 20 >120 every 4 hours       * insulin glargine 100 UNIT/ML injection    LANTUS    3 mL    Inject 70 Units Subcutaneous every evening       * insulin glargine 100 UNIT/ML injection    LANTUS    3 mL    Inject 90 Units Subcutaneous every morning       insulin pen needle 32G X 4 MM     100 each    Use 8 pen needles daily or as directed.       levothyroxine 25 mcg/mL Susp    SYNTHROID    180 mL    6 mLs (150 mcg) by Per J Tube route every morning (before breakfast)       magnesium hydroxide 400 MG/5ML suspension    MILK OF MAGNESIA    105 mL    30 mLs by Per Feeding Tube route 2 times daily as needed for constipation       multivitamins with minerals Liqd liquid     450 mL    15 mLs by Per Feeding Tube route daily       nystatin 812977 UNIT/ML suspension    MYCOSTATIN    280 mL    Take 5 mLs (500,000 Units) by  mouth 4 times daily for 7 days       ondansetron 4 MG ODT tab    ZOFRAN-ODT    30 tablet    Take 1-2 tablets (4-8 mg) by mouth every 6 hours as needed for nausea       pantoprazole Susp suspension    PROTONIX    1200 mL    20 mLs (40 mg) by Oral or J tube route 2 times daily       pregabalin 20 MG/ML solution    LYRICA    70 mL    1.25 mLs (25 mg) by Per Feeding Tube route 2 times daily       sennosides 8.8 MG/5ML syrup    SENOKOT    600 mL    10 mLs by Per Feeding Tube route 2 times daily       * Notice:  This list has 2 medication(s) that are the same as other medications prescribed for you. Read the directions carefully, and ask your doctor or other care provider to review them with you.

## 2017-05-30 NOTE — PROGRESS NOTES
"SP TPIAT 5/15/2017.  Has been discharged for 4 days.  Doing ok, has occasional times when he feels poorly.  Endurance is still low.  However, no need to uncap G tube.  No excessive BM or diarrhea.  Gluc ok with current insulin regimen  MAURICIO drainage < 25 ml/24 hours.  Decreasing use of narcotics: has recently stopped oral diluadid.    /62  Pulse 86  Temp 97.6  F (36.4  C) (Oral)  Resp 20  Ht 1.88 m (6' 2\")  Wt 103.6 kg (228 lb 4.8 oz)  SpO2 98%  BMI 29.31 kg/m2  Abd. Soft and nontender.  Wound is ok.  Upper opening is without erythema.  No real drainage.    Orders Only on 05/30/2017   Component Date Value Ref Range Status     ABO 05/30/2017 O   Final     RH(D) 05/30/2017  Pos   Final     Antibody Screen 05/30/2017 Neg   Final     Test Valid Only At 05/30/2017 [facility]   Final     Specimen Expires 05/30/2017 06/02/2017   Final     WBC 05/30/2017 12.8* 4.0 - 11.0 10e9/L Final     RBC Count 05/30/2017 3.05* 4.4 - 5.9 10e12/L Final     Hemoglobin 05/30/2017 8.3* 13.3 - 17.7 g/dL Final     Hematocrit 05/30/2017 26.9* 40.0 - 53.0 % Final     MCV 05/30/2017 88  78 - 100 fl Final     MCH 05/30/2017 27.2  26.5 - 33.0 pg Final     MCHC 05/30/2017 30.9* 31.5 - 36.5 g/dL Final     RDW 05/30/2017 15.3* 10.0 - 15.0 % Final     Platelet Count 05/30/2017 1308* 150 - 450 10e9/L Final    Comment: This result has been called to NICK DHALIWAL RN TXC by Matilda Vasquez on 05 30 2017   at   1134, and has been read back.       Sodium 05/30/2017 137  133 - 144 mmol/L Final     Potassium 05/30/2017 4.0  3.4 - 5.3 mmol/L Final     Chloride 05/30/2017 102  94 - 109 mmol/L Final     Carbon Dioxide 05/30/2017 29  20 - 32 mmol/L Final     Anion Gap 05/30/2017 6  3 - 14 mmol/L Final     Glucose 05/30/2017 90  70 - 99 mg/dL Final     Urea Nitrogen 05/30/2017 17  7 - 30 mg/dL Final     Creatinine 05/30/2017 0.82  0.66 - 1.25 mg/dL Final     GFR Estimate 05/30/2017   >60 mL/min/1.7m2 Final          "           Value:>90  Non  GFR Calc       GFR Estimate If Black 05/30/2017   >60 mL/min/1.7m2 Final                    Value:>90   GFR Calc       Calcium 05/30/2017 8.8  8.5 - 10.1 mg/dL Final     Bilirubin Total 05/30/2017 0.3  0.2 - 1.3 mg/dL Final     Albumin 05/30/2017 2.6* 3.4 - 5.0 g/dL Final     Protein Total 05/30/2017 7.0  6.8 - 8.8 g/dL Final     Alkaline Phosphatase 05/30/2017 108  40 - 150 U/L Final     ALT 05/30/2017 24  0 - 70 U/L Final     AST 05/30/2017 20  0 - 45 U/L Final     Prealbumin 05/30/2017 12* 15 - 45 mg/dL Final     % Retic 05/30/2017 4.2* 0.5 - 2.0 % Final     Absolute Retic 05/30/2017 124.9* 25 - 95 10e9/L Final       Imp/Plan:  Looks quite well. Hgb rising and retics increasing.  Should help with fatigue.  Glucose well controlled at present, will see the diabetic educators.  Needs to start oral feeds when insulin ok.  Overall, looks good.  RTC in 1 week.

## 2017-05-30 NOTE — NURSING NOTE
"Chief Complaint   Patient presents with     Surgical Followup     Islet txp 5/15/2017       Initial /62  Pulse 86  Temp 97.6  F (36.4  C) (Oral)  Resp 20  Ht 1.88 m (6' 2\")  Wt 103.6 kg (228 lb 4.8 oz)  SpO2 98%  BMI 29.31 kg/m2 Estimated body mass index is 29.31 kg/(m^2) as calculated from the following:    Height as of this encounter: 1.88 m (6' 2\").    Weight as of this encounter: 103.6 kg (228 lb 4.8 oz).  Medication Reconciliation: complete'  "

## 2017-05-31 ENCOUNTER — HOME INFUSION (PRE-WILLOW HOME INFUSION) (OUTPATIENT)
Dept: PHARMACY | Facility: CLINIC | Age: 57
End: 2017-05-31

## 2017-05-31 ENCOUNTER — TELEPHONE (OUTPATIENT)
Dept: TRANSPLANT | Facility: CLINIC | Age: 57
End: 2017-05-31

## 2017-05-31 DIAGNOSIS — Z90.410 POST-PANCREATECTOMY DIABETES (H): Primary | ICD-10-CM

## 2017-05-31 DIAGNOSIS — K86.89 PANCREATIC INSUFFICIENCY: ICD-10-CM

## 2017-05-31 DIAGNOSIS — Z90.410 POST-PANCREATECTOMY DIABETES (H): ICD-10-CM

## 2017-05-31 DIAGNOSIS — E13.9 POST-PANCREATECTOMY DIABETES (H): Primary | ICD-10-CM

## 2017-05-31 DIAGNOSIS — E89.1 POST-PANCREATECTOMY DIABETES (H): ICD-10-CM

## 2017-05-31 DIAGNOSIS — E89.1 POST-PANCREATECTOMY DIABETES (H): Primary | ICD-10-CM

## 2017-05-31 DIAGNOSIS — E13.9 POST-PANCREATECTOMY DIABETES (H): ICD-10-CM

## 2017-05-31 DIAGNOSIS — D64.9 ANEMIA: ICD-10-CM

## 2017-05-31 DIAGNOSIS — R79.89 HIGH PLATELET COUNT: Primary | ICD-10-CM

## 2017-05-31 DIAGNOSIS — Z90.81 ACQUIRED ASPLENIA: ICD-10-CM

## 2017-05-31 RX ORDER — FERROUS SULFATE 325(65) MG
325 TABLET, DELAYED RELEASE (ENTERIC COATED) ORAL DAILY
Qty: 30 TABLET | Refills: 1 | Status: SHIPPED | OUTPATIENT
Start: 2017-05-31

## 2017-05-31 RX ORDER — SODIUM BICARBONATE 325 MG/1
TABLET ORAL
Qty: 126 TABLET | Refills: 3 | Status: ON HOLD | OUTPATIENT
Start: 2017-05-31 | End: 2017-06-14

## 2017-05-31 RX ORDER — FOLIC ACID 1 MG/1
1 TABLET ORAL DAILY
Qty: 30 TABLET | Refills: 1 | Status: SHIPPED | OUTPATIENT
Start: 2017-05-31 | End: 2017-07-05

## 2017-06-01 ENCOUNTER — OFFICE VISIT (OUTPATIENT)
Dept: ENDOCRINOLOGY | Facility: CLINIC | Age: 57
End: 2017-06-01

## 2017-06-01 VITALS
SYSTOLIC BLOOD PRESSURE: 120 MMHG | BODY MASS INDEX: 29.69 KG/M2 | DIASTOLIC BLOOD PRESSURE: 73 MMHG | WEIGHT: 231.3 LBS | HEIGHT: 74 IN | HEART RATE: 93 BPM

## 2017-06-01 DIAGNOSIS — K86.89 PANCREATIC INSUFFICIENCY: ICD-10-CM

## 2017-06-01 DIAGNOSIS — E13.9 POST-PANCREATECTOMY DIABETES (H): ICD-10-CM

## 2017-06-01 DIAGNOSIS — Z90.410 ACQUIRED TOTAL ABSENCE OF PANCREAS: ICD-10-CM

## 2017-06-01 DIAGNOSIS — G89.18 POSTOPERATIVE PAIN: Primary | ICD-10-CM

## 2017-06-01 DIAGNOSIS — Z90.410 POST-PANCREATECTOMY DIABETES (H): ICD-10-CM

## 2017-06-01 DIAGNOSIS — E89.1 POST-PANCREATECTOMY DIABETES (H): ICD-10-CM

## 2017-06-01 RX ORDER — FENTANYL 25 UG/1
1 PATCH TRANSDERMAL
Qty: 9 PATCH | Refills: 0 | Status: ON HOLD | OUTPATIENT
Start: 2017-06-01 | End: 2017-06-14

## 2017-06-01 ASSESSMENT — PAIN SCALES - GENERAL: PAINLEVEL: NO PAIN (0)

## 2017-06-01 NOTE — MR AVS SNAPSHOT
After Visit Summary   6/1/2017    Sohan Arita    MRN: [id number]           Patient Information     Date Of Birth          1960        Visit Information        Provider Department      6/1/2017 8:30 AM Sheri Moise MD [facility] Endocr[facility]ology        Today's Diagnoses     Post-pancreatectomy diabetes (H)        Pancreatic insufficiency          Care Instructions    Tonight reduce the Lantus dose to 50 units.     Starting from tomorrow; with starting Impact Peptide 1.5 with 252 grams in 24 hours;     Reduce the morning dose Lantus to 47 units and evening dose to 37 units     Correction dose change to 1 unit for every 20 above 120.   SF  20   1 per 20 > 120  BGà units of insulin  -140 = 1 unit.  -160 = 2 units.  -180 = 3 units.  -200 = 4 units.  -220 = 5 units.  -240 = 6 units.  -260 = 7 units.  -280 = 8 units.  -300 = 9 units.  BG >300 = 10 units.      Please call us if blood glucose reading running low or high persistently; below 70 or above 150 persistently.    Please come back for a follow up in one week.            Follow-ups after your visit        Follow-up notes from your care team     Return in about 1 week (around 6/8/2017) for with ARABELLA Mac .      Your next 10 appointments already scheduled     Jun 05, 2017  8:00 AM CDT   (Arrive by 7:45 AM)   RETURN DIABETES with KATRINA Renteria [facility] End[facility]ology ([facility])    [address] [place] [id number] [phone]            Jun 05, 2017  9:30 AM CDT   (Arrive by 9:15 AM)   Office Visit with JAXON Vazquez [facility] ([facility])    [address] [place] [id number] [phone]           Bring a current list of meds and any records pertaining to this visit.  For Physicals, please bring immunization records and any forms needing to be filled  out.  Please arrive 10 minutes early to complete paperwork.            Jun 05, 2017 10:30 AM CDT   (Arrive by 10:15 AM)   Office Visit with Radha Saavedra RN   [facility] ([facility])    [address] [place] [id number] [phone]           Bring a current list of meds and any records pertaining to this visit.  For Physicals, please bring immunization records and any forms needing to be filled out.  Please arrive 10 minutes early to complete paperwork.            Jun 08, 2017  9:45 AM CDT   LAB with  LAB   [facility] Lab ([facility])    [address] [place] [id number] [phone]           Patient must bring picture ID.  Patient should be prepared to give a urine specimen  Please do not eat 10-12 hours before your appointment if you are coming in fasting for labs on lipids, cholesterol, or glucose (sugar).  Pregnant women should follow their Care Team instructions. Water with medications is okay. Do not drink coffee or other fluids.   If you have concerns about taking  your medications, please ask at office or if scheduling via Go[employer], send a message by clicking on Secure Messaging, Message Your Care Team.            Jun 08, 2017 10:30 AM CDT   (Arrive by 10:15 AM)   Return Auto Islet with Rodrigo Jaquez MD   [facility] Solid Organ Transplant ([facility])    [address] [place] [id number] [phone]              Who to contact     Please call your clinic at [phone] to:    Ask questions about your health    Make or cancel appointments    Discuss your medicines    Learn about your test results    Speak to your doctor   If you have compliments or concerns about an experience at your clinic, or if you wish to file a complaint, please contact [facility] Patient Relations at [phone] or email us at  [email or web address]         Additional Information About Your Visit        [employer] Information     Mobile[employer] is an electronic gateway that provides easy, online access to your medical records. With MobileRQ, you can request a clinic appointment, read your test results, renew a prescription or communicate with your care team.     To sign up for MobileRQ visit the website at [email or web address]   You will be asked to enter the access code listed below, as well as some personal information. Please follow the directions to create your username and password.     Your access code is: [email or web address]  Expires: 2017  6:30 AM     Your access code will  in 90 days. If you need help or a new code, please contact your [facility] or call [phone] for assistance.        Care EveryWhere ID     This is your Care EveryWhere ID. This could be used by other organizations to access your [facility] medical records  [id number]        Your Vitals Were     Pulse Height BMI (Body Mass Index)             93 1.88 m (6' 2\") 29.7 kg/m2          Blood Pressure from Last 3 Encounters:   17 120/73   17 171/62   17 123/62    Weight from Last 3 Encounters:   17 104.9 kg (231 lb 4.8 oz)   17 103.6 kg (228 lb 4.8 oz)   17 105.6 kg (232 lb 11.2 oz)              We Performed the Following     Endocrinology Adult Referral          Today's Medication Changes          These changes are accurate as of: 17  9:40 AM.  If you have any questions, ask your nurse or doctor.               These medicines have changed or have updated prescriptions.        Dose/Directions    * fentaNYL 100 mcg/hr 72 hr patch   Commonly known as:  DURAGESIC   This may have changed:  Another medication with the same name was added. Make sure you understand how and when to take each.   Used for:  Acquired total absence of pancreas        Dose:  1 patch   Place 1 patch onto the skin " every 72 hours for 14 days   Quantity:  4 patch   Refills:  0       * fentaNYL 25 mcg/hr 72 hr patch   Commonly known as:  DURAGESIC   This may have changed:  You were already taking a medication with the same name, and this prescription was added. Make sure you understand how and when to take each.   Used for:  Acquired total absence of pancreas, Postoperative pain   Changed by:  Soni Aleman RN        Dose:  1 patch   Place 1 patch onto the skin every 72 hours   Quantity:  9 patch   Refills:  0       * Notice:  This list has 2 medication(s) that are the same as other medications prescribed for you. Read the directions carefully, and ask your doctor or other care provider to review them with you.         Where to get your medicines      Some of these will need a paper prescription and others can be bought over the counter.  Ask your nurse if you have questions.     Bring a paper prescription for each of these medications     fentaNYL 25 mcg/hr 72 hr patch                Primary Care Provider Office Phone # Fax #    Frandy JOHNSON Frye [phone]       [address]  [facility] [address]        Thank you!     Thank you for choosing [facility]  for your care. Our goal is always to provide you with excellent care. Hearing back from our patients is one way we can continue to improve our services. Please take a few minutes to complete the written survey that you may receive in the mail after your visit with us. Thank you!             Your Updated Medication List - Protect others around you: Learn how to safely use, store and throw away your medicines at www.disposemymeds.org.          This list is accurate as of: 6/1/17  9:40 AM.  Always use your most recent med list.                   Brand Name Dispense Instructions for use    acetaminophen 32 mg/mL solution    TYLENOL    900 mL    Take 15.65 mLs (500 mg) by mouth every 6 hours for 14 days       Alcohol  Swabs Pads     100 each    1 pad as needed       amylase-lipase-protease 17412 UNITS Cpep    CREON 12    270 capsule    Take 3-4 capsules (36,000-48,000 Units) by mouth every hour as needed (with snacks)       aspirin 81 MG EC tablet     30 tablet    Take 1 tablet (81 mg) by mouth daily       BD SHARPS  Misc     1 each    1 Container as needed       blood glucose monitoring lancets     1 Box    Use to test blood sugar 8 times daily or as directed.       blood glucose monitoring test strip    FREESTYLE LITE    100 strip    Use to test blood sugars 8 times daily or as directed.       cholecalciferol 400 UNIT/ML Liqd liquid    vitamin D/D-VI-SOL    150 mL    5 mLs (2,000 Units) by Per J Tube route daily       * fentaNYL 100 mcg/hr 72 hr patch    DURAGESIC    4 patch    Place 1 patch onto the skin every 72 hours for 14 days       * fentaNYL 25 mcg/hr 72 hr patch    DURAGESIC    9 patch    Place 1 patch onto the skin every 72 hours       ferrous sulfate 325 (65 FE) MG Tbec EC tablet     30 tablet    Take 1 tablet (325 mg) by mouth daily       FLUoxetine 20 MG/5ML solution    PROzac    75 mL    2.5 mLs (10 mg) by Per J Tube route daily       folic acid 1 MG tablet    FOLVITE    30 tablet    Take 1 tablet (1 mg) by mouth daily       glucose 40 % Gel gel     3 Tube    Take 15-30 g by mouth every 15 minutes as needed for low blood sugar       HYDROmorphone 1 MG/ML Liqd liquid    DILAUDID    168 mL    1-2 mLs (1-2 mg) by Per J Tube route every 4 hours       insulin aspart 100 UNIT/ML injection    NovoLOG PEN    3 mL    Correction aspart 2 units per 20 >120 every 4 hours       * insulin glargine 100 UNIT/ML injection    LANTUS    3 mL    Inject 70 Units Subcutaneous every evening       * insulin glargine 100 UNIT/ML injection    LANTUS    3 mL    Inject 90 Units Subcutaneous every morning       insulin pen needle 32G X 4 MM     100 each    Use 8 pen needles daily or as directed.       levothyroxine 25 mcg/mL Susp     SYNTHROID    180 mL    6 mLs (150 mcg) by Per J Tube route every morning (before breakfast)       magnesium hydroxide 400 MG/5ML suspension    MILK OF MAGNESIA    105 mL    30 mLs by Per Feeding Tube route 2 times daily as needed for constipation       multivitamins with minerals Liqd liquid     450 mL    15 mLs by Per Feeding Tube route daily       nystatin 432941 UNIT/ML suspension    MYCOSTATIN    280 mL    Take 5 mLs (500,000 Units) by mouth 4 times daily for 7 days       ondansetron 4 MG ODT tab    ZOFRAN-ODT    30 tablet    Take 1-2 tablets (4-8 mg) by mouth every 6 hours as needed for nausea       pantoprazole Susp suspension    PROTONIX    1200 mL    20 mLs (40 mg) by Oral or J tube route 2 times daily       pregabalin 20 MG/ML solution    LYRICA    70 mL    1.25 mLs (25 mg) by Per Feeding Tube route 2 times daily       sennosides 8.8 MG/5ML syrup    SENOKOT    600 mL    10 mLs by Per Feeding Tube route 2 times daily       sodium bicarbonate 325 MG tablet     126 tablet    1 tablet q 4 hrs via Jtube. Administration Instructions: Crush 1 tablet and mix into 15 ml of warm water and use this solution to mix with Creon pancreatic enzymes. DO NOT administer directly into Jtube (to be mixed into tube feed formula with Creon enzyme).       * Notice:  This list has 4 medication(s) that are the same as other medications prescribed for you. Read the directions carefully, and ask your doctor or other care provider to review them with you.

## 2017-06-01 NOTE — LETTER
6/1/2017       RE: Sohan Arita  [address]  [facility] [address]     Dear Colleague,    Thank you for referring your patient, Sohan Arita, to the [facility] ENDOCRINOLOGY at [facility]. Please see a copy of my visit note below.    Endocrinology Clinic Visit    Chief Complaint: RECHECK (hospital f/u)     Information obtained from:Patient and his wife.     Subjective:         HPI: Sohan Arita is a 56 year old year old male with history of chronic pancreatitis s/p TPAIT on 5/15/17 who is seen in consultation for management of hyperglycemia. 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  Currently he is staying here in [place] for the surgery.  Might be going back to [place] in the next few days.     Again, Hx of chronic pancreatitis s/p lap lin, biliary and pancreatic sphincterotomies, transduodenal sphincteroplasty with resultant leak and phlegmonous pancreatitis who is now s/p open TPAIT on 5/15/17 by Dr. Jaquez. Pt received 4347 ie/kg in transplant, primarily intraportally but with some islets to peritoneum.      Reports today that pain is overall controlled; 2/10 in the epigastric area.  He is tolerating his tube feeding .  He is currently using lantus 90 units in the morning and 70 units in the evening with 2 units for 20 above 120 correction dose.     CURRENT FORMULA Vivonex TEN provides 494 grams carb over 24-hrs of continuous feedings. Plus protein shakes three times per day; the protein shake doesn't contain any CHO.     NEW FORMULA Impact Peptide 1.5 provides 252 gram carb over 24-hrs of continuous feedings will be started on June 2, 2017.       His BG was downloaded; yesterday his numbers were: 88 93 138 116 112 101 and average was 108.  He only needed 2 units of correction yesterday. No lows below 80 or s/s of lows.     ROS: fatigue which is slowly improving.  He reports that he has a tender spot on his calf which is bothering him since  yesterday. Involves the left calf but this morning also involving the right calf as well. No swelling or erythema.    He has history of thyroid cancer s/p thyroidectomy.  Size of cancer per report was 3 mm, encapsulated.        ENDO DIABETES Latest Ref Rng & Units 5/16/2017   HEMOGLOBIN A1C 4.3 - 6.0 % 5.3      No Known Allergies    Current Outpatient Prescriptions   Medication Sig Dispense Refill     fentaNYL (DURAGESIC) 25 mcg/hr 72 hr patch Place 1 patch onto the skin every 72 hours 9 patch 0     insulin aspart (NOVOLOG PEN) 100 UNIT/ML injection Correction aspart 1 units per 20 >120 every 4 hours 3 mL 3     insulin glargine (LANTUS) 100 UNIT/ML injection Inject 37 Units Subcutaneous every evening 3 mL 3     insulin glargine (LANTUS) 100 UNIT/ML injection Inject 47 Units Subcutaneous every morning 3 mL 3     aspirin 81 MG EC tablet Take 1 tablet (81 mg) by mouth daily 30 tablet 1     ferrous sulfate 325 (65 FE) MG TBEC EC tablet Take 1 tablet (325 mg) by mouth daily 30 tablet 1     folic acid (FOLVITE) 1 MG tablet Take 1 tablet (1 mg) by mouth daily 30 tablet 1     sodium bicarbonate 325 MG tablet 1 tablet q 4 hrs via Jtube. Administration Instructions: Crush 1 tablet and mix into 15 ml of warm water and use this solution to mix with Creon pancreatic enzymes. DO NOT administer directly into Jtube (to be mixed into tube feed formula with Creon enzyme). 126 tablet 3     fentaNYL (DURAGESIC) 100 mcg/hr 72 hr patch Place 1 patch onto the skin every 72 hours for 14 days 4 patch 0     HYDROmorphone (DILAUDID) 1 MG/ML LIQD liquid 1-2 mLs (1-2 mg) by Per J Tube route every 4 hours 168 mL 0     acetaminophen (TYLENOL) 32 mg/mL solution Take 15.65 mLs (500 mg) by mouth every 6 hours for 14 days 900 mL 3     pregabalin (LYRICA) 20 MG/ML solution 1.25 mLs (25 mg) by Per Feeding Tube route 2 times daily 70 mL 3     FLUoxetine (PROZAC) 20 MG/5ML solution 2.5 mLs (10 mg) by Per J Tube route daily 75 mL 3     glucose 40 % GEL  gel Take 15-30 g by mouth every 15 minutes as needed for low blood sugar 3 Tube 1     ondansetron (ZOFRAN-ODT) 4 MG ODT tab Take 1-2 tablets (4-8 mg) by mouth every 6 hours as needed for nausea 30 tablet 1     amylase-lipase-protease (CREON 12) 56381 UNITS CPEP Take 3-4 capsules (36,000-48,000 Units) by mouth every hour as needed (with snacks) 270 capsule 3     magnesium hydroxide (MILK OF MAGNESIA) 400 MG/5ML suspension 30 mLs by Per Feeding Tube route 2 times daily as needed for constipation 105 mL 1     sennosides (SENOKOT) 8.8 MG/5ML syrup 10 mLs by Per Feeding Tube route 2 times daily 600 mL 3     nystatin (MYCOSTATIN) 378061 UNIT/ML suspension Take 5 mLs (500,000 Units) by mouth 4 times daily for 7 days 280 mL 1     multivitamins with minerals (CERTAVITE/CEROVITE) LIQD liquid 15 mLs by Per Feeding Tube route daily 450 mL 1     levothyroxine (SYNTHROID) 25 mcg/mL SUSP 6 mLs (150 mcg) by Per J Tube route every morning (before breakfast) 180 mL 1     pantoprazole (PROTONIX) SUSP suspension 20 mLs (40 mg) by Oral or J tube route 2 times daily 1200 mL 1     cholecalciferol (VITAMIN D/D-VI-SOL) 400 UNIT/ML LIQD liquid 5 mLs (2,000 Units) by Per J Tube route daily 150 mL 1     Alcohol Swabs PADS 1 pad as needed 100 each 3     Sharps Container (BD SHARPS ) MISC 1 Container as needed 1 each 3     blood glucose monitoring (FREESTYLE LITE) test strip Use to test blood sugars 8 times daily or as directed. 100 strip 11     insulin pen needle 32G X 4 MM Use 8 pen needles daily or as directed. 100 each 3     blood glucose monitoring (ACCU-CHEK FASTCLIX) lancets Use to test blood sugar 8 times daily or as directed. 1 Box 3     [DISCONTINUED] insulin aspart (NOVOLOG PEN) 100 UNIT/ML injection Correction aspart 2 units per 20 >120 every 4 hours 3 mL 3     [DISCONTINUED] insulin glargine (LANTUS) 100 UNIT/ML injection Inject 70 Units Subcutaneous every evening 3 mL 3     [DISCONTINUED] insulin glargine (LANTUS) 100  UNIT/ML injection Inject 90 Units Subcutaneous every morning 3 mL 3       Review of Systems     11 point review system (Constitutional, HENT, Eyes, Respiratory, Cardiovascular, Gastrointestinal, Genitourinary, Musculoskeletal,Neurological, Psychiatric/Behavioural, Endocrine) is negative or is as per HPI above    Past Medical History:   Diagnosis Date     Depression      Eye injury, penetrating 1978    left eye, blurry vision long distance      Pancreatic disease     pancreatitis     Pancreatitis 2012    outside notes indicate lipase elevations; labs available: 4/11/12 lipase 563 (); 5/7/12 1224 ()     Thyroid cancer (H)      Thyroid disease     thyroid cancer     Ventral hernia 2017    Present for a year, no pain       Past Surgical History:   Procedure Laterality Date     CHOLECYSTECTOMY  4/2012    [place], [place]     debribement eye Left 1978    for eye injury     ENDOSCOPIC RETROGRADE CHOLANGIOPANCREATOGRAM  4/18/2012    with sphincterotomy     ENDOSCOPIC ULTRASOUND UPPER GASTROINTESTINAL TRACT (GI) N/A 11/11/2015    Procedure: ENDOSCOPIC ULTRASOUND, ESOPHAGOSCOPY / UPPER GASTROINTESTINAL TRACT (GI);  Surgeon: Emeka Mcleod MD;  Location: [facility] OR     FEEDING TUBE REPLACEMENT  2/3/2015     HERNIA REPAIR, INGUINAL RT/LT Left 1980     LAPAROTOMY EXPLORATORY  12/31/2014    WITH sphincterotom (transduodenal), resection of sphincter of Oddi, lysis of adhesions     NERVE BLOCK PERIPHERAL  12/31/2014    U/S guided transversus abdominus plane peripheral field nerve block     PANCREATECTOMY, TRANSPLANT AUTO ISLET CELL, COMBINED N/A 5/15/2017    Procedure: COMBINED PANCREATECTOMY, TRANSPLANT AUTO ISLET CELL;  Open Pancreatectomy, Splenectomy, Gastrojejunostomy Tube Placement , Auto Islet Cell Transplant;  Surgeon: Rodrigo Jaquez MD;  Location: [facility] OR     THYROIDECTOMY  12/28/2011     transduodenal sphincteroplasty  12/31/2014       Family History   Problem Relation Age of Onset      "Pancreatitis No family hx of      DIABETES No family hx of        Social History     Social History     Marital status:      Spouse name: N/A     Number of children: N/A     Years of education: N/A     Occupational History     MD      Social History Main Topics     Smoking status: Never Smoker     Smokeless tobacco: Never Used     Alcohol use No     Drug use: No     Sexual activity: Not on file     Other Topics Concern     Not on file     Social History Narrative    Sohan is , 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  He is a non-smoker and has never drank alcohol. He is a twin.         Objective:   /73  Pulse 93  Ht 1.88 m (6' 2\")  Wt 104.9 kg (231 lb 4.8 oz)  BMI 29.7 kg/m2  Constitutional: Pleasant no acute cardiopulmonary distress.   EYES: anicteric, normal extra-ocular movements, no lid lag or retraction   HEENT: Mouth/Throat: Mucous membrane is moist. Oropharynx is clear. No adenopathy. thyroid surgically absent.   Cardiovascular: RRR, S1, S2 normal. No m/g/r   Pulmonary/Chest: CTAB. No wheezing or rales   Abdominal: +BS. Non tender to superficial palpation. Wound dressed; no discharge on the dressing. No erythema surrounding the wound.   Neurological: Alert and oriented.   Extremities: No cyanosis or inflammation or erythema or swelling.  There is a point tenderness in the mid calf area both on the left>> and right side.    Skin: normal texture, color  Lymphatic: no cervical lymphadenopathy.  Psychological: appropriate mood and affect     In House Labs:   Lab Results   Component Value Date    A1C 5.3 05/16/2017    A1C 5.6 05/10/2017    A1C 5.4 09/10/2015       No results found for: TSH, T4    Creatinine   Date Value Ref Range Status   05/30/2017 0.82 0.66 - 1.25 mg/dL Final   ]    Recent Labs   Lab Test  05/10/17   0805  09/10/15   0910   CHOL  157  146   HDL  43  39*   LDL  94  86   TRIG  98  109   CHOLHDLRATIO   --   3.8       No results found for: [id number], [id number], " [id number]      Assessment/Treatment Plan:      Sohan Arita is a 56 year old male with chronic pancreatitis s/p lap lin, biliary and pancreatic sphincterotomies, transduodenal sphincteroplasty with resultant leak and phlegmonous pancreatitis who is now s/p open TPAIT on 5/15/17 by Dr. Jaquez. Pt received 4347 ie/kg in transplant, primarily intraportally but with some islets to peritoneum.   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       CURRENT FORMULA Vivonex TEN provides 494 grams carb over 24-hrs of continuous feedings. Plus protein shakes three times per day; the protein shake doesn't contain any CHO.   NEW FORMULA Impact Peptide 1.5 provides 252 gram carb over 24-hrs of continuous feedings will be started on June 2, 2017.    Currently on 160 units of Lantus (90 in the morning and 60 in the evening) to cover the 494 grams of CHO which gives us 1 unit for 3 grams of CHO. Starting from tomorrow; he will be doing 252 grams of CHO.  BG over the last 24 hours were perfect; only needed 2 units of correction over 24 hours. No Lows.  Therefore, will use the same coverage of 1 unit per 3 grams of CHO which gives us around 84 units of Lantus in 24 hours.  He is currently doing 44% of his lantus dose in the evening and 56% in the morning and using the same class fication will give us 47 units of Lantus in the morning and 37 units in the evening.  Correction scale also changed based on TDD to 1 unit for every 20 above 120. Written instructions given to patient as below.  Tonight's dose will also be reduced to 50 units from 70  Units in anticipation of the TF formula to be changed in the morning. Instruction given to call us for persistent low or high.  I think insulin need will change fast; there is a difference between last 48 hours and last 24 hours.  Discussed in detail with patient and wife.  We have scheduled follow up appointment with Estefania in five days.  Needs close follow up until  he moves to [place].     Off note, he is from [place] and will be moving there in the next few weeks and he understands that he has to find endocrinologist locally.         Patient Instructions   Tonight reduce the Lantus dose to 50 units.     Starting from tomorrow; with starting Impact Peptide 1.5 with 252 grams in 24 hours;     Reduce the morning dose Lantus to 47 units and evening dose to 37 units     Correction dose change to 1 unit for every 20 above 120.   SF  20   1 per 20 > 120  BGà units of insulin  -140 = 1 unit.  -160 = 2 units.  -180 = 3 units.  -200 = 4 units.  -220 = 5 units.  -240 = 6 units.  -260 = 7 units.  -280 = 8 units.  -300 = 9 units.  BG >300 = 10 units.      Please call us if blood glucose reading running low or high persistently; below 70 or above 150 persistently.    Please come back for a follow up in one week.        I will contact the patient with the test results.  Return to clinic in 1 week.     Patient seen and discussed with endocrinology attending Dr. Kellogg.     Sheri Moise MD  Endocrinology fellow   [phone]  Division of Endocrinology and Diabetes    Attending Note:   Patient was seen and examined with fellow Dr. Moise  The note reflects our mutual findings and plan.     Carolyn Kellogg MD  [phone]  Endocrinology Service

## 2017-06-01 NOTE — PROGRESS NOTES
Endocrinology Clinic Visit    Chief Complaint: RECHECK (hospital f/u)     Information obtained from:Patient and his wife.     Subjective:         HPI: Sohan Arita is a 56 year old year old male with history of chronic pancreatitis s/p TPAIT on 5/15/17 who is seen in consultation for management of hyperglycemia. 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  Currently he is staying here in [place] for the surgery.  Might be going back to [place] in the next few days.     Again, Hx of chronic pancreatitis s/p lap lin, biliary and pancreatic sphincterotomies, transduodenal sphincteroplasty with resultant leak and phlegmonous pancreatitis who is now s/p open TPAIT on 5/15/17 by Dr. Jaquez. Pt received 4347 ie/kg in transplant, primarily intraportally but with some islets to peritoneum.      Reports today that pain is overall controlled; 2/10 in the epigastric area.  He is tolerating his tube feeding .  He is currently using lantus 90 units in the morning and 70 units in the evening with 2 units for 20 above 120 correction dose.     CURRENT FORMULA Vivonex TEN provides 494 grams carb over 24-hrs of continuous feedings. Plus protein shakes three times per day; the protein shake doesn't contain any CHO.     NEW FORMULA Impact Peptide 1.5 provides 252 gram carb over 24-hrs of continuous feedings will be started on June 2, 2017.       His BG was downloaded; yesterday his numbers were: 88 93 138 116 112 101 and average was 108.  He only needed 2 units of correction yesterday. No lows below 80 or s/s of lows.     ROS: fatigue which is slowly improving.  He reports that he has a tender spot on his calf which is bothering him since yesterday. Involves the left calf but this morning also involving the right calf as well. No swelling or erythema.    He has history of thyroid cancer s/p thyroidectomy.  Size of cancer per report was 3 mm, encapsulated.        ENDO DIABETES Latest Ref Rng & Units 5/16/2017   HEMOGLOBIN A1C 4.3 -  6.0 % 5.3      No Known Allergies    Current Outpatient Prescriptions   Medication Sig Dispense Refill     fentaNYL (DURAGESIC) 25 mcg/hr 72 hr patch Place 1 patch onto the skin every 72 hours 9 patch 0     insulin aspart (NOVOLOG PEN) 100 UNIT/ML injection Correction aspart 1 units per 20 >120 every 4 hours 3 mL 3     insulin glargine (LANTUS) 100 UNIT/ML injection Inject 37 Units Subcutaneous every evening 3 mL 3     insulin glargine (LANTUS) 100 UNIT/ML injection Inject 47 Units Subcutaneous every morning 3 mL 3     aspirin 81 MG EC tablet Take 1 tablet (81 mg) by mouth daily 30 tablet 1     ferrous sulfate 325 (65 FE) MG TBEC EC tablet Take 1 tablet (325 mg) by mouth daily 30 tablet 1     folic acid (FOLVITE) 1 MG tablet Take 1 tablet (1 mg) by mouth daily 30 tablet 1     sodium bicarbonate 325 MG tablet 1 tablet q 4 hrs via Jtube. Administration Instructions: Crush 1 tablet and mix into 15 ml of warm water and use this solution to mix with Creon pancreatic enzymes. DO NOT administer directly into Jtube (to be mixed into tube feed formula with Creon enzyme). 126 tablet 3     fentaNYL (DURAGESIC) 100 mcg/hr 72 hr patch Place 1 patch onto the skin every 72 hours for 14 days 4 patch 0     HYDROmorphone (DILAUDID) 1 MG/ML LIQD liquid 1-2 mLs (1-2 mg) by Per J Tube route every 4 hours 168 mL 0     acetaminophen (TYLENOL) 32 mg/mL solution Take 15.65 mLs (500 mg) by mouth every 6 hours for 14 days 900 mL 3     pregabalin (LYRICA) 20 MG/ML solution 1.25 mLs (25 mg) by Per Feeding Tube route 2 times daily 70 mL 3     FLUoxetine (PROZAC) 20 MG/5ML solution 2.5 mLs (10 mg) by Per J Tube route daily 75 mL 3     glucose 40 % GEL gel Take 15-30 g by mouth every 15 minutes as needed for low blood sugar 3 Tube 1     ondansetron (ZOFRAN-ODT) 4 MG ODT tab Take 1-2 tablets (4-8 mg) by mouth every 6 hours as needed for nausea 30 tablet 1     amylase-lipase-protease (CREON 12) 59068 UNITS CPEP Take 3-4 capsules (36,000-48,000  Units) by mouth every hour as needed (with snacks) 270 capsule 3     magnesium hydroxide (MILK OF MAGNESIA) 400 MG/5ML suspension 30 mLs by Per Feeding Tube route 2 times daily as needed for constipation 105 mL 1     sennosides (SENOKOT) 8.8 MG/5ML syrup 10 mLs by Per Feeding Tube route 2 times daily 600 mL 3     nystatin (MYCOSTATIN) 311806 UNIT/ML suspension Take 5 mLs (500,000 Units) by mouth 4 times daily for 7 days 280 mL 1     multivitamins with minerals (CERTAVITE/CEROVITE) LIQD liquid 15 mLs by Per Feeding Tube route daily 450 mL 1     levothyroxine (SYNTHROID) 25 mcg/mL SUSP 6 mLs (150 mcg) by Per J Tube route every morning (before breakfast) 180 mL 1     pantoprazole (PROTONIX) SUSP suspension 20 mLs (40 mg) by Oral or J tube route 2 times daily 1200 mL 1     cholecalciferol (VITAMIN D/D-VI-SOL) 400 UNIT/ML LIQD liquid 5 mLs (2,000 Units) by Per J Tube route daily 150 mL 1     Alcohol Swabs PADS 1 pad as needed 100 each 3     Sharps Container (BD SHARPS ) MISC 1 Container as needed 1 each 3     blood glucose monitoring (FREESTYLE LITE) test strip Use to test blood sugars 8 times daily or as directed. 100 strip 11     insulin pen needle 32G X 4 MM Use 8 pen needles daily or as directed. 100 each 3     blood glucose monitoring (ACCU-CHEK FASTCLIX) lancets Use to test blood sugar 8 times daily or as directed. 1 Box 3     [DISCONTINUED] insulin aspart (NOVOLOG PEN) 100 UNIT/ML injection Correction aspart 2 units per 20 >120 every 4 hours 3 mL 3     [DISCONTINUED] insulin glargine (LANTUS) 100 UNIT/ML injection Inject 70 Units Subcutaneous every evening 3 mL 3     [DISCONTINUED] insulin glargine (LANTUS) 100 UNIT/ML injection Inject 90 Units Subcutaneous every morning 3 mL 3       Review of Systems     11 point review system (Constitutional, HENT, Eyes, Respiratory, Cardiovascular, Gastrointestinal, Genitourinary, Musculoskeletal,Neurological, Psychiatric/Behavioural, Endocrine) is negative or is as  per HPI above    Past Medical History:   Diagnosis Date     Depression      Eye injury, penetrating 1978    left eye, blurry vision long distance      Pancreatic disease     pancreatitis     Pancreatitis 2012    outside notes indicate lipase elevations; labs available: 4/11/12 lipase 563 (); 5/7/12 1224 ()     Thyroid cancer (H)      Thyroid disease     thyroid cancer     Ventral hernia 2017    Present for a year, no pain       Past Surgical History:   Procedure Laterality Date     CHOLECYSTECTOMY  4/2012    [place], [place]     debribement eye Left 1978    for eye injury     ENDOSCOPIC RETROGRADE CHOLANGIOPANCREATOGRAM  4/18/2012    with sphincterotomy     ENDOSCOPIC ULTRASOUND UPPER GASTROINTESTINAL TRACT (GI) N/A 11/11/2015    Procedure: ENDOSCOPIC ULTRASOUND, ESOPHAGOSCOPY / UPPER GASTROINTESTINAL TRACT (GI);  Surgeon: Emeka Mcleod MD;  Location: [facility] OR     FEEDING TUBE REPLACEMENT  2/3/2015     HERNIA REPAIR, INGUINAL RT/LT Left 1980     LAPAROTOMY EXPLORATORY  12/31/2014    WITH sphincterotom (transduodenal), resection of sphincter of Oddi, lysis of adhesions     NERVE BLOCK PERIPHERAL  12/31/2014    U/S guided transversus abdominus plane peripheral field nerve block     PANCREATECTOMY, TRANSPLANT AUTO ISLET CELL, COMBINED N/A 5/15/2017    Procedure: COMBINED PANCREATECTOMY, TRANSPLANT AUTO ISLET CELL;  Open Pancreatectomy, Splenectomy, Gastrojejunostomy Tube Placement , Auto Islet Cell Transplant;  Surgeon: Rodrigo Jaquez MD;  Location: [facility] OR     THYROIDECTOMY  12/28/2011     transduodenal sphincteroplasty  12/31/2014       Family History   Problem Relation Age of Onset     Pancreatitis No family hx of      DIABETES No family hx of        Social History     Social History     Marital status:      Spouse name: N/A     Number of children: N/A     Years of education: N/A     Occupational History     MD      Social History Main Topics     Smoking status: Never Smoker      "Smokeless tobacco: Never Used     Alcohol use No     Drug use: No     Sexual activity: Not on file     Other Topics Concern     Not on file     Social History Narrative    Sohan is , 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  He is a non-smoker and has never drank alcohol. He is a twin.         Objective:   /73  Pulse 93  Ht 1.88 m (6' 2\")  Wt 104.9 kg (231 lb 4.8 oz)  BMI 29.7 kg/m2  Constitutional: Pleasant no acute cardiopulmonary distress.   EYES: anicteric, normal extra-ocular movements, no lid lag or retraction   HEENT: Mouth/Throat: Mucous membrane is moist. Oropharynx is clear. No adenopathy. thyroid surgically absent.   Cardiovascular: RRR, S1, S2 normal. No m/g/r   Pulmonary/Chest: CTAB. No wheezing or rales   Abdominal: +BS. Non tender to superficial palpation. Wound dressed; no discharge on the dressing. No erythema surrounding the wound.   Neurological: Alert and oriented.   Extremities: No cyanosis or inflammation or erythema or swelling.  There is a point tenderness in the mid calf area both on the left>> and right side.    Skin: normal texture, color  Lymphatic: no cervical lymphadenopathy.  Psychological: appropriate mood and affect     In House Labs:   Lab Results   Component Value Date    A1C 5.3 05/16/2017    A1C 5.6 05/10/2017    A1C 5.4 09/10/2015       No results found for: TSH, T4    Creatinine   Date Value Ref Range Status   05/30/2017 0.82 0.66 - 1.25 mg/dL Final   ]    Recent Labs   Lab Test  05/10/17   0805  09/10/15   0910   CHOL  157  146   HDL  43  39*   LDL  94  86   TRIG  98  109   CHOLHDLRATIO   --   3.8       No results found for: [id number], [id number], [id number]      Assessment/Treatment Plan:      Sohan Arita is a 56 year old male with chronic pancreatitis s/p lap lin, biliary and pancreatic sphincterotomies, transduodenal sphincteroplasty with resultant leak and phlegmonous pancreatitis who is now s/p open TPAIT on 5/15/17 by Dr. Jaquez. Pt " received 4347 ie/kg in transplant, primarily intraportally but with some islets to peritoneum.  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       CURRENT FORMULA Vivonex TEN provides 494 grams carb over 24-hrs of continuous feedings. Plus protein shakes three times per day; the protein shake doesn't contain any CHO.   NEW FORMULA Impact Peptide 1.5 provides 252 gram carb over 24-hrs of continuous feedings will be started on June 2, 2017.    Currently on 160 units of Lantus (90 in the morning and 60 in the evening) to cover the 494 grams of CHO which gives us 1 unit for 3 grams of CHO. Starting from tomorrow; he will be doing 252 grams of CHO.  BG over the last 24 hours were perfect; only needed 2 units of correction over 24 hours. No Lows.  Therefore, will use the same coverage of 1 unit per 3 grams of CHO which gives us around 84 units of Lantus in 24 hours.  He is currently doing 44% of his lantus dose in the evening and 56% in the morning and using the same class fication will give us 47 units of Lantus in the morning and 37 units in the evening.  Correction scale also changed based on TDD to 1 unit for every 20 above 120. Written instructions given to patient as below.  Tonight's dose will also be reduced to 50 units from 70  Units in anticipation of the TF formula to be changed in the morning. Instruction given to call us for persistent low or high.  I think insulin need will change fast; there is a difference between last 48 hours and last 24 hours.  Discussed in detail with patient and wife.  We have scheduled follow up appointment with Estefania in five days.  Needs close follow up until he moves to [place].     Off note, he is from [place] and will be moving there in the next few weeks and he understands that he has to find endocrinologist locally.         Patient Instructions   Tonight reduce the Lantus dose to 50 units.     Starting from tomorrow; with starting Impact Peptide 1.5 with 252 grams  in 24 hours;     Reduce the morning dose Lantus to 47 units and evening dose to 37 units     Correction dose change to 1 unit for every 20 above 120.   SF  20   1 per 20 > 120  BGà units of insulin  -140 = 1 unit.  -160 = 2 units.  -180 = 3 units.  -200 = 4 units.  -220 = 5 units.  -240 = 6 units.  -260 = 7 units.  -280 = 8 units.  -300 = 9 units.  BG >300 = 10 units.      Please call us if blood glucose reading running low or high persistently; below 70 or above 150 persistently.    Please come back for a follow up in one week.        I will contact the patient with the test results.  Return to clinic in 1 week.     Patient seen and discussed with endocrinology attending Dr. Kellogg.     Sheri Moise MD  Endocrinology fellow   [phone]  Division of Endocrinology and Diabetes      Attending Note:     Patient was seen and examined with fellow Dr. Moise    The note reflects our mutual findings and plan.     Carolyn Kellogg MD  [phone]  Endocrinology Service

## 2017-06-01 NOTE — PATIENT INSTRUCTIONS
Tonight reduce the Lantus dose to 50 units.     Starting from tomorrow; with starting Impact Peptide 1.5 with 252 grams in 24 hours;     Reduce the morning dose Lantus to 47 units and evening dose to 37 units     Correction dose change to 1 unit for every 20 above 120.   SF  20   1 per 20 > 120  BGà units of insulin  -140 = 1 unit.  -160 = 2 units.  -180 = 3 units.  -200 = 4 units.  -220 = 5 units.  -240 = 6 units.  -260 = 7 units.  -280 = 8 units.  -300 = 9 units.  BG >300 = 10 units.      Please call us if blood glucose reading running low or high persistently; below 70 or above 150 persistently.    Please come back for a follow up in one week.

## 2017-06-02 ENCOUNTER — HOME INFUSION (PRE-WILLOW HOME INFUSION) (OUTPATIENT)
Dept: PHARMACY | Facility: CLINIC | Age: 57
End: 2017-06-02

## 2017-06-02 ENCOUNTER — APPOINTMENT (OUTPATIENT)
Dept: CT IMAGING | Facility: CLINIC | Age: 57
DRG: 862 | End: 2017-06-02
Attending: PHYSICIAN ASSISTANT
Payer: COMMERCIAL

## 2017-06-02 ENCOUNTER — OFFICE VISIT (OUTPATIENT)
Dept: TRANSPLANT | Facility: CLINIC | Age: 57
DRG: 862 | End: 2017-06-02
Attending: FAMILY MEDICINE
Payer: COMMERCIAL

## 2017-06-02 ENCOUNTER — HOSPITAL ENCOUNTER (INPATIENT)
Facility: CLINIC | Age: 57
LOS: 12 days | Discharge: HOME | DRG: 862 | End: 2017-06-14
Attending: SURGERY | Admitting: TRANSPLANT SURGERY
Payer: COMMERCIAL

## 2017-06-02 ENCOUNTER — TELEPHONE (OUTPATIENT)
Dept: TRANSPLANT | Facility: CLINIC | Age: 57
End: 2017-06-02

## 2017-06-02 VITALS
OXYGEN SATURATION: 96 % | SYSTOLIC BLOOD PRESSURE: 118 MMHG | HEART RATE: 78 BPM | RESPIRATION RATE: 18 BRPM | TEMPERATURE: 98.3 F | DIASTOLIC BLOOD PRESSURE: 74 MMHG

## 2017-06-02 DIAGNOSIS — E13.9 POST-PANCREATECTOMY DIABETES (H): ICD-10-CM

## 2017-06-02 DIAGNOSIS — M79.89 LOCALIZED SWELLING OF BOTH LOWER EXTREMITIES: Primary | ICD-10-CM

## 2017-06-02 DIAGNOSIS — D64.9 ANEMIA: ICD-10-CM

## 2017-06-02 DIAGNOSIS — Z90.410 POST-PANCREATECTOMY DIABETES (H): ICD-10-CM

## 2017-06-02 DIAGNOSIS — Z90.410 ACQUIRED TOTAL ABSENCE OF PANCREAS: ICD-10-CM

## 2017-06-02 DIAGNOSIS — E86.0 DEHYDRATION: ICD-10-CM

## 2017-06-02 DIAGNOSIS — F41.9 ANXIETY: ICD-10-CM

## 2017-06-02 DIAGNOSIS — Z90.410 HISTORY OF PANCREATECTOMY: ICD-10-CM

## 2017-06-02 DIAGNOSIS — M79.89 PAIN AND SWELLING OF LOWER EXTREMITY, LEFT: Primary | ICD-10-CM

## 2017-06-02 DIAGNOSIS — I82.492 ACUTE DEEP VEIN THROMBOSIS (DVT) OF OTHER SPECIFIED VEIN OF LEFT LOWER EXTREMITY (H): ICD-10-CM

## 2017-06-02 DIAGNOSIS — G89.18 ACUTE POSTOPERATIVE PAIN OF ABDOMEN: ICD-10-CM

## 2017-06-02 DIAGNOSIS — M79.89 LOCALIZED SWELLING OF BOTH LOWER EXTREMITIES: ICD-10-CM

## 2017-06-02 DIAGNOSIS — M79.605 PAIN AND SWELLING OF LOWER EXTREMITY, LEFT: Primary | ICD-10-CM

## 2017-06-02 DIAGNOSIS — R79.89 HIGH PLATELET COUNT: ICD-10-CM

## 2017-06-02 DIAGNOSIS — Z90.81 ACQUIRED ASPLENIA: ICD-10-CM

## 2017-06-02 DIAGNOSIS — K86.89 PANCREATIC INSUFFICIENCY: ICD-10-CM

## 2017-06-02 DIAGNOSIS — R11.0 POSTOPERATIVE NAUSEA: ICD-10-CM

## 2017-06-02 DIAGNOSIS — E89.1 POST-PANCREATECTOMY DIABETES (H): ICD-10-CM

## 2017-06-02 DIAGNOSIS — R10.9 ACUTE POSTOPERATIVE PAIN OF ABDOMEN: ICD-10-CM

## 2017-06-02 DIAGNOSIS — Z98.890 POSTOPERATIVE NAUSEA: ICD-10-CM

## 2017-06-02 PROBLEM — I82.402 DEEP VEIN THROMBOSIS (DVT) OF LEFT LOWER EXTREMITY (H): Status: ACTIVE | Noted: 2017-06-02

## 2017-06-02 PROBLEM — T81.49XA SURGICAL WOUND INFECTION: Status: ACTIVE | Noted: 2017-06-02

## 2017-06-02 LAB
ALBUMIN UR-MCNC: NEGATIVE MG/DL
ANION GAP SERPL CALCULATED.3IONS-SCNC: 7 MMOL/L (ref 3–14)
APPEARANCE UR: CLEAR
BILIRUB UR QL STRIP: NEGATIVE
BUN SERPL-MCNC: 19 MG/DL (ref 7–30)
CALCIUM SERPL-MCNC: 8.8 MG/DL (ref 8.5–10.1)
CHLORIDE SERPL-SCNC: 101 MMOL/L (ref 94–109)
CO2 SERPL-SCNC: 32 MMOL/L (ref 20–32)
COLOR UR AUTO: YELLOW
CREAT SERPL-MCNC: 0.83 MG/DL (ref 0.66–1.25)
ERYTHROCYTE [DISTWIDTH] IN BLOOD BY AUTOMATED COUNT: 15.5 % (ref 10–15)
GFR SERPL CREATININE-BSD FRML MDRD: NORMAL ML/MIN/1.7M2
GLUCOSE BLDC GLUCOMTR-MCNC: 110 MG/DL (ref 70–99)
GLUCOSE BLDC GLUCOMTR-MCNC: 122 MG/DL (ref 70–99)
GLUCOSE BLDC GLUCOMTR-MCNC: 61 MG/DL (ref 70–99)
GLUCOSE BLDC GLUCOMTR-MCNC: 64 MG/DL (ref 70–99)
GLUCOSE BLDC GLUCOMTR-MCNC: 66 MG/DL (ref 70–99)
GLUCOSE BLDC GLUCOMTR-MCNC: 67 MG/DL (ref 70–99)
GLUCOSE BLDC GLUCOMTR-MCNC: 67 MG/DL (ref 70–99)
GLUCOSE SERPL-MCNC: 70 MG/DL (ref 70–99)
GLUCOSE UR STRIP-MCNC: NEGATIVE MG/DL
HCT VFR BLD AUTO: 25.9 % (ref 40–53)
HGB BLD-MCNC: 8 G/DL (ref 13.3–17.7)
HGB UR QL STRIP: NEGATIVE
KETONES UR STRIP-MCNC: NEGATIVE MG/DL
LEUKOCYTE ESTERASE UR QL STRIP: NEGATIVE
MAGNESIUM SERPL-MCNC: 2.2 MG/DL (ref 1.6–2.3)
MCH RBC QN AUTO: 26.8 PG (ref 26.5–33)
MCHC RBC AUTO-ENTMCNC: 30.9 G/DL (ref 31.5–36.5)
MCV RBC AUTO: 87 FL (ref 78–100)
NITRATE UR QL: NEGATIVE
PH UR STRIP: 7.5 PH (ref 5–7)
PHOSPHATE SERPL-MCNC: 2.5 MG/DL (ref 2.5–4.5)
PLATELET # BLD AUTO: 1233 10E9/L (ref 150–450)
POTASSIUM SERPL-SCNC: 4 MMOL/L (ref 3.4–5.3)
RBC # BLD AUTO: 2.98 10E12/L (ref 4.4–5.9)
RBC #/AREA URNS AUTO: <1 /HPF (ref 0–2)
SODIUM SERPL-SCNC: 140 MMOL/L (ref 133–144)
SP GR UR STRIP: 1.02 (ref 1–1.03)
TRANS CELLS #/AREA URNS HPF: <1 /HPF (ref 0–1)
URN SPEC COLLECT METH UR: ABNORMAL
UROBILINOGEN UR STRIP-MCNC: NORMAL MG/DL (ref 0–2)
WBC # BLD AUTO: 15.2 10E9/L (ref 4–11)
WBC #/AREA URNS AUTO: <1 /HPF (ref 0–2)

## 2017-06-02 PROCEDURE — 81001 URINALYSIS AUTO W/SCOPE: CPT | Performed by: PHYSICIAN ASSISTANT

## 2017-06-02 PROCEDURE — 27210437 ZZH NUTRITION PRODUCT SEMIELEM INTERMED LITER

## 2017-06-02 PROCEDURE — 40000141 ZZH STATISTIC PERIPHERAL IV START W/O US GUIDANCE

## 2017-06-02 PROCEDURE — 83735 ASSAY OF MAGNESIUM: CPT | Performed by: PHYSICIAN ASSISTANT

## 2017-06-02 PROCEDURE — 85027 COMPLETE CBC AUTOMATED: CPT | Performed by: PHYSICIAN ASSISTANT

## 2017-06-02 PROCEDURE — 40000556 ZZH STATISTIC PERIPHERAL IV START W US GUIDANCE

## 2017-06-02 PROCEDURE — 87086 URINE CULTURE/COLONY COUNT: CPT | Performed by: PHYSICIAN ASSISTANT

## 2017-06-02 PROCEDURE — 87040 BLOOD CULTURE FOR BACTERIA: CPT | Performed by: PHYSICIAN ASSISTANT

## 2017-06-02 PROCEDURE — 25000132 ZZH RX MED GY IP 250 OP 250 PS 637: Performed by: PHYSICIAN ASSISTANT

## 2017-06-02 PROCEDURE — 40000559 ZZH STATISTIC FAILED PERIPHERAL IV START

## 2017-06-02 PROCEDURE — 84100 ASSAY OF PHOSPHORUS: CPT | Performed by: PHYSICIAN ASSISTANT

## 2017-06-02 PROCEDURE — 25000128 H RX IP 250 OP 636: Performed by: STUDENT IN AN ORGANIZED HEALTH CARE EDUCATION/TRAINING PROGRAM

## 2017-06-02 PROCEDURE — 25000128 H RX IP 250 OP 636: Performed by: TRANSPLANT SURGERY

## 2017-06-02 PROCEDURE — 12000025 ZZH R&B TRANSPLANT INTERMEDIATE

## 2017-06-02 PROCEDURE — 74177 CT ABD & PELVIS W/CONTRAST: CPT

## 2017-06-02 PROCEDURE — 80048 BASIC METABOLIC PNL TOTAL CA: CPT | Performed by: PHYSICIAN ASSISTANT

## 2017-06-02 PROCEDURE — 36415 COLL VENOUS BLD VENIPUNCTURE: CPT | Performed by: PHYSICIAN ASSISTANT

## 2017-06-02 PROCEDURE — 00000146 ZZHCL STATISTIC GLUCOSE BY METER IP

## 2017-06-02 PROCEDURE — 25800025 ZZH RX 258: Performed by: PHYSICIAN ASSISTANT

## 2017-06-02 PROCEDURE — 25000128 H RX IP 250 OP 636: Performed by: PHYSICIAN ASSISTANT

## 2017-06-02 PROCEDURE — 25000125 ZZHC RX 250: Performed by: PHYSICIAN ASSISTANT

## 2017-06-02 RX ORDER — ASPIRIN 81 MG/1
81 TABLET ORAL DAILY
Status: DISCONTINUED | OUTPATIENT
Start: 2017-06-03 | End: 2017-06-14 | Stop reason: HOSPADM

## 2017-06-02 RX ORDER — NALOXONE HYDROCHLORIDE 0.4 MG/ML
.1-.4 INJECTION, SOLUTION INTRAMUSCULAR; INTRAVENOUS; SUBCUTANEOUS
Status: DISCONTINUED | OUTPATIENT
Start: 2017-06-02 | End: 2017-06-14 | Stop reason: HOSPADM

## 2017-06-02 RX ORDER — FENTANYL 100 UG/1
100 PATCH TRANSDERMAL
Status: COMPLETED | OUTPATIENT
Start: 2017-06-02 | End: 2017-06-02

## 2017-06-02 RX ORDER — DEXTROSE MONOHYDRATE 25 G/50ML
25-50 INJECTION, SOLUTION INTRAVENOUS
Status: DISCONTINUED | OUTPATIENT
Start: 2017-06-02 | End: 2017-06-14 | Stop reason: HOSPADM

## 2017-06-02 RX ORDER — SODIUM BICARBONATE 325 MG/1
325 TABLET ORAL EVERY 4 HOURS
Status: DISCONTINUED | OUTPATIENT
Start: 2017-06-02 | End: 2017-06-02

## 2017-06-02 RX ORDER — HEPARIN SODIUM 10000 [USP'U]/100ML
0-3500 INJECTION, SOLUTION INTRAVENOUS CONTINUOUS
Status: DISCONTINUED | OUTPATIENT
Start: 2017-06-02 | End: 2017-06-09

## 2017-06-02 RX ORDER — IOPAMIDOL 755 MG/ML
135 INJECTION, SOLUTION INTRAVASCULAR ONCE
Status: COMPLETED | OUTPATIENT
Start: 2017-06-02 | End: 2017-06-02

## 2017-06-02 RX ORDER — FERROUS SULFATE 325(65) MG
325 TABLET ORAL DAILY
Status: DISCONTINUED | OUTPATIENT
Start: 2017-06-03 | End: 2017-06-14 | Stop reason: HOSPADM

## 2017-06-02 RX ORDER — FLUOXETINE 20 MG/5ML
10 SOLUTION ORAL DAILY
Status: DISCONTINUED | OUTPATIENT
Start: 2017-06-03 | End: 2017-06-14 | Stop reason: HOSPADM

## 2017-06-02 RX ORDER — ONDANSETRON 4 MG/1
4-8 TABLET, ORALLY DISINTEGRATING ORAL EVERY 6 HOURS PRN
Status: DISCONTINUED | OUTPATIENT
Start: 2017-06-02 | End: 2017-06-14

## 2017-06-02 RX ORDER — PIPERACILLIN SODIUM, TAZOBACTAM SODIUM 3; .375 G/15ML; G/15ML
3.38 INJECTION, POWDER, LYOPHILIZED, FOR SOLUTION INTRAVENOUS EVERY 6 HOURS
Status: DISCONTINUED | OUTPATIENT
Start: 2017-06-02 | End: 2017-06-13

## 2017-06-02 RX ORDER — HYDROMORPHONE HYDROCHLORIDE 1 MG/ML
.3-.5 INJECTION, SOLUTION INTRAMUSCULAR; INTRAVENOUS; SUBCUTANEOUS
Status: DISCONTINUED | OUTPATIENT
Start: 2017-06-02 | End: 2017-06-05

## 2017-06-02 RX ORDER — SODIUM BICARBONATE 325 MG/1
325 TABLET ORAL EVERY 4 HOURS
Status: DISCONTINUED | OUTPATIENT
Start: 2017-06-02 | End: 2017-06-14 | Stop reason: HOSPADM

## 2017-06-02 RX ORDER — FENTANYL 75 UG/1
75 PATCH TRANSDERMAL
Status: DISCONTINUED | OUTPATIENT
Start: 2017-06-03 | End: 2017-06-09

## 2017-06-02 RX ORDER — DEXTROSE MONOHYDRATE 100 MG/ML
INJECTION, SOLUTION INTRAVENOUS CONTINUOUS
Status: DISCONTINUED | OUTPATIENT
Start: 2017-06-02 | End: 2017-06-05

## 2017-06-02 RX ORDER — FENTANYL 100 UG/1
100 PATCH TRANSDERMAL
Status: DISCONTINUED | OUTPATIENT
Start: 2017-06-03 | End: 2017-06-02

## 2017-06-02 RX ORDER — NICOTINE POLACRILEX 4 MG
15-30 LOZENGE BUCCAL
Status: DISCONTINUED | OUTPATIENT
Start: 2017-06-02 | End: 2017-06-14 | Stop reason: HOSPADM

## 2017-06-02 RX ORDER — FOLIC ACID 1 MG/1
1 TABLET ORAL DAILY
Status: DISCONTINUED | OUTPATIENT
Start: 2017-06-03 | End: 2017-06-14 | Stop reason: HOSPADM

## 2017-06-02 RX ORDER — OXYCODONE HYDROCHLORIDE 5 MG/1
5-10 TABLET ORAL EVERY 4 HOURS PRN
Status: DISCONTINUED | OUTPATIENT
Start: 2017-06-02 | End: 2017-06-03

## 2017-06-02 RX ADMIN — DEXTROSE 15 G: 15 GEL ORAL at 17:10

## 2017-06-02 RX ADMIN — PIPERACILLIN SODIUM,TAZOBACTAM SODIUM 3.38 G: 3; .375 INJECTION, POWDER, FOR SOLUTION INTRAVENOUS at 19:02

## 2017-06-02 RX ADMIN — PANTOPRAZOLE SODIUM 40 MG: 40 TABLET, DELAYED RELEASE ORAL at 20:22

## 2017-06-02 RX ADMIN — FENTANYL 1 PATCH: 100 PATCH, EXTENDED RELEASE TRANSDERMAL at 20:16

## 2017-06-02 RX ADMIN — SODIUM CHLORIDE 1000 ML: 9 INJECTION, SOLUTION INTRAVENOUS at 18:30

## 2017-06-02 RX ADMIN — Medication 25 ML: at 17:48

## 2017-06-02 RX ADMIN — HEPARIN SODIUM 1800 UNITS/HR: 10000 INJECTION, SOLUTION INTRAVENOUS at 18:37

## 2017-06-02 RX ADMIN — Medication 25 ML: at 20:49

## 2017-06-02 RX ADMIN — PANCRELIPASE 48000 UNITS: 24000; 76000; 120000 CAPSULE, DELAYED RELEASE PELLETS ORAL at 20:22

## 2017-06-02 RX ADMIN — ONDANSETRON 4 MG: 4 TABLET, ORALLY DISINTEGRATING ORAL at 20:44

## 2017-06-02 RX ADMIN — SODIUM BICARBONATE 325 MG: 325 TABLET ORAL at 20:21

## 2017-06-02 RX ADMIN — VANCOMYCIN HYDROCHLORIDE 1500 MG: 10 INJECTION, POWDER, LYOPHILIZED, FOR SOLUTION INTRAVENOUS at 21:50

## 2017-06-02 RX ADMIN — IOPAMIDOL 135 ML: 755 INJECTION, SOLUTION INTRAVENOUS at 18:01

## 2017-06-02 RX ADMIN — HYDROMORPHONE HYDROCHLORIDE 0.5 MG: 1 INJECTION, SOLUTION INTRAMUSCULAR; INTRAVENOUS; SUBCUTANEOUS at 20:43

## 2017-06-02 RX ADMIN — DEXTROSE MONOHYDRATE: 100 INJECTION, SOLUTION INTRAVENOUS at 17:15

## 2017-06-02 RX ADMIN — PIPERACILLIN SODIUM,TAZOBACTAM SODIUM 3.38 G: 3; .375 INJECTION, POWDER, FOR SOLUTION INTRAVENOUS at 23:54

## 2017-06-02 NOTE — IP AVS SNAPSHOT
[address] [address]    Phone:  [phone]                                       After Visit Summary   6/2/2017    Sohan Arita    MRN: [id number]           After Visit Summary Signature Page     I have received my discharge instructions, and my questions have been answered. I have discussed any challenges I see with this plan with the nurse or doctor.    ..........................................................................................................................................  Patient/Patient Representative Signature      ..........................................................................................................................................  Patient Representative Print Name and Relationship to Patient    ..................................................               ................................................  Date                                            Time    ..........................................................................................................................................  Reviewed by Signature/Title    ...................................................              ..............................................  Date                                                            Time

## 2017-06-02 NOTE — IP AVS SNAPSHOT
MRN:[id number]                      After Visit Summary   6/2/2017    Sohan Arita    MRN: [id number]           Thank you!     Thank you for choosing [facility] for your care. Our goal is always to provide you with excellent care. Hearing back from our patients is one way we can continue to improve our services. Please take a few minutes to complete the written survey that you may receive in the mail after you visit with us. Thank you!        Patient Information     Date Of Birth          1960        Designated Caregiver       Most Recent Value    Caregiver    Will someone help with your care after discharge? yes    Name of designated caregiver Maribell Arita    Phone number of caregiver [phone]    Caregiver address [address] [place] [address]      About your hospital stay     You were admitted on:  June 2, 2017 You last received care in the:  [facility]    You were discharged on:  June 14, 2017        Reason for your hospital stay       Patient was readmitted with a surgical wound infection, which was treated with drainage and antibiotics/antifungals.  He will discharge with oral antifungals and wound dressing changes.                  Who to Call     For medical emergencies, please call 541.  For non-urgent questions about your medical care, please call your primary care provider or clinic, [phone]          Attending Provider     Provider Specialty    Rasheed Lagunas MD Transplant    Ian Mendez MD Transplant       Primary Care Provider Office Phone # Fax #    Frandy Y MD Uday [phone]       When to contact your care team       Call your transplant coordinator: Soni Aleman at [phone] or [phone] with any new onset or dramatic increase in pain, or difficulty obtaining or taking your medications.  If after hours, please call [phone] and an on call coordinator will assist you.                  After  Care Instructions     Activity       Your activity upon discharge: Walk at least four times a day, lift no greater than 10 pounds for 6 weeks from the time of surgery.  After 6 weeks time please return to your normal exercise routine.  No driving while taking narcotics or until 3 weeks after surgery.            Diet       Follow this diet upon discharge:       Adult Formula Drip Feeding: Continuous Impact Peptide 1.5; Jejunostomy; Goal Rate: 75 cc/hr; mL/hr; From: 8:00 AM; 8:00 AM;     Clear Liquid Diet            Monitor and record       blood glucose 4-6 times a day            Tubes and drains       You are going home with the following tubes or drains: feeding tube GJ-Tube.   Tube cares per hospital or home care instructions            Wound care and dressings       Instructions to care for your wound at home: Wound dressing to be changed 3-4 times daily, place gauze sponge into tunneling area at superior aspect of open wound, and across rest of wound.  Cover with an abd pad.  Drain site to be kept covered for 48 hours, then may leave open.                  Follow-up Appointments     Adult [facility] Follow-up and recommended labs and tests       Labs to be drawn on Friday, June 16 (at 9 am) and Monday June 19 (at 8:30 am): CBC, BMP, and INR.      Follow up with Dr. Mendez at 9am on Monday, June 19.  Further follow up to be determined at that time.    Endocrine follow up to be rescheduled    Appointments on [facility] and/or [facility] (with [facility] or [facility] provider or service). Call [phone] if you haven't heard regarding these appointments within 7 days of discharge.                  Your next 10 appointments already scheduled     Jun 16, 2017  9:00 AM CDT   LAB with  LAB    Health Lab ([facility])    [address]  1st Floor  [facility] [address] [phone]           Patient must bring picture ID.  Patient should be prepared to give a urine specimen  Please do  not eat 10-12 hours before your appointment if you are coming in fasting for labs on lipids, cholesterol, or glucose (sugar).  Pregnant women should follow their Care Team instructions. Water with medications is okay. Do not drink coffee or other fluids.   If you have concerns about taking  your medications, please ask at office or if scheduling via [employer], send a message by clicking on Secure Messaging, Message Your Care Team.            Jun 19, 2017  8:45 AM CDT   LAB with  LAB   [facility] Lab ([facility])    [address] [place] [address] [phone]           Patient must bring picture ID.  Patient should be prepared to give a urine specimen  Please do not eat 10-12 hours before your appointment if you are coming in fasting for labs on lipids, cholesterol, or glucose (sugar).  Pregnant women should follow their Care Team instructions. Water with medications is okay. Do not drink coffee or other fluids.   If you have concerns about taking  your medications, please ask at office or if scheduling via [employer], send a message by clicking on Secure Messaging, Message Your Care Team.            Jun 19, 2017  9:00 AM CDT   (Arrive by 8:45 AM)   Pancreas and Kidney Transplant Post Op with Ian Mendez MD   [facility] Solid Organ Transplant ([facility])    [address] [place] [address] [phone]              Additional Services     Home infusion referral       Resumption    Your provider has referred you to: [facility]: [facility] [place] [phone]   [email or web address]  S/p TPAIT--DR Rodrigo Jaquez   Enteral feeds      Local Address (if different from home address): [facility]  Room:  Address: [address] [place] [address]  Phone: [phone]  [place] cell: [phone]  [place] cell: [phone]    [facility]  [place]    Anticipated Length of Therapy: 2-8 weeks    Home Infusion Pharmacist to adjust therapy based on labs and clinical assessments: Yes    Labs:  May draw labs from Venous Catheter: No  Home Infusion Pharmacist to order labs based on therapy type and clinical assessments: Yes  Call/Fax Lab Results to: Outpatient Care Coordinator: Soniynes Taylorprice  Main Phone: [phone]  Ph: [phone]  Fax: [phone]    Agency Staff to assess nursing needs for Infusion Therapy.    Access Device Management:  IV Access Type: n/a  Flush with Heparin and Normal Saline IVP PRN and routine site care (per agency protocol) to maintain access device? 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  Indication: DVT Treatment (left peroneal vein)  Therapy Goal: INR 2-3  Provider/Team: Pancreas Transplant  Warfarin Prior to Admission: No  Significant drug interactions: enoxaparin, aspirin, Zosyn  Recent documented change in oral intake/nutrition: No  Dose Comments: cycled tube feeds     INR  Date Value Ref Range Status  06/09/2017 1.23 (H) 0.86 - 1.14 Final  05/15/2017 1.66 (H) 0.86 - 1.14 Final        Recommend warfarin 7.5 mg today.  Pharmacy will monitor Sohan Arita daily and order warfarin doses to achieve specified goal.       Please contact pharmacy as soon as possible if  "the warfarin needs to be held for a procedure or if the warfarin goals change.       Eliza Singh, Pharm.D., [facility]  Pager [phone]                  Further instructions from your care team       Discharge nurse, please fax discharge orders to [facility]    --they need signed discharge orders by 12 noon on the day of discharge--aware 6/14 @ 10:20am BP  ---page mark Duronin @ [phone] Monday-Friday  ---weekends call office [phone]  __________________________________________________________________________        Script fax'd to [employer] 6/13/17   Ph: [phone]   Fax: [phone]   Sterile ABD, sterile 4x4' and split 2x2's, sterile q tips, paper tape, microklenz spray   Wound is 9cm longx 1 cm deep x 2 cm wide     Pending Results     Date and Time Order Name Status Description    6/6/2017 1843 IR PICC Vascular In process     6/6/2017 0906 Blood culture yeast Preliminary     6/5/2017 2000 Fungus Culture, non-blood Preliminary     6/5/2017 0925 Fungus culture Preliminary     6/3/2017 1540 Fungus Culture, non-blood Preliminary             Statement of Approval     Ordered          06/14/17 1029  I have reviewed and agree with all the recommendations and orders detailed in this document.  EFFECTIVE NOW     Approved and electronically signed by:  Aleksandra Pugh PA-C             Admission Information     Date & Time Provider Department Dept. Phone    6/2/2017 Ian Mendez MD [address] [facility] [place] [phone]      Your Vitals Were     Blood Pressure Pulse Temperature Respirations Height Weight    126/74 (BP Location: Right arm) 67 98  F (36.7  C) (Oral) 16 1.88 m (6' 2\") 100.9 kg (222 lb 6.4 oz)    Pulse Oximetry BMI (Body Mass Index)                95% 28.55 kg/m2          MyChart Information     MyChart lets you send messages to your doctor, view your test results, renew your prescriptions, schedule appointments and more. To sign up, go to [email or web address] "Click on \"Log in\" on the left side of the screen, which will take you to the Welcome page. Then click on \"Sign up Now\" on the right side of the page.     You will be asked to enter the access code listed below, as well as some personal information. Please follow the directions to create your username and password.     Your access code is: [email or web address]  Expires: 2017  6:30 AM     Your access code will  in 90 days. If you need help or a new code, please call your [facility] clinic or [phone].        Care EveryWhere ID     This is your Care EveryWhere ID. This could be used by other organizations to access your [facility] medical records  [id number]           Review of your medicines      START taking        Dose / Directions    acetaminophen 325 MG tablet   Commonly known as:  TYLENOL   Used for:  Acute postoperative pain of abdomen        Dose:  650 mg   Take 2 tablets (650 mg) by mouth every 4 hours as needed for mild pain or fever   Quantity:  100 tablet   Refills:  0       cholecalciferol 2000 UNITS tablet   Used for:  Acquired total absence of pancreas   Replaces:  cholecalciferol 400 UNIT/ML Liqd liquid        Dose:  1 tablet   Take 1 tablet by mouth daily   Quantity:  30 tablet   Refills:  3       fluconazole 200 MG tablet   Commonly known as:  DIFLUCAN   Indication:  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        Dose:  200 mg   Take 1 tablet (200 mg) by mouth daily   Quantity:  30 tablet   Refills:  0       metoclopramide 5 MG tablet   Commonly known as:  REGLAN   Used for:  History of pancreatectomy        Dose:  5 mg   Take 1 tablet (5 mg) by mouth 3 times daily as needed   Quantity:  90 tablet   Refills:  1       multivitamin CF formula Caps per capsule   Used for:  Acquired total absence of pancreas   Replaces:  multivitamins with minerals Liqd liquid        Dose:  1 capsule   Take 1 capsule by mouth daily   Quantity:  30 capsule   Refills:  3       ondansetron 4 MG tablet   Commonly known " as:  ZOFRAN   Used for:  Postoperative nausea        Dose:  4 mg   Take 1 tablet (4 mg) by mouth every 6 hours as needed for nausea or vomiting   Quantity:  24 tablet   Refills:  0       oxyCODONE 5 MG/5ML solution   Commonly known as:  ROXICODONE        Dose:  2.5-5 mg   2.5-5 mLs (2.5-5 mg) by Per Feeding Tube route every 4 hours as needed for moderate to severe pain   Quantity:  250 mL   Refills:  0       * QUEtiapine 25 MG tablet   Commonly known as:  SEROquel   Used for:  Anxiety        Dose:  12.5 mg   Take 0.5 tablets (12.5 mg) by mouth At Bedtime   Quantity:  60 tablet   Refills:  0       * QUEtiapine 25 MG tablet   Commonly known as:  SEROquel   Used for:  Anxiety        Dose:  25 mg   Take 1 tablet (25 mg) by mouth 2 times daily as needed (Anxiety)   Quantity:  60 tablet   Refills:  0       * Notice:  This list has 2 medication(s) that are the same as other medications prescribed for you. Read the directions carefully, and ask your doctor or other care provider to review them with you.      CONTINUE these medicines which may have CHANGED, or have new prescriptions. If we are uncertain of the size of tablets/capsules you have at home, strength may be listed as something that might have changed.        Dose / Directions    * amylase-lipase-protease 35807 UNITS Cpep   Commonly known as:  CREON 12   This may have changed:  Another medication with the same name was added. Make sure you understand how and when to take each.   Used for:  Acquired total absence of pancreas        Dose:  3-4 capsule   Take 3-4 capsules (36,000-48,000 Units) by mouth every hour as needed (with snacks)   Quantity:  270 capsule   Refills:  3       * amylase-lipase-protease 39245 UNITS Cpep   Commonly known as:  ZENPEP   This may have changed:  You were already taking a medication with the same name, and this prescription was added. Make sure you understand how and when to take each.   Used for:  Acquired total absence of pancreas         Dose:  2 capsule   2 capsules (40,000 Units) by Per Feeding Tube route every 4 hours   Quantity:  180 capsule   Refills:  0       fentaNYL 100 mcg/hr 72 hr patch   Commonly known as:  DURAGESIC   This may have changed:  Another medication with the same name was removed. Continue taking this medication, and follow the directions you see here.   Used for:  Acquired total absence of pancreas        Dose:  1 patch   Place 1 patch onto the skin every 72 hours   Quantity:  4 patch   Refills:  0       * insulin aspart 100 UNIT/ML injection   Commonly known as:  NovoLOG PEN   This may have changed:  You were already taking a medication with the same name, and this prescription was added. Make sure you understand how and when to take each.   Used for:  Post-pancreatectomy diabetes (H)        1 units per 8 grams of carbohydrate.  Only chart total amount of units given.  Do not give if pre-prandial glucose is less than 60 mg/dL.   Quantity:  3 mL   Refills:  0       * insulin aspart 100 UNIT/ML injection   Commonly known as:  NovoLOG PEN   This may have changed:  additional instructions   Used for:  Post-pancreatectomy diabetes (H)        Check blood glucose Q4H and administer based on blood glucose Notify provider if glucose greater than or equal to 350 mg/dL after administration. -150 = 2 units. -180 = 4 units. -210 = 6 units. -240 = 8 units. -270 = 10 units. -300 = 12 units. -330 = 14 units. BG >330 = 16 units.   Quantity:  3 mL   Refills:  3       * insulin glargine 100 UNIT/ML injection   Commonly known as:  LANTUS   This may have changed:  how much to take   Used for:  Post-pancreatectomy diabetes (H)        Dose:  30 Units   Inject 30 Units Subcutaneous every morning   Quantity:  3 mL   Refills:  3       * insulin glargine 100 UNIT/ML injection   Commonly known as:  LANTUS   This may have changed:  how much to take   Used for:  Post-pancreatectomy diabetes (H)        Dose:   36 Units   Inject 36 Units Subcutaneous every evening   Quantity:  3 mL   Refills:  3       pregabalin 20 MG/ML solution   Commonly known as:  LYRICA   This may have changed:    - when to take this  - reasons to take this   Used for:  History of pancreatectomy        Dose:  25 mg   1.25 mLs (25 mg) by Per Feeding Tube route daily as needed   Quantity:  70 mL   Refills:  3       * Notice:  This list has 6 medication(s) that are the same as other medications prescribed for you. Read the directions carefully, and ask your doctor or other care provider to review them with you.      CONTINUE these medicines which have NOT CHANGED        Dose / Directions    Alcohol Swabs Pads   Used for:  Post-pancreatectomy diabetes (H)        Dose:  1 pad   1 pad as needed   Quantity:  100 each   Refills:  3       aspirin 81 MG EC tablet   Used for:  High platelet count (H), Acquired asplenia, Post-pancreatectomy diabetes (H), Pancreatic insufficiency, Anemia        Dose:  81 mg   Take 1 tablet (81 mg) by mouth daily   Quantity:  30 tablet   Refills:  1       BD SHARPS  Misc   Used for:  Post-pancreatectomy diabetes (H)        Dose:  1 Container   1 Container as needed   Quantity:  1 each   Refills:  3       blood glucose monitoring lancets   Used for:  Acquired total absence of pancreas        Use to test blood sugar 8 times daily or as directed.   Quantity:  1 Box   Refills:  3       blood glucose monitoring test strip   Commonly known as:  FREESTYLE LITE   Used for:  Post-pancreatectomy diabetes (H)        Use to test blood sugars 8 times daily or as directed.   Quantity:  100 strip   Refills:  11       ferrous sulfate 325 (65 FE) MG Tbec EC tablet   Used for:  High platelet count (H), Acquired asplenia, Post-pancreatectomy diabetes (H), Pancreatic insufficiency, Anemia        Dose:  325 mg   Take 1 tablet (325 mg) by mouth daily   Quantity:  30 tablet   Refills:  1       FLUoxetine 20 MG/5ML solution   Commonly known as:   PROzac   Used for:  Acquired total absence of pancreas        Dose:  10 mg   2.5 mLs (10 mg) by Per J Tube route daily   Quantity:  75 mL   Refills:  3       folic acid 1 MG tablet   Commonly known as:  FOLVITE   Used for:  High platelet count (H), Acquired asplenia, Post-pancreatectomy diabetes (H), Pancreatic insufficiency, Anemia        Dose:  1 mg   Take 1 tablet (1 mg) by mouth daily   Quantity:  30 tablet   Refills:  1       glucose 40 % Gel gel   Used for:  Post-pancreatectomy diabetes (H)        Dose:  15-30 g   Take 15-30 g by mouth every 15 minutes as needed for low blood sugar   Quantity:  3 Tube   Refills:  1       insulin pen needle 32G X 4 MM   Used for:  Acquired total absence of pancreas        Use 8 pen needles daily or as directed.   Quantity:  100 each   Refills:  3       levothyroxine 25 mcg/mL Susp   Commonly known as:  SYNTHROID   Used for:  Acquired total absence of pancreas        Dose:  150 mcg   6 mLs (150 mcg) by Per J Tube route every morning (before breakfast)   Quantity:  180 mL   Refills:  1       magnesium hydroxide 400 MG/5ML suspension   Commonly known as:  MILK OF MAGNESIA   Used for:  Other constipation        Dose:  30 mL   30 mLs by Per Feeding Tube route 2 times daily as needed for constipation   Quantity:  105 mL   Refills:  1       pantoprazole Susp suspension   Commonly known as:  PROTONIX   Used for:  Acquired total absence of pancreas        Dose:  40 mg   20 mLs (40 mg) by Oral or J tube route 2 times daily   Quantity:  1200 mL   Refills:  1       sennosides 8.8 MG/5ML syrup   Commonly known as:  SENOKOT   Used for:  Other constipation        Dose:  10 mL   10 mLs by Per Feeding Tube route 2 times daily   Quantity:  600 mL   Refills:  3       sodium bicarbonate 325 MG tablet   Used for:  High platelet count (H), Acquired asplenia, Post-pancreatectomy diabetes (H), Pancreatic insufficiency        1 tablet q 4 hrs via Jtube. Administration Instructions: Crush 1 tablet and  mix into 15 ml of warm water and use this solution to mix with Creon pancreatic enzymes. DO NOT administer directly into Jtube (to be mixed into tube feed formula with Creon enzyme).   Quantity:  180 tablet   Refills:  3         STOP taking     cholecalciferol 400 UNIT/ML Liqd liquid   Commonly known as:  vitamin D/D-VI-SOL   Replaced by:  cholecalciferol 2000 UNITS tablet           HYDROmorphone 1 MG/ML Liqd liquid   Commonly known as:  DILAUDID           multivitamins with minerals Liqd liquid   Replaced by:  multivitamin CF formula Caps per capsule           nystatin 728357 UNIT/ML suspension   Commonly known as:  MYCOSTATIN           ondansetron 4 MG ODT tab   Commonly known as:  ZOFRAN-ODT                Where to get your medicines      These medications were sent to [facility] [address] [address]     Phone:  [phone]     acetaminophen 325 MG tablet    Alcohol Swabs Pads    cholecalciferol 2000 UNITS tablet    fluconazole 200 MG tablet    insulin aspart 100 UNIT/ML injection    insulin glargine 100 UNIT/ML injection    insulin glargine 100 UNIT/ML injection    metoclopramide 5 MG tablet    multivitamin CF formula Caps per capsule    ondansetron 4 MG tablet    QUEtiapine 25 MG tablet    QUEtiapine 25 MG tablet         Some of these will need a paper prescription and others can be bought over the counter. Ask your nurse if you have questions.     Bring a paper prescription for each of these medications     fentaNYL 100 mcg/hr 72 hr patch    insulin aspart 100 UNIT/ML injection    oxyCODONE 5 MG/5ML solution    pregabalin 20 MG/ML solution    sodium bicarbonate 325 MG tablet       You don't need a prescription for these medications     amylase-lipase-protease 13431 UNITS Cpep                Protect others around you: Learn how to safely use, store and throw away your medicines at www.disposemymeds.org.             Medication List:  This is a list of all your medications and when to take them. Check marks below indicate your daily home schedule. Keep this list as a reference.      Medications           Morning Afternoon Evening Bedtime As Needed    acetaminophen 325 MG tablet   Commonly known as:  TYLENOL   Take 2 tablets (650 mg) by mouth every 4 hours as needed for mild pain or fever   Last time this was given:  325 mg on 6/14/2017  9:09 AM                                Alcohol Swabs Pads   1 pad as needed                                * amylase-lipase-protease 79844 UNITS Cpep   Commonly known as:  CREON 12   Take 3-4 capsules (36,000-48,000 Units) by mouth every hour as needed (with snacks)   Last time this was given:  40,000 Units on 6/14/2017  9:10 AM                                * amylase-lipase-protease 98763 UNITS Cpep   Commonly known as:  ZENPEP   2 capsules (40,000 Units) by Per Feeding Tube route every 4 hours   Last time this was given:  40,000 Units on 6/14/2017  9:10 AM                                aspirin 81 MG EC tablet   Take 1 tablet (81 mg) by mouth daily   Last time this was given:  81 mg on 6/14/2017  8:05 AM                                BD SHARPS  Misc   1 Container as needed                                blood glucose monitoring lancets   Use to test blood sugar 8 times daily or as directed.                                blood glucose monitoring test strip   Commonly known as:  FREESTYLE LITE   Use to test blood sugars 8 times daily or as directed.                                cholecalciferol 2000 UNITS tablet   Take 1 tablet by mouth daily                                fentaNYL 100 mcg/hr 72 hr patch   Commonly known as:  DURAGESIC   Place 1 patch onto the skin every 72 hours   Last time this was given:  1 patch on 6/12/2017 10:15 AM                                ferrous sulfate 325 (65 FE) MG Tbec EC tablet   Take 1 tablet (325 mg) by mouth daily                                fluconazole 200 MG  tablet   Commonly known as:  DIFLUCAN   Take 1 tablet (200 mg) by mouth daily   Last time this was given:  200 mg on 6/14/2017  8:07 AM                                FLUoxetine 20 MG/5ML solution   Commonly known as:  PROzac   2.5 mLs (10 mg) by Per J Tube route daily   Last time this was given:  10 mg on 6/14/2017  8:09 AM                                folic acid 1 MG tablet   Commonly known as:  FOLVITE   Take 1 tablet (1 mg) by mouth daily   Last time this was given:  1 mg on 6/14/2017  8:06 AM                                glucose 40 % Gel gel   Take 15-30 g by mouth every 15 minutes as needed for low blood sugar   Last time this was given:  15 g on 6/2/2017  5:10 PM                                * insulin aspart 100 UNIT/ML injection   Commonly known as:  NovoLOG PEN   1 units per 8 grams of carbohydrate.  Only chart total amount of units given.  Do not give if pre-prandial glucose is less than 60 mg/dL.   Last time this was given:  2 Units on 6/14/2017  8:12 AM                                * insulin aspart 100 UNIT/ML injection   Commonly known as:  NovoLOG PEN   Check blood glucose Q4H and administer based on blood glucose Notify provider if glucose greater than or equal to 350 mg/dL after administration. -150 = 2 units. -180 = 4 units. -210 = 6 units. -240 = 8 units. -270 = 10 units. -300 = 12 units. -330 = 14 units. BG >330 = 16 units.   Last time this was given:  2 Units on 6/14/2017  8:12 AM                                * insulin glargine 100 UNIT/ML injection   Commonly known as:  LANTUS   Inject 30 Units Subcutaneous every morning   Last time this was given:  30 Units on 6/14/2017  8:11 AM                                * insulin glargine 100 UNIT/ML injection   Commonly known as:  LANTUS   Inject 36 Units Subcutaneous every evening   Last time this was given:  30 Units on 6/14/2017  8:11 AM                                insulin pen needle 32G X 4 MM    Use 8 pen needles daily or as directed.                                levothyroxine 25 mcg/mL Susp   Commonly known as:  SYNTHROID   6 mLs (150 mcg) by Per J Tube route every morning (before breakfast)   Last time this was given:  150 mcg on 6/14/2017  6:44 AM                                magnesium hydroxide 400 MG/5ML suspension   Commonly known as:  MILK OF MAGNESIA   30 mLs by Per Feeding Tube route 2 times daily as needed for constipation                                metoclopramide 5 MG tablet   Commonly known as:  REGLAN   Take 1 tablet (5 mg) by mouth 3 times daily as needed   Last time this was given:  5 mg on 6/12/2017  5:29 PM                                multivitamin CF formula Caps per capsule   Take 1 capsule by mouth daily                                ondansetron 4 MG tablet   Commonly known as:  ZOFRAN   Take 1 tablet (4 mg) by mouth every 6 hours as needed for nausea or vomiting   Last time this was given:  4 mg on 6/14/2017  8:04 AM                                oxyCODONE 5 MG/5ML solution   Commonly known as:  ROXICODONE   2.5-5 mLs (2.5-5 mg) by Per Feeding Tube route every 4 hours as needed for moderate to severe pain   Last time this was given:  2.5 mg on 6/14/2017  4:47 AM                                pantoprazole Susp suspension   Commonly known as:  PROTONIX   20 mLs (40 mg) by Oral or J tube route 2 times daily   Last time this was given:  40 mg on 6/14/2017  8:10 AM                                pregabalin 20 MG/ML solution   Commonly known as:  LYRICA   1.25 mLs (25 mg) by Per Feeding Tube route daily as needed   Last time this was given:  25 mg on 6/14/2017  8:09 AM                                * QUEtiapine 25 MG tablet   Commonly known as:  SEROquel   Take 0.5 tablets (12.5 mg) by mouth At Bedtime   Last time this was given:  12.5 mg on 6/12/2017 10:23 PM                                * QUEtiapine 25 MG tablet   Commonly known as:  SEROquel   Take 1 tablet (25 mg) by  mouth 2 times daily as needed (Anxiety)   Last time this was given:  12.5 mg on 6/12/2017 10:23 PM                                sennosides 8.8 MG/5ML syrup   Commonly known as:  SENOKOT   10 mLs by Per Feeding Tube route 2 times daily   Last time this was given:  10 mLs on 6/13/2017  3:03 PM                                sodium bicarbonate 325 MG tablet   1 tablet q 4 hrs via Jtube. Administration Instructions: Crush 1 tablet and mix into 15 ml of warm water and use this solution to mix with Creon pancreatic enzymes. DO NOT administer directly into Jtube (to be mixed into tube feed formula with Creon enzyme).   Last time this was given:  325 mg on 6/14/2017  9:10 AM                                * Notice:  This list has 8 medication(s) that are the same as other medications prescribed for you. Read the directions carefully, and ask your doctor or other care provider to review them with you.

## 2017-06-02 NOTE — TELEPHONE ENCOUNTER
"Tanner called with complaints of increased swelling and pain in his left lower extremity. He stated that he \"can hardly walk. I had so much pain during the night when I walked to the bathroom\". Tanner thinks the swelling is spreading and vessels feel more ropey. States area is very tender. Tanner denies redness or heat to the area. He also states his right lower extremity is somewhat swollen.  This RN contacted Dr. Mendez. Orders placed for stat bilateral lower extremity ultrasound. Eileen Santana RNP also aware and will see Tanner in clinic 3A.   Tanner and Maribell notified of the plan and will leave as soon as possible for the [facility] to the vascular ultrasound area. Both verbalized understanding and had no questions.  "

## 2017-06-02 NOTE — MR AVS SNAPSHOT
After Visit Summary   6/2/2017    Sohan Arita    MRN: [id number]           Patient Information     Date Of Birth          1960        Visit Information        Provider Department      6/2/2017 1:00 PM Eileen Santana NP [facility] Solid Organ Transplant        Today's Diagnoses     Pain and swelling of lower extremity, left    -  1    Post-pancreatectomy diabetes (H)           Follow-ups after your visit        Your next 10 appointments already scheduled     Jun 08, 2017  9:45 AM CDT   LAB with  LAB   [facility] ([facility])    [address] [place] [id number] [phone]           Patient must bring picture ID.  Patient should be prepared to give a urine specimen  Please do not eat 10-12 hours before your appointment if you are coming in fasting for labs on lipids, cholesterol, or glucose (sugar).  Pregnant women should follow their Care Team instructions. Water with medications is okay. Do not drink coffee or other fluids.   If you have concerns about taking  your medications, please ask at office or if scheduling via Roundrate, send a message by clicking on Secure Messaging, Message Your Care Team.            Jun 08, 2017 10:30 AM CDT   (Arrive by 10:15 AM)   Return Auto [place] with Rodrigo Jaquez MD   [facility] Solid Organ Transplant ([facility])    [address] [place] [id number] [phone]              Who to contact     If you have questions or need follow up information about today's clinic visit or your schedule please contact [facility] SOLID ORGAN TRANSPLANT directly at [phone].  Normal or non-critical lab and imaging results will be communicated to you by MyChart, letter or phone within 4 business days after the clinic has received the results. If you do not hear from us within 7 days, please contact the clinic through MyChart or phone. If you have a  "critical or abnormal lab result, we will notify you by phone as soon as possible.  Submit refill requests through Queue-it or call your pharmacy and they will forward the refill request to us. Please allow 3 business days for your refill to be completed.          Additional Information About Your Visit        MyChart Information     Queue-it lets you send messages to your doctor, view your test results, renew your prescriptions, schedule appointments and more. To sign up, go to [email or web address] . Click on \"Log in\" on the left side of the screen, which will take you to the Welcome page. Then click on \"Sign up Now\" on the right side of the page.     You will be asked to enter the access code listed below, as well as some personal information. Please follow the directions to create your username and password.     Your access code is: [email or web address]  Expires: 2017  6:30 AM     Your access code will  in 90 days. If you need help or a new code, please call your [place] clinic or [phone].        Care EveryWhere ID     This is your Care EveryWhere ID. This could be used by other organizations to access your [place] medical records  [id number]        Your Vitals Were     Pulse Temperature Respirations Pulse Oximetry          78 98.3  F (36.8  C) (Oral) 18 96%         Blood Pressure from Last 3 Encounters:   17 137/74   17 118/74   17 120/73    Weight from Last 3 Encounters:   17 107.6 kg (237 lb 3.2 oz)   17 104.9 kg (231 lb 4.8 oz)   17 103.6 kg (228 lb 4.8 oz)              Today, you had the following     No orders found for display       Primary Care Provider Office Phone # Fax #    Frandy Frye [phone]       [address]  [facility] [address]        Thank you!     Thank you for choosing [facility] SOLID ORGAN TRANSPLANT  for your care. Our goal is always to provide you with excellent care. Hearing back from our " patients is one way we can continue to improve our services. Please take a few minutes to complete the written survey that you may receive in the mail after your visit with us. Thank you!             Your Updated Medication List - Protect others around you: Learn how to safely use, store and throw away your medicines at www.disposemymeds.org.          This list is accurate as of: 6/2/17  4:03 PM.  Always use your most recent med list.                   Brand Name Dispense Instructions for use    Alcohol Swabs Pads     100 each    1 pad as needed       amylase-lipase-protease 26745 UNITS Cpep    CREON 12    270 capsule    Take 3-4 capsules (36,000-48,000 Units) by mouth every hour as needed (with snacks)       aspirin 81 MG EC tablet     30 tablet    Take 1 tablet (81 mg) by mouth daily       BD SHARPS  Misc     1 each    1 Container as needed       blood glucose monitoring lancets     1 Box    Use to test blood sugar 8 times daily or as directed.       blood glucose monitoring test strip    FREESTYLE LITE    100 strip    Use to test blood sugars 8 times daily or as directed.       cholecalciferol 400 UNIT/ML Liqd liquid    vitamin D/D-VI-SOL    150 mL    5 mLs (2,000 Units) by Per J Tube route daily       * fentaNYL 100 mcg/hr 72 hr patch    DURAGESIC    4 patch    Place 1 patch onto the skin every 72 hours for 14 days       * fentaNYL 25 mcg/hr 72 hr patch    DURAGESIC    9 patch    Place 1 patch onto the skin every 72 hours       ferrous sulfate 325 (65 FE) MG Tbec EC tablet     30 tablet    Take 1 tablet (325 mg) by mouth daily       FLUoxetine 20 MG/5ML solution    PROzac    75 mL    2.5 mLs (10 mg) by Per J Tube route daily       folic acid 1 MG tablet    FOLVITE    30 tablet    Take 1 tablet (1 mg) by mouth daily       glucose 40 % Gel gel     3 Tube    Take 15-30 g by mouth every 15 minutes as needed for low blood sugar       HYDROmorphone 1 MG/ML Liqd liquid    DILAUDID    168 mL    1-2 mLs (1-2 mg)  by Per J Tube route every 4 hours       insulin aspart 100 UNIT/ML injection    NovoLOG PEN    3 mL    Correction aspart 1 units per 20 >120 every 4 hours       * insulin glargine 100 UNIT/ML injection    LANTUS    3 mL    Inject 37 Units Subcutaneous every evening       * insulin glargine 100 UNIT/ML injection    LANTUS    3 mL    Inject 47 Units Subcutaneous every morning       insulin pen needle 32G X 4 MM     100 each    Use 8 pen needles daily or as directed.       levothyroxine 25 mcg/mL Susp    SYNTHROID    180 mL    6 mLs (150 mcg) by Per J Tube route every morning (before breakfast)       magnesium hydroxide 400 MG/5ML suspension    MILK OF MAGNESIA    105 mL    30 mLs by Per Feeding Tube route 2 times daily as needed for constipation       multivitamins with minerals Liqd liquid     450 mL    15 mLs by Per Feeding Tube route daily       nystatin 355606 UNIT/ML suspension    MYCOSTATIN    280 mL    Take 5 mLs (500,000 Units) by mouth 4 times daily for 7 days       ondansetron 4 MG ODT tab    ZOFRAN-ODT    30 tablet    Take 1-2 tablets (4-8 mg) by mouth every 6 hours as needed for nausea       pantoprazole Susp suspension    PROTONIX    1200 mL    20 mLs (40 mg) by Oral or J tube route 2 times daily       pregabalin 20 MG/ML solution    LYRICA    70 mL    1.25 mLs (25 mg) by Per Feeding Tube route 2 times daily       sennosides 8.8 MG/5ML syrup    SENOKOT    600 mL    10 mLs by Per Feeding Tube route 2 times daily       sodium bicarbonate 325 MG tablet     126 tablet    1 tablet q 4 hrs via Jtube. Administration Instructions: Crush 1 tablet and mix into 15 ml of warm water and use this solution to mix with Creon pancreatic enzymes. DO NOT administer directly into Jtube (to be mixed into tube feed formula with Creon enzyme).       * Notice:  This list has 4 medication(s) that are the same as other medications prescribed for you. Read the directions carefully, and ask your doctor or other care provider to  review them with you.

## 2017-06-02 NOTE — PHARMACY-CONSULT NOTE
Pharmacy Tube Feeding Consult    Medication reviewed for administration by feeding tube and for potential food/drug interactions.    Recommendation: No changes are needed at this time.  Do not crush aspirin EC tabs.  If any oral medications need to be switched to po liquid, please notify pharmacist.      Pharmacy will continue to follow as new medications are ordered.

## 2017-06-02 NOTE — H&P
[facility], [place]    History and Physical  Solid Organ Transplant     Date of Admission:  6/2/2017    Assessment & Plan   Sohan Arita is a 56 year old male who presents with wound infection and occlusive thrombosis left peroneal vein. 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     He presented to clinic today due to leg pain and swelling, concern for DVT. Found to have occlusive thrombosis in left peroneal vein. Wound opened in clinic due to erythema, purulent discharge upon opening. Moist to dry dressing placed.      Reports feeling fatigue and malaise about 2 days ago. Mild nausea and some loose stools. Denies CP or SOB on admission. Non labored breathing on RA.  Denies fever, was diaphoretic in clinic. No change in urine output. Some lightheadedness with standing. Abdominal pain controlled with Fentanyl. Plan for fentanyl patch to be decreased from 100 mcg/hr to 75 mcg/hr tomorrow.    TF stopped around 1500. BG on admission 60s. Will give glucose now. Place PIV to start D10 until TF available.     Admitted for anticoagulation, CT scan to evaluate for abscess and fluid hydration.     Past Medical History    I have reviewed this patient's medical history and updated it with pertinent information if needed.   Past Medical History:   Diagnosis Date     Depression      Eye injury, penetrating 1978    left eye, blurry vision long distance      Pancreatic  disease     pancreatitis     Pancreatitis     outside notes indicate lipase elevations; labs available: 12 lipase 563 (); 12 1224 ()     Thyroid cancer (H)      Thyroid disease     thyroid cancer     Ventral hernia     Present for a year, no pain       Past Surgical History   I have reviewed this patient's surgical history and updated it with pertinent information if needed.  Past Surgical History:   Procedure Laterality Date     CHOLECYSTECTOMY  2012    [place]     debribement eye Left     for eye injury     ENDOSCOPIC RETROGRADE CHOLANGIOPANCREATOGRAM  2012    with sphincterotomy     ENDOSCOPIC ULTRASOUND UPPER GASTROINTESTINAL TRACT (GI) N/A 2015    Procedure: ENDOSCOPIC ULTRASOUND, ESOPHAGOSCOPY / UPPER GASTROINTESTINAL TRACT (GI);  Surgeon: Emeka Mcleod MD;  Location: [facility] OR     FEEDING TUBE REPLACEMENT  2/3/2015     HERNIA REPAIR, INGUINAL RT/LT Left      LAPAROTOMY EXPLORATORY  2014    WITH sphincterotom (transduodenal), resection of sphincter of Oddi, lysis of adhesions     NERVE BLOCK PERIPHERAL  2014    U/S guided transversus abdominus plane peripheral field nerve block     PANCREATECTOMY, TRANSPLANT AUTO ISLET CELL, COMBINED N/A 5/15/2017    Procedure: COMBINED PANCREATECTOMY, TRANSPLANT AUTO ISLET CELL;  Open Pancreatectomy, Splenectomy, Gastrojejunostomy Tube Placement , Auto Islet Cell Transplant;  Surgeon: Rodrigo Jaquez MD;  Location: [facility] OR     THYROIDECTOMY  2011     transduodenal sphincteroplasty  2014       Prior to Admission Medications   Prior to Admission Medications   Prescriptions Last Dose Informant Patient Reported? Taking?   Alcohol Swabs PADS   No No   Si pad as needed   FLUoxetine (PROZAC) 20 MG/5ML solution 2017 at Unknown time  No Yes   Si.5 mLs (10 mg) by Per J Tube route daily   HYDROmorphone (DILAUDID) 1 MG/ML LIQD liquid Past Week at Unknown time  No Yes   Si-2  mLs (1-2 mg) by Per J Tube route every 4 hours   Sharps Container (BD SHARPS ) MISC   No No   Si Container as needed   amylase-lipase-protease (CREON 12) 41589 UNITS CPEP   No No   Sig: Take 3-4 capsules (36,000-48,000 Units) by mouth every hour as needed (with snacks)   aspirin 81 MG EC tablet 2017 at Unknown time  No Yes   Sig: Take 1 tablet (81 mg) by mouth daily   blood glucose monitoring (ACCU-CHEK FASTCLIX) lancets   No No   Sig: Use to test blood sugar 8 times daily or as directed.   blood glucose monitoring (FREESTYLE LITE) test strip   No No   Sig: Use to test blood sugars 8 times daily or as directed.   cholecalciferol (VITAMIN D/D-VI-SOL) 400 UNIT/ML LIQD liquid 2017 at Unknown time  No Yes   Si mLs (2,000 Units) by Per J Tube route daily   fentaNYL (DURAGESIC) 100 mcg/hr 72 hr patch 2017 at 0800  No Yes   Sig: Place 1 patch onto the skin every 72 hours for 14 days   fentaNYL (DURAGESIC) 25 mcg/hr 72 hr patch   No No   Sig: Place 1 patch onto the skin every 72 hours   ferrous sulfate 325 (65 FE) MG TBEC EC tablet 2017 at Unknown time  No Yes   Sig: Take 1 tablet (325 mg) by mouth daily   folic acid (FOLVITE) 1 MG tablet 2017 at Unknown time  No Yes   Sig: Take 1 tablet (1 mg) by mouth daily   glucose 40 % GEL gel   No No   Sig: Take 15-30 g by mouth every 15 minutes as needed for low blood sugar   insulin aspart (NOVOLOG PEN) 100 UNIT/ML injection   No No   Sig: Correction aspart 1 units per 20 >120 every 4 hours   insulin glargine (LANTUS) 100 UNIT/ML injection  at Unknown time  No Yes   Sig: Inject 37 Units Subcutaneous every evening   insulin glargine (LANTUS) 100 UNIT/ML injection 2017 at Unknown time  No Yes   Sig: Inject 47 Units Subcutaneous every morning   insulin pen needle 32G X 4 MM   No No   Sig: Use 8 pen needles daily or as directed.   levothyroxine (SYNTHROID) 25 mcg/mL SUSP 2017 at Unknown time  No Yes   Si mLs (150 mcg) by Per J Tube route  every morning (before breakfast)   magnesium hydroxide (MILK OF MAGNESIA) 400 MG/5ML suspension Past Week at Unknown time  No Yes   Si mLs by Per Feeding Tube route 2 times daily as needed for constipation   multivitamins with minerals (CERTAVITE/CEROVITE) LIQD liquid 2017 at Unknown time  No Yes   Sig: 15 mLs by Per Feeding Tube route daily   nystatin (MYCOSTATIN) 356539 UNIT/ML suspension Past Week at Unknown time  No Yes   Sig: Take 5 mLs (500,000 Units) by mouth 4 times daily for 7 days   ondansetron (ZOFRAN-ODT) 4 MG ODT tab Past Week at Unknown time  No Yes   Sig: Take 1-2 tablets (4-8 mg) by mouth every 6 hours as needed for nausea   pantoprazole (PROTONIX) SUSP suspension 2017 at Unknown time  No Yes   Si mLs (40 mg) by Oral or J tube route 2 times daily   pregabalin (LYRICA) 20 MG/ML solution Past Week at Unknown time  No Yes   Si.25 mLs (25 mg) by Per Feeding Tube route 2 times daily   sennosides (SENOKOT) 8.8 MG/5ML syrup 2017 at Unknown time  No Yes   Sig: 10 mLs by Per Feeding Tube route 2 times daily   sodium bicarbonate 325 MG tablet 2017 at Unknown time  No Yes   Si tablet q 4 hrs via Jtube. Administration Instructions: Crush 1 tablet and mix into 15 ml of warm water and use this solution to mix with Creon pancreatic enzymes. DO NOT administer directly into Jtube (to be mixed into tube feed formula with Creon enzyme).      Facility-Administered Medications: None     Allergies   No Known Allergies    Social History   I have reviewed this patient's social history and updated it with pertinent information if needed. Sohan Arita  reports that he has never smoked. He has never used smokeless tobacco. He reports that he does not drink alcohol or use illicit drugs.    Family History   I have reviewed this patient's family history and updated it with pertinent information if needed.   Family History   Problem Relation Age of Onset     Pancreatitis No family hx of       DIABETES No family hx of        Review of Systems   The 5 point Review of Systems is negative other than noted in the HPI.    Physical Exam   Temp: 98  F (36.7  C) Temp src: Oral BP: 119/63 Pulse: 71   Resp: 12 SpO2: 92 % O2 Device: None (Room air)    Vital Signs with Ranges  Temp:  [98  F (36.7  C)-98.3  F (36.8  C)] 98  F (36.7  C)  Pulse:  [71-78] 71  Resp:  [12-18] 12  BP: (118-119)/(63-74) 119/63  SpO2:  [92 %-96 %] 92 %  226 lbs 8 oz    Exam:  Constitutional: NAD  HEENT: MMM  Cardiovascular: RRR  Respiratory: bilateral CTA  Gastrointestinal: soft, benign, mild distension.   Skin: incision opened, wound edges pink, kerlix dressing non purulent. GJ tube site clean. G tube clamped. J tube clamped.   Musculoskeletal: left calf tender, swelling calf.    Neurologic: axox3      Data   Results for orders placed or performed during the hospital encounter of 06/02/17 (from the past 24 hour(s))   CBC with platelets   Result Value Ref Range    WBC 15.2 (H) 4.0 - 11.0 10e9/L    RBC Count 2.98 (L) 4.4 - 5.9 10e12/L    Hemoglobin 8.0 (L) 13.3 - 17.7 g/dL    Hematocrit 25.9 (L) 40.0 - 53.0 %    MCV 87 78 - 100 fl    MCH 26.8 26.5 - 33.0 pg    MCHC 30.9 (L) 31.5 - 36.5 g/dL    RDW 15.5 (H) 10.0 - 15.0 %    Platelet Count 1233 (HH) 150 - 450 10e9/L   Basic metabolic panel   Result Value Ref Range    Sodium 140 133 - 144 mmol/L    Potassium 4.0 3.4 - 5.3 mmol/L    Chloride 101 94 - 109 mmol/L    Carbon Dioxide 32 20 - 32 mmol/L    Anion Gap 7 3 - 14 mmol/L    Glucose 70 70 - 99 mg/dL    Urea Nitrogen 19 7 - 30 mg/dL    Creatinine 0.83 0.66 - 1.25 mg/dL    GFR Estimate >90  Non  GFR Calc   >60 mL/min/1.7m2    GFR Estimate If Black >90   GFR Calc   >60 mL/min/1.7m2    Calcium 8.8 8.5 - 10.1 mg/dL   Magnesium   Result Value Ref Range    Magnesium 2.2 1.6 - 2.3 mg/dL   Phosphorus   Result Value Ref Range    Phosphorus 2.5 2.5 - 4.5 mg/dL   Glucose by meter   Result Value Ref Range    Glucose 64  (L) 70 - 99 mg/dL   UA with Microscopic   Result Value Ref Range    Color Urine Yellow     Appearance Urine Clear     Glucose Urine Negative NEG mg/dL    Bilirubin Urine Negative NEG    Ketones Urine Negative NEG mg/dL    Specific Gravity Urine 1.019 1.003 - 1.035    Blood Urine Negative NEG    pH Urine 7.5 (H) 5.0 - 7.0 pH    Protein Albumin Urine Negative NEG mg/dL    Urobilinogen mg/dL Normal 0.0 - 2.0 mg/dL    Nitrite Urine Negative NEG    Leukocyte Esterase Urine Negative NEG    Source Urine     WBC Urine <1 0 - 2 /HPF    RBC Urine <1 0 - 2 /HPF    Transitional Epi <1 0 - 1 /HPF   Glucose by meter   Result Value Ref Range    Glucose 67 (L) 70 - 99 mg/dL   Glucose by meter   Result Value Ref Range    Glucose 66 (L) 70 - 99 mg/dL

## 2017-06-02 NOTE — NURSING NOTE
"Chief Complaint   Patient presents with     TP-IAT Transplant     POD 18     Post-op Problem       Initial /74  Pulse 78  Resp 18  SpO2 96% Estimated body mass index is 29.7 kg/(m^2) as calculated from the following:    Height as of 6/1/17: 1.88 m (6' 2\").    Weight as of 6/1/17: 104.9 kg (231 lb 4.8 oz).    "

## 2017-06-02 NOTE — LETTER
Transition Communication Hand-off for Care Transitions to Next Level of Care Provider    Name: Sohan Arita  MRN #: [id number]  Primary Care Provider: Frandy Frye     Primary Clinic: [place] [address] [address]  [facility] [id number]     Reason for Hospitalization:  wound infection  nausea  dehydration  systemic inflammatory process  DVT  Dehydration  Admit Date/Time: 6/2/2017  4:03 PM  Discharge Date: 6.14.17  Payor Source: Payor: COMMERCIAL / Plan: OTHER / Product Type: Indemnity /              Reason for Communication Hand-off Referral: Other see dx    Discharge Plan:       Concern for non-adherence with plan of care:   Y/N N  Discharge Needs Assessment:  Needs       Most Recent Value    Home Infusion Provider [facility] [phone], Fax: [phone]            Follow-up specialty is recommended: Yes    Follow-up plan:  Future Appointments  Date Time Provider Department Center   6/16/2017 9:00 AM  LAB [facility]   6/19/2017 8:45 AM  LAB [facility]   6/19/2017 9:00 AM Ian Mendez MD [facility]       Any outstanding tests or procedures:        Referrals     Future Labs/Procedures    Home infusion referral     Comments:    Resumption    Your provider has referred you to: [facility]: [facility] [place] [phone]   [email or web address]  S/p TPAIT--DR Rodrigo Jaquez   Enteral feeds      Local Address (if different from home address): [facility]  Room:  Address: [address] [place] [address]  Phone: [phone]  [place] cell: [phone]  [place] cell: [phone]    [place], [place]    Anticipated Length of Therapy: 2-8 weeks    Home Infusion Pharmacist to adjust therapy based on labs and clinical assessments: Yes    Labs:  May draw labs from Venous Catheter: No  Home Infusion Pharmacist to order labs based on therapy type and clinical assessments: Yes  Call/Fax Lab Results to: Outpatient  Care Coordinator: Soni Aleman  Main Phone: [phone]  Ph: [phone]  Fax: [phone]    Agency Staff to assess nursing needs for Infusion Therapy.    Access Device Management:  IV Access Type: n/a  Flush with Heparin and Normal Saline IVP PRN and routine site care (per agency protocol) to maintain access device? No     GJT    Dressing change supplies:  Script fax'd to [email or web address] 6/13/17  Ph: [phone]  Fax: [phone]  Sterile ABD, sterile 4x4' and split 2x2's, sterile q tips, paper tape, microklenz spray  Wound is 9cm longx 2 cm deep x 2 cm wide  Dr Arita is independent with dressing change    INR Clinic Referral     Comments:    New Warfarin  [facility]  Ph: [phone]  Fax: [phone]    Indication: DVT Treatment (left peroneal vein) s/p TPAIT 5.15.17  Therapy Goal: INR 2-3  Duration: to be determined  Transplant Surgeon: Dr Rodrigo Jaquez                      Clinical Pharmacy - Warfarin Dosing Consult      Pharmacy has been consulted to manage this patient's warfarin therapy.  Indication: DVT Treatment (left peroneal vein)  Therapy Goal: INR 2-3  Provider/Team: [facility]  Warfarin Prior to Admission: No  Significant drug interactions: enoxaparin, aspirin, Zosyn  Recent documented change in oral intake/nutrition: No  Dose Comments: cycled tube feeds     INR  Date Value Ref Range Status  06/09/2017 1.23 (H) 0.86 - 1.14 Final  05/15/2017 1.66 (H) 0.86 - 1.14 Final        Recommend warfarin 7.5 mg today.  Pharmacy will monitor Sohan Arita daily and order warfarin doses to achieve specified goal.       Please contact pharmacy as soon as possible if the warfarin needs to be held for a procedure or if the warfarin goals change.       Eliza Singh, Pharm.D., [facility]  Pager [phone]            Key Recommendations:      Vikki Mckeon    AVS/Discharge Summary is the source of truth; this is a helpful guide for improved communication of patient story

## 2017-06-02 NOTE — PHARMACY-VANCOMYCIN DOSING SERVICE
Pharmacy Vancomycin Initial Note  Date of Service 2017  Patient's  1960  56 year old, male    Indication: Skin and Soft Tissue Infection    Current estimated CrCl = Estimated Creatinine Clearance: 128.6 mL/min (based on Cr of 0.82).    Creatinine for last 3 days  No results found for requested labs within last 72 hours.    Recent Vancomycin Level(s) for last 3 days  No results found for requested labs within last 72 hours.      Vancomycin IV Administrations (past 72 hours)      No vancomycin orders with administrations in past 72 hours.                Nephrotoxins and other renal medications (Future)    Start     Dose/Rate Route Frequency Ordered Stop    17 1715  piperacillin-tazobactam (ZOSYN) 3.375 g vial to attach to  mL bag      3.375 g  over 1 Hours Intravenous EVERY 6 HOURS 17 1706            Contrast Orders - past 72 hours     None                Plan:  1.  Start vancomycin  1500 mg IV q12h.   2.  Goal Trough Level: 10-15 mg/L   3.  Pharmacy will check trough levels as appropriate in 1-3 Days.    4. Serum creatinine levels will be ordered every other day for at least 10 days while on concomitant nephrotoxins.    5. Newton method utilized to dose vancomycin therapy: Method 1    Amy Zarate, PharmD

## 2017-06-02 NOTE — PROGRESS NOTES
Chronic Pancreatitis Group Progress Note    I had the pleasure of seeing Mr. Sohan Arita today. 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    Interval events since last visit with me:   ER visits = 0  , reasons: NA  Inpatient admissions = 0, reasons NA  The patient reports their current symptoms to be: bilateral lower extremity edema L>R nausea, cool, clammy, diaphoresis.  Distended abdomen..      GI function:   Nausea:   []  none    []  rare    []  often    [x]  daily  Comment: currently nauseated.     Vomiting: [x]  none    []  rare    []  often    []  daily   Comment:   Last BM: Today.  Stools are liquid, frequency : Multiple times daily.   Appetite: poor  Impact Peptide 1.5 provides 252 gram carb over 24-hrs of continuous feedings will be started on June 2, 2017.  Oral food intake: very little to none per day  Pancreatic exocrine insufficiency:   Takes (Creon 12), 3-4 with meals, 3-4 with snacks.  Greasy stools: [x]  none   []  rarely    []  several times/wk   []  daily      Comment: loose  Diabetes management:   Long acting insulin: Lantus--47 units in the AM, 37units in PM  Short acting insulin: Novolog--6-10 units/day  Blood sugars typically range from 100 to 120.   Lab Results   Component Value Date    A1C 5.3 05/16/2017    A1C 5.6 05/10/2017    A1C 5.4 09/10/2015      Pain management:   Pain rating (0=no pain, 10=unbearable): 5  Long acting agent: Fentanyl 100mcg/hr 72 hour.  Short acting agent: diluadid 1mg/ml4 times daily.  Daily 'Uptime': Varies.  Yesterday walked 3 miles  Walking: distance varies  Prescription Medications as of 6/2/2017             fentaNYL (DURAGESIC) 25 mcg/hr 72 hr patch Place 1 patch onto the skin every 72 hours    insulin aspart (NOVOLOG PEN) 100 UNIT/ML injection Correction aspart 1 units per 20 >120 every 4 hours    insulin glargine (LANTUS) 100 UNIT/ML injection Inject 37 Units Subcutaneous every evening    insulin glargine (LANTUS) 100 UNIT/ML  injection Inject 47 Units Subcutaneous every morning    aspirin 81 MG EC tablet Take 1 tablet (81 mg) by mouth daily    ferrous sulfate 325 (65 FE) MG TBEC EC tablet Take 1 tablet (325 mg) by mouth daily    folic acid (FOLVITE) 1 MG tablet Take 1 tablet (1 mg) by mouth daily    sodium bicarbonate 325 MG tablet 1 tablet q 4 hrs via Jtube. Administration Instructions: Crush 1 tablet and mix into 15 ml of warm water and use this solution to mix with Creon pancreatic enzymes. DO NOT administer directly into Jtube (to be mixed into tube feed formula with Creon enzyme).    fentaNYL (DURAGESIC) 100 mcg/hr 72 hr patch Place 1 patch onto the skin every 72 hours for 14 days    HYDROmorphone (DILAUDID) 1 MG/ML LIQD liquid 1-2 mLs (1-2 mg) by Per J Tube route every 4 hours    acetaminophen (TYLENOL) 32 mg/mL solution Take 15.65 mLs (500 mg) by mouth every 6 hours for 14 days    pregabalin (LYRICA) 20 MG/ML solution 1.25 mLs (25 mg) by Per Feeding Tube route 2 times daily    FLUoxetine (PROZAC) 20 MG/5ML solution 2.5 mLs (10 mg) by Per J Tube route daily    glucose 40 % GEL gel Take 15-30 g by mouth every 15 minutes as needed for low blood sugar    ondansetron (ZOFRAN-ODT) 4 MG ODT tab Take 1-2 tablets (4-8 mg) by mouth every 6 hours as needed for nausea    amylase-lipase-protease (CREON 12) 73671 UNITS CPEP Take 3-4 capsules (36,000-48,000 Units) by mouth every hour as needed (with snacks)    magnesium hydroxide (MILK OF MAGNESIA) 400 MG/5ML suspension 30 mLs by Per Feeding Tube route 2 times daily as needed for constipation    sennosides (SENOKOT) 8.8 MG/5ML syrup 10 mLs by Per Feeding Tube route 2 times daily    nystatin (MYCOSTATIN) 204512 UNIT/ML suspension Take 5 mLs (500,000 Units) by mouth 4 times daily for 7 days    multivitamins with minerals (CERTAVITE/CEROVITE) LIQD liquid 15 mLs by Per Feeding Tube route daily    levothyroxine (SYNTHROID) 25 mcg/mL SUSP 6 mLs (150 mcg) by Per J Tube route every morning (before  breakfast)    pantoprazole (PROTONIX) SUSP suspension 20 mLs (40 mg) by Oral or J tube route 2 times daily    cholecalciferol (VITAMIN D/D-VI-SOL) 400 UNIT/ML LIQD liquid 5 mLs (2,000 Units) by Per J Tube route daily    Alcohol Swabs PADS 1 pad as needed    Sharps Container (BD SHARPS ) MISC 1 Container as needed    blood glucose monitoring (FREESTYLE LITE) test strip Use to test blood sugars 8 times daily or as directed.    insulin pen needle 32G X 4 MM Use 8 pen needles daily or as directed.    blood glucose monitoring (ACCU-CHEK FASTCLIX) lancets Use to test blood sugar 8 times daily or as directed.        Physical exam:   General Appearance: in no apparent distress.   Temp:  [98.3  F (36.8  C)] 98.3  F (36.8  C)  Pulse:  [78] 78  Resp:  [18] 18  BP: (118)/(74) 118/74  SpO2:  [96 %] 96 %   Skin: Normal, no rashes or jaundice  Heart: Regular rhythm.  Lungs: no audible wheezes or increased work of breathing.  Abdomen: The abdomen is distended, and the wound is with signs of infection, tenderness, erythema and drainage, without hernia.  G/J Tube exit site is clean. The abdomen is mildly tender, generalized.  Staples removed by surgeon and drainage noted.  Area is now open and packed with gauze.    Edema: bilateral lower extremities L>R    Chronic Pancreatitis Latest Ref Rng & Units 5/25/2017 5/26/2017 5/27/2017 5/30/2017 6/1/2017   Weight - 109.226 kg - 105.552 kg 103.556 kg 104.917 kg   AMYLASE 30 - 110 U/L - - - - -   LIPASE 73 - 393 U/L - - - - -   HEMOGLOBIN A1C 4.3 - 6.0 % - - - - -   GLUCOSE 70 - 99 mg/dL 162(H) 94 132(H) 90 -   HGB 13.3 - 17.7 g/dL 7.2(L) 7.2(L) 7.3(L) 8.3(L) -    - 450 10e9/L 653(H) 730(H) 862(H) 1308(HH) -   ALBUMIN 3.4 - 5.0 g/dL - - - 2.6(L) -   PREALBUMIN 15 - 45 mg/dL - - - 12(L) -       Assessment and Plan: He is doing poorly s/p TP-IAT.  Interval progress has been poor.  Diabetes mellitus: Well controlled  Abdominal pain management: Increased pain today.      Will  admit to hospital for CT scan of abdomen, abdominal distention, wound infection and new DVT on scan.        Patient declines Ambulance and requests to take other mode of transportation.    Total time: 25 min, Counselling Time: 15 min.  Eileen Santana, CNP

## 2017-06-02 NOTE — IP AVS SNAPSHOT
"    UNIT [address] [facility]: [phone]                                              INTERAGENCY TRANSFER FORM - PHYSICIAN ORDERS   2017                    Hospital Admission Date: 2017  FINESSE OCONNELL   : 1960  Sex: Male        Attending Provider: Ian Mendez MD     Allergies:  No Known Allergies    Infection:  None   Service:  TRANSPLANT    Ht:  1.88 m (6' 2\")   Wt:  100.9 kg (222 lb 6.4 oz)   Admission Wt:  102.7 kg (226 lb 8 oz)    BMI:  28.55 kg/m 2   BSA:  2.3 m 2            Patient PCP Information     Provider PCP Type    Frandy Frye MD General      ED Clinical Impression     Diagnosis Description Comment Added By Time Added    Surgical wound infection, initial encounter [T81.4XXA] Surgical wound infection, initial encounter [T81.4XXA]  Ian Mendez MD 6/3/2017  2:57 PM    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 pancreatectomy [Z90.410]  Ian Mendez MD 6/3/2017  3:15 PM    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.410] 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.410]  Areli Montanez, APRN CNP 2017  9:25 AM    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, 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, 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, 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, 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]  Vikki Mckeon RN 2017  1:56 PM    Acute postoperative pain of abdomen [G89.18, R10.9] Acute postoperative pain of abdomen [G89.18, R10.9]  Vikki Mckeon RN 2017  1:56 PM    Dehydration [E86.0] Dehydration [E86.0]  Vikki Mckeon RN 2017  1:56 PM    Acute deep vein thrombosis (DVT) of other specified vein of left lower extremity (H) [I82.492] Acute deep vein thrombosis (DVT) of other specified vein of left lower extremity (H) [I82.492]  Vikki Mckeon RN 2017  1:56 PM    Anxiety [F41.9] Anxiety [F41.9]  Areli Montanez APRN CNP 2017  8:39 AM    Postoperative nausea [R11.0, Z98.890] Postoperative nausea [R11.0, Z98.890]  Aleksandra Pugh PA-C 2017 10:05 AM      Hospital Problems as of " 6/14/2017              Priority Class Noted POA    Post-pancreatectomy diabetes (H) Medium  5/26/2017 Yes    Dehydration Medium  6/2/2017 Yes    Deep vein thrombosis (DVT) of left lower extremity (H) Medium  6/2/2017 Unknown    Surgical wound infection Medium  6/2/2017 Unknown      Non-Hospital Problems as of 6/14/2017              Priority Class Noted    Chronic pancreatitis (H) Medium  5/15/2017    Acquired total absence of pancreas Medium  5/26/2017    Acute postoperative pain of abdomen Medium  5/26/2017    Long-term (current) use of anticoagulants [Z79.01] Medium  6/13/2017      Code Status History     Date Active Date Inactive Code Status Order ID Comments User Context    5/26/2017 12:18 PM  Full Code [id number]  Vero Rader PA-C Outpatient         Medication Review      START taking        Dose / Directions Comments    acetaminophen 325 MG tablet   Commonly known as:  TYLENOL   Used for:  Acute postoperative pain of abdomen        Dose:  650 mg   Take 2 tablets (650 mg) by mouth every 4 hours as needed for mild pain or fever   Quantity:  100 tablet   Refills:  0        fluconazole 200 MG tablet   Commonly known as:  DIFLUCAN   Indication:  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        Dose:  200 mg   Take 1 tablet (200 mg) by mouth daily   Quantity:  30 tablet   Refills:  0        metoclopramide 5 MG tablet   Commonly known as:  REGLAN   Used for:  History of pancreatectomy        Dose:  5 mg   Take 1 tablet (5 mg) by mouth 3 times daily as needed   Quantity:  90 tablet   Refills:  1        ondansetron 4 MG tablet   Commonly known as:  ZOFRAN   Used for:  Postoperative nausea        Dose:  4 mg   Take 1 tablet (4 mg) by mouth every 6 hours as needed for nausea or vomiting   Quantity:  24 tablet   Refills:  0        oxyCODONE 5 MG/5ML solution   Commonly known as:  ROXICODONE        Dose:  2.5-5 mg   2.5-5 mLs (2.5-5 mg) by Per Feeding Tube route every 4 hours as needed for moderate to severe pain    Quantity:  250 mL   Refills:  0        * QUEtiapine 25 MG tablet   Commonly known as:  SEROquel   Used for:  Anxiety        Dose:  12.5 mg   Take 0.5 tablets (12.5 mg) by mouth At Bedtime   Quantity:  60 tablet   Refills:  0        * QUEtiapine 25 MG tablet   Commonly known as:  SEROquel   Used for:  Anxiety        Dose:  25 mg   Take 1 tablet (25 mg) by mouth 2 times daily as needed (Anxiety)   Quantity:  60 tablet   Refills:  0        * Notice:  This list has 2 medication(s) that are the same as other medications prescribed for you. Read the directions carefully, and ask your doctor or other care provider to review them with you.      CONTINUE these medications which may have CHANGED, or have new prescriptions. If we are uncertain of the size of tablets/capsules you have at home, strength may be listed as something that might have changed.        Dose / Directions Comments    * amylase-lipase-protease 16672 UNITS Cpep   Commonly known as:  CREON 12   This may have changed:  Another medication with the same name was added. Make sure you understand how and when to take each.   Used for:  Acquired total absence of pancreas        Dose:  3-4 capsule   Take 3-4 capsules (36,000-48,000 Units) by mouth every hour as needed (with snacks)   Quantity:  270 capsule   Refills:  3        * amylase-lipase-protease 96980 UNITS Cpep   Commonly known as:  ZENPEP   This may have changed:  You were already taking a medication with the same name, and this prescription was added. Make sure you understand how and when to take each.   Used for:  Acquired total absence of pancreas        Dose:  2 capsule   2 capsules (40,000 Units) by Per Feeding Tube route every 4 hours   Quantity:  180 capsule   Refills:  0        fentaNYL 100 mcg/hr 72 hr patch   Commonly known as:  DURAGESIC   This may have changed:  Another medication with the same name was removed. Continue taking this medication, and follow the directions you see here.   Used  for:  Acquired total absence of pancreas        Dose:  1 patch   Place 1 patch onto the skin every 72 hours   Quantity:  4 patch   Refills:  0        * insulin aspart 100 UNIT/ML injection   Commonly known as:  NovoLOG PEN   This may have changed:  Another medication with the same name was added. Make sure you understand how and when to take each.   Used for:  Post-pancreatectomy diabetes (H)        Correction aspart 1 units per 20 >120 every 4 hours   Quantity:  3 mL   Refills:  3        * insulin aspart 100 UNIT/ML injection   Commonly known as:  NovoLOG PEN   This may have changed:  You were already taking a medication with the same name, and this prescription was added. Make sure you understand how and when to take each.   Used for:  Post-pancreatectomy diabetes (H)        1 units per 8 grams of carbohydrate.  Only chart total amount of units given.  Do not give if pre-prandial glucose is less than 60 mg/dL.   Quantity:  3 mL   Refills:  0        * insulin glargine 100 UNIT/ML injection   Commonly known as:  LANTUS   This may have changed:  how much to take   Used for:  Post-pancreatectomy diabetes (H)        Dose:  30 Units   Inject 30 Units Subcutaneous every morning   Quantity:  3 mL   Refills:  3    PROFILE       * insulin glargine 100 UNIT/ML injection   Commonly known as:  LANTUS   This may have changed:  how much to take   Used for:  Post-pancreatectomy diabetes (H)        Dose:  36 Units   Inject 36 Units Subcutaneous every evening   Quantity:  3 mL   Refills:  3    PROFILE       pregabalin 20 MG/ML solution   Commonly known as:  LYRICA   This may have changed:    - when to take this  - reasons to take this   Used for:  History of pancreatectomy        Dose:  25 mg   1.25 mLs (25 mg) by Per Feeding Tube route daily as needed   Quantity:  70 mL   Refills:  3    PROFILE       * Notice:  This list has 6 medication(s) that are the same as other medications prescribed for you. Read the directions carefully,  and ask your doctor or other care provider to review them with you.      CONTINUE these medications which have NOT CHANGED        Dose / Directions Comments    Alcohol Swabs Pads   Used for:  Post-pancreatectomy diabetes (H)        Dose:  1 pad   1 pad as needed   Quantity:  100 each   Refills:  3        aspirin 81 MG EC tablet   Used for:  High platelet count (H), Acquired asplenia, Post-pancreatectomy diabetes (H), Pancreatic insufficiency, Anemia        Dose:  81 mg   Take 1 tablet (81 mg) by mouth daily   Quantity:  30 tablet   Refills:  1        BD SHARPS  Misc   Used for:  Post-pancreatectomy diabetes (H)        Dose:  1 Container   1 Container as needed   Quantity:  1 each   Refills:  3        blood glucose monitoring lancets   Used for:  Acquired total absence of pancreas        Use to test blood sugar 8 times daily or as directed.   Quantity:  1 Box   Refills:  3        blood glucose monitoring test strip   Commonly known as:  FREESTYLE LITE   Used for:  Post-pancreatectomy diabetes (H)        Use to test blood sugars 8 times daily or as directed.   Quantity:  100 strip   Refills:  11        cholecalciferol 400 UNIT/ML Liqd liquid   Commonly known as:  vitamin D/D-VI-SOL   Used for:  Acquired total absence of pancreas        Dose:  2000 Units   5 mLs (2,000 Units) by Per J Tube route daily   Quantity:  150 mL   Refills:  1        ferrous sulfate 325 (65 FE) MG Tbec EC tablet   Used for:  High platelet count (H), Acquired asplenia, Post-pancreatectomy diabetes (H), Pancreatic insufficiency, Anemia        Dose:  325 mg   Take 1 tablet (325 mg) by mouth daily   Quantity:  30 tablet   Refills:  1        FLUoxetine 20 MG/5ML solution   Commonly known as:  PROzac   Used for:  Acquired total absence of pancreas        Dose:  10 mg   2.5 mLs (10 mg) by Per J Tube route daily   Quantity:  75 mL   Refills:  3        folic acid 1 MG tablet   Commonly known as:  FOLVITE   Used for:  High platelet count (H),  Acquired asplenia, Post-pancreatectomy diabetes (H), Pancreatic insufficiency, Anemia        Dose:  1 mg   Take 1 tablet (1 mg) by mouth daily   Quantity:  30 tablet   Refills:  1        glucose 40 % Gel gel   Used for:  Post-pancreatectomy diabetes (H)        Dose:  15-30 g   Take 15-30 g by mouth every 15 minutes as needed for low blood sugar   Quantity:  3 Tube   Refills:  1        insulin pen needle 32G X 4 MM   Used for:  Acquired total absence of pancreas        Use 8 pen needles daily or as directed.   Quantity:  100 each   Refills:  3        levothyroxine 25 mcg/mL Susp   Commonly known as:  SYNTHROID   Used for:  Acquired total absence of pancreas        Dose:  150 mcg   6 mLs (150 mcg) by Per J Tube route every morning (before breakfast)   Quantity:  180 mL   Refills:  1        magnesium hydroxide 400 MG/5ML suspension   Commonly known as:  MILK OF MAGNESIA   Used for:  Other constipation        Dose:  30 mL   30 mLs by Per Feeding Tube route 2 times daily as needed for constipation   Quantity:  105 mL   Refills:  1        multivitamins with minerals Liqd liquid   Used for:  Acquired total absence of pancreas        Dose:  15 mL   15 mLs by Per Feeding Tube route daily   Quantity:  450 mL   Refills:  1        pantoprazole Susp suspension   Commonly known as:  PROTONIX   Used for:  Acquired total absence of pancreas        Dose:  40 mg   20 mLs (40 mg) by Oral or J tube route 2 times daily   Quantity:  1200 mL   Refills:  1        sennosides 8.8 MG/5ML syrup   Commonly known as:  SENOKOT   Used for:  Other constipation        Dose:  10 mL   10 mLs by Per Feeding Tube route 2 times daily   Quantity:  600 mL   Refills:  3        sodium bicarbonate 325 MG tablet   Used for:  High platelet count (H), Acquired asplenia, Post-pancreatectomy diabetes (H), Pancreatic insufficiency, Anemia        1 tablet q 4 hrs via Jtube. Administration Instructions: Crush 1 tablet and mix into 15 ml of warm water and use this  solution to mix with Creon pancreatic enzymes. DO NOT administer directly into Jtube (to be mixed into tube feed formula with Creon enzyme).   Quantity:  126 tablet   Refills:  3    This is a 3-week supply.         STOP taking     HYDROmorphone 1 MG/ML Liqd liquid   Commonly known as:  DILAUDID           nystatin 836598 UNIT/ML suspension   Commonly known as:  MYCOSTATIN           ondansetron 4 MG ODT tab   Commonly known as:  ZOFRAN-ODT                     Further instructions from your care team       Discharge nurse, please fax discharge orders to [facility]    --they need signed discharge orders by 12 noon on the day of discharge  ---page mark Swanesstanton Salas @ [phone] Monday-Friday  ---weekends call office [phone]  __________________________________________________________________________    Home wound vac has been approved. IF pt will need it--call sterile [employer]  Clinton Ortega to release and deliver vac and supplies to pt's room @ 7.5514.  ________________________________________________________________________    Script fax'd to [employer] 6/13/17   Ph: [phone]   Fax: [phone]   Sterile ABD, sterile 4x4' and split 2x2's, sterile q tips, paper tape, microklenz spray   Wound is 9cm longx 2 cm deep x 2 cm wide     Summary of Visit     Reason for your hospital stay       Patient was readmitted with a surgical wound infection, which was treated with drainage and antibiotics/antifungals.  He will discharge with oral antifungals and wound dressing changes.             After Care     Activity       Your activity upon discharge: Walk at least four times a day, lift no greater than 10 pounds for 6 weeks from the time of surgery.  After 6 weeks time please return to your normal exercise routine.  No driving while taking narcotics or until 3 weeks after surgery.       Diet       Follow this diet upon discharge:       Adult Formula Drip Feeding: Continuous Impact Peptide 1.5; Jejunostomy;  Goal Rate: 75 cc/hr; mL/hr; From: 8:00 AM; 8:00 AM;     Clear Liquid Diet       Monitor and record       blood glucose 4-6 times a day       Tubes and drains       You are going home with the following tubes or drains: feeding tube GJ-Tube.   Tube cares per hospital or home care instructions       Wound care and dressings       Instructions to care for your wound at home: Wound dressing to be changed 3-4 times daily, place gauze sponge into tunneling area at superior aspect of open wound, and across rest of wound.  Cover with an abd pad.  Drain site to be kept covered for 48 hours, then may leave open.             Referrals     Home infusion referral       Resumption    Your provider has referred you to: [facility]: [facility] [place] [phone]   [email or web address]  S/p TPAIT--DR Rodrigo Jaquez   Enteral feeds      Local Address (if different from home address): [facility]  Room:  Address: [address] [place] [address]  Phone: [phone]  [place] cell: [phone]  [employer] cell: [phone]    [place], [place]    Anticipated Length of Therapy: 2-8 weeks    Home Infusion Pharmacist to adjust therapy based on labs and clinical assessments: Yes    Labs:  May draw labs from Venous Catheter: No  Home Infusion Pharmacist to order labs based on therapy type and clinical assessments: Yes  Call/Fax Lab Results to: Outpatient Care Coordinator: Soni Aleman  Main Phone: [phone]  Ph: [phone]  Fax: [phone]    Agency Staff to assess nursing needs for Infusion Therapy.    Access Device Management:  IV Access Type: n/a  Flush with Heparin and Normal Saline IVP PRN and routine site care (per agency protocol) to maintain access device? No     GJT    Dressing change supplies:  Script fax'd to [employer] 6/13/17  Ph: [phone]  Fax: [phone]  Sterile ABD, sterile 4x4' and split 2x2's, sterile q tips, paper tape, microklenz  spray  Wound is 9cm longx 2 cm deep x 2 cm wide  Dr Arita is independent with dressing change       INR Clinic Referral       New Warfarin  [facility]  Ph: [phone]  Fax: [phone]    Indication: DVT Treatment (left peroneal vein) s/p TPAIT 5.15.17  Therapy Goal: INR 2-3  Duration: to be determined  Transplant Surgeon: Dr Rodrigo Jaquez                      Clinical Pharmacy - Warfarin Dosing Consult      Pharmacy has been consulted to manage this patient's warfarin therapy.  Indication: DVT Treatment (left peroneal vein)  Therapy Goal: INR 2-3  Provider/Team: Pancreas Transplant  Warfarin Prior to Admission: No  Significant drug interactions: enoxaparin, aspirin, Zosyn  Recent documented change in oral intake/nutrition: No  Dose Comments: cycled tube feeds     INR  Date Value Ref Range Status  06/09/2017 1.23 (H) 0.86 - 1.14 Final  05/15/2017 1.66 (H) 0.86 - 1.14 Final        Recommend warfarin 7.5 mg today.  Pharmacy will monitor Sohan Arita daily and order warfarin doses to achieve specified goal.       Please contact pharmacy as soon as possible if the warfarin needs to be held for a procedure or if the warfarin goals change.       Eliza Singh, Pharm.D., [facility]  Pager [phone]             Your next 10 appointments already scheduled     Jun 16, 2017  9:00 AM CDT   LAB with  LAB    Health Lab ([facility])    [address] [place] [id number] [phone]           Patient must bring picture ID.  Patient should be prepared to give a urine specimen  Please do not eat 10-12 hours before your appointment if you are coming in fasting for labs on lipids, cholesterol, or glucose (sugar).  Pregnant women should follow their Care Team instructions. Water with medications is okay. Do not drink coffee or other fluids.   If you have concerns about taking  your medications, please ask at office or if scheduling via [facility], send a  message by clicking on Secure Messaging, Message Your Care Team.            Jun 19, 2017  8:45 AM CDT   LAB with UC LAB   [facility] ([facility])    [address] [place] [id number] [phone]           Patient must bring picture ID.  Patient should be prepared to give a urine specimen  Please do not eat 10-12 hours before your appointment if you are coming in fasting for labs on lipids, cholesterol, or glucose (sugar).  Pregnant women should follow their Care Team instructions. Water with medications is okay. Do not drink coffee or other fluids.   If you have concerns about taking  your medications, please ask at office or if scheduling via [employer], send a message by clicking on Secure Messaging, Message Your Care Team.            Jun 19, 2017  9:00 AM CDT   (Arrive by 8:45 AM)   Pancreas and Kidney Transplant Post Op with Ian Mendez MD   [facility] Solid Organ Transplant ([facility])    [address] [place] [id number] [phone]              Follow-Up Appointment Instructions     Future Labs/Procedures    Adult [facility] Follow-up and recommended labs and tests     Comments:    Labs to be drawn on Friday, June 16 (at 9 am) and Monday June 19 (at 8:30 am): CBC, BMP, and INR.      Follow up with Dr. Mendez at 9am on Monday, June 19.  Further follow up to be determined at that time.    Endocrine follow up to be rescheduled    Appointments on [place] and/or [facility] (with [facility] or [facility] provider or service). Call [phone] if you haven't heard regarding these appointments within 7 days of discharge.      Follow-Up Appointment Instructions     Adult [facility] Follow-up and recommended labs and tests       Labs to be drawn on Friday, June 16 (at 9 am) and Monday June 19 (at 8:30 am): CBC, BMP, and INR.      Follow up with Dr. Mendez at 9am on Monday, June 19.  Further follow up to be  determined at that time.    Endocrine follow up to be rescheduled    Appointments on [place] and/or [facility] (with [facility] or [facility] provider or service). Call [phone] if you haven't heard regarding these appointments within 7 days of discharge.             Statement of Approval     Ordered          06/14/17 1029  I have reviewed and agree with all the recommendations and orders detailed in this document.  EFFECTIVE NOW     Approved and electronically signed by:  Aleksandra Pugh PA-C

## 2017-06-03 LAB
BACTERIA SPEC CULT: NO GROWTH
GLUCOSE BLDC GLUCOMTR-MCNC: 102 MG/DL (ref 70–99)
GLUCOSE BLDC GLUCOMTR-MCNC: 104 MG/DL (ref 70–99)
GLUCOSE BLDC GLUCOMTR-MCNC: 105 MG/DL (ref 70–99)
GLUCOSE BLDC GLUCOMTR-MCNC: 115 MG/DL (ref 70–99)
GLUCOSE BLDC GLUCOMTR-MCNC: 132 MG/DL (ref 70–99)
GLUCOSE BLDC GLUCOMTR-MCNC: 143 MG/DL (ref 70–99)
GLUCOSE BLDC GLUCOMTR-MCNC: 90 MG/DL (ref 70–99)
GLUCOSE BLDC GLUCOMTR-MCNC: 97 MG/DL (ref 70–99)
GRAM STN SPEC: NORMAL
LMWH PPP CHRO-ACNC: 0.32 IU/ML
LMWH PPP CHRO-ACNC: 0.41 IU/ML
Lab: NORMAL
MICRO REPORT STATUS: NORMAL
SPECIMEN SOURCE: NORMAL
YEAST SPEC QL CULT: NORMAL

## 2017-06-03 PROCEDURE — 87205 SMEAR GRAM STAIN: CPT | Performed by: TRANSPLANT SURGERY

## 2017-06-03 PROCEDURE — 25000132 ZZH RX MED GY IP 250 OP 250 PS 637: Performed by: PHYSICIAN ASSISTANT

## 2017-06-03 PROCEDURE — 87106 FUNGI IDENTIFICATION YEAST: CPT | Performed by: TRANSPLANT SURGERY

## 2017-06-03 PROCEDURE — 25000132 ZZH RX MED GY IP 250 OP 250 PS 637: Performed by: STUDENT IN AN ORGANIZED HEALTH CARE EDUCATION/TRAINING PROGRAM

## 2017-06-03 PROCEDURE — 25000128 H RX IP 250 OP 636: Performed by: PHYSICIAN ASSISTANT

## 2017-06-03 PROCEDURE — 25000131 ZZH RX MED GY IP 250 OP 636 PS 637: Performed by: PHYSICIAN ASSISTANT

## 2017-06-03 PROCEDURE — 27210436 ZZH NUTRITION PRODUCT SEMIELEM INTERMED CAN

## 2017-06-03 PROCEDURE — 36415 COLL VENOUS BLD VENIPUNCTURE: CPT | Performed by: PHYSICIAN ASSISTANT

## 2017-06-03 PROCEDURE — 12000025 ZZH R&B TRANSPLANT INTERMEDIATE

## 2017-06-03 PROCEDURE — 00000146 ZZHCL STATISTIC GLUCOSE BY METER IP

## 2017-06-03 PROCEDURE — 25000128 H RX IP 250 OP 636: Performed by: TRANSPLANT SURGERY

## 2017-06-03 PROCEDURE — 27210437 ZZH NUTRITION PRODUCT SEMIELEM INTERMED LITER

## 2017-06-03 PROCEDURE — 25000132 ZZH RX MED GY IP 250 OP 250 PS 637: Performed by: TRANSPLANT SURGERY

## 2017-06-03 PROCEDURE — 87070 CULTURE OTHR SPECIMN AEROBIC: CPT | Performed by: TRANSPLANT SURGERY

## 2017-06-03 PROCEDURE — 87102 FUNGUS ISOLATION CULTURE: CPT | Performed by: TRANSPLANT SURGERY

## 2017-06-03 PROCEDURE — 85520 HEPARIN ASSAY: CPT | Performed by: PHYSICIAN ASSISTANT

## 2017-06-03 RX ORDER — ACETAMINOPHEN 325 MG/1
650 TABLET ORAL EVERY 4 HOURS PRN
Status: DISCONTINUED | OUTPATIENT
Start: 2017-06-03 | End: 2017-06-14 | Stop reason: HOSPADM

## 2017-06-03 RX ORDER — OXYCODONE HCL 5 MG/5 ML
5-10 SOLUTION, ORAL ORAL EVERY 4 HOURS PRN
Status: DISCONTINUED | OUTPATIENT
Start: 2017-06-03 | End: 2017-06-03

## 2017-06-03 RX ORDER — HYDROMORPHONE HYDROCHLORIDE 1 MG/ML
1-2 SOLUTION ORAL EVERY 4 HOURS PRN
Status: DISCONTINUED | OUTPATIENT
Start: 2017-06-03 | End: 2017-06-05

## 2017-06-03 RX ORDER — FLUCONAZOLE 2 MG/ML
400 INJECTION, SOLUTION INTRAVENOUS EVERY 24 HOURS
Status: DISCONTINUED | OUTPATIENT
Start: 2017-06-03 | End: 2017-06-06

## 2017-06-03 RX ADMIN — PIPERACILLIN SODIUM,TAZOBACTAM SODIUM 3.38 G: 3; .375 INJECTION, POWDER, FOR SOLUTION INTRAVENOUS at 12:53

## 2017-06-03 RX ADMIN — ACETAMINOPHEN 650 MG: 325 TABLET, FILM COATED ORAL at 03:54

## 2017-06-03 RX ADMIN — PANCRELIPASE 48000 UNITS: 24000; 76000; 120000 CAPSULE, DELAYED RELEASE PELLETS ORAL at 11:10

## 2017-06-03 RX ADMIN — MULTIVIT AND MINERALS-FERROUS GLUCONATE 9 MG IRON/15 ML ORAL LIQUID 15 ML: at 09:12

## 2017-06-03 RX ADMIN — FLUOXETINE HYDROCHLORIDE 10 MG: 20 SOLUTION ORAL at 09:13

## 2017-06-03 RX ADMIN — Medication 2000 UNITS: at 09:18

## 2017-06-03 RX ADMIN — FERROUS SULFATE 325 MG: 325 TABLET, FILM COATED ORAL at 09:13

## 2017-06-03 RX ADMIN — PANCRELIPASE 48000 UNITS: 24000; 76000; 120000 CAPSULE, DELAYED RELEASE PELLETS ORAL at 04:51

## 2017-06-03 RX ADMIN — PIPERACILLIN SODIUM,TAZOBACTAM SODIUM 3.38 G: 3; .375 INJECTION, POWDER, FOR SOLUTION INTRAVENOUS at 22:22

## 2017-06-03 RX ADMIN — PIPERACILLIN SODIUM,TAZOBACTAM SODIUM 3.38 G: 3; .375 INJECTION, POWDER, FOR SOLUTION INTRAVENOUS at 17:53

## 2017-06-03 RX ADMIN — SODIUM BICARBONATE 325 MG: 325 TABLET ORAL at 00:43

## 2017-06-03 RX ADMIN — INSULIN GLARGINE 40 UNITS: 100 INJECTION, SOLUTION SUBCUTANEOUS at 08:36

## 2017-06-03 RX ADMIN — ASPIRIN 81 MG: 81 TABLET, COATED ORAL at 09:12

## 2017-06-03 RX ADMIN — PANCRELIPASE 40000 UNITS: 20000; 68000; 109000 CAPSULE, DELAYED RELEASE ORAL at 21:38

## 2017-06-03 RX ADMIN — PANTOPRAZOLE SODIUM 40 MG: 40 TABLET, DELAYED RELEASE ORAL at 09:13

## 2017-06-03 RX ADMIN — LEVOTHYROXINE SODIUM 150 MCG: 300 TABLET ORAL at 11:13

## 2017-06-03 RX ADMIN — SODIUM BICARBONATE 325 MG: 325 TABLET ORAL at 21:39

## 2017-06-03 RX ADMIN — INSULIN ASPART 1 UNITS: 100 INJECTION, SOLUTION INTRAVENOUS; SUBCUTANEOUS at 16:25

## 2017-06-03 RX ADMIN — PANCRELIPASE 40000 UNITS: 20000; 68000; 109000 CAPSULE, DELAYED RELEASE ORAL at 17:47

## 2017-06-03 RX ADMIN — OXYCODONE HYDROCHLORIDE 5 MG: 5 SOLUTION ORAL at 12:53

## 2017-06-03 RX ADMIN — FOLIC ACID 1 MG: 1 TABLET ORAL at 09:13

## 2017-06-03 RX ADMIN — PANTOPRAZOLE SODIUM 40 MG: 40 TABLET, DELAYED RELEASE ORAL at 21:39

## 2017-06-03 RX ADMIN — FENTANYL 1 PATCH: 75 PATCH, EXTENDED RELEASE TRANSDERMAL at 14:50

## 2017-06-03 RX ADMIN — VANCOMYCIN HYDROCHLORIDE 1500 MG: 10 INJECTION, POWDER, LYOPHILIZED, FOR SOLUTION INTRAVENOUS at 17:58

## 2017-06-03 RX ADMIN — FLUCONAZOLE 400 MG: 2 INJECTION INTRAVENOUS at 17:42

## 2017-06-03 RX ADMIN — PANCRELIPASE 48000 UNITS: 24000; 76000; 120000 CAPSULE, DELAYED RELEASE PELLETS ORAL at 00:43

## 2017-06-03 RX ADMIN — SODIUM BICARBONATE 325 MG: 325 TABLET ORAL at 11:10

## 2017-06-03 RX ADMIN — PIPERACILLIN SODIUM,TAZOBACTAM SODIUM 3.38 G: 3; .375 INJECTION, POWDER, FOR SOLUTION INTRAVENOUS at 04:48

## 2017-06-03 RX ADMIN — INSULIN ASPART 2 UNITS: 100 INJECTION, SOLUTION INTRAVENOUS; SUBCUTANEOUS at 08:34

## 2017-06-03 RX ADMIN — SODIUM BICARBONATE 325 MG: 325 TABLET ORAL at 04:51

## 2017-06-03 RX ADMIN — VANCOMYCIN HYDROCHLORIDE 1500 MG: 10 INJECTION, POWDER, LYOPHILIZED, FOR SOLUTION INTRAVENOUS at 05:53

## 2017-06-03 RX ADMIN — HEPARIN SODIUM 1800 UNITS/HR: 10000 INJECTION, SOLUTION INTRAVENOUS at 22:34

## 2017-06-03 RX ADMIN — SODIUM BICARBONATE 325 MG: 325 TABLET ORAL at 17:47

## 2017-06-03 NOTE — PROGRESS NOTES
"CLINICAL NUTRITION SERVICES - ASSESSMENT NOTE     Nutrition Prescription    RECOMMENDATIONS FOR MDs/PROVIDERS TO ORDER:  Continue current TF - Impact Peptide 1.5 @ 75 mL/hr to meet 100% estimated nutrition needs.    Malnutrition Status:    Patient does not meet two of the above criteria necessary for diagnosing malnutrition but is at risk for malnutrition    Recommendations already ordered by Registered Dietitian (RD):  1) Modify TF regimen to include in comments \"Open 8 cans (tetra chikis packs) and empty into a quart container. Notify RN of delivery of formula to unit fridge.\"  2) Change PERT to Zenpep 40, 1 capsule q 4 hrs via Jtube per pt preference. This will provide 2,087 units lipase/g fat with current TF regimen. Administration Instructions: Open capsule and empty contents into 15 ml sodium bicarb solution (see sodium bicarb order), let dissolve for about 20-30 minutes and then add this solution to the amount of TF formula hung in TF bag every 4 hrs.    Future/Additional Recommendations:  1. Continue to monitor TF and adjust as needed.  2. Monitor wound to assess if wound care protocol becomes warranted.     REASON FOR ASSESSMENT  Sohan Arita is a/an 56 year old male assessed by the dietitian for Provider Order - Registered Dietitian to Assess and Order TF per Medical Nutrition Therapy Protocol    NUTRITION HISTORY  Per H&P: Pt presents with wound infection and occlusive thrombosis left peroneal vein. 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    Per review of previous RD note 5/25, pt was receiving Vivonex TEN TF formula d/t suspected chyle leak, recommended switch to Impact Peptide 1.5 as able by 6/4/17 to meet full nutrition needs and prevent essential fatty acid deficiency. Also noted pt had requested to use Zenpep 40 as he uses this at home vs Creon.    Pt states he was taking full TF at home PTA, and tolerating well. Says he is feeling a little bit confused at RD visit, unable to obtain further diet " "hx at this time.    MD ordered TF regimen as follows - Impact Peptide @ goal 75 ml/hr (1800 ml/day) to provide 2700 kcals (26 kcal/kg), 169 g PRO (1.6 g/kg), 1386 ml free H2O,115 g Fat (50% from MCTs), 252 g CHO and no Fiber daily. Pt is currently receiving goal rate 75 mL/hr.    CURRENT NUTRITION ORDERS  Diet: Clear Liquid (CHO free)  Intake/Tolerance: No po documented since admit. Per RN note this morning, tolerating clear liquids fairly well.    LABS  Labs reviewed  BG - ranging  since admit yesterday    MEDICATIONS  Medications reviewed  Creon 26293, 2 capsule q4hr  Sodium bicarbonate, 325 mg q4hr  Vit D, 2,000 units daily  Iron, 325 mg daily  Folic acid, 1 mg daily  Certavite, 15 mL daily  Novolog  Lantus      ANTHROPOMETRICS  Height: 188 cm (6' 2\")  Most Recent Weight: 102.7 kg (226 lb 8 oz)    IBW: 86.4 kg  BMI: Overweight BMI 25-29.9  Weight History: Wt appears to fluctuate in past 1 1/2 weeks, however appears overall stable since ~1 month ago.  Wt Readings from Last 15 Encounters:   06/02/17 102.7 kg (226 lb 8 oz)   06/01/17 104.9 kg (231 lb 4.8 oz)   05/30/17 103.6 kg (228 lb 4.8 oz)   05/27/17 105.6 kg (232 lb 11.2 oz)   05/25/17 109.2 kg (240 lb 12.8 oz)   05/10/17 103.2 kg (227 lb 8 oz)   11/11/15 100.2 kg (220 lb 14.4 oz)   11/10/15 98.8 kg (217 lb 14.4 oz)   09/10/15 97 kg (213 lb 12.8 oz)   09/10/15 97 kg (213 lb 12.8 oz)   09/09/15 96.5 kg (212 lb 12.8 oz)   09/09/15 96.6 kg (213 lb)     Dosing Weight: 103 kg (acutal lowest wt this admit 6/2)    ASSESSED NUTRITION NEEDS  Estimated Energy Needs: 2575 - 3090 kcals/day (25 - 30 kcals/kg)  Justification: Increased needs and Post-op TP AIT  Estimated Protein Needs: 134 - 185 grams protein/day (1.3 - 1.8 grams of pro/kg)  Justification: Increased needs and Post-op TP AIT  Estimated Fluid Needs:  (1 mL/kcal) or per MD pending fluid status      PHYSICAL FINDINGS  See malnutrition section below.  Skin: staci score, 18. Surgical wound infection " noted.    MALNUTRITION  % Intake: No decreased intake noted  % Weight Loss: Weight loss does not meet criteria  Subcutaneous Fat Loss: None observed  Muscle Loss: None observed  Fluid Accumulation/Edema: None noted per chart review  Malnutrition Diagnosis: Patient does not meet two of the above criteria necessary for diagnosing malnutrition but is at risk for malnutrition    NUTRITION DIAGNOSIS  Inadequate oral intake related to s/p TP AIT as evidenced by reliance on EN to meet nutrition needs at this time.      INTERVENTIONS  Implementation  Nutrition Education: Will be provided if education needs arise   Collaboration with other providers - spoke with RN regarding TF tolerance, will ensure TF being sent up in a carton to mix with PERT.    Goals  Total avg nutritional intake to meet a minimum of 25 kcal/kg and 1.3 g PRO/kg daily (per dosing wt 103 kg).     Monitoring/Evaluation  Progress toward goals will be monitored and evaluated per protocol.    Carissa Fisher RD, BISI  W/e pager: [phone]

## 2017-06-03 NOTE — CONSULTS
"Diabetes/Hyperglycemia Management Consult    Chief Complaint post pancreatectomy diabetes  Consult requested by: Dr. Vero Rader, attending: Dr. Ian Mendez   History of Present Illness Mr. Tanner Arita is a 55 yo man with a history of chronic pancreatitis s/p TPAIT on 5/15/17, with post pancreatectomy diabetes, who was admitted on 6/2/17 with wound infection, DVT of LLE, and dehydration.  History of obtained from pt, who was feeling very lethargic during visit, and chart review.    Pt discharged on 5/26/17 on glargine 90 units qAM, 70 units qPM and correction aspart 2 units/20 for BG >120 q4h.  This was while pt was receiving continuous TFs: Vivonex 10 at 100ml/h (490g CHO).  Per outpatient endocrinology note from 6/1, pt's glucoses outpatient on this regimen were mostly in the target range of . On June 2 he switched his continual enteral feeds to Impact peptide 1.5 at 75ml/h (252g CHO).  Dr. Moise, endocrinologist, recommended pt decrease dose of glargine to 50  Units on the night of June 1, and then on June 2 decrease to glargine 47 units qAM, 37 units qPM and correction aspart to 1unit/20 for BG >120.  Tanner made this change and yesterday morning he took glargine 47 units in the morning.  He was feeling worse when he was seen in clinic and was admitted from there.  TFs were off for a chunk of time in the afternoon and D10 was started at 75ml/h.  Glucoses ran low (60s) while TFs were off.  After TFs restarted around 2000, pt still had a glucose in the 60s.  He declined the evening glargine dose.  Glucose overnight was 97.  He recalls drinking about 6 oz of juice during the night, which likely lead to BG up to 143 this morning.  He requested morning glargine be reduced from 47 to 40 units this morning.  Midday glucose was only 104, and this was after he had some more juice (\"a few sips\").  This afternoon, BG is trending a little higher: 132 at 1600.    Tanner reports feeling very tired.  Sensation in L " hand is improving.  He feels very thirsty.  Abdomen feels tight and uncomfortable.      Recent Labs  Lab 06/03/17  1428 06/03/17  1207 06/03/17  0829 06/03/17  0400 06/03/17  0046 06/02/17  2107  06/02/17  1632 05/30/17  1114   GLC  --   --   --   --   --   --   --  70 90   * 104* 143* 97 102* 122*  < >  --   --    < > = values in this interval not displayed.      Diabetes Type: post pancreatectomy diabetes  Diabetes Duration: since TPAIT on 5/15/17  Usual Diabetes Regimen: Doses recently decreased to glargine 47 qAM, 37 qPM and aspart 1 unit/20 for BG >120- but has not had a chance to fully try this out (reduction made for switch to lower carb enteral formula).  Ability to Craig Prescribed Regimen: very good with help from wife as he recovers from surgery  Diabetes Control:   Lab Results   Component Value Date    A1C 5.3 05/16/2017    A1C 5.6 05/10/2017    A1C 5.4 09/10/2015     Diabetes Complications: none  Able to Detect Hypoglycemia: yes  Usual Diabetes Care Provider: Saw Dr. Moise for the first time on 6/1  Factors Impacting Glucose Control: infection, dehydration, recent change to lower carb enteral formula, occasional sips of juice (now switching to no carb clears).      Review of Systems  10 point ROS completed with pertinent positives and negatives noted in the HPI    Past medical, family and social histories are reviewed and updated.    Past Medical History  Past Medical History:   Diagnosis Date     Depression      Eye injury, penetrating 1978    left eye, blurry vision long distance      Pancreatic disease     pancreatitis     Pancreatitis 2012    outside notes indicate lipase elevations; labs available: 4/11/12 lipase 563 (); 5/7/12 1224 ()     Thyroid cancer (H)      Thyroid disease     thyroid cancer     Ventral hernia 2017    Present for a year, no pain       Family History  Family History   Problem Relation Age of Onset     Pancreatitis No family hx of      DIABETES No  "family hx of        Social History  Social History     Social History     Marital status:      Spouse name: N/A     Number of children: N/A     Years of education: N/A     Occupational History     MD      Social History Main Topics     Smoking status: Never Smoker     Smokeless tobacco: Never Used     Alcohol use No     Drug use: No     Sexual activity: Not on file     Other Topics Concern     Not on file     Social History Narrative    Sohan is , 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  He is a non-smoker and has never drank alcohol. He is a twin.           Physical Exam  /63 (BP Location: Right arm)  Pulse 72  Temp 98.7  F (37.1  C) (Oral)  Resp 16  Ht 1.88 m (6' 2\")  Wt 104.6 kg (230 lb 11.2 oz)  SpO2 96%  BMI 29.62 kg/m2    General:  Tired appearing man, resting in bed, in no distress.   HEENT: NC/AT, PER and anicteric, non-injected, oral mucous membranes moist.   Lungs: unlabored respiration, no cough  ABD: bandages intact, tender to light palpation  Skin: warm and dry, no obvious lesions  MSK:  fluid movement of all extremities  Lymp: trace pitting edema in bilateral LE    Mental status:  alert, oriented x3, communicating clearly- but at times seems confused.  Psych:  calm, even mood    Laboratory  Recent Labs   Lab Test  06/02/17   1632  05/30/17   1114   NA  140  137   POTASSIUM  4.0  4.0   CHLORIDE  101  102   CO2  32  29   ANIONGAP  7  6   GLC  70  90   BUN  19  17   CR  0.83  0.82   CRISTOBAL  8.8  8.8     CBC RESULTS:   Recent Labs   Lab Test  06/02/17   1632   WBC  15.2*   RBC  2.98*   HGB  8.0*   HCT  25.9*   MCV  87   MCH  26.8   MCHC  30.9*   RDW  15.5*   PLT  1233*       Liver Function Studies -   Recent Labs   Lab Test  05/30/17   1114   PROTTOTAL  7.0   ALBUMIN  2.6*   BILITOTAL  0.3   ALKPHOS  108   AST  20   ALT  24       Active Medications  Current Facility-Administered Medications   Medication     acetaminophen (TYLENOL) tablet 650 mg     amylase-lipase-protease (ZENPEP) " 80061 UNITS delayed release capsule 40,000 Units     [START ON 6/4/2017] insulin glargine (LANTUS) injection 40 Units     fluconazole (DIFLUCAN) intermittent infusion 400 mg in NaCl     HYDROmorphone (DILAUDID) liquid 1-2 mg     piperacillin-tazobactam (ZOSYN) 3.375 g vial to attach to  mL bag     dextrose 10% infusion     heparin  drip 25,000 units in 0.45% NaCl 250 mL (see additional administration details for dose)     heparin bolus from infusion pump     dextrose 10 % 1,000 mL infusion     aspirin EC EC tablet 81 mg     cholecalciferol (vitamin D/D-VI-SOL) liquid 2,000 Units     ferrous sulfate (IRON) tablet 325 mg     FLUoxetine (PROzac) solution 10 mg     folic acid (FOLVITE) tablet 1 mg     levothyroxine (SYNTHROID) suspension 150 mcg     multivitamins with minerals (CERTAVITE/CEROVITE) liquid 15 mL     ondansetron (ZOFRAN-ODT) ODT tab 4-8 mg     pantoprazole (PROTONIX) suspension 40 mg     sennosides (SENOKOT) syrup 10 mL     sodium bicarbonate tablet 325 mg     glucose 40 % gel 15-30 g    Or     dextrose 50 % injection 25-50 mL    Or     glucagon injection 1 mg     insulin aspart (NovoLOG) inj (RAPID ACTING)     naloxone (NARCAN) injection 0.1-0.4 mg     vancomycin (VANCOCIN) 1,500 mg in NaCl 0.9 % 250 mL intermittent infusion     fentaNYL (DURAGESIC) Patch in Place     fentaNYL (DURAGESIC) 75 mcg/hr 72 hr patch 1 patch     fentaNYL (DURAGESIC) patch REMOVAL     HYDROmorphone (PF) (DILAUDID) injection 0.3-0.5 mg     No current outpatient prescriptions on file.       Current Diet    Active Diet Order      Clear Liquid Diet      Assessment  Mr. Tanner Arita is a 55 yo man with a history of chronic pancreatitis s/p TPAIT on 5/15/17, with post pancreatectomy diabetes, who was admitted on 6/2/17 with wound infection, DVT of LLE, and dehydration.  Requiring much less insulin than anticipated on lower carb enteral formula.  Glucoses that have been above target range today may be a result of pt sipping on  some juice, other glucoses in the 90s-low 100s.      Plan  -continue glargine 40 units qAM, evening glargine dc'd for now  -correction aspart 1unit/20 for BG >120 q4hrs  -will place continuous prn order for IV insulin infusion to start if glucose is >200 x 2 (with target BG of ).  We will continue to follow.    Yazmin Poon PA-C [phone]    Diabetes Management Team job code: [id number]

## 2017-06-03 NOTE — PLAN OF CARE
"Problem: Goal Outcome Summary  Goal: Goal Outcome Summary     1900-0730 VSS on RA, A&Ox4, no s/s of distress. At start of shift, pt c/o increased discomfort/pain in abdomen and in left leg (DVT). Fentanyl patch placed on Right shoulder with little relief--MD paged and IV Dilaudid and PO oxy ordered. PRN Dilaudid 0.5mg administered x1 with relief and PRN tylenol x1 for headache. Pt did not request any further pain medications(pt does not wish to get any doses of Dilaudid higher than 1mg d/t \"it knocks me out.\"). Denied n/v. On abx therapy--zosyn and vanc. Heparin gtt with NS TKO for DVT @1800units/hr--Hep 10A level at midnight therapeutic (0.32) and no change in rate needed. Hep 10A to be drawn with morning labs. D10 infusion stopped when TF started per MD order. TF with enzymes/bicarb started last night @2030; rate is currently 75ml/hr which is goal with 20cc flushes q4 (changed from 30cc flushes to 20cc flushes d/t pt request; pt stated \"unbearable discomfort\" with any flushes greater than 20cc at a time; note left for team). Q4 BG checks; At start of shift, BG 61--remained with pt and pt given applejuice; recheck was 67--pt then given D50; after D50 given, BG was 122; pt refused to have evening dose of Lantus because he was afraid of \"bottoming out.\" Remaining BG overnight were then 102 & 97, no SS coverage given. Abdominal dressing to incision changed this morning per pt request--wet to dry dressing placed according to MD orders. Pt up with SBA; voiding adequately (had to be reminded to void in urinal to measure output, pt voided x2 in toilet), no BMs overnight, but passing \"a lot of gas,\" per pt report. Clear liquid diet--tolerating PO fairly well. Pt ambulated down one arriaza of unit with SBA and tolerated well. Pt resting quietly, no s/s of distress. Call light and belongings within reach. Bed in lowest position. Will continue to monitor and continue POC/update team as needed.       "

## 2017-06-04 LAB
ANION GAP SERPL CALCULATED.3IONS-SCNC: 7 MMOL/L (ref 3–14)
BASOPHILS # BLD AUTO: 0.1 10E9/L (ref 0–0.2)
BASOPHILS NFR BLD AUTO: 0.4 %
BUN SERPL-MCNC: 23 MG/DL (ref 7–30)
CALCIUM SERPL-MCNC: 8.3 MG/DL (ref 8.5–10.1)
CHLORIDE SERPL-SCNC: 105 MMOL/L (ref 94–109)
CO2 SERPL-SCNC: 29 MMOL/L (ref 20–32)
CREAT SERPL-MCNC: 0.94 MG/DL (ref 0.66–1.25)
DIFFERENTIAL METHOD BLD: ABNORMAL
EOSINOPHIL # BLD AUTO: 2.2 10E9/L (ref 0–0.7)
EOSINOPHIL NFR BLD AUTO: 12.3 %
ERYTHROCYTE [DISTWIDTH] IN BLOOD BY AUTOMATED COUNT: 15.9 % (ref 10–15)
GFR SERPL CREATININE-BSD FRML MDRD: 83 ML/MIN/1.7M2
GLUCOSE BLDC GLUCOMTR-MCNC: 133 MG/DL (ref 70–99)
GLUCOSE BLDC GLUCOMTR-MCNC: 133 MG/DL (ref 70–99)
GLUCOSE BLDC GLUCOMTR-MCNC: 135 MG/DL (ref 70–99)
GLUCOSE BLDC GLUCOMTR-MCNC: 138 MG/DL (ref 70–99)
GLUCOSE BLDC GLUCOMTR-MCNC: 147 MG/DL (ref 70–99)
GLUCOSE BLDC GLUCOMTR-MCNC: 156 MG/DL (ref 70–99)
GLUCOSE SERPL-MCNC: 126 MG/DL (ref 70–99)
HCT VFR BLD AUTO: 23.2 % (ref 40–53)
HGB BLD-MCNC: 7.1 G/DL (ref 13.3–17.7)
IMM GRANULOCYTES # BLD: 0.1 10E9/L (ref 0–0.4)
IMM GRANULOCYTES NFR BLD: 0.7 %
LMWH PPP CHRO-ACNC: 0.39 IU/ML
LYMPHOCYTES # BLD AUTO: 3.7 10E9/L (ref 0.8–5.3)
LYMPHOCYTES NFR BLD AUTO: 20.9 %
MCH RBC QN AUTO: 26.5 PG (ref 26.5–33)
MCHC RBC AUTO-ENTMCNC: 30.6 G/DL (ref 31.5–36.5)
MCV RBC AUTO: 87 FL (ref 78–100)
MONOCYTES # BLD AUTO: 2.1 10E9/L (ref 0–1.3)
MONOCYTES NFR BLD AUTO: 11.6 %
NEUTROPHILS # BLD AUTO: 9.6 10E9/L (ref 1.6–8.3)
NEUTROPHILS NFR BLD AUTO: 54.1 %
NRBC # BLD AUTO: 0 10*3/UL
NRBC BLD AUTO-RTO: 0 /100
PLATELET # BLD AUTO: 1112 10E9/L (ref 150–450)
POTASSIUM SERPL-SCNC: 4.2 MMOL/L (ref 3.4–5.3)
RBC # BLD AUTO: 2.68 10E12/L (ref 4.4–5.9)
SODIUM SERPL-SCNC: 141 MMOL/L (ref 133–144)
WBC # BLD AUTO: 17.7 10E9/L (ref 4–11)

## 2017-06-04 PROCEDURE — 80048 BASIC METABOLIC PNL TOTAL CA: CPT | Performed by: PHYSICIAN ASSISTANT

## 2017-06-04 PROCEDURE — 00000146 ZZHCL STATISTIC GLUCOSE BY METER IP

## 2017-06-04 PROCEDURE — 25000132 ZZH RX MED GY IP 250 OP 250 PS 637: Performed by: PHYSICIAN ASSISTANT

## 2017-06-04 PROCEDURE — 36415 COLL VENOUS BLD VENIPUNCTURE: CPT | Performed by: PHYSICIAN ASSISTANT

## 2017-06-04 PROCEDURE — 27210436 ZZH NUTRITION PRODUCT SEMIELEM INTERMED CAN

## 2017-06-04 PROCEDURE — 25000125 ZZHC RX 250: Performed by: PHYSICIAN ASSISTANT

## 2017-06-04 PROCEDURE — 12000025 ZZH R&B TRANSPLANT INTERMEDIATE

## 2017-06-04 PROCEDURE — 25000132 ZZH RX MED GY IP 250 OP 250 PS 637: Performed by: TRANSPLANT SURGERY

## 2017-06-04 PROCEDURE — 85025 COMPLETE CBC W/AUTO DIFF WBC: CPT | Performed by: PHYSICIAN ASSISTANT

## 2017-06-04 PROCEDURE — 85520 HEPARIN ASSAY: CPT | Performed by: PHYSICIAN ASSISTANT

## 2017-06-04 PROCEDURE — 25000128 H RX IP 250 OP 636: Performed by: PHYSICIAN ASSISTANT

## 2017-06-04 PROCEDURE — 25000132 ZZH RX MED GY IP 250 OP 250 PS 637: Performed by: STUDENT IN AN ORGANIZED HEALTH CARE EDUCATION/TRAINING PROGRAM

## 2017-06-04 PROCEDURE — 25000128 H RX IP 250 OP 636: Performed by: TRANSPLANT SURGERY

## 2017-06-04 RX ORDER — DEXTROSE, SODIUM CHLORIDE, SODIUM LACTATE, POTASSIUM CHLORIDE, AND CALCIUM CHLORIDE 5; .6; .31; .03; .02 G/100ML; G/100ML; G/100ML; G/100ML; G/100ML
INJECTION, SOLUTION INTRAVENOUS CONTINUOUS
Status: DISCONTINUED | OUTPATIENT
Start: 2017-06-05 | End: 2017-06-05

## 2017-06-04 RX ORDER — SIMETHICONE 80 MG
80 TABLET,CHEWABLE ORAL EVERY 6 HOURS PRN
Status: DISCONTINUED | OUTPATIENT
Start: 2017-06-04 | End: 2017-06-14 | Stop reason: HOSPADM

## 2017-06-04 RX ADMIN — MULTIVIT AND MINERALS-FERROUS GLUCONATE 9 MG IRON/15 ML ORAL LIQUID 15 ML: at 08:56

## 2017-06-04 RX ADMIN — INSULIN ASPART 1 UNITS: 100 INJECTION, SOLUTION INTRAVENOUS; SUBCUTANEOUS at 16:33

## 2017-06-04 RX ADMIN — ASPIRIN 81 MG: 81 TABLET, COATED ORAL at 08:57

## 2017-06-04 RX ADMIN — PANCRELIPASE 40000 UNITS: 20000; 68000; 109000 CAPSULE, DELAYED RELEASE ORAL at 00:16

## 2017-06-04 RX ADMIN — FERROUS SULFATE 325 MG: 325 TABLET, FILM COATED ORAL at 08:57

## 2017-06-04 RX ADMIN — PIPERACILLIN SODIUM,TAZOBACTAM SODIUM 3.38 G: 3; .375 INJECTION, POWDER, FOR SOLUTION INTRAVENOUS at 23:18

## 2017-06-04 RX ADMIN — PANCRELIPASE 40000 UNITS: 20000; 68000; 109000 CAPSULE, DELAYED RELEASE ORAL at 21:15

## 2017-06-04 RX ADMIN — Medication 2000 UNITS: at 08:57

## 2017-06-04 RX ADMIN — PIPERACILLIN SODIUM,TAZOBACTAM SODIUM 3.38 G: 3; .375 INJECTION, POWDER, FOR SOLUTION INTRAVENOUS at 04:35

## 2017-06-04 RX ADMIN — INSULIN ASPART 3 UNITS: 100 INJECTION, SOLUTION INTRAVENOUS; SUBCUTANEOUS at 11:37

## 2017-06-04 RX ADMIN — SODIUM BICARBONATE 325 MG: 325 TABLET ORAL at 08:55

## 2017-06-04 RX ADMIN — SENNOSIDES A AND B 10 ML: 415.36 LIQUID ORAL at 11:41

## 2017-06-04 RX ADMIN — INSULIN ASPART 2 UNITS: 100 INJECTION, SOLUTION INTRAVENOUS; SUBCUTANEOUS at 08:52

## 2017-06-04 RX ADMIN — PANCRELIPASE 40000 UNITS: 20000; 68000; 109000 CAPSULE, DELAYED RELEASE ORAL at 04:37

## 2017-06-04 RX ADMIN — LEVOTHYROXINE SODIUM 150 MCG: 300 TABLET ORAL at 08:56

## 2017-06-04 RX ADMIN — PANCRELIPASE 40000 UNITS: 20000; 68000; 109000 CAPSULE, DELAYED RELEASE ORAL at 08:55

## 2017-06-04 RX ADMIN — PANTOPRAZOLE SODIUM 40 MG: 40 TABLET, DELAYED RELEASE ORAL at 21:16

## 2017-06-04 RX ADMIN — SIMETHICONE CHEW TAB 80 MG 80 MG: 80 TABLET ORAL at 22:25

## 2017-06-04 RX ADMIN — PANTOPRAZOLE SODIUM 40 MG: 40 TABLET, DELAYED RELEASE ORAL at 08:56

## 2017-06-04 RX ADMIN — ACETAMINOPHEN 650 MG: 325 TABLET, FILM COATED ORAL at 13:53

## 2017-06-04 RX ADMIN — SODIUM BICARBONATE 325 MG: 325 TABLET ORAL at 00:16

## 2017-06-04 RX ADMIN — FOLIC ACID 1 MG: 1 TABLET ORAL at 08:57

## 2017-06-04 RX ADMIN — SODIUM BICARBONATE 325 MG: 325 TABLET ORAL at 17:56

## 2017-06-04 RX ADMIN — VANCOMYCIN HYDROCHLORIDE 1500 MG: 10 INJECTION, POWDER, LYOPHILIZED, FOR SOLUTION INTRAVENOUS at 05:58

## 2017-06-04 RX ADMIN — HYDROMORPHONE HYDROCHLORIDE 1 MG: 1 SOLUTION ORAL at 07:38

## 2017-06-04 RX ADMIN — SODIUM BICARBONATE 325 MG: 325 TABLET ORAL at 21:15

## 2017-06-04 RX ADMIN — PANCRELIPASE 40000 UNITS: 20000; 68000; 109000 CAPSULE, DELAYED RELEASE ORAL at 17:57

## 2017-06-04 RX ADMIN — HYDROMORPHONE HYDROCHLORIDE 1 MG: 1 SOLUTION ORAL at 11:34

## 2017-06-04 RX ADMIN — PIPERACILLIN SODIUM,TAZOBACTAM SODIUM 3.38 G: 3; .375 INJECTION, POWDER, FOR SOLUTION INTRAVENOUS at 18:25

## 2017-06-04 RX ADMIN — VANCOMYCIN HYDROCHLORIDE 1500 MG: 10 INJECTION, POWDER, LYOPHILIZED, FOR SOLUTION INTRAVENOUS at 19:48

## 2017-06-04 RX ADMIN — SODIUM BICARBONATE 325 MG: 325 TABLET ORAL at 04:35

## 2017-06-04 RX ADMIN — PANCRELIPASE 40000 UNITS: 20000; 68000; 109000 CAPSULE, DELAYED RELEASE ORAL at 13:51

## 2017-06-04 RX ADMIN — SODIUM BICARBONATE 325 MG: 325 TABLET ORAL at 13:50

## 2017-06-04 RX ADMIN — INSULIN ASPART 1 UNITS: 100 INJECTION, SOLUTION INTRAVENOUS; SUBCUTANEOUS at 04:36

## 2017-06-04 RX ADMIN — INSULIN ASPART 1 UNITS: 100 INJECTION, SOLUTION INTRAVENOUS; SUBCUTANEOUS at 21:16

## 2017-06-04 RX ADMIN — PIPERACILLIN SODIUM,TAZOBACTAM SODIUM 3.38 G: 3; .375 INJECTION, POWDER, FOR SOLUTION INTRAVENOUS at 11:46

## 2017-06-04 RX ADMIN — INSULIN ASPART 1 UNITS: 100 INJECTION, SOLUTION INTRAVENOUS; SUBCUTANEOUS at 00:16

## 2017-06-04 RX ADMIN — SENNOSIDES A AND B 10 ML: 415.36 LIQUID ORAL at 05:58

## 2017-06-04 RX ADMIN — ONDANSETRON 4 MG: 4 TABLET, ORALLY DISINTEGRATING ORAL at 07:38

## 2017-06-04 RX ADMIN — FLUCONAZOLE 400 MG: 2 INJECTION INTRAVENOUS at 16:39

## 2017-06-04 RX ADMIN — FLUOXETINE HYDROCHLORIDE 10 MG: 20 SOLUTION ORAL at 08:57

## 2017-06-04 NOTE — PLAN OF CARE
"Problem: Goal Outcome Summary  Goal: Goal Outcome Summary  Outcome: No Change  D: Tanner c/o'ed much today about feeling \"foggy\" all of the time and \"hating that feeling.\" VSS. Good UO. Pain controlled well most of the time, and Tanner maintains that he does not want to take much for pain medication, \"because of how it makes me feel.\"  Blood sugars stable today (on less Lantus than B4).  Slightly elevated temperatures late this afternoon: continues.  I:   Tanner's Fentanyl patch was reduced, his minimal oral pain medications were at first tried and then DC'ed due to untoward effects.  Encouraged to walk around 7A.  A:  By this time, Tanner states that he is starting to feel much more normal than before, but \"I still have a long way to go.\"      "

## 2017-06-04 NOTE — PROGRESS NOTES
Diabetes Consult Daily  Progress Note          Assessment/Plan:   Mr. Tanner Arita is a 55 yo man with a history of chronic pancreatitis s/p TPAIT on 5/15/17, with post pancreatectomy diabetes, who was admitted on 6/2/17 with wound infection, DVT of LLE, and dehydration.    Yesterday we continued with lower dose of basal insulin and glucoses ran in the 90s-130s.  BG up to 147 this morning, we continue with glargine 40 units this morning and midday BG now higher at 156.    Plan:  -Glargine 40 units QAM,  We will start a small evening dose of glargine to help with overnight hyperglycemia: 4 units qPM  -meal aspart 1 unit/20 for BG >120 q4h  -monitor glucose q4h.       Outpatient diabetes follow up: Has appointment with Lexy Brunson PA-C at [facility] for tomorrow morning- this will need to be rescheduled.  Plan discussed with patient, bedside RN.           Interval History:   The last 24 hours progress and nursing notes reviewed.  Continuous TF: Impact Peptide 1.5 at 75ml/h.    Tanner reports feeling very tired.  Plan for wound vac placement later today or tomorrow.  No carb intake since he had sips of juice yesterday morning.  Variable glucoses.  WBC up to 17 today.          Recent Labs  Lab 06/04/17  1135 06/04/17  0747 06/04/17  0720 06/04/17  0435 06/03/17  2356 06/03/17  2137 06/03/17  1934  06/02/17  1632 05/30/17  1114   GLC  --   --  126*  --   --   --   --   --  70 90   * 147*  --  133* 133* 105* 90  < >  --   --    < > = values in this interval not displayed.            Review of Systems:   See interval hx          Medications:       Active Diet Order      Clear Liquid Diet     Physical Exam:  Gen: Appears tired, resting in bed, in NAD.  Wife, Maribell, is at bedside.  HEENT: NC/AT, mucous membranes are moist  Resp: Unlabored  Neuro:oriented x3, communicating clearly  /63 (BP Location: Right arm)  Pulse 76  Temp 98.8  F (37.1  C) (Oral)  Resp 16  Ht 1.88 m (6'  "2\")  Wt 106.5 kg (234 lb 12.8 oz)  SpO2 (!) 88%  BMI 30.15 kg/m2           Data:     Lab Results   Component Value Date    A1C 5.3 05/16/2017    A1C 5.6 05/10/2017    A1C 5.4 09/10/2015              CBC RESULTS:   Recent Labs   Lab Test  06/04/17   0720   WBC  17.7*   RBC  2.68*   HGB  7.1*   HCT  23.2*   MCV  87   MCH  26.5   MCHC  30.6*   RDW  15.9*   PLT  1112*     Recent Labs   Lab Test  06/04/17   0720  06/02/17   1632   NA  141  140   POTASSIUM  4.2  4.0   CHLORIDE  105  101   CO2  29  32   ANIONGAP  7  7   GLC  126*  70   BUN  23  19   CR  0.94  0.83   CRISTOBAL  8.3*  8.8       Yazmin Poon PA-C [phone]  Diabetes Management job code [id number]          "

## 2017-06-04 NOTE — PLAN OF CARE
"Problem: Goal Outcome Summary  Goal: Goal Outcome Summary     1900-0730 TMax 100.1 at start of shift but by end of shift, temp down to 98.8 (pt had stated, \"I feel like my fever broke overnight.\"); BPs 100s/60s, OVSS on O2 overnight; O2 Sats were in low 90s and pt felt he \"wasn't breathing deep enough d/t to the pain medications\"--managed to wean pt down from 2L to 0.5L via humidified O2 by NC while keeping O2 sats >94%. Also obtained acapella for pt per his request and pt used acapella 5-7x during the night; pt expressed that he felt \"great improvement\" in his breathing. Denied n/v. C/o feeling \"stopped up\" with mild abdominal discomfort and also \"soreness\" in his left lower leg from DVT but declined medication intervention. Fentanyl patch in place on left shoulder. Continuous TF @75 w/enzymes and 20cc flushes q4hrs; tubing changed at midnight. Tolerating PO intake--only had ice water overnight. Q4 BG checks--102, 133, & 133, covered per SS orders. Heparin gtt with NS TKO @1800units/hr--hep 10A to be drawn with morning labs. Up with SBA, voiding adequately, passing gas, no BMs overnight (last BM 6/2) but senakot suspension administered this morning; sticky note left for team to add Milk of Mag per pt request. Pt slept intermittently between cares and stated that he felt \"well rested\"--slept approximately 5-6 hours (clustered cares to allow for pt to have uninterrupted sleep). Pt continues on zosyn and vanc abx IV therapy. Pt resting quietly now, no s/s of distress. Call light and belongings within reach. Will continue to monitor and continue POC/update team as needed.      @0500 contacted by RT that they were not going to draw ABGs that have been ordered for pt this morning d/t being unaware of reasoning behind order. Paged oncall surgical MD about order--oncall MD aware that ABGs not being drawn and will pass on to day team to address. Sticky note left for team to address as well.       "

## 2017-06-05 ENCOUNTER — APPOINTMENT (OUTPATIENT)
Dept: INTERVENTIONAL RADIOLOGY/VASCULAR | Facility: CLINIC | Age: 57
DRG: 862 | End: 2017-06-05
Attending: RADIOLOGY PRACTITIONER ASSISTANT
Payer: COMMERCIAL

## 2017-06-05 ENCOUNTER — MEDICAL CORRESPONDENCE (OUTPATIENT)
Dept: TRANSPLANT | Facility: CLINIC | Age: 57
End: 2017-06-05

## 2017-06-05 LAB
ABO + RH BLD: NORMAL
ABO + RH BLD: NORMAL
BACTERIA SPEC CULT: ABNORMAL
BASOPHILS # BLD AUTO: 0 10E9/L (ref 0–0.2)
BASOPHILS NFR BLD AUTO: 0.3 %
BLD GP AB SCN SERPL QL: NORMAL
BLOOD BANK CMNT PATIENT-IMP: NORMAL
DIFFERENTIAL METHOD BLD: ABNORMAL
EOSINOPHIL # BLD AUTO: 1.4 10E9/L (ref 0–0.7)
EOSINOPHIL NFR BLD AUTO: 9.1 %
ERYTHROCYTE [DISTWIDTH] IN BLOOD BY AUTOMATED COUNT: 16 % (ref 10–15)
GLUCOSE BLDC GLUCOMTR-MCNC: 124 MG/DL (ref 70–99)
GLUCOSE BLDC GLUCOMTR-MCNC: 126 MG/DL (ref 70–99)
GLUCOSE BLDC GLUCOMTR-MCNC: 136 MG/DL (ref 70–99)
GLUCOSE BLDC GLUCOMTR-MCNC: 139 MG/DL (ref 70–99)
GLUCOSE BLDC GLUCOMTR-MCNC: 151 MG/DL (ref 70–99)
GLUCOSE BLDC GLUCOMTR-MCNC: 153 MG/DL (ref 70–99)
GLUCOSE BLDC GLUCOMTR-MCNC: 158 MG/DL (ref 70–99)
GLUCOSE BLDC GLUCOMTR-MCNC: 161 MG/DL (ref 70–99)
GLUCOSE BLDC GLUCOMTR-MCNC: 185 MG/DL (ref 70–99)
GRAM STN SPEC: NORMAL
GRAM STN SPEC: NORMAL
HCT VFR BLD AUTO: 22.6 % (ref 40–53)
HGB BLD-MCNC: 6.8 G/DL (ref 13.3–17.7)
HGB BLD-MCNC: 6.9 G/DL (ref 13.3–17.7)
IMM GRANULOCYTES # BLD: 0.1 10E9/L (ref 0–0.4)
IMM GRANULOCYTES NFR BLD: 0.6 %
LMWH PPP CHRO-ACNC: 0.26 IU/ML
LMWH PPP CHRO-ACNC: 0.42 IU/ML
LYMPHOCYTES # BLD AUTO: 3.1 10E9/L (ref 0.8–5.3)
LYMPHOCYTES NFR BLD AUTO: 19.9 %
MCH RBC QN AUTO: 26.4 PG (ref 26.5–33)
MCHC RBC AUTO-ENTMCNC: 30.1 G/DL (ref 31.5–36.5)
MCV RBC AUTO: 88 FL (ref 78–100)
MICRO REPORT STATUS: ABNORMAL
MICRO REPORT STATUS: NORMAL
MONOCYTES # BLD AUTO: 1.7 10E9/L (ref 0–1.3)
MONOCYTES NFR BLD AUTO: 11 %
NEUTROPHILS # BLD AUTO: 9.2 10E9/L (ref 1.6–8.3)
NEUTROPHILS NFR BLD AUTO: 59.1 %
NRBC # BLD AUTO: 0.1 10*3/UL
NRBC BLD AUTO-RTO: 0 /100
PLATELET # BLD AUTO: 1098 10E9/L (ref 150–450)
RBC # BLD AUTO: 2.58 10E12/L (ref 4.4–5.9)
SPECIMEN EXP DATE BLD: NORMAL
SPECIMEN SOURCE: ABNORMAL
SPECIMEN SOURCE: NORMAL
VANCOMYCIN SERPL-MCNC: 13.2 MG/L
WBC # BLD AUTO: 15.5 10E9/L (ref 4–11)
YEAST SPEC QL CULT: NORMAL

## 2017-06-05 PROCEDURE — 86901 BLOOD TYPING SEROLOGIC RH(D): CPT | Performed by: NURSE PRACTITIONER

## 2017-06-05 PROCEDURE — 76942 ECHO GUIDE FOR BIOPSY: CPT

## 2017-06-05 PROCEDURE — 85520 HEPARIN ASSAY: CPT | Performed by: TRANSPLANT SURGERY

## 2017-06-05 PROCEDURE — 25000132 ZZH RX MED GY IP 250 OP 250 PS 637: Performed by: STUDENT IN AN ORGANIZED HEALTH CARE EDUCATION/TRAINING PROGRAM

## 2017-06-05 PROCEDURE — 93010 ELECTROCARDIOGRAM REPORT: CPT | Performed by: INTERNAL MEDICINE

## 2017-06-05 PROCEDURE — 40000556 ZZH STATISTIC PERIPHERAL IV START W US GUIDANCE

## 2017-06-05 PROCEDURE — 36415 COLL VENOUS BLD VENIPUNCTURE: CPT | Performed by: TRANSPLANT SURGERY

## 2017-06-05 PROCEDURE — 93005 ELECTROCARDIOGRAM TRACING: CPT

## 2017-06-05 PROCEDURE — 87181 SC STD AGAR DILUTION PER AGT: CPT | Performed by: NURSE PRACTITIONER

## 2017-06-05 PROCEDURE — 12000025 ZZH R&B TRANSPLANT INTERMEDIATE

## 2017-06-05 PROCEDURE — 0F913ZX DRAINAGE OF RIGHT LOBE LIVER, PERCUTANEOUS APPROACH, DIAGNOSTIC: ICD-10-PCS | Performed by: RADIOLOGY

## 2017-06-05 PROCEDURE — 25000128 H RX IP 250 OP 636: Performed by: NURSE PRACTITIONER

## 2017-06-05 PROCEDURE — 87205 SMEAR GRAM STAIN: CPT | Performed by: NURSE PRACTITIONER

## 2017-06-05 PROCEDURE — 25000131 ZZH RX MED GY IP 250 OP 636 PS 637: Performed by: CLINICAL NURSE SPECIALIST

## 2017-06-05 PROCEDURE — 25000128 H RX IP 250 OP 636: Performed by: TRANSPLANT SURGERY

## 2017-06-05 PROCEDURE — 85018 HEMOGLOBIN: CPT | Performed by: NURSE PRACTITIONER

## 2017-06-05 PROCEDURE — 25000132 ZZH RX MED GY IP 250 OP 250 PS 637: Performed by: PHYSICIAN ASSISTANT

## 2017-06-05 PROCEDURE — 25000128 H RX IP 250 OP 636: Performed by: PHYSICIAN ASSISTANT

## 2017-06-05 PROCEDURE — 00000146 ZZHCL STATISTIC GLUCOSE BY METER IP

## 2017-06-05 PROCEDURE — 87106 FUNGI IDENTIFICATION YEAST: CPT | Performed by: NURSE PRACTITIONER

## 2017-06-05 PROCEDURE — 079P3ZX DRAINAGE OF SPLEEN, PERCUTANEOUS APPROACH, DIAGNOSTIC: ICD-10-PCS | Performed by: RADIOLOGY

## 2017-06-05 PROCEDURE — 25800025 ZZH RX 258: Performed by: PHYSICIAN ASSISTANT

## 2017-06-05 PROCEDURE — 25000128 H RX IP 250 OP 636: Performed by: RADIOLOGY

## 2017-06-05 PROCEDURE — 85025 COMPLETE CBC W/AUTO DIFF WBC: CPT | Performed by: TRANSPLANT SURGERY

## 2017-06-05 PROCEDURE — 87102 FUNGUS ISOLATION CULTURE: CPT | Performed by: NURSE PRACTITIONER

## 2017-06-05 PROCEDURE — 27210436 ZZH NUTRITION PRODUCT SEMIELEM INTERMED CAN

## 2017-06-05 PROCEDURE — 25000125 ZZHC RX 250: Performed by: RADIOLOGY

## 2017-06-05 PROCEDURE — 80202 ASSAY OF VANCOMYCIN: CPT | Performed by: TRANSPLANT SURGERY

## 2017-06-05 PROCEDURE — 25000128 H RX IP 250 OP 636: Performed by: STUDENT IN AN ORGANIZED HEALTH CARE EDUCATION/TRAINING PROGRAM

## 2017-06-05 PROCEDURE — 86900 BLOOD TYPING SEROLOGIC ABO: CPT | Performed by: NURSE PRACTITIONER

## 2017-06-05 PROCEDURE — 87070 CULTURE OTHR SPECIMN AEROBIC: CPT | Performed by: NURSE PRACTITIONER

## 2017-06-05 PROCEDURE — 86850 RBC ANTIBODY SCREEN: CPT | Performed by: NURSE PRACTITIONER

## 2017-06-05 PROCEDURE — 36415 COLL VENOUS BLD VENIPUNCTURE: CPT | Performed by: NURSE PRACTITIONER

## 2017-06-05 PROCEDURE — 25000132 ZZH RX MED GY IP 250 OP 250 PS 637: Performed by: NURSE PRACTITIONER

## 2017-06-05 PROCEDURE — E2402 NEG PRESS WOUND THERAPY PUMP: HCPCS

## 2017-06-05 RX ORDER — FLUMAZENIL 0.1 MG/ML
0.2 INJECTION, SOLUTION INTRAVENOUS
Status: DISCONTINUED | OUTPATIENT
Start: 2017-06-05 | End: 2017-06-05 | Stop reason: HOSPADM

## 2017-06-05 RX ORDER — NALOXONE HYDROCHLORIDE 0.4 MG/ML
.1-.4 INJECTION, SOLUTION INTRAMUSCULAR; INTRAVENOUS; SUBCUTANEOUS
Status: DISCONTINUED | OUTPATIENT
Start: 2017-06-05 | End: 2017-06-05

## 2017-06-05 RX ORDER — SODIUM CHLORIDE 9 MG/ML
INJECTION, SOLUTION INTRAVENOUS CONTINUOUS
Status: DISCONTINUED | OUTPATIENT
Start: 2017-06-05 | End: 2017-06-05 | Stop reason: CLARIF

## 2017-06-05 RX ORDER — FLUMAZENIL 0.1 MG/ML
0.2 INJECTION, SOLUTION INTRAVENOUS
Status: DISCONTINUED | OUTPATIENT
Start: 2017-06-05 | End: 2017-06-05

## 2017-06-05 RX ORDER — PREGABALIN 20 MG/ML
25 SOLUTION ORAL DAILY
Status: DISCONTINUED | OUTPATIENT
Start: 2017-06-05 | End: 2017-06-14 | Stop reason: HOSPADM

## 2017-06-05 RX ORDER — FUROSEMIDE 10 MG/ML
10 INJECTION INTRAMUSCULAR; INTRAVENOUS ONCE
Status: COMPLETED | OUTPATIENT
Start: 2017-06-05 | End: 2017-06-05

## 2017-06-05 RX ORDER — FENTANYL CITRATE 50 UG/ML
25-50 INJECTION, SOLUTION INTRAMUSCULAR; INTRAVENOUS EVERY 5 MIN PRN
Status: DISCONTINUED | OUTPATIENT
Start: 2017-06-05 | End: 2017-06-05

## 2017-06-05 RX ORDER — NALOXONE HYDROCHLORIDE 0.4 MG/ML
.1-.4 INJECTION, SOLUTION INTRAMUSCULAR; INTRAVENOUS; SUBCUTANEOUS
Status: DISCONTINUED | OUTPATIENT
Start: 2017-06-05 | End: 2017-06-05 | Stop reason: HOSPADM

## 2017-06-05 RX ORDER — OXYCODONE HCL 5 MG/5 ML
5 SOLUTION, ORAL ORAL EVERY 4 HOURS PRN
Status: DISCONTINUED | OUTPATIENT
Start: 2017-06-05 | End: 2017-06-14 | Stop reason: HOSPADM

## 2017-06-05 RX ORDER — FENTANYL CITRATE 50 UG/ML
25-50 INJECTION, SOLUTION INTRAMUSCULAR; INTRAVENOUS EVERY 5 MIN PRN
Status: DISCONTINUED | OUTPATIENT
Start: 2017-06-05 | End: 2017-06-05 | Stop reason: HOSPADM

## 2017-06-05 RX ORDER — ONDANSETRON 2 MG/ML
4 INJECTION INTRAMUSCULAR; INTRAVENOUS EVERY 6 HOURS PRN
Status: DISCONTINUED | OUTPATIENT
Start: 2017-06-05 | End: 2017-06-14 | Stop reason: HOSPADM

## 2017-06-05 RX ADMIN — FLUOXETINE HYDROCHLORIDE 10 MG: 20 SOLUTION ORAL at 09:57

## 2017-06-05 RX ADMIN — VANCOMYCIN HYDROCHLORIDE 1500 MG: 10 INJECTION, POWDER, LYOPHILIZED, FOR SOLUTION INTRAVENOUS at 06:51

## 2017-06-05 RX ADMIN — FENTANYL CITRATE 100 MCG: 50 INJECTION INTRAMUSCULAR; INTRAVENOUS at 20:18

## 2017-06-05 RX ADMIN — PANCRELIPASE 40000 UNITS: 20000; 68000; 109000 CAPSULE, DELAYED RELEASE ORAL at 04:53

## 2017-06-05 RX ADMIN — HYDROMORPHONE HYDROCHLORIDE 0.5 MG: 1 INJECTION, SOLUTION INTRAMUSCULAR; INTRAVENOUS; SUBCUTANEOUS at 05:11

## 2017-06-05 RX ADMIN — FOLIC ACID 1 MG: 1 TABLET ORAL at 10:00

## 2017-06-05 RX ADMIN — FUROSEMIDE 10 MG: 10 INJECTION, SOLUTION INTRAVENOUS at 10:34

## 2017-06-05 RX ADMIN — PANCRELIPASE 40000 UNITS: 20000; 68000; 109000 CAPSULE, DELAYED RELEASE ORAL at 21:02

## 2017-06-05 RX ADMIN — SODIUM BICARBONATE 325 MG: 325 TABLET ORAL at 21:01

## 2017-06-05 RX ADMIN — SODIUM BICARBONATE 325 MG: 325 TABLET ORAL at 01:02

## 2017-06-05 RX ADMIN — PANTOPRAZOLE SODIUM 40 MG: 40 TABLET, DELAYED RELEASE ORAL at 10:18

## 2017-06-05 RX ADMIN — FLUCONAZOLE 400 MG: 2 INJECTION INTRAVENOUS at 15:57

## 2017-06-05 RX ADMIN — INSULIN ASPART 1 UNITS: 100 INJECTION, SOLUTION INTRAVENOUS; SUBCUTANEOUS at 20:59

## 2017-06-05 RX ADMIN — ONDANSETRON 4 MG: 2 INJECTION INTRAMUSCULAR; INTRAVENOUS at 05:17

## 2017-06-05 RX ADMIN — SENNOSIDES A AND B 10 ML: 415.36 LIQUID ORAL at 01:07

## 2017-06-05 RX ADMIN — MULTIVIT AND MINERALS-FERROUS GLUCONATE 9 MG IRON/15 ML ORAL LIQUID 15 ML: at 09:59

## 2017-06-05 RX ADMIN — PREGABALIN 25 MG: 20 SOLUTION ORAL at 12:13

## 2017-06-05 RX ADMIN — PANTOPRAZOLE SODIUM 40 MG: 40 TABLET, DELAYED RELEASE ORAL at 21:12

## 2017-06-05 RX ADMIN — INSULIN ASPART 2 UNITS: 100 INJECTION, SOLUTION INTRAVENOUS; SUBCUTANEOUS at 00:16

## 2017-06-05 RX ADMIN — INSULIN HUMAN 12 UNITS: 100 INJECTION, SUSPENSION SUBCUTANEOUS at 10:19

## 2017-06-05 RX ADMIN — PIPERACILLIN SODIUM,TAZOBACTAM SODIUM 3.38 G: 3; .375 INJECTION, POWDER, FOR SOLUTION INTRAVENOUS at 12:13

## 2017-06-05 RX ADMIN — PIPERACILLIN SODIUM,TAZOBACTAM SODIUM 3.38 G: 3; .375 INJECTION, POWDER, FOR SOLUTION INTRAVENOUS at 04:54

## 2017-06-05 RX ADMIN — SODIUM BICARBONATE 325 MG: 325 TABLET ORAL at 10:01

## 2017-06-05 RX ADMIN — SODIUM CHLORIDE: 9 INJECTION, SOLUTION INTRAVENOUS at 03:01

## 2017-06-05 RX ADMIN — Medication 2000 UNITS: at 09:57

## 2017-06-05 RX ADMIN — SODIUM BICARBONATE 325 MG: 325 TABLET ORAL at 04:53

## 2017-06-05 RX ADMIN — FERROUS SULFATE 325 MG: 325 TABLET, FILM COATED ORAL at 10:00

## 2017-06-05 RX ADMIN — INSULIN ASPART 4 UNITS: 100 INJECTION, SOLUTION INTRAVENOUS; SUBCUTANEOUS at 09:57

## 2017-06-05 RX ADMIN — ASPIRIN 81 MG: 81 TABLET, COATED ORAL at 10:00

## 2017-06-05 RX ADMIN — INSULIN ASPART 2 UNITS: 100 INJECTION, SOLUTION INTRAVENOUS; SUBCUTANEOUS at 04:53

## 2017-06-05 RX ADMIN — MIDAZOLAM 2 MG: 1 INJECTION INTRAMUSCULAR; INTRAVENOUS at 20:18

## 2017-06-05 RX ADMIN — INSULIN ASPART 1 UNITS: 100 INJECTION, SOLUTION INTRAVENOUS; SUBCUTANEOUS at 18:29

## 2017-06-05 RX ADMIN — PANCRELIPASE 40000 UNITS: 20000; 68000; 109000 CAPSULE, DELAYED RELEASE ORAL at 01:02

## 2017-06-05 RX ADMIN — LEVOTHYROXINE SODIUM 150 MCG: 300 TABLET ORAL at 09:59

## 2017-06-05 RX ADMIN — HEPARIN SODIUM 1800 UNITS/HR: 10000 INJECTION, SOLUTION INTRAVENOUS at 03:25

## 2017-06-05 RX ADMIN — OXYCODONE HYDROCHLORIDE 5 MG: 5 SOLUTION ORAL at 18:37

## 2017-06-05 RX ADMIN — PANCRELIPASE 40000 UNITS: 20000; 68000; 109000 CAPSULE, DELAYED RELEASE ORAL at 10:01

## 2017-06-05 RX ADMIN — ACETAMINOPHEN 650 MG: 325 TABLET, FILM COATED ORAL at 03:01

## 2017-06-05 RX ADMIN — SODIUM CHLORIDE, SODIUM LACTATE, POTASSIUM CHLORIDE, CALCIUM CHLORIDE AND DEXTROSE MONOHYDRATE: 5; 600; 310; 30; 20 INJECTION, SOLUTION INTRAVENOUS at 00:00

## 2017-06-05 RX ADMIN — INSULIN ASPART 1 UNITS: 100 INJECTION, SOLUTION INTRAVENOUS; SUBCUTANEOUS at 14:53

## 2017-06-05 RX ADMIN — PIPERACILLIN SODIUM,TAZOBACTAM SODIUM 3.38 G: 3; .375 INJECTION, POWDER, FOR SOLUTION INTRAVENOUS at 18:24

## 2017-06-05 RX ADMIN — DEXTROSE MONOHYDRATE: 100 INJECTION, SOLUTION INTRAVENOUS at 14:42

## 2017-06-05 RX ADMIN — OXYCODONE HYDROCHLORIDE 5 MG: 5 SOLUTION ORAL at 14:48

## 2017-06-05 RX ADMIN — TRAMADOL HYDROCHLORIDE 100 MG: 50 TABLET ORAL at 13:06

## 2017-06-05 RX ADMIN — VANCOMYCIN HYDROCHLORIDE 1750 MG: 10 INJECTION, POWDER, LYOPHILIZED, FOR SOLUTION INTRAVENOUS at 20:56

## 2017-06-05 RX ADMIN — HEPARIN SODIUM 1950 UNITS/HR: 10000 INJECTION, SOLUTION INTRAVENOUS at 06:08

## 2017-06-05 NOTE — PLAN OF CARE
"Problem: Goal Outcome Summary  Goal: Goal Outcome Summary     2614-1926 VSS on 1-2L O2 via NC overnight (pt will desat to high 80s on RA at times; had O2 on/off through shift; pt also c/o feeling short of breath; used acapella frequently through shift); Pt A&Ox4, pleasant though did have vague complaints overnight, in between periods of rest/sleep; complaints about feeling unwell increased after 2am. Pt mainly complained of feeling \"bloated\" stating his stomach felt \"tight and tender,\" mainly attributing it to gas. MD paged at midnight and Simethicone ordered/administered with some relief; at this time, pt declining pain medications, stating they made him feel \"foggy.\" At 0200; pt stated \"bloating feeling\" was back and requested dose of Senakot to help with a bowel movement; Senakot administered as well as Tylenol x1. By 4am, pt continued to state he generally felt unwell and could not specify where; pt clammy and restless. Ricardo LOPEZ paged to assess pt--see previous note; PRN Dilaudid administered x1 with some relief of pain. At the same time, pt then reported feeling nauseated--declined Zofran ODT; MD paged and IV Zofran ordered and administered--pt felt relief but then c/o of having \"hallucinations when I open and close my eyes,\" stating that he saw \"people walking in and out of my room.\" Pt attributes these hallucinations to Dilaudid though--sticky note left for team to address. Pt increasingly anxious from 0300 to end of shift--encouragement and positive reinforcement offered; care explained and questions encouraged/answered; pt expressed feelings of anxiety and frustration over his condition, again with vague statements like, \"I just don't feel good.\"    Continuous TF @75ml/hr with 20cc flushes and enzymes; pt NPO at midnight for possible drain placement today; NS@75ml/hr currently running per ricardo LOPEZ orders (see previous notes). Heparin gtt with NS TKO increased to 1950units/hr and 3000unit bolus given for " Heparin 10A level of 0.26 per MD orders. 2 PIVs in Rforearm. Up with SBA to bathroom; walked around unit 2-3x in the evening. Passing gas, large BM this morning. Voiding not saving. Q4 BG checks--pt required SS coverage for all BG overnight; BG were 138, 153, spotcheck at 0200 was 161 (see previous notes), and 158. Wound vac in place--dressing C/D/I on mid abdomen. Pt on zosyn/vanc abx therapy.     This morning, pt had critical Hgb 6.8--see previous note. Pt currently resting in bed, call light and belongings within reach. Will continue to monitor and continue POC/update team as needed.

## 2017-06-05 NOTE — PROGRESS NOTES
Pancreatitis Service - Daily Progress Note  06/05/2017    Assessment & Plan: 55yo with hx chronic pancreatitis s/p TPAIT 5/15/17.  Presented with wound infection and occlusive thrombosis left peroneal vein.    Cardiorespiratory: Hypoxia resolved.  GI/Nutrition:  On cycled TF.  NPO for abscess drainage.    Heme: Anemia, likely hemodilution.  Lasix today.  LLE DVT, on heparin gtt.  Fluid/Electrolytes:  Hypervolemic.  D/c IVF and TKO when diet resumed.  Lasix 10mg today.  : No acute issues  Post-pancreatectomy diabetes: Continue lantus/SSI, appreciate Endocrine consult.  Infection: Afebrile  -Wound infection: Opened in clinic, VAC on.  6/3 cx growing candida alb.  On fluconazole.    -Suspected abscess(es): Perihepatic and left sided fluid collections on imaging.  Plan drainage today with cultures.  Perihepatic collection more concerning.  On empiric Zosyn and Vanco.  Prophylaxis: Anticoagulation: Heparin gtt  Pain control: Fair.  Has not tolerated dilaudid well in the past.  Ultram ineffective.  Will try oxycodone.  Also has fentanyl 75mcg patch on, dose decreased prior to admission.  Restart Lyrica.  Activity: Up with assist  Anticipated LOS/Discharge: Undetermined    Medical Decision Making: Medium  Subsequent visit 23007 (moderate level decision making)    GELACIO/Fellow/Resident Provider: Areli Montanez NP [id number]    Faculty: Rodrigo Jaquez MD  __________________________________________________________________  Transplant History:   5/15/2017 ([facility]), Postoperative day: 21     Interval History: History is obtained from the patient  Overnight events: Pain BLQ, diarrhea this morning but formed stool this afternoon, hallucinating (believes due to dilaudid), intermittent nausea    ROS:   A 10-point review of systems was negative except as noted above.    Curent Meds:    vancomycin (VANCOCIN) IV  1,750 mg Intravenous Q12H     insulin glargine  26 Units Subcutaneous QAM     insulin glargine  26 Units Subcutaneous Q24H      "pregabalin  25 mg Oral Daily     amylase-lipase-protease  2 capsule Per Feeding Tube Q4H     fluconazole  400 mg Intravenous Q24H     piperacillin-tazobactam  3.375 g Intravenous Q6H     aspirin  81 mg Oral Daily     cholecalciferol  2,000 Units Per J Tube Daily     ferrous sulfate  325 mg Oral Daily     FLUoxetine  10 mg Per J Tube Daily     folic acid  1 mg Oral Daily     levothyroxine  150 mcg Per J Tube QAM AC     multivitamins with minerals  15 mL Per Feeding Tube Daily     pantoprazole  40 mg Oral or J tube BID     sodium bicarbonate  325 mg Per J Tube Q4H     insulin aspart  1-10 Units Subcutaneous Q4H     fentaNYL   Transdermal Q8H     fentaNYL  75 mcg Transdermal Q72H     fentaNYL   Transdermal Q72H       Physical Exam:     Admit Weight: 102.7 kg (226 lb 8 oz)    Current Vitals:   /68 (BP Location: Left arm)  Pulse 79  Temp 98.5  F (36.9  C) (Oral)  Resp 16  Ht 1.88 m (6' 2\")  Wt 107.6 kg (237 lb 3.2 oz)  SpO2 92%  BMI 30.45 kg/m2      Vital sign ranges:    Temp:  [97.2  F (36.2  C)-99  F (37.2  C)] 98.5  F (36.9  C)  Pulse:  [76-79] 79  Heart Rate:  [73-84] 80  Resp:  [16-19] 16  BP: (106-120)/(65-72) 120/68  SpO2:  [89 %-95 %] 92 %  Patient Vitals for the past 24 hrs:   BP Temp Temp src Pulse Heart Rate Resp SpO2 Weight   06/05/17 1121 120/68 98.5  F (36.9  C) Oral - 80 16 92 % -   06/05/17 0948 - - - - - - - 107.6 kg (237 lb 3.2 oz)   06/05/17 0859 118/68 97.9  F (36.6  C) Oral 79 79 18 93 % -   06/05/17 0456 120/72 97.2  F (36.2  C) Oral - 84 19 95 % -   06/05/17 0009 106/65 99  F (37.2  C) Oral - 73 18 94 % -   06/05/17 0002 - - - - - - 90 % -   06/05/17 0000 - - - - - - (!) 89 % -   06/04/17 1924 111/67 98.8  F (37.1  C) Oral 76 76 16 95 % -     General Appearance: in no apparent distress.   Skin: normal, warm, dry  Heart: regular rate and rhythm  Lungs: clear to auscultation  Abdomen: The abdomen is rounded, and  mildly tender. The wound has VAC dressing.   : king is not present.  "   Extremities: generalized tr to +1 edema    Data:   CMP  Recent Labs  Lab 06/04/17  0720 06/02/17  1632 05/30/17  1114    140 137   POTASSIUM 4.2 4.0 4.0   CHLORIDE 105 101 102   CO2 29 32 29   * 70 90   BUN 23 19 17   CR 0.94 0.83 0.82   GFRESTIMATED 83 >90Non  GFR Calc >90Non  GFR Calc   GFRESTBLACK >90African American GFR Calc >90African American GFR Calc >90African American GFR Calc   CRISTOBAL 8.3* 8.8 8.8   MAG  --  2.2  --    PHOS  --  2.5  --    ALBUMIN  --   --  2.6*   BILITOTAL  --   --  0.3   ALKPHOS  --   --  108   AST  --   --  20   ALT  --   --  24     CBC  Recent Labs  Lab 06/05/17  0415 06/04/17  0720   HGB 6.8* 7.1*   WBC 15.5* 17.7*   PLT 1098* 1112*     CoagsNo lab results found in last 7 days.    Invalid input(s): XA   Urinalysis  Recent Labs   Lab Test  06/02/17   1710  05/10/17   1015   COLOR  Yellow  Yellow   APPEARANCE  Clear  Clear   URINEGLC  Negative  Negative   URINEBILI  Negative  Negative   URINEKETONE  Negative  Negative   SG  1.019  1.022   UBLD  Negative  Negative   URINEPH  7.5*  6.5   PROTEIN  Negative  Negative   NITRITE  Negative  Negative   LEUKEST  Negative  Negative   RBCU  <1  2   WBCU  <1  <1   Attestation:  Patient has been seen and evaluated by me. Feeling poor today.  Need to get fluid sampled/drained.  Vital signs, labs, medications and orders were reviewed.   When obtained, diagnostic images were reviewed by me and interpreted as above.    The care plan was discussed with the multidisciplinary team and I agree with the findings and plan in this note, with any differences recorded in blue.    .

## 2017-06-05 NOTE — PROVIDER NOTIFICATION
"  Pt NPO @midnight and D5LR started while pt NPO per MD orders. Midnight , SS coverage given. Lantus 4 units also administered @2100. Spot checked pt BG at 0230 was 161; pt concerned this BG will \"get out of hand.\" Notified oncall transplant/surgery MD of situation--MD ordered for D5LR to be discontinued and to start NS@75 instead; no SS coverage to be given at this time per MD but recheck at 0400. Orders given via telephone with readback and entered as such. Will continue to monitor pt and continue POC/update team as needed.   "

## 2017-06-05 NOTE — PROCEDURES
Interventional Radiology Pre-Procedure Sedation Assessment   Time of Assessment: 6:36 PM    Expected Level: Moderate Sedation    Indication: Sedation is required for the following type of Procedure:     Sedation and procedural consent: Risks, benefits and alternatives were discussed with Patient    PO Intake: Appropriately NPO for procedure    ASA Class: Class 3 - SEVERE SYSTEMIC DISEASE, DEFINITE FUNCTIONAL LIMITATIONS.    Mallampati: Grade 2:  Soft palate, base of uvula, tonsillar pillars, and portion of posterior pharyngeal wall visible    Lungs: Lungs Clear with good breath sounds bilaterally and faint expiratory wheeze right chest    Heart: Normal heart sounds and rate    History and physical reviewed and no updates needed. I have reviewed the lab findings, diagnostic data, medications, and the plan for sedation. I have determined this patient to be an appropriate candidate for the planned sedation and procedure and have reassessed the patient IMMEDIATELY PRIOR to sedation and procedure.    Rand Tsang MD

## 2017-06-05 NOTE — PROGRESS NOTES
Diabetes Consult Daily  Progress Note          Assessment/Plan:   Mr. Tanner Arita is a 57 yo man with a history of chronic pancreatitis s/p TPAIT on 5/15/17, with post pancreatectomy diabetes, who was admitted on 6/2/17 with wound infection, DVT of LLE, and dehydration.    Glucose trending higher than target, particularly this morning, though note BG checked after glargine was due.    Plan:  -to revise to more equal dosing BID glargine, give 26 units now plus NPH 12  Units STAT, then 26 units tonight  -correction aspart 1 unit/20 for BG >120 q4h,   -monitor glucose q4h.       Outpatient diabetes follow up: Had appointment with Lexy Brunson PA-C at [facility]- this will need to be rescheduled once a discharge date is in sight  Plan discussed with patient, bedside RN.           Interval History:   The last 24 hours progress and nursing notes reviewed.  Continuous TF: Impact Peptide 1.5 at 75ml/h.  BG dropped significantly after TF changed and was off for a period.  Glargine doses decreased to 47 and 37 PTA  Yesterday got glargine 40 un AM and 4 units at night.  Received D5LR at 75 cc/h from about 2724-0906 ?d/t NPO?  .  TFs were infusing all night per I/O.  Tanner reports feeling very tired and complains of bloating/fullness w/ TF.  Discomfort worse after med administration.  Feels like he just needs a break in negative momentum.  Had loose stool earlier, but now formed this morning.  Awaiting r/o c.diff          Recent Labs  Lab 06/05/17  0948 06/05/17  0452 06/05/17  0447 06/05/17  0241 06/05/17  0014 06/04/17  2025  06/04/17  0720  06/02/17  1632 05/30/17  1114   GLC  --   --   --   --   --   --   --  126*  --  70 90   * 136* 158* 161* 153* 138*  < >  --   < >  --   --    < > = values in this interval not displayed.            Review of Systems:   See interval hx          Medications:       Active Diet Order      NPO per Anesthesia Guidelines for Procedure/Surgery Except  "for: Meds     Physical Exam:  Gen: Appears tired, resting in bed, in NAD.    HEENT: NC/AT, mucous membranes are moist  Resp: Unlabored  Neuro:oriented x3, communicating clearly  /68 (BP Location: Right arm)  Pulse 79  Temp 97.9  F (36.6  C) (Oral)  Resp 18  Ht 1.88 m (6' 2\")  Wt 107.6 kg (237 lb 3.2 oz)  SpO2 93%  BMI 30.45 kg/m2           Data:     Lab Results   Component Value Date    A1C 5.3 05/16/2017    A1C 5.6 05/10/2017    A1C 5.4 09/10/2015            Recent Labs   Lab Test  06/04/17   0720  06/02/17   1632   NA  141  140   POTASSIUM  4.2  4.0   CHLORIDE  105  101   CO2  29  32   ANIONGAP  7  7   GLC  126*  70   BUN  23  19   CR  0.94  0.83   CRISTOBAL  8.3*  8.8     CBC RESULTS:   Recent Labs   Lab Test  06/05/17   0415   WBC  15.5*   RBC  2.58*   HGB  6.8*   HCT  22.6*   MCV  88   MCH  26.4*   MCHC  30.1*   RDW  16.0*   PLT  1098*     Diabetes Management job code 0243          "

## 2017-06-05 NOTE — PLAN OF CARE
Problem: Goal Outcome Summary  Goal: Goal Outcome Summary  Outcome: No Change  D: Tanner says he continues to feel very weak and down, but  I:   He did walk in the hallway x 2, received pain medication x 1 (didn't like it again), and nausea medication (didn't work, he says).

## 2017-06-05 NOTE — PROVIDER NOTIFICATION
Also spoke with Areli Bolden NP about pt condition this morning and Hgb 6.8; she already spoke with Dr. Wright and screen ordered and team to address Hgb this a.m.

## 2017-06-05 NOTE — PLAN OF CARE
Problem: Goal Outcome Summary  Goal: Goal Outcome Summary  Outcome: No Change  VSS- requiring O2 intermittently. Pt states that he just feels awful today and attributes the feeling to some pain, bloating, agitation, and hallucinations. Dilaudid was stopped and tramadol tried. Tramadol did not work and gave pt HA. Oxycodone 5mg was administered with some relief. Pt is togo down to IR to have fluid collections drained. Heparin gtt stopped at 1430 and TF stopped at 1440. D10 initiated at 75cc/hr per MAR. Pt up 5kg since admission and received lasix 10mg. Pt stated he voided large amounts x4-5 but did not measure. Pt had one large loose bm at start of shift but has since had only 3 small, formed stools. C.diff order and iso precautions can be d/c'd per team. Wound vac is in place. G tube has been clamped. Wife was supportive at bedside most of shift. Continue to monitor and notify MD as needed.

## 2017-06-05 NOTE — CONSULTS
Patient is on IR schedule 6/5/2017  for a aspirated the right and left fluid collection.   Labs WNL for procedure.    Orders for NPO, scrubs  have been entered.   Consent will be done prior to procedure.  Please contact the IR charge RN at [phone] for estimated time of procedure.       Zabrina Rolon IR RPA  [phone] Call pager  [phone] pager

## 2017-06-05 NOTE — PHARMACY-VANCOMYCIN DOSING SERVICE
Pharmacy Vancomycin Note  Date of Service 2017  Patient's  1960   56 year old, male    Indication: Skin and Soft Tissue Infection  Goal Trough Level: 10-15 mg/L  Day of Therapy: 4  Current Vancomycin regimen:  1500 mg IV q12h  Other antibiotics: Zosyn 3.375gm IV q6h and Fluconazole 400mg IV q24h      Creatinine for last 3 days  2017:  4:32 PM Creatinine 0.83 mg/dL  2017:  7:20 AM Creatinine 0.94 mg/dL  Current estimated CrCl = Estimated Creatinine Clearance: 114.1 mL/min (based on Cr of 0.94).      Recent Vancomycin Levels (past 3 days)  2017:  4:15 AM Vancomycin Level 13.2 mg/L (8.25hr trough)    Vancomycin IV Administrations (past 72 hours)                   vancomycin (VANCOCIN) 1,500 mg in NaCl 0.9 % 250 mL intermittent infusion (mg) 1,500 mg New Bag 17 0651     1,500 mg New Bag 17 1948     1,500 mg New Bag  0558     1,500 mg New Bag 17 1758     1,500 mg New Bag  0553     1,500 mg New Bag 17 2150                Nephrotoxins and other renal medications (Future)    Start     Dose/Rate Route Frequency Ordered Stop    17 1800  vancomycin (VANCOCIN) 1,750 mg in NaCl 0.9 % 500 mL intermittent infusion      1,750 mg Intravenous EVERY 12 HOURS 17 0750      17 1715  piperacillin-tazobactam (ZOSYN) 3.375 g vial to attach to  mL bag      3.375 g  over 1 Hours Intravenous EVERY 6 HOURS 17 1706               Contrast Orders - past 72 hours (72h ago through future)    Start     Dose/Rate Route Frequency Ordered Stop    17 1745  iopamidol (ISOVUE-370) solution 135 mL      135 mL Intravenous ONCE 17 1733 17 1801          Interpretation of levels and current regimen:  Trough level is therapeutic, however drawn 4 hours early. Estimated trough at 12 hours is ~10mg/L.     Has serum creatinine changed > 50% in last 72 hours: No    Urine output:  good urine output (~0.7 ml/kg/hr on 17)    Renal Function: Stable    Plan:  1.   Increase Dose to 1750mg IV q12h to achieve vancomycin trough goal closer to 15 mg/L. Patient's WBC is still elevated.  2.  Pharmacy will check trough levels as appropriate in 1-3 Days.    3. Serum creatinine levels will be ordered every other day for at least 10 days while on concomitant nephrotoxins.      Eliza Singh, Pharm.D., [facility]  Pager [phone]

## 2017-06-05 NOTE — PROVIDER NOTIFICATION
"  Spoke in person @0455 with Dr. Olmstead about critical Hemoglobin value of 6.8; also requested for Dr. Olmstead to assess pt--pt c/o increased discomfort and \"bloated\" feeling in abdomen, pt also clammy and feeling increasingly unwell, expressing his feelings with vague statements such as \"i just feel weak and I feel like it's getting worse.\" Dr. Olmstead assessed pt--EKG ordered and Dr. Olmstead to pass on to day shift team about critical Hgb and whether or not they want to transfuse. IV dilaudid 0.5mg administered as well as IV Zofran 4mg for c/o of \"feeling queasy.\" Will continue to monitor pt and continue POC/update team as needed.   "

## 2017-06-06 ENCOUNTER — APPOINTMENT (OUTPATIENT)
Dept: ULTRASOUND IMAGING | Facility: CLINIC | Age: 57
DRG: 862 | End: 2017-06-06
Attending: NURSE PRACTITIONER
Payer: COMMERCIAL

## 2017-06-06 ENCOUNTER — APPOINTMENT (OUTPATIENT)
Dept: GENERAL RADIOLOGY | Facility: CLINIC | Age: 57
DRG: 862 | End: 2017-06-06
Attending: NURSE PRACTITIONER
Payer: COMMERCIAL

## 2017-06-06 ENCOUNTER — HOSPITAL ENCOUNTER (INPATIENT)
Dept: VASCULAR ULTRASOUND | Facility: CLINIC | Age: 57
DRG: 862 | End: 2017-06-06
Attending: NURSE PRACTITIONER | Admitting: TRANSPLANT SURGERY
Payer: COMMERCIAL

## 2017-06-06 LAB
ANION GAP SERPL CALCULATED.3IONS-SCNC: 4 MMOL/L (ref 3–14)
BASOPHILS # BLD AUTO: 0.1 10E9/L (ref 0–0.2)
BASOPHILS NFR BLD AUTO: 0.3 %
BUN SERPL-MCNC: 19 MG/DL (ref 7–30)
CALCIUM SERPL-MCNC: 8.7 MG/DL (ref 8.5–10.1)
CHLORIDE SERPL-SCNC: 106 MMOL/L (ref 94–109)
CO2 SERPL-SCNC: 31 MMOL/L (ref 20–32)
CREAT SERPL-MCNC: 0.84 MG/DL (ref 0.66–1.25)
DIFFERENTIAL METHOD BLD: ABNORMAL
EOSINOPHIL # BLD AUTO: 1.4 10E9/L (ref 0–0.7)
EOSINOPHIL NFR BLD AUTO: 8.8 %
ERYTHROCYTE [DISTWIDTH] IN BLOOD BY AUTOMATED COUNT: 16.4 % (ref 10–15)
GFR SERPL CREATININE-BSD FRML MDRD: ABNORMAL ML/MIN/1.7M2
GLUCOSE BLDC GLUCOMTR-MCNC: 115 MG/DL (ref 70–99)
GLUCOSE BLDC GLUCOMTR-MCNC: 119 MG/DL (ref 70–99)
GLUCOSE BLDC GLUCOMTR-MCNC: 130 MG/DL (ref 70–99)
GLUCOSE BLDC GLUCOMTR-MCNC: 138 MG/DL (ref 70–99)
GLUCOSE BLDC GLUCOMTR-MCNC: 86 MG/DL (ref 70–99)
GLUCOSE BLDC GLUCOMTR-MCNC: 97 MG/DL (ref 70–99)
GLUCOSE SERPL-MCNC: 135 MG/DL (ref 70–99)
GRAM STN SPEC: NORMAL
HCT VFR BLD AUTO: 22.8 % (ref 40–53)
HGB BLD-MCNC: 6.9 G/DL (ref 13.3–17.7)
IMM GRANULOCYTES # BLD: 0.1 10E9/L (ref 0–0.4)
IMM GRANULOCYTES NFR BLD: 0.6 %
INTERPRETATION ECG - MUSE: NORMAL
LMWH PPP CHRO-ACNC: 0.31 IU/ML
LMWH PPP CHRO-ACNC: NORMAL IU/ML
LYMPHOCYTES # BLD AUTO: 3.3 10E9/L (ref 0.8–5.3)
LYMPHOCYTES NFR BLD AUTO: 20.8 %
Lab: NORMAL
MCH RBC QN AUTO: 26.3 PG (ref 26.5–33)
MCHC RBC AUTO-ENTMCNC: 30.3 G/DL (ref 31.5–36.5)
MCV RBC AUTO: 87 FL (ref 78–100)
MICRO REPORT STATUS: NORMAL
MONOCYTES # BLD AUTO: 1.9 10E9/L (ref 0–1.3)
MONOCYTES NFR BLD AUTO: 12 %
NEUTROPHILS # BLD AUTO: 9.1 10E9/L (ref 1.6–8.3)
NEUTROPHILS NFR BLD AUTO: 57.5 %
NRBC # BLD AUTO: 0.1 10*3/UL
NRBC BLD AUTO-RTO: 1 /100
PLATELET # BLD AUTO: 1164 10E9/L (ref 150–450)
POTASSIUM SERPL-SCNC: 3.9 MMOL/L (ref 3.4–5.3)
RADIOLOGIST FLAGS: ABNORMAL
RBC # BLD AUTO: 2.62 10E12/L (ref 4.4–5.9)
SODIUM SERPL-SCNC: 140 MMOL/L (ref 133–144)
SPECIMEN SOURCE: NORMAL
WBC # BLD AUTO: 15.7 10E9/L (ref 4–11)

## 2017-06-06 PROCEDURE — 85520 HEPARIN ASSAY: CPT | Performed by: PHYSICIAN ASSISTANT

## 2017-06-06 PROCEDURE — 36415 COLL VENOUS BLD VENIPUNCTURE: CPT | Performed by: NURSE PRACTITIONER

## 2017-06-06 PROCEDURE — 87070 CULTURE OTHR SPECIMN AEROBIC: CPT | Performed by: NURSE PRACTITIONER

## 2017-06-06 PROCEDURE — 25000128 H RX IP 250 OP 636: Performed by: PHYSICIAN ASSISTANT

## 2017-06-06 PROCEDURE — 27210577 ZZ H INTRODUCER MICRO SET

## 2017-06-06 PROCEDURE — 25000132 ZZH RX MED GY IP 250 OP 250 PS 637: Performed by: NURSE PRACTITIONER

## 2017-06-06 PROCEDURE — 87106 FUNGI IDENTIFICATION YEAST: CPT | Performed by: NURSE PRACTITIONER

## 2017-06-06 PROCEDURE — 27210436 ZZH NUTRITION PRODUCT SEMIELEM INTERMED CAN

## 2017-06-06 PROCEDURE — 85025 COMPLETE CBC W/AUTO DIFF WBC: CPT | Performed by: PHYSICIAN ASSISTANT

## 2017-06-06 PROCEDURE — 85520 HEPARIN ASSAY: CPT | Performed by: NURSE PRACTITIONER

## 2017-06-06 PROCEDURE — 00000146 ZZHCL STATISTIC GLUCOSE BY METER IP

## 2017-06-06 PROCEDURE — 25000128 H RX IP 250 OP 636: Performed by: NURSE PRACTITIONER

## 2017-06-06 PROCEDURE — 25000125 ZZHC RX 250: Performed by: NURSE PRACTITIONER

## 2017-06-06 PROCEDURE — 25000128 H RX IP 250 OP 636: Performed by: TRANSPLANT SURGERY

## 2017-06-06 PROCEDURE — 27210195 ZZH KIT POWER PICC DOUBLE LUMEN

## 2017-06-06 PROCEDURE — 36415 COLL VENOUS BLD VENIPUNCTURE: CPT | Performed by: PHYSICIAN ASSISTANT

## 2017-06-06 PROCEDURE — 80048 BASIC METABOLIC PNL TOTAL CA: CPT | Performed by: NURSE PRACTITIONER

## 2017-06-06 PROCEDURE — 12000026 ZZH R&B TRANSPLANT

## 2017-06-06 PROCEDURE — E2402 NEG PRESS WOUND THERAPY PUMP: HCPCS

## 2017-06-06 PROCEDURE — 87205 SMEAR GRAM STAIN: CPT | Performed by: NURSE PRACTITIONER

## 2017-06-06 PROCEDURE — 36569 INSJ PICC 5 YR+ W/O IMAGING: CPT

## 2017-06-06 PROCEDURE — 93970 EXTREMITY STUDY: CPT

## 2017-06-06 PROCEDURE — 71020 XR CHEST 2 VW: CPT

## 2017-06-06 PROCEDURE — 87040 BLOOD CULTURE FOR BACTERIA: CPT | Performed by: NURSE PRACTITIONER

## 2017-06-06 PROCEDURE — 87103 BLOOD FUNGUS CULTURE: CPT | Performed by: NURSE PRACTITIONER

## 2017-06-06 PROCEDURE — 25000132 ZZH RX MED GY IP 250 OP 250 PS 637: Performed by: PHYSICIAN ASSISTANT

## 2017-06-06 RX ORDER — FUROSEMIDE 10 MG/ML
10 INJECTION INTRAMUSCULAR; INTRAVENOUS ONCE
Status: COMPLETED | OUTPATIENT
Start: 2017-06-06 | End: 2017-06-06

## 2017-06-06 RX ORDER — LIDOCAINE 40 MG/G
CREAM TOPICAL
Status: DISCONTINUED | OUTPATIENT
Start: 2017-06-06 | End: 2017-06-14 | Stop reason: HOSPADM

## 2017-06-06 RX ORDER — HEPARIN SODIUM,PORCINE 10 UNIT/ML
2-5 VIAL (ML) INTRAVENOUS
Status: COMPLETED | OUTPATIENT
Start: 2017-06-06 | End: 2017-06-08

## 2017-06-06 RX ADMIN — PANCRELIPASE 40000 UNITS: 20000; 68000; 109000 CAPSULE, DELAYED RELEASE ORAL at 20:53

## 2017-06-06 RX ADMIN — OXYCODONE HYDROCHLORIDE 2.5 MG: 5 SOLUTION ORAL at 15:35

## 2017-06-06 RX ADMIN — ASPIRIN 81 MG: 81 TABLET, COATED ORAL at 09:16

## 2017-06-06 RX ADMIN — INSULIN ASPART 1 UNITS: 100 INJECTION, SOLUTION INTRAVENOUS; SUBCUTANEOUS at 00:28

## 2017-06-06 RX ADMIN — LEVOTHYROXINE SODIUM 150 MCG: 300 TABLET ORAL at 09:15

## 2017-06-06 RX ADMIN — FENTANYL 1 PATCH: 75 PATCH, EXTENDED RELEASE TRANSDERMAL at 15:13

## 2017-06-06 RX ADMIN — SODIUM BICARBONATE 325 MG: 325 TABLET ORAL at 10:54

## 2017-06-06 RX ADMIN — SODIUM BICARBONATE 325 MG: 325 TABLET ORAL at 00:49

## 2017-06-06 RX ADMIN — PREGABALIN 25 MG: 20 SOLUTION ORAL at 09:21

## 2017-06-06 RX ADMIN — INSULIN GLARGINE 30 UNITS: 100 INJECTION, SOLUTION SUBCUTANEOUS at 09:17

## 2017-06-06 RX ADMIN — FLUOXETINE HYDROCHLORIDE 10 MG: 20 SOLUTION ORAL at 09:16

## 2017-06-06 RX ADMIN — PIPERACILLIN SODIUM,TAZOBACTAM SODIUM 3.38 G: 3; .375 INJECTION, POWDER, FOR SOLUTION INTRAVENOUS at 17:20

## 2017-06-06 RX ADMIN — SODIUM BICARBONATE 325 MG: 325 TABLET ORAL at 14:43

## 2017-06-06 RX ADMIN — OXYCODONE HYDROCHLORIDE 2.5 MG: 5 SOLUTION ORAL at 02:05

## 2017-06-06 RX ADMIN — FUROSEMIDE 10 MG: 10 INJECTION, SOLUTION INTRAVENOUS at 14:43

## 2017-06-06 RX ADMIN — OXYCODONE HYDROCHLORIDE 5 MG: 5 SOLUTION ORAL at 20:44

## 2017-06-06 RX ADMIN — FERROUS SULFATE 325 MG: 325 TABLET, FILM COATED ORAL at 09:16

## 2017-06-06 RX ADMIN — PIPERACILLIN SODIUM,TAZOBACTAM SODIUM 3.38 G: 3; .375 INJECTION, POWDER, FOR SOLUTION INTRAVENOUS at 11:00

## 2017-06-06 RX ADMIN — OXYCODONE HYDROCHLORIDE 5 MG: 5 SOLUTION ORAL at 06:25

## 2017-06-06 RX ADMIN — PANCRELIPASE 40000 UNITS: 20000; 68000; 109000 CAPSULE, DELAYED RELEASE ORAL at 18:42

## 2017-06-06 RX ADMIN — Medication 2000 UNITS: at 09:17

## 2017-06-06 RX ADMIN — PANCRELIPASE 40000 UNITS: 20000; 68000; 109000 CAPSULE, DELAYED RELEASE ORAL at 05:24

## 2017-06-06 RX ADMIN — FOLIC ACID 1 MG: 1 TABLET ORAL at 09:16

## 2017-06-06 RX ADMIN — INSULIN ASPART 1 UNITS: 100 INJECTION, SOLUTION INTRAVENOUS; SUBCUTANEOUS at 14:48

## 2017-06-06 RX ADMIN — PANCRELIPASE 40000 UNITS: 20000; 68000; 109000 CAPSULE, DELAYED RELEASE ORAL at 14:43

## 2017-06-06 RX ADMIN — PIPERACILLIN SODIUM,TAZOBACTAM SODIUM 3.38 G: 3; .375 INJECTION, POWDER, FOR SOLUTION INTRAVENOUS at 22:44

## 2017-06-06 RX ADMIN — PANCRELIPASE 40000 UNITS: 20000; 68000; 109000 CAPSULE, DELAYED RELEASE ORAL at 10:54

## 2017-06-06 RX ADMIN — VANCOMYCIN HYDROCHLORIDE 1750 MG: 10 INJECTION, POWDER, LYOPHILIZED, FOR SOLUTION INTRAVENOUS at 17:00

## 2017-06-06 RX ADMIN — LIDOCAINE HYDROCHLORIDE 5 ML: 10 INJECTION, SOLUTION INFILTRATION; PERINEURAL at 20:14

## 2017-06-06 RX ADMIN — SODIUM BICARBONATE 325 MG: 325 TABLET ORAL at 20:53

## 2017-06-06 RX ADMIN — MULTIVIT AND MINERALS-FERROUS GLUCONATE 9 MG IRON/15 ML ORAL LIQUID 15 ML: at 09:16

## 2017-06-06 RX ADMIN — MICAFUNGIN SODIUM 150 MG: 10 INJECTION, POWDER, LYOPHILIZED, FOR SOLUTION INTRAVENOUS at 14:42

## 2017-06-06 RX ADMIN — SODIUM BICARBONATE 325 MG: 325 TABLET ORAL at 05:24

## 2017-06-06 RX ADMIN — SODIUM BICARBONATE 325 MG: 325 TABLET ORAL at 18:42

## 2017-06-06 RX ADMIN — PANCRELIPASE 40000 UNITS: 20000; 68000; 109000 CAPSULE, DELAYED RELEASE ORAL at 00:49

## 2017-06-06 RX ADMIN — PANTOPRAZOLE SODIUM 40 MG: 40 TABLET, DELAYED RELEASE ORAL at 20:44

## 2017-06-06 RX ADMIN — OXYCODONE HYDROCHLORIDE 2.5 MG: 5 SOLUTION ORAL at 10:49

## 2017-06-06 RX ADMIN — PANTOPRAZOLE SODIUM 40 MG: 40 TABLET, DELAYED RELEASE ORAL at 09:16

## 2017-06-06 RX ADMIN — PIPERACILLIN SODIUM,TAZOBACTAM SODIUM 3.38 G: 3; .375 INJECTION, POWDER, FOR SOLUTION INTRAVENOUS at 01:56

## 2017-06-06 NOTE — PROVIDER NOTIFICATION
"ARABELLA Singleton was verbally informed that Tanner's Hgb=6.9 (same as last draw), but Areli had already seen this.  In addition to this, the following text message was soon after sent to Areli's pager: \"GINA: The lab noted that I was told that Tanner's platelets are 1164 this morning (this is up from 1098): they didn't actually tell me, but I just noticed it now. Thank you.\"  "

## 2017-06-06 NOTE — PLAN OF CARE
Problem: Goal Outcome Summary  Goal: Goal Outcome Summary  Outcome: No Change  AVSS on 2L NC.  115. Pt complains of abdominal pain, oxycodone given x2. Denies nausea. Right PIV with Heparin gtt 19.5(started @ midnight), next 10a with morning labs. NJ with TF @ 75ml/hr. Fentanyl patch on left deltoid. Wound vac @ 125. Voiding adequate amounts, no reported BM. Clear liquid diet. Up SBA. Will continue to monitor. Call light in reach, continue with POC.

## 2017-06-06 NOTE — PROGRESS NOTES
Pancreatitis Service - Daily Progress Note  06/06/2017    Assessment & Plan: 55yo with hx chronic pancreatitis s/p TPAIT 5/15/17.  Presented to the hospital from clinic with wound infection and LLE pain. Work up revealed an left peroneal vein DVT. Patient admitted to initiate anticoagulation, wound care and antibiotics.    Cardiorespiratory: Hemodynamically stable, saturations ok on room air.  GI/Nutrition: On cycled TF and tolerating clears. Will contact IR for drainage of abdominal collections  Heme: On heparin gtt for LLE DVT.  Fluid/Electrolytes: Continue hydration and replacement of lytes as needed.  : No acute issues  Post-pancreatectomy diabetes: Continue lantus/SSI, appreciate Endocrine consult.  Infection: wbc 12 => 15 => 17. Abdominal CT with fluid collections perihepatic and to splenic bed. Empirically started on Zosyn, Vanco, Diflucan. Blood/Urine cultures pending. Plan for IR drainage of intraabdominal collections. Wound VAC placement. Will send for C.diff.  Prophylaxis: Anticoagulation: Heparin gtt  Pain control: Hallucinations with dilaudid, stopped. Oxycodone for pain and Fentanyl 75mcg patch and Lyrica.  Disposition: 7A    Medical Decision Making: Medium  Subsequent visit 89710 (moderate level decision making)    GELACIO/Fellow/Resident Provider:  Jas Jackson MD, MPH  Transplant Fellow  Pager# [phone]    Faculty: Rodrigo Jaquez MD  __________________________________________________________________  Transplant History:   5/15/2017 ([facility]), Postoperative day: 20    Interval History: History is obtained from the patient  Overnight events: Overall feels ok, complains more of LUQ pain, tolerating feeds.    ROS:   A 10-point review of systems was negative except as noted above.    Curent Meds:    insulin glargine  30 Units Subcutaneous QAM     micafungin  150 mg Intravenous Q24H     [START ON 6/7/2017] micafungin  100 mg Intravenous Q24H     insulin glargine  30 Units Subcutaneous Q24H     vancomycin  "(VANCOCIN) IV  1,750 mg Intravenous Q12H     pregabalin  25 mg Oral Daily     amylase-lipase-protease  2 capsule Per Feeding Tube Q4H     piperacillin-tazobactam  3.375 g Intravenous Q6H     aspirin  81 mg Oral Daily     cholecalciferol  2,000 Units Per J Tube Daily     ferrous sulfate  325 mg Oral Daily     FLUoxetine  10 mg Per J Tube Daily     folic acid  1 mg Oral Daily     levothyroxine  150 mcg Per J Tube QAM AC     multivitamins with minerals  15 mL Per Feeding Tube Daily     pantoprazole  40 mg Oral or J tube BID     sodium bicarbonate  325 mg Per J Tube Q4H     insulin aspart  1-10 Units Subcutaneous Q4H     fentaNYL   Transdermal Q8H     fentaNYL  75 mcg Transdermal Q72H     fentaNYL   Transdermal Q72H       Physical Exam:     Admit Weight: 102.7 kg (226 lb 8 oz)    Current Vitals:   /70 (BP Location: Right arm)  Pulse 71  Temp 98.7  F (37.1  C) (Oral)  Resp 18  Ht 1.88 m (6' 2\")  Wt 107.6 kg (237 lb 3.2 oz)  SpO2 95%  BMI 30.45 kg/m2      Vital sign ranges:    Temp:  [98.3  F (36.8  C)-98.9  F (37.2  C)] 98.7  F (37.1  C)  Pulse:  [70-79] 71  Heart Rate:  [70-79] 74  Resp:  [16-18] 18  BP: (111-137)/(66-74) 135/70  SpO2:  [93 %-97 %] 95 %  Patient Vitals for the past 24 hrs:   BP Temp Temp src Pulse Heart Rate Resp SpO2   06/06/17 1546 135/70 98.7  F (37.1  C) Oral - 74 18 95 %   06/06/17 1137 137/74 98.3  F (36.8  C) Oral 71 - 16 93 %   06/06/17 0800 125/72 98.7  F (37.1  C) Oral 76 - 16 93 %   06/06/17 0428 114/66 98.4  F (36.9  C) Oral 71 71 16 93 %   06/06/17 0025 118/71 98.6  F (37  C) Oral - 77 16 94 %   06/05/17 2109 111/71 98.9  F (37.2  C) Oral 70 70 16 96 %   06/05/17 2010 116/71 - - 79 79 16 97 %   06/05/17 2000 120/73 - - 76 76 16 95 %   06/05/17 1950 121/69 - - 74 74 16 96 %   06/05/17 1940 121/73 - - 78 78 17 94 %     General Appearance: in no apparent distress.   Skin: normal, warm, dry  Heart: regular rate and rhythm  Lungs: clear to auscultation  Abdomen: The abdomen is " rounded, and appropriately tender, no guarding/rebound. Wound is packed, wet-to-dry. Edges clean, good granulation tissue, no erytherma. Inferior staples intact.  : king is not present.    Extremities: +1 edema    Data:   CMP    Recent Labs  Lab 06/06/17  0954 06/04/17  0720 06/02/17  1632    141 140   POTASSIUM 3.9 4.2 4.0   CHLORIDE 106 105 101   CO2 31 29 32   * 126* 70   BUN 19 23 19   CR 0.84 0.94 0.83   GFRESTIMATED >90Non  GFR Calc 83 >90Non  GFR Calc   GFRESTBLACK >90African American GFR Calc >90African American GFR Calc >90African American GFR Calc   CRISTOBAL 8.7 8.3* 8.8   MAG  --   --  2.2   PHOS  --   --  2.5     CBC    Recent Labs  Lab 06/06/17  0559 06/05/17  1637 06/05/17  0415   HGB 6.9* 6.9* 6.8*   WBC 15.7*  --  15.5*   PLT 1164*  --  1098*     CoagsNo lab results found in last 7 days.    Invalid input(s): XA   Urinalysis  Recent Labs   Lab Test  06/02/17   1710  05/10/17   1015   COLOR  Yellow  Yellow   APPEARANCE  Clear  Clear   URINEGLC  Negative  Negative   URINEBILI  Negative  Negative   URINEKETONE  Negative  Negative   SG  1.019  1.022   UBLD  Negative  Negative   URINEPH  7.5*  6.5   PROTEIN  Negative  Negative   NITRITE  Negative  Negative   LEUKEST  Negative  Negative   RBCU  <1  2   WBCU  <1  <1

## 2017-06-06 NOTE — PROGRESS NOTES
Pancreatitis Service - Daily Progress Note  06/06/2017    Assessment & Plan: 57yo with hx chronic pancreatitis s/p TPAIT 5/15/17.  Presented with wound infection and occlusive thrombosis left peroneal vein.    Cardiorespiratory: Hypoxia, check CXR today.  GI/Nutrition:  On cycled TF.    Heme: Anemia, stable, likely hemodilution.  LLE DVT, on heparin gtt.  Check US BUE today.  Fluid/Electrolytes:  Hypervolemic.  May give more diuretics based on CXR.  : No acute issues  Post-pancreatectomy diabetes: Continue lantus/SSI, appreciate Endocrine consult.  Infection: Afebrile, WBC 15.5 (stable)  -Wound infection: Opened in clinic, VAC on.  6/3 cx growing candida alb.  Persistent purulent drainage today under VAC.  Will change to wet to dry dressings TID.  On fluconazole 6/3-6/6, change to micafungin today.    -Suspected abscess(es): Perihepatic and left sided fluid collections on imaging.  Both aspirated 6/5, gram stains negative.  Await cultures.  On empiric Zosyn and Vanco.  Prophylaxis: Anticoagulation: Heparin gtt  Pain control: Fair control with PRN oxycodone.  Also has fentanyl 75mcg patch on, dose decreased prior to admission.  Restarted Lyrica.  Hallucinations likely unrelated to narcotics.  Neuro: Mild delirium with occasional hallucinations.  Secondary to infection as changes in narcotics did not improve symptoms.  Activity: Up with assist  Anticipated LOS/Discharge: Undetermined    Medical Decision Making: Medium  Subsequent visit 04172 (moderate level decision making)    GELACIO/Fellow/Resident Provider: Areli Montanez NP [id number]    Faculty: Rodrigo Jaquez MD  __________________________________________________________________  Transplant History:   5/15/2017 ([facility]), Postoperative day: 22     Interval History: History is obtained from the patient  Overnight events: Feels overall weaker, more dyspnea, some chills, left flank pain, no diarrhea    ROS:   A 10-point review of systems was negative except as noted  "above.    Curent Meds:    insulin glargine  30 Units Subcutaneous QAM     micafungin  150 mg Intravenous Q24H     [START ON 6/7/2017] micafungin  100 mg Intravenous Q24H     vancomycin (VANCOCIN) IV  1,750 mg Intravenous Q12H     insulin glargine  26 Units Subcutaneous Q24H     pregabalin  25 mg Oral Daily     amylase-lipase-protease  2 capsule Per Feeding Tube Q4H     piperacillin-tazobactam  3.375 g Intravenous Q6H     aspirin  81 mg Oral Daily     cholecalciferol  2,000 Units Per J Tube Daily     ferrous sulfate  325 mg Oral Daily     FLUoxetine  10 mg Per J Tube Daily     folic acid  1 mg Oral Daily     levothyroxine  150 mcg Per J Tube QAM AC     multivitamins with minerals  15 mL Per Feeding Tube Daily     pantoprazole  40 mg Oral or J tube BID     sodium bicarbonate  325 mg Per J Tube Q4H     insulin aspart  1-10 Units Subcutaneous Q4H     fentaNYL   Transdermal Q8H     fentaNYL  75 mcg Transdermal Q72H     fentaNYL   Transdermal Q72H       Physical Exam:     Admit Weight: 102.7 kg (226 lb 8 oz)    Current Vitals:   /72 (BP Location: Right arm)  Pulse 76  Temp 98.7  F (37.1  C) (Oral)  Resp 16  Ht 1.88 m (6' 2\")  Wt 107.6 kg (237 lb 3.2 oz)  SpO2 93%  BMI 30.45 kg/m2      Vital sign ranges:    Temp:  [98.4  F (36.9  C)-98.9  F (37.2  C)] 98.7  F (37.1  C)  Pulse:  [70-79] 76  Heart Rate:  [70-81] 71  Resp:  [16-17] 16  BP: (111-125)/(66-73) 125/72  SpO2:  [88 %-97 %] 93 %  Patient Vitals for the past 24 hrs:   BP Temp Temp src Pulse Heart Rate Resp SpO2   06/06/17 0800 125/72 98.7  F (37.1  C) Oral 76 - 16 93 %   06/06/17 0428 114/66 98.4  F (36.9  C) Oral 71 71 16 93 %   06/06/17 0025 118/71 98.6  F (37  C) Oral - 77 16 94 %   06/05/17 2109 111/71 98.9  F (37.2  C) Oral 70 70 16 96 %   06/05/17 2010 116/71 - - 79 79 16 97 %   06/05/17 2000 120/73 - - 76 76 16 95 %   06/05/17 1950 121/69 - - 74 74 16 96 %   06/05/17 1940 121/73 - - 78 78 17 94 %   06/05/17 1609 - - - - - - 93 %   06/05/17 1604 " 117/69 98.9  F (37.2  C) Oral - 81 16 (!) 88 %   06/05/17 1121 120/68 98.5  F (36.9  C) Oral - 80 16 92 %     General Appearance: in no apparent distress.   Skin: normal, warm, dry  Heart: regular rate and rhythm  Lungs: Crackles bilat mid to base, 3L  Abdomen: The abdomen is rounded, and mildly tender. The wound has VAC dressing.   : king is not present.    Extremities: generalized +1 edema  Neuro: A&Ox4    Data:   CMP    Recent Labs  Lab 06/06/17  0954 06/04/17  0720 06/02/17  1632 05/30/17  1114    141 140 137   POTASSIUM 3.9 4.2 4.0 4.0   CHLORIDE 106 105 101 102   CO2 31 29 32 29   * 126* 70 90   BUN 19 23 19 17   CR 0.84 0.94 0.83 0.82   GFRESTIMATED >90Non  GFR Calc 83 >90Non  GFR Calc >90Non  GFR Calc   GFRESTBLACK >90African American GFR Calc >90African American GFR Calc >90African American GFR Calc >90African American GFR Calc   CRISTOBAL 8.7 8.3* 8.8 8.8   MAG  --   --  2.2  --    PHOS  --   --  2.5  --    ALBUMIN  --   --   --  2.6*   BILITOTAL  --   --   --  0.3   ALKPHOS  --   --   --  108   AST  --   --   --  20   ALT  --   --   --  24     CBC    Recent Labs  Lab 06/06/17  0559 06/05/17  1637 06/05/17  0415   HGB 6.9* 6.9* 6.8*   WBC 15.7*  --  15.5*   PLT 1164*  --  1098*     CoagsNo lab results found in last 7 days.    Invalid input(s): XA   Urinalysis  Recent Labs   Lab Test  06/02/17   1710  05/10/17   1015   COLOR  Yellow  Yellow   APPEARANCE  Clear  Clear   URINEGLC  Negative  Negative   URINEBILI  Negative  Negative   URINEKETONE  Negative  Negative   SG  1.019  1.022   UBLD  Negative  Negative   URINEPH  7.5*  6.5   PROTEIN  Negative  Negative   NITRITE  Negative  Negative   LEUKEST  Negative  Negative   RBCU  <1  2   WBCU  <1  <1   Attestation:  Patient has been seen and evaluated by me.   Vital signs, labs, medications and orders were reviewed.   When obtained, diagnostic images were reviewed by me and interpreted as above.    The  care plan was discussed with the multidisciplinary team and I agree with the findings and plan in this note, with any differences recorded in blue.    .

## 2017-06-06 NOTE — PLAN OF CARE
Problem: Goal Outcome Summary  Goal: Goal Outcome Summary  Outcome: No Change  [id number]: Afebrile. VSS on 1-1.5L O2. Pt went down to IR to have fluid collections drained around 1900. Prior to the procedure, pt was complaining of abdominal pain, PRN Oxycodone given x1 with some relief per pt report. Pt switched from NPO to clear liquid diet, pt was given some apple juice and water, tolerating well without any complaints of nausea. BS checks every 4 hours. BS check at 1800 was 124, 1 unit of insulin given per order parameters. BS check at 2100 was 126, 1 unit of insulin given per order parameters. Pt also received scheduled dose of Lantus 26 units. Since arriving back from IR, pts tube feeds started back up, per MD report. GJ tube, J tube with tube feeds at 75 mL/hr with 30cc water flushes every 4 hours, G tube clamped. Heparin drip to be started back up 3 hours post-procedure (6/6/2017 @ 0000) and at the same rate prior to the procedure, per MD report. Pt has two PIVs in the R Forearm, one PIV SL, other PIV is infusing with patients scheduled dose of Vancomycin. Wound vac intact and at 125. Pts hemoglobin 6.9 prior to going down to IR, on-call MD notified, no new orders placed. Pt voiding adequate amounts via the urinal at the bedside. No BM this shift per pt report, pt passing flatus. C.Dif and isolation precautions discontinued this shift per MD order. Pt up with SBA to the bathroom. Call light in reach. Will continue to monitor and follow plan of care.

## 2017-06-06 NOTE — PROCEDURES
[facility] Brief Procedure Note    Pre-operative diagnosis: Fevers, abdominal pain post TPAIT, small non-loculated fluid regions in RUQ and LUQ   Post-operative diagnosis Same   Procedure: Ultrasound guided aspiration of RUQ and LUQ fluid   Surgeon: Rand Jones MD   Assistants(s): -   Estimated blood loss: None    Specimens: Fluid samples as below   Findings: 15 cc of brownish cloudy fluid aspirated from RUQ with 20 G needle. 6 cc of brownish cloudy fluid aspirated from LUQ (with 22 G needle due to proximity of pleura). Both samples sent to lab.   Complications: None.

## 2017-06-06 NOTE — PROGRESS NOTES
Interventional Radiology Intra-procedural Nursing Note    Patient Name: Sohan Arita  Medical Record Number: [id number]  Today's Date: June 6, 2017    Start Time: 1940  End of procedure time: 2010  Procedure: Aspiration of Fluid Collection on Right and Left Abdomen  Report given to: MARLON Connolly 7A  Time pt departs: 2018    Proceduralist: Dr. Rand Jones    Other Notes:     Pt to IR Room 1 from Unit 7A. Consent confirmed with patient; questions addressed.  Pt positioned supine and prepped on IR stretcher.  Aspiration of 20 mL of tan / brown fluid from perihepatic fluid collection in RUQ of abdomen.  Aspiration of 6 mL from fluid collection in LUQ of abdomen by Dr. Jones.  All fluid sent for fluid cultures sent to lab for analysis. Pt tolerated procedure without apparent incident. Pt denies pain post-procedure.    Jose Noriega RN

## 2017-06-06 NOTE — PROGRESS NOTES
Diabetes Consult Daily  Progress Note          Assessment/Plan:   Mr. Tanner Arita is a 57 yo man with a history of chronic pancreatitis s/p TPAIT on 5/15/17, with post pancreatectomy diabetes, who was admitted on 6/2/17 with wound infection, DVT of LLE, and dehydration.    Glucose control improving, though not consistently in target.    Plan:  -glargine increased to 30 units AM and PM today  -correction aspart 1 unit/20 for BG >120 q4h,   -monitor glucose q4h.       Outpatient diabetes follow up: Had appointment with Lexy Brunson PA-C at [facility]- this will need to be rescheduled once a discharge date is in sight  Plan discussed with patient, bedside RN.           Interval History:   The last 24 hours progress and nursing notes reviewed.  Continuous TF: Impact Peptide 1.5 at 75ml/h.  BG dropped significantly after TF changed and was off for a period.  Glargine doses decreased to 47 and 37 PTA    TF stopped yesterday afternoon in advance of IR procedure (aspiration of fluid abd fluid collections).  D10W infused.  TF back on post-procedure.  Cont on heparin drip for LLE DVT.    Tanner reports feeling very slightly better today, though can't pinpoint why.  Still some bloating/pressure he relates to TF.  Was just back from [place].            Recent Labs  Lab 06/06/17  1446 06/06/17  1000 06/06/17  0954 06/06/17  0427 06/06/17  0027 06/05/17  2055 06/05/17  1828  06/04/17  0720  06/02/17  1632   GLC  --   --  135*  --   --   --   --   --  126*  --  70   * 97  --  115* 138* 126* 124*  < >  --   < >  --    < > = values in this interval not displayed.            Review of Systems:   See interval hx          Medications:       Active Diet Order      Clear Liquid Diet     Physical Exam:  Gen: Appears tired, resting in bed, in NAD.  Wife at bedside  HEENT: NC/AT, mucous membranes are moist  Resp: Unlabored  Neuro:oriented x3, communicating clearly, more engaged today  /74 (BP  "Location: Right arm)  Pulse 71  Temp 98.3  F (36.8  C) (Oral)  Resp 16  Ht 1.88 m (6' 2\")  Wt 107.6 kg (237 lb 3.2 oz)  SpO2 93%  BMI 30.45 kg/m2           Data:     Lab Results   Component Value Date    A1C 5.3 05/16/2017    A1C 5.6 05/10/2017    A1C 5.4 09/10/2015            Recent Labs   Lab Test  06/06/17   0954  06/04/17   0720   NA  140  141   POTASSIUM  3.9  4.2   CHLORIDE  106  105   CO2  31  29   ANIONGAP  4  7   GLC  135*  126*   BUN  19  23   CR  0.84  0.94   CRISTOBAL  8.7  8.3*     CBC RESULTS:   Recent Labs   Lab Test  06/06/17   0559   WBC  15.7*   RBC  2.62*   HGB  6.9*   HCT  22.8*   MCV  87   MCH  26.3*   MCHC  30.3*   RDW  16.4*   PLT  1164*     Orquidea Cabello APRN [facility] [phone]  Diabetes Management job code [id number]          "

## 2017-06-06 NOTE — PROGRESS NOTES
Care Coordinator  D: Brayan s/p TPAIT 5.15.17 with dehydration, LE DVT and wound infection.  I: I have completed the order for home wound vac today  Date 6/6/2017   Facility [id number] - [facility]  Patient Name FINESSE Marques Order Number [id number]  Delivery Instructions Consignment VAC order  ReadyCareTM Kit VAC ActiVAC   - (1) ActiVAC Canister w/Gel (5ea)   - (1) STRAC Med GranuFoam Dress(5)  To Be Shipped  - (1) ActiVAC Canister w/Gel (5ea)   - (2) STRAC Med GranuFoam Dress(5)  Per Dr Mendez--just took vac off due to purulent drainage. I informed Dr Mendez that his insurance may take awhile to approve the vac, so I am going ahead to order it, just in case he will need it in the future.  Insurance is [employer]-- is: Nav [phone] and I have fax'd this to [facility].  A: on IVAB--now wet to dry dressing changes  P: will need to find out if he will need IVAB at discharge and possibly IVF(I will call Marco Antonio at [facility] to check coverage--pt on service with them for enteral feeds/warfarin?/ will need HH for dressing changes--I will inform [facility]itt MURRIETA, as last admission they wanted to stay in the [facility] system. Per Marco Antonio IVAB/IVF ok as long as dx is related to his surgery. Wait for wound vac approval--Amparo([phone]) called from [facility] and got info and is working on the auth and is aware that supplies to be delivered to [facility] and ready care to be released from [facility]. Will follow. I have talked with Maribell and informed her of above and that I will arrange a HHN for dressing changes and IVAB and she agrees.

## 2017-06-06 NOTE — PROGRESS NOTES
Pancreatitis Service - Daily Progress Note  06/06/2017    Assessment & Plan: 55yo with hx chronic pancreatitis s/p TPAIT 5/15/17.  Presented to the hospital from clinic with wound infection and LLE pain. Work up revealed an left peroneal vein DVT. Patient admitted to initiate anticoagulation, wound care and antibiotics.    Cardiorespiratory: Hemodynamically stable, saturations ok on room air.  GI/Nutrition: On cycled TF and tolerating clears.   Heme: On heparin gtt for LLE DVT.  Fluid/Electrolytes: Needs hydration and replacement of lytes as needed.  : No acute issues  Post-pancreatectomy diabetes: Continue lantus/SSI, appreciate Endocrine consult.  Infection: wbc 12 => 15. Abdominal CT with fluid collections Perihepatic and to splenic bed. Empirically started on Zosyn, Vanco, Diflucan. Blood/Urine cultures pending.  Prophylaxis: Anticoagulation: Heparin gtt  Pain control: Dilaudid prn. Fentanyl 75mcg patch on, dose decreased prior to admission. On Lyrica.  Disposition: 7A    Medical Decision Making: Medium  Subsequent visit 25747 (moderate level decision making)    GELACIO/Fellow/Resident Provider:  Jas Jackson MD, MPH  Transplant Fellow  Pager# [phone]    Faculty: Rodrigo Jaquez MD  __________________________________________________________________  Transplant History:   5/15/2017 ([facility]), Postoperative day: 19    Interval History: History is obtained from the patient  Overnight events: Overall feels ok, some diarrhea, tolerating feeds.    ROS:   A 10-point review of systems was negative except as noted above.    Curent Meds:    insulin glargine  30 Units Subcutaneous QAM     micafungin  150 mg Intravenous Q24H     [START ON 6/7/2017] micafungin  100 mg Intravenous Q24H     insulin glargine  30 Units Subcutaneous Q24H     vancomycin (VANCOCIN) IV  1,750 mg Intravenous Q12H     pregabalin  25 mg Oral Daily     amylase-lipase-protease  2 capsule Per Feeding Tube Q4H     piperacillin-tazobactam  3.375 g  "Intravenous Q6H     aspirin  81 mg Oral Daily     cholecalciferol  2,000 Units Per J Tube Daily     ferrous sulfate  325 mg Oral Daily     FLUoxetine  10 mg Per J Tube Daily     folic acid  1 mg Oral Daily     levothyroxine  150 mcg Per J Tube QAM AC     multivitamins with minerals  15 mL Per Feeding Tube Daily     pantoprazole  40 mg Oral or J tube BID     sodium bicarbonate  325 mg Per J Tube Q4H     insulin aspart  1-10 Units Subcutaneous Q4H     fentaNYL   Transdermal Q8H     fentaNYL  75 mcg Transdermal Q72H     fentaNYL   Transdermal Q72H       Physical Exam:     Admit Weight: 102.7 kg (226 lb 8 oz)    Current Vitals:   /70 (BP Location: Right arm)  Pulse 71  Temp 98.7  F (37.1  C) (Oral)  Resp 18  Ht 1.88 m (6' 2\")  Wt 107.6 kg (237 lb 3.2 oz)  SpO2 95%  BMI 30.45 kg/m2      Vital sign ranges:    Temp:  [98.3  F (36.8  C)-98.9  F (37.2  C)] 98.7  F (37.1  C)  Pulse:  [70-79] 71  Heart Rate:  [70-79] 74  Resp:  [16-18] 18  BP: (111-137)/(66-74) 135/70  SpO2:  [93 %-97 %] 95 %  Patient Vitals for the past 24 hrs:   BP Temp Temp src Pulse Heart Rate Resp SpO2   06/06/17 1546 135/70 98.7  F (37.1  C) Oral - 74 18 95 %   06/06/17 1137 137/74 98.3  F (36.8  C) Oral 71 - 16 93 %   06/06/17 0800 125/72 98.7  F (37.1  C) Oral 76 - 16 93 %   06/06/17 0428 114/66 98.4  F (36.9  C) Oral 71 71 16 93 %   06/06/17 0025 118/71 98.6  F (37  C) Oral - 77 16 94 %   06/05/17 2109 111/71 98.9  F (37.2  C) Oral 70 70 16 96 %   06/05/17 2010 116/71 - - 79 79 16 97 %   06/05/17 2000 120/73 - - 76 76 16 95 %   06/05/17 1950 121/69 - - 74 74 16 96 %   06/05/17 1940 121/73 - - 78 78 17 94 %     General Appearance: in no apparent distress.   Skin: normal, warm, dry  Heart: regular rate and rhythm  Lungs: clear to auscultation  Abdomen: The abdomen is rounded, and appropriately tender. Wound is packed, wet-to-dry. Edges clean, good granulation tissue, no erytherma. Inferior staples intact.  : king is not present.  "   Extremities: +1 edema    Data:   CMP    Recent Labs  Lab 06/06/17  0954 06/04/17  0720 06/02/17  1632    141 140   POTASSIUM 3.9 4.2 4.0   CHLORIDE 106 105 101   CO2 31 29 32   * 126* 70   BUN 19 23 19   CR 0.84 0.94 0.83   GFRESTIMATED >90Non  GFR Calc 83 >90Non  GFR Calc   GFRESTBLACK >90African American GFR Calc >90African American GFR Calc >90African American GFR Calc   CRISTOBAL 8.7 8.3* 8.8   MAG  --   --  2.2   PHOS  --   --  2.5     CBC    Recent Labs  Lab 06/06/17  0559 06/05/17  1637 06/05/17  0415   HGB 6.9* 6.9* 6.8*   WBC 15.7*  --  15.5*   PLT 1164*  --  1098*     CoagsNo lab results found in last 7 days.    Invalid input(s): XA   Urinalysis  Recent Labs   Lab Test  06/02/17   1710  05/10/17   1015   COLOR  Yellow  Yellow   APPEARANCE  Clear  Clear   URINEGLC  Negative  Negative   URINEBILI  Negative  Negative   URINEKETONE  Negative  Negative   SG  1.019  1.022   UBLD  Negative  Negative   URINEPH  7.5*  6.5   PROTEIN  Negative  Negative   NITRITE  Negative  Negative   LEUKEST  Negative  Negative   RBCU  <1  2   WBCU  <1  <1     Attestation:  Patient has been seen and evaluated by me.   Vital signs, labs, medications and orders were reviewed.   When obtained, diagnostic images were reviewed by me and interpreted as above.    The care plan was discussed with the multidisciplinary team and I agree with the findings and plan in this note, with any differences recorded in blue.    .

## 2017-06-06 NOTE — PROCEDURES
Interventional Radiology Pre-Procedure Sedation Assessment   Time of Assessment: 7:26 PM    Expected Level: Moderate Sedation    Indication: Sedation is required for the following type of Procedure: Drain    Sedation and procedural consent: Risks, benefits and alternatives were discussed with Patient    PO Intake: Appropriately NPO for procedure    ASA Class: Class 3 - SEVERE SYSTEMIC DISEASE, DEFINITE FUNCTIONAL LIMITATIONS.    Mallampati: Grade 2:  Soft palate, base of uvula, tonsillar pillars, and portion of posterior pharyngeal wall visible    Lungs: Lungs Clear with good breath sounds bilaterally    Heart: Normal heart sounds and rate    History and physical reviewed and no updates needed. I have reviewed the lab findings, diagnostic data, medications, and the plan for sedation. I have determined this patient to be an appropriate candidate for the planned sedation and procedure and have reassessed the patient IMMEDIATELY PRIOR to sedation and procedure.    Rand Tsang MD

## 2017-06-07 ENCOUNTER — APPOINTMENT (OUTPATIENT)
Dept: CT IMAGING | Facility: CLINIC | Age: 57
DRG: 862 | End: 2017-06-07
Attending: NURSE PRACTITIONER
Payer: COMMERCIAL

## 2017-06-07 LAB
ANION GAP SERPL CALCULATED.3IONS-SCNC: 6 MMOL/L (ref 3–14)
BUN SERPL-MCNC: 21 MG/DL (ref 7–30)
CALCIUM SERPL-MCNC: 8.9 MG/DL (ref 8.5–10.1)
CHLORIDE SERPL-SCNC: 105 MMOL/L (ref 94–109)
CO2 SERPL-SCNC: 30 MMOL/L (ref 20–32)
CREAT SERPL-MCNC: 0.81 MG/DL (ref 0.66–1.25)
CREAT SERPL-MCNC: 0.84 MG/DL (ref 0.66–1.25)
ERYTHROCYTE [DISTWIDTH] IN BLOOD BY AUTOMATED COUNT: 16.6 % (ref 10–15)
GFR SERPL CREATININE-BSD FRML MDRD: ABNORMAL ML/MIN/1.7M2
GFR SERPL CREATININE-BSD FRML MDRD: NORMAL ML/MIN/1.7M2
GLUCOSE BLDC GLUCOMTR-MCNC: 103 MG/DL (ref 70–99)
GLUCOSE BLDC GLUCOMTR-MCNC: 110 MG/DL (ref 70–99)
GLUCOSE BLDC GLUCOMTR-MCNC: 127 MG/DL (ref 70–99)
GLUCOSE BLDC GLUCOMTR-MCNC: 134 MG/DL (ref 70–99)
GLUCOSE BLDC GLUCOMTR-MCNC: 141 MG/DL (ref 70–99)
GLUCOSE BLDC GLUCOMTR-MCNC: 59 MG/DL (ref 70–99)
GLUCOSE BLDC GLUCOMTR-MCNC: 89 MG/DL (ref 70–99)
GLUCOSE BLDC GLUCOMTR-MCNC: 90 MG/DL (ref 70–99)
GLUCOSE BLDC GLUCOMTR-MCNC: 95 MG/DL (ref 70–99)
GLUCOSE SERPL-MCNC: 133 MG/DL (ref 70–99)
GRAM STN SPEC: ABNORMAL
HCT VFR BLD AUTO: 23.2 % (ref 40–53)
HGB BLD-MCNC: 7 G/DL (ref 13.3–17.7)
LMWH PPP CHRO-ACNC: 0.26 IU/ML
MCH RBC QN AUTO: 26 PG (ref 26.5–33)
MCHC RBC AUTO-ENTMCNC: 30.2 G/DL (ref 31.5–36.5)
MCV RBC AUTO: 86 FL (ref 78–100)
MICRO REPORT STATUS: ABNORMAL
PLATELET # BLD AUTO: 1040 10E9/L (ref 150–450)
POTASSIUM SERPL-SCNC: 4.1 MMOL/L (ref 3.4–5.3)
RBC # BLD AUTO: 2.69 10E12/L (ref 4.4–5.9)
SODIUM SERPL-SCNC: 141 MMOL/L (ref 133–144)
SPECIMEN SOURCE: ABNORMAL
VANCOMYCIN SERPL-MCNC: 10.6 MG/L
WBC # BLD AUTO: 13.9 10E9/L (ref 4–11)

## 2017-06-07 PROCEDURE — E2402 NEG PRESS WOUND THERAPY PUMP: HCPCS

## 2017-06-07 PROCEDURE — 99152 MOD SED SAME PHYS/QHP 5/>YRS: CPT

## 2017-06-07 PROCEDURE — 25000128 H RX IP 250 OP 636: Performed by: RADIOLOGY

## 2017-06-07 PROCEDURE — 80202 ASSAY OF VANCOMYCIN: CPT | Performed by: TRANSPLANT SURGERY

## 2017-06-07 PROCEDURE — 40000802 ZZH SITE CHECK

## 2017-06-07 PROCEDURE — 25800025 ZZH RX 258: Performed by: PHYSICIAN ASSISTANT

## 2017-06-07 PROCEDURE — 36592 COLLECT BLOOD FROM PICC: CPT | Performed by: PHYSICIAN ASSISTANT

## 2017-06-07 PROCEDURE — 0W9G30Z DRAINAGE OF PERITONEAL CAVITY WITH DRAINAGE DEVICE, PERCUTANEOUS APPROACH: ICD-10-PCS | Performed by: RADIOLOGY

## 2017-06-07 PROCEDURE — 27210436 ZZH NUTRITION PRODUCT SEMIELEM INTERMED CAN

## 2017-06-07 PROCEDURE — 82565 ASSAY OF CREATININE: CPT | Performed by: TRANSPLANT SURGERY

## 2017-06-07 PROCEDURE — 25000132 ZZH RX MED GY IP 250 OP 250 PS 637: Performed by: PHYSICIAN ASSISTANT

## 2017-06-07 PROCEDURE — 85027 COMPLETE CBC AUTOMATED: CPT | Performed by: PHYSICIAN ASSISTANT

## 2017-06-07 PROCEDURE — 25000125 ZZHC RX 250: Performed by: STUDENT IN AN ORGANIZED HEALTH CARE EDUCATION/TRAINING PROGRAM

## 2017-06-07 PROCEDURE — C1729 CATH, DRAINAGE: HCPCS

## 2017-06-07 PROCEDURE — 87070 CULTURE OTHR SPECIMN AEROBIC: CPT | Performed by: PHYSICIAN ASSISTANT

## 2017-06-07 PROCEDURE — 25000128 H RX IP 250 OP 636: Performed by: TRANSPLANT SURGERY

## 2017-06-07 PROCEDURE — 87205 SMEAR GRAM STAIN: CPT | Performed by: PHYSICIAN ASSISTANT

## 2017-06-07 PROCEDURE — 00000146 ZZHCL STATISTIC GLUCOSE BY METER IP

## 2017-06-07 PROCEDURE — 12000026 ZZH R&B TRANSPLANT

## 2017-06-07 PROCEDURE — 99153 MOD SED SAME PHYS/QHP EA: CPT

## 2017-06-07 PROCEDURE — 27210995 ZZH RX 272: Performed by: STUDENT IN AN ORGANIZED HEALTH CARE EDUCATION/TRAINING PROGRAM

## 2017-06-07 PROCEDURE — 80048 BASIC METABOLIC PNL TOTAL CA: CPT | Performed by: PHYSICIAN ASSISTANT

## 2017-06-07 PROCEDURE — 36592 COLLECT BLOOD FROM PICC: CPT | Performed by: TRANSPLANT SURGERY

## 2017-06-07 PROCEDURE — 85520 HEPARIN ASSAY: CPT | Performed by: PHYSICIAN ASSISTANT

## 2017-06-07 PROCEDURE — 25000128 H RX IP 250 OP 636: Performed by: NURSE PRACTITIONER

## 2017-06-07 PROCEDURE — 87106 FUNGI IDENTIFICATION YEAST: CPT | Performed by: PHYSICIAN ASSISTANT

## 2017-06-07 PROCEDURE — 25000132 ZZH RX MED GY IP 250 OP 250 PS 637: Performed by: NURSE PRACTITIONER

## 2017-06-07 PROCEDURE — 25000125 ZZHC RX 250: Performed by: RADIOLOGY

## 2017-06-07 PROCEDURE — C1769 GUIDE WIRE: HCPCS

## 2017-06-07 PROCEDURE — 27211039 ZZH NEEDLE CR2

## 2017-06-07 PROCEDURE — 25000128 H RX IP 250 OP 636: Performed by: PHYSICIAN ASSISTANT

## 2017-06-07 PROCEDURE — 25000128 H RX IP 250 OP 636: Performed by: STUDENT IN AN ORGANIZED HEALTH CARE EDUCATION/TRAINING PROGRAM

## 2017-06-07 RX ORDER — NALOXONE HYDROCHLORIDE 0.4 MG/ML
.1-.4 INJECTION, SOLUTION INTRAMUSCULAR; INTRAVENOUS; SUBCUTANEOUS
Status: DISCONTINUED | OUTPATIENT
Start: 2017-06-07 | End: 2017-06-07

## 2017-06-07 RX ORDER — FLUMAZENIL 0.1 MG/ML
0.2 INJECTION, SOLUTION INTRAVENOUS
Status: DISCONTINUED | OUTPATIENT
Start: 2017-06-07 | End: 2017-06-07 | Stop reason: HOSPADM

## 2017-06-07 RX ORDER — FENTANYL CITRATE 50 UG/ML
25-50 INJECTION, SOLUTION INTRAMUSCULAR; INTRAVENOUS EVERY 5 MIN PRN
Status: DISCONTINUED | OUTPATIENT
Start: 2017-06-07 | End: 2017-06-07

## 2017-06-07 RX ORDER — FLUMAZENIL 0.1 MG/ML
0.2 INJECTION, SOLUTION INTRAVENOUS
Status: DISCONTINUED | OUTPATIENT
Start: 2017-06-07 | End: 2017-06-07

## 2017-06-07 RX ORDER — FUROSEMIDE 10 MG/ML
10 INJECTION INTRAMUSCULAR; INTRAVENOUS ONCE
Status: COMPLETED | OUTPATIENT
Start: 2017-06-07 | End: 2017-06-07

## 2017-06-07 RX ORDER — FENTANYL CITRATE 50 UG/ML
25-50 INJECTION, SOLUTION INTRAMUSCULAR; INTRAVENOUS EVERY 5 MIN PRN
Status: DISCONTINUED | OUTPATIENT
Start: 2017-06-07 | End: 2017-06-07 | Stop reason: HOSPADM

## 2017-06-07 RX ORDER — NALOXONE HYDROCHLORIDE 0.4 MG/ML
.1-.4 INJECTION, SOLUTION INTRAMUSCULAR; INTRAVENOUS; SUBCUTANEOUS
Status: DISCONTINUED | OUTPATIENT
Start: 2017-06-07 | End: 2017-06-07 | Stop reason: HOSPADM

## 2017-06-07 RX ADMIN — PANTOPRAZOLE SODIUM 40 MG: 40 TABLET, DELAYED RELEASE ORAL at 19:58

## 2017-06-07 RX ADMIN — PANCRELIPASE 40000 UNITS: 20000; 68000; 109000 CAPSULE, DELAYED RELEASE ORAL at 04:10

## 2017-06-07 RX ADMIN — SODIUM BICARBONATE 325 MG: 325 TABLET ORAL at 19:58

## 2017-06-07 RX ADMIN — INSULIN GLARGINE 30 UNITS: 100 INJECTION, SOLUTION SUBCUTANEOUS at 09:10

## 2017-06-07 RX ADMIN — MIDAZOLAM 0.5 MG: 1 INJECTION INTRAMUSCULAR; INTRAVENOUS at 12:36

## 2017-06-07 RX ADMIN — ASPIRIN 81 MG: 81 TABLET, COATED ORAL at 09:32

## 2017-06-07 RX ADMIN — PIPERACILLIN SODIUM,TAZOBACTAM SODIUM 3.38 G: 3; .375 INJECTION, POWDER, FOR SOLUTION INTRAVENOUS at 18:42

## 2017-06-07 RX ADMIN — INSULIN ASPART 1 UNITS: 100 INJECTION, SOLUTION INTRAVENOUS; SUBCUTANEOUS at 04:10

## 2017-06-07 RX ADMIN — MULTIVIT AND MINERALS-FERROUS GLUCONATE 9 MG IRON/15 ML ORAL LIQUID 15 ML: at 09:14

## 2017-06-07 RX ADMIN — FENTANYL CITRATE 25 MCG: 50 INJECTION INTRAMUSCULAR; INTRAVENOUS at 12:35

## 2017-06-07 RX ADMIN — PANCRELIPASE 40000 UNITS: 20000; 68000; 109000 CAPSULE, DELAYED RELEASE ORAL at 01:12

## 2017-06-07 RX ADMIN — OXYCODONE HYDROCHLORIDE 5 MG: 5 SOLUTION ORAL at 01:17

## 2017-06-07 RX ADMIN — LEVOTHYROXINE SODIUM 150 MCG: 300 TABLET ORAL at 09:09

## 2017-06-07 RX ADMIN — MICAFUNGIN SODIUM 100 MG: 10 INJECTION, POWDER, LYOPHILIZED, FOR SOLUTION INTRAVENOUS at 13:42

## 2017-06-07 RX ADMIN — PANTOPRAZOLE SODIUM 40 MG: 40 TABLET, DELAYED RELEASE ORAL at 09:14

## 2017-06-07 RX ADMIN — SODIUM BICARBONATE 325 MG: 325 TABLET ORAL at 09:16

## 2017-06-07 RX ADMIN — VANCOMYCIN HYDROCHLORIDE 1750 MG: 10 INJECTION, POWDER, LYOPHILIZED, FOR SOLUTION INTRAVENOUS at 09:15

## 2017-06-07 RX ADMIN — PANCRELIPASE 40000 UNITS: 20000; 68000; 109000 CAPSULE, DELAYED RELEASE ORAL at 09:16

## 2017-06-07 RX ADMIN — INSULIN ASPART 1 UNITS: 100 INJECTION, SOLUTION INTRAVENOUS; SUBCUTANEOUS at 19:59

## 2017-06-07 RX ADMIN — VANCOMYCIN HYDROCHLORIDE 1750 MG: 10 INJECTION, POWDER, LYOPHILIZED, FOR SOLUTION INTRAVENOUS at 19:59

## 2017-06-07 RX ADMIN — SODIUM BICARBONATE 325 MG: 325 TABLET ORAL at 04:09

## 2017-06-07 RX ADMIN — SODIUM BICARBONATE 325 MG: 325 TABLET ORAL at 01:12

## 2017-06-07 RX ADMIN — PANCRELIPASE 40000 UNITS: 20000; 68000; 109000 CAPSULE, DELAYED RELEASE ORAL at 19:58

## 2017-06-07 RX ADMIN — SENNOSIDES A AND B 10 ML: 415.36 LIQUID ORAL at 04:21

## 2017-06-07 RX ADMIN — MIDAZOLAM 2 MG: 1 INJECTION INTRAMUSCULAR; INTRAVENOUS at 17:00

## 2017-06-07 RX ADMIN — Medication 2000 UNITS: at 09:13

## 2017-06-07 RX ADMIN — OXYCODONE HYDROCHLORIDE 5 MG: 5 SOLUTION ORAL at 20:06

## 2017-06-07 RX ADMIN — Medication 25 ML: at 16:56

## 2017-06-07 RX ADMIN — PREGABALIN 25 MG: 20 SOLUTION ORAL at 09:33

## 2017-06-07 RX ADMIN — FLUOXETINE HYDROCHLORIDE 10 MG: 20 SOLUTION ORAL at 09:13

## 2017-06-07 RX ADMIN — FENTANYL CITRATE 100 MCG: 50 INJECTION INTRAMUSCULAR; INTRAVENOUS at 17:00

## 2017-06-07 RX ADMIN — OXYCODONE HYDROCHLORIDE 5 MG: 5 SOLUTION ORAL at 07:11

## 2017-06-07 RX ADMIN — FUROSEMIDE 10 MG: 10 INJECTION, SOLUTION INTRAVENOUS at 15:16

## 2017-06-07 RX ADMIN — FERROUS SULFATE 325 MG: 325 TABLET, FILM COATED ORAL at 09:15

## 2017-06-07 RX ADMIN — PIPERACILLIN SODIUM,TAZOBACTAM SODIUM 3.38 G: 3; .375 INJECTION, POWDER, FOR SOLUTION INTRAVENOUS at 11:36

## 2017-06-07 RX ADMIN — HEPARIN SODIUM 1950 UNITS/HR: 10000 INJECTION, SOLUTION INTRAVENOUS at 05:02

## 2017-06-07 RX ADMIN — INSULIN ASPART 1 UNITS: 100 INJECTION, SOLUTION INTRAVENOUS; SUBCUTANEOUS at 09:09

## 2017-06-07 RX ADMIN — PIPERACILLIN SODIUM,TAZOBACTAM SODIUM 3.38 G: 3; .375 INJECTION, POWDER, FOR SOLUTION INTRAVENOUS at 05:03

## 2017-06-07 RX ADMIN — DEXTROSE MONOHYDRATE: 100 INJECTION, SOLUTION INTRAVENOUS at 11:30

## 2017-06-07 RX ADMIN — FOLIC ACID 1 MG: 1 TABLET ORAL at 09:15

## 2017-06-07 RX ADMIN — LIDOCAINE HYDROCHLORIDE 8 ML: 10 INJECTION, SOLUTION EPIDURAL; INFILTRATION; INTRACAUDAL; PERINEURAL at 17:00

## 2017-06-07 NOTE — PLAN OF CARE
Problem: Goal Outcome Summary  Goal: Goal Outcome Summary  Outcome: Improving  D: Tanner says that he continues to experience ongoing discomfort, but that he thinks he is starting to improve more now.  I:   He went to IR to have a drain placed: TF and Heparin gtt were stopped for this, but it was determined in IR that the procedure needed to be done in CT, so Tanner was sent back to 7A temporarily to await the attempt in CT later; his Heparin gtt was restarted--temporarily--and then stopped again before his time in CT.  A:  His blood sugar dropped during his time in CT, even though D10W was infused throughout then time that he was off tube feeds (except for ~ 1 hr in the beginning): CT staff stated that they administered 1/2 amp D50W IV, after which Tanner's blood sugar returned back to a more acceptable level.  P:  Continue to monitor Tanner's blood sugars closely when he returns to 7A and tube feedings are reinitiated...

## 2017-06-07 NOTE — PROGRESS NOTES
Pancreatitis Service - Daily Progress Note  06/07/2017    Assessment & Plan: 57yo with hx chronic pancreatitis s/p TPAIT 5/15/17.  Presented with wound infection and occlusive thrombosis left peroneal vein.    Cardiorespiratory: Hypoxia, CXR b/l small pleural effusion, b/l bibasilar opacities. Repeat lasix 10 mg IV x 1.  Increase IS, ambulation. Titrate O2 as tolerated.   GI/Nutrition:  On cycled TF.    Heme: Anemia, stable, likely hemodilution. Minimize labs. Have labs use pediatric tubes if possible.  LLE DVT, on heparin gtt.  US BUE, small non occlusive thrombus right IJ.  Fluid/Electrolytes:  Hypervolemic. Lasix today.  : No acute issues  Post-pancreatectomy diabetes: Continue lantus/SSI, appreciate Endocrine consult.  Infection: Afebrile, WBC 13.9 decreased from 15.7  -Wound infection: Opened in clinic, VAC on.  6/3 cx growing candida alb.  Persistent purulent drainage today under VAC.  Will change to wet to dry dressings QID.  On fluconazole 6/3-6/6, change to micafungin 6/5. Follow culture, final sensitivities pending.    -Suspected abscess(es): Perihepatic and left sided fluid collections on imaging.  Both aspirated 6/5, gram stains negative.  Culture yeast.  On empiric Zosyn and Vanco. IR consulted for drain placement perihepatic fluid collection.  Prophylaxis: Anticoagulation: Heparin gtt. Will discuss with staff plan for anticoagulation at discharge.   Pain control: Fair control with PRN oxycodone.  Also has fentanyl 75mcg patch on, dose decreased prior to admission.  Restarted Lyrica.    Neuro: Mild delirium with occasional hallucinations, improved.  Suspect secondary to infection as changes in narcotics did not improve symptoms.  Activity: Up with assist  Anticipated LOS/Discharge: Undetermined    Medical Decision Making: Medium  Subsequent visit 79507 (moderate level decision making)    GELACIO/Fellow/Resident Provider: TAY Arriaza [id number]    Faculty: Rodrigo Jaquez  "MD  __________________________________________________________________  Transplant History:   5/15/2017 ([facility]), Postoperative day: 23     Interval History: History is obtained from the patient  Overnight events: Short of breath with ambulation. Overall feels slightly better. Loose stools, now improved.     ROS:   A 10-point review of systems was negative except as noted above.    Curent Meds:    insulin glargine  30 Units Subcutaneous QAM     micafungin  100 mg Intravenous Q24H     insulin glargine  30 Units Subcutaneous Q24H     vancomycin (VANCOCIN) IV  1,750 mg Intravenous Q12H     pregabalin  25 mg Oral Daily     amylase-lipase-protease  2 capsule Per Feeding Tube Q4H     piperacillin-tazobactam  3.375 g Intravenous Q6H     aspirin  81 mg Oral Daily     cholecalciferol  2,000 Units Per J Tube Daily     ferrous sulfate  325 mg Oral Daily     FLUoxetine  10 mg Per J Tube Daily     folic acid  1 mg Oral Daily     levothyroxine  150 mcg Per J Tube QAM AC     multivitamins with minerals  15 mL Per Feeding Tube Daily     pantoprazole  40 mg Oral or J tube BID     sodium bicarbonate  325 mg Per J Tube Q4H     insulin aspart  1-10 Units Subcutaneous Q4H     fentaNYL   Transdermal Q8H     fentaNYL  75 mcg Transdermal Q72H     fentaNYL   Transdermal Q72H       Physical Exam:     Admit Weight: 102.7 kg (226 lb 8 oz)    Current Vitals:   /72 (BP Location: Right arm)  Pulse 71  Temp 98.9  F (37.2  C) (Oral)  Resp 12  Ht 1.88 m (6' 2\")  Wt 109.1 kg (240 lb 8 oz)  SpO2 98%  BMI 30.88 kg/m2      Vital sign ranges:    Temp:  [98.5  F (36.9  C)-99  F (37.2  C)] 98.9  F (37.2  C)  Heart Rate:  [65-75] 67  Resp:  [8-20] 12  BP: (115-135)/(68-77) 118/72  SpO2:  [95 %-98 %] 98 %  Patient Vitals for the past 24 hrs:   BP Temp Temp src Heart Rate Resp SpO2 Weight   06/07/17 1220 118/72 - - 67 12 98 % -   06/07/17 1210 121/73 - - 68 14 98 % -   06/07/17 1200 123/77 - - 65 8 98 % -   06/07/17 1150 127/76 - - 67 10 98 % - "   06/07/17 0812 129/70 98.9  F (37.2  C) Oral 66 18 97 % 109.1 kg (240 lb 8 oz)   06/07/17 0413 120/74 99  F (37.2  C) Oral 75 - 96 % -   06/07/17 0109 115/68 98.5  F (36.9  C) Oral 66 20 96 % -   06/06/17 1925 129/71 98.8  F (37.1  C) Oral 69 20 98 % -   06/06/17 1546 135/70 98.7  F (37.1  C) Oral 74 18 95 % -     General Appearance: in no apparent distress.   Skin: normal, warm, dry  Heart: regular rate and rhythm  Lungs: Crackles bilat bases, NLB on NC 2L  Abdomen: The abdomen is rounded, and mildly tender. The wound grayish exudate.   : king is not present.    Extremities: generalized +1 edema  Neuro: A&Ox4    Data:   CMP    Recent Labs  Lab 06/07/17  0707 06/06/17  0954  06/02/17  1632    140  < > 140   POTASSIUM 4.1 3.9  < > 4.0   CHLORIDE 105 106  < > 101   CO2 30 31  < > 32   * 135*  < > 70   BUN 21 19  < > 19   CR 0.84  0.81 0.84  < > 0.83   GFRESTIMATED >90Non  GFR Calc  >90Non  GFR Calc >90Non  GFR Calc  < > >90Non  GFR Calc   GFRESTBLACK >90African American GFR Calc  >90African American GFR Calc >90African American GFR Calc  < > >90African American GFR Calc   CRISTOBAL 8.9 8.7  < > 8.8   MAG  --   --   --  2.2   PHOS  --   --   --  2.5   < > = values in this interval not displayed.  CBC    Recent Labs  Lab 06/07/17  0707 06/06/17  0559   HGB 7.0* 6.9*   WBC 13.9* 15.7*   PLT 1040* 1164*     CoagsNo lab results found in last 7 days.    Invalid input(s): XA   Urinalysis  Recent Labs   Lab Test  06/02/17   1710  05/10/17   1015   COLOR  Yellow  Yellow   APPEARANCE  Clear  Clear   URINEGLC  Negative  Negative   URINEBILI  Negative  Negative   URINEKETONE  Negative  Negative   SG  1.019  1.022   UBLD  Negative  Negative   URINEPH  7.5*  6.5   PROTEIN  Negative  Negative   NITRITE  Negative  Negative   LEUKEST  Negative  Negative   RBCU  <1  2   WBCU  <1  <1     Attestation:  Patient has been seen and evaluated by me.   Vital signs,  labs, medications and orders were reviewed.   When obtained, diagnostic images were reviewed by me and interpreted as above.    The care plan was discussed with the multidisciplinary team and I agree with the findings and plan in this note, with any differences recorded in blue.    .

## 2017-06-07 NOTE — PLAN OF CARE
Problem: Individualization  Goal: Patient Preferences  Outcome: No Change     RN:  Tmax. 99.0(O), OVSS, 96% @2/NC. Abdominal pain controlled with Oxycodone, given x2, denies nausea. TF@75cc/hr, blood glucose 110 and 134, SS given. Dressing changed x1, wound with moderate amount of purulent drainage. Adequate urine output, no BM during the shift, passing gas, patient requested for Milagros x1. Patient resting between cares, will continue to monitor.

## 2017-06-07 NOTE — PROGRESS NOTES
Diabetes Consult Daily  Progress Note          Assessment/Plan:   Mr. Tanner Arita is a 55 yo man with a history of chronic pancreatitis s/p TPAIT on 5/15/17, with post pancreatectomy diabetes, who was admitted on 6/2/17 with wound infection, DVT of LLE, and dehydration.    Glucose control improving, more often in target.  With IR procedure to drain perihepatic flluid collection requiring TF off, BG may trend lower.    Plan:  -glargine continue 30 units AM and PM today  -correction aspart 1 unit/20 for BG >120 q4h,   -monitor glucose q4h.  (D10W to infuse while TF off)       Outpatient diabetes follow up: Had appointment with Lexy Brunson PA-C at [facility]- this will need to be rescheduled once a discharge date is in sight  Plan discussed with patient, bedside RN.           Interval History:   The last 24 hours progress and nursing notes reviewed.  Continuous TF: Impact Peptide 1.5 at 75ml/h.  BG dropped significantly after TF changed and was off for a period.  Glargine doses decreased to 47 and 37 PTA    No nausea overnight.  Heading to IR and TFs were stopped.  RN started D10W at 75 prior to departure.          Recent Labs  Lab 06/07/17  1328 06/07/17  1208 06/07/17  0908 06/07/17  0707 06/07/17  0408 06/07/17  0045 06/06/17  2146  06/06/17  0954  06/04/17  0720  06/02/17  1632   GLC  --   --   --  133*  --   --   --   --  135*  --  126*  --  70   BGM 89 95 141*  --  134* 110* 86  < >  --   < >  --   < >  --    < > = values in this interval not displayed.            Review of Systems:   See interval hx          Medications:       Active Diet Order      NPO per Anesthesia Guidelines for Procedure/Surgery Except for: Meds     Physical Exam:  Gen: NAD resting in bed, in NAD.  Wife at bedside  HEENT: NC/AT, mucous membranes are moist  Resp: Unlabored  Neuro:oriented x3, communicating clearly  /72 (BP Location: Right arm)  Pulse 71  Temp 98.9  F (37.2  C) (Oral)  Resp 12  " Ht 1.88 m (6' 2\")  Wt 109.1 kg (240 lb 8 oz)  SpO2 98%  BMI 30.88 kg/m2           Data:     Lab Results   Component Value Date    A1C 5.3 05/16/2017    A1C 5.6 05/10/2017    A1C 5.4 09/10/2015            Recent Labs   Lab Test  06/07/17   0707  06/06/17   0954   NA  141  140   POTASSIUM  4.1  3.9   CHLORIDE  105  106   CO2  30  31   ANIONGAP  6  4   GLC  133*  135*   BUN  21  19   CR  0.84  0.81  0.84   CRISTOBAL  8.9  8.7     CBC RESULTS:   Recent Labs   Lab Test  06/07/17   0707   WBC  13.9*   RBC  2.69*   HGB  7.0*   HCT  23.2*   MCV  86   MCH  26.0*   MCHC  30.2*   RDW  16.6*   PLT  1040*     Orquidea Cabello APRN [facility] [phone]  Diabetes Management job code [id number]          "

## 2017-06-07 NOTE — PROGRESS NOTES
Interventional Radiology Intra-procedural Nursing Note    Patient Name: Sohan Arita  Medical Record Number: [id number]  Today's Date: June 7, 2017    Start Time: 1650  End of procedure time: 1735  Procedure: Image guided placement of drain into perihepatic fluid collection  Report given to: MARLON Arenas 7A  Time pt departs: 1744    Proceduralist: Dr. Lesia Arceo    Other Notes:     Pt to CT Room #2 from Unit 7A. Consent confirmed with patient; questions addressed.  Pt positioned supine and prepped on CT table by technologist.  Placed 12 fr x 35 cm Skater abdominal drain in RUQ. Removed 30 mL of necrotic, thick fluid from abscess drain. Sent fluid to lab for culture and gram stain. Pt tolerated procedure without apparent incident. Pt denies pain post-procedure.    Jose Noriega RN

## 2017-06-07 NOTE — PROGRESS NOTES
This is a snapshot of the patient's [facility] medical record. For complete information or questions call [phone] or In [facility]  Home [facility]fusion ([id number]).  [facility] Number:  [id number]

## 2017-06-07 NOTE — PROGRESS NOTES
Interventional Radiology Pre-Procedure Sedation Assessment   Time of Assessment: 11:57 AM    Expected Level: Moderate Sedation    Indication: Sedation is required for the following type of Procedure: Drain    Sedation and procedural consent: Risks, benefits and alternatives were discussed with Patient    PO Intake: Appropriately NPO for procedure    ASA Class: Class 3 - SEVERE SYSTEMIC DISEASE, DEFINITE FUNCTIONAL LIMITATIONS.    Mallampati: Grade 2:  Soft palate, base of uvula, tonsillar pillars, and portion of posterior pharyngeal wall visible    Lungs: Lungs Clear with good breath sounds bilaterally    Heart: Normal heart sounds and rate    History and physical reviewed and no updates needed. I have reviewed the lab findings, diagnostic data, medications, and the plan for sedation. I have determined this patient to be an appropriate candidate for the planned sedation and procedure and have reassessed the patient IMMEDIATELY PRIOR to sedation and procedure.    Easton Jordan MD, MD

## 2017-06-07 NOTE — PHARMACY-VANCOMYCIN DOSING SERVICE
Pharmacy Vancomycin Note  Date of Service 2017  Patient's  1960   56 year old, male    Indication: Skin and Soft Tissue Infection, perihepatic fluid collection  Goal Trough Level: 10-15 mg/L  Day of Therapy: 6  Current Vancomycin regimen:  1750 mg IV q12h  Other antibiotics/antifungal therapy: Zosyn 3.375gm IV q6h and Micafungin 100mg IV q24h   perihepatic fluid collection culture: yeast    Creatinine for last 3 days  2017:  9:54 AM Creatinine 0.84 mg/dL  2017:  7:07 AM Creatinine 0.81 mg/dL  Current estimated CrCl = Estimated Creatinine Clearance: 134 mL/min (based on Cr of 0.81).    Recent Vancomycin Levels (past 3 days)  2017:  4:15 AM Vancomycin Level 13.2 mg/L  2017:  7:07 AM Vancomycin Level 10.6 mg/L (14hr trough)    Vancomycin IV Administrations (past 72 hours)                   vancomycin (VANCOCIN) 1,750 mg in NaCl 0.9 % 500 mL intermittent infusion (mg) 1,750 mg New Bag 17 0915     1,750 mg New Bag 17 1700     1,750 mg New Bag 17 2056                Nephrotoxins and other renal medications (Future)    Start     Dose/Rate Route Frequency Ordered Stop    17 1800  vancomycin (VANCOCIN) 1,750 mg in NaCl 0.9 % 500 mL intermittent infusion      1,750 mg Intravenous EVERY 12 HOURS 17 0750      17 1715  piperacillin-tazobactam (ZOSYN) 3.375 g vial to attach to  mL bag      3.375 g  over 1 Hours Intravenous EVERY 6 HOURS 17 1706               Contrast Orders - past 72 hours (72h ago through future)    Start     Dose/Rate Route Frequency Ordered Stop    17 1140  iohexol (OMNIPAQUE) solution 50 mL  Status:  Discontinued      50 mL Oral ONCE 17 0958 17 1041          Interpretation of levels and current regimen:  Trough level is therapeutic at a long trough and after missing one dose on 17.    Has serum creatinine changed > 50% in last 72 hours: No    Urine output:  good urine output    Renal Function:  Stable    Plan:  1.  Continue Current Dose  2.  Pharmacy will check trough level in 48-72hr due to only having 2 doses of 1750mg prior to this level being drawn and missing one dose on 6/6/17.  3. Serum creatinine levels will be ordered every other day for at least 10 days while on concomitant nephrotoxins.      Eliza Singh, Pharm.D., [facility]  Pager [phone]

## 2017-06-07 NOTE — PLAN OF CARE
Problem: Goal Outcome Summary  Goal: Goal Outcome Summary  Outcome: No Change  D: Tanner continues to state that he feels weak and doesn't know why and that he is SOB all the time: using oxygen regularly.  VSS.  Blood sugars fairly consistent.  I:   Assisted with all walks. Encouraged him that he should start to feel better any time soon.  A:  Remains in a state that seems to be very discouraged, though pain is fairly well controlled now and without significant side effects from the oxycodone of which he is using minimal doses.

## 2017-06-07 NOTE — IR NOTE
Interventional Radiology Intra-procedural Nursing Note    Patient Name: Sohan Arita  Medical Record Number: [id number]  Today's Date: June 7, 2017      Procedure: Ultrasound guided right sided drain placement.      Other Notes:   Procedure deferred to CT for better imaging.     Skye Mccord

## 2017-06-08 LAB
BACTERIA SPEC CULT: NO GROWTH
BACTERIA SPEC CULT: NO GROWTH
ERYTHROCYTE [DISTWIDTH] IN BLOOD BY AUTOMATED COUNT: 16.9 % (ref 10–15)
GLUCOSE BLDC GLUCOMTR-MCNC: 106 MG/DL (ref 70–99)
GLUCOSE BLDC GLUCOMTR-MCNC: 110 MG/DL (ref 70–99)
GLUCOSE BLDC GLUCOMTR-MCNC: 117 MG/DL (ref 70–99)
GLUCOSE BLDC GLUCOMTR-MCNC: 124 MG/DL (ref 70–99)
GLUCOSE BLDC GLUCOMTR-MCNC: 127 MG/DL (ref 70–99)
GLUCOSE BLDC GLUCOMTR-MCNC: 131 MG/DL (ref 70–99)
GLUCOSE BLDC GLUCOMTR-MCNC: 142 MG/DL (ref 70–99)
GLUCOSE BLDC GLUCOMTR-MCNC: 150 MG/DL (ref 70–99)
HCT VFR BLD AUTO: 22.7 % (ref 40–53)
HGB BLD-MCNC: 6.8 G/DL (ref 13.3–17.7)
LMWH PPP CHRO-ACNC: 0.22 IU/ML
LMWH PPP CHRO-ACNC: 0.39 IU/ML
MCH RBC QN AUTO: 25.7 PG (ref 26.5–33)
MCHC RBC AUTO-ENTMCNC: 30 G/DL (ref 31.5–36.5)
MCV RBC AUTO: 86 FL (ref 78–100)
MICRO REPORT STATUS: NORMAL
MICRO REPORT STATUS: NORMAL
PLATELET # BLD AUTO: 967 10E9/L (ref 150–450)
RBC # BLD AUTO: 2.65 10E12/L (ref 4.4–5.9)
SPECIMEN SOURCE: NORMAL
SPECIMEN SOURCE: NORMAL
WBC # BLD AUTO: 14.3 10E9/L (ref 4–11)

## 2017-06-08 PROCEDURE — 27210436 ZZH NUTRITION PRODUCT SEMIELEM INTERMED CAN

## 2017-06-08 PROCEDURE — 25000132 ZZH RX MED GY IP 250 OP 250 PS 637: Performed by: STUDENT IN AN ORGANIZED HEALTH CARE EDUCATION/TRAINING PROGRAM

## 2017-06-08 PROCEDURE — 25000128 H RX IP 250 OP 636: Performed by: TRANSPLANT SURGERY

## 2017-06-08 PROCEDURE — 25000132 ZZH RX MED GY IP 250 OP 250 PS 637: Performed by: NURSE PRACTITIONER

## 2017-06-08 PROCEDURE — 85027 COMPLETE CBC AUTOMATED: CPT | Performed by: PHYSICIAN ASSISTANT

## 2017-06-08 PROCEDURE — 85520 HEPARIN ASSAY: CPT | Performed by: PHYSICIAN ASSISTANT

## 2017-06-08 PROCEDURE — 00000146 ZZHCL STATISTIC GLUCOSE BY METER IP

## 2017-06-08 PROCEDURE — 25000128 H RX IP 250 OP 636: Performed by: PHYSICIAN ASSISTANT

## 2017-06-08 PROCEDURE — 12000026 ZZH R&B TRANSPLANT

## 2017-06-08 PROCEDURE — 25000128 H RX IP 250 OP 636: Performed by: NURSE PRACTITIONER

## 2017-06-08 PROCEDURE — 25000128 H RX IP 250 OP 636

## 2017-06-08 PROCEDURE — 40000802 ZZH SITE CHECK

## 2017-06-08 PROCEDURE — 25000132 ZZH RX MED GY IP 250 OP 250 PS 637: Performed by: PHYSICIAN ASSISTANT

## 2017-06-08 PROCEDURE — E2402 NEG PRESS WOUND THERAPY PUMP: HCPCS

## 2017-06-08 PROCEDURE — 36592 COLLECT BLOOD FROM PICC: CPT | Performed by: PHYSICIAN ASSISTANT

## 2017-06-08 PROCEDURE — 25000125 ZZHC RX 250: Performed by: NURSE PRACTITIONER

## 2017-06-08 PROCEDURE — 25000131 ZZH RX MED GY IP 250 OP 636 PS 637: Performed by: CLINICAL NURSE SPECIALIST

## 2017-06-08 RX ORDER — SODIUM CHLORIDE 9 MG/ML
INJECTION, SOLUTION INTRAVENOUS
Status: COMPLETED
Start: 2017-06-08 | End: 2017-06-08

## 2017-06-08 RX ORDER — FUROSEMIDE 10 MG/ML
10 INJECTION INTRAMUSCULAR; INTRAVENOUS ONCE
Status: COMPLETED | OUTPATIENT
Start: 2017-06-08 | End: 2017-06-08

## 2017-06-08 RX ADMIN — ACETAMINOPHEN 650 MG: 325 TABLET, FILM COATED ORAL at 08:27

## 2017-06-08 RX ADMIN — PANCRELIPASE 40000 UNITS: 20000; 68000; 109000 CAPSULE, DELAYED RELEASE ORAL at 20:34

## 2017-06-08 RX ADMIN — INSULIN ASPART 1 UNITS: 100 INJECTION, SOLUTION INTRAVENOUS; SUBCUTANEOUS at 04:02

## 2017-06-08 RX ADMIN — MULTIVIT AND MINERALS-FERROUS GLUCONATE 9 MG IRON/15 ML ORAL LIQUID 15 ML: at 08:37

## 2017-06-08 RX ADMIN — SODIUM BICARBONATE 325 MG: 325 TABLET ORAL at 16:24

## 2017-06-08 RX ADMIN — INSULIN GLARGINE 30 UNITS: 100 INJECTION, SOLUTION SUBCUTANEOUS at 09:55

## 2017-06-08 RX ADMIN — PIPERACILLIN SODIUM,TAZOBACTAM SODIUM 3.38 G: 3; .375 INJECTION, POWDER, FOR SOLUTION INTRAVENOUS at 05:33

## 2017-06-08 RX ADMIN — OXYCODONE HYDROCHLORIDE 5 MG: 5 SOLUTION ORAL at 16:01

## 2017-06-08 RX ADMIN — OXYCODONE HYDROCHLORIDE 5 MG: 5 SOLUTION ORAL at 05:32

## 2017-06-08 RX ADMIN — PANCRELIPASE 40000 UNITS: 20000; 68000; 109000 CAPSULE, DELAYED RELEASE ORAL at 00:25

## 2017-06-08 RX ADMIN — Medication 2000 UNITS: at 08:37

## 2017-06-08 RX ADMIN — HEPARIN SODIUM 2100 UNITS/HR: 10000 INJECTION, SOLUTION INTRAVENOUS at 01:27

## 2017-06-08 RX ADMIN — MICAFUNGIN SODIUM 100 MG: 10 INJECTION, POWDER, LYOPHILIZED, FOR SOLUTION INTRAVENOUS at 16:23

## 2017-06-08 RX ADMIN — FOLIC ACID 1 MG: 1 TABLET ORAL at 08:38

## 2017-06-08 RX ADMIN — SODIUM BICARBONATE 325 MG: 325 TABLET ORAL at 20:34

## 2017-06-08 RX ADMIN — SODIUM BICARBONATE 325 MG: 325 TABLET ORAL at 00:26

## 2017-06-08 RX ADMIN — PANCRELIPASE 40000 UNITS: 20000; 68000; 109000 CAPSULE, DELAYED RELEASE ORAL at 04:03

## 2017-06-08 RX ADMIN — SODIUM BICARBONATE 325 MG: 325 TABLET ORAL at 04:03

## 2017-06-08 RX ADMIN — PIPERACILLIN SODIUM,TAZOBACTAM SODIUM 3.38 G: 3; .375 INJECTION, POWDER, FOR SOLUTION INTRAVENOUS at 00:25

## 2017-06-08 RX ADMIN — VANCOMYCIN HYDROCHLORIDE 1750 MG: 10 INJECTION, POWDER, LYOPHILIZED, FOR SOLUTION INTRAVENOUS at 08:42

## 2017-06-08 RX ADMIN — PIPERACILLIN SODIUM,TAZOBACTAM SODIUM 3.38 G: 3; .375 INJECTION, POWDER, FOR SOLUTION INTRAVENOUS at 22:42

## 2017-06-08 RX ADMIN — LEVOTHYROXINE SODIUM 150 MCG: 300 TABLET ORAL at 08:37

## 2017-06-08 RX ADMIN — PIPERACILLIN SODIUM,TAZOBACTAM SODIUM 3.38 G: 3; .375 INJECTION, POWDER, FOR SOLUTION INTRAVENOUS at 16:06

## 2017-06-08 RX ADMIN — OXYCODONE HYDROCHLORIDE 5 MG: 5 SOLUTION ORAL at 20:34

## 2017-06-08 RX ADMIN — FUROSEMIDE 10 MG: 10 INJECTION, SOLUTION INTRAVENOUS at 09:51

## 2017-06-08 RX ADMIN — PREGABALIN 25 MG: 20 SOLUTION ORAL at 08:37

## 2017-06-08 RX ADMIN — VANCOMYCIN HYDROCHLORIDE 1750 MG: 10 INJECTION, POWDER, LYOPHILIZED, FOR SOLUTION INTRAVENOUS at 20:35

## 2017-06-08 RX ADMIN — PANCRELIPASE 40000 UNITS: 20000; 68000; 109000 CAPSULE, DELAYED RELEASE ORAL at 16:24

## 2017-06-08 RX ADMIN — SODIUM CHLORIDE, PRESERVATIVE FREE 5 ML: 5 INJECTION INTRAVENOUS at 00:27

## 2017-06-08 RX ADMIN — FLUOXETINE HYDROCHLORIDE 10 MG: 20 SOLUTION ORAL at 08:37

## 2017-06-08 RX ADMIN — PANTOPRAZOLE SODIUM 40 MG: 40 TABLET, DELAYED RELEASE ORAL at 08:38

## 2017-06-08 RX ADMIN — PANTOPRAZOLE SODIUM 40 MG: 40 TABLET, DELAYED RELEASE ORAL at 20:36

## 2017-06-08 RX ADMIN — OXYCODONE HYDROCHLORIDE 5 MG: 5 SOLUTION ORAL at 00:37

## 2017-06-08 RX ADMIN — SODIUM BICARBONATE 325 MG: 325 TABLET ORAL at 09:34

## 2017-06-08 RX ADMIN — OXYCODONE HYDROCHLORIDE 5 MG: 5 SOLUTION ORAL at 09:38

## 2017-06-08 RX ADMIN — ASPIRIN 81 MG: 81 TABLET, COATED ORAL at 08:38

## 2017-06-08 RX ADMIN — PANCRELIPASE 40000 UNITS: 20000; 68000; 109000 CAPSULE, DELAYED RELEASE ORAL at 09:34

## 2017-06-08 RX ADMIN — SODIUM CHLORIDE 500 ML: 9 INJECTION, SOLUTION INTRAVENOUS at 20:35

## 2017-06-08 RX ADMIN — ACETAMINOPHEN 650 MG: 325 TABLET, FILM COATED ORAL at 17:14

## 2017-06-08 RX ADMIN — INSULIN ASPART 2 UNITS: 100 INJECTION, SOLUTION INTRAVENOUS; SUBCUTANEOUS at 08:32

## 2017-06-08 RX ADMIN — FERROUS SULFATE 325 MG: 325 TABLET, FILM COATED ORAL at 08:38

## 2017-06-08 NOTE — PROGRESS NOTES
Care Coordinator  D/I: Home wound vac has been approved. IF pt will need it--call sterile stores  Clinton Ortega to release and deliver vac and supplies to pt's room @ 7.0178.  P: will follow. Pt may need IVAB at discharge and see my note from 6/6/17. Will follow. Per Shital Salas IF pt goes home with wound vac--they will keep pt--do not refer to [facility].

## 2017-06-08 NOTE — PROGRESS NOTES
Pancreatitis Service - Daily Progress Note  06/08/2017    Assessment & Plan: 55 yo with hx chronic pancreatitis s/p TPAIT 5/15/17.  Presented with wound infection and occlusive thrombosis left peroneal vein.    Cardiorespiratory: Hypoxia, CXR b/l small pleural effusion, b/l bibasilar opacities. Repeat lasix 10 mg IV x 1 today.  Increase IS, ambulation. Titrate O2 as tolerated, on 2 L this AM.   GI/Nutrition:  On cycled TF.    Heme: Anemia, stable, likely hemodilution. HGB 6.9 this AM. Minimize labs. Have labs use pediatric tubes if possible.  LLE DVT, on heparin gtt.  US BUE, small non occlusive thrombus right IJ.  Fluid/Electrolytes:  Hypervolemic. Lasix today.  : No acute issues  Post-pancreatectomy diabetes: Continue lantus/SSI, appreciate Endocrine consult.  Infection: Afebrile, WBC 13.9 decreased from 15.7  -Wound infection: Opened in clinic, VAC on.  6/3 cx growing candida alb.  Persistent purulent drainage today under VAC.  Will change to wet to dry dressings QID.  On fluconazole 6/3-6/6, change to micafungin 6/5. Follow culture, final sensitivities pending.    -Suspected abscess(es): Perihepatic and left sided fluid collections on imaging.  Both aspirated 6/5, gram stains negative.  Culture yeast.  On empiric Zosyn and Vanco. IR consulted for drain placement perihepatic fluid collection completed on 6/7 with 30 cc necrotic thick fluid drained, 12F drain in place.   Prophylaxis: Anticoagulation: Heparin gtt. Will discuss with staff plan for anticoagulation at discharge.   Pain control: Fair control with PRN oxycodone.  Also has fentanyl 75mcg patch on, dose decreased prior to admission.  Restarted Lyrica.    Neuro: Mild delirium with occasional hallucinations, improved.  Suspect secondary to infection as changes in narcotics did not improve symptoms. Patient states he is feeling depressed recently, will consult health psychology.   Activity: Up with assist  Anticipated LOS/Discharge: 7A, Undetermined  "LOS    Medical Decision Making: Medium  Subsequent visit 37972 (moderate level decision making)    GELACIO/Fellow/Resident Provider: Bambi Alejandre, PAC [id number]    Faculty: Ian Mendez MD  __________________________________________________________________  Transplant History:   5/15/2017 ([facility]), Postoperative day: 24     Interval History: History is obtained from the patient  Overnight events: Wound looks better. Patient states he has been feeling depressed over the last couple days.     ROS:   A 10-point review of systems was negative except as noted above.    Curent Meds:    furosemide  10 mg Intravenous Once     sodium chloride (PF)  10 mL Irrigation Q8H     insulin glargine  30 Units Subcutaneous QAM     micafungin  100 mg Intravenous Q24H     insulin glargine  30 Units Subcutaneous Q24H     vancomycin (VANCOCIN) IV  1,750 mg Intravenous Q12H     pregabalin  25 mg Oral Daily     amylase-lipase-protease  2 capsule Per Feeding Tube Q4H     piperacillin-tazobactam  3.375 g Intravenous Q6H     aspirin  81 mg Oral Daily     cholecalciferol  2,000 Units Per J Tube Daily     ferrous sulfate  325 mg Oral Daily     FLUoxetine  10 mg Per J Tube Daily     folic acid  1 mg Oral Daily     levothyroxine  150 mcg Per J Tube QAM AC     multivitamins with minerals  15 mL Per Feeding Tube Daily     pantoprazole  40 mg Oral or J tube BID     sodium bicarbonate  325 mg Per J Tube Q4H     insulin aspart  1-10 Units Subcutaneous Q4H     fentaNYL   Transdermal Q8H     fentaNYL  75 mcg Transdermal Q72H     fentaNYL   Transdermal Q72H       Physical Exam:     Admit Weight: 102.7 kg (226 lb 8 oz)    Current Vitals:   /78 (BP Location: Right arm)  Pulse 69  Temp 98.4  F (36.9  C) (Oral)  Resp 18  Ht 1.88 m (6' 2\")  Wt 110.8 kg (244 lb 3.2 oz)  SpO2 96%  BMI 31.35 kg/m2      Vital sign ranges:    Temp:  [98.4  F (36.9  C)-98.8  F (37.1  C)] 98.4  F (36.9  C)  Pulse:  [63-70] 69  Heart Rate:  [60-73] 67  Resp:  [8-20] 18  BP: " (105-139)/(58-78) 139/78  SpO2:  [94 %-98 %] 96 %  Patient Vitals for the past 24 hrs:   BP Temp Temp src Pulse Heart Rate Resp SpO2 Weight   06/08/17 0827 - 98.4  F (36.9  C) Oral - - - - -   06/08/17 0720 139/78 98.5  F (36.9  C) Oral 69 - 18 96 % -   06/08/17 0451 - - - - - - - 110.8 kg (244 lb 3.2 oz)   06/08/17 0400 123/71 98.4  F (36.9  C) Oral - 67 20 97 % -   06/08/17 0122 121/72 98.4  F (36.9  C) Oral - 70 20 95 % -   06/07/17 1929 117/67 98.8  F (37.1  C) Oral 68 68 18 96 % -   06/07/17 1845 114/66 - - - 73 16 96 % -   06/07/17 1830 116/67 - - - 60 18 96 % -   06/07/17 1815 116/68 - - - 62 16 96 % -   06/07/17 1804 107/67 98.5  F (36.9  C) Oral 63 63 18 96 % -   06/07/17 1740 111/58 - - 65 65 16 96 % -   06/07/17 1730 105/58 - - 68 68 15 96 % -   06/07/17 1720 112/60 - - 70 70 16 96 % -   06/07/17 1710 106/60 - - 68 68 14 95 % -   06/07/17 1700 110/60 - - 63 63 14 97 % -   06/07/17 1650 119/59 - - 66 66 16 94 % -   06/07/17 1640 114/61 - - 65 65 16 95 % -   06/07/17 1220 118/72 - - - 67 12 98 % -   06/07/17 1210 121/73 - - - 68 14 98 % -   06/07/17 1200 123/77 - - - 65 8 98 % -   06/07/17 1150 127/76 - - - 67 10 98 % -     General Appearance: in no apparent distress.   Skin: normal, warm, dry  Heart: regular rate and rhythm  Lungs: NLB on NC 2L  Abdomen: The abdomen is rounded, and mildly tender. The wound has purulent exudate at the superior aspect.   : king is not present.    Extremities: generalized +1 edema  Neuro: A&Ox4    Data:   CMP    Recent Labs  Lab 06/07/17  0707 06/06/17  0954  06/02/17  1632    140  < > 140   POTASSIUM 4.1 3.9  < > 4.0   CHLORIDE 105 106  < > 101   CO2 30 31  < > 32   * 135*  < > 70   BUN 21 19  < > 19   CR 0.84  0.81 0.84  < > 0.83   GFRESTIMATED >90Non  GFR Calc  >90Non  GFR Calc >90Non  GFR Calc  < > >90Non  GFR Calc   GFRESTBLACK >90African American GFR Calc  >90African American GFR Calc  >90African American GFR Calc  < > >90African American GFR Calc   CRISTOBAL 8.9 8.7  < > 8.8   MAG  --   --   --  2.2   PHOS  --   --   --  2.5   < > = values in this interval not displayed.  CBC    Recent Labs  Lab 06/08/17  0750 06/07/17  0707   HGB 6.8* 7.0*   WBC 14.3* 13.9*   * 1040*     CoagsNo lab results found in last 7 days.    Invalid input(s): XA   Urinalysis  Recent Labs   Lab Test  06/02/17   1710  05/10/17   1015   COLOR  Yellow  Yellow   APPEARANCE  Clear  Clear   URINEGLC  Negative  Negative   URINEBILI  Negative  Negative   URINEKETONE  Negative  Negative   SG  1.019  1.022   UBLD  Negative  Negative   URINEPH  7.5*  6.5   PROTEIN  Negative  Negative   NITRITE  Negative  Negative   LEUKEST  Negative  Negative   RBCU  <1  2   WBCU  <1  <1

## 2017-06-08 NOTE — PROGRESS NOTES
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 At clinic the wound was opened due to erythema and purulent discharge. On arrival to [facility] he had a leukocytosis (15.2), but was otherwise clinically stable. CT abdomen with several small discrete fluid collections in the retroperitoneum, anterior abdomen, and pelvis c/f hematoma vs abscess. He was started on empiric vancomycin and piperacillin/tazobactam for SSTI. On 6/3 wound cultures were collected positive for C. albicans/dubliniensis and was started on fluconazole. IR aspirated right and left fluid collection with a total of 26mL tan/brown fluid on 6/5 with cultures positive for yeast and fluconazole was transitioned to micafungin.. On 6/7 IR placed RUQ drain and removed 30mL of necrotic thick fluid.     Interval History  6/2: CT abdomen - fluid collections, start zosyn/vanc  6/3 Wound Cx - C. albicans  6/5: Perihepatic fluid collection aspiration. Cx yeast, presumptive C. albicans  6/7: IR drain placed. Cx: rare fungal elements    Assessment:   Surgical wound infection and suspected intrabdominal abscesses s/p IR drain placement 6/7.   Currently, his leukocytosis has downtrended to 14.3 and he remained hemodynamically stable and afebrile. Physical exam noted purulent exudate at surgical site. Drain ouput 5CC.  He is on day 7 of vancomycin and piperacillin/tazobactam and day 5 of fungal coverage with micafungin. Given that cultures obtained on 6/5 and 6/7 were on 4-6 days of vancomycin and piperacillin/tazobactam likely low yield for isolating bacteria to help direct antibiotics. No h/o MRSA found and no MRSA isolated on 6/3 wound culture. It would  be reasonable to trial off vancomycin and observe clinically. If he clinically decompensates continue MRSA treatment is warranted. Duration of therapy dependent on source control and clinical picture.    Recommendations:  1. Consider discontinuing vancomycin given limited MRSA risk factors and   2. Continue piperacillin/tazobactam for now.    Discussed with ID Staff - MD Piper Fall, PharmD  PGY-2 Infectious Diseases Pharmacy Resident  Office Ph: [phone]  Pager: [phone]    Current Anti-infective Orders:  Anti-infectives (Future)    Start     Dose/Rate Route Frequency Ordered Stop    06/07/17 1200  micafungin (MYCAMINE) 100 mg in NaCl 0.9 % 100 mL intermittent infusion      100 mg Intravenous EVERY 24 HOURS 06/06/17 0906      06/05/17 1800  vancomycin (VANCOCIN) 1,750 mg in NaCl 0.9 % 500 mL intermittent infusion      1,750 mg Intravenous EVERY 12 HOURS 06/05/17 0750      06/02/17 1715  piperacillin-tazobactam (ZOSYN) 3.375 g vial to attach to  mL bag      3.375 g  over 1 Hours Intravenous EVERY 6 HOURS 06/02/17 1706            Vitals and other clinical features  Vitals       06/06 0700  -  06/07 0659 06/07 0700  -  06/08 0659 06/08 0700  -  06/08 0936   Most Recent    Temp ( F) 98.3 -  99    98.4 -  98.9    98.4 -  98.5     98.4 (36.9)  Comment: (pt request)    Pulse 71 -  76    63 -  70      69     69    Heart Rate 66 -  75    60 -  73       67    Resp 16 -  20    8 -  20      18     18    /68 -  137/74    105/58 -  129/70      139/78     139/78    SpO2 (%) 93 -  98    94 -  98      96     96        Temperature curve:      Labs  Estimated Creatinine Clearance: 134.8 mL/min (based on Cr of 0.81).  Recent Labs   Lab Test  06/07/17   0707  06/06/17   0954  06/04/17   0720  06/02/17   1632  05/30/17   1114  05/27/17   1325   CR  0.84  0.81  0.84  0.94  0.83  0.82  0.81       Recent Labs   Lab Test  06/08/17   0750  06/07/17   0707  06/06/17   0559  06/05/17   1637  06/05/17   0415   06/04/17   0720  06/02/17   1632   05/27/17   1325   05/20/17   0716   05/19/17   0656   WBC  14.3*  13.9*  15.7*   --   15.5*  17.7*  15.2*   < >  15.0*   < >  12.5*   < >  18.2*   ANEU   --    --   9.1*   --   9.2*  9.6*   --    --   9.2*   --   8.3   --   15.3*   ALYM   --    --   3.3   --   3.1  3.7   --    --   1.8   --   1.3   --   1.1   TEJA   --    --   1.9*   --   1.7*  2.1*   --    --   1.9*   --   0.7   --   0.5   AEOS   --    --   1.4*   --   1.4*  2.2*   --    --   1.7*   --   1.2*   --   1.3*   HGB  6.8*  7.0*  6.9*  6.9*  6.8*  7.1*  8.0*   < >  7.3*   < >  6.9*   < >  7.3*   HCT  22.7*  23.2*  22.8*   --   22.6*  23.2*  25.9*   < >  23.1*   < >  22.3*   < >  22.7*   MCV  86  86  87   --   88  87  87   < >  89   < >  89   < >  87   PLT  967*  1040*  1164*   --   1098*  1112*  1233*   < >  862*   < >  383   < >  328    < > = values in this interval not displayed.       Recent Labs   Lab Test  05/30/17   1114  05/20/17   0716  05/17/17   0214  05/16/17   0355  05/15/17   2134  05/10/17   0805   BILITOTAL  0.3  0.4  0.5  1.1  1.5*  0.4   ALKPHOS  108  84  32*  29*  34*  79   ALBUMIN  2.6*  1.8*  2.3*  2.9*  3.0*  3.5   AST  20  20  74*  133*  164*  14   ALT  24  43  93*  128*  163*  21       Recent Labs   Lab Test  05/22/17   0643  05/15/17   2002  05/15/17   1800   LACT   --   3.9*  3.5*   CRP  81.0*   --    --      Recent Labs   Lab Test  06/07/17   0707   VANCOMYCIN  10.6       Culture results with specimen source  Culture Micro   Date Value Ref Range Status   06/07/2017 Pending  Incomplete   06/06/2017 Culture in progress  Final   06/06/2017 No growth after 2 days  Final   06/06/2017 No growth after 2 days  Final   06/06/2017 No growth after 2 days  Final   06/05/2017 (A)  Final    Light growth Candida albicans / dubliniensis Candida albicans and Candida   dubliniensis are not routinely speciated  Susceptibility testing not routinely done  Susceptibility testing requested by Vero joaquin  [phone] for normal   susceptibilities on org 1 @ 1610 6/7/17 CS     06/05/2017   Final    Canceled, Test credited Duplicate request YEAST WILL BE LOOKED FOR IN THE FUNGAL   CULTURE ORDERED     06/05/2017 Culture negative monitoring continues  Final   06/05/2017 (A)  Final    Yeast isolated Presumptive identification: Candida albicans / dubliniensis  Candida albicans and Candida dubliniensis are not routinely speciated     06/05/2017 Culture negative after 2 days  Final    Specimen Description   Date Value Ref Range Status   06/07/2017 Hepatic ESTEBAN HEPATIC Fluid  Final   06/07/2017 Hepatic ESTEBAN HEPATIC Fluid  Final   06/06/2017 Abdominal Wound  Final   06/06/2017 Abdominal Wound  Final   06/06/2017 Blood Right Hand  Final   06/06/2017 Blood Left Arm  Final   06/06/2017 Blood Left Arm  Final        Urine Studies     Recent Labs   Lab Test  06/02/17   1710  05/10/17   1015   URINEPH  7.5*  6.5   NITRITE  Negative  Negative   LEUKEST  Negative  Negative   WBCU  <1  <1       Imaging:  Xr Chest 2 Views    Result Date: 6/6/2017  EXAMINATION: XR CHEST 2 VW  6/6/2017 9:24 AM  CLINICAL HISTORY: Hypoxia COMPARISON: 5/15/2017 FINDINGS: PA and lateral views of the chest. Interval removal of OG/NG tube and right IJ central venous catheter. Cardiac silhouette within normal limits. New small bilateral pleural effusion with left greater than right bibasilar opacities. No pneumothorax. Partially visualized upper abdomen is unremarkable.     IMPRESSION: 1. New small bilateral pleural effusions with left greater than right bibasilar opacities, atelectasis and/or infection. 2. Removal of right IJ central venous catheter and OS/NG tube. I have personally reviewed the examination and initial interpretation and I agree with the findings. RASHARD WILKINS MD    Ct Subdiapraghm Abscess Drainage    Result Date: 6/7/2017  Procedure 6/7/2017: CT guided right upper quadrant drain placement History: 56-year-old male with a history of chronic  pancreatitis status post pancreas transplant and pancreatectomy with multiple small abdominal fluid collections, plan for percutaneous abscess drain placement in perihepatic right subdiaphragmatic fluid collection. Comparison: CT 6/2/2017 Staff: Lesia Arceo M.D. Resident: Garrick Phan M.D. Medications: 1. 100 mcg Fentanyl 2. 2 mg Versed Moderate sedation administered by the IR nurse at the supervision of the attending. Vital signs and oxygenation continuously monitored. The patient remained stable throughout the procedure. Sedation time: 45 minutes DLP: 1744 mGy*cm Findings/procedure: Prior to the procedure, both verbal and written informed consent obtained from the patient. Patient placed supine on the CT table. Preprocedure scan obtained revealing trace perihepatic fluid in the right upper quadrant and adequate percutaneous approach for drain placement. The right upper quadrant prepped and draped. Timeout performed. Buffered 1% Lidocaine used for local analgesia. Under intermittent CT fluoroscopic guidance, a 5F Verdezyne centesis needle with needle stylette advanced into the collection. Stylette removed. There was no return of fluid. Catheter removed over a wire. Wire was visualized by CT within the right upper quadrant collection. Tract dilated to 12 French. 12 French Skater catheter advanced over the wire into the collection. Position was confirmed with CT imaging. Catheter secured to the skin with a 2-0 Ethilon retention suture. Dressing applied and catheter connected to MAURICIO suction bulb.     Impression: Uncomplicated CT fluoroscopic guided right upper quadrant drain placement. Plan: Catheter to MAURICIO suction bulb. Flush catheter 3 times per day to maintain patency. Chart daily outputs. If output is low consider discontinuing versus catheter upsizing if there is significant fluid accumulates. The course of the procedure and the above plan were discussed with the surgery on-call resident, Dr. Olmstead by Dr. Phan  following the procedure at 6:00 PM on 6/7/2017.    Us Lower Extremity Venous Duplex Bilateral    Result Date: 6/2/2017  EXAMINATION: DOPPLER VENOUS ULTRASOUND OF BILATERAL LOWER EXTREMITIES, 6/2/2017 1:00 PM COMPARISON: None. HISTORY: Left lower pain and increased swelling. TECHNIQUE:  Gray-scale evaluation with compression, spectral flow and color Doppler assessment of the deep venous system of both legs from groin to knee, and then at the ankles. FINDINGS: Right lower extremity:  The common femoral, femoral, popliteal and posterior tibial veins demonstrate normal compressibility and blood flow. The peroneal veins are also patent Left lower extremity: The common femoral, femoral, popliteal and posterior tibial veins demonstrate normal compressibility and blood flow. The peroneal veins also patent. However there is occlusive thrombosis of a branch in the left mid, arising from the peroneal vein.     IMPRESSION: 1. Occlusive thrombosis of a branch arising from the left peroneal vein in the mid calf region. The peroneal veins are otherwise patent. Remainder of the left lower extremity deep venous system is patent. 2. No evidence of deep venous thrombosis in right lower extremity. Findings discussed with MsaJnel Love by  on 6/2/2017 at 12:50 PM. I have personally reviewed the examination and initial interpretation and I agree with the findings. ANDRE AGARWAL MD    Us Upper Extremity Venous Duplex Bilat    Result Date: 6/6/2017  Examination:   Bilateral upper extremity venous ultrasound History: Evaluate for DVT prior to picc placement Comparison: None is available. Technique: Grayscale images with compression, color and spectral Doppler's of the deep veins of the left or right? upper extremity. Findings: Focal echogenic filling defect that changes in position while adherent to the wall of the low right internal jugular vein. Otherwise, the right internal jugular, axillary, brachial, cephalic, and basilic  veins are fully compressible and have no intraluminal filling defects. Right internal jugular, innominate, subclavian, and axillary veins have normal venous waveforms.     Impression: Tiny adherent nonocclusive thrombus in the low right internal jugular vein. This may be resolving following a recent venotomy. [Access Center: Small adherent nonocclusive thrombus in the right internal jugular vein] This report will be copied to the [facility] to ensure a provider acknowledges the finding. Access Center is available Monday through Friday 8am-3:30 pm. I have personally reviewed the examination and initial interpretation and I agree with the findings. SEFERINO JONES    Ct Abdomen Pelvis W Contrast    Result Date: 6/2/2017  EXAMINATION: CT ABDOMEN PELVIS W CONTRAST, 6/2/2017 6:13 PM TECHNIQUE:  Helical CT images from the lung bases through the symphysis pubis were obtained with IV contrast. Contrast dose: 135ml [id number] COMPARISON: 5/16/2017, 5/10/2017, 9/9/2016. HISTORY: Evaluate for abscess. Postoperative day 18 status post total pancreatectomy and auto islet cell transplantation. FINDINGS: Abdomen and pelvis: Postsurgical changes of total pancreatectomy, splenectomy, cholecystectomy, Dhara-en-Y gastric bypass. Expected mild pneumobilia in the left hepatic lobe. Minimal heterogeneity throughout the liver, related to recent auto islet cell transplantation. The percutaneous gastrojejunostomy tube tip is positioned in the proximal jejunum near the jejunojejunal anastomosis. Trace perigastric pneumoperitoneum.  Scattered free fluid and mesenteric/retroperitoneal fat stranding throughout the upper abdomen. Loculated fluid collections with minimal peripheral enhancement seen in the pancreatic head and neck resection bed, the largest component measuring 2.2 x 2.7 x 3.5 cm and 16 Hounsfield units (series 5, image 178). There is a similar appearing discrete fluid collection immediately subjacent to the anterior  abdominal wall in the central abdomen which measures 5.2 x 5.8 x 1.0 cm and 14 Hounsfield units (series 5, image 175). There is another peripherally enhancing fluid collection anterior to the rectum which measures 1.7 x 1.8 x 3.2 cm and 10 Hounsfield units (series 5, image 107). Stable simple renal cysts and renal hypodensities which are too small to characterize. Contrast opacifies the renal collecting systems.  No dilated loops of bowel. The appendix is unremarkable. Normal caliber abdominal aorta. The splenic vein vasculature is surgically absent. Focal narrowing of the confluence of the SMV and portal vein measures 9 mm in diameter compared to 15 mm in the adjacent portal vein. No lymphadenopathy in the abdomen or pelvis. Lung bases: The heart is not enlarged. Trace right and small left pleural effusions. Bibasilar atelectasis, left greater than right. Bones and soft tissues: Postsurgical changes of midline laparotomy with wound packing. Multilevel degenerative changes in the spine. No acute or worrisome osseous lesions.     IMPRESSION: 1. Extensive postsurgical changes including pancreatectomy, cholecystectomy, Dhara-en-Y gastric bypass, and splenectomy. Trace pneumobilia with mild to moderate free fluid and fat stranding throughout the upper abdomen. 2. Several small discrete fluid collections in the retroperitoneum, anterior abdomen, and pelvis, which measure intermediate density and demonstrate peripheral enhancement. These are nonspecific and may evolving postsurgical changes and hematomas, though the overall extent is greater than expected at this stage post operatively. None have typical appearance of abscess though early infection is possible. Collections related to small biliary leak could be considered though there is not definitive evidence of a bile duct leak. 3. Patent peripancreatic vasculature with focal narrowing of the confluence of the SMV and portal vein. 4. Heterogeneous hepatic parenchyma,  due to auto islet cell transplantation. 5. Trace right and small left pleural effusions. I have personally reviewed the examination and initial interpretation and I agree with the findings. JASON ANGEL MD

## 2017-06-08 NOTE — CONSULTS
Health Psychology                  Clinic    Department of Medicine  Vandana Aragon, Ph.D., L.P. [phone]                          [facility]  [facility] Antelmo Will, Ph.D.,  L.P. [phone]                 3rd Floor  [place] Mail Code 741   Raymond Redman, Ph.D., A.B.P.P., L.P. [phone]     [address] [place] [address] Manjula Pineda, Ph.D., L.P. [phone]            [place] [address]           Cassidy Harry, Ph.D., L.P. [phone]     Inpatient Health Psychology    Short conversation with patient and his wife to introduce myself and Health Psychology services.    Both were very welcoming of contact. However, patient feeling very physically uncomfortable currently and would appreciate being able to meet at a different time. Because of PLC class this afternoon, we agreed to meet tomorrow morning at 1100.    Vandana Aragon, PhD, LP  [phone]

## 2017-06-08 NOTE — PLAN OF CARE
Problem: Individualization  Goal: Patient Preferences  Outcome: No Change     RN:  AVSS, abdominal pain controlled with Oxycodone, given x2, denies nausea. TF @75cc/hr, blood glucose 117 and 127, SS given. Hep10a at 0000 was 0.22, Heparin 3,00units bolus given and increased Heparin gtt to 2100units/hr from 1950units/hr, next Hep10a check with AM blood draw. Right drain flush with NS 10cc ordered but patient c/o intolerable pain at the site, NS flush not given, MAURICIO drain with scant purulent/serosanguinous drainage. Adequate urine output, loose BM x1. Up ad hattie with standby assist. Francia-hepatic culture (+) with rare fungal elements, MD on call notified. Will continue to monitor.

## 2017-06-08 NOTE — PLAN OF CARE
Problem: Discharge Planning  Goal: Discharge Planning (Adult, OB, Behavioral, Peds)  6/8/17 I saw the patient(pt) and his wife (Maribell) in the room for PLC MAURICIO drain education.Maribell correctly returned cares per written maertail using PLC supplies.The patient was not feeling well but attentive and verbalized understanding .Continue to reinforce education.  Written material given and reviewed : Drainage Cares, Saline Flush to a Drain

## 2017-06-08 NOTE — PROGRESS NOTES
Diabetes Consult Daily  Progress Note          Assessment/Plan:   Mr. Tanner Arita is a 57 yo man with a history of chronic pancreatitis s/p TPAIT on 5/15/17, with post pancreatectomy diabetes, who was admitted on 6/2/17 with wound infection, DVT of LLE, and dehydration.    Hypoglycemia yesterday afternoon due to insufficient carb provision when TF off.    Plan:  -glargine continue 30 units AM and increase to 36 units in PM today  -correction aspart 1 unit/20 for BG >120 q4h,   -monitor glucose q4h.  (D10W rate to increase to 100 cc/h in case of turning TF off)       Outpatient diabetes follow up: Had appointment with eLxy Brunson PA-C at [facility]- this will need to be rescheduled once a discharge date is in sight  Plan discussed with patient, bedside RN.           Interval History:   The last 24 hours progress and nursing notes reviewed.  Continuous TF: Impact Peptide 1.5 at 75ml/h.  BG dropped significantly after TF changed and was off for a period.  Glargine doses decreased to 47 and 37 PTA    Had to go to procedure twice yesterday afternoon, so receiving D10W in place f TF for several hours and BG dropped to 59.  Pt was symptomatic.  Overnight BG climbing.  Pt has already walked 6 short loops since midnight.  Hopes to have more energy and increase distance.  Complains perihepatic drain irrigation was very painful.  He thinks it may be upsized, but not today.          Recent Labs  Lab 06/08/17  1502 06/08/17  1253 06/08/17  0832 06/08/17  0724 06/08/17  0356 06/08/17  0033  06/07/17  0707  06/06/17  0954  06/04/17  0720  06/02/17  1632   GLC  --   --   --   --   --   --   --  133*  --  135*  --  126*  --  70   * 131* 150* 142* 127* 117*  < >  --   < >  --   < >  --   < >  --    < > = values in this interval not displayed.            Review of Systems:   See interval hx          Medications:       Active Diet Order      Clear Liquid Diet     Physical Exam:  Gen: NAD  "resting in bed,  Wife at bedside  HEENT: NC/AT, mucous membranes are moist  Resp: Unlabored  Neuro:oriented x3, communicating clearly  /78 (BP Location: Right arm)  Pulse 64  Temp 98.3  F (36.8  C) (Oral)  Resp 18  Ht 1.88 m (6' 2\")  Wt 110.8 kg (244 lb 3.2 oz)  SpO2 95%  BMI 31.35 kg/m2           Data:     Lab Results   Component Value Date    A1C 5.3 05/16/2017    A1C 5.6 05/10/2017    A1C 5.4 09/10/2015          Chem  CBC RESULTS:   Recent Labs   Lab Test  06/08/17   0750   WBC  14.3*   RBC  2.65*   HGB  6.8*   HCT  22.7*   MCV  86   MCH  25.7*   MCHC  30.0*   RDW  16.9*   PLT  967*     Orquidea Cabello APRN [facility] [phone]  Diabetes Management job code [id number]          "

## 2017-06-09 ENCOUNTER — HOME INFUSION (PRE-WILLOW HOME INFUSION) (OUTPATIENT)
Dept: PHARMACY | Facility: CLINIC | Age: 57
End: 2017-06-09

## 2017-06-09 LAB
ABO + RH BLD: NORMAL
ABO + RH BLD: NORMAL
ANION GAP SERPL CALCULATED.3IONS-SCNC: 6 MMOL/L (ref 3–14)
BACTERIA SPEC CULT: ABNORMAL
BACTERIA SPEC CULT: ABNORMAL
BLD GP AB SCN SERPL QL: NORMAL
BLD PROD TYP BPU: NORMAL
BLD PROD TYP BPU: NORMAL
BLD UNIT ID BPU: 0
BLOOD BANK CMNT PATIENT-IMP: NORMAL
BLOOD PRODUCT CODE: NORMAL
BPU ID: NORMAL
BUN SERPL-MCNC: 20 MG/DL (ref 7–30)
CALCIUM SERPL-MCNC: 8.7 MG/DL (ref 8.5–10.1)
CHLORIDE SERPL-SCNC: 106 MMOL/L (ref 94–109)
CO2 SERPL-SCNC: 29 MMOL/L (ref 20–32)
CREAT SERPL-MCNC: 0.83 MG/DL (ref 0.66–1.25)
ERYTHROCYTE [DISTWIDTH] IN BLOOD BY AUTOMATED COUNT: 16.3 % (ref 10–15)
GFR SERPL CREATININE-BSD FRML MDRD: ABNORMAL ML/MIN/1.7M2
GLUCOSE BLDC GLUCOMTR-MCNC: 111 MG/DL (ref 70–99)
GLUCOSE BLDC GLUCOMTR-MCNC: 126 MG/DL (ref 70–99)
GLUCOSE BLDC GLUCOMTR-MCNC: 130 MG/DL (ref 70–99)
GLUCOSE BLDC GLUCOMTR-MCNC: 130 MG/DL (ref 70–99)
GLUCOSE BLDC GLUCOMTR-MCNC: 135 MG/DL (ref 70–99)
GLUCOSE BLDC GLUCOMTR-MCNC: 153 MG/DL (ref 70–99)
GLUCOSE SERPL-MCNC: 116 MG/DL (ref 70–99)
HCT VFR BLD AUTO: 23.7 % (ref 40–53)
HGB BLD-MCNC: 7.2 G/DL (ref 13.3–17.7)
INR PPP: 1.23 (ref 0.86–1.14)
LMWH PPP CHRO-ACNC: 0.49 IU/ML
Lab: ABNORMAL
MCH RBC QN AUTO: 25.5 PG (ref 26.5–33)
MCHC RBC AUTO-ENTMCNC: 30.4 G/DL (ref 31.5–36.5)
MCV RBC AUTO: 84 FL (ref 78–100)
MICRO REPORT STATUS: ABNORMAL
MICRO REPORT STATUS: ABNORMAL
NUM BPU REQUESTED: 1
PLATELET # BLD AUTO: 1024 10E9/L (ref 150–450)
POTASSIUM SERPL-SCNC: 3.9 MMOL/L (ref 3.4–5.3)
RBC # BLD AUTO: 2.82 10E12/L (ref 4.4–5.9)
SODIUM SERPL-SCNC: 141 MMOL/L (ref 133–144)
SPECIMEN EXP DATE BLD: NORMAL
SPECIMEN SOURCE: ABNORMAL
SPECIMEN SOURCE: ABNORMAL
TRANSFUSION STATUS PATIENT QL: NORMAL
TRANSFUSION STATUS PATIENT QL: NORMAL
VANCOMYCIN SERPL-MCNC: 20.3 MG/L
WBC # BLD AUTO: 15.8 10E9/L (ref 4–11)

## 2017-06-09 PROCEDURE — 25000128 H RX IP 250 OP 636: Performed by: STUDENT IN AN ORGANIZED HEALTH CARE EDUCATION/TRAINING PROGRAM

## 2017-06-09 PROCEDURE — 86900 BLOOD TYPING SEROLOGIC ABO: CPT | Performed by: NURSE PRACTITIONER

## 2017-06-09 PROCEDURE — E2402 NEG PRESS WOUND THERAPY PUMP: HCPCS

## 2017-06-09 PROCEDURE — 00000146 ZZHCL STATISTIC GLUCOSE BY METER IP

## 2017-06-09 PROCEDURE — 25000132 ZZH RX MED GY IP 250 OP 250 PS 637: Performed by: PHYSICIAN ASSISTANT

## 2017-06-09 PROCEDURE — 12000026 ZZH R&B TRANSPLANT

## 2017-06-09 PROCEDURE — 80048 BASIC METABOLIC PNL TOTAL CA: CPT | Performed by: PHYSICIAN ASSISTANT

## 2017-06-09 PROCEDURE — 85610 PROTHROMBIN TIME: CPT | Performed by: PHYSICIAN ASSISTANT

## 2017-06-09 PROCEDURE — 86850 RBC ANTIBODY SCREEN: CPT | Performed by: NURSE PRACTITIONER

## 2017-06-09 PROCEDURE — 80202 ASSAY OF VANCOMYCIN: CPT | Performed by: TRANSPLANT SURGERY

## 2017-06-09 PROCEDURE — 86923 COMPATIBILITY TEST ELECTRIC: CPT | Performed by: NURSE PRACTITIONER

## 2017-06-09 PROCEDURE — 25000128 H RX IP 250 OP 636: Performed by: NURSE PRACTITIONER

## 2017-06-09 PROCEDURE — 25000128 H RX IP 250 OP 636: Performed by: PHYSICIAN ASSISTANT

## 2017-06-09 PROCEDURE — 27210436 ZZH NUTRITION PRODUCT SEMIELEM INTERMED CAN

## 2017-06-09 PROCEDURE — 25000132 ZZH RX MED GY IP 250 OP 250 PS 637: Performed by: STUDENT IN AN ORGANIZED HEALTH CARE EDUCATION/TRAINING PROGRAM

## 2017-06-09 PROCEDURE — 86901 BLOOD TYPING SEROLOGIC RH(D): CPT | Performed by: NURSE PRACTITIONER

## 2017-06-09 PROCEDURE — 36415 COLL VENOUS BLD VENIPUNCTURE: CPT | Performed by: PHYSICIAN ASSISTANT

## 2017-06-09 PROCEDURE — 36415 COLL VENOUS BLD VENIPUNCTURE: CPT | Performed by: TRANSPLANT SURGERY

## 2017-06-09 PROCEDURE — 85520 HEPARIN ASSAY: CPT | Performed by: PHYSICIAN ASSISTANT

## 2017-06-09 PROCEDURE — P9016 RBC LEUKOCYTES REDUCED: HCPCS | Performed by: NURSE PRACTITIONER

## 2017-06-09 PROCEDURE — 85027 COMPLETE CBC AUTOMATED: CPT | Performed by: PHYSICIAN ASSISTANT

## 2017-06-09 PROCEDURE — 25000132 ZZH RX MED GY IP 250 OP 250 PS 637: Performed by: NURSE PRACTITIONER

## 2017-06-09 PROCEDURE — 25000132 ZZH RX MED GY IP 250 OP 250 PS 637: Performed by: TRANSPLANT SURGERY

## 2017-06-09 PROCEDURE — 25000128 H RX IP 250 OP 636: Performed by: TRANSPLANT SURGERY

## 2017-06-09 RX ORDER — QUETIAPINE FUMARATE 25 MG/1
25 TABLET, FILM COATED ORAL AT BEDTIME
Status: DISCONTINUED | OUTPATIENT
Start: 2017-06-09 | End: 2017-06-10

## 2017-06-09 RX ORDER — WARFARIN SODIUM 7.5 MG/1
7.5 TABLET ORAL
Status: COMPLETED | OUTPATIENT
Start: 2017-06-09 | End: 2017-06-09

## 2017-06-09 RX ORDER — FENTANYL 100 UG/1
100 PATCH TRANSDERMAL
Status: DISCONTINUED | OUTPATIENT
Start: 2017-06-09 | End: 2017-06-14 | Stop reason: HOSPADM

## 2017-06-09 RX ORDER — QUETIAPINE FUMARATE 25 MG/1
25 TABLET, FILM COATED ORAL 2 TIMES DAILY
Status: DISCONTINUED | OUTPATIENT
Start: 2017-06-09 | End: 2017-06-09

## 2017-06-09 RX ORDER — PROCHLORPERAZINE 25 MG
25 SUPPOSITORY, RECTAL RECTAL EVERY 12 HOURS PRN
Status: DISCONTINUED | OUTPATIENT
Start: 2017-06-09 | End: 2017-06-14 | Stop reason: HOSPADM

## 2017-06-09 RX ORDER — FUROSEMIDE 20 MG
20 TABLET ORAL ONCE
Status: COMPLETED | OUTPATIENT
Start: 2017-06-09 | End: 2017-06-09

## 2017-06-09 RX ORDER — QUETIAPINE FUMARATE 25 MG/1
25 TABLET, FILM COATED ORAL 2 TIMES DAILY PRN
Status: DISCONTINUED | OUTPATIENT
Start: 2017-06-09 | End: 2017-06-14 | Stop reason: HOSPADM

## 2017-06-09 RX ORDER — PROCHLORPERAZINE MALEATE 5 MG
5-10 TABLET ORAL EVERY 6 HOURS PRN
Status: DISCONTINUED | OUTPATIENT
Start: 2017-06-09 | End: 2017-06-14 | Stop reason: HOSPADM

## 2017-06-09 RX ADMIN — ENOXAPARIN SODIUM 100 MG: 100 INJECTION SUBCUTANEOUS at 21:51

## 2017-06-09 RX ADMIN — SODIUM BICARBONATE 325 MG: 325 TABLET ORAL at 16:50

## 2017-06-09 RX ADMIN — INSULIN GLARGINE 30 UNITS: 100 INJECTION, SOLUTION SUBCUTANEOUS at 09:44

## 2017-06-09 RX ADMIN — FOLIC ACID 1 MG: 1 TABLET ORAL at 09:46

## 2017-06-09 RX ADMIN — LEVOTHYROXINE SODIUM 150 MCG: 300 TABLET ORAL at 12:51

## 2017-06-09 RX ADMIN — PIPERACILLIN SODIUM,TAZOBACTAM SODIUM 3.38 G: 3; .375 INJECTION, POWDER, FOR SOLUTION INTRAVENOUS at 13:57

## 2017-06-09 RX ADMIN — WARFARIN SODIUM 7.5 MG: 7.5 TABLET ORAL at 18:00

## 2017-06-09 RX ADMIN — ACETAMINOPHEN 650 MG: 325 TABLET, FILM COATED ORAL at 19:44

## 2017-06-09 RX ADMIN — OXYCODONE HYDROCHLORIDE 5 MG: 5 SOLUTION ORAL at 01:15

## 2017-06-09 RX ADMIN — HEPARIN SODIUM 2100 UNITS/HR: 10000 INJECTION, SOLUTION INTRAVENOUS at 01:36

## 2017-06-09 RX ADMIN — MICAFUNGIN SODIUM 100 MG: 10 INJECTION, POWDER, LYOPHILIZED, FOR SOLUTION INTRAVENOUS at 15:35

## 2017-06-09 RX ADMIN — PANCRELIPASE 40000 UNITS: 20000; 68000; 109000 CAPSULE, DELAYED RELEASE ORAL at 00:30

## 2017-06-09 RX ADMIN — INSULIN ASPART 2 UNITS: 100 INJECTION, SOLUTION INTRAVENOUS; SUBCUTANEOUS at 12:49

## 2017-06-09 RX ADMIN — PANCRELIPASE 40000 UNITS: 20000; 68000; 109000 CAPSULE, DELAYED RELEASE ORAL at 04:02

## 2017-06-09 RX ADMIN — FUROSEMIDE 20 MG: 20 TABLET ORAL at 10:24

## 2017-06-09 RX ADMIN — SODIUM BICARBONATE 325 MG: 325 TABLET ORAL at 13:57

## 2017-06-09 RX ADMIN — INSULIN ASPART 1 UNITS: 100 INJECTION, SOLUTION INTRAVENOUS; SUBCUTANEOUS at 09:41

## 2017-06-09 RX ADMIN — PIPERACILLIN SODIUM,TAZOBACTAM SODIUM 3.38 G: 3; .375 INJECTION, POWDER, FOR SOLUTION INTRAVENOUS at 04:02

## 2017-06-09 RX ADMIN — FERROUS SULFATE 325 MG: 325 TABLET, FILM COATED ORAL at 09:46

## 2017-06-09 RX ADMIN — ASPIRIN 81 MG: 81 TABLET, COATED ORAL at 09:46

## 2017-06-09 RX ADMIN — Medication 2000 UNITS: at 09:57

## 2017-06-09 RX ADMIN — VANCOMYCIN HYDROCHLORIDE 1750 MG: 10 INJECTION, POWDER, LYOPHILIZED, FOR SOLUTION INTRAVENOUS at 09:47

## 2017-06-09 RX ADMIN — PANCRELIPASE 40000 UNITS: 20000; 68000; 109000 CAPSULE, DELAYED RELEASE ORAL at 13:56

## 2017-06-09 RX ADMIN — OXYCODONE HYDROCHLORIDE 5 MG: 5 SOLUTION ORAL at 10:09

## 2017-06-09 RX ADMIN — PROCHLORPERAZINE EDISYLATE 10 MG: 5 INJECTION INTRAMUSCULAR; INTRAVENOUS at 02:00

## 2017-06-09 RX ADMIN — INSULIN ASPART 1 UNITS: 100 INJECTION, SOLUTION INTRAVENOUS; SUBCUTANEOUS at 16:39

## 2017-06-09 RX ADMIN — QUETIAPINE FUMARATE 25 MG: 25 TABLET, FILM COATED ORAL at 10:24

## 2017-06-09 RX ADMIN — FLUOXETINE HYDROCHLORIDE 10 MG: 20 SOLUTION ORAL at 09:59

## 2017-06-09 RX ADMIN — PIPERACILLIN SODIUM,TAZOBACTAM SODIUM 3.38 G: 3; .375 INJECTION, POWDER, FOR SOLUTION INTRAVENOUS at 19:35

## 2017-06-09 RX ADMIN — SODIUM BICARBONATE 325 MG: 325 TABLET ORAL at 20:36

## 2017-06-09 RX ADMIN — SODIUM BICARBONATE 325 MG: 325 TABLET ORAL at 08:51

## 2017-06-09 RX ADMIN — PANTOPRAZOLE SODIUM 40 MG: 40 TABLET, DELAYED RELEASE ORAL at 10:09

## 2017-06-09 RX ADMIN — PANTOPRAZOLE SODIUM 40 MG: 40 TABLET, DELAYED RELEASE ORAL at 20:36

## 2017-06-09 RX ADMIN — PANCRELIPASE 40000 UNITS: 20000; 68000; 109000 CAPSULE, DELAYED RELEASE ORAL at 08:50

## 2017-06-09 RX ADMIN — INSULIN ASPART 1 UNITS: 100 INJECTION, SOLUTION INTRAVENOUS; SUBCUTANEOUS at 00:30

## 2017-06-09 RX ADMIN — MULTIVIT AND MINERALS-FERROUS GLUCONATE 9 MG IRON/15 ML ORAL LIQUID 15 ML: at 09:57

## 2017-06-09 RX ADMIN — PANCRELIPASE 40000 UNITS: 20000; 68000; 109000 CAPSULE, DELAYED RELEASE ORAL at 16:51

## 2017-06-09 RX ADMIN — PANCRELIPASE 40000 UNITS: 20000; 68000; 109000 CAPSULE, DELAYED RELEASE ORAL at 20:36

## 2017-06-09 RX ADMIN — FENTANYL 1 PATCH: 100 PATCH, EXTENDED RELEASE TRANSDERMAL at 12:44

## 2017-06-09 RX ADMIN — PREGABALIN 25 MG: 20 SOLUTION ORAL at 10:09

## 2017-06-09 RX ADMIN — SODIUM BICARBONATE 325 MG: 325 TABLET ORAL at 00:30

## 2017-06-09 RX ADMIN — ENOXAPARIN SODIUM 100 MG: 100 INJECTION SUBCUTANEOUS at 12:45

## 2017-06-09 RX ADMIN — INSULIN ASPART 1 UNITS: 100 INJECTION, SOLUTION INTRAVENOUS; SUBCUTANEOUS at 03:58

## 2017-06-09 RX ADMIN — SODIUM BICARBONATE 325 MG: 325 TABLET ORAL at 04:02

## 2017-06-09 ASSESSMENT — PAIN DESCRIPTION - DESCRIPTORS
DESCRIPTORS: CONSTANT
DESCRIPTORS: CONSTANT

## 2017-06-09 NOTE — PHARMACY-VANCOMYCIN DOSING SERVICE
Pharmacy Vancomycin Note  Date of Service 2017  Patient's  1960   56 year old, male    Indication: Skin and Soft Tissue Infection, perohepatic fluid collection.   Goal Trough Level: 15-20 mg/L  Day of Therapy: 8  Current Vancomycin regimen:  1750 mg IV q12h  Other antibiotics/antifungal therapy: Zosyn 3.375 gm IV q6h and Micafungin 100 mg IV q24h  6/3 wound fluid: c. Albicans (sens pend__)   abd / perihepatic fluid collections: candida albicans__ (paul requested sens )   abd wound: c. Albicans (sens pend___)    Creatinine for last 3 days  2017:  7:07 AM Creatinine 0.81 mg/dL;  7:07 AM Creatinine 0.84 mg/dL  2017: 12:26 AM Creatinine 0.83 mg/dL  Current estimated CrCl = Estimated Creatinine Clearance: 130.4 mL/min (based on Cr of 0.83).    Recent Vancomycin Levels (past 3 days)  2017:  7:07 AM Vancomycin Level 10.6 mg/L  2017:  7:21 AM Vancomycin Level 20.3 mg/L (11hr trough)    Vancomycin IV Administrations (past 72 hours)                   vancomycin (VANCOCIN) 1,750 mg in NaCl 0.9 % 500 mL intermittent infusion (mg) 1,750 mg New Bag 17 0947     1,750 mg New Bag 17 2035     1,750 mg New Bag  0842     1,750 mg New Bag 17 1959     1,750 mg New Bag  0915     1,750 mg New Bag 17 1700                Nephrotoxins and other renal medications (Future)    Start     Dose/Rate Route Frequency Ordered Stop    17 1800  vancomycin (VANCOCIN) 1,750 mg in NaCl 0.9 % 500 mL intermittent infusion      1,750 mg Intravenous EVERY 12 HOURS 17 0750      17 1715  piperacillin-tazobactam (ZOSYN) 3.375 g vial to attach to  mL bag      3.375 g  over 1 Hours Intravenous EVERY 6 HOURS 17 1706               Contrast Orders - past 72 hours     None          Interpretation of levels and current regimen:  Trough level is therapeutic drawn as an 11hr trough.    Has serum creatinine changed > 50% in last 72 hours: No    Urine output:  good urine  output    Renal Function: Stable    Plan:  1.  Spoke with Dr. Mendez and Areli Montanez NP regarding necessity of therapy. Team agreed to discontinue vancomycin treatment course. If provider recommends restarting therapy, recommend to continue with 1750 mg q12h based off of current levels.   2.  Pharmacy will check trough levels as appropriate in 3-5 days if vancomycin treatment is continued.   3. Serum creatinine levels will be ordered every other day for at least 10 days while on concomitant nephrotoxins.         Emy Roy, PharmD Student    Eliza Singh, Pharm.D., [facility]  Pager [phone]

## 2017-06-09 NOTE — PLAN OF CARE
Problem: Goal Outcome Summary  Goal: Goal Outcome Summary  Outcome: No Change  D: Tanner continues to express that he has much abdominal discomfort, kash R/T his new drain.  I:   Pain medication given once, and when the ordered drain irrigations were given, they were injected very slowly and carefully--and not even completely, titrated per comfort/pain.  A:  Tanner is very worried about his drain system, and wonders if the doctors should be fixing it again soon: he says he will discuss this with his team tomorrow.

## 2017-06-09 NOTE — PHARMACY-ANTICOAGULATION SERVICE
Clinical Pharmacy - Warfarin Dosing Consult     Pharmacy has been consulted to manage this patient s warfarin therapy.  Indication: DVT Treatment (left peroneal vein)  Therapy Goal: INR 2-3  Provider/Team: Pancreas Transplant  Warfarin Prior to Admission: No  Significant drug interactions: enoxaparin, aspirin, Zosyn  Recent documented change in oral intake/nutrition: No  Dose Comments: cycled tube feeds    INR   Date Value Ref Range Status   06/09/2017 1.23 (H) 0.86 - 1.14 Final   05/15/2017 1.66 (H) 0.86 - 1.14 Final       Recommend warfarin 7.5 mg today.  Pharmacy will monitor Sohan Powers Arita daily and order warfarin doses to achieve specified goal.      Please contact pharmacy as soon as possible if the warfarin needs to be held for a procedure or if the warfarin goals change.      Eliza Singh, Pharm.D., [facility]  Pager [phone]

## 2017-06-09 NOTE — PROGRESS NOTES
This is a snapshot of the patient's [facility] medical record. For complete information or questions call [phone] or In [facility],  Home Infusion ([id number]).  [facility] Number:  [id number]

## 2017-06-09 NOTE — PROGRESS NOTES
Appearing weak and depressed. Psychologist consulted. VSS. 1 L NC. OOB with SBA assist and ambulated in hallways. LS crackles LLL. Using IS and lasix given. Wound cares done x 1. Small purulent drainage. MAURICIO pulled 10 cc  red/purulent fluid this shift. Could only tolerate 4 cc irrigation. Remains on Lantus and SSI. Glucoses 135 & 153. Continue to monitor and notify MD of changes.

## 2017-06-09 NOTE — CONSULTS
"  Health Psychology                  Clinic    Department of Medicine  Vandana Aragon, Ph.D., L.P. [phone]                          Clinics and Surgery Center  [facility] Antelmo Will, Ph.D.,  L.P. [phone]                 3rd Floor  [place] Mail Code 741   Raymond Redman, Ph.D., NICOLE., L.P. [phone]     [address] [place] [address] Manjula Pineda, Ph.D., L.P. [phone]            [address]  [place] [address]           Cassidy Harry, Ph.D., L.P. [phone]     Inpatient Health Psychology Consultation*     DATE OF SERVICE:  06/09/2017.       REFERRAL SOURCE:  Bambi Alejandre PA-C, Pancreas Transplant Surgery team, [facility], [place].      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 Jaquez.  Dr. Arita did very well with his initial recovery from these procedures.  However, he became ill secondary to an infection and DVT, and was readmitted on 06/02.  Dr. Arita has some history of anxiety and depressed mood.  Over the  past several days he has reported feeling much more depressed than usual.  This evaluation was requested to assess his current emotional status and to make treatment recommendations.  Part of the information contained in this evaluation report was gained through conversation with his wife, Maribell.  When he was available approximately half an hour later, I met with him to get his perspective and confirmation of much of the information provided by his wife.      HISTORY OF PRESENT ILLNESS:  Mr. Arita, according to his wife, Maribell, has always had a tendency to be a worrier.  He agreed that his typical tendency would be to see that the glass is half empty; this is in contrast to his wife, who describes that she is the \"glass half " "full person in the family.\"  Per his wife, his tendency to see the potential negatives in a situation and to worry about them have for a long time affected his ability to initiate sleep well, have affected his concentration negatively, and can lead to a high level of irritability with others.  He has reportedly, according to both himself and his wife, found great benefit in use of a very low dose of fluoxetine.  He began use of fluoxetine only about 3 years ago when he began having difficulty with pancreatitis and significant levels of pain.      Dr. Arita reports that his mood has been much lower than usual over the past week or so of this second hospitalization.  He describes a sense that he \"has been fighting death\" and used the phrase that he has a sense that he has been \"circling the drain.\"  He acknowledges that much of this is related to his great difficulty with sleeping, and that he has recently been even more sleep deprived than usual.  His medical team concurs that he has been quite ill with this infection, which was very difficult to get under control, but is now improving.  Dr. Arita tells me that because of his sense of agitation and distress, together with the difficulty sleeping, that he and his treatment team agreed this morning to begin a low dose of Seroquel, 25 mg scheduled twice daily and also available p.r.n. twice daily.  He also notes that he slept better last night than he has in some time, and is hopeful that this may represent a turning point toward the positive in terms of his physical recovery.      Dr. Arita was very appreciative of this contact with Health Psychology and the opportunity to talk about his atypical thoughts and feelings.  At the same time, he would like to wait a few days to find out if his improved sleep and improved mood, continue to move in a positive direction before considering any other medications or behavioral interventions.      SOCIAL HISTORY:  Dr." Juan J is one of 6 children and has an identical twin brother.  He grew up in [place].  He and his wife, Maribell,  when he was 21 and she was 17.  They have 6 children and 9 grandchildren.  They have lived in a number of different places in [place].  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  He tells me that this change has made practicing medicine a very positive thing for him again.  Dr. Arita grew [place] in [place] [employer], and his wife, Maribell, describes herself as a convert to the [facility] [employer].  His ailyn remains very central in his life.      MEDICAL HISTORY:  Dr. Arita' medical record indicates that his pancreatitis was first diagnosed in 2012.  Interestingly, both Dr. Arita and his wife use the timeframe of 3 years ago as the period when he became significantly affected by the pancreatitis.  His record also indicates a history of thyroid cancer.  Please see his [facility] [place], electronic medical record for more complete information on his medical history,  current admission status, and all medications.      Dr. Arita' wife, Maribell, explained to me that this surgery was originally scheduled for approximately a year ago, and Dr. Arita had gone through the full workup to prepare for that.  However, Ms. Arita became quite ill with a CSF leak in 05/2016 and was very ill for many months, requiring hospitalization and treatment in [place] because the appropriate diagnosis and treatment could not be found in their local hospitals.  As a result, Dr. Arita' surgery and transplant procedure was delayed for a year while she recovered to a level that would allow her to travel with Dr. Artia and provide the necessary care.      PSYCHIATRIC HISTORY:  As above in  "HPI.  Dr. Arita' medical record indicates a history of depression, but no specific diagnostic information is available.  Both he and his wife report that he began use of Prozac approximately 3 years ago and has found it very helpful.  No other psychiatric history was available at the time of this evaluation.      BEHAVIORAL IMPRESSIONS:  Dr. Arita presented for this evaluation resting in his bed on unit 7A.  He was somewhat sedated, having received a first dose of Seroquel but wanted to engage in this conversation.  He was also clearly physically uncomfortable.  He reported his mood as much more depressed than his usual baseline, but noted that this has lessened recently as he has felt \"a hair better\" physically and after getting a relatively good night of sleep last night.  His eyes were closed and he exhibited a quite restricted affect during this conversations;  I attribute much of that to the fact that he was physically very uncomfortable and focusing very intently on my questions and his responses in the context of not feeling well physically.  His speech was very soft, but clear, coherent and goal oriented.  Insight and judgment appear to be intact.  He exhibited no evidence of distortions of thought or perception.      IMPRESSIONS AND PLAN:  Sohan Arita is a 56-year-old man who underwent an open pancreatectomy and auto-islet transplant on 05/15.  His initial recovery from this procedure was very good and he was discharged on 05/30/2017.  He was readmitted on 06/02 secondary to a severe infection and deep venous thrombosis.  He has been extremely physically uncomfortable and ill over the past week since his readmission and this has led to a much lower mood with feelings of hopelessness and a fear of death.  Dr. Arita reports that these feelings are much lower today.  He does acknowledge a history of anxiety that appears to fit the diagnosis of generalized anxiety disorder.  His typical mood, as " described primarily by his wife, appears to fit a description of dysthymic disorder.  He typically finds great benefit in use of 10 mg per day of fluoxetine.  He is reluctant to consider increasing that dose because, in the past, when he has been on a higher dose, he has experienced irregular heartbeat.  Given the better night's sleep last night, and initiation of use of low dose Seroquel, both scheduled and p.r.n. today, he would like to make a plan to see how he progresses both physically and emotionally over the next few days.  We made a plan that I will check back in with him next Tuesday morning.  He is aware that should sleep continue to be problematic to initiate, that there are behavioral strategies that we can work on to avoid use of additional medications if possible.      DIAGNOSES:   1.  Generalized anxiety disorder (F41.1).   2.  Dysthymic disorder (F34.1).         MARY REINA, PHD, LP       *In accordance with the Rules of the [place] Board of [employer], it is noted that psychological descriptions and scientific procedures underlying psychological evaluations have limitations.  Absolute predictions cannot be made based on information in this report.     D: 2017 13:16   T: 2017 14:48   MT: OPAL      Name:     FINESSE OCONNELL   MRN:      [id number]        Account:       [id number]   :      1960           Consult Date:  2017      Document: [id number]

## 2017-06-09 NOTE — PROGRESS NOTES
Diabetes Consult Daily  Progress Note          Assessment/Plan:   Mr. Tanner Arita is a 55 yo man with a history of chronic pancreatitis s/p TPAIT on 5/15/17, with post pancreatectomy diabetes, who was admitted on 6/2/17 with wound infection, DVT of LLE, and dehydration.    Glucose down to the low 100s yesterday evening, glargine then increased from 30 to 36 units in the evening.  BG improved overnight but still above target.  No trending higher today- 156 midday.    Plan:  -glargine increased from 30 to 36 units qAM starting tomorrow morning, continue 36 units qPM  -correction aspart 1 unit/20 for BG >120 y4a--ar after glucose is still running higher, will increase correction aspart to 2unit/30 for BG >120.  -monitor glucose q4h.  (D10W rate to increase to 100 cc/h in case of turning TF off)       Outpatient diabetes follow up: Had appointment with Lexy Brunson PA-C at [facility]- this will need to be rescheduled once a discharge date is in sight  Plan discussed with patient, bedside RN.           Interval History:   The last 24 hours progress and nursing notes reviewed.  Continuous TF: Impact Peptide 1.5 at 75ml/h.  BG dropped significantly after TF changed and was off for a period.  Glargine doses decreased to 47 and 37 PTA    Tanner was very tired when I visited, wanting to rest.  On review of notes it seems that he has been having some increased pain and also feeling somewhat anxious.  Dr. Aragon met with him today and recommended starting low dose of seroquel.        Recent Labs  Lab 06/09/17  1243 06/09/17  0940 06/09/17  0356 06/09/17  0027 06/09/17  0026 06/08/17  2004 06/08/17  1551  06/07/17  0707  06/06/17  0954  06/04/17  0720  06/02/17  1632   GLC  --   --   --   --  116*  --   --   --  133*  --  135*  --  126*  --  70   * 135* 126* 130*  --  106* 110*  < >  --   < >  --   < >  --   < >  --    < > = values in this interval not displayed.            Review of  "Systems:   See interval hx          Medications:       Active Diet Order      Clear Liquid Diet     Physical Exam:  Gen: NAD resting in bed,  Wife at bedside  HEENT: NC/AT, mucous membranes are moist  Resp: Unlabored  Neuro:oriented x3, communicating clearly  /76 (BP Location: Right arm)  Pulse 72  Temp 98.2  F (36.8  C) (Oral)  Resp 18  Ht 1.88 m (6' 2\")  Wt 108.6 kg (239 lb 6.4 oz)  SpO2 94%  BMI 30.74 kg/m2           Data:     Lab Results   Component Value Date    A1C 5.3 05/16/2017    A1C 5.6 05/10/2017    A1C 5.4 09/10/2015            Recent Labs   Lab Test  06/09/17   0026  06/07/17   0707   NA  141  141   POTASSIUM  3.9  4.1   CHLORIDE  106  105   CO2  29  30   ANIONGAP  6  6   GLC  116*  133*   BUN  20  21   CR  0.83  0.84  0.81   CRISTOBAL  8.7  8.9     CBC RESULTS:   Recent Labs   Lab Test  06/09/17   0721   WBC  15.8*   RBC  2.82*   HGB  7.2*   HCT  23.7*   MCV  84   MCH  25.5*   MCHC  30.4*   RDW  16.3*   PLT  1024*     Yazmin Poon PA-C [phone]  Diabetes Management job code [id number]          "

## 2017-06-09 NOTE — PROGRESS NOTES
Pancreatitis Service - Daily Progress Note  06/09/2017    Assessment & Plan: 55 yo with hx chronic pancreatitis s/p TPAIT 5/15/17.  Presented with wound infection and occlusive thrombosis left peroneal vein.    Cardiorespiratory: Hypoxia, CXR b/l small pleural effusion, b/l bibasilar opacities. Repeat lasix 20mg PO x 1 today.  Increase IS, ambulation. Titrate O2 as tolerated, on 1 L this AM.   GI/Nutrition:  On cycled TF.    Heme: Anemia, hgb  7.2 stable but symptomatic. Minimize labs. Have labs use pediatric tubes if possible.  Transfuse 1u RBC today.  LLE DVT, on heparin gtt.  US BUE, small non occlusive thrombus right IJ.  Transition to lovenox/warfarin today.  INR goal 2-3.  Fluid/Electrolytes:  Hypervolemic. Lasix today.  : No acute issues  Post-pancreatectomy diabetes: Continue lantus/SSI, appreciate Endocrine consult.  Infection: Afebrile, WBC 15.8  -Wound infection: Opened in clinic, VAC on.  6/3 cx growing candida alb.  Persistent purulent drainage under VAC.  Changed to wet to dry dressings TID.  On fluconazole 6/3-6/6, changed to micafungin 6/5-present. Follow culture, final sensitivities pending.    -Perihepatic abscess: Perihepatic and left sided fluid collections on imaging.  Both aspirated 6/5, culture + candida alb/dub.  On empiric Zosyn (6/2-present)  and Vanco (d/c today). IR consulted for drain placement perihepatic fluid collection completed on 6/7 with 30 cc necrotic thick fluid drained, 12F drain in place.   Prophylaxis:  DVT  Pain control: Fair control with PRN oxycodone.  Also has fentanyl 75mcg patch on, increase to 100 today per Gisele.  On Lyrica.    Neuro/psych: Mild delirium with occasional hallucinations, resolved.  Suspect it was secondary to infection.  Patient states he is feeling depressed recently, consulted health psychology.  Will add low dose seroquel today for symptoms of anxiety/agitation and nausea.  Activity: Up with assist  Anticipated LOS/Discharge: 7A, Undetermined  "LOS    Medical Decision Making: Medium  Subsequent visit 09444 (moderate level decision making)    GELACIO/Fellow/Resident Provider: Areli Montanez NP  [id number]    Faculty: Ian Mendez MD  __________________________________________________________________  Transplant History:   5/15/2017 (Islet), Postoperative day: 25     Interval History: History is obtained from the patient  Overnight events: Nausea, feels agitated at times, Loose BMs, abd pain, dyspnea with activity, chills at times    ROS:   A 10-point review of systems was negative except as noted above.    Curent Meds:    enoxaparin  100 mg Subcutaneous Q12H     fentaNYL   Transdermal Q8H     [START ON 6/12/2017] fentaNYL   Transdermal Q72H     fentaNYL  100 mcg Transdermal Q72H     QUEtiapine  25 mg Oral BID     warfarin  7.5 mg Oral ONCE at 18:00     insulin glargine  36 Units Subcutaneous Q24H     sodium chloride (PF)  10 mL Irrigation Q8H     insulin glargine  30 Units Subcutaneous QAM     micafungin  100 mg Intravenous Q24H     pregabalin  25 mg Oral Daily     amylase-lipase-protease  2 capsule Per Feeding Tube Q4H     piperacillin-tazobactam  3.375 g Intravenous Q6H     aspirin  81 mg Oral Daily     cholecalciferol  2,000 Units Per J Tube Daily     ferrous sulfate  325 mg Oral Daily     FLUoxetine  10 mg Per J Tube Daily     folic acid  1 mg Oral Daily     levothyroxine  150 mcg Per J Tube QAM AC     multivitamins with minerals  15 mL Per Feeding Tube Daily     pantoprazole  40 mg Oral or J tube BID     sodium bicarbonate  325 mg Per J Tube Q4H     insulin aspart  1-10 Units Subcutaneous Q4H       Physical Exam:     Admit Weight: 102.7 kg (226 lb 8 oz)    Current Vitals:   /76 (BP Location: Right arm)  Pulse 72  Temp 98.2  F (36.8  C) (Oral)  Resp 18  Ht 1.88 m (6' 2\")  Wt 108.6 kg (239 lb 6.4 oz)  SpO2 94%  BMI 30.74 kg/m2      Vital sign ranges:    Temp:  [98.2  F (36.8  C)-98.6  F (37  C)] 98.2  F (36.8  C)  Pulse:  [67-74] 72  Heart Rate:  " [67-77] 72  Resp:  [18-20] 18  BP: (126-140)/(71-87) 126/76  SpO2:  [94 %-96 %] 94 %  Patient Vitals for the past 24 hrs:   BP Temp Temp src Pulse Heart Rate Resp SpO2 Weight   06/09/17 1245 126/76 98.2  F (36.8  C) Oral 72 72 18 94 % -   06/09/17 0900 - - - - - - - 108.6 kg (239 lb 6.4 oz)   06/09/17 0727 140/87 98.3  F (36.8  C) Oral - 73 20 94 % -   06/09/17 0400 130/75 98.6  F (37  C) Oral - 77 20 - -   06/08/17 2244 130/75 98.3  F (36.8  C) Oral 74 - 18 94 % -   06/08/17 1920 128/71 98.5  F (36.9  C) Oral 68 68 18 95 % -   06/08/17 1623 126/75 98.4  F (36.9  C) Oral 67 67 18 96 % -     General Appearance: in no apparent distress.   Skin: normal, warm, dry  Heart: regular rate and rhythm  Lungs: NLB on NC 1L, rales BLL  Abdomen: The abdomen is rounded, and mildly tender.  Attempted x2 to see wound but pt on way to restroom at both attempts  : king is not present.    Extremities: generalized +1 edema  Neuro: A&Ox4    Data:   CMP    Recent Labs  Lab 06/09/17  0026 06/07/17  0707  06/02/17  1632    141  < > 140   POTASSIUM 3.9 4.1  < > 4.0   CHLORIDE 106 105  < > 101   CO2 29 30  < > 32   * 133*  < > 70   BUN 20 21  < > 19   CR 0.83 0.84  0.81  < > 0.83   GFRESTIMATED >90Non  GFR Calc >90Non  GFR Calc  >90Non  GFR Calc  < > >90Non  GFR Calc   GFRESTBLACK >90African American GFR Calc >90African American GFR Calc  >90African American GFR Calc  < > >90African American GFR Calc   CRISTOBAL 8.7 8.9  < > 8.8   MAG  --   --   --  2.2   PHOS  --   --   --  2.5   < > = values in this interval not displayed.  CBC    Recent Labs  Lab 06/09/17  0721 06/08/17  0750   HGB 7.2* 6.8*   WBC 15.8* 14.3*   PLT 1024* 967*     Coags    Recent Labs  Lab 06/09/17  0721   INR 1.23*      Urinalysis  Recent Labs   Lab Test  06/02/17   1710  05/10/17   1015   COLOR  Yellow  Yellow   APPEARANCE  Clear  Clear   URINEGLC  Negative  Negative   URINEBILI  Negative   Negative   URINEKETONE  Negative  Negative   SG  1.019  1.022   UBLD  Negative  Negative   URINEPH  7.5*  6.5   PROTEIN  Negative  Negative   NITRITE  Negative  Negative   LEUKEST  Negative  Negative   RBCU  <1  2   WBCU  <1  <1     Attestation:  Patient has been seen and evaluated by me.   Vital signs, labs, medications and orders were reviewed.   When obtained, diagnostic images were reviewed by me and interpreted as above.    The care plan was discussed with the multidisciplinary team and I agree with the findings and plan in this note, with any differences recorded in blue.    .  \

## 2017-06-09 NOTE — PROGRESS NOTES
This is a snapshot of the patient's [facility] medical record. For complete information or questions call [phone] or In [facility]  Home Infusion ([id number]).  [facility] Number:  [phone]

## 2017-06-09 NOTE — PROGRESS NOTES
CLINICAL NUTRITION SERVICES - REASSESSMENT NOTE    Nutrition Prescription    RECOMMENDATIONS FOR MDs/PROVIDERS TO ORDER:  None at this time    Malnutrition:    Patient does not meet two of the above criteria necessary for diagnosing malnutrition    Recommendations already ordered by Registered Dietitian (RD):  None at this time    Future/Additional Recommendations:  Tolerance to TF  Need for review of TF/enzyme nutrition education ?      EVALUATION OF THE PROGRESS TOWARD GOALS   Diet:  Clear liquid  Nutrition Support:  Impact Peptide @ goal 75 ml/hr (1800 ml/day) to provide 2700 kcals (26 kcal/kg/day), 169 g PRO (1.6 g/kg/day), 1386 ml free H2O,115 g Fat (50% from MCTs), 252 g CHO and no Fiber daily.  Fluid Flush: 30 ml q 4 hrs (tube patency)  Intake:  7 day average intake providing (1318 ml): 1977 kcals (19 kcals/kg) and 124 g PRO (1.2 g.kg) per dosing wt 103 kg     Patient/patient spouse report TF was going well at home. Patient currently taking clear liquids and likes apple juice     NEW FINDINGS   Labs: reviewed  Meds: Zenpep 20000 x 2 tabs q 4 hrs (provides 2087 units of lipase/g fat per day   Wt: 108.6 kg (6/9) up from admit wt of 102.7 kg (6/2)  Kevin Score: 20    MALNUTRITION  % Intake:  No decreased intake noted  % Weight Loss:  Weight loss does not meet criteria for malnutrition   Subcutaneous Fat Loss:  None observed  Muscle Loss:  None observed  Fluid Accumulation/Edema:  None noted  Malnutrition Diagnosis: Patient does not meet two of the above criteria necessary for diagnosing malnutrition    Previous Goals   Total avg nutritional intake to meet a minimum of 25 kcal/kg and 1.3 g PRO/kg daily (per dosing wt 103 kg).  Evaluation: Not met    Previous Nutrition Diagnosis  Inadequate oral intake  Evaluation: No change    CURRENT NUTRITION DIAGNOSIS  Inadequate protein intake related to TF interruptions as evidenced by 7 day average intake of TF providing 1.2 g/kg PRO per dosing 103  kg    INTERVENTIONS  Implementation  None at this time    Goals  Total avg nutritional intake to meet a minimum of 25 kcal/kg and 1.3 g PRO/kg daily (per dosing wt 103 kg).    Monitoring/Evaluation  Progress toward goals will be monitored and evaluated per protocol.    Eva Cool MS/RD/LD/CNS  [address]A RD Pager: [phone]

## 2017-06-09 NOTE — PLAN OF CARE
Problem: Individualization  Goal: Patient Preferences  Outcome: Declining     RN:  AVSS, O2 sat 93-96% on 1L/NC, abdominal pain controlled with Oxycodone, given x2, Compazine for nausea. Heparin gtt@2100units/hr, Hep10a check with AM blood draw. Wound dressing done @2200, right drain with scant purulent/pinkish output, patient refused irrigation of drain due to pain. Patient venting his frustration about his care, needing more pain medications, infections getting worse and getting weaker every day. TF @75cc/hr, blood glucose 130 and 126, SS and Lantus was increased to 36units from 30units PM shift dose. Adequate urine output, loose sticky BM x1, up to bathroom with one person assist. Will continue to monitor.

## 2017-06-09 NOTE — PROVIDER NOTIFICATION
Critical Platelet, 1024. Trending high. Paged pancreas team with call-back at [phone]. Continue to monitor.

## 2017-06-10 LAB
BACTERIA SPEC CULT: NO GROWTH
ERYTHROCYTE [DISTWIDTH] IN BLOOD BY AUTOMATED COUNT: 16.2 % (ref 10–15)
GLUCOSE BLDC GLUCOMTR-MCNC: 103 MG/DL (ref 70–99)
GLUCOSE BLDC GLUCOMTR-MCNC: 103 MG/DL (ref 70–99)
GLUCOSE BLDC GLUCOMTR-MCNC: 108 MG/DL (ref 70–99)
GLUCOSE BLDC GLUCOMTR-MCNC: 110 MG/DL (ref 70–99)
GLUCOSE BLDC GLUCOMTR-MCNC: 83 MG/DL (ref 70–99)
GLUCOSE BLDC GLUCOMTR-MCNC: 94 MG/DL (ref 70–99)
HCT VFR BLD AUTO: 26.3 % (ref 40–53)
HGB BLD-MCNC: 8.1 G/DL (ref 13.3–17.7)
INR PPP: 1.39 (ref 0.86–1.14)
Lab: NORMAL
MCH RBC QN AUTO: 25.9 PG (ref 26.5–33)
MCHC RBC AUTO-ENTMCNC: 30.8 G/DL (ref 31.5–36.5)
MCV RBC AUTO: 84 FL (ref 78–100)
MICRO REPORT STATUS: NORMAL
PLATELET # BLD AUTO: 936 10E9/L (ref 150–450)
RBC # BLD AUTO: 3.13 10E12/L (ref 4.4–5.9)
SPECIMEN SOURCE: NORMAL
WBC # BLD AUTO: 13.9 10E9/L (ref 4–11)

## 2017-06-10 PROCEDURE — 25000132 ZZH RX MED GY IP 250 OP 250 PS 637: Performed by: STUDENT IN AN ORGANIZED HEALTH CARE EDUCATION/TRAINING PROGRAM

## 2017-06-10 PROCEDURE — 25000132 ZZH RX MED GY IP 250 OP 250 PS 637: Performed by: NURSE PRACTITIONER

## 2017-06-10 PROCEDURE — 25000128 H RX IP 250 OP 636: Performed by: PHYSICIAN ASSISTANT

## 2017-06-10 PROCEDURE — 25000128 H RX IP 250 OP 636: Performed by: NURSE PRACTITIONER

## 2017-06-10 PROCEDURE — 00000146 ZZHCL STATISTIC GLUCOSE BY METER IP

## 2017-06-10 PROCEDURE — 85027 COMPLETE CBC AUTOMATED: CPT | Performed by: PHYSICIAN ASSISTANT

## 2017-06-10 PROCEDURE — E2402 NEG PRESS WOUND THERAPY PUMP: HCPCS

## 2017-06-10 PROCEDURE — 25000132 ZZH RX MED GY IP 250 OP 250 PS 637: Performed by: TRANSPLANT SURGERY

## 2017-06-10 PROCEDURE — 12000026 ZZH R&B TRANSPLANT

## 2017-06-10 PROCEDURE — 36415 COLL VENOUS BLD VENIPUNCTURE: CPT | Performed by: PHYSICIAN ASSISTANT

## 2017-06-10 PROCEDURE — 85610 PROTHROMBIN TIME: CPT | Performed by: PHYSICIAN ASSISTANT

## 2017-06-10 PROCEDURE — 27210436 ZZH NUTRITION PRODUCT SEMIELEM INTERMED CAN

## 2017-06-10 PROCEDURE — 25000132 ZZH RX MED GY IP 250 OP 250 PS 637: Performed by: PHYSICIAN ASSISTANT

## 2017-06-10 RX ORDER — WARFARIN SODIUM 7.5 MG/1
7.5 TABLET ORAL
Status: COMPLETED | OUTPATIENT
Start: 2017-06-10 | End: 2017-06-10

## 2017-06-10 RX ADMIN — FLUOXETINE HYDROCHLORIDE 10 MG: 20 SOLUTION ORAL at 07:56

## 2017-06-10 RX ADMIN — PANCRELIPASE 40000 UNITS: 20000; 68000; 109000 CAPSULE, DELAYED RELEASE ORAL at 07:56

## 2017-06-10 RX ADMIN — SODIUM BICARBONATE 325 MG: 325 TABLET ORAL at 00:01

## 2017-06-10 RX ADMIN — Medication 12.5 MG: at 21:57

## 2017-06-10 RX ADMIN — FOLIC ACID 1 MG: 1 TABLET ORAL at 07:57

## 2017-06-10 RX ADMIN — PANTOPRAZOLE SODIUM 40 MG: 40 TABLET, DELAYED RELEASE ORAL at 20:16

## 2017-06-10 RX ADMIN — PANCRELIPASE 40000 UNITS: 20000; 68000; 109000 CAPSULE, DELAYED RELEASE ORAL at 12:15

## 2017-06-10 RX ADMIN — ASPIRIN 81 MG: 81 TABLET, COATED ORAL at 07:57

## 2017-06-10 RX ADMIN — ENOXAPARIN SODIUM 100 MG: 100 INJECTION SUBCUTANEOUS at 21:57

## 2017-06-10 RX ADMIN — ACETAMINOPHEN 325 MG: 325 TABLET, FILM COATED ORAL at 17:40

## 2017-06-10 RX ADMIN — PREGABALIN 25 MG: 20 SOLUTION ORAL at 07:57

## 2017-06-10 RX ADMIN — PIPERACILLIN SODIUM,TAZOBACTAM SODIUM 3.38 G: 3; .375 INJECTION, POWDER, FOR SOLUTION INTRAVENOUS at 20:16

## 2017-06-10 RX ADMIN — PIPERACILLIN SODIUM,TAZOBACTAM SODIUM 3.38 G: 3; .375 INJECTION, POWDER, FOR SOLUTION INTRAVENOUS at 01:02

## 2017-06-10 RX ADMIN — SODIUM BICARBONATE 325 MG: 325 TABLET ORAL at 21:11

## 2017-06-10 RX ADMIN — FERROUS SULFATE 325 MG: 325 TABLET, FILM COATED ORAL at 07:57

## 2017-06-10 RX ADMIN — ENOXAPARIN SODIUM 100 MG: 100 INJECTION SUBCUTANEOUS at 10:04

## 2017-06-10 RX ADMIN — OXYCODONE HYDROCHLORIDE 2.5 MG: 5 SOLUTION ORAL at 16:13

## 2017-06-10 RX ADMIN — WARFARIN SODIUM 7.5 MG: 7.5 TABLET ORAL at 17:41

## 2017-06-10 RX ADMIN — SODIUM BICARBONATE 325 MG: 325 TABLET ORAL at 16:04

## 2017-06-10 RX ADMIN — MULTIVIT AND MINERALS-FERROUS GLUCONATE 9 MG IRON/15 ML ORAL LIQUID 15 ML: at 07:56

## 2017-06-10 RX ADMIN — PANCRELIPASE 40000 UNITS: 20000; 68000; 109000 CAPSULE, DELAYED RELEASE ORAL at 21:11

## 2017-06-10 RX ADMIN — PANCRELIPASE 40000 UNITS: 20000; 68000; 109000 CAPSULE, DELAYED RELEASE ORAL at 04:08

## 2017-06-10 RX ADMIN — OXYCODONE HYDROCHLORIDE 2.5 MG: 5 SOLUTION ORAL at 00:01

## 2017-06-10 RX ADMIN — SODIUM BICARBONATE 325 MG: 325 TABLET ORAL at 04:08

## 2017-06-10 RX ADMIN — PANCRELIPASE 40000 UNITS: 20000; 68000; 109000 CAPSULE, DELAYED RELEASE ORAL at 00:01

## 2017-06-10 RX ADMIN — LEVOTHYROXINE SODIUM 150 MCG: 300 TABLET ORAL at 06:04

## 2017-06-10 RX ADMIN — PANCRELIPASE 40000 UNITS: 20000; 68000; 109000 CAPSULE, DELAYED RELEASE ORAL at 16:04

## 2017-06-10 RX ADMIN — PANTOPRAZOLE SODIUM 40 MG: 40 TABLET, DELAYED RELEASE ORAL at 07:56

## 2017-06-10 RX ADMIN — MICAFUNGIN SODIUM 100 MG: 10 INJECTION, POWDER, LYOPHILIZED, FOR SOLUTION INTRAVENOUS at 12:15

## 2017-06-10 RX ADMIN — Medication 2000 UNITS: at 07:56

## 2017-06-10 RX ADMIN — SODIUM BICARBONATE 325 MG: 325 TABLET ORAL at 12:15

## 2017-06-10 RX ADMIN — PIPERACILLIN SODIUM,TAZOBACTAM SODIUM 3.38 G: 3; .375 INJECTION, POWDER, FOR SOLUTION INTRAVENOUS at 07:56

## 2017-06-10 RX ADMIN — PIPERACILLIN SODIUM,TAZOBACTAM SODIUM 3.38 G: 3; .375 INJECTION, POWDER, FOR SOLUTION INTRAVENOUS at 13:58

## 2017-06-10 RX ADMIN — ACETAMINOPHEN 325 MG: 325 TABLET, FILM COATED ORAL at 07:17

## 2017-06-10 RX ADMIN — SODIUM BICARBONATE 325 MG: 325 TABLET ORAL at 07:56

## 2017-06-10 NOTE — PROGRESS NOTES
Pancreatitis Service - Daily Progress Note  06/10/2017    Assessment & Plan: 57 yo with hx chronic pancreatitis s/p TPAIT 5/15/17.  Presented with wound infection and occlusive thrombosis left peroneal vein.    Cardiorespiratory: Hypoxia resolved, CXR b/l small pleural effusion, b/l bibasilar opacities. Lasix yesterday. Increase IS, ambulation. On RA.   GI/Nutrition:  On cycled TF.    Heme: Anemia, hgb  7.2 => 1u PRBC => 8.3. LLE DVT, on heparin gtt. US BUE, small non occlusive thrombus right IJ.  Transition to lovenox/warfarin today.  INR 1.23 => 1.39 [goal 2-3]. Will discuss the timing of repeat imaging and length of treatment.  Fluid/Electrolytes:  Slightly hypervolemic, will allow autodiuresis today.  : No acute issues  Post-pancreatectomy diabetes: Continue lantus/SSI, appreciate Endocrine consult.  Infection: Afebrile, WBC 15.8 => 13.9  -Wound infection: Opened in clinic, VAC on.  6/3 cx growing candida alb.  Persistent purulent drainage under VAC.  Changed to wet to dry dressings TID.  On fluconazole 6/3-6/6, changed to micafungin 6/5-present. Follow culture, final sensitivities pending.    -Perihepatic abscess: Perihepatic and left sided fluid collections on imaging.  Both aspirated 6/5, culture + candida alb/dub.  On empiric Zosyn (6/2-present)  and Vanco (d/c today). IR consulted for drain placement perihepatic fluid collection completed on 6/7 with 30 cc necrotic thick fluid drained, 12F drain in place. Continue with wet-to-dry dressing changes. Will discuss the timing of repeat imaging.  Prophylaxis:  DVT  Pain control: Well controlled with PRN oxycodone.  Also has fentanyl 100mcg patch on. On Lyrica.    Neuro/psych: Mild delirium with occasional hallucinations, resolved.  Suspect it was secondary to infection. Low dose seroquel. Feels more energetic today.  Activity: Up with assist  Anticipated LOS/Discharge: 7A.    Medical Decision Making: Medium  Subsequent visit 82904 (moderate level decision  "making)    GELACIO/Fellow/Resident Provider:  Jas Jackson MD, MPH  Transplant Fellow  Pager# [phone]    Faculty: Ian Mendez MD  __________________________________________________________________  Transplant History:   5/15/2017 (Islet), Postoperative day: 26     Interval History: History is obtained from the patient  Overnight events: Feels well today, had a good night of rest. Ambulatory this am.    ROS:   A 10-point review of systems was negative except as noted above.    Curent Meds:    insulin glargine  30 Units Subcutaneous QAM     insulin aspart   Subcutaneous TID w/meals     warfarin  7.5 mg Oral ONCE at 18:00     enoxaparin  100 mg Subcutaneous Q12H     fentaNYL   Transdermal Q8H     [START ON 6/12/2017] fentaNYL   Transdermal Q72H     fentaNYL  100 mcg Transdermal Q72H     QUEtiapine  25 mg Oral At Bedtime     insulin aspart  2-16 Units Subcutaneous Q4H     insulin glargine  36 Units Subcutaneous Q24H     sodium chloride (PF)  10 mL Irrigation Q8H     micafungin  100 mg Intravenous Q24H     pregabalin  25 mg Oral Daily     amylase-lipase-protease  2 capsule Per Feeding Tube Q4H     piperacillin-tazobactam  3.375 g Intravenous Q6H     aspirin  81 mg Oral Daily     cholecalciferol  2,000 Units Per J Tube Daily     ferrous sulfate  325 mg Oral Daily     FLUoxetine  10 mg Per J Tube Daily     folic acid  1 mg Oral Daily     levothyroxine  150 mcg Per J Tube QAM AC     multivitamins with minerals  15 mL Per Feeding Tube Daily     pantoprazole  40 mg Oral or J tube BID     sodium bicarbonate  325 mg Per J Tube Q4H       Physical Exam:     Admit Weight: 102.7 kg (226 lb 8 oz)    Current Vitals:   /75 (BP Location: Right arm)  Pulse 68  Temp 98.3  F (36.8  C) (Oral)  Resp 16  Ht 1.88 m (6' 2\")  Wt 107.2 kg (236 lb 6.4 oz)  SpO2 94%  BMI 30.35 kg/m2      Vital sign ranges:    Temp:  [97.9  F (36.6  C)-99.7  F (37.6  C)] 98.3  F (36.8  C)  Pulse:  [68-72] 68  Heart Rate:  [63-78] 73  Resp:  " [16-18] 16  BP: (118-136)/(69-81) 118/75  SpO2:  [94 %-95 %] 94 %  Patient Vitals for the past 24 hrs:   BP Temp Temp src Pulse Heart Rate Resp SpO2 Weight   06/10/17 0810 - - - - - - 94 % -   06/10/17 0807 118/75 98.3  F (36.8  C) Oral - 73 16 95 % -   06/10/17 0405 136/81 97.9  F (36.6  C) Oral - 66 16 95 % 107.2 kg (236 lb 6.4 oz)   06/10/17 0009 121/79 98.3  F (36.8  C) Oral - 63 16 95 % -   06/09/17 2046 121/69 98.7  F (37.1  C) Oral - 70 - - -   06/09/17 1937 122/77 99.7  F (37.6  C) - - 78 - 95 % -   06/09/17 1847 130/75 98.3  F (36.8  C) - - 73 18 - -   06/09/17 1620 127/70 98.2  F (36.8  C) Oral 68 68 16 94 % -   06/09/17 1245 126/76 98.2  F (36.8  C) Oral 72 72 18 94 % -     General Appearance: in no apparent distress.   Skin: normal, warm, dry  Heart: regular rate and rhythm  Lungs: NLB on NC 1L, rales BLL  Abdomen: The abdomen is rounded, and appropriately tender. Opened aspect of incision with good granulation tissue. Cephalad aspect of the wound with one point exuding mucopurulent drainage that is most likely communicating with subfascial collection. No erytherma on wound edges. Inferior aspect of incision with intact staples. Drain serosanguinous.    Extremities: generalized +1 edema  Neuro: A&Ox4    Data:   CMP    Recent Labs  Lab 06/09/17  0026 06/07/17  0707    141   POTASSIUM 3.9 4.1   CHLORIDE 106 105   CO2 29 30   * 133*   BUN 20 21   CR 0.83 0.84  0.81   GFRESTIMATED >90Non  GFR Calc >90Non  GFR Calc  >90Non  GFR Calc   GFRESTBLACK >90African American GFR Calc >90African American GFR Calc  >90African American GFR Calc   CRISTOBAL 8.7 8.9     CBC    Recent Labs  Lab 06/10/17  0804 06/09/17  0721   HGB 8.1* 7.2*   WBC 13.9* 15.8*   * 1024*     Coags    Recent Labs  Lab 06/10/17  0804 06/09/17  0721   INR 1.39* 1.23*      Urinalysis  Recent Labs   Lab Test  06/02/17   1710  05/10/17   1015   COLOR  Yellow  Yellow   APPEARANCE  Clear   Clear   URINEGLC  Negative  Negative   URINEBILI  Negative  Negative   URINEKETONE  Negative  Negative   SG  1.019  1.022   UBLD  Negative  Negative   URINEPH  7.5*  6.5   PROTEIN  Negative  Negative   NITRITE  Negative  Negative   LEUKEST  Negative  Negative   RBCU  <1  2   WBCU  <1  <1

## 2017-06-10 NOTE — PROGRESS NOTES
Diabetes Consult Daily  Progress Note          Assessment/Plan:   Mr. Tanner Arita is a 55 yo man with a history of chronic pancreatitis s/p TPAIT on 5/15/17, with post pancreatectomy diabetes, who was admitted on 6/2/17 with wound infection, DVT of LLE, and dehydration.    Glucoses much improved today with increased activity and pt feeling better.  Higher glucoses yesterday likely due to pt being more sedentary and drinking juice.    Plan:  -Morning glargine kept at 30 units qAM (did not increase to 36 units as planned given lower overnight BG)  -Evening glargine: continue 36 units qPM  -correction aspart increased yesterday afternoon to 2unit/30 for BG >120 q4h  -monitor glucose q4h.  (D10W rate to increase to 100 cc/h in case of turning TF off)       Outpatient diabetes follow up: Had appointment with Lexy Brunson PA-C at [facility]- this will need to be rescheduled once a discharge date is in sight  Plan discussed with patient, bedside RN.           Interval History:   The last 24 hours progress and nursing notes reviewed.  Continuous TF: Impact Peptide 1.5 at 75ml/h.     Tanner is feeling better today, he thinks in part to getting transfusion yesterday.  He is much more active today- he walked around nursing station 7x so far today.  We were not aware that pt has been drinking juice.  This may be the cause of higher glucoses yesterday. Today he continues to have some intermittent sips of juice but he thinks his walking is counteracting the rise in blood sugar this would cause.  Meal aspart ordered this morning, but he has not gotten any yet b/c intake is occasional sip only.        Recent Labs  Lab 06/10/17  1204 06/10/17  0802 06/10/17  0407 06/10/17  0006 06/09/17  2030 06/09/17  1623  06/09/17  0026  06/07/17  0707  06/06/17  0954  06/04/17  0720   GLC  --   --   --   --   --   --   --  116*  --  133*  --  135*  --  126*   BGM 94 108* 83 103* 111* 130*  < >  --   < >  --    "< >  --   < >  --    < > = values in this interval not displayed.            Review of Systems:   See interval hx          Medications:       Active Diet Order      Clear Liquid Diet     Physical Exam:  Gen: Up walking around room, NAD. Wife, Maribell, is present.  HEENT: NC/AT, mucous membranes are moist  Resp: Unlabored  Neuro:oriented x3, communicating clearly  /82 (BP Location: Right arm)  Pulse 74  Temp 98.2  F (36.8  C) (Oral)  Resp 16  Ht 1.88 m (6' 2\")  Wt 107.2 kg (236 lb 6.4 oz)  SpO2 94%  BMI 30.35 kg/m2           Data:     Lab Results   Component Value Date    A1C 5.3 05/16/2017    A1C 5.6 05/10/2017    A1C 5.4 09/10/2015            Recent Labs   Lab Test  06/09/17   0026  06/07/17   0707   NA  141  141   POTASSIUM  3.9  4.1   CHLORIDE  106  105   CO2  29  30   ANIONGAP  6  6   GLC  116*  133*   BUN  20  21   CR  0.83  0.84  0.81   CRISTOBAL  8.7  8.9     CBC RESULTS:   Recent Labs   Lab Test  06/10/17   0804   WBC  13.9*   RBC  3.13*   HGB  8.1*   HCT  26.3*   MCV  84   MCH  25.9*   MCHC  30.8*   RDW  16.2*   PLT  936*       Yazmin Poon PA-C [phone]  Diabetes Management job code [id number]          "

## 2017-06-10 NOTE — PLAN OF CARE
Problem: Goal Outcome Summary  Goal: Goal Outcome Summary  Outcome: No Change  Afebrile; VSS and pt weaned to RA.  Pt c/o sweats and chills but no fever accompanying symptoms and WBC 13.9 (down from 15.8).  One episode of epistaxis resolved after pressure and ice pack was applied for 15 minutes.  BG ; no novolog needed.  Pt c/o increasing abdominal pain and intermittent headache; prn tylenol and oxycodone and fentanyl patch provided with some relief.  G-tube clamped; denies nausea.  TF at goal 75mL/hr.  Clear liquid diet ordered and novolog added for carb coverage; pt tolerating sips of water today.  Voiding good amounts but not always saving.  Small BMx3 today.  MAURICIO drain irrigated with 3mL as pt could not tolerate more; scant serosangiunous output.  Abdominal wound dressing changedx3.  Up ambulating in hallway ad hattie.  Continue with plan of care and notify MD with changes.

## 2017-06-10 NOTE — PLAN OF CARE
Problem: Goal Outcome Summary  Goal: Goal Outcome Summary  Outcome: No Change  VSS on 3/4L O2. BG was 130 and 111 during shift. Pt c/o pain in abdomen but refused prn oxy as he did not want to feel any more sedated, was given 650 of tylenol for pain. Was given 25 mg of seroquel at 1000 and was excessively somnolent during the day. He refused his bedtime dose of seroquel, as well. He experienced discomfort during drain irrigations and during the flushes of his J Tube. Flush amounts were titrated for comfort per his request. He is tolerating clear liquids and continuous tube feedings well. Denies any nausea. Was up independently and ambulated around the unit during the shift. Dressing was changed by MD x 1. Pt received 1 unit of blood during shift and tolerated it well. Will continue to monitor.

## 2017-06-10 NOTE — PLAN OF CARE
Problem: Goal Outcome Summary  Goal: Goal Outcome Summary  Outcome: No Change  AVSS on 1L NC.  and 82.  Complains of abdominal pain, oxycodone given x1. Denies nausea. J-tube with continuous TF @ 75ml/hr. G-tube to gravity. Right drain irrigated with only 2cc per pt request d/t pain when irrigating. Left PICC with TKO. Dressing changed and intact. Voiding adequate amounts, no reported BM. Clear liquid diet. Up SBA. Will continue to monitor. Call light in reach, continue with POC.

## 2017-06-11 LAB
ANION GAP SERPL CALCULATED.3IONS-SCNC: 6 MMOL/L (ref 3–14)
BACTERIA SPEC CULT: ABNORMAL
BUN SERPL-MCNC: 25 MG/DL (ref 7–30)
CALCIUM SERPL-MCNC: 8.5 MG/DL (ref 8.5–10.1)
CHLORIDE SERPL-SCNC: 107 MMOL/L (ref 94–109)
CO2 SERPL-SCNC: 27 MMOL/L (ref 20–32)
CREAT SERPL-MCNC: 0.88 MG/DL (ref 0.66–1.25)
ERYTHROCYTE [DISTWIDTH] IN BLOOD BY AUTOMATED COUNT: 16.2 % (ref 10–15)
GFR SERPL CREATININE-BSD FRML MDRD: 90 ML/MIN/1.7M2
GLUCOSE BLDC GLUCOMTR-MCNC: 106 MG/DL (ref 70–99)
GLUCOSE BLDC GLUCOMTR-MCNC: 107 MG/DL (ref 70–99)
GLUCOSE BLDC GLUCOMTR-MCNC: 111 MG/DL (ref 70–99)
GLUCOSE BLDC GLUCOMTR-MCNC: 128 MG/DL (ref 70–99)
GLUCOSE BLDC GLUCOMTR-MCNC: 134 MG/DL (ref 70–99)
GLUCOSE BLDC GLUCOMTR-MCNC: 87 MG/DL (ref 70–99)
GLUCOSE SERPL-MCNC: 105 MG/DL (ref 70–99)
HCT VFR BLD AUTO: 27.3 % (ref 40–53)
HGB BLD-MCNC: 8.3 G/DL (ref 13.3–17.7)
INR PPP: 2.21 (ref 0.86–1.14)
Lab: ABNORMAL
MCH RBC QN AUTO: 25.7 PG (ref 26.5–33)
MCHC RBC AUTO-ENTMCNC: 30.4 G/DL (ref 31.5–36.5)
MCV RBC AUTO: 85 FL (ref 78–100)
MICRO REPORT STATUS: ABNORMAL
MICROORGANISM SPEC CULT: ABNORMAL
PLATELET # BLD AUTO: 728 10E9/L (ref 150–450)
POTASSIUM SERPL-SCNC: 4.2 MMOL/L (ref 3.4–5.3)
RBC # BLD AUTO: 3.23 10E12/L (ref 4.4–5.9)
SODIUM SERPL-SCNC: 141 MMOL/L (ref 133–144)
SPECIMEN SOURCE: ABNORMAL
TROPONIN I SERPL-MCNC: NORMAL UG/L (ref 0–0.04)
WBC # BLD AUTO: 14.1 10E9/L (ref 4–11)

## 2017-06-11 PROCEDURE — 12000025 ZZH R&B TRANSPLANT INTERMEDIATE

## 2017-06-11 PROCEDURE — 25000132 ZZH RX MED GY IP 250 OP 250 PS 637: Performed by: NURSE PRACTITIONER

## 2017-06-11 PROCEDURE — 85610 PROTHROMBIN TIME: CPT | Performed by: PHYSICIAN ASSISTANT

## 2017-06-11 PROCEDURE — 85027 COMPLETE CBC AUTOMATED: CPT | Performed by: PHYSICIAN ASSISTANT

## 2017-06-11 PROCEDURE — 27210437 ZZH NUTRITION PRODUCT SEMIELEM INTERMED LITER

## 2017-06-11 PROCEDURE — E2402 NEG PRESS WOUND THERAPY PUMP: HCPCS

## 2017-06-11 PROCEDURE — 36592 COLLECT BLOOD FROM PICC: CPT | Performed by: PHYSICIAN ASSISTANT

## 2017-06-11 PROCEDURE — 84484 ASSAY OF TROPONIN QUANT: CPT | Performed by: PHYSICIAN ASSISTANT

## 2017-06-11 PROCEDURE — 80048 BASIC METABOLIC PNL TOTAL CA: CPT | Performed by: PHYSICIAN ASSISTANT

## 2017-06-11 PROCEDURE — 93010 ELECTROCARDIOGRAM REPORT: CPT | Performed by: INTERNAL MEDICINE

## 2017-06-11 PROCEDURE — 25000128 H RX IP 250 OP 636: Performed by: TRANSPLANT SURGERY

## 2017-06-11 PROCEDURE — 40000802 ZZH SITE CHECK

## 2017-06-11 PROCEDURE — 25000128 H RX IP 250 OP 636: Performed by: NURSE PRACTITIONER

## 2017-06-11 PROCEDURE — 27210436 ZZH NUTRITION PRODUCT SEMIELEM INTERMED CAN

## 2017-06-11 PROCEDURE — 25000132 ZZH RX MED GY IP 250 OP 250 PS 637: Performed by: TRANSPLANT SURGERY

## 2017-06-11 PROCEDURE — 25000132 ZZH RX MED GY IP 250 OP 250 PS 637: Performed by: STUDENT IN AN ORGANIZED HEALTH CARE EDUCATION/TRAINING PROGRAM

## 2017-06-11 PROCEDURE — 00000146 ZZHCL STATISTIC GLUCOSE BY METER IP

## 2017-06-11 PROCEDURE — 93005 ELECTROCARDIOGRAM TRACING: CPT

## 2017-06-11 PROCEDURE — 25000128 H RX IP 250 OP 636: Performed by: PHYSICIAN ASSISTANT

## 2017-06-11 PROCEDURE — 25000132 ZZH RX MED GY IP 250 OP 250 PS 637: Performed by: PHYSICIAN ASSISTANT

## 2017-06-11 RX ORDER — WARFARIN SODIUM 2 MG/1
2 TABLET ORAL
Status: COMPLETED | OUTPATIENT
Start: 2017-06-11 | End: 2017-06-11

## 2017-06-11 RX ORDER — HYDROMORPHONE HYDROCHLORIDE 1 MG/ML
0.5 INJECTION, SOLUTION INTRAMUSCULAR; INTRAVENOUS; SUBCUTANEOUS ONCE
Status: COMPLETED | OUTPATIENT
Start: 2017-06-11 | End: 2017-06-11

## 2017-06-11 RX ORDER — LIDOCAINE HYDROCHLORIDE 10 MG/ML
INJECTION, SOLUTION EPIDURAL; INFILTRATION; INTRACAUDAL; PERINEURAL
Status: COMPLETED
Start: 2017-06-11 | End: 2017-06-11

## 2017-06-11 RX ADMIN — SODIUM BICARBONATE 325 MG: 325 TABLET ORAL at 11:40

## 2017-06-11 RX ADMIN — PREGABALIN 25 MG: 20 SOLUTION ORAL at 07:51

## 2017-06-11 RX ADMIN — FOLIC ACID 1 MG: 1 TABLET ORAL at 07:52

## 2017-06-11 RX ADMIN — SODIUM BICARBONATE 325 MG: 325 TABLET ORAL at 16:22

## 2017-06-11 RX ADMIN — ACETAMINOPHEN 325 MG: 325 TABLET, FILM COATED ORAL at 11:40

## 2017-06-11 RX ADMIN — Medication 12.5 MG: at 21:36

## 2017-06-11 RX ADMIN — HYDROMORPHONE HYDROCHLORIDE 0.5 MG: 1 INJECTION, SOLUTION INTRAMUSCULAR; INTRAVENOUS; SUBCUTANEOUS at 12:38

## 2017-06-11 RX ADMIN — ASPIRIN 81 MG: 81 TABLET, COATED ORAL at 07:51

## 2017-06-11 RX ADMIN — FLUOXETINE HYDROCHLORIDE 10 MG: 20 SOLUTION ORAL at 07:52

## 2017-06-11 RX ADMIN — PANCRELIPASE 40000 UNITS: 20000; 68000; 109000 CAPSULE, DELAYED RELEASE ORAL at 20:47

## 2017-06-11 RX ADMIN — LIDOCAINE HYDROCHLORIDE: 10 INJECTION, SOLUTION EPIDURAL; INFILTRATION; INTRACAUDAL; PERINEURAL at 12:37

## 2017-06-11 RX ADMIN — ACETAMINOPHEN 325 MG: 325 TABLET, FILM COATED ORAL at 20:48

## 2017-06-11 RX ADMIN — SENNOSIDES A AND B 10 ML: 415.36 LIQUID ORAL at 16:22

## 2017-06-11 RX ADMIN — PIPERACILLIN SODIUM,TAZOBACTAM SODIUM 3.38 G: 3; .375 INJECTION, POWDER, FOR SOLUTION INTRAVENOUS at 08:00

## 2017-06-11 RX ADMIN — PIPERACILLIN SODIUM,TAZOBACTAM SODIUM 3.38 G: 3; .375 INJECTION, POWDER, FOR SOLUTION INTRAVENOUS at 02:15

## 2017-06-11 RX ADMIN — PANCRELIPASE 40000 UNITS: 20000; 68000; 109000 CAPSULE, DELAYED RELEASE ORAL at 11:40

## 2017-06-11 RX ADMIN — PANTOPRAZOLE SODIUM 40 MG: 40 TABLET, DELAYED RELEASE ORAL at 07:51

## 2017-06-11 RX ADMIN — PANCRELIPASE 40000 UNITS: 20000; 68000; 109000 CAPSULE, DELAYED RELEASE ORAL at 08:00

## 2017-06-11 RX ADMIN — OXYCODONE HYDROCHLORIDE 5 MG: 5 SOLUTION ORAL at 07:05

## 2017-06-11 RX ADMIN — PIPERACILLIN SODIUM,TAZOBACTAM SODIUM 3.38 G: 3; .375 INJECTION, POWDER, FOR SOLUTION INTRAVENOUS at 20:49

## 2017-06-11 RX ADMIN — PANCRELIPASE 40000 UNITS: 20000; 68000; 109000 CAPSULE, DELAYED RELEASE ORAL at 16:22

## 2017-06-11 RX ADMIN — SODIUM BICARBONATE 325 MG: 325 TABLET ORAL at 20:47

## 2017-06-11 RX ADMIN — PANTOPRAZOLE SODIUM 40 MG: 40 TABLET, DELAYED RELEASE ORAL at 20:48

## 2017-06-11 RX ADMIN — SODIUM BICARBONATE 325 MG: 325 TABLET ORAL at 07:51

## 2017-06-11 RX ADMIN — MICAFUNGIN SODIUM 100 MG: 10 INJECTION, POWDER, LYOPHILIZED, FOR SOLUTION INTRAVENOUS at 12:38

## 2017-06-11 RX ADMIN — FERROUS SULFATE 325 MG: 325 TABLET, FILM COATED ORAL at 07:52

## 2017-06-11 RX ADMIN — WARFARIN SODIUM 2 MG: 2 TABLET ORAL at 17:56

## 2017-06-11 RX ADMIN — PIPERACILLIN SODIUM,TAZOBACTAM SODIUM 3.38 G: 3; .375 INJECTION, POWDER, FOR SOLUTION INTRAVENOUS at 14:01

## 2017-06-11 RX ADMIN — Medication 2000 UNITS: at 07:52

## 2017-06-11 RX ADMIN — SODIUM BICARBONATE 325 MG: 325 TABLET ORAL at 04:37

## 2017-06-11 RX ADMIN — SODIUM BICARBONATE 325 MG: 325 TABLET ORAL at 00:14

## 2017-06-11 RX ADMIN — LEVOTHYROXINE SODIUM 150 MCG: 300 TABLET ORAL at 06:27

## 2017-06-11 RX ADMIN — PANCRELIPASE 40000 UNITS: 20000; 68000; 109000 CAPSULE, DELAYED RELEASE ORAL at 04:37

## 2017-06-11 RX ADMIN — PANCRELIPASE 40000 UNITS: 20000; 68000; 109000 CAPSULE, DELAYED RELEASE ORAL at 00:14

## 2017-06-11 RX ADMIN — MULTIVIT AND MINERALS-FERROUS GLUCONATE 9 MG IRON/15 ML ORAL LIQUID 15 ML: at 07:51

## 2017-06-11 NOTE — PLAN OF CARE
Problem: Goal Outcome Summary  Goal: Goal Outcome Summary  Outcome: No Change  Tmax 99.1; VSS on RA.  -134; novolog administered per ss.  INR 2.21; warfarin adjusted and lovenox d/c'd.  Abdominal pain in LUQ worse today; fentanyl patch in place and prn tylenol provided.  Pt c/o intermittent nausea; declined intervention.  TF at goal 75mL/hr.  Clear liquid diet ordered; tolerating sips of water.  Voiding adequately but not always saving.  BMx2; prn senokot administered per pt request.  MAURICIO irrigated; scant serosanguinous output.  Drainage tube placed in abdominal wound; dressing changedx3.  Ambulating in hallway ad hattie.  Continue with plan of care and notify MD with changes.

## 2017-06-11 NOTE — PROGRESS NOTES
Diabetes Consult Daily  Progress Note          Assessment/Plan:   Mr. Tanner Arita is a 55 yo man with a history of chronic pancreatitis s/p TPAIT on 5/15/17, with post pancreatectomy diabetes, who was admitted on 6/2/17 with wound infection, DVT of LLE, and dehydration.    Almost all glucoses in target range the past 24h except for morning glucose today (128), which might have been higher due to increased pain this morning and/or sip of juice.    Plan:  -Morning glargine: continue 30 units qAM   -Evening glargine: continue 36 units qPM  -meal aspart 1unit/8g CHO was added yesterday- Tanner is encouraged to get this if he has more than a sip of juice.  -correction aspart: continue 2unit/30 for BG >120 q4h  -monitor glucose q4h.  (D10W rate to increase to 100 cc/h in case of turning TF off)       Outpatient diabetes follow up: Had appointment with Lexy Brunson PA-C at [facility]- this will need to be rescheduled once a discharge date is in sight  Plan discussed with patient, bedside RN.           Interval History:   The last 24 hours progress and nursing notes reviewed.  Continuous TF: Impact Peptide 1.5 at 75ml/h.     Tanner reports sudden, intense pain on his L side this morning.  The pain made it difficult to breathe.  The pain is still present now midday, but less intense.  Cause of pain unknown at this time, EKG and troponin negative.  Tanner thinks this intense pain is likely the cause in slight rise in glucose this morning.  RN thought he might have taken a sip of juice as well before this was checked, but pt recalls last juice sips being around [place].  He has not gotten aspart for juice yet, b/c he says he only taking an occasional sip.        Recent Labs  Lab 06/11/17  1131 06/11/17  0737 06/11/17  0602 06/11/17  0403 06/11/17  0005 06/10/17  2018 06/10/17  1529  06/09/17  0026  06/07/17  0707  06/06/17  0954   GLC  --   --  105*  --   --   --   --   --  116*  --  133*  --  135*  "  * 128*  --  106* 107* 110* 103*  < >  --   < >  --   < >  --    < > = values in this interval not displayed.            Review of Systems:   See interval hx          Medications:       Active Diet Order      Clear Liquid Diet     Physical Exam:  Gen: Sitting on edge of bed, NAD. Wife, Maribell, is present.  HEENT: NC/AT, mucous membranes are moist  Resp: Unlabored  Neuro:oriented x3, communicating clearly  /74  Pulse 80  Temp 99  F (37.2  C) (Oral)  Resp 16  Ht 1.88 m (6' 2\")  Wt 103.8 kg (228 lb 14.4 oz)  SpO2 95%  BMI 29.39 kg/m2           Data:     Lab Results   Component Value Date    A1C 5.3 05/16/2017    A1C 5.6 05/10/2017    A1C 5.4 09/10/2015            Recent Labs   Lab Test  06/11/17   0602  06/09/17   0026   NA  141  141   POTASSIUM  4.2  3.9   CHLORIDE  107  106   CO2  27  29   ANIONGAP  6  6   GLC  105*  116*   BUN  25  20   CR  0.88  0.83   CRISTOBAL  8.5  8.7     CBC RESULTS:   Recent Labs   Lab Test  06/11/17   0602   WBC  14.1*   RBC  3.23*   HGB  8.3*   HCT  27.3*   MCV  85   MCH  25.7*   MCHC  30.4*   RDW  16.2*   PLT  728*       Yazmin Poon PA-C [phone]  Diabetes Management job code [id number]          "

## 2017-06-11 NOTE — PLAN OF CARE
Problem: Goal Outcome Summary  Goal: Goal Outcome Summary   AVSS on RA. , 107, 106. C/o sharp L-sided chest pain around 0600 that woke him up, states concern for PE. MD notified, EKG and trops ordered; both negative. Per MD will pass along to fellow w/ possibility of imaging today. Oxycodone given w/ little relief. Denies nausea. PICC w/ TKO. Clear liquid diet w/ calorie counts. TF infusing into J. G tube clamped. Drain irrigated w/ 3 cc per pt tolerance. Wound packed per orders. Voiding. BM x1. Up ad hattie. Will continue POC.

## 2017-06-11 NOTE — PROVIDER NOTIFICATION
On-call MD paged: pt c/o L sided sharp chest pain that he reports woke him up. AVSS on RA. Awaiting any new orders.

## 2017-06-12 LAB
BACTERIA SPEC CULT: NO GROWTH
BACTERIA SPEC CULT: NO GROWTH
ERYTHROCYTE [DISTWIDTH] IN BLOOD BY AUTOMATED COUNT: 16.3 % (ref 10–15)
GLUCOSE BLDC GLUCOMTR-MCNC: 100 MG/DL (ref 70–99)
GLUCOSE BLDC GLUCOMTR-MCNC: 104 MG/DL (ref 70–99)
GLUCOSE BLDC GLUCOMTR-MCNC: 111 MG/DL (ref 70–99)
GLUCOSE BLDC GLUCOMTR-MCNC: 115 MG/DL (ref 70–99)
GLUCOSE BLDC GLUCOMTR-MCNC: 125 MG/DL (ref 70–99)
GLUCOSE BLDC GLUCOMTR-MCNC: 127 MG/DL (ref 70–99)
GLUCOSE BLDC GLUCOMTR-MCNC: 133 MG/DL (ref 70–99)
GLUCOSE BLDC GLUCOMTR-MCNC: 87 MG/DL (ref 70–99)
HCT VFR BLD AUTO: 27.8 % (ref 40–53)
HGB BLD-MCNC: 8.8 G/DL (ref 13.3–17.7)
INR PPP: 2.3 (ref 0.86–1.14)
INTERPRETATION ECG - MUSE: NORMAL
MCH RBC QN AUTO: 25.8 PG (ref 26.5–33)
MCHC RBC AUTO-ENTMCNC: 31.7 G/DL (ref 31.5–36.5)
MCV RBC AUTO: 82 FL (ref 78–100)
MICRO REPORT STATUS: NORMAL
MICRO REPORT STATUS: NORMAL
PLATELET # BLD AUTO: ABNORMAL 10E9/L (ref 150–450)
RBC # BLD AUTO: 3.41 10E12/L (ref 4.4–5.9)
SPECIMEN SOURCE: NORMAL
SPECIMEN SOURCE: NORMAL
WBC # BLD AUTO: 13.3 10E9/L (ref 4–11)

## 2017-06-12 PROCEDURE — 25000132 ZZH RX MED GY IP 250 OP 250 PS 637: Performed by: STUDENT IN AN ORGANIZED HEALTH CARE EDUCATION/TRAINING PROGRAM

## 2017-06-12 PROCEDURE — 25000132 ZZH RX MED GY IP 250 OP 250 PS 637: Performed by: PHYSICIAN ASSISTANT

## 2017-06-12 PROCEDURE — 36415 COLL VENOUS BLD VENIPUNCTURE: CPT | Performed by: PHYSICIAN ASSISTANT

## 2017-06-12 PROCEDURE — 25000128 H RX IP 250 OP 636: Performed by: PHYSICIAN ASSISTANT

## 2017-06-12 PROCEDURE — 25000131 ZZH RX MED GY IP 250 OP 636 PS 637: Performed by: TRANSPLANT SURGERY

## 2017-06-12 PROCEDURE — 27210436 ZZH NUTRITION PRODUCT SEMIELEM INTERMED CAN

## 2017-06-12 PROCEDURE — 12000025 ZZH R&B TRANSPLANT INTERMEDIATE

## 2017-06-12 PROCEDURE — 25000131 ZZH RX MED GY IP 250 OP 636 PS 637: Performed by: PHYSICIAN ASSISTANT

## 2017-06-12 PROCEDURE — 85027 COMPLETE CBC AUTOMATED: CPT | Performed by: PHYSICIAN ASSISTANT

## 2017-06-12 PROCEDURE — 25000132 ZZH RX MED GY IP 250 OP 250 PS 637: Performed by: NURSE PRACTITIONER

## 2017-06-12 PROCEDURE — 85610 PROTHROMBIN TIME: CPT | Performed by: PHYSICIAN ASSISTANT

## 2017-06-12 PROCEDURE — 00000146 ZZHCL STATISTIC GLUCOSE BY METER IP

## 2017-06-12 PROCEDURE — 25000128 H RX IP 250 OP 636: Performed by: NURSE PRACTITIONER

## 2017-06-12 PROCEDURE — 25000132 ZZH RX MED GY IP 250 OP 250 PS 637: Performed by: TRANSPLANT SURGERY

## 2017-06-12 RX ORDER — METOCLOPRAMIDE 5 MG/1
10 TABLET ORAL
Status: DISCONTINUED | OUTPATIENT
Start: 2017-06-12 | End: 2017-06-13

## 2017-06-12 RX ORDER — WARFARIN SODIUM 2 MG/1
2 TABLET ORAL
Status: COMPLETED | OUTPATIENT
Start: 2017-06-12 | End: 2017-06-12

## 2017-06-12 RX ADMIN — PIPERACILLIN SODIUM,TAZOBACTAM SODIUM 3.38 G: 3; .375 INJECTION, POWDER, FOR SOLUTION INTRAVENOUS at 20:10

## 2017-06-12 RX ADMIN — FERROUS SULFATE 325 MG: 325 TABLET, FILM COATED ORAL at 08:41

## 2017-06-12 RX ADMIN — OXYCODONE HYDROCHLORIDE 2.5 MG: 5 SOLUTION ORAL at 01:20

## 2017-06-12 RX ADMIN — Medication 12.5 MG: at 22:23

## 2017-06-12 RX ADMIN — SODIUM BICARBONATE 325 MG: 325 TABLET ORAL at 23:56

## 2017-06-12 RX ADMIN — Medication 2000 UNITS: at 08:42

## 2017-06-12 RX ADMIN — SODIUM BICARBONATE 325 MG: 325 TABLET ORAL at 17:12

## 2017-06-12 RX ADMIN — MICAFUNGIN SODIUM 100 MG: 10 INJECTION, POWDER, LYOPHILIZED, FOR SOLUTION INTRAVENOUS at 13:50

## 2017-06-12 RX ADMIN — SODIUM BICARBONATE 325 MG: 325 TABLET ORAL at 04:18

## 2017-06-12 RX ADMIN — PANCRELIPASE 40000 UNITS: 20000; 68000; 109000 CAPSULE, DELAYED RELEASE ORAL at 09:16

## 2017-06-12 RX ADMIN — PIPERACILLIN SODIUM,TAZOBACTAM SODIUM 3.38 G: 3; .375 INJECTION, POWDER, FOR SOLUTION INTRAVENOUS at 15:10

## 2017-06-12 RX ADMIN — LEVOTHYROXINE SODIUM 150 MCG: 300 TABLET ORAL at 06:32

## 2017-06-12 RX ADMIN — METOCLOPRAMIDE HYDROCHLORIDE 5 MG: 5 TABLET ORAL at 17:29

## 2017-06-12 RX ADMIN — PIPERACILLIN SODIUM,TAZOBACTAM SODIUM 3.38 G: 3; .375 INJECTION, POWDER, FOR SOLUTION INTRAVENOUS at 08:34

## 2017-06-12 RX ADMIN — PANTOPRAZOLE SODIUM 40 MG: 40 TABLET, DELAYED RELEASE ORAL at 08:42

## 2017-06-12 RX ADMIN — PANCRELIPASE 40000 UNITS: 20000; 68000; 109000 CAPSULE, DELAYED RELEASE ORAL at 04:18

## 2017-06-12 RX ADMIN — FOLIC ACID 1 MG: 1 TABLET ORAL at 08:41

## 2017-06-12 RX ADMIN — PANTOPRAZOLE SODIUM 40 MG: 40 TABLET, DELAYED RELEASE ORAL at 20:10

## 2017-06-12 RX ADMIN — METOCLOPRAMIDE HYDROCHLORIDE 10 MG: 5 TABLET ORAL at 08:31

## 2017-06-12 RX ADMIN — PANCRELIPASE 40000 UNITS: 20000; 68000; 109000 CAPSULE, DELAYED RELEASE ORAL at 23:58

## 2017-06-12 RX ADMIN — SODIUM BICARBONATE 325 MG: 325 TABLET ORAL at 09:16

## 2017-06-12 RX ADMIN — SODIUM BICARBONATE 325 MG: 325 TABLET ORAL at 13:15

## 2017-06-12 RX ADMIN — SODIUM BICARBONATE 325 MG: 325 TABLET ORAL at 20:08

## 2017-06-12 RX ADMIN — PREGABALIN 25 MG: 20 SOLUTION ORAL at 08:42

## 2017-06-12 RX ADMIN — PANCRELIPASE 40000 UNITS: 20000; 68000; 109000 CAPSULE, DELAYED RELEASE ORAL at 20:08

## 2017-06-12 RX ADMIN — PANCRELIPASE 40000 UNITS: 20000; 68000; 109000 CAPSULE, DELAYED RELEASE ORAL at 00:34

## 2017-06-12 RX ADMIN — FLUOXETINE HYDROCHLORIDE 10 MG: 20 SOLUTION ORAL at 08:42

## 2017-06-12 RX ADMIN — FENTANYL 1 PATCH: 100 PATCH, EXTENDED RELEASE TRANSDERMAL at 10:15

## 2017-06-12 RX ADMIN — SODIUM BICARBONATE 325 MG: 325 TABLET ORAL at 00:34

## 2017-06-12 RX ADMIN — MULTIVIT AND MINERALS-FERROUS GLUCONATE 9 MG IRON/15 ML ORAL LIQUID 15 ML: at 08:42

## 2017-06-12 RX ADMIN — PANCRELIPASE 40000 UNITS: 20000; 68000; 109000 CAPSULE, DELAYED RELEASE ORAL at 17:12

## 2017-06-12 RX ADMIN — INSULIN GLARGINE 33 UNITS: 100 INJECTION, SOLUTION SUBCUTANEOUS at 10:08

## 2017-06-12 RX ADMIN — WARFARIN SODIUM 2 MG: 2 TABLET ORAL at 17:29

## 2017-06-12 RX ADMIN — ASPIRIN 81 MG: 81 TABLET, COATED ORAL at 08:41

## 2017-06-12 RX ADMIN — OXYCODONE HYDROCHLORIDE 2.5 MG: 5 SOLUTION ORAL at 11:35

## 2017-06-12 RX ADMIN — PIPERACILLIN SODIUM,TAZOBACTAM SODIUM 3.38 G: 3; .375 INJECTION, POWDER, FOR SOLUTION INTRAVENOUS at 02:26

## 2017-06-12 NOTE — PROGRESS NOTES
Diabetes Consult Daily  Progress Note          Assessment/Plan:   Mr. Tanner Arita is a 57 yo man with a history of chronic pancreatitis s/p TPAIT on 5/15/17, with post pancreatectomy diabetes, who was admitted on 6/2/17 with wound infection, DVT of LLE, and dehydration.    Glucoses up to 120s-130s at times, otherwise in target range.  Higher glucoses may be due to reduction in physical activity today.    Plan:  -Morning glargine: slight increase to 33 units qAM   -Evening glargine: continue 36 units qPM  -meal aspart 1unit/8g CHO- Tanner is encouraged to get this if he has more than a sip of juice.  -correction aspart: continue 2unit/30 for BG >120 q4h  -monitor glucose q4h.  (D10W rate to increase to 100 cc/h in case of turning TF off)       Outpatient diabetes follow up: Had appointment with Lexy Brunson PA-C at [facility]- this will need to be rescheduled once a discharge date is in sight  Plan discussed with patient, bedside RN.           Interval History:   The last 24 hours progress and nursing notes reviewed.  Continuous TF: Impact Peptide 1.5 at 75ml/h.     Tanner was more tired today following administration of metaclopramide.  Walking much less today, likely contributing to some slightly higher glucoses.  Tanner feels hungry- wanting to try some more clears.        Recent Labs  Lab 06/12/17  1200 06/12/17  0805 06/12/17  0421 06/12/17  0031 06/11/17  2202 06/11/17  2035  06/11/17  0602  06/09/17  0026  06/07/17  0707  06/06/17  0954   GLC  --   --   --   --   --   --   --  105*  --  116*  --  133*  --  135*   * 115* 127* 111* 100* 87  < >  --   < >  --   < >  --   < >  --    < > = values in this interval not displayed.            Review of Systems:   See interval hx          Medications:       Active Diet Order      Clear Liquid Diet     Physical Exam:  Gen: resting in bed NAD. Wife, Maribell, is present.  HEENT: NC/AT, mucous membranes are moist  Resp:  "Unlabored  Neuro:oriented x3, communicating clearly  /73 (BP Location: Right arm)  Pulse 67  Temp 98.1  F (36.7  C) (Oral)  Resp 16  Ht 1.88 m (6' 2\")  Wt 103.8 kg (228 lb 14.4 oz)  SpO2 95%  BMI 29.39 kg/m2           Data:     Lab Results   Component Value Date    A1C 5.3 05/16/2017    A1C 5.6 05/10/2017    A1C 5.4 09/10/2015            Recent Labs   Lab Test  06/11/17   0602  06/09/17   0026   NA  141  141   POTASSIUM  4.2  3.9   CHLORIDE  107  106   CO2  27  29   ANIONGAP  6  6   GLC  105*  116*   BUN  25  20   CR  0.88  0.83   CRISTOBAL  8.5  8.7     CBC RESULTS:   Recent Labs   Lab Test  06/12/17   0907   WBC  13.3*   RBC  3.41*   HGB  8.8*   HCT  27.8*   MCV  82   MCH  25.8*   MCHC  31.7   RDW  16.3*   PLT  Platelets clumped, platelet count unavailable  CALLED TO AMADEO GARDNER,1100 06/12/17 BY [facility]  CORRECTED ON 06/12 AT 1403: PREVIOUSLY REPORTED          Yazmin Poon PA-C [phone]  Diabetes Management job code [id number]          "

## 2017-06-12 NOTE — DOWNTIME EVENT NOTE
The EMR was down for 6 hours on 6/12/2017.    Sydnie Ivy was responsible for completing the paper charting during this time period.     The following information was re-entered into the system by Orquidea Arita: Flowsheet data    The following information will remain in the paper chart: MARLON Arita  6/12/2017

## 2017-06-12 NOTE — PLAN OF CARE
Problem: Goal Outcome Summary  Goal: Goal Outcome Summary  Outcome: Improving  7636-8283. AVSS.  and 127. Pt complained of pain and nausea, received oxy 2.5mg for pain per pt request and denied anything for nausea. Pt has a PICC double lumen with a TKO, MAURICIO with small amount of output (MAURICIO to be irrigated with 10ml, but pt can only handle 3ml), GJ tube with G clamped and J hooked up to tube feedings at 75ml/hr. Pt has a midline incision with wound dressing changes four times daily. Dressing was changed at 0400 6/12. Pt left arm still feeling numb since islet surgery on the 15th of May. Wants to avoid blood pressure on that arm. Pt is on a clear liquid diet and carb coverage. Pt is independent. Will continue to monitor and follow plan of care.

## 2017-06-12 NOTE — PLAN OF CARE
Problem: Goal Outcome Summary  Goal: Goal Outcome Summary  Outcome: No Change  7155-2377: AVSS on RA. BG 87 and 100. PRN tylenol given x1 for abdominal/L chest pain. Fentanyl patch in place. Denies nausea. Clear liquid diet w/ calorie counts, very little oral intake. J tube w/ TF infusing at 75 ml/hr. G tube clamped. Wound dressing changed and tube removed per pt request. Drain w/ scant serosanguinous output. Up ad hattie. Will continue POC.

## 2017-06-13 ENCOUNTER — ANTICOAGULATION THERAPY VISIT (OUTPATIENT)
Dept: ANTICOAGULATION | Facility: CLINIC | Age: 57
End: 2017-06-13

## 2017-06-13 ENCOUNTER — HOME INFUSION (PRE-WILLOW HOME INFUSION) (OUTPATIENT)
Dept: PHARMACY | Facility: CLINIC | Age: 57
End: 2017-06-13

## 2017-06-13 DIAGNOSIS — I82.402 DEEP VEIN THROMBOSIS (DVT) OF LEFT LOWER EXTREMITY (H): ICD-10-CM

## 2017-06-13 DIAGNOSIS — Z79.01 LONG-TERM (CURRENT) USE OF ANTICOAGULANTS: ICD-10-CM

## 2017-06-13 LAB
ANION GAP SERPL CALCULATED.3IONS-SCNC: 6 MMOL/L (ref 3–14)
BUN SERPL-MCNC: 30 MG/DL (ref 7–30)
CALCIUM SERPL-MCNC: 9.1 MG/DL (ref 8.5–10.1)
CHLORIDE SERPL-SCNC: 108 MMOL/L (ref 94–109)
CO2 SERPL-SCNC: 24 MMOL/L (ref 20–32)
CREAT SERPL-MCNC: 0.86 MG/DL (ref 0.66–1.25)
ERYTHROCYTE [DISTWIDTH] IN BLOOD BY AUTOMATED COUNT: 16.3 % (ref 10–15)
GFR SERPL CREATININE-BSD FRML MDRD: ABNORMAL ML/MIN/1.7M2
GLUCOSE BLDC GLUCOMTR-MCNC: 115 MG/DL (ref 70–99)
GLUCOSE BLDC GLUCOMTR-MCNC: 119 MG/DL (ref 70–99)
GLUCOSE BLDC GLUCOMTR-MCNC: 119 MG/DL (ref 70–99)
GLUCOSE BLDC GLUCOMTR-MCNC: 122 MG/DL (ref 70–99)
GLUCOSE BLDC GLUCOMTR-MCNC: 133 MG/DL (ref 70–99)
GLUCOSE BLDC GLUCOMTR-MCNC: 76 MG/DL (ref 70–99)
GLUCOSE SERPL-MCNC: 133 MG/DL (ref 70–99)
HCT VFR BLD AUTO: 27.6 % (ref 40–53)
HGB BLD-MCNC: 8.7 G/DL (ref 13.3–17.7)
INR PPP: 2.13 (ref 0.86–1.14)
MCH RBC QN AUTO: 26 PG (ref 26.5–33)
MCHC RBC AUTO-ENTMCNC: 31.5 G/DL (ref 31.5–36.5)
MCV RBC AUTO: 82 FL (ref 78–100)
PLATELET # BLD AUTO: 743 10E9/L (ref 150–450)
POTASSIUM SERPL-SCNC: 4.2 MMOL/L (ref 3.4–5.3)
RBC # BLD AUTO: 3.35 10E12/L (ref 4.4–5.9)
SODIUM SERPL-SCNC: 138 MMOL/L (ref 133–144)
WBC # BLD AUTO: 12.9 10E9/L (ref 4–11)

## 2017-06-13 PROCEDURE — 12000025 ZZH R&B TRANSPLANT INTERMEDIATE

## 2017-06-13 PROCEDURE — 80048 BASIC METABOLIC PNL TOTAL CA: CPT | Performed by: TRANSPLANT SURGERY

## 2017-06-13 PROCEDURE — 85610 PROTHROMBIN TIME: CPT | Performed by: PHYSICIAN ASSISTANT

## 2017-06-13 PROCEDURE — 80048 BASIC METABOLIC PNL TOTAL CA: CPT | Performed by: PHYSICIAN ASSISTANT

## 2017-06-13 PROCEDURE — 36415 COLL VENOUS BLD VENIPUNCTURE: CPT | Performed by: PHYSICIAN ASSISTANT

## 2017-06-13 PROCEDURE — E2402 NEG PRESS WOUND THERAPY PUMP: HCPCS

## 2017-06-13 PROCEDURE — 25000132 ZZH RX MED GY IP 250 OP 250 PS 637: Performed by: PHYSICIAN ASSISTANT

## 2017-06-13 PROCEDURE — 25000132 ZZH RX MED GY IP 250 OP 250 PS 637: Performed by: NURSE PRACTITIONER

## 2017-06-13 PROCEDURE — 25000128 H RX IP 250 OP 636: Performed by: PHYSICIAN ASSISTANT

## 2017-06-13 PROCEDURE — 25000131 ZZH RX MED GY IP 250 OP 636 PS 637: Performed by: PHYSICIAN ASSISTANT

## 2017-06-13 PROCEDURE — 85027 COMPLETE CBC AUTOMATED: CPT | Performed by: PHYSICIAN ASSISTANT

## 2017-06-13 PROCEDURE — 27210436 ZZH NUTRITION PRODUCT SEMIELEM INTERMED CAN

## 2017-06-13 PROCEDURE — 25000132 ZZH RX MED GY IP 250 OP 250 PS 637: Performed by: TRANSPLANT SURGERY

## 2017-06-13 PROCEDURE — 40000558 ZZH STATISTIC CVC DRESSING CHANGE

## 2017-06-13 PROCEDURE — 00000146 ZZHCL STATISTIC GLUCOSE BY METER IP

## 2017-06-13 RX ORDER — WARFARIN SODIUM 3 MG/1
3 TABLET ORAL
Status: COMPLETED | OUTPATIENT
Start: 2017-06-13 | End: 2017-06-13

## 2017-06-13 RX ORDER — FLUCONAZOLE 200 MG/1
400 TABLET ORAL ONCE
Status: COMPLETED | OUTPATIENT
Start: 2017-06-13 | End: 2017-06-13

## 2017-06-13 RX ORDER — WARFARIN SODIUM 4 MG/1
4 TABLET ORAL
Status: DISCONTINUED | OUTPATIENT
Start: 2017-06-13 | End: 2017-06-13

## 2017-06-13 RX ORDER — METOCLOPRAMIDE 5 MG/1
5 TABLET ORAL
Status: DISCONTINUED | OUTPATIENT
Start: 2017-06-13 | End: 2017-06-14 | Stop reason: HOSPADM

## 2017-06-13 RX ORDER — FLUCONAZOLE 200 MG/1
200 TABLET ORAL DAILY
Status: DISCONTINUED | OUTPATIENT
Start: 2017-06-14 | End: 2017-06-14 | Stop reason: HOSPADM

## 2017-06-13 RX ADMIN — SODIUM BICARBONATE 325 MG: 325 TABLET ORAL at 20:09

## 2017-06-13 RX ADMIN — PANCRELIPASE 40000 UNITS: 20000; 68000; 109000 CAPSULE, DELAYED RELEASE ORAL at 08:07

## 2017-06-13 RX ADMIN — PANTOPRAZOLE SODIUM 40 MG: 40 TABLET, DELAYED RELEASE ORAL at 20:09

## 2017-06-13 RX ADMIN — PIPERACILLIN SODIUM,TAZOBACTAM SODIUM 3.38 G: 3; .375 INJECTION, POWDER, FOR SOLUTION INTRAVENOUS at 01:51

## 2017-06-13 RX ADMIN — SENNOSIDES A AND B 10 ML: 415.36 LIQUID ORAL at 15:03

## 2017-06-13 RX ADMIN — LEVOTHYROXINE SODIUM 150 MCG: 300 TABLET ORAL at 05:36

## 2017-06-13 RX ADMIN — WARFARIN SODIUM 3 MG: 3 TABLET ORAL at 18:43

## 2017-06-13 RX ADMIN — PANCRELIPASE 40000 UNITS: 20000; 68000; 109000 CAPSULE, DELAYED RELEASE ORAL at 20:09

## 2017-06-13 RX ADMIN — PIPERACILLIN SODIUM,TAZOBACTAM SODIUM 3.38 G: 3; .375 INJECTION, POWDER, FOR SOLUTION INTRAVENOUS at 08:01

## 2017-06-13 RX ADMIN — INSULIN GLARGINE 33 UNITS: 100 INJECTION, SOLUTION SUBCUTANEOUS at 08:06

## 2017-06-13 RX ADMIN — FLUCONAZOLE 400 MG: 200 TABLET ORAL at 12:13

## 2017-06-13 RX ADMIN — PANCRELIPASE 40000 UNITS: 20000; 68000; 109000 CAPSULE, DELAYED RELEASE ORAL at 12:13

## 2017-06-13 RX ADMIN — FOLIC ACID 1 MG: 1 TABLET ORAL at 08:08

## 2017-06-13 RX ADMIN — SODIUM BICARBONATE 325 MG: 325 TABLET ORAL at 08:08

## 2017-06-13 RX ADMIN — SODIUM BICARBONATE 325 MG: 325 TABLET ORAL at 12:13

## 2017-06-13 RX ADMIN — INSULIN ASPART 1 UNITS: 100 INJECTION, SOLUTION INTRAVENOUS; SUBCUTANEOUS at 12:01

## 2017-06-13 RX ADMIN — SODIUM BICARBONATE 325 MG: 325 TABLET ORAL at 16:33

## 2017-06-13 RX ADMIN — SODIUM BICARBONATE 325 MG: 325 TABLET ORAL at 04:00

## 2017-06-13 RX ADMIN — FLUOXETINE HYDROCHLORIDE 10 MG: 20 SOLUTION ORAL at 08:09

## 2017-06-13 RX ADMIN — PANCRELIPASE 40000 UNITS: 20000; 68000; 109000 CAPSULE, DELAYED RELEASE ORAL at 16:33

## 2017-06-13 RX ADMIN — ASPIRIN 81 MG: 81 TABLET, COATED ORAL at 08:08

## 2017-06-13 RX ADMIN — FERROUS SULFATE 325 MG: 325 TABLET, FILM COATED ORAL at 08:09

## 2017-06-13 RX ADMIN — INSULIN ASPART 1 UNITS: 100 INJECTION, SOLUTION INTRAVENOUS; SUBCUTANEOUS at 18:43

## 2017-06-13 RX ADMIN — PREGABALIN 25 MG: 20 SOLUTION ORAL at 08:09

## 2017-06-13 RX ADMIN — PANTOPRAZOLE SODIUM 40 MG: 40 TABLET, DELAYED RELEASE ORAL at 08:08

## 2017-06-13 RX ADMIN — MULTIVIT AND MINERALS-FERROUS GLUCONATE 9 MG IRON/15 ML ORAL LIQUID 15 ML: at 08:08

## 2017-06-13 RX ADMIN — PANCRELIPASE 40000 UNITS: 20000; 68000; 109000 CAPSULE, DELAYED RELEASE ORAL at 04:00

## 2017-06-13 RX ADMIN — Medication 2000 UNITS: at 08:09

## 2017-06-13 NOTE — PLAN OF CARE
Problem: Goal Outcome Summary  Goal: Goal Outcome Summary  Outcome: No Change  [id number]: Vital signs stable. Patient alert, oriented and up independently. Ambulated in halls x4. Endorses abdominal pain; received PRN oxycodone x1, PRN tylenol available. Endorses nausea; received PRN reglan x2; prefers only 5mg. PICC intact with TKO for antibiotics zosyn and micafungin. Abdominal incision dressing changed x2 per orders; upper part of incision is open with wet to dry dressing changes, lower part of incision is stapled. Drain in place with 0 output. Glu Q4 115, 133, 104. G/J tube intact. J tube has TF impact peptide @ goal 75mL/hr, G tube is clamped. Patient voiding spontaneously. Had 3 soft/loose BM's today. Will continue to monitor.

## 2017-06-13 NOTE — MR AVS SNAPSHOT
Sohan Arita   6/13/2017   Anticoagulation Therapy Visit    Description:  56 year old male   Provider:  Eduardo Mejia [facility]   Department:  [facility]           INR as of 6/13/2017     Today's INR       Anticoagulation Summary as of 6/13/2017     INR goal 2.0-3.0   Today's INR    Next INR check     Indications   Deep vein thrombosis (DVT) of left lower extremity (H) [I82.402]  Long-term (current) use of anticoagulants [Z79.01] [Z79.01]

## 2017-06-13 NOTE — PROGRESS NOTES
This is a snapshot of the patient's [facility] medical record. For complete information or questions call [phone] or In [facility]  Home [facility]fusion ([id number]).  [facility] Number:  [id number]

## 2017-06-13 NOTE — CONSULTS
Health Psychology                  Clinic    Department of Medicine  Vandana Aragon, Ph.D., L.P. [phone]                          Clinics and Surgery Center  [facility] Antelmo Will, Ph.D.,  L.P. [phone]                 3rd Floor  Lily Mail Code 741   Raymond Redman, Ph.D., NICOLE., L.P. [phone]     [address] [place] [address] Manjula Pineda, Ph.D., L.P. [phone]            [id number]  [place] [address]           Cassidy Harry, Ph.D., L.P. [phone]     Inpatient Health Psychology      DATE OF SERVICE:  06/13/2017.       REFERRAL SOURCE:  Bambi Alejandre PA-C, Pancreas Transplant Surgery team, [facility], [place].      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 Describes that he hit a hill that needed to be climbed and is now much better. Sleeping better, but continuing to use medication as needed; he notes that in a addition to the Seroquel that he was begun on Reglan which also makes him sleepy, and together these have allowed more sleep. He also notes wryly that his belief has always been that a person needs to get out of the hospital to be able to sleep effectively. Reporting that he has an appetite at times. Energy increased, and he has been walking in the halls. He continues to have insight into his basic smooth and positive recovery from the TPAIT and remains hopeful that this will continue as the infection continues to respond to treatment. Hopeful re possible discharge soon. Affect relaxed, varied, and appropriate to content of conversation.  Assessment: Mood appears to be back to baseline. Clearly relieved and happy to be feeling more comfortable than he was last week.   Diagnosis:    1.  Generalized anxiety disorder (F41.1).   2.  Dysthymic disorder (F34.1).   Recommendations/Plan: Patient  and wife have my contact information and are aware that they can ask hospital or clinic staff to contact me should they feel it would be helpful  Time Spent with Patient: 17 minutes  Service Provided: Individual psychotherapy   Provider: Vandana Aragon, Ph.D,    Provider Pager: [phone]   Provider Phone: [phone]

## 2017-06-13 NOTE — PROGRESS NOTES
Patient referred to our clinic on 6/13/17. He will be staying locally. We will follow his Anticoagulation Therapy until he returns Home.  INR referral and Lab orders have been placed in [facility].   Patient is currently on 7A.  No warfarin doses have been documented on his calendar yet

## 2017-06-13 NOTE — PROGRESS NOTES
Diabetes Consult Daily  Progress Note          Assessment/Plan:   Mr. Tanner Arita is a 57 yo man with a history of chronic pancreatitis s/p TPAIT on 5/15/17, with post pancreatectomy diabetes, who was admitted on 6/2/17 with wound infection, DVT of LLE, and dehydration.    Glucose control is fair.  Tanner was quite nervous about BG to 80s last night.  Discharge may be tomorrow.    Plan:  -Morning glargine: continue 33 units qAM   -Evening glargine: continue 36 units qPM  -meal aspart 1unit/8g CHO- Tanner is encouraged to get this if he has more than a sip of juice.  -correction aspart: continue 2unit/30 for BG >120 q4h  -monitor glucose q4h.  (D10W rate to increase to 100 cc/h in case of turning TF off)       Outpatient diabetes follow up: Had appointment with Lexy Brunson PA-C at [facility]- asked pt's wife to call to reschedule  Plan discussed with patient, bedside RN, primary team           Interval History:   The last 24 hours progress and nursing notes reviewed.  Continuous TF: Impact Peptide 1.5 at 75ml/h.     Walked a lot yesterday.  Took about 1 ounce juice at midnight when Bg 87.  BG then 130 afterward.  Overall feeling really good and anxious to discharge.  But, will be changing to oral abx today, then may d/c tomorrow.  Discussed covering any carbs that aren't for hypo prevention/treatment        Recent Labs  Lab 06/13/17  1159 06/13/17  0804 06/13/17  0628 06/13/17  0356 06/12/17  2350 06/12/17  1930 06/12/17  1714  06/11/17  0602  06/09/17  0026  06/07/17  0707   GLC  --   --  133*  --   --   --   --   --  105*  --  116*  --  133*   * 122*  --  119* 87 125* 104*  < >  --   < >  --   < >  --    < > = values in this interval not displayed.            Review of Systems:   See interval hx          Medications:       Active Diet Order      Clear Liquid Diet     Physical Exam:  Gen: resting in bed NAD. Wife, Maribell, is present.  HEENT: NC/AT, mucous membranes are  "moist  Resp: Unlabored  Neuro:oriented x3, communicating clearly  /72 (BP Location: Right arm)  Pulse 67  Temp 98.1  F (36.7  C) (Oral)  Resp 16  Ht 1.88 m (6' 2\")  Wt 101.9 kg (224 lb 11.2 oz)  SpO2 94%  BMI 28.85 kg/m2           Data:     Lab Results   Component Value Date    A1C 5.3 05/16/2017    A1Cs- 5.6 05/10/2017    A1Cs 5.4 09/10/2015            Recent Labs   Lab Test  06/13/17   0628  06/11/17   0602   NA  138  141   POTASSIUM  4.2  4.2   CHLORIDE  108  107   CO2  24  27   ANIONGAP  6  6   GLC  133*  105*   BUN  30  25   CR  0.86  0.88   CRISTOBAL  9.1  8.5     CBC RESULTS:   Recent Labs   Lab Test  06/13/17   0628   WBC  12.9*   RBC  3.35*   HGB  8.7*   HCT  27.6*   MCV  82   MCH  26.0*   MCHC  31.5   RDW  16.3*   PLT  743*     Orquidea Cabello APRN [facility] [phone]    Diabetes Management job code [id number]          "

## 2017-06-13 NOTE — PROGRESS NOTES
Pancreatitis Service - Daily Progress Note  06/13/2017    Assessment & Plan: 57 yo with hx chronic pancreatitis s/p TPAIT 5/15/17.  Presented with wound infection and occlusive thrombosis left peroneal vein.    Cardiorespiratory: Hypoxia resolved.  Continue IS, ambulation. On RA, patient states his saturations have been improving.   GI/Nutrition:  On cycled TF plus clear liquid diet.  Heme: Anemia, hgb now 8.7, last transfusion 6/9/17.  LLE DVT, small non occlusive thrombus right IJ.  Was on heparin drip, now transitioned to lovenox/warfarin today.  INR today 2.13 [goal 2-3].    Fluid/Electrolytes:  Pt auto diuresing.  : No acute issues  Post-pancreatectomy diabetes: Continue lantus/SSI, appreciate Endocrine consult.  Infection: Afebrile, leukocytosis improved today, 12.9 (13.3).  -Wound infection: Opened in clinic, VAC on.  6/3 cx growing candida alb.  Persistent purulent drainage under VAC.  Changed to wet to dry dressings TID.  On fluconazole 6/3-6/6, changed to micafungin 6/5-present.  Will transition from micafungin to oral diflucan today.    -Perihepatic abscess: Perihepatic and left sided fluid collections on imaging.  Both aspirated 6/5, culture + candida alb/dub.  Will d'c empiric Zosyn today (6/2-6/13)  and Vanco (d/c 6/9). IR consulted for drainage of perihepatic fluid collection completed on 6/7 with 30 cc necrotic thick fluid drained, 12F drain in place. Continue with wet-to-dry dressing changes. Will discuss the timing of repeat imaging, planning for outpatient CT scan on or around 6/22.  Prophylaxis:  DVT  Pain control: Well controlled with PRN oxycodone (hasn't used any in the last 24 hours).  Also has fentanyl 100mcg patch on. On Lyrica.    Neuro/psych: Mild delirium with occasional hallucinations, resolved.  Suspect it was secondary to infection. Low dose seroquel. Feels more energetic today, thinks that the reglan may make him sleepy, will decrease dose.  Activity: Up with assist  Anticipated  "LOS/Discharge: 7A, planning for discharge tomorrow if no changes in status with change to oral antifungals.    Medical Decision Making: Medium  Subsequent visit 08505 (moderate level decision making)    GELACIO/Fellow/Resident Provider: Aleksandra Pugh PA-C [id number]    Faculty: Louie Jaquez MD  __________________________________________________________________  Transplant History:   5/15/2017 ([place]), Postoperative day: 29     Interval History: History is obtained from the patient  Overnight events: Feels well today, had a good night of rest. Ambulatory this am.  No fevers, occasional chills.  Looking forward to discharge.    ROS:   A 10-point review of systems was negative except as noted above.    Curent Meds:    [START ON 6/14/2017] fluconazole  200 mg Oral Daily     metoclopramide  5 mg Oral TID AC     warfarin  3 mg Oral ONCE at 18:00     insulin glargine  33 Units Subcutaneous QAM     insulin aspart   Subcutaneous TID w/meals     QUEtiapine  12.5 mg Oral At Bedtime     fentaNYL   Transdermal Q8H     fentaNYL   Transdermal Q72H     fentaNYL  100 mcg Transdermal Q72H     insulin aspart  2-16 Units Subcutaneous Q4H     insulin glargine  36 Units Subcutaneous Q24H     pregabalin  25 mg Oral Daily     amylase-lipase-protease  2 capsule Per Feeding Tube Q4H     aspirin  81 mg Oral Daily     cholecalciferol  2,000 Units Per J Tube Daily     ferrous sulfate  325 mg Oral Daily     FLUoxetine  10 mg Per J Tube Daily     folic acid  1 mg Oral Daily     levothyroxine  150 mcg Per J Tube QAM AC     multivitamins with minerals  15 mL Per Feeding Tube Daily     pantoprazole  40 mg Oral or J tube BID     sodium bicarbonate  325 mg Per J Tube Q4H       Physical Exam:     Admit Weight: 102.7 kg (226 lb 8 oz)    Current Vitals:   /72 (BP Location: Right arm)  Pulse 67  Temp 98.1  F (36.7  C) (Oral)  Resp 16  Ht 1.88 m (6' 2\")  Wt 101.9 kg (224 lb 11.2 oz)  SpO2 94%  BMI 28.85 kg/m2      Vital sign ranges:    Temp:  [98.1  F " (36.7  C)-98.8  F (37.1  C)] 98.1  F (36.7  C)  Pulse:  [67] 67  Heart Rate:  [62-68] 66  Resp:  [16] 16  BP: (116-129)/(68-86) 120/72  SpO2:  [94 %-96 %] 94 %  Patient Vitals for the past 24 hrs:   BP Temp Temp src Pulse Heart Rate Resp SpO2 Weight   06/13/17 1156 120/72 98.1  F (36.7  C) Oral - 66 16 94 % -   06/13/17 0900 - - - - - - - 101.9 kg (224 lb 11.2 oz)   06/13/17 0800 121/74 98.1  F (36.7  C) Oral - 68 16 94 % -   06/13/17 0354 129/68 98.5  F (36.9  C) Oral - 62 16 94 % -   06/12/17 2352 123/86 98.6  F (37  C) Oral - 63 16 94 % -   06/12/17 1928 124/69 98.8  F (37.1  C) Oral - 67 16 96 % -   06/12/17 1639 116/73 98.1  F (36.7  C) Oral 67 - 16 95 % -     General Appearance: in no apparent distress.   Skin: normal, warm, dry  Heart: regular rate and rhythm  Lungs: NLB on NC 1L, rales BLL  Abdomen: The abdomen is rounded, and appropriately tender. Opened aspect of incision with good granulation tissue. Cephalad aspect of the wound with one point exuding mucopurulent drainage that is most likely communicating with subfascial collection. No erythema on wound edges. Inferior aspect of incision with intact staples- staples now removed. Drain serosanguinous.    Extremities: generalized +1 edema  Neuro: A&Ox4    Data:   CMP    Recent Labs  Lab 06/13/17  0628 06/11/17  0602    141   POTASSIUM 4.2 4.2   CHLORIDE 108 107   CO2 24 27   * 105*   BUN 30 25   CR 0.86 0.88   GFRESTIMATED >90Non  GFR Calc 90   GFRESTBLACK >90African American GFR Calc >90African American GFR Calc   CRISTOBAL 9.1 8.5     CBC    Recent Labs  Lab 06/13/17  0628 06/12/17  0907   HGB 8.7* 8.8*   WBC 12.9* 13.3*   * Platelets clumped, platelet count unavailableCALLED TO AMADEO GARDNER,1100 06/12/17 BY SMICORRECTED ON 06/12 AT 1403: PREVIOUSLY REPORTED      Coags    Recent Labs  Lab 06/13/17  0628 06/12/17  0907   INR 2.13* 2.30*      Urinalysis  Recent Labs   Lab Test  06/02/17   1710  05/10/17   1015   COLOR   Yellow  Yellow   APPEARANCE  Clear  Clear   URINEGLC  Negative  Negative   URINEBILI  Negative  Negative   URINEKETONE  Negative  Negative   SG  1.019  1.022   UBLD  Negative  Negative   URINEPH  7.5*  6.5   PROTEIN  Negative  Negative   NITRITE  Negative  Negative   LEUKEST  Negative  Negative   RBCU  <1  2   WBCU  <1  <1     Attestation:  Patient has been seen and evaluated by me.   Vital signs, labs, medications and orders were reviewed.   When obtained, diagnostic images were reviewed by me and interpreted as above.    The care plan was discussed with the multidisciplinary team and I agree with the findings and plan in this note, with any differences recorded in blue.    .

## 2017-06-13 NOTE — PLAN OF CARE
Problem: Goal Outcome Summary  Goal: Goal Outcome Summary  Outcome: No Change  [id number]: AVSS on RA.  87 119. Denies pain and nausea. G-tube clamped and J-tube with continuous TF @ 75ml/hr. Left PICC with TKO. LUE still has numbness and tingling present from after surgery. Voiding adequate amounts, not saving. Dressing changed x2, serosanguinous drainage. G/J dressing changed x2, has pussy tan drainage. Drain with cc OP. No reported BM. Clear liquid diet. Up ad hattie. Possible DC today. Will continue to monitor. Call light in reach, continue with POC.

## 2017-06-13 NOTE — PROGRESS NOTES
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

## 2017-06-13 NOTE — PROGRESS NOTES
Care Coordinator  D/I I had a message from Hong at [facility] [phone] x [phone] ref # [id number] and I called back today and talked with Helena and updated her that vac has not been put on, and I do not want to cancel the order yet. I will update them closer to d/c. Per Dr Jackson, pt may need another CT of abdomen to assess abcess and drain positioning.  Pt is on new warfarin and I placed [facility] INR monitoring order as pt will stay locally.  P: will follow--pt also on IVAB and may need them at d/c.

## 2017-06-13 NOTE — PLAN OF CARE
Problem: Goal Outcome Summary  Goal: Goal Outcome Summary  Outcome: Improving  Afebrile; VSS on RA.  , 115; novolog administered per ss.  INR therapeutic; warfarin teaching provided to pt and wife.  Pt c/o minimal pain; fentanyl patch in place.  Denies nausea; declined reglan and g-tube is clamped.  Continuous TF at goal 75mL/hr. Tolerating clear liquids; carbs covered.  PICC saline locked.  Voiding but not saving.  No BM today but pt is passing gas.  MAURICIO with scant serosanguinous output.  Wound dressing changed; staples removed.  Up ambulating in room and arriaza ad hattie.  Micafungin and zosyn d/c'd and pt changed to PO diflucan.  Plan to discharge tomorrow if pt remains afebrile.  Continue to monitor and notify MD with changes.

## 2017-06-14 ENCOUNTER — TELEPHONE (OUTPATIENT)
Dept: TRANSPLANT | Facility: CLINIC | Age: 57
End: 2017-06-14

## 2017-06-14 ENCOUNTER — HOME INFUSION (PRE-WILLOW HOME INFUSION) (OUTPATIENT)
Dept: PHARMACY | Facility: CLINIC | Age: 57
End: 2017-06-14

## 2017-06-14 VITALS
HEIGHT: 74 IN | SYSTOLIC BLOOD PRESSURE: 126 MMHG | RESPIRATION RATE: 16 BRPM | DIASTOLIC BLOOD PRESSURE: 74 MMHG | WEIGHT: 222.4 LBS | OXYGEN SATURATION: 95 % | HEART RATE: 67 BPM | TEMPERATURE: 98 F | BODY MASS INDEX: 28.54 KG/M2

## 2017-06-14 DIAGNOSIS — I82.409 DVT (DEEP VENOUS THROMBOSIS) (H): Primary | ICD-10-CM

## 2017-06-14 LAB
ANION GAP SERPL CALCULATED.3IONS-SCNC: 4 MMOL/L (ref 3–14)
BUN SERPL-MCNC: 30 MG/DL (ref 7–30)
CALCIUM SERPL-MCNC: 9.1 MG/DL (ref 8.5–10.1)
CHLORIDE SERPL-SCNC: 105 MMOL/L (ref 94–109)
CO2 SERPL-SCNC: 26 MMOL/L (ref 20–32)
CREAT SERPL-MCNC: 0.76 MG/DL (ref 0.66–1.25)
ERYTHROCYTE [DISTWIDTH] IN BLOOD BY AUTOMATED COUNT: 16.3 % (ref 10–15)
GFR SERPL CREATININE-BSD FRML MDRD: ABNORMAL ML/MIN/1.7M2
GLUCOSE BLDC GLUCOMTR-MCNC: 113 MG/DL (ref 70–99)
GLUCOSE BLDC GLUCOMTR-MCNC: 116 MG/DL (ref 70–99)
GLUCOSE BLDC GLUCOMTR-MCNC: 118 MG/DL (ref 70–99)
GLUCOSE BLDC GLUCOMTR-MCNC: 136 MG/DL (ref 70–99)
GLUCOSE BLDC GLUCOMTR-MCNC: 143 MG/DL (ref 70–99)
GLUCOSE SERPL-MCNC: 109 MG/DL (ref 70–99)
GRAM STN SPEC: NORMAL
HCT VFR BLD AUTO: 28.8 % (ref 40–53)
HGB BLD-MCNC: 9.2 G/DL (ref 13.3–17.7)
INR PPP: 2.09 (ref 0.86–1.14)
MCH RBC QN AUTO: 26.1 PG (ref 26.5–33)
MCHC RBC AUTO-ENTMCNC: 31.9 G/DL (ref 31.5–36.5)
MCV RBC AUTO: 82 FL (ref 78–100)
MICRO REPORT STATUS: NORMAL
PLATELET # BLD AUTO: 706 10E9/L (ref 150–450)
POTASSIUM SERPL-SCNC: 4.2 MMOL/L (ref 3.4–5.3)
RBC # BLD AUTO: 3.52 10E12/L (ref 4.4–5.9)
SODIUM SERPL-SCNC: 136 MMOL/L (ref 133–144)
SPECIMEN SOURCE: NORMAL
WBC # BLD AUTO: 13 10E9/L (ref 4–11)

## 2017-06-14 PROCEDURE — 87205 SMEAR GRAM STAIN: CPT | Performed by: PHYSICIAN ASSISTANT

## 2017-06-14 PROCEDURE — 27210436 ZZH NUTRITION PRODUCT SEMIELEM INTERMED CAN

## 2017-06-14 PROCEDURE — 25000132 ZZH RX MED GY IP 250 OP 250 PS 637: Performed by: STUDENT IN AN ORGANIZED HEALTH CARE EDUCATION/TRAINING PROGRAM

## 2017-06-14 PROCEDURE — 85610 PROTHROMBIN TIME: CPT | Performed by: PHYSICIAN ASSISTANT

## 2017-06-14 PROCEDURE — 36415 COLL VENOUS BLD VENIPUNCTURE: CPT | Performed by: PHYSICIAN ASSISTANT

## 2017-06-14 PROCEDURE — 25000125 ZZHC RX 250: Performed by: PHYSICIAN ASSISTANT

## 2017-06-14 PROCEDURE — 25000132 ZZH RX MED GY IP 250 OP 250 PS 637: Performed by: PHYSICIAN ASSISTANT

## 2017-06-14 PROCEDURE — 85027 COMPLETE CBC AUTOMATED: CPT | Performed by: PHYSICIAN ASSISTANT

## 2017-06-14 PROCEDURE — 40000802 ZZH SITE CHECK

## 2017-06-14 PROCEDURE — 80048 BASIC METABOLIC PNL TOTAL CA: CPT | Performed by: PHYSICIAN ASSISTANT

## 2017-06-14 PROCEDURE — 00000146 ZZHCL STATISTIC GLUCOSE BY METER IP

## 2017-06-14 PROCEDURE — 25000132 ZZH RX MED GY IP 250 OP 250 PS 637: Performed by: NURSE PRACTITIONER

## 2017-06-14 PROCEDURE — 87070 CULTURE OTHR SPECIMN AEROBIC: CPT | Performed by: PHYSICIAN ASSISTANT

## 2017-06-14 RX ORDER — PREGABALIN 20 MG/ML
25 SOLUTION ORAL DAILY PRN
Qty: 70 ML | Refills: 3 | Status: SHIPPED | OUTPATIENT
Start: 2017-06-14 | End: 2017-07-05

## 2017-06-14 RX ORDER — WARFARIN SODIUM 5 MG/1
5 TABLET ORAL
Status: DISCONTINUED | OUTPATIENT
Start: 2017-06-14 | End: 2017-06-14 | Stop reason: HOSPADM

## 2017-06-14 RX ORDER — SODIUM BICARBONATE 325 MG/1
TABLET ORAL
Qty: 180 TABLET | Refills: 3 | Status: SHIPPED | OUTPATIENT
Start: 2017-06-14 | End: 2017-07-05

## 2017-06-14 RX ORDER — QUETIAPINE FUMARATE 25 MG/1
12.5 TABLET, FILM COATED ORAL AT BEDTIME
Qty: 60 TABLET | Refills: 0 | Status: SHIPPED | OUTPATIENT
Start: 2017-06-14 | End: 2017-07-05

## 2017-06-14 RX ORDER — FENTANYL 100 UG/1
1 PATCH TRANSDERMAL
Qty: 4 PATCH | Refills: 0 | Status: SHIPPED | OUTPATIENT
Start: 2017-06-14 | End: 2017-07-05

## 2017-06-14 RX ORDER — PEDI MULTIVIT NO.128/VITAMIN K 500 MCG/ML
1 LIQUID (ML) ORAL DAILY
Qty: 30 CAPSULE | Refills: 3 | Status: SHIPPED | OUTPATIENT
Start: 2017-06-14 | End: 2017-07-05

## 2017-06-14 RX ORDER — QUETIAPINE FUMARATE 25 MG/1
25 TABLET, FILM COATED ORAL 2 TIMES DAILY PRN
Qty: 60 TABLET | Refills: 0 | Status: SHIPPED | OUTPATIENT
Start: 2017-06-14 | End: 2017-06-28

## 2017-06-14 RX ORDER — WARFARIN SODIUM 5 MG/1
5 TABLET ORAL DAILY
Qty: 30 TABLET | Refills: 0 | Status: SHIPPED | OUTPATIENT
Start: 2017-06-14 | End: 2017-11-09

## 2017-06-14 RX ORDER — BLOOD PRESSURE TEST KIT
1 KIT MISCELLANEOUS PRN
Qty: 100 EACH | Refills: 3 | Status: SHIPPED | OUTPATIENT
Start: 2017-06-14

## 2017-06-14 RX ORDER — ONDANSETRON 4 MG/1
4-8 TABLET, FILM COATED ORAL EVERY 6 HOURS PRN
Status: DISCONTINUED | OUTPATIENT
Start: 2017-06-14 | End: 2017-06-14 | Stop reason: HOSPADM

## 2017-06-14 RX ORDER — ONDANSETRON 4 MG/1
4 TABLET, FILM COATED ORAL EVERY 6 HOURS PRN
Qty: 24 TABLET | Refills: 0 | Status: SHIPPED | OUTPATIENT
Start: 2017-06-14

## 2017-06-14 RX ORDER — OXYCODONE HCL 5 MG/5 ML
2.5-5 SOLUTION, ORAL ORAL EVERY 4 HOURS PRN
Qty: 250 ML | Refills: 0 | Status: SHIPPED | OUTPATIENT
Start: 2017-06-14 | End: 2017-11-09

## 2017-06-14 RX ORDER — FLUCONAZOLE 200 MG/1
200 TABLET ORAL DAILY
Qty: 30 TABLET | Refills: 0 | Status: SHIPPED | OUTPATIENT
Start: 2017-06-14 | End: 2017-11-09

## 2017-06-14 RX ORDER — METOCLOPRAMIDE 5 MG/1
5 TABLET ORAL 3 TIMES DAILY PRN
Qty: 90 TABLET | Refills: 1 | Status: SHIPPED | OUTPATIENT
Start: 2017-06-14 | End: 2017-07-05

## 2017-06-14 RX ORDER — ACETAMINOPHEN 325 MG/1
650 TABLET ORAL EVERY 4 HOURS PRN
Qty: 100 TABLET | Refills: 0 | Status: SHIPPED | OUTPATIENT
Start: 2017-06-14 | End: 2017-07-05

## 2017-06-14 RX ADMIN — FOLIC ACID 1 MG: 1 TABLET ORAL at 08:06

## 2017-06-14 RX ADMIN — SODIUM BICARBONATE 325 MG: 325 TABLET ORAL at 09:10

## 2017-06-14 RX ADMIN — SODIUM BICARBONATE 325 MG: 325 TABLET ORAL at 13:03

## 2017-06-14 RX ADMIN — PANCRELIPASE 40000 UNITS: 20000; 68000; 109000 CAPSULE, DELAYED RELEASE ORAL at 00:28

## 2017-06-14 RX ADMIN — ACETAMINOPHEN 325 MG: 325 TABLET, FILM COATED ORAL at 15:09

## 2017-06-14 RX ADMIN — PREGABALIN 25 MG: 20 SOLUTION ORAL at 08:09

## 2017-06-14 RX ADMIN — ONDANSETRON HYDROCHLORIDE 4 MG: 4 TABLET, FILM COATED ORAL at 08:04

## 2017-06-14 RX ADMIN — PANCRELIPASE 40000 UNITS: 20000; 68000; 109000 CAPSULE, DELAYED RELEASE ORAL at 04:31

## 2017-06-14 RX ADMIN — SODIUM BICARBONATE 325 MG: 325 TABLET ORAL at 00:28

## 2017-06-14 RX ADMIN — MULTIVIT AND MINERALS-FERROUS GLUCONATE 9 MG IRON/15 ML ORAL LIQUID 15 ML: at 08:10

## 2017-06-14 RX ADMIN — LEVOTHYROXINE SODIUM 150 MCG: 300 TABLET ORAL at 06:44

## 2017-06-14 RX ADMIN — PANCRELIPASE 40000 UNITS: 20000; 68000; 109000 CAPSULE, DELAYED RELEASE ORAL at 13:03

## 2017-06-14 RX ADMIN — OXYCODONE HYDROCHLORIDE 2.5 MG: 5 SOLUTION ORAL at 04:47

## 2017-06-14 RX ADMIN — FLUOXETINE HYDROCHLORIDE 10 MG: 20 SOLUTION ORAL at 08:09

## 2017-06-14 RX ADMIN — FLUCONAZOLE 200 MG: 200 TABLET ORAL at 08:07

## 2017-06-14 RX ADMIN — Medication 2000 UNITS: at 08:08

## 2017-06-14 RX ADMIN — ACETAMINOPHEN 325 MG: 325 TABLET, FILM COATED ORAL at 09:09

## 2017-06-14 RX ADMIN — FERROUS SULFATE 325 MG: 325 TABLET, FILM COATED ORAL at 08:05

## 2017-06-14 RX ADMIN — ASPIRIN 81 MG: 81 TABLET, COATED ORAL at 08:05

## 2017-06-14 RX ADMIN — SODIUM BICARBONATE 325 MG: 325 TABLET ORAL at 04:31

## 2017-06-14 RX ADMIN — PANTOPRAZOLE SODIUM 40 MG: 40 TABLET, DELAYED RELEASE ORAL at 08:10

## 2017-06-14 RX ADMIN — PANCRELIPASE 40000 UNITS: 20000; 68000; 109000 CAPSULE, DELAYED RELEASE ORAL at 09:10

## 2017-06-14 NOTE — DISCHARGE SUMMARY
[facility], [place]    Discharge Summary  Transplant Surgery    Date of Admission:  6/2/2017  Date of Discharge:  6/14/2017  Discharging Provider: Aleksanrda Pugh  Date of Service (when I saw the patient): 06/14/17    Discharge Diagnoses   Active Problems:    Acquired total absence of pancreas    Post-pancreatectomy diabetes (H)    Dehydration    Deep vein thrombosis (DVT) of left lower extremity (H)    Surgical wound infection      History of Present Illness   Sohan Arita is a 56 year old male with a history of chronic pancreatitis s/p TPAIT 5/15/17.  Presented with wound infection and occlusive thrombosis left peroneal vein.    Hospital Course      Wound infection: Afebrile, leukocytosis 13 prior to discharge, down from peak of 17.7.  -Wound originally opened in clinic, VAC was placed.  6/3 cx growing candida alb.  Persistent purulent drainage under VAC.  Changed to packing QID.    Perihepatic abscess: Perihepatic and left sided fluid collections on imaging.  Both aspirated 6/5, culture + candida alb/dub, susceptible to fluconazole.  On fluconazole 6/3-6/6, changed to micafungin 6/5-6/13, then transitioned back to fluconazole.  On empiric Zosyn (6/2-6/13) and Vanco (6/2-6/9). IR consulted for drainage of perihepatic fluid collection completed on 6/7 with 30 cc necrotic thick fluid drained, 12F drain in place.  Drain removed prior to discharge.  Timing of additional imaging to be determined at outpatient follow up.  Mild delirium with occasional hallucinations, resolved.  Suspect it was secondary to infection. Low dose seroquel. Feels more energetic today, thinks that the reglan may make him sleepy, will decrease dose.  Additional inferior aspect of the wound was opened prior to discharge, this will also need to be packed.    Cardiorespiratory: Hypoxia resolved.  Continue IS, ambulation. On RA, patient states his saturations have been improving.     Nutrition: Continue  cycled TF plus clear liquid diet.    Post operative anemia: Last transfusion 6/9/17.  Hgb 9.2    DVT: LLE DVT, small non occlusive thrombus right IJ.  Was on heparin drip, bridged with lovenox, will discharge on warfarin.  INR today 2.1 [goal 2-3].      Post-pancreatectomy diabetes: Appreciate Endocrine consult.   Will need to reschedule outpatient endocrinology appointment.   -Morning glargine: decrease to 30 units qAM   -Evening glargine: continue 36 units qPM  -meal aspart 1unit/8g CHO- Tanner is encouraged to get this if he has more than a sip of juice.  -correction aspart: continue 2unit/30 for BG >120 q4h    Pain control: Fentanyl 100mcg patch. On Lyrica, will change to PRN prior to discharge.  Also on PRN oxycodone.    Antibiotics prescribed at discharge: Fluconazole, Duration: At least 14 days, final duration to be determined with outpatient follow up     Imaging study follow up needs:   To be determined    Aleksandra Pugh PA-C    Discharge Disposition   Discharged to stay locally   Condition at discharge: Stable    Pending Results   These results will be followed up by transplant team  Unresulted Labs Ordered in the Past 30 Days of this Admission     Date and Time Order Name Status Description    6/14/2017 1458 Gram stain In process     6/14/2017 1458 Wound Culture Aerobic Bacterial In process     6/6/2017 0906 Blood culture yeast Preliminary     6/5/2017 2000 Fungus Culture, non-blood Preliminary     6/5/2017 0925 Fungus culture Preliminary     6/3/2017 1540 Fungus Culture, non-blood Preliminary         Primary Care Physician   Frandy Frye    Physical Exam   Temp: 98  F (36.7  C) Temp src: Oral BP: 126/74   Heart Rate: 68 Resp: 16 SpO2: 95 % O2 Device: None (Room air)    Vitals:    06/11/17 1000 06/13/17 0900 06/14/17 0900   Weight: 103.8 kg (228 lb 14.4 oz) 101.9 kg (224 lb 11.2 oz) 100.9 kg (222 lb 6.4 oz)     Vital Signs with Ranges  Temp:  [98  F (36.7  C)-98.7  F (37.1  C)] 98  F (36.7   C)  Heart Rate:  [67-72] 68  Resp:  [16] 16  BP: (117-128)/(68-74) 126/74  SpO2:  [94 %-95 %] 95 %  I/O last 3 completed shifts:  In: 1295 [P.O.:120; NG/GT:50]  Out: 450 [Urine:450]    Constitutional: Awake, alert, cooperative, no apparent distress, and appears stated age.  Eyes: Lids and lashes normal, pupils equal, round, sclera clear, conjunctiva normal.  ENT: Normocephalic, without obvious abnormality, atraumatic  Respiratory: Nonlabored resps on RA  Cardiovascular: RRR  GI: Soft, non-distended, midline incision now with 2 open areas, superior aspect with good granulation tissue, one area of tunneling, GJ tube in place with site intact  Genitourinary:  Voiding  Skin: No bruising or bleeding, normal skin color, texture  Musculoskeletal: There is no redness, warmth, or swelling of the joints.  Full range of motion noted.  Motor strength is 5 out of 5 all extremities bilaterally.  Tone is normal.  Neurologic: Awake, alert, oriented to name, place and time.    Neuropsychiatric: Calm, normal eye contact, alert, normal affect, oriented to self, place, time and situation, memory for past and recent events intact and thought process normal.    Consultations This Hospital Stay   NUTRITION SERVICES ADULT IP CONSULT  PHARMACY IP CONSULT  VASCULAR ACCESS CARE ADULT IP CONSULT  PHARMACY TO DOSE VANCO  NUTRITION SERVICES ADULT IP CONSULT  ENDOCRINOLOGY IP CONSULT  VASCULAR ACCESS CARE ADULT IP CONSULT  INTERVENTIONAL RADIOLOGY IP CONSULT  VASCULAR ACCESS CARE ADULT IP CONSULT  VASCULAR ACCESS ADULT IP CONSULT  PATIENT LEARNING CENTER IP CONSULT  PSYCHOLOGY ADULT IP CONSULT  PHARMACY TO DOSE WARFARIN    Time Spent on this Encounter   I have spent greater than 30 minutes on this discharge.    Discharge Orders   Discharge Medications   Current Discharge Medication List      START taking these medications    Details   ondansetron (ZOFRAN) 4 MG tablet Take 1 tablet (4 mg) by mouth every 6 hours as needed for nausea or  vomiting  Qty: 24 tablet, Refills: 0    Associated Diagnoses: Postoperative nausea      fluconazole (DIFLUCAN) 200 MG tablet Take 1 tablet (200 mg) by mouth daily  Qty: 30 tablet, Refills: 0    Associated Diagnoses: Surgical wound infection, initial encounter      metoclopramide (REGLAN) 5 MG tablet Take 1 tablet (5 mg) by mouth 3 times daily as needed  Qty: 90 tablet, Refills: 1    Associated Diagnoses: History of pancreatectomy      oxyCODONE (ROXICODONE) 5 MG/5ML solution 2.5-5 mLs (2.5-5 mg) by Per Feeding Tube route every 4 hours as needed for moderate to severe pain  Qty: 250 mL, Refills: 0    Associated Diagnoses: Surgical wound infection, initial encounter      !! amylase-lipase-protease (ZENPEP) 98984 UNITS CPEP 2 capsules (40,000 Units) by Per Feeding Tube route every 4 hours  Qty: 180 capsule, Refills: 0    Associated Diagnoses: Acquired total absence of pancreas      !! QUEtiapine (SEROQUEL) 25 MG tablet Take 0.5 tablets (12.5 mg) by mouth At Bedtime  Qty: 60 tablet, Refills: 0    Associated Diagnoses: Anxiety      !! QUEtiapine (SEROQUEL) 25 MG tablet Take 1 tablet (25 mg) by mouth 2 times daily as needed (Anxiety)  Qty: 60 tablet, Refills: 0    Associated Diagnoses: Anxiety      acetaminophen (TYLENOL) 325 MG tablet Take 2 tablets (650 mg) by mouth every 4 hours as needed for mild pain or fever  Qty: 100 tablet, Refills: 0    Associated Diagnoses: Acute postoperative pain of abdomen      multivitamin CF formula (DEKAS PLUS) CAPS per capsule Take 1 capsule by mouth daily  Qty: 30 capsule, Refills: 3    Associated Diagnoses: Acquired total absence of pancreas      cholecalciferol 2000 UNITS tablet Take 1 tablet by mouth daily  Qty: 30 tablet, Refills: 3    Associated Diagnoses: Acquired total absence of pancreas       !! - Potential duplicate medications found. Please discuss with provider.      CONTINUE these medications which have CHANGED    Details   !! insulin glargine (LANTUS) 100 UNIT/ML injection  Inject 30 Units Subcutaneous every morning  Qty: 3 mL, Refills: 3    Comments: PROFILE  Associated Diagnoses: Post-pancreatectomy diabetes (H)      !! insulin glargine (LANTUS) 100 UNIT/ML injection Inject 36 Units Subcutaneous every evening  Qty: 3 mL, Refills: 3    Comments: PROFILE  Associated Diagnoses: Post-pancreatectomy diabetes (H)      !! insulin aspart (NOVOLOG PEN) 100 UNIT/ML injection 1 units per 8 grams of carbohydrate.  Only chart total amount of units given.  Do not give if pre-prandial glucose is less than 60 mg/dL.  Qty: 3 mL, Refills: 0    Associated Diagnoses: Post-pancreatectomy diabetes (H)      fentaNYL (DURAGESIC) 100 mcg/hr 72 hr patch Place 1 patch onto the skin every 72 hours  Qty: 4 patch, Refills: 0    Associated Diagnoses: Acquired total absence of pancreas      pregabalin (LYRICA) 20 MG/ML solution 1.25 mLs (25 mg) by Per Feeding Tube route daily as needed  Qty: 70 mL, Refills: 3    Comments: PROFILE  Associated Diagnoses: History of pancreatectomy      sodium bicarbonate 325 MG tablet 1 tablet q 4 hrs via Jtube. Administration Instructions: Crush 1 tablet and mix into 15 ml of warm water and use this solution to mix with Creon pancreatic enzymes. DO NOT administer directly into Jtube (to be mixed into tube feed formula with Creon enzyme).  Qty: 180 tablet, Refills: 3    Associated Diagnoses: High platelet count (H); Acquired asplenia; Post-pancreatectomy diabetes (H); Pancreatic insufficiency      !! insulin aspart (NOVOLOG PEN) 100 UNIT/ML injection Check blood glucose Q4H and administer based on blood glucose  Notify provider if glucose greater than or equal to 350 mg/dL after administration.  -150 = 2 units.  -180 = 4 units.  -210 = 6 units.  -240 = 8 units.  -270 = 10 units.  -300 = 12 units.  -330 = 14 units.  BG >330 = 16 units.  Qty: 3 mL, Refills: 3    Associated Diagnoses: Post-pancreatectomy diabetes (H)       !! - Potential  duplicate medications found. Please discuss with provider.      CONTINUE these medications which have NOT CHANGED    Details   Alcohol Swabs PADS 1 pad as needed  Qty: 100 each, Refills: 3    Associated Diagnoses: Post-pancreatectomy diabetes (H)      aspirin 81 MG EC tablet Take 1 tablet (81 mg) by mouth daily  Qty: 30 tablet, Refills: 1    Associated Diagnoses: High platelet count (H); Acquired asplenia; Post-pancreatectomy diabetes (H); Pancreatic insufficiency; Anemia      ferrous sulfate 325 (65 FE) MG TBEC EC tablet Take 1 tablet (325 mg) by mouth daily  Qty: 30 tablet, Refills: 1    Associated Diagnoses: High platelet count (H); Acquired asplenia; Post-pancreatectomy diabetes (H); Pancreatic insufficiency; Anemia      folic acid (FOLVITE) 1 MG tablet Take 1 tablet (1 mg) by mouth daily  Qty: 30 tablet, Refills: 1    Associated Diagnoses: High platelet count (H); Acquired asplenia; Post-pancreatectomy diabetes (H); Pancreatic insufficiency; Anemia      FLUoxetine (PROZAC) 20 MG/5ML solution 2.5 mLs (10 mg) by Per J Tube route daily  Qty: 75 mL, Refills: 3    Associated Diagnoses: Acquired total absence of pancreas      magnesium hydroxide (MILK OF MAGNESIA) 400 MG/5ML suspension 30 mLs by Per Feeding Tube route 2 times daily as needed for constipation  Qty: 105 mL, Refills: 1    Associated Diagnoses: Other constipation      sennosides (SENOKOT) 8.8 MG/5ML syrup 10 mLs by Per Feeding Tube route 2 times daily  Qty: 600 mL, Refills: 3    Associated Diagnoses: Other constipation      levothyroxine (SYNTHROID) 25 mcg/mL SUSP 6 mLs (150 mcg) by Per J Tube route every morning (before breakfast)  Qty: 180 mL, Refills: 1    Associated Diagnoses: Acquired total absence of pancreas      pantoprazole (PROTONIX) SUSP suspension 20 mLs (40 mg) by Oral or J tube route 2 times daily  Qty: 1200 mL, Refills: 1    Associated Diagnoses: Acquired total absence of pancreas      glucose 40 % GEL gel Take 15-30 g by mouth every 15  minutes as needed for low blood sugar  Qty: 3 Tube, Refills: 1    Associated Diagnoses: Post-pancreatectomy diabetes (H)      !! amylase-lipase-protease (CREON 12) 90435 UNITS CPEP Take 3-4 capsules (36,000-48,000 Units) by mouth every hour as needed (with snacks)  Qty: 270 capsule, Refills: 3    Associated Diagnoses: Acquired total absence of pancreas      Sharps Container (BD SHARPS ) MISC 1 Container as needed  Qty: 1 each, Refills: 3    Associated Diagnoses: Post-pancreatectomy diabetes (H)      blood glucose monitoring (FREESTYLE LITE) test strip Use to test blood sugars 8 times daily or as directed.  Qty: 100 strip, Refills: 11    Associated Diagnoses: Post-pancreatectomy diabetes (H)      insulin pen needle 32G X 4 MM Use 8 pen needles daily or as directed.  Qty: 100 each, Refills: 3    Associated Diagnoses: Acquired total absence of pancreas      blood glucose monitoring (ACCU-CHEK FASTCLIX) lancets Use to test blood sugar 8 times daily or as directed.  Qty: 1 Box, Refills: 3    Associated Diagnoses: Acquired total absence of pancreas       !! - Potential duplicate medications found. Please discuss with provider.      STOP taking these medications       fentaNYL (DURAGESIC) 25 mcg/hr 72 hr patch Comments:   Reason for Stopping:         HYDROmorphone (DILAUDID) 1 MG/ML LIQD liquid Comments:   Reason for Stopping:         ondansetron (ZOFRAN-ODT) 4 MG ODT tab Comments:   Reason for Stopping:         nystatin (MYCOSTATIN) 052243 UNIT/ML suspension Comments:   Reason for Stopping:         multivitamins with minerals (CERTAVITE/CEROVITE) LIQD liquid Comments:   Reason for Stopping:         cholecalciferol (VITAMIN D/D-VI-SOL) 400 UNIT/ML LIQD liquid Comments:   Reason for Stopping:                 Home infusion referral     INR Clinic Referral     Reason for your hospital stay   Patient was readmitted with a surgical wound infection, which was treated with drainage and antibiotics/antifungals.  He will  discharge with oral antifungals and wound dressing changes.     Adult [facility] Follow-up and recommended labs and tests   Labs to be drawn on Friday, June 16 (at 9 am) and Monday June 19 (at 8:30 am): CBC, BMP, and INR.      Follow up with Dr. Mendez at 9am on Monday, June 19.  Further follow up to be determined at that time.    Endocrine follow up to be rescheduled--[phone] Cassidy is working on getting you an appointment in 1-2 weeks with either Estefania Brunson or Dr Traore  --please call if you have not heard by Friday 6/16    Appointments on [place] and/or [facility] (with [facility] or [facility] provider or service). Call [phone] if you haven't heard regarding these appointments within 7 days of discharge.     Activity   Your activity upon discharge: Walk at least four times a day, lift no greater than 10 pounds for 6 weeks from the time of surgery.  After 6 weeks time please return to your normal exercise routine.  No driving while taking narcotics or until 3 weeks after surgery.     Monitor and record   blood glucose 4-6 times a day     When to contact your care team   Call your transplant coordinator: Soni Aleman at [phone] or [phone] with any new onset or dramatic increase in pain, or difficulty obtaining or taking your medications.  If after hours, please call [phone] and an on call coordinator will assist you.     Wound care and dressings   Instructions to care for your wound at home: Wound dressing to be changed 3-4 times daily, place gauze sponge into tunneling area at superior aspect of open wound, and across rest of wound.  Cover with an abd pad.  Drain site to be kept covered for 48 hours, then may leave open.     Tubes and drains   You are going home with the following tubes or drains: feeding tube GJ-Tube.   Tube cares per hospital or home care instructions     Diet   Follow this diet upon discharge:       Adult Formula Drip Feeding: Continuous Impact Peptide 1.5;  Jejunostomy; Goal Rate: 75 cc/hr; mL/hr; From: 8:00 AM; 8:00 AM;     Clear Liquid Diet           Data   Most Recent 3 CBC's:  Recent Labs   Lab Test  06/14/17   0634  06/13/17   0628  06/12/17   0907   WBC  13.0*  12.9*  13.3*   HGB  9.2*  8.7*  8.8*   MCV  82  82  82   PLT  706*  743*  Platelets clumped, platelet count unavailable  CALLED TO AMADEO GARDNER,1100 06/12/17 BY [facility]  CORRECTED ON 06/12 AT 1403: PREVIOUSLY REPORTED

## 2017-06-14 NOTE — PROGRESS NOTES
Care Coordinator  D/I: Pt may d/c to local hotel today. Abdominal drain has been pulled. PICC will be pulled and pt will be on oral antifungal. Pt is independent with QID dressing changes--bedside nurse Sydnie, will send pt home with 5 days worth of dressing supplies. Script sent to [facility] yesterday and script given to Dr Arita today. No need for home vac--I called [facility] rep Eliz [phone] x [phone] ref # [id number] and canceled it. Pt will d/c on continuous enteral feeds, Maribell has brought home pump(charged) and back pack  To his room--[facility] mark Salas aware.   Pt needs labs on Friday morning, and Monday morning with Dr Mendez visit--scheduled. Pt needs RTC for Endocrinoolgy in 1-2 weeks with either Estefania Brunson or Dr Traore--Cassidy [phone] is working on finding an appointment--Soni Aleman is aware. Pt has new LE DVT and [facility] coumadin clinic will follow.  A: ready for d/c to home  P: see above. Follow up OP. Pt will arrange with [facility] if he would like more Home RN visits. OP CC: Soni Aleman

## 2017-06-14 NOTE — PROGRESS NOTES
"                          Diabetes Consult Daily  Progress Note          Assessment/Plan:   Mr. Tanner Arita is a 55 yo man with a history of chronic pancreatitis s/p TPAIT on 5/15/17, with post pancreatectomy diabetes, who was admitted on 6/2/17 with wound infection, DVT of LLE, and dehydration.    Glucose controlled mostly to target.  Discharge to local hotel today.    Plan:  -Morning glargine: decrease to 30 units qAM   -Evening glargine: continue 36 units qPM  -meal aspart 1unit/8g CHO- Tanner is encouraged to get this if he has more than a sip of juice.  -correction aspart: continue 2unit/30 for BG >120 q4h  -monitor glucose q4h.  (D10W rate to increase to 100 cc/h in case of turning TF off)       Outpatient diabetes follow up: Care coordinator assisting with reschedule of missed appt  Contact number for endocrinologist on call provided to patient/wife.  Plan discussed with patient, bedside RN, primary team           Interval History:   The last 24 hours progress and nursing notes reviewed.  Continuous TF: Impact Peptide 1.5 at 75ml/h.     Walking regularly and more comfortable now that has better footwear.  Poor lseep due to noise last night.  Very happy at prospect of sleeping in hotel tonight.  Discussed insulin dosing and decision making, impact of increasing activity on insulin need.  Last night for BG76 Tanner sipped on apple juice.  Around 0400 had oxycodone which hadn't had for almost 48 hours.  Wonder if that was cause of .  More likely related ot AM cortisol spike.  Advised that the AM BG rise usually is transient and no proactive treatment unless there's a strong pattern.    Asks about when he can eat \"real\" food.  Diet still just clears.      Recent Labs  Lab 06/14/17  1141 06/14/17  0803 06/14/17  0732 06/14/17  0634 06/14/17  0419 06/14/17  0214 06/13/17  2336  06/13/17  0628  06/11/17  0602  06/09/17  0026   GLC  --   --   --  109*  --   --   --   --  133*  --  105*  --  116*   * 136* " "143*  --  113* 116* 76  < >  --   < >  --   < >  --    < > = values in this interval not displayed.            Review of Systems:   See interval hx          Medications:       Active Diet Order      Clear Liquid Diet      Diet     Physical Exam:  Gen: resting in bed NAD. Wife, Maribell, is present.  HEENT: NC/AT, mucous membranes are moist  Resp: Unlabored  Neuro:oriented x3, communicating clearly  /74 (BP Location: Right arm)  Pulse 67  Temp 98  F (36.7  C) (Oral)  Resp 16  Ht 1.88 m (6' 2\")  Wt 100.9 kg (222 lb 6.4 oz)  SpO2 95%  BMI 28.55 kg/m2           Data:     Lab Results   Component Value Date    A1C 5.3 05/16/2017    A1Cs- 5.6 05/10/2017    A1Cs 5.4 09/10/2015            Recent Labs   Lab Test  06/14/17   0634  06/13/17   0628   NA  136  138   POTASSIUM  4.2  4.2   CHLORIDE  105  108   CO2  26  24   ANIONGAP  4  6   GLC  109*  133*   BUN  30  30   CR  0.76  0.86   CRISTOBAL  9.1  9.1     CBC RESULTS:   Recent Labs   Lab Test  06/14/17   0634   WBC  13.0*   RBC  3.52*   HGB  9.2*   HCT  28.8*   MCV  82   MCH  26.1*   MCHC  31.9   RDW  16.3*   PLT  706*     Orquidea Paltapton APRN [facility] [phone]    Diabetes Management job code [id number]          "

## 2017-06-14 NOTE — PLAN OF CARE
Problem: Goal Outcome Summary  Goal: Goal Outcome Summary  Outcome: No Change  [id number]: Vital signs stable. Patient alert, oriented and up independently. Ambulated in halls x4. Endorses abdominal pain; received PRN tylenol. Endorses nausea; received PRN zofran. PICC line removed per order. Abdominal incision dressing changed per MD upper part of incision is open with wet to dry dressing changes, lower part of incision staples removed with opening at bottom. PA notified of wound opening; cultures sent and MD instructed patient to pack lower incision. Drain removed. Glu Q4 136, 118. G/J tube intact. J tube has TF impact peptide @ goal 75mL/hr, G tube is clamped. Patient voiding spontaneously. Last BM 6/14. AVS reviewed with patient and wife; med card updated. Patient left unit ambulating escorted by wife.

## 2017-06-14 NOTE — DISCHARGE INSTRUCTIONS
Discharge nurse, please fax discharge orders to [facility]    --they need signed discharge orders by 12 noon on the day of discharge--aware 6/14 @ 10:20am BP  ---page mark Swanesa Maritza @ [phone] Monday-Friday  ---weekends call office [phone]  __________________________________________________________________________    DIABETES questions after-hours.  Call hospital  ([phone]) and ask to have endocrinologist on-call paged.    Script fax'd to [employer] 6/13/17   Ph: [phone]   Fax: [phone]   Sterile ABD, sterile 4x4' and split 2x2's, sterile q tips, paper tape, microklenz spray   Wound is 9cm longx 1 cm deep x 2 cm wide

## 2017-06-14 NOTE — PLAN OF CARE
Problem: Goal Outcome Summary  Goal: Goal Outcome Summary  Outcome: No Change  VSS on RA. Up independently. Received 2.5mg oxy x1 for incisional pain when changing dressing. Pt c/o nausea but declined intervention. No emesis. BG 76 and 113. G clamped, J with TF @ 75ml/hr. PICC saline locked. Incision c/d/i; dressing changed. Pt does dressing changes independently with minimal assist. Voiding. Possible discharge today. Will continue to monitor and notify of any changes.

## 2017-06-14 NOTE — PROGRESS NOTES
Communication note:  Called Transplant surgery provider, Aleksandra Pugh to recommend repeat imaging of perihepatic collection prior to MAURICIO drain removal. Was informed drain had been discontinued this morning. Per report, output was minimal. Plan for follow up imaging next week. Discussed with Dr. Arceo.   Garrick Phan, R-2

## 2017-06-15 ENCOUNTER — TELEPHONE (OUTPATIENT)
Dept: TRANSPLANT | Facility: CLINIC | Age: 57
End: 2017-06-15

## 2017-06-15 ENCOUNTER — CARE COORDINATION (OUTPATIENT)
Dept: CARE COORDINATION | Facility: CLINIC | Age: 57
End: 2017-06-15

## 2017-06-15 ENCOUNTER — OFFICE VISIT (OUTPATIENT)
Dept: TRANSPLANT | Facility: CLINIC | Age: 57
End: 2017-06-15
Attending: TRANSPLANT SURGERY
Payer: COMMERCIAL

## 2017-06-15 ENCOUNTER — ANTICOAGULATION THERAPY VISIT (OUTPATIENT)
Dept: ANTICOAGULATION | Facility: CLINIC | Age: 57
End: 2017-06-15

## 2017-06-15 VITALS
OXYGEN SATURATION: 96 % | TEMPERATURE: 98.5 F | HEART RATE: 75 BPM | DIASTOLIC BLOOD PRESSURE: 78 MMHG | SYSTOLIC BLOOD PRESSURE: 123 MMHG | RESPIRATION RATE: 14 BRPM

## 2017-06-15 DIAGNOSIS — E13.9 POST-PANCREATECTOMY DIABETES (H): ICD-10-CM

## 2017-06-15 DIAGNOSIS — I82.402 DEEP VEIN THROMBOSIS (DVT) OF LEFT LOWER EXTREMITY (H): ICD-10-CM

## 2017-06-15 DIAGNOSIS — Z90.410 POST-PANCREATECTOMY DIABETES (H): ICD-10-CM

## 2017-06-15 DIAGNOSIS — I82.492 ACUTE DEEP VEIN THROMBOSIS (DVT) OF OTHER SPECIFIED VEIN OF LEFT LOWER EXTREMITY (H): ICD-10-CM

## 2017-06-15 DIAGNOSIS — I82.409 DVT (DEEP VENOUS THROMBOSIS) (H): ICD-10-CM

## 2017-06-15 DIAGNOSIS — Z90.410 HISTORY OF PANCREATECTOMY: ICD-10-CM

## 2017-06-15 DIAGNOSIS — Z98.890 POSTOPERATIVE STATE: Primary | ICD-10-CM

## 2017-06-15 DIAGNOSIS — Z79.01 LONG-TERM (CURRENT) USE OF ANTICOAGULANTS: ICD-10-CM

## 2017-06-15 DIAGNOSIS — Z98.890 POSTOPERATIVE STATE: ICD-10-CM

## 2017-06-15 DIAGNOSIS — I82.492 ACUTE DEEP VEIN THROMBOSIS (DVT) OF OTHER SPECIFIED VEIN OF LEFT LOWER EXTREMITY (H): Primary | ICD-10-CM

## 2017-06-15 DIAGNOSIS — K86.89 PANCREATIC INSUFFICIENCY: ICD-10-CM

## 2017-06-15 DIAGNOSIS — E89.1 POST-PANCREATECTOMY DIABETES (H): ICD-10-CM

## 2017-06-15 LAB
ALBUMIN SERPL-MCNC: 3.2 G/DL (ref 3.4–5)
ALP SERPL-CCNC: 100 U/L (ref 40–150)
ALT SERPL W P-5'-P-CCNC: 31 U/L (ref 0–70)
ANION GAP SERPL CALCULATED.3IONS-SCNC: 6 MMOL/L (ref 3–14)
AST SERPL W P-5'-P-CCNC: 24 U/L (ref 0–45)
BASOPHILS # BLD AUTO: 0.1 10E9/L (ref 0–0.2)
BASOPHILS NFR BLD AUTO: 0.6 %
BILIRUB SERPL-MCNC: 0.2 MG/DL (ref 0.2–1.3)
BUN SERPL-MCNC: 34 MG/DL (ref 7–30)
CALCIUM SERPL-MCNC: 9.6 MG/DL (ref 8.5–10.1)
CHLORIDE SERPL-SCNC: 101 MMOL/L (ref 94–109)
CO2 SERPL-SCNC: 30 MMOL/L (ref 20–32)
CREAT SERPL-MCNC: 0.86 MG/DL (ref 0.66–1.25)
DIFFERENTIAL METHOD BLD: ABNORMAL
EOSINOPHIL # BLD AUTO: 0.6 10E9/L (ref 0–0.7)
EOSINOPHIL NFR BLD AUTO: 4.4 %
ERYTHROCYTE [DISTWIDTH] IN BLOOD BY AUTOMATED COUNT: 16.8 % (ref 10–15)
GFR SERPL CREATININE-BSD FRML MDRD: ABNORMAL ML/MIN/1.7M2
GLUCOSE SERPL-MCNC: 135 MG/DL (ref 70–99)
HCT VFR BLD AUTO: 32.3 % (ref 40–53)
HGB BLD-MCNC: 9.9 G/DL (ref 13.3–17.7)
IMM GRANULOCYTES # BLD: 0.1 10E9/L (ref 0–0.4)
IMM GRANULOCYTES NFR BLD: 0.6 %
INR PPP: 2.63 (ref 0.86–1.14)
LYMPHOCYTES # BLD AUTO: 2 10E9/L (ref 0.8–5.3)
LYMPHOCYTES NFR BLD AUTO: 13.8 %
MCH RBC QN AUTO: 25.9 PG (ref 26.5–33)
MCHC RBC AUTO-ENTMCNC: 30.7 G/DL (ref 31.5–36.5)
MCV RBC AUTO: 85 FL (ref 78–100)
MONOCYTES # BLD AUTO: 1.3 10E9/L (ref 0–1.3)
MONOCYTES NFR BLD AUTO: 8.9 %
NEUTROPHILS # BLD AUTO: 10.4 10E9/L (ref 1.6–8.3)
NEUTROPHILS NFR BLD AUTO: 71.7 %
NRBC # BLD AUTO: 0 10*3/UL
NRBC BLD AUTO-RTO: 0 /100
PLATELET # BLD AUTO: 814 10E9/L (ref 150–450)
POTASSIUM SERPL-SCNC: 4.1 MMOL/L (ref 3.4–5.3)
PREALB SERPL IA-MCNC: 28 MG/DL (ref 15–45)
PROT SERPL-MCNC: 8.5 G/DL (ref 6.8–8.8)
RBC # BLD AUTO: 3.82 10E12/L (ref 4.4–5.9)
SODIUM SERPL-SCNC: 137 MMOL/L (ref 133–144)
WBC # BLD AUTO: 14.4 10E9/L (ref 4–11)

## 2017-06-15 PROCEDURE — 99211 OFF/OP EST MAY X REQ PHY/QHP: CPT

## 2017-06-15 PROCEDURE — 80053 COMPREHEN METABOLIC PANEL: CPT | Performed by: TRANSPLANT SURGERY

## 2017-06-15 PROCEDURE — 85610 PROTHROMBIN TIME: CPT | Performed by: TRANSPLANT SURGERY

## 2017-06-15 PROCEDURE — 84134 ASSAY OF PREALBUMIN: CPT | Performed by: TRANSPLANT SURGERY

## 2017-06-15 PROCEDURE — 85025 COMPLETE CBC W/AUTO DIFF WBC: CPT | Performed by: TRANSPLANT SURGERY

## 2017-06-15 PROCEDURE — 36415 COLL VENOUS BLD VENIPUNCTURE: CPT | Performed by: TRANSPLANT SURGERY

## 2017-06-15 NOTE — MR AVS SNAPSHOT
After Visit Summary   6/15/2017    Sohan Arita    MRN: [id number]           Patient Information     Date Of Birth          1960        Visit Information        Provider Department      6/15/2017 12:30 PM Eileen Santana NP [facility] Solid [facility] Transplant        Today's Diagnoses     Postoperative state    -  1       Follow-ups after your visit        Your next 10 appointments already scheduled     Jun 22, 2017 12:00 PM CDT   (Arrive by 11:45 AM)   Post-Op with Rodrigo Jaquez MD   [facility] Solid Organ Transplant ([facility])    [address] [place] [id number] [phone]            Jul 12, 2017  9:30 AM CDT   (Arrive by 9:15 AM)   Office Visit with Evelyne Diez RD   [facility] Diabetes ([facility])    [address] [place] [id number] [phone]           Bring a current list of meds and any records pertaining to this visit.  For Physicals, please bring immunization records and any forms needing to be filled out.  Please arrive 10 minutes early to complete paperwork.            Jul 18, 2017  8:30 AM CDT   (Arrive by 8:15 AM)   RETURN DIABETES with Lexy Brunson PA-C   [facility] Endocrinology ([facility])    [address] [place] [id number] [phone]              Future tests that were ordered for you today     Open Future Orders        Priority Expected Expires Ordered    INR Routine  7/19/2017 6/19/2017    CT Abdomen Pelvis w Contrast Routine  6/19/2018 6/19/2017    US Lower Extremity Venous Duplex Bilateral Today  6/19/2018 6/19/2017    TSH with free T4 reflex Routine  6/19/2018 6/19/2017            Who to contact     If you have questions or need follow up information about today's clinic visit or your schedule please contact [facility] directly at [phone].  Normal or non-critical  "lab and imaging results will be communicated to you by OpenROVhart, letter or phone within 4 business days after the clinic has received the results. If you do not hear from us within 7 days, please contact the clinic through Caprizat or phone. If you have a critical or abnormal lab result, we will notify you by phone as soon as possible.  Submit refill requests through Spontacts or call your pharmacy and they will forward the refill request to us. Please allow 3 business days for your refill to be completed.          Additional Information About Your Visit        OpenROVharFoodem Information     Spontacts lets you send messages to your doctor, view your test results, renew your prescriptions, schedule appointments and more. To sign up, go to [email or web address] . Click on \"Log in\" on the left side of the screen, which will take you to the Welcome page. Then click on \"Sign up Now\" on the right side of the page.     You will be asked to enter the access code listed below, as well as some personal information. Please follow the directions to create your username and password.     Your access code is: [email or web address]  Expires: 2017  6:30 AM     Your access code will  in 90 days. If you need help or a new code, please call your [facility] clinic or [phone].        Care EveryWhere ID     This is your Care EveryWhere ID. This could be used by other organizations to access your [facility] medical records  [id number]        Your Vitals Were     Pulse Temperature Respirations Pulse Oximetry          75 98.5  F (36.9  C) (Oral) 14 96%         Blood Pressure from Last 3 Encounters:   17 128/79   06/15/17 123/78   17 126/74    Weight from Last 3 Encounters:   17 100.9 kg (222 lb 8 oz)   17 100.9 kg (222 lb 6.4 oz)   17 104.9 kg (231 lb 4.8 oz)              Today, you had the following     No orders found for display       Primary Care Provider Office Phone # Fax #    Frandy Frye [phone] " [phone]       [address]  [facility] [address]        Thank you!     Thank you for choosing [facility]  for your care. Our goal is always to provide you with excellent care. Hearing back from our patients is one way we can continue to improve our services. Please take a few minutes to complete the written survey that you may receive in the mail after your visit with us. Thank you!             Your Updated Medication List - Protect others around you: Learn how to safely use, store and throw away your medicines at www.disposemymeds.org.          This list is accurate as of: 6/15/17 11:59 PM.  Always use your most recent med list.                   Brand Name Dispense Instructions for use    acetaminophen 325 MG tablet    TYLENOL    100 tablet    Take 2 tablets (650 mg) by mouth every 4 hours as needed for mild pain or fever       Alcohol Swabs Pads     100 each    1 pad as needed       * amylase-lipase-protease 87259 UNITS Cpep    CREON 12    270 capsule    Take 3-4 capsules (36,000-48,000 Units) by mouth every hour as needed (with snacks)       * amylase-lipase-protease 75918 UNITS Cpep    ZENPEP    180 capsule    2 capsules (40,000 Units) by Per Feeding Tube route every 4 hours       aspirin 81 MG EC tablet     30 tablet    Take 1 tablet (81 mg) by mouth daily       BD SHARPS  Misc     1 each    1 Container as needed       blood glucose monitoring lancets     1 Box    Use to test blood sugar 8 times daily or as directed.       cholecalciferol 2000 UNITS tablet     30 tablet    Take 1 tablet by mouth daily       fentaNYL 100 mcg/hr 72 hr patch    DURAGESIC    4 patch    Place 1 patch onto the skin every 72 hours       ferrous sulfate 325 (65 FE) MG Tbec EC tablet     30 tablet    Take 1 tablet (325 mg) by mouth daily       fluconazole 200 MG tablet    DIFLUCAN    30 tablet    Take 1 tablet (200 mg) by mouth daily       FLUoxetine 20 MG/5ML solution     PROzac    75 mL    2.5 mLs (10 mg) by Per J Tube route daily       folic acid 1 MG tablet    FOLVITE    30 tablet    Take 1 tablet (1 mg) by mouth daily       glucose 40 % Gel gel     3 Tube    Take 15-30 g by mouth every 15 minutes as needed for low blood sugar       * insulin aspart 100 UNIT/ML injection    NovoLOG PEN    3 mL    1 units per 8 grams of carbohydrate.  Only chart total amount of units given.  Do not give if pre-prandial glucose is less than 60 mg/dL.       * insulin aspart 100 UNIT/ML injection    NovoLOG PEN    3 mL    Check blood glucose Q4H and administer based on blood glucose Notify provider if glucose greater than or equal to 350 mg/dL after administration. -150 = 2 units. -180 = 4 units. -210 = 6 units. -240 = 8 units. -270 = 10 units. -300 = 12 units. -330 = 14 units. BG >330 = 16 units.       * insulin glargine 100 UNIT/ML injection    LANTUS    3 mL    Inject 30 Units Subcutaneous every morning       * insulin glargine 100 UNIT/ML injection    LANTUS    3 mL    Inject 36 Units Subcutaneous every evening       insulin pen needle 32G X 4 MM     100 each    Use 8 pen needles daily or as directed.       levothyroxine 25 mcg/mL Susp    SYNTHROID    180 mL    6 mLs (150 mcg) by Per J Tube route every morning (before breakfast)       magnesium hydroxide 400 MG/5ML suspension    MILK OF MAGNESIA    105 mL    30 mLs by Per Feeding Tube route 2 times daily as needed for constipation       metoclopramide 5 MG tablet    REGLAN    90 tablet    Take 1 tablet (5 mg) by mouth 3 times daily as needed       multivitamin CF formula Caps per capsule     30 capsule    Take 1 capsule by mouth daily       ondansetron 4 MG tablet    ZOFRAN    24 tablet    Take 1 tablet (4 mg) by mouth every 6 hours as needed for nausea or vomiting       oxyCODONE 5 MG/5ML solution    ROXICODONE    250 mL    2.5-5 mLs (2.5-5 mg) by Per Feeding Tube route every 4 hours as needed for  moderate to severe pain       pantoprazole Susp suspension    PROTONIX    1200 mL    20 mLs (40 mg) by Oral or J tube route 2 times daily       pregabalin 20 MG/ML solution    LYRICA    70 mL    1.25 mLs (25 mg) by Per Feeding Tube route daily as needed       * QUEtiapine 25 MG tablet    SEROquel    60 tablet    Take 0.5 tablets (12.5 mg) by mouth At Bedtime       * QUEtiapine 25 MG tablet    SEROquel    60 tablet    Take 1 tablet (25 mg) by mouth 2 times daily as needed (Anxiety)       sennosides 8.8 MG/5ML syrup    SENOKOT    600 mL    10 mLs by Per Feeding Tube route 2 times daily       sodium bicarbonate 325 MG tablet     180 tablet    1 tablet q 4 hrs via Jtube. Administration Instructions: Crush 1 tablet and mix into 15 ml of warm water and use this solution to mix with Creon pancreatic enzymes. DO NOT administer directly into Jtube (to be mixed into tube feed formula with Creon enzyme).       warfarin 5 MG tablet    COUMADIN    30 tablet    Take 1 tablet (5 mg) by mouth daily       * Notice:  This list has 8 medication(s) that are the same as other medications prescribed for you. Read the directions carefully, and ask your doctor or other care provider to review them with you.

## 2017-06-15 NOTE — MR AVS SNAPSHOT
Sohan Casillasvictor manuel Arita   6/15/2017   Anticoagulation Therapy Visit    Description:  56 year old male   Provider:  Jeri Mcdowell, RN   Department:  [facility]           INR as of 6/15/2017     Today's INR       Anticoagulation Summary as of 6/15/2017     INR goal 2.0-3.0   Today's INR    Full instructions 6/15: 5 mg; Otherwise No maintenance plan   Next INR check 6/16/2017    Indications   Deep vein thrombosis (DVT) of left lower extremity (H) [I82.402]  Long-term (current) use of anticoagulants [Z79.01] [Z79.01]         June 2017 Details    Sun Mon Tue Wed Thu Fri Sat         1               2               3                 4               5               6               7               8               9               10                 11               12               13               14               15      5 mg   See details      16            17                 18               19               20               21               22               23               24                 25               26               27               28               29               30                 Date Details   06/15 This INR check       Date of next INR:  6/16/2017         How to take your warfarin dose     To take:  5 mg Take 1 of the 5 mg tablets.

## 2017-06-15 NOTE — TELEPHONE ENCOUNTER
"Received a call from Maribell this morning with a report that Tanner is not doing well, had a bad night, and that \"he is as white as a sheet\". Maribell stated he feels like he needs to have a BM but can't go. Tanner described that his arms felt heavy and that he overall feels sick and would like to be seen. He stated that he is hearing \"fine crackles when I breathe and I have not had that before\". Tanner denies any fever but describes feeling hot then cold, sweaty, and that his ears are burning. Spoke with Eileen Santana NP. Plan decided between Eileen and Dr. Mendez are for Tanner to arrive at the [facility] lab and after labs to go to  to be evaluated by Eileen.  Spoke with Tanner to update and he stated he is feeling better, had a large BM, coughed up some sputum and no longer hears the crackles with change of position. Able to reach 2,750 with his incentive spirometer. He stated that his pulse is steady at 70 bpm. He is not short of breath or concerned about having a PE anymore. He is taking his ASA 81 mg and Coumadin 5 mg daily. Maribell stated that Tanner did not take his Lyrica or Seroquel last night and that he has been very anxious. Tanner reported that when he changed his abdominal wound dressing yesterday afternoon it \"bled heavily for quite awhile\". He also stated that whenever his wound gets opened up more due to the infection it makes him really sick for awhile. Tanner stated he is concerned about the bleeding when he does his wound dressing changes today.   This RN spoke with Tanner about staying with his Provider's treatment plan and not to adjust anything without consulting his team first. Tanner stated that he is wanting to be done with narcotics, Lyrica, and Seroquel. We discussed that these treatments will be managed after he has progressed more in his recovery and re-emphasized staying with current orders and treatments as this will enable his providers to manage his recovery more consistently. Tanner and Maribell verbalized " understanding of the importance of not detouring from his current cares.

## 2017-06-15 NOTE — PROGRESS NOTES
[facility] Progress Note    I had the pleasure of seeing Mr. Sohan Arita today. 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    Interval events since last visit with me:   ER visits = 0, reasons: naQ  Inpatient admissions = 1 , reasons wound infection   The patient reports their current symptoms to be: fatigue and feeling run down.  He has stopped his Seroquel  And has not slept at night since discontinuing it.    GI function:   Nausea:   []  none    [x]  rare    []  often    []  daily  Comment: after Protonix this am   Vomiting: [x]  none    []  rare    []  often    []  daily   Comment:   Last BM: Today.  Stools are soft, .   Appetite: fair    Oral food intake: very little to none per day  Pancreatic exocrine insufficiency:   Takes zenpep every 4 hours   Greasy stools: [x]  none   []  rarely    []  several times/wk   []  daily      Comment:   Diabetes management:     insulin glargine  36 Units Subcutaneous Q24H     sodium chloride (PF)  10 mL Irrigation Q8H     insulin glargine  30 Units Subcutaneous QAM     insulin aspart  1-10 Units Subcutaneous Q4H        Blood sugars typically range from 120-135  Lab Results   Component Value Date    A1C 5.3 05/16/2017    A1C 5.6 05/10/2017    A1C 5.4 09/10/2015      Pain management:   Pain rating (0=no pain, 10=unbearable): 5-  Daily 'Uptime': unsure  Walking: distance 1-2 miles, 3 times per week  Prescription Medications as of 6/15/2017             insulin glargine (LANTUS) 100 UNIT/ML injection Inject 30 Units Subcutaneous every morning    insulin glargine (LANTUS) 100 UNIT/ML injection Inject 36 Units Subcutaneous every evening    ondansetron (ZOFRAN) 4 MG tablet Take 1 tablet (4 mg) by mouth every 6 hours as needed for nausea or vomiting    fluconazole (DIFLUCAN) 200 MG tablet Take 1 tablet (200 mg) by mouth daily    metoclopramide (REGLAN) 5 MG tablet Take 1 tablet (5 mg) by mouth 3 times daily as needed    oxyCODONE  (ROXICODONE) 5 MG/5ML solution 2.5-5 mLs (2.5-5 mg) by Per Feeding Tube route every 4 hours as needed for moderate to severe pain    amylase-lipase-protease (ZENPEP) 54564 UNITS CPEP 2 capsules (40,000 Units) by Per Feeding Tube route every 4 hours    QUEtiapine (SEROQUEL) 25 MG tablet Take 0.5 tablets (12.5 mg) by mouth At Bedtime    QUEtiapine (SEROQUEL) 25 MG tablet Take 1 tablet (25 mg) by mouth 2 times daily as needed (Anxiety)    insulin aspart (NOVOLOG PEN) 100 UNIT/ML injection 1 units per 8 grams of carbohydrate.  Only chart total amount of units given.  Do not give if pre-prandial glucose is less than 60 mg/dL.    acetaminophen (TYLENOL) 325 MG tablet Take 2 tablets (650 mg) by mouth every 4 hours as needed for mild pain or fever    fentaNYL (DURAGESIC) 100 mcg/hr 72 hr patch Place 1 patch onto the skin every 72 hours    pregabalin (LYRICA) 20 MG/ML solution 1.25 mLs (25 mg) by Per Feeding Tube route daily as needed    sodium bicarbonate 325 MG tablet 1 tablet q 4 hrs via Jtube. Administration Instructions: Crush 1 tablet and mix into 15 ml of warm water and use this solution to mix with Creon pancreatic enzymes. DO NOT administer directly into Jtube (to be mixed into tube feed formula with Creon enzyme).    Alcohol Swabs PADS 1 pad as needed    multivitamin CF formula (DEKAS PLUS) CAPS per capsule Take 1 capsule by mouth daily    cholecalciferol 2000 UNITS tablet Take 1 tablet by mouth daily    insulin aspart (NOVOLOG PEN) 100 UNIT/ML injection Check blood glucose Q4H and administer based on blood glucose  Notify provider if glucose greater than or equal to 350 mg/dL after administration.  -150 = 2 units.  -180 = 4 units.  -210 = 6 units.  -240 = 8 units.  -270 = 10 units.  -300 = 12 units.  -330 = 14 units.  BG >330 = 16 units.    warfarin (COUMADIN) 5 MG tablet Take 1 tablet (5 mg) by mouth daily    aspirin 81 MG EC tablet Take 1 tablet (81 mg) by mouth daily     ferrous sulfate 325 (65 FE) MG TBEC EC tablet Take 1 tablet (325 mg) by mouth daily    folic acid (FOLVITE) 1 MG tablet Take 1 tablet (1 mg) by mouth daily    FLUoxetine (PROZAC) 20 MG/5ML solution 2.5 mLs (10 mg) by Per J Tube route daily    glucose 40 % GEL gel Take 15-30 g by mouth every 15 minutes as needed for low blood sugar    amylase-lipase-protease (CREON 12) 06813 UNITS CPEP Take 3-4 capsules (36,000-48,000 Units) by mouth every hour as needed (with snacks)    magnesium hydroxide (MILK OF MAGNESIA) 400 MG/5ML suspension 30 mLs by Per Feeding Tube route 2 times daily as needed for constipation    sennosides (SENOKOT) 8.8 MG/5ML syrup 10 mLs by Per Feeding Tube route 2 times daily    levothyroxine (SYNTHROID) 25 mcg/mL SUSP 6 mLs (150 mcg) by Per J Tube route every morning (before breakfast)    pantoprazole (PROTONIX) SUSP suspension 20 mLs (40 mg) by Oral or J tube route 2 times daily    Sharps Container (BD SHARPS ) MISC 1 Container as needed    blood glucose monitoring (FREESTYLE LITE) test strip Use to test blood sugars 8 times daily or as directed.    insulin pen needle 32G X 4 MM Use 8 pen needles daily or as directed.    blood glucose monitoring (ACCU-CHEK FASTCLIX) lancets Use to test blood sugar 8 times daily or as directed.      Facility Administered Medications as of 6/15/2017             ondansetron (ZOFRAN) tablet 4-8 mg (Discontinued) Take 1-2 tablets (4-8 mg) by mouth every 6 hours as needed for nausea or vomiting    insulin glargine (LANTUS) injection 30 Units (Discontinued) Inject 30 Units Subcutaneous every morning    warfarin (COUMADIN) tablet 5 mg (Discontinued) Take 1 tablet (5 mg) by mouth Once at 6pm    fluconazole (DIFLUCAN) tablet 200 mg (Discontinued) Take 1 tablet (200 mg) by mouth daily    metoclopramide (REGLAN) tablet 5 mg (Discontinued) Take 1 tablet (5 mg) by mouth 3 times daily (before meals)    insulin aspart (NovoLOG) inj (RAPID ACTING) (Discontinued) Inject  "Subcutaneous 3 times daily (with meals)    insulin aspart (NovoLOG) inj (RAPID ACTING) (Discontinued) Inject Subcutaneous Take with snacks or supplements for high blood sugar    QUEtiapine (SEROquel) half-tab 12.5 mg (Discontinued) Take 1 half-tab (12.5 mg) by mouth At Bedtime    prochlorperazine (COMPAZINE) injection 5-10 mg (Discontinued) Inject 1-2 mLs (5-10 mg) into the vein every 6 hours as needed for nausea or vomiting    Linked Group 1:  \"Or\" Linked Group Details     prochlorperazine (COMPAZINE) tablet 5-10 mg (Discontinued) Take 1-2 tablets (5-10 mg) by mouth every 6 hours as needed for vomiting    Linked Group 1:  \"Or\" Linked Group Details     prochlorperazine (COMPAZINE) Suppository 25 mg (Discontinued) Place 1 suppository (25 mg) rectally every 12 hours as needed for nausea or vomiting    Linked Group 1:  \"Or\" Linked Group Details     Warfarin Therapy Reminder (Check START DATE - warfarin may be starting in the FUTURE) (Discontinued) 1 each continuous prn    fentaNYL (DURAGESIC) Patch in Place (Discontinued) Place onto the skin every 8 hours    fentaNYL (DURAGESIC) patch REMOVAL (Discontinued) Place onto the skin every 72 hours    fentaNYL (DURAGESIC) 100 mcg/hr 72 hr patch 1 patch (Discontinued) Place 1 patch onto the skin every 72 hours    QUEtiapine (SEROquel) tablet 25 mg (Discontinued) Take 1 tablet (25 mg) by mouth 2 times daily as needed (Anxiety)    insulin aspart (NovoLOG) inj (RAPID ACTING) (Discontinued) Inject 2-16 Units Subcutaneous every 4 hours    insulin glargine (LANTUS) injection 36 Units (Discontinued) Inject 36 Units Subcutaneous every 24 hours    lidocaine (LMX4) kit (Discontinued) Apply topically once as needed for moderate pain (for local anesthetic during PICC insertion)    ondansetron (ZOFRAN) injection 4 mg (Discontinued) Inject 2 mLs (4 mg) into the vein every 6 hours as needed for nausea or vomiting    pregabalin (LYRICA) solution 25 mg (Discontinued) Take 1.25 mLs (25 mg) by " mouth daily    oxyCODONE (ROXICODONE) solution 5 mg (Discontinued) 5 mLs (5 mg) by Per Feeding Tube route every 4 hours as needed for moderate to severe pain    simethicone (MYLICON) chewable tablet 80 mg (Discontinued) Take 1 tablet (80 mg) by mouth every 6 hours as needed for cramping    acetaminophen (TYLENOL) tablet 650 mg (Discontinued) Take 2 tablets (650 mg) by mouth every 4 hours as needed for mild pain or fever    amylase-lipase-protease (ZENPEP) 71116 UNITS delayed release capsule 40,000 Units (Discontinued) 2 capsules (40,000 Units) by Per Feeding Tube route every 4 hours    insulin 1 unit/mL in saline (NovoLIN, HumuLIN Regular) drip - ADULT IV Infusion (Discontinued) Inject 0-24 Units/hr into the vein continuous prn (Please start IV insulin infusion if glucose is >200 x 2.)    dextrose 10 % 1,000 mL infusion (Discontinued) Inject into the vein continuous prn (Hypoglycemia prevention)    aspirin EC EC tablet 81 mg (Discontinued) Take 1 tablet (81 mg) by mouth daily    cholecalciferol (vitamin D/D-VI-SOL) liquid 2,000 Units (Discontinued) 5 mLs (2,000 Units) by Per J Tube route daily    ferrous sulfate (IRON) tablet 325 mg (Discontinued) Take 1 tablet (325 mg) by mouth daily    FLUoxetine (PROzac) solution 10 mg (Discontinued) 2.5 mLs (10 mg) by Per J Tube route daily    folic acid (FOLVITE) tablet 1 mg (Discontinued) Take 1 tablet (1 mg) by mouth daily    levothyroxine (SYNTHROID) suspension 150 mcg (Discontinued) 6 mLs (150 mcg) by Per J Tube route every morning (before breakfast)    multivitamins with minerals (CERTAVITE/CEROVITE) liquid 15 mL (Discontinued) 15 mLs by Per Feeding Tube route daily    pantoprazole (PROTONIX) suspension 40 mg (Discontinued) 20 mLs (40 mg) by Oral or J tube route 2 times daily    sennosides (SENOKOT) syrup 10 mL (Discontinued) 10 mLs by Per Feeding Tube route 2 times daily as needed for constipation    sodium bicarbonate tablet 325 mg (Discontinued) 1 tablet (325 mg) by Per  "J Tube route every 4 hours    glucose 40 % gel 15-30 g (Discontinued) Take 15-30 g by mouth every 15 minutes as needed for low blood sugar    Linked Group 2:  \"Or\" Linked Group Details     dextrose 50 % injection 25-50 mL (Discontinued) Inject 25-50 mLs into the vein every 15 minutes as needed for low blood sugar    Linked Group 2:  \"Or\" Linked Group Details     glucagon injection 1 mg (Discontinued) Inject 1 mg Subcutaneous every 15 minutes as needed for low blood sugar (May repeat x 1 only)    Linked Group 2:  \"Or\" Linked Group Details     naloxone (NARCAN) injection 0.1-0.4 mg (Discontinued) Inject 0.25-1 mLs (0.1-0.4 mg) into the vein every 2 minutes as needed for opioid reversal        Physical exam:   General Appearance: in no apparent distress.   Temp:  [98.5  F (36.9  C)] 98.5  F (36.9  C)  Pulse:  [75] 75  Resp:  [14] 14  BP: (123)/(78) 123/78  SpO2:  [96 %] 96 %   Skin: Normal, no rashes or jaundice  Heart: Regular rhythm.  Lungs: no audible wheezes or increased work of breathing.  Abdomen: The abdomen is flat, and the wound is erythema and drainage, without hernia.  G/J Tube exit site is clean. The abdomen is mildly tender, generalized.    Edema: absent.    Chronic Pancreatitis Latest Ref Rng & Units 6/11/2017 6/12/2017 6/13/2017 6/14/2017 6/15/2017   Weight - 103.828 kg - 101.923 kg 100.88 kg -   LIPASE 73 - 393 U/L - - - - -   GLUCOSE 70 - 99 mg/dL 105(H) - 133(H) 109(H) 135(H)   HGB 13.3 - 17.7 g/dL 8.3(L) 8.8(L) 8.7(L) 9.2(L) 9.9(L)    - 450 10e9/L 728(H) Platelets clumped, platelet count unavailable  CALLED TO AMADEO GARDNER,1100 06/12/17 BY [facility]  CORRECTED ON 06/12 AT 1403: PREVIOUSLY REPORTED    743(H) 706(H) 814(H)   ALBUMIN 3.4 - 5.0 g/dL - - - - 3.2(L)   PREALBUMIN 15 - 45 mg/dL - - - - 28       Assessment and Plan: He is doing poorly s/p TP-IAT.  Interval progress has been fair.  Postoperative gastric ileus: now has had bowel movement  Pancreatic exocrine insufficiency: continue " current management  Malnutrition: Poor PO intake   Diabetes mellitus: Continue current management.  Abdominal pain management: Continue current managment  Non healing wound: Continue current management, wet to dry packing.   Medication: Start taking medication as directed. Will see Dr. Mendez next week.  Patient should rest frequently.    Followup: I will see him again in clinic in 1 week    Total time: 25 min, Counselling Time: 15 min.      Eileen Santana, CNP

## 2017-06-15 NOTE — TELEPHONE ENCOUNTER
Was discharged from the hospital today.  Called wondering if he is supposed to be taking Coumadin as it was not on his discharge orders/instructions.    Spoke with Dr. Jackson to verify that he should indeed remain on his dose of Coumadin from the hospital - was on 5mg every evening.    Sohan reports that he does not have any Coumadin, he will check with hotel staff to find out where he can get Rx filled this evening & will call back.

## 2017-06-15 NOTE — PROGRESS NOTES
Patient is a transplant patient and will be followed by the transplant team for follow up          Islets (Transplant 1)                                                        Transplant surgery: Surgery (5/15/2017)

## 2017-06-15 NOTE — LETTER
6/15/2017       RE: Sohan Arita  [address] [facility] [address]     Dear Colleague,    Thank you for referring your patient, Sohan Arita, to the [facility]ID [facility]GAN TRANSPL[facility] at [facility]. Please see a copy of my visit note below.    Chronic Pancreatitis Group Progress Note    I had the pleasure of seeing Mr. Sohan Arita today. 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    Interval events since last visit with me:   ER visits = 0, reasons: naQ  Inpatient admissions = 1 , reasons wound infection   The patient reports their current symptoms to be: fatigue and feeling run down.  He has stopped his Seroquel  And has not slept at night since discontinuing it.    GI function:   Nausea:   []  none    [x]  rare    []  often    []  daily  Comment: after Protonix this am   Vomiting: [x]  none    []  rare    []  often    []  daily   Comment:   Last BM: Today.  Stools are soft, .   Appetite: fair    Oral food intake: very little to none per day  Pancreatic exocrine insufficiency:   Takes zenpep every 4 hours   Greasy stools: [x]  none   []  rarely    []  several times/wk   []  daily      Comment:   Diabetes management:     insulin glargine  36 Units Subcutaneous Q24H     sodium chloride (PF)  10 mL Irrigation Q8H     insulin glargine  30 Units Subcutaneous QAM     insulin aspart  1-10 Units Subcutaneous Q4H        Blood sugars typically range from 120-135  Lab Results   Component Value Date    A1C 5.3 05/16/2017    A1C 5.6 05/10/2017    A1C 5.4 09/10/2015      Pain management:   Pain rating (0=no pain, 10=unbearable): 5-  Daily 'Uptime': unsure  Walking: distance 1-2 miles, 3 times per week  Prescription Medications as of 6/15/2017             insulin glargine (LANTUS) 100 UNIT/ML injection Inject 30 Units Subcutaneous every morning    insulin glargine (LANTUS) 100 UNIT/ML injection Inject 36 Units Subcutaneous every  evening    ondansetron (ZOFRAN) 4 MG tablet Take 1 tablet (4 mg) by mouth every 6 hours as needed for nausea or vomiting    fluconazole (DIFLUCAN) 200 MG tablet Take 1 tablet (200 mg) by mouth daily    metoclopramide (REGLAN) 5 MG tablet Take 1 tablet (5 mg) by mouth 3 times daily as needed    oxyCODONE (ROXICODONE) 5 MG/5ML solution 2.5-5 mLs (2.5-5 mg) by Per Feeding Tube route every 4 hours as needed for moderate to severe pain    amylase-lipase-protease (ZENPEP) 52257 UNITS CPEP 2 capsules (40,000 Units) by Per Feeding Tube route every 4 hours    QUEtiapine (SEROQUEL) 25 MG tablet Take 0.5 tablets (12.5 mg) by mouth At Bedtime    QUEtiapine (SEROQUEL) 25 MG tablet Take 1 tablet (25 mg) by mouth 2 times daily as needed (Anxiety)    insulin aspart (NOVOLOG PEN) 100 UNIT/ML injection 1 units per 8 grams of carbohydrate.  Only chart total amount of units given.  Do not give if pre-prandial glucose is less than 60 mg/dL.    acetaminophen (TYLENOL) 325 MG tablet Take 2 tablets (650 mg) by mouth every 4 hours as needed for mild pain or fever    fentaNYL (DURAGESIC) 100 mcg/hr 72 hr patch Place 1 patch onto the skin every 72 hours    pregabalin (LYRICA) 20 MG/ML solution 1.25 mLs (25 mg) by Per Feeding Tube route daily as needed    sodium bicarbonate 325 MG tablet 1 tablet q 4 hrs via Jtube. Administration Instructions: Crush 1 tablet and mix into 15 ml of warm water and use this solution to mix with Creon pancreatic enzymes. DO NOT administer directly into Jtube (to be mixed into tube feed formula with Creon enzyme).    Alcohol Swabs PADS 1 pad as needed    multivitamin CF formula (DEKAS PLUS) CAPS per capsule Take 1 capsule by mouth daily    cholecalciferol 2000 UNITS tablet Take 1 tablet by mouth daily    insulin aspart (NOVOLOG PEN) 100 UNIT/ML injection Check blood glucose Q4H and administer based on blood glucose  Notify provider if glucose greater than or equal to 350 mg/dL after administration.  -150 = 2  units.  -180 = 4 units.  -210 = 6 units.  -240 = 8 units.  -270 = 10 units.  -300 = 12 units.  -330 = 14 units.  BG >330 = 16 units.    warfarin (COUMADIN) 5 MG tablet Take 1 tablet (5 mg) by mouth daily    aspirin 81 MG EC tablet Take 1 tablet (81 mg) by mouth daily    ferrous sulfate 325 (65 FE) MG TBEC EC tablet Take 1 tablet (325 mg) by mouth daily    folic acid (FOLVITE) 1 MG tablet Take 1 tablet (1 mg) by mouth daily    FLUoxetine (PROZAC) 20 MG/5ML solution 2.5 mLs (10 mg) by Per J Tube route daily    glucose 40 % GEL gel Take 15-30 g by mouth every 15 minutes as needed for low blood sugar    amylase-lipase-protease (CREON 12) 49889 UNITS CPEP Take 3-4 capsules (36,000-48,000 Units) by mouth every hour as needed (with snacks)    magnesium hydroxide (MILK OF MAGNESIA) 400 MG/5ML suspension 30 mLs by Per Feeding Tube route 2 times daily as needed for constipation    sennosides (SENOKOT) 8.8 MG/5ML syrup 10 mLs by Per Feeding Tube route 2 times daily    levothyroxine (SYNTHROID) 25 mcg/mL SUSP 6 mLs (150 mcg) by Per J Tube route every morning (before breakfast)    pantoprazole (PROTONIX) SUSP suspension 20 mLs (40 mg) by Oral or J tube route 2 times daily    Sharps Container (BD SHARPS ) MISC 1 Container as needed    blood glucose monitoring (FREESTYLE LITE) test strip Use to test blood sugars 8 times daily or as directed.    insulin pen needle 32G X 4 MM Use 8 pen needles daily or as directed.    blood glucose monitoring (ACCU-CHEK FASTCLIX) lancets Use to test blood sugar 8 times daily or as directed.      Facility Administered Medications as of 6/15/2017             ondansetron (ZOFRAN) tablet 4-8 mg (Discontinued) Take 1-2 tablets (4-8 mg) by mouth every 6 hours as needed for nausea or vomiting    insulin glargine (LANTUS) injection 30 Units (Discontinued) Inject 30 Units Subcutaneous every morning    warfarin (COUMADIN) tablet 5 mg (Discontinued) Take 1 tablet (5  "mg) by mouth Once at 6pm    fluconazole (DIFLUCAN) tablet 200 mg (Discontinued) Take 1 tablet (200 mg) by mouth daily    metoclopramide (REGLAN) tablet 5 mg (Discontinued) Take 1 tablet (5 mg) by mouth 3 times daily (before meals)    insulin aspart (NovoLOG) inj (RAPID ACTING) (Discontinued) Inject Subcutaneous 3 times daily (with meals)    insulin aspart (NovoLOG) inj (RAPID ACTING) (Discontinued) Inject Subcutaneous Take with snacks or supplements for high blood sugar    QUEtiapine (SEROquel) half-tab 12.5 mg (Discontinued) Take 1 half-tab (12.5 mg) by mouth At Bedtime    prochlorperazine (COMPAZINE) injection 5-10 mg (Discontinued) Inject 1-2 mLs (5-10 mg) into the vein every 6 hours as needed for nausea or vomiting    Linked Group 1:  \"Or\" Linked Group Details     prochlorperazine (COMPAZINE) tablet 5-10 mg (Discontinued) Take 1-2 tablets (5-10 mg) by mouth every 6 hours as needed for vomiting    Linked Group 1:  \"Or\" Linked Group Details     prochlorperazine (COMPAZINE) Suppository 25 mg (Discontinued) Place 1 suppository (25 mg) rectally every 12 hours as needed for nausea or vomiting    Linked Group 1:  \"Or\" Linked Group Details     Warfarin Therapy Reminder (Check START DATE - warfarin may be starting in the FUTURE) (Discontinued) 1 each continuous prn    fentaNYL (DURAGESIC) Patch in Place (Discontinued) Place onto the skin every 8 hours    fentaNYL (DURAGESIC) patch REMOVAL (Discontinued) Place onto the skin every 72 hours    fentaNYL (DURAGESIC) 100 mcg/hr 72 hr patch 1 patch (Discontinued) Place 1 patch onto the skin every 72 hours    QUEtiapine (SEROquel) tablet 25 mg (Discontinued) Take 1 tablet (25 mg) by mouth 2 times daily as needed (Anxiety)    insulin aspart (NovoLOG) inj (RAPID ACTING) (Discontinued) Inject 2-16 Units Subcutaneous every 4 hours    insulin glargine (LANTUS) injection 36 Units (Discontinued) Inject 36 Units Subcutaneous every 24 hours    lidocaine (LMX4) kit (Discontinued) Apply " topically once as needed for moderate pain (for local anesthetic during PICC insertion)    ondansetron (ZOFRAN) injection 4 mg (Discontinued) Inject 2 mLs (4 mg) into the vein every 6 hours as needed for nausea or vomiting    pregabalin (LYRICA) solution 25 mg (Discontinued) Take 1.25 mLs (25 mg) by mouth daily    oxyCODONE (ROXICODONE) solution 5 mg (Discontinued) 5 mLs (5 mg) by Per Feeding Tube route every 4 hours as needed for moderate to severe pain    simethicone (MYLICON) chewable tablet 80 mg (Discontinued) Take 1 tablet (80 mg) by mouth every 6 hours as needed for cramping    acetaminophen (TYLENOL) tablet 650 mg (Discontinued) Take 2 tablets (650 mg) by mouth every 4 hours as needed for mild pain or fever    amylase-lipase-protease (ZENPEP) 42545 UNITS delayed release capsule 40,000 Units (Discontinued) 2 capsules (40,000 Units) by Per Feeding Tube route every 4 hours    insulin 1 unit/mL in saline (NovoLIN, HumuLIN Regular) drip - ADULT IV Infusion (Discontinued) Inject 0-24 Units/hr into the vein continuous prn (Please start IV insulin infusion if glucose is >200 x 2.)    dextrose 10 % 1,000 mL infusion (Discontinued) Inject into the vein continuous prn (Hypoglycemia prevention)    aspirin EC EC tablet 81 mg (Discontinued) Take 1 tablet (81 mg) by mouth daily    cholecalciferol (vitamin D/D-VI-SOL) liquid 2,000 Units (Discontinued) 5 mLs (2,000 Units) by Per J Tube route daily    ferrous sulfate (IRON) tablet 325 mg (Discontinued) Take 1 tablet (325 mg) by mouth daily    FLUoxetine (PROzac) solution 10 mg (Discontinued) 2.5 mLs (10 mg) by Per J Tube route daily    folic acid (FOLVITE) tablet 1 mg (Discontinued) Take 1 tablet (1 mg) by mouth daily    levothyroxine (SYNTHROID) suspension 150 mcg (Discontinued) 6 mLs (150 mcg) by Per J Tube route every morning (before breakfast)    multivitamins with minerals (CERTAVITE/CEROVITE) liquid 15 mL (Discontinued) 15 mLs by Per Feeding Tube route daily     "pantoprazole (PROTONIX) suspension 40 mg (Discontinued) 20 mLs (40 mg) by Oral or J tube route 2 times daily    sennosides (SENOKOT) syrup 10 mL (Discontinued) 10 mLs by Per Feeding Tube route 2 times daily as needed for constipation    sodium bicarbonate tablet 325 mg (Discontinued) 1 tablet (325 mg) by Per J Tube route every 4 hours    glucose 40 % gel 15-30 g (Discontinued) Take 15-30 g by mouth every 15 minutes as needed for low blood sugar    Linked Group 2:  \"Or\" Linked Group Details     dextrose 50 % injection 25-50 mL (Discontinued) Inject 25-50 mLs into the vein every 15 minutes as needed for low blood sugar    Linked Group 2:  \"Or\" Linked Group Details     glucagon injection 1 mg (Discontinued) Inject 1 mg Subcutaneous every 15 minutes as needed for low blood sugar (May repeat x 1 only)    Linked Group 2:  \"Or\" Linked Group Details     naloxone (NARCAN) injection 0.1-0.4 mg (Discontinued) Inject 0.25-1 mLs (0.1-0.4 mg) into the vein every 2 minutes as needed for opioid reversal        Physical exam:   General Appearance: in no apparent distress.   Temp:  [98.5  F (36.9  C)] 98.5  F (36.9  C)  Pulse:  [75] 75  Resp:  [14] 14  BP: (123)/(78) 123/78  SpO2:  [96 %] 96 %   Skin: Normal, no rashes or jaundice  Heart: Regular rhythm.  Lungs: no audible wheezes or increased work of breathing.  Abdomen: The abdomen is flat, and the wound is erythema and drainage, without hernia.  G/J Tube exit site is clean. The abdomen is mildly tender, generalized.    Edema: absent.    Chronic Pancreatitis Latest Ref Rng & Units 6/11/2017 6/12/2017 6/13/2017 6/14/2017 6/15/2017   Weight - 103.828 kg - 101.923 kg 100.88 kg -   LIPASE 73 - 393 U/L - - - - -   GLUCOSE 70 - 99 mg/dL 105(H) - 133(H) 109(H) 135(H)   HGB 13.3 - 17.7 g/dL 8.3(L) 8.8(L) 8.7(L) 9.2(L) 9.9(L)    - 450 10e9/L 728(H) Platelets clumped, platelet count unavailable  CALLED TO AMADEO GARDNER,1100 06/12/17 BY [facility]  CORRECTED ON 06/12 AT 1403: PREVIOUSLY " REPORTED    743(H) 706(H) 814(H)   ALBUMIN 3.4 - 5.0 g/dL - - - - 3.2(L)   PREALBUMIN 15 - 45 mg/dL - - - - 28       Assessment and Plan: He is doing poorly s/p TP-IAT.  Interval progress has been fair.  Postoperative gastric ileus: now has had bowel movement  Pancreatic exocrine insufficiency: continue current management  Malnutrition: Poor PO intake   Diabetes mellitus: Continue current management.  Abdominal pain management: Continue current managment  Non healing wound: Continue current management, wet to dry packing.   Medication: Start taking medication as directed. Will see Dr. Mendez next week.  Patient should rest frequently.    Followup: I will see him again in clinic in 1 week    Total time: 25 min, Counselling Time: 15 min.      Eileen Santana, CNP

## 2017-06-15 NOTE — PROGRESS NOTES
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 No  Inpatient warfarin doses added to calendar? Yes  Medication changes at discharge: Was on fluconazole 6/3 to 6/6 and back on 6/13. Discharging on fluconazole.  Warfarin dosing after DC: 5 mg daily  Patient discharged on Lovenox? No  Next INR date: 6/16  Where is the patient discharging to? (home, TCU, staying locally, etc.): Saying locally at [facility] for 2-8 weeks.   Will patient have home care? Home infusion. Will be coming to clinic for labs.     Is on continuous tube feeding with Impact Peptide.

## 2017-06-15 NOTE — NURSING NOTE
"Chief Complaint   Patient presents with     TP-IAT Transplant     POD 31       Initial /78  Pulse 75  Temp 98.5  F (36.9  C) (Oral)  Resp 14  SpO2 96% Estimated body mass index is 28.55 kg/(m^2) as calculated from the following:    Height as of 6/2/17: 1.88 m (6' 2\").    Weight as of 6/14/17: 100.9 kg (222 lb 6.4 oz).      "

## 2017-06-16 ENCOUNTER — TELEPHONE (OUTPATIENT)
Dept: TRANSPLANT | Facility: CLINIC | Age: 57
End: 2017-06-16

## 2017-06-16 LAB
BACTERIA SPEC CULT: NO GROWTH
MICRO REPORT STATUS: NORMAL
SPECIMEN SOURCE: NORMAL

## 2017-06-16 NOTE — TELEPHONE ENCOUNTER
"Maribell reports that Tanner is doing much better today. Tanner stated that he \"feels pretty good today\". He has improved from 2750 to 3000 with his incentive spirometer. Denies fevers. Maribell reports the wound \"looks really good. The large one is so clean and is closing up fast and the smaller one is really clean too\". Maribell acknowledged giving Tanner 40 mg or Protonix yesterday instead of 20 mg. She misread the instructions but understands now. Tanner complained of not feeling well after the Protonix was given yesterday. No issues today with Protonix administration. Tanner drank 650 ml's of water yesterday, 12 ounces so far today and is able to drink 4-5 ounces at one time now, an increase from just sips.  Tanner stated he is going to take walks today and spend the day resting. Tanner stated that he slept much better last night. They have instructions to call the on call transplant coordinator this weekend and if needed to go the ED. Both verbalized understanding and have no further questions or concerns.  "

## 2017-06-19 ENCOUNTER — OFFICE VISIT (OUTPATIENT)
Dept: TRANSPLANT | Facility: CLINIC | Age: 57
End: 2017-06-19
Attending: TRANSPLANT SURGERY
Payer: COMMERCIAL

## 2017-06-19 ENCOUNTER — OFFICE VISIT (OUTPATIENT)
Dept: ENDOCRINOLOGY | Facility: CLINIC | Age: 57
End: 2017-06-19

## 2017-06-19 ENCOUNTER — ANTICOAGULATION THERAPY VISIT (OUTPATIENT)
Dept: ANTICOAGULATION | Facility: CLINIC | Age: 57
End: 2017-06-19

## 2017-06-19 VITALS
WEIGHT: 222.5 LBS | SYSTOLIC BLOOD PRESSURE: 128 MMHG | HEART RATE: 73 BPM | HEIGHT: 74 IN | BODY MASS INDEX: 28.55 KG/M2 | DIASTOLIC BLOOD PRESSURE: 79 MMHG

## 2017-06-19 DIAGNOSIS — N64.4 NIPPLE PAIN: Primary | ICD-10-CM

## 2017-06-19 DIAGNOSIS — E89.1 POST-PANCREATECTOMY DIABETES (H): ICD-10-CM

## 2017-06-19 DIAGNOSIS — E13.9 POST-PANCREATECTOMY DIABETES (H): ICD-10-CM

## 2017-06-19 DIAGNOSIS — Z90.410 POST-PANCREATECTOMY DIABETES (H): ICD-10-CM

## 2017-06-19 DIAGNOSIS — I82.402 DEEP VEIN THROMBOSIS (DVT) OF LEFT LOWER EXTREMITY (H): ICD-10-CM

## 2017-06-19 DIAGNOSIS — Z53.9 ERRONEOUS ENCOUNTER--DISREGARD: Primary | ICD-10-CM

## 2017-06-19 DIAGNOSIS — E89.0 POSTSURGICAL HYPOTHYROIDISM: ICD-10-CM

## 2017-06-19 DIAGNOSIS — Z79.01 LONG-TERM (CURRENT) USE OF ANTICOAGULANTS: ICD-10-CM

## 2017-06-19 DIAGNOSIS — I82.452 THROMBOSIS OF LEFT PERONEAL VEIN (H): ICD-10-CM

## 2017-06-19 DIAGNOSIS — N62 HYPERTROPHY OF BREAST: Primary | ICD-10-CM

## 2017-06-19 DIAGNOSIS — Z98.890 HISTORY OF BREAST LUMP/MASS EXCISION: ICD-10-CM

## 2017-06-19 DIAGNOSIS — K86.89 PANCREATIC INSUFFICIENCY: ICD-10-CM

## 2017-06-19 DIAGNOSIS — M79.604 PAIN OF RIGHT LOWER EXTREMITY: ICD-10-CM

## 2017-06-19 LAB
ALBUMIN SERPL-MCNC: 3.3 G/DL (ref 3.4–5)
ALP SERPL-CCNC: 106 U/L (ref 40–150)
ALT SERPL W P-5'-P-CCNC: 38 U/L (ref 0–70)
ANION GAP SERPL CALCULATED.3IONS-SCNC: 6 MMOL/L (ref 3–14)
AST SERPL W P-5'-P-CCNC: 34 U/L (ref 0–45)
BASOPHILS # BLD AUTO: 0.1 10E9/L (ref 0–0.2)
BASOPHILS NFR BLD AUTO: 1 %
BILIRUB SERPL-MCNC: 0.3 MG/DL (ref 0.2–1.3)
BUN SERPL-MCNC: 38 MG/DL (ref 7–30)
CALCIUM SERPL-MCNC: 9.6 MG/DL (ref 8.5–10.1)
CHLORIDE SERPL-SCNC: 101 MMOL/L (ref 94–109)
CO2 SERPL-SCNC: 31 MMOL/L (ref 20–32)
CREAT SERPL-MCNC: 0.89 MG/DL (ref 0.66–1.25)
DIFFERENTIAL METHOD BLD: ABNORMAL
EOSINOPHIL # BLD AUTO: 1 10E9/L (ref 0–0.7)
EOSINOPHIL NFR BLD AUTO: 7.2 %
ERYTHROCYTE [DISTWIDTH] IN BLOOD BY AUTOMATED COUNT: 16.6 % (ref 10–15)
GFR SERPL CREATININE-BSD FRML MDRD: 88 ML/MIN/1.7M2
GLUCOSE SERPL-MCNC: 124 MG/DL (ref 70–99)
HCT VFR BLD AUTO: 32.9 % (ref 40–53)
HGB BLD-MCNC: 10 G/DL (ref 13.3–17.7)
IMM GRANULOCYTES # BLD: 0.1 10E9/L (ref 0–0.4)
IMM GRANULOCYTES NFR BLD: 0.4 %
INR PPP: 5.63 (ref 0.86–1.14)
LYMPHOCYTES # BLD AUTO: 1.9 10E9/L (ref 0.8–5.3)
LYMPHOCYTES NFR BLD AUTO: 13.8 %
MCH RBC QN AUTO: 25.4 PG (ref 26.5–33)
MCHC RBC AUTO-ENTMCNC: 30.4 G/DL (ref 31.5–36.5)
MCV RBC AUTO: 84 FL (ref 78–100)
MONOCYTES # BLD AUTO: 1.4 10E9/L (ref 0–1.3)
MONOCYTES NFR BLD AUTO: 10.2 %
NEUTROPHILS # BLD AUTO: 9.1 10E9/L (ref 1.6–8.3)
NEUTROPHILS NFR BLD AUTO: 67.4 %
NRBC # BLD AUTO: 0 10*3/UL
NRBC BLD AUTO-RTO: 0 /100
PLATELET # BLD AUTO: 852 10E9/L (ref 150–450)
POTASSIUM SERPL-SCNC: 4.3 MMOL/L (ref 3.4–5.3)
PROT SERPL-MCNC: 8.9 G/DL (ref 6.8–8.8)
RBC # BLD AUTO: 3.93 10E12/L (ref 4.4–5.9)
SODIUM SERPL-SCNC: 139 MMOL/L (ref 133–144)
WBC # BLD AUTO: 13.5 10E9/L (ref 4–11)

## 2017-06-19 PROCEDURE — 85610 PROTHROMBIN TIME: CPT | Performed by: TRANSPLANT SURGERY

## 2017-06-19 PROCEDURE — 85025 COMPLETE CBC W/AUTO DIFF WBC: CPT | Performed by: TRANSPLANT SURGERY

## 2017-06-19 PROCEDURE — 36415 COLL VENOUS BLD VENIPUNCTURE: CPT | Performed by: TRANSPLANT SURGERY

## 2017-06-19 PROCEDURE — 80053 COMPREHEN METABOLIC PANEL: CPT | Performed by: TRANSPLANT SURGERY

## 2017-06-19 NOTE — MR AVS SNAPSHOT
Sohan Arita   6/19/2017   Anticoagulation Therapy Visit    Description:  56 year old male   Provider:  Jeri Mcdowell, RN   Department:  [facility]           INR as of 6/19/2017     Today's INR 5.63!      Anticoagulation Summary as of 6/19/2017     INR goal 2.0-3.0   Today's INR 5.63!   Full instructions 6/19: Hold; 6/20: Hold; Otherwise No maintenance plan   Next INR check 6/21/2017    Indications   Deep vein thrombosis (DVT) of left lower extremity (H) [I82.402]  Long-term (current) use of anticoagulants [Z79.01] [Z79.01]         June 2017 Details    Sun Mon Tue Wed Thu Fri Sat         1               2               3                 4               5               6               7               8               9               10                 11               12               13               14               15               16               17                 18               19      Hold   See details      20      Hold         21            22               23               24                 25               26               27               28               29               30                 Date Details   06/19 This INR check       Date of next INR:  6/21/2017         How to take your warfarin dose     Hold Do not take your warfarin dose. See the Details table to the right for additional instructions.

## 2017-06-19 NOTE — MR AVS SNAPSHOT
After Visit Summary   6/19/2017    Sohan Arita    MRN: [id number]           Patient Information     Date Of Birth          1960        Visit Information        Provider Department      6/19/2017 9:00 AM aIn Mendez MD [facility] Solid Organ Transplant        Today's Diagnoses     Wound infection after surgery, subsequent encounter    -  1       Follow-ups after your visit        Your next 10 appointments already scheduled     Nov 09, 2017  6:45 AM CST   LAB with  LAB    Health Lab ([facility])    [address] [place] [id number] [phone]           Patient must bring picture ID.  Patient should be prepared to give a urine specimen  Please do not eat 10-12 hours before your appointment if you are coming in fasting for labs on lipids, cholesterol, or glucose (sugar).  Pregnant women should follow their Care Team instructions. Water with medications is okay. Do not drink coffee or other fluids.   If you have concerns about taking  your medications, please ask at office or if scheduling via MxBiodevicest, send a message by clicking on Secure Messaging, Message Your Care Team.            Nov 09, 2017  7:00 AM CST   Nurse Visit with  Txc Nurse   [facility] Solid Organ Transplant ([facility])    [address] [place] [id number] [phone]            Nov 09, 2017  9:45 AM CST   (Arrive by 9:30 AM)   Return Auto Islet with Rodrigo Jaquez MD   [facility] Solid Organ Transplant ([facility])    [address] [place] [id number] [phone]            Nov 09, 2017 11:00 AM CST   (Arrive by 10:45 AM)   Genetic Counseling with Roxie Mathis GC   [facility] Solid Organ Transplant ([facility])    [address] [place] [id number] [phone]              Who to contact     If you  "have questions or need follow up information about today's clinic visit or your schedule please contact [facility] SOLID ORGAN TRANSPLANT directly at [phone].  Normal or non-critical lab and imaging results will be communicated to you by CoupOptionhart, letter or phone within 4 business days after the clinic has received the results. If you do not hear from us within 7 days, please contact the clinic through CoupOptionhart or phone. If you have a critical or abnormal lab result, we will notify you by phone as soon as possible.  Submit refill requests through ShareSDK or call your pharmacy and they will forward the refill request to us. Please allow 3 business days for your refill to be completed.          Additional Information About Your Visit        CoupOptionhartribr Information     ShareSDK lets you send messages to your doctor, view your test results, renew your prescriptions, schedule appointments and more. To sign up, go to [email or web address] . Click on \"Log in\" on the left side of the screen, which will take you to the Welcome page. Then click on \"Sign up Now\" on the right side of the page.     You will be asked to enter the access code listed below, as well as some personal information. Please follow the directions to create your username and password.     Your access code is: [email or web address]  Expires: 2017  6:30 AM     Your access code will  in 90 days. If you need help or a new code, please call your [place] clinic or [phone].        Care EveryWhere ID     This is your Care EveryWhere ID. This could be used by other organizations to access your [place] medical records  [id number]         Blood Pressure from Last 3 Encounters:   17 139/81   17 114/76   17 136/79    Weight from Last 3 Encounters:   17 96.9 kg (213 lb 9.6 oz)   17 99.8 kg (220 lb)   17 99.7 kg (219 lb 11.2 oz)              Today, you had the following     No orders found for display         Where to get your " medicines      These medications were sent to [facility] - [place], [place] - [address]  [address], [facility] [address]     Phone:  [phone]     blood glucose monitoring test strip          Primary Care Provider Office Phone # Fax #    Frandy JOHNSON MD [facility]day [phone]       [address]  [facility] [address]        Equal Access to Services     KIMBERLY VINCENT : Hadii aad ku hadasho Soomaali, waaxda luqadaha, qaybta kaalmada adeegyada, waxay idiin hayaan adeeg diamondyumish la'aan . So [facility] [phone].    ATENCIÓN: Si habla español, tiene a welsh disposición servicios gratuitos de asistencia lingüística. Joseph al [phone].    We comply with applicable federal civil rights laws and [place] laws. We do not discriminate on the basis of race, color, national origin, age, disability sex, sexual orientation or gender identity.            Thank you!     Thank you for choosing [facility] SOLID ORGAN TRANSPLANT  for your care. Our goal is always to provide you with excellent care. Hearing back from our patients is one way we can continue to improve our services. Please take a few minutes to complete the written survey that you may receive in the mail after your visit with us. Thank you!             Your Updated Medication List - Protect others around you: Learn how to safely use, store and throw away your medicines at www.disposemymeds.org.          This list is accurate as of: 6/19/17 11:59 PM.  Always use your most recent med list.                   Brand Name Dispense Instructions for use Diagnosis    acetaminophen 325 MG tablet    TYLENOL    100 tablet    Take 2 tablets (650 mg) by mouth every 4 hours as needed for mild pain or fever    Acute postoperative pain of abdomen       Alcohol Swabs Pads     100 each    1 pad as needed    Post-pancreatectomy diabetes (H)       * amylase-lipase-protease 65509 UNITS Cpep    CREON 12    270 capsule    Take  3-4 capsules (36,000-48,000 Units) by mouth every hour as needed (with snacks)    Acquired total absence of pancreas       * amylase-lipase-protease 43432 UNITS Cpep    ZENPEP    180 capsule    2 capsules (40,000 Units) by Per Feeding Tube route every 4 hours    Acquired total absence of pancreas       aspirin 81 MG EC tablet     30 tablet    Take 1 tablet (81 mg) by mouth daily    High platelet count (H), Acquired asplenia, Post-pancreatectomy diabetes (H), Pancreatic insufficiency, Anemia       BD SHARPS  Misc     1 each    1 Container as needed    Post-pancreatectomy diabetes (H)       blood glucose monitoring lancets     1 Box    Use to test blood sugar 8 times daily or as directed.    Acquired total absence of pancreas       blood glucose monitoring test strip    FREESTYLE LITE    300 strip    Use to test blood sugars 8 times daily or as directed.    Post-pancreatectomy diabetes (H)       cholecalciferol 2000 UNITS tablet     30 tablet    Take 1 tablet by mouth daily    Acquired total absence of pancreas       fentaNYL 100 mcg/hr 72 hr patch    DURAGESIC    4 patch    Place 1 patch onto the skin every 72 hours    Acquired total absence of pancreas       ferrous sulfate 325 (65 FE) MG Tbec EC tablet     30 tablet    Take 1 tablet (325 mg) by mouth daily    High platelet count (H), Acquired asplenia, Post-pancreatectomy diabetes (H), Pancreatic insufficiency, Anemia       fluconazole 200 MG tablet    DIFLUCAN    30 tablet    Take 1 tablet (200 mg) by mouth daily    Surgical wound infection, initial encounter       FLUoxetine 20 MG/5ML solution    PROzac    75 mL    2.5 mLs (10 mg) by Per J Tube route daily    Acquired total absence of pancreas       folic acid 1 MG tablet    FOLVITE    30 tablet    Take 1 tablet (1 mg) by mouth daily    High platelet count (H), Acquired asplenia, Post-pancreatectomy diabetes (H), Pancreatic insufficiency, Anemia       glucose 40 % Gel gel     3 Tube    Take 15-30 g by  mouth every 15 minutes as needed for low blood sugar    Post-pancreatectomy diabetes (H)       insulin aspart 100 UNIT/ML injection    NovoLOG PEN    3 mL    Check blood glucose Q4H and administer based on blood glucose Notify provider if glucose greater than or equal to 350 mg/dL after administration. -150 = 2 units. -180 = 4 units. -210 = 6 units. -240 = 8 units. -270 = 10 units. -300 = 12 units. -330 = 14 units. BG >330 = 16 units.    Post-pancreatectomy diabetes (H)       insulin pen needle 32G X 4 MM     100 each    Use 8 pen needles daily or as directed.    Acquired total absence of pancreas       levothyroxine 25 mcg/mL Susp    SYNTHROID    180 mL    6 mLs (150 mcg) by Per J Tube route every morning (before breakfast)    Acquired total absence of pancreas       magnesium hydroxide 400 MG/5ML suspension    MILK OF MAGNESIA    105 mL    30 mLs by Per Feeding Tube route 2 times daily as needed for constipation    Other constipation       metoclopramide 5 MG tablet    REGLAN    90 tablet    Take 1 tablet (5 mg) by mouth 3 times daily as needed    History of pancreatectomy       multivitamin CF formula Caps per capsule     30 capsule    Take 1 capsule by mouth daily    Acquired total absence of pancreas       ondansetron 4 MG tablet    ZOFRAN    24 tablet    Take 1 tablet (4 mg) by mouth every 6 hours as needed for nausea or vomiting    Postoperative nausea       oxyCODONE 5 MG/5ML solution    ROXICODONE    250 mL    2.5-5 mLs (2.5-5 mg) by Per Feeding Tube route every 4 hours as needed for moderate to severe pain    Surgical wound infection, initial encounter       pantoprazole Susp suspension    PROTONIX    1200 mL    20 mLs (40 mg) by Oral or J tube route 2 times daily    Acquired total absence of pancreas       pregabalin 20 MG/ML solution    LYRICA    70 mL    1.25 mLs (25 mg) by Per Feeding Tube route daily as needed    History of pancreatectomy       * QUEtiapine 25  MG tablet    SEROquel    60 tablet    Take 0.5 tablets (12.5 mg) by mouth At Bedtime    Anxiety       * QUEtiapine 25 MG tablet    SEROquel    60 tablet    Take 1 tablet (25 mg) by mouth 2 times daily as needed (Anxiety)    Anxiety       sennosides 8.8 MG/5ML syrup    SENOKOT    600 mL    10 mLs by Per Feeding Tube route 2 times daily    Other constipation       sodium bicarbonate 325 MG tablet     180 tablet    1 tablet q 4 hrs via Jtube. Administration Instructions: Crush 1 tablet and mix into 15 ml of warm water and use this solution to mix with Creon pancreatic enzymes. DO NOT administer directly into Jtube (to be mixed into tube feed formula with Creon enzyme).    High platelet count (H), Acquired asplenia, Post-pancreatectomy diabetes (H), Pancreatic insufficiency       warfarin 5 MG tablet    COUMADIN    30 tablet    Take 1 tablet (5 mg) by mouth daily    DVT (deep venous thrombosis) (H)       * Notice:  This list has 4 medication(s) that are the same as other medications prescribed for you. Read the directions carefully, and ask your doctor or other care provider to review them with you.

## 2017-06-19 NOTE — PROGRESS NOTES
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 He has a history of chronic pancreatitis s/p lap lin, biliary and pancreatic sphincterotomies, transduodenal sphincteroplasty with resultant leak and phlegmonous pancreatitis . He received 4347 ie/kg in transplant, primarily intraportally but with some islets to peritoneum.  Post-operative course has been complicated by wound infection and occlusive thrombosis left peroneal vein.  He is now on anticoagulant therapy.      He has no known history of diabetes and A1C 5.4 here ion 2015, and 5.6 prior to transplant though he reports 5.7 in his own lab prior to travel.  He reports at that time he was living on juice and toast as his stomach tolerated no more.      From notes, I understand that he has been using 30 units of Lantus in the morning and 36 units in the evening.  Correction scale was calculated based on TDD in May and is 1 unit for every 20 above 120. He has tube feedings with Impact Peptide 1.5 provides 252 gram carb over 24-hrs of continuous feedings, started on June 2, 2017.    He is noting also tenderness in his left posterior thigh and that his left nipple is very sore.  Of note, his twin brother recently had same and normal mammogram. He reports had a mass in right breast some years ago, which was removed and was benign. He has been taking Seroquel, but really takes 1/4 tablet when really cannot sleep.  Has taken 4 times he thinks.  Unsure when TSH was last checked.  He had total thyroidectomy after  3 mm thyroid papilloma, \"entirely encased\" was removed 20 + years ago.  He is not sleeping well, but did not take Seroquel as concerned.  The tenderness in lower, lateral, posterior right thigh developed Saturday after walking a long time and seems to be " getting worse.    Has FH skin and bladder cancer.  No FH or personal history of breast, ovarian or prostate cancers.       At end of 1.2 mile walk Saturday BG dropped to 65, he drank lemonade and possibly over treated as BG then went up 157 no recurrent or other lows.  He has awoken sweating many nights, but checks and BG is never low overnight.    Again, 30 units of Lantus in the morning and 36 units in the evening.  And Novolog correction scale is:  -150 = 2 units.  -180 = 4 units.  -210 = 6 units.  -240 = 8 units.  -270 = 10 units.  -300 = 12 units.  -330 = 14 units.  BG >330 = 16 units.      We reviewed glucometer data together.  It revealed:  86% within target 70 - 140  With one BG 65, 13% > 140 , highest 167.    FBG in last week 117 - 139.        Katlin  has a past medical history of Depression; Eye injury, penetrating (1978); Pancreatic disease; Pancreatitis (2012); Thyroid cancer (H); Thyroid disease; and Ventral hernia (2017).  Immunization History   Administered Date(s) Administered     HIB 01/22/2016     Influenza (IIV3) 09/22/2015     Influenza Vaccine IM 3yrs+ 4 Valent IIV4 09/09/2016     Meningococcal (Bexsero ) 05/04/2017     Meningococcal (Menactra ) 03/08/2017     Meningococcal (Menomune ) 01/22/2016     Pneumococcal (PCV 13) 03/07/2017     Pneumococcal 23 valent 01/22/2016     Tdap (Adacel,Boostrix) 01/22/2016       Sohan's   Social History     Social History     Marital status:      Spouse name: N/A     Number of children: N/A     Years of education: N/A     Occupational History     MD      Social History Main Topics     Smoking status: Never Smoker     Smokeless tobacco: Never Used     Alcohol use No     Drug use: No     Sexual activity: Not on file     Other Topics Concern     Not on file     Social History Narrative    Sohan is , 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  He is a non-smoker and has never drank alcohol. He is a twin.    "      He is from [place], and has planned to return there shortly.  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      Sohan's family history is negative for Pancreatitis and DIABETES.    ROS:   Patient is having difficulty with sleep and other symptoms as above.    Patient has symptoms of hypoglycemia except as above. No sx of hyperglycemia.   Patients has fatigue and insomnia, some wandering thoughts.    Abdominal pain and also is constipated.   Patient has pain and possibly swelling in right leg.    He is right handed.     Exam:    /79 (BP Location: Right arm, Patient Position: Chair, Cuff Size: Adult Regular)  Pulse 73  Ht 1.88 m (6' 2\")  Wt 100.9 kg (222 lb 8 oz)  BMI 28.57 kg/m2    General: Pleasant, well  hydrated male in NAD though appears generally weak.  Ambulates independently though slowly Mildly anxious, appears somewhat uncomfortable.  Accompanied by wife, Maribell.    Psych:  Mood is \"good,\" affect is appropriate.  Thought form and content are fluid and coherent.    HEENT: Eyes and sclera are clear. Extraocular movements are grossly intact without proptosis.  Nares are patent, mucous membranes moist.  Neck: No masses or JVD are noted.    Resp: Easy and unlabored breathing.   Neuro: Alert and oriented, communicating clearly.   No supraclavicular masses or lymphadenopathy, no axillary.  No masses, discharge or swelling right chest or nipple. No masses, discharge left chest or nipple, though retro alveolar area is notably full. No mass at all appreciated.    Ext: There is tenderness above right popliteal fossa.  Interestingly, right ankle is if anything, slightly larger than left though recently had left DVT. There is no change in right foot Equal warmth and cap refill B.  Discomfort with Gianni's B, though I suspect may be generally taught and not very flexible.    Data:      Last Basic Metabolic Panel:  Lab Results   Component Value Date     06/15/2017      Lab Results   Component Value Date    POTASSIUM 4.1 " 06/15/2017     Lab Results   Component Value Date    CHLORIDE 101 06/15/2017     Lab Results   Component Value Date    CRISTOBAL 9.6 06/15/2017     Lab Results   Component Value Date    CO2 30 06/15/2017     Lab Results   Component Value Date    BUN 34 06/15/2017     Lab Results   Component Value Date    CR 0.86 06/15/2017     Lab Results   Component Value Date     06/15/2017       GFR Estimate   Date Value Ref Range Status   06/15/2017 >90  Non  GFR Calc   >60 mL/min/1.7m2 Final   06/14/2017 >90  Non  GFR Calc   >60 mL/min/1.7m2 Final   06/13/2017 >90  Non  GFR Calc   >60 mL/min/1.7m2 Final     GFR Estimate If Black   Date Value Ref Range Status   06/15/2017 >90   GFR Calc   >60 mL/min/1.7m2 Final   06/14/2017 >90   GFR Calc   >60 mL/min/1.7m2 Final   06/13/2017 >90   GFR Calc   >60 mL/min/1.7m2 Final         Lab Results   Component Value Date    A1C 5.3 05/16/2017    A1C 5.6 05/10/2017    A1C 5.4 09/10/2015     Lab Results   Component Value Date    MICROL 7 05/10/2017     No results found for: MICROALBUMIN  Lab Results   Component Value Date    CPEPT 7.4 (H) 05/10/2017    GADAB  09/10/2015     <5.0  Reference range: 0.0 to 5.0  Unit: IU/mL  (Note)  INTERPRETIVE INFORMATION:  Glutamic Acid Decarboxylase  Antibody  A value greater than 5.0 IU/mL is considered positive for  Glutamic Acid Decarboxylase Antibody.  Performed by [email or web address],  [address] [place] [id number] [phone]  [email or web address], Josesito Ortiz MD, Lab. Director      ISCAB  09/10/2015     <1:4  Reference range: <1:4  (Note)  INTERPRETIVE INFORMATION: Islet Cell Ab, IgG  Islet cell antibodies (ICAs) are associated with type 1  diabetes (TID), an autoimmune endocrine disorder. ICAs may  be present years before the onset of clinical symptoms. To  calculate Juvenile Diabetes Foundation (JDF) units:  multiply the titer x 5 (1:8  8 x 5 = 40 JDF  Units).  Test developed and characteristics determined by [facility]. See Compliance Statement A: "[employer]/CS  Performed by [facility],  [address], [facility] [place] [address] [phone]  [email or web address], Josesito Ortiz MD, Lab. Director       Cholesterol   Date Value Ref Range Status   05/10/2017 157 <200 mg/dL Final   09/10/2015 146 <200 mg/dL Final     Comment:     LDL Cholesterol is the primary guide to therapy.   The NCEP recommends further evaluation of: patients with cholesterol greater   than 200 mg/dL if additional risk factors are present, cholesterol greater   than   240 mg/dL, triglycerides greater than 150 mg/dL, or HDL less than 40 mg/dL.       HDL Cholesterol   Date Value Ref Range Status   05/10/2017 43 >39 mg/dL Final   09/10/2015 39 (L) >40 mg/dL Final     LDL Cholesterol Calculated   Date Value Ref Range Status   05/10/2017 94 <100 mg/dL Final     Comment:     Desirable:       <100 mg/dl   09/10/2015 86 0 - 129 mg/dL Final     Comment:     LDL Cholesterol is the primary guide to therapy: LDL-cholesterol goal in high   risk patients is <100 mg/dL and in very high risk patients is <70 mg/dL.       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  He is doing well on basal Lantus bid and Novolog correction with really water only at this time in addition to his tube feeding.     - Recommend  increase Lantus qhs to 38 in hopes of improving fasting blood glucose without hypoglycemia.     - RTC 2-3 weeks to adjust regimen for oral intake and transitioning to overnight only feeds.     - Will send to US to assure to developing additional clot on anticoagulation therapy.      - Left nipple tenderness concerning as unilateral, though recent onset may be related to Seroquel though has had minimal doses.  To breast center if symptoms do not readily resolve.          >50% of 45 minute visit spent in face to face counseling, education and coordination of care related to options for better glycemic control as well as preventing, detecting, and treating hypoglycemia.      It is my privilege to be involved in the care of the above patient.     Lexy Brunson PA-C, MPAS  [facility]  Diabetes, Endocrinology, and Metabolism  [phone] Appointments/Nurse  [phone] pager  [phone] nurse line

## 2017-06-19 NOTE — PROGRESS NOTES
ANTICOAGULATION FOLLOW-UP CLINIC VISIT    Patient Name:  Sohan Arita  Date:  6/19/2017  Contact Type:  Telephone    SUBJECTIVE:     Patient Findings     Comments Remains on fluconazole.           OBJECTIVE    INR   Date Value Ref Range Status   06/19/2017 5.63 (HH) 0.86 - 1.14 Final     Comment:     Critical Value called to and read back by  RODRIGO JAQUEZ MD 06/19/2017 0935 BY          ASSESSMENT / PLAN  INR assessment SUPRA    Recheck INR In: 2 DAYS    INR Location Clinic      Anticoagulation Summary as of 6/19/2017     INR goal 2.0-3.0   Today's INR 5.63!   Maintenance plan No maintenance plan   Full instructions 6/19: Hold; 6/20: Hold; Otherwise No maintenance plan   Next INR check 6/21/2017   Priority INR   Target end date     Indications   Deep vein thrombosis (DVT) of left lower extremity (H) [I82.402]  Long-term (current) use of anticoagulants [Z79.01] [Z79.01]         Anticoagulation Episode Summary     INR check location     Preferred lab     [facility]    Comments       Anticoagulation Care Providers     Provider Role Specialty Phone number    Rodrigo Jaquez MD Responsible Transplant [phone]            See the Encounter Report to view Anticoagulation Flowsheet and Dosing Calendar (Go to Encounters tab in chart review, and find the Anticoagulation Therapy Visit)    Spoke with spouse, Maribell. She denies bleeding signs (Sohan was being seen by the surgeon presently). She was advised to bring him to the ED for any bleeding, trauma, or headache/abdominal pain.     Jeri Mcdowell RN

## 2017-06-19 NOTE — LETTER
6/19/2017       RE: Sohan Arita  [address] [facility] [address]     Dear Colleague,    Thank you for referring your patient, Sohan Arita, to the [facility] SOLID ORGAN TRANSPLANT at [facility]. Please see a copy of my visit note below.    S/p TPIAT    No fevers, pain is controlled, tolerates TFs    Af, Vsst    A&Ox3  Non-labored breathing  Soft abd, wound is being packed    WBC 13.5    A/P: 57 yo gentleman, s/p TPIAT, with wound ifx.  - continue wound packing  - no change to pain meds  - advance diet  - coumadin for DVT    CHIO Mendez    Again, thank you for allowing me to participate in the care of your patient.      Sincerely,    Ian Mendez MD

## 2017-06-19 NOTE — LETTER
6/19/2017       RE: Sohan Airta  [address] [facility] [address]     Dear Colleague,    Thank you for referring your patient, Sohan Arita, to the [facility] ENDOCRINOLOGY at [facility]. Please see a copy of my visit note below.    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 He has a history of chronic pancreatitis s/p lap lin, biliary and pancreatic sphincterotomies, transduodenal sphincteroplasty with resultant leak and phlegmonous pancreatitis . He received 4347 ie/kg in transplant, primarily intraportally but with some islets to peritoneum.  Post-operative course has been complicated by wound infection and occlusive thrombosis left peroneal vein.  He is now on anticoagulant therapy.      He has no known history of diabetes and A1C 5.4 here ion 2015, and 5.6 prior to transplant though he reports 5.7 in his own lab prior to travel.  He reports at that time he was living on juice and toast as his stomach tolerated no more.      From notes, I understand that he has been using 30 units of Lantus in the morning and 36 units in the evening.  Correction scale was calculated based on TDD in May and is 1 unit for every 20 above 120. He has tube feedings with Impact Peptide 1.5 provides 252 gram carb over 24-hrs of continuous feedings, started on June 2, 2017.    He is noting also tenderness in his left posterior thigh and that his left nipple is very sore.  Of note, his twin brother recently had same and normal mammogram. He reports had a mass in right breast some years ago, which was removed and was benign. He has been taking Seroquel, but really takes 1/4 tablet when really cannot sleep.  Has taken 4 times he thinks.  Unsure when TSH was last checked.  " He had total thyroidectomy after  3 mm thyroid papilloma, \"entirely encased\" was removed 20 + years ago.  He is not sleeping well, but did not take Seroquel as concerned.  The tenderness in lower, lateral, posterior right thigh developed Saturday after walking a long time and seems to be getting worse.    Has FH skin and bladder cancer.  No FH or personal history of breast, ovarian or prostate cancers.       At end of 1.2 mile walk Saturday BG dropped to 65, he drank lemonade and possibly over treated as BG then went up 157 no recurrent or other lows.  He has awoken sweating many nights, but checks and BG is never low overnight.    Again, 30 units of Lantus in the morning and 36 units in the evening.  And Novolog correction scale is:  -150 = 2 units.  -180 = 4 units.  -210 = 6 units.  -240 = 8 units.  -270 = 10 units.  -300 = 12 units.  -330 = 14 units.  BG >330 = 16 units.      We reviewed glucometer data together.  It revealed:  86% within target 70 - 140  With one BG 65, 13% > 140 , highest 167.    FBG in last week 117 - 139.        Katlin  has a past medical history of Depression; Eye injury, penetrating (1978); Pancreatic disease; Pancreatitis (2012); Thyroid cancer (H); Thyroid disease; and Ventral hernia (2017).  Immunization History   Administered Date(s) Administered     HIB 01/22/2016     Influenza (IIV3) 09/22/2015     Influenza Vaccine IM 3yrs+ 4 Valent IIV4 09/09/2016     Meningococcal (Bexsero ) 05/04/2017     Meningococcal (Menactra ) 03/08/2017     Meningococcal (Menomune ) 01/22/2016     Pneumococcal (PCV 13) 03/07/2017     Pneumococcal 23 valent 01/22/2016     Tdap (Adacel,Boostrix) 01/22/2016       Sohan's   Social History     Social History     Marital status:      Spouse name: N/A     Number of children: N/A     Years of education: N/A     Occupational History     MD      Social History Main Topics     Smoking status: Never Smoker     " "Smokeless tobacco: Never Used     Alcohol use No     Drug use: No     Sexual activity: Not on file     Other Topics Concern     Not on file     Social History Narrative    Sohan is , 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  He is a non-smoker and has never drank alcohol. He is a twin.         He is from [place], and has planned to return there shortly.  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      Sohan's family history is negative for Pancreatitis and DIABETES.    ROS:   Patient is having difficulty with sleep and other symptoms as above.    Patient has symptoms of hypoglycemia except as above. No sx of hyperglycemia.   Patients has fatigue and insomnia, some wandering thoughts.    Abdominal pain and also is constipated.   Patient has pain and possibly swelling in right leg.    He is right handed.     Exam:    /79 (BP Location: Right arm, Patient Position: Chair, Cuff Size: Adult Regular)  Pulse 73  Ht 1.88 m (6' 2\")  Wt 100.9 kg (222 lb 8 oz)  BMI 28.57 kg/m2    General: Pleasant, well  hydrated male in NAD though appears generally weak.  Ambulates independently though slowly Mildly anxious, appears somewhat uncomfortable.  Accompanied by wife, Maribell.    Psych:  Mood is \"good,\" affect is appropriate.  Thought form and content are fluid and coherent.    HEENT: Eyes and sclera are clear. Extraocular movements are grossly intact without proptosis.  Nares are patent, mucous membranes moist.  Neck: No masses or JVD are noted.    Resp: Easy and unlabored breathing.   Neuro: Alert and oriented, communicating clearly.   No supraclavicular masses or lymphadenopathy, no axillary.  No masses, discharge or swelling right chest or nipple. No masses, discharge left chest or nipple, though retro alveolar area is notably full. No mass at all appreciated.    Ext: There is tenderness above right popliteal fossa.  Interestingly, right ankle is if anything, slightly larger than left though recently had left DVT. There is no change " in right foot Equal warmth and cap refill B.  Discomfort with Gianni's B, though I suspect may be generally taught and not very flexible.    Data:      Last Basic Metabolic Panel:  Lab Results   Component Value Date     06/15/2017      Lab Results   Component Value Date    POTASSIUM 4.1 06/15/2017     Lab Results   Component Value Date    CHLORIDE 101 06/15/2017     Lab Results   Component Value Date    CRISTOBAL 9.6 06/15/2017     Lab Results   Component Value Date    CO2 30 06/15/2017     Lab Results   Component Value Date    BUN 34 06/15/2017     Lab Results   Component Value Date    CR 0.86 06/15/2017     Lab Results   Component Value Date     06/15/2017       GFR Estimate   Date Value Ref Range Status   06/15/2017 >90  Non  GFR Calc   >60 mL/min/1.7m2 Final   06/14/2017 >90  Non  GFR Calc   >60 mL/min/1.7m2 Final   06/13/2017 >90  Non  GFR Calc   >60 mL/min/1.7m2 Final     GFR Estimate If Black   Date Value Ref Range Status   06/15/2017 >90   GFR Calc   >60 mL/min/1.7m2 Final   06/14/2017 >90   GFR Calc   >60 mL/min/1.7m2 Final   06/13/2017 >90   GFR Calc   >60 mL/min/1.7m2 Final         Lab Results   Component Value Date    A1C 5.3 05/16/2017    A1C 5.6 05/10/2017    A1C 5.4 09/10/2015     Lab Results   Component Value Date    MICROL 7 05/10/2017     No results found for: MICROALBUMIN  Lab Results   Component Value Date    CPEPT 7.4 (H) 05/10/2017    GADAB  09/10/2015     <5.0  Reference range: 0.0 to 5.0  Unit: IU/mL  (Note)  INTERPRETIVE INFORMATION:  Glutamic Acid Decarboxylase  Antibody  A value greater than 5.0 IU/mL is considered positive for  Glutamic Acid Decarboxylase Antibody.  Performed by [facility],  [address] [place] [id number] [phone]  [email or web address], Josesito Ortiz MD, Lab. Director      ISCAB  09/10/2015     <1:4  Reference range: <1:4  (Note)  INTERPRETIVE INFORMATION: Islet  Cell Ab, IgG  Islet cell antibodies (ICAs) are associated with type 1  diabetes (TID), an autoimmune endocrine disorder. ICAs may  be present years before the onset of clinical symptoms. To  calculate Juvenile Diabetes Foundation (JDF) units:  multiply the titer x 5 (1:8  8 x 5 = 40 JDF Units).  Test developed and characteristics determined by "[employer] See Compliance Statement A: [employer]  Performed by "[facility]",  [address] [facility] [place] [place] [address] [phone]  [email or web address], Josesito Ortiz MD, Lab. Director       Cholesterol   Date Value Ref Range Status   05/10/2017 157 <200 mg/dL Final   09/10/2015 146 <200 mg/dL Final     Comment:     LDL Cholesterol is the primary guide to therapy.   The NCEP recommends further evaluation of: patients with cholesterol greater   than 200 mg/dL if additional risk factors are present, cholesterol greater   than   240 mg/dL, triglycerides greater than 150 mg/dL, or HDL less than 40 mg/dL.       HDL Cholesterol   Date Value Ref Range Status   05/10/2017 43 >39 mg/dL Final   09/10/2015 39 (L) >40 mg/dL Final     LDL Cholesterol Calculated   Date Value Ref Range Status   05/10/2017 94 <100 mg/dL Final     Comment:     Desirable:       <100 mg/dl   09/10/2015 86 0 - 129 mg/dL Final     Comment:     LDL Cholesterol is the primary guide to therapy: LDL-cholesterol goal in high   risk patients is <100 mg/dL and in very high risk patients is <70 mg/dL.       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  He is doing well on basal Lantus bid and Novolog correction with really water only at this time in addition to his tube feeding.     -  Recommend  increase Lantus qhs to 38 in hopes of improving fasting blood glucose without hypoglycemia.     - RTC 2-3 weeks to adjust regimen for oral intake and transitioning to overnight only feeds.     - Will send to US to assure to developing additional clot on anticoagulation therapy.     - Left nipple tenderness concerning as unilateral, though recent onset may be related to Seroquel though has had minimal doses.  To breast center if symptoms do not readily resolve.          >50% of 45 minute visit spent in face to face counseling, education and coordination of care related to options for better glycemic control as well as preventing, detecting, and treating hypoglycemia.      It is my privilege to be involved in the care of the above patient.     Lexy Brunson PA-C, MPAS  [facility]  Diabetes, Endocrinology, and Metabolism  [phone] Appointments/Nurse  [phone] pager  [phone] nurse line

## 2017-06-19 NOTE — PATIENT INSTRUCTIONS
It is so nice to meet you!    I think that overall your blood sugar control is quite good.  I recommend that you increase your evening Lantus from 36 to 38 units in hopes of achieving fasting BG of 90- 120.    Please do My Chart numbers if BG again <70 or >180 at all.     We can go ahead and check out the tenderness in your leg and left breast, but if symptoms resolve prior to appointments, you can certainly cancel and advise us if symptoms return.    My best wishes,    Lexy Brunson PA-C, MPAS  [facility]  Diabetes, Endocrinology, and Metabolism  [phone] Appointments/Nurse  [phone] pager  [phone] nurse line  [phone] URGENTafter hours/weekend Endocrinologist on call

## 2017-06-19 NOTE — MR AVS SNAPSHOT
After Visit Summary   6/19/2017    Sohan Arita    MRN: [id number]           Patient Information     Date Of Birth          1960        Visit Information        Provider Department      6/19/2017 7:30 AM Lexy Brunson PA-C [facility] Endocrinology        Today's Diagnoses     Hypertrophy of breast    -  1    Post-pancreatectomy diabetes (H)        Pain of right lower extremity        Postsurgical hypothyroidism          Care Instructions    It is so nice to meet you!    I think that overall your blood sugar control is quite good.  I recommend that you increase your evening Lantus from 36 to 38 units in hopes of achieving fasting BG of 90- 120.    Please do My Chart numbers if BG again <70 or >180 at all.     We can go ahead and check out the tenderness in your leg and left breast, but if symptoms resolve prior to appointments, you can certainly cancel and advise us if symptoms return.    My best wishes,    Lexy Brunson PA-C, [facility]  [facility]  Diabetes, Endocrinology, and Metabolism  [phone] Appointments/Nurse  [phone] pager  [phone] nurse line  [phone] URGENTafter hours/weekend Endocrinologist on call            Follow-ups after your visit        Follow-up notes from your care team     Return in about 3 weeks (around 7/10/2017).      Your next 10 appointments already scheduled     Jun 19, 2017  8:45 AM CDT   LAB with  LAB   [facility] ([facility])    [address] [place] [id number] [phone]           Patient must bring picture ID.  Patient should be prepared to give a urine specimen  Please do not eat 10-12 hours before your appointment if you are coming in fasting for labs on lipids, cholesterol, or glucose (sugar).  Pregnant women should follow their Care Team instructions. Water with medications is okay. Do not drink coffee or other fluids.   If you have concerns about taking   your medications, please ask at office or if scheduling via [employer], send a message by clicking on Secure Messaging, Message Your Care Team.            Jun 19, 2017  9:00 AM CDT   (Arrive by 8:45 AM)   Pancreas and Kidney Transplant Post Op with Ian Mendez MD   [facility] Solid Organ Transplant ([facility])    [address] [place] [id number] [phone]            Jul 12, 2017  9:30 AM CDT   (Arrive by 9:15 AM)   Office Visit with Evelyne Diez RD   [facility] Diabetes ([facility])    [address] [place] [id number] [phone]           Bring a current list of meds and any records pertaining to this visit.  For Physicals, please bring immunization records and any forms needing to be filled out.  Please arrive 10 minutes early to complete paperwork.            Jul 18, 2017  8:30 AM CDT   (Arrive by 8:15 AM)   RETURN DIABETES with Lexy Brunson PA-C   [facility] Endocrinology ([facility])    [address] [place] [id number] [phone]              Who to contact     Please call your clinic at [phone] to:    Ask questions about your health    Make or cancel appointments    Discuss your medicines    Learn about your test results    Speak to your doctor   If you have compliments or concerns about an experience at your clinic, or if you wish to file a complaint, please contact [facility] Patient Relations at [phone] or email us at [email or web address]         Additional Information About Your Visit        CITYBIZLIST Information     CITY[employer]ZLIST is an electronic gateway that provides easy, online access to your medical records. With CITYBIZLIST, you can request a clinic appointment, read your test results, renew a prescription or communicate with your care team.     To sign up for [employer]BIZLIST visit the website at  "[email or web address]   You will be asked to enter the access code listed below, as well as some personal information. Please follow the directions to create your username and password.     Your access code is: [email or web address]  Expires: 2017  6:30 AM     Your access code will  in 90 days. If you need help or a new code, please contact your [facility] or call [phone] for assistance.        Care EveryWhere ID     This is your Care EveryWhere ID. This could be used by other organizations to access your [facility] records  [id number]        Your Vitals Were     Pulse Height BMI (Body Mass Index)             73 1.88 m (6' 2\") 28.57 kg/m2          Blood Pressure from Last 3 Encounters:   17 128/79   06/15/17 123/78   17 126/74    Weight from Last 3 Encounters:   17 100.9 kg (222 lb 8 oz)   17 100.9 kg (222 lb 6.4 oz)   17 104.9 kg (231 lb 4.8 oz)              Today, you had the following     No orders found for display         Where to get your medicines      These medications were sent to [facility] [address] [address]     Phone:  [phone]     blood glucose monitoring test strip          Primary Care Provider Office Phone # Fax #    Frandy JOHNSON Frye [phone]       [address]  [facility] [address]        Thank you!     Thank you for choosing [facility] ENDOCRINOLOGY  for your care. Our goal is always to provide you with excellent care. Hearing back from our patients is one way we can continue to improve our services. Please take a few minutes to complete the written survey that you may receive in the mail after your visit with us. Thank you!             Your Updated Medication List - Protect others around you: Learn how to safely use, store and throw away your medicines at www.disposemymeds.org.        "   This list is accurate as of: 6/19/17  8:40 AM.  Always use your most recent med list.                   Brand Name Dispense Instructions for use    acetaminophen 325 MG tablet    TYLENOL    100 tablet    Take 2 tablets (650 mg) by mouth every 4 hours as needed for mild pain or fever       Alcohol Swabs Pads     100 each    1 pad as needed       * amylase-lipase-protease 39737 UNITS Cpep    CREON 12    270 capsule    Take 3-4 capsules (36,000-48,000 Units) by mouth every hour as needed (with snacks)       * amylase-lipase-protease 49798 UNITS Cpep    ZENPEP    180 capsule    2 capsules (40,000 Units) by Per Feeding Tube route every 4 hours       aspirin 81 MG EC tablet     30 tablet    Take 1 tablet (81 mg) by mouth daily       BD SHARPS  Misc     1 each    1 Container as needed       blood glucose monitoring lancets     1 Box    Use to test blood sugar 8 times daily or as directed.       blood glucose monitoring test strip    FREESTYLE LITE    300 strip    Use to test blood sugars 8 times daily or as directed.       cholecalciferol 2000 UNITS tablet     30 tablet    Take 1 tablet by mouth daily       fentaNYL 100 mcg/hr 72 hr patch    DURAGESIC    4 patch    Place 1 patch onto the skin every 72 hours       ferrous sulfate 325 (65 FE) MG Tbec EC tablet     30 tablet    Take 1 tablet (325 mg) by mouth daily       fluconazole 200 MG tablet    DIFLUCAN    30 tablet    Take 1 tablet (200 mg) by mouth daily       FLUoxetine 20 MG/5ML solution    PROzac    75 mL    2.5 mLs (10 mg) by Per J Tube route daily       folic acid 1 MG tablet    FOLVITE    30 tablet    Take 1 tablet (1 mg) by mouth daily       glucose 40 % Gel gel     3 Tube    Take 15-30 g by mouth every 15 minutes as needed for low blood sugar       * insulin aspart 100 UNIT/ML injection    NovoLOG PEN    3 mL    1 units per 8 grams of carbohydrate.  Only chart total amount of units given.  Do not give if pre-prandial glucose is less than 60 mg/dL.        * insulin aspart 100 UNIT/ML injection    NovoLOG PEN    3 mL    Check blood glucose Q4H and administer based on blood glucose Notify provider if glucose greater than or equal to 350 mg/dL after administration. -150 = 2 units. -180 = 4 units. -210 = 6 units. -240 = 8 units. -270 = 10 units. -300 = 12 units. -330 = 14 units. BG >330 = 16 units.       * insulin glargine 100 UNIT/ML injection    LANTUS    3 mL    Inject 30 Units Subcutaneous every morning       * insulin glargine 100 UNIT/ML injection    LANTUS    3 mL    Inject 36 Units Subcutaneous every evening       insulin pen needle 32G X 4 MM     100 each    Use 8 pen needles daily or as directed.       levothyroxine 25 mcg/mL Susp    SYNTHROID    180 mL    6 mLs (150 mcg) by Per J Tube route every morning (before breakfast)       magnesium hydroxide 400 MG/5ML suspension    MILK OF MAGNESIA    105 mL    30 mLs by Per Feeding Tube route 2 times daily as needed for constipation       metoclopramide 5 MG tablet    REGLAN    90 tablet    Take 1 tablet (5 mg) by mouth 3 times daily as needed       multivitamin CF formula Caps per capsule     30 capsule    Take 1 capsule by mouth daily       ondansetron 4 MG tablet    ZOFRAN    24 tablet    Take 1 tablet (4 mg) by mouth every 6 hours as needed for nausea or vomiting       oxyCODONE 5 MG/5ML solution    ROXICODONE    250 mL    2.5-5 mLs (2.5-5 mg) by Per Feeding Tube route every 4 hours as needed for moderate to severe pain       pantoprazole Susp suspension    PROTONIX    1200 mL    20 mLs (40 mg) by Oral or J tube route 2 times daily       pregabalin 20 MG/ML solution    LYRICA    70 mL    1.25 mLs (25 mg) by Per Feeding Tube route daily as needed       * QUEtiapine 25 MG tablet    SEROquel    60 tablet    Take 0.5 tablets (12.5 mg) by mouth At Bedtime       * QUEtiapine 25 MG tablet    SEROquel    60 tablet    Take 1 tablet (25 mg) by mouth 2 times daily as needed  (Anxiety)       sennosides 8.8 MG/5ML syrup    SENOKOT    600 mL    10 mLs by Per Feeding Tube route 2 times daily       sodium bicarbonate 325 MG tablet     180 tablet    1 tablet q 4 hrs via Jtube. Administration Instructions: Crush 1 tablet and mix into 15 ml of warm water and use this solution to mix with Creon pancreatic enzymes. DO NOT administer directly into Jtube (to be mixed into tube feed formula with Creon enzyme).       warfarin 5 MG tablet    COUMADIN    30 tablet    Take 1 tablet (5 mg) by mouth daily       * Notice:  This list has 8 medication(s) that are the same as other medications prescribed for you. Read the directions carefully, and ask your doctor or other care provider to review them with you.

## 2017-06-19 NOTE — NURSING NOTE
"Chief Complaint   Patient presents with     RECHECK     POST-PANCREATECTOMY DIABETES F/U        Initial /79 (BP Location: Right arm, Patient Position: Chair, Cuff Size: Adult Regular)  Pulse 73  Ht 1.88 m (6' 2\")  Wt 100.9 kg (222 lb 8 oz)  BMI 28.57 kg/m2 Estimated body mass index is 28.57 kg/(m^2) as calculated from the following:    Height as of this encounter: 1.88 m (6' 2\").    Weight as of this encounter: 100.9 kg (222 lb 8 oz).  Medication Reconciliation: complete    "

## 2017-06-20 ENCOUNTER — TELEPHONE (OUTPATIENT)
Dept: TRANSPLANT | Facility: CLINIC | Age: 57
End: 2017-06-20

## 2017-06-20 DIAGNOSIS — Z90.410 POST-PANCREATECTOMY DIABETES (H): ICD-10-CM

## 2017-06-20 DIAGNOSIS — E89.1 POST-PANCREATECTOMY DIABETES (H): ICD-10-CM

## 2017-06-20 DIAGNOSIS — N64.4 NIPPLE PAIN: Primary | ICD-10-CM

## 2017-06-20 DIAGNOSIS — Z90.410 HISTORY OF PANCREATECTOMY: ICD-10-CM

## 2017-06-20 DIAGNOSIS — Z79.01 LONG TERM (CURRENT) USE OF ANTICOAGULANTS: ICD-10-CM

## 2017-06-20 DIAGNOSIS — I82.409 DVT (DEEP VENOUS THROMBOSIS) (H): ICD-10-CM

## 2017-06-20 DIAGNOSIS — E13.9 POST-PANCREATECTOMY DIABETES (H): ICD-10-CM

## 2017-06-20 DIAGNOSIS — T81.49XA POSTOPERATIVE WOUND INFECTION: Primary | ICD-10-CM

## 2017-06-20 DIAGNOSIS — T81.49XA WOUND INFECTION AFTER SURGERY: ICD-10-CM

## 2017-06-20 LAB
MICRO REPORT STATUS: NORMAL
SPECIMEN SOURCE: NORMAL
YEAST SPEC QL CULT: NO GROWTH

## 2017-06-21 ENCOUNTER — TELEPHONE (OUTPATIENT)
Dept: ENDOCRINOLOGY | Facility: CLINIC | Age: 57
End: 2017-06-21

## 2017-06-21 ENCOUNTER — ANTICOAGULATION THERAPY VISIT (OUTPATIENT)
Dept: ANTICOAGULATION | Facility: CLINIC | Age: 57
End: 2017-06-21

## 2017-06-21 DIAGNOSIS — I82.402 DEEP VEIN THROMBOSIS (DVT) OF LEFT LOWER EXTREMITY (H): ICD-10-CM

## 2017-06-21 DIAGNOSIS — Z79.01 LONG-TERM (CURRENT) USE OF ANTICOAGULANTS: ICD-10-CM

## 2017-06-21 DIAGNOSIS — E89.0 POSTSURGICAL HYPOTHYROIDISM: ICD-10-CM

## 2017-06-21 DIAGNOSIS — I82.452 THROMBOSIS OF LEFT PERONEAL VEIN (H): ICD-10-CM

## 2017-06-21 LAB
INR PPP: 4.01 (ref 0.86–1.14)
T4 FREE SERPL-MCNC: 1.11 NG/DL (ref 0.76–1.46)
TSH SERPL DL<=0.005 MIU/L-ACNC: 9.85 MU/L (ref 0.4–4)

## 2017-06-21 NOTE — MR AVS SNAPSHOT
Sohan Arita   6/21/2017   Anticoagulation Therapy Visit    Description:  56 year old male   Provider:  Cristina Faust, RN   Department:  [facility]           INR as of 6/21/2017     Today's INR 4.01!      Anticoagulation Summary as of 6/21/2017     INR goal 2.0-3.0   Today's INR 4.01!   Full instructions 6/21: Hold; 6/22: 2.5 mg; Otherwise No maintenance plan   Next INR check 6/23/2017    Indications   Deep vein thrombosis (DVT) of left lower extremity (H) [I82.402]  Long-term (current) use of anticoagulants [Z79.01] [Z79.01]         June 2017 Details    Sun Mon Tue Wed Thu Fri Sat         1               2               3                 4               5               6               7               8               9               10                 11               12               13               14               15               16               17                 18               19               20               21      Hold   See details      22      2.5 mg         23            24                 25               26               27               28               29               30                 Date Details   06/21 This INR check       Date of next INR:  6/23/2017         How to take your warfarin dose     To take:  2.5 mg Take 0.5 of a 5 mg tablet.    Hold Do not take your warfarin dose. See the Details table to the right for additional instructions.

## 2017-06-21 NOTE — TELEPHONE ENCOUNTER
Received call from Radiology who saw previous clot left, nothing at all on right side.  I reached Tanner by phone to let him know.    He will see breast center next Wednesday.    It is my privilege to be involved in the care of the above patient.     Lexy Brunson PA-C, MPAS  [facility]  Diabetes, Endocrinology, and Metabolism  [phone] Appointments/Nurse  [phone] pager  [phone] nurse line

## 2017-06-21 NOTE — PROGRESS NOTES
ANTICOAGULATION FOLLOW-UP CLINIC VISIT    Patient Name:  Sohan Arita  Date:  6/21/2017  Contact Type:  Telephone    SUBJECTIVE:     Patient Findings     Positives Antibiotic use or infection    Comments Continues on Fluconazole and pt is unaware of an end date            OBJECTIVE    INR   Date Value Ref Range Status   06/21/2017 4.01 (H) 0.86 - 1.14 Final       ASSESSMENT / PLAN  INR assessment SUPRA    Recheck INR In: 2 DAYS    INR Location Clinic      Anticoagulation Summary as of 6/21/2017     INR goal 2.0-3.0   Today's INR 4.01!   Maintenance plan No maintenance plan   Full instructions 6/21: Hold; 6/22: 2.5 mg; Otherwise No maintenance plan   Next INR check 6/23/2017   Priority INR   Target end date     Indications   Deep vein thrombosis (DVT) of left lower extremity (H) [I82.402]  Long-term (current) use of anticoagulants [Z79.01] [Z79.01]         Anticoagulation Episode Summary     INR check location     Preferred lab     Send INR reminders to [facility]    Comments       Anticoagulation Care Providers     Provider Role Specialty Phone number    Rodrigo Jaquze MD Responsible Transplant [phone]            See the Encounter Report to view Anticoagulation Flowsheet and Dosing Calendar (Go to Encounters tab in chart review, and find the Anticoagulation Therapy Visit)    Spoke with patient. Gave them their lab results and new warfarin recommendation.  No changes in health, medication, or diet. No missed doses, no falls. No signs or symptoms of bleed or clotting.     Cristina Faust RN

## 2017-06-22 ENCOUNTER — OFFICE VISIT (OUTPATIENT)
Dept: TRANSPLANT | Facility: CLINIC | Age: 57
End: 2017-06-22
Attending: TRANSPLANT SURGERY
Payer: COMMERCIAL

## 2017-06-22 VITALS
OXYGEN SATURATION: 97 % | HEART RATE: 79 BPM | TEMPERATURE: 99 F | DIASTOLIC BLOOD PRESSURE: 74 MMHG | RESPIRATION RATE: 16 BRPM | SYSTOLIC BLOOD PRESSURE: 123 MMHG

## 2017-06-22 DIAGNOSIS — E89.1 POST-PANCREATECTOMY DIABETES (H): ICD-10-CM

## 2017-06-22 DIAGNOSIS — E13.9 POST-PANCREATECTOMY DIABETES (H): ICD-10-CM

## 2017-06-22 DIAGNOSIS — Z90.410 ACQUIRED TOTAL ABSENCE OF PANCREAS: ICD-10-CM

## 2017-06-22 DIAGNOSIS — I82.4Y9 ACUTE DEEP VEIN THROMBOSIS (DVT) OF PROXIMAL VEIN OF LOWER EXTREMITY, UNSPECIFIED LATERALITY (H): ICD-10-CM

## 2017-06-22 DIAGNOSIS — Z90.410 POST-PANCREATECTOMY DIABETES (H): ICD-10-CM

## 2017-06-22 DIAGNOSIS — I82.409 DVT (DEEP VENOUS THROMBOSIS) (H): Primary | ICD-10-CM

## 2017-06-22 DIAGNOSIS — G89.18 POSTOPERATIVE PAIN: Primary | ICD-10-CM

## 2017-06-22 DIAGNOSIS — Z90.410 HISTORY OF PANCREATECTOMY: ICD-10-CM

## 2017-06-22 RX ORDER — METHADONE HYDROCHLORIDE 10 MG/5ML
SOLUTION ORAL
Qty: 80 ML | Refills: 0 | Status: SHIPPED | OUTPATIENT
Start: 2017-06-22 | End: 2017-06-29

## 2017-06-22 NOTE — PROGRESS NOTES
TPIAT 5_15_17  Back for wound check.    /74  Pulse 79  Temp 99  F (37.2  C) (Oral)  Resp 16  SpO2 97%  Upper midline wound granulating well  No erythema  Small defect in upper aspect, probed about 2-3 cm below fascia.  Packed with Nuguaze    Feeds: continuous.  No problems  Pain: well controlled on Fentanyl (100) patch.  Once every other day, takes a single 5 mg oxycodone.  Bowel function:ok    Plan: RTC next Thursday  Continue wound pack  Convert to night time feeds, have endocrine see re: insulin management.  Can increase oral intake.  Will start fentanyl patch wean: add methadone 10 mg twice a day.  INR still over 4.  Hold coumadin still, check INR tomorrow.

## 2017-06-22 NOTE — PROGRESS NOTES
Trevor Pugh,     Dr. Jaquez would like to begin cycling Tanner's tube feeds and allow him to begin eating more. I'd like to turn his feeds off during th day but then have them run at night at the current rate.     Can you please advise on insulin dosing?     Thank you.     Soni      Please decrease am Lantus to 9 units and start NPH 13 units at the start of tube feeding please.  Please have him My Chart his numbers weekly and call right away if BG < 70 , >180 at all.    They may need assistance getting NPH approved as second basal insulin.    Thanks, Lexy

## 2017-06-22 NOTE — NURSING NOTE
"Chief Complaint   Patient presents with     TP-IAT Transplant     POD 38       Initial /74  Pulse 79  Temp 99  F (37.2  C) (Oral)  Resp 16  SpO2 97% Estimated body mass index is 28.57 kg/(m^2) as calculated from the following:    Height as of 6/19/17: 1.88 m (6' 2\").    Weight as of 6/19/17: 100.9 kg (222 lb 8 oz).    "

## 2017-06-22 NOTE — LETTER
6/22/2017       RE: Sohan Arita  [address] [facility] [address]     Dear Colleague,    Thank you for referring your patient, Sohan Arita, to the [facility] SOLID ORGAN TRANSPLANT at [facility]. Please see a copy of my visit note below.    TPIAT 5_15_17  Back for wound check.    /74  Pulse 79  Temp 99  F (37.2  C) (Oral)  Resp 16  SpO2 97%  Upper midline wound granulating well  No erythema  Small defect in upper aspect, probed about 2-3 cm below fascia.  Packed with Nuguaze    Feeds: continuous.  No problems  Pain: well controlled on Fentanyl (100) patch.  Once every other day, takes a single 5 mg oxycodone.  Bowel function:ok    Plan: RTC next Thursday  Continue wound pack  Convert to night time feeds, have endocrine see re: insulin management.  Can increase oral intake.  Will start fentanyl patch wean: add methadone 10 mg twice a day.  INR still over 4.  Hold coumadin still, check INR tomorrow.    Again, thank you for allowing me to participate in the care of your patient.      Sincerely,    Rodrigo Jaquez MD

## 2017-06-22 NOTE — MR AVS SNAPSHOT
After Visit Summary   6/22/2017    Sohan Arita    MRN: [id number]           Patient Information     Date Of Birth          1960        Visit Information        Provider Department      6/22/2017 12:00 PM Rodrigo Jaquez MD [facility] Solid Organ Transplant        Today's Diagnoses     Postoperative wound infection, subsequent encounter    -  1    Acute deep vein thrombosis (DVT) of proximal vein of lower extremity, unspecified laterality (H)        Post-pancreatectomy diabetes (H)        Acquired total absence of pancreas           Follow-ups after your visit        Your next 10 appointments already scheduled     Jun 23, 2017  2:00 PM CDT   LAB with  LAB   [facility] Lab ([facility])    [address] [place] [id number] [phone]           Patient must bring picture ID.  Patient should be prepared to give a urine specimen  Please do not eat 10-12 hours before your appointment if you are coming in fasting for labs on lipids, cholesterol, or glucose (sugar).  Pregnant women should follow their Care Team instructions. Water with medications is okay. Do not drink coffee or other fluids.   If you have concerns about taking  your medications, please ask at office or if scheduling via [employer], send a message by clicking on Secure Messaging, Message Your Care Team.            Jun 28, 2017  9:40 AM CDT   (Arrive by 9:25 AM)   New Patient Visit with Amy Corrales MD   [facility] ([facility])    [address] [place] [id number] [phone]            Jun 29, 2017 10:15 AM CDT   (Arrive by 10:00 AM)   Return Auto [place] with Rodrigo Jaquez MD   [facility] Solid Organ Transplant ([facility])    [address] [place] [address]  3rd [facility] [id number] [phone]            Jul 12, 2017  9:30 AM CDT   (Arrive by 9:15 AM)   Office Visit  "with Evelyne Diez RD   [facility] Diabetes ([facility])    [address] [address] [phone]           Bring a current list of meds and any records pertaining to this visit.  For Physicals, please bring immunization records and any forms needing to be filled out.  Please arrive 10 minutes early to complete paperwork.            Jul 18, 2017  8:30 AM CDT   (Arrive by 8:15 AM)   RETURN DIABETES with Lexy Brunson PA-C   [facility] Endocrinology ([facility])    [address] [place] [address] [phone]              Future tests that were ordered for you today     Open Future Orders        Priority Expected Expires Ordered    CBC with platelets Routine 6/23/2017 6/23/2017 6/22/2017    INR Routine 6/23/2017 6/23/2017 6/22/2017            Who to contact     If you have questions or need follow up information about today's clinic visit or your schedule please contact [facility] SOLID ORGAN TRANSPLANT directly at [phone].  Normal or non-critical lab and imaging results will be communicated to you by [employer], letter or phone within 4 business days after the clinic has received the results. If you do not hear from us within 7 days, please contact the clinic through [employer] or phone. If you have a critical or abnormal lab result, we will notify you by phone as soon as possible.  Submit refill requests through [employer] or call your pharmacy and they will forward the refill request to us. Please allow 3 business days for your refill to be completed.          Additional Information About Your Visit        [employer] Information     [employer] lets you send messages to your doctor, view your test results, renew your prescriptions, schedule appointments and more. To sign up, go to [email or web address] . Click on \"Log in\" on the left side of the screen, which will take you to the Welcome page. Then click on " "\"Sign up Now\" on the right side of the page.     You will be asked to enter the access code listed below, as well as some personal information. Please follow the directions to create your username and password.     Your access code is: [email or web address]  Expires: 2017  6:30 AM     Your access code will  in 90 days. If you need help or a new code, please call your [facility] or [phone].        Care EveryWhere ID     This is your Care EveryWhere ID. This could be used by other organizations to access your [place] medical records  [id number]        Your Vitals Were     Pulse Temperature Respirations Pulse Oximetry          79 99  F (37.2  C) (Oral) 16 97%         Blood Pressure from Last 3 Encounters:   17 123/74   17 128/79   06/15/17 123/78    Weight from Last 3 Encounters:   17 100.9 kg (222 lb 8 oz)   17 100.9 kg (222 lb 6.4 oz)   17 104.9 kg (231 lb 4.8 oz)              Today, you had the following     No orders found for display         Today's Medication Changes          These changes are accurate as of: 17  1:13 PM.  If you have any questions, ask your nurse or doctor.               Start taking these medicines.        Dose/Directions    methadone HCl 10 MG/5ML Soln   Used for:  Postoperative pain, Post-pancreatectomy diabetes (H)   Started by:  Soni Aleman, RN        Take 10 mg (5 ml) liquid by jejunal tube every twelve hours.   Quantity:  80 mL   Refills:  0            Where to get your medicines      Some of these will need a paper prescription and others can be bought over the counter.  Ask your nurse if you have questions.     Bring a paper prescription for each of these medications     methadone HCl 10 MG/5ML Soln                Primary Care Provider Office Phone # Fax #    Frandy Frye [phone]       [address]  [facility] [address]        Equal Access to Services     SEPIDEH VINCENT AH: Leatha marrero " crissy Cordon, wabrandyda luperiadaha, qaybta kaaidan beltran, luis armando taoin hayaaej foxgeorgina diamondchitra lacarolinaej sammie. So [facility] [phone].    ATENCIÓN: Si habla ching, tiene a welsh disposición servicios gratuitos de asistencia lingüística. Joseph al [phone].    We comply with applicable federal civil rights laws and [place] laws. We do not discriminate on the basis of race, color, national origin, age, disability sex, sexual orientation or gender identity.            Thank you!     Thank you for choosing [facility] SOLID ORGAN TRANSPLANT  for your care. Our goal is always to provide you with excellent care. Hearing back from our patients is one way we can continue to improve our services. Please take a few minutes to complete the written survey that you may receive in the mail after your visit with us. Thank you!             Your Updated Medication List - Protect others around you: Learn how to safely use, store and throw away your medicines at www.disposemymeds.org.          This list is accurate as of: 6/22/17  1:13 PM.  Always use your most recent med list.                   Brand Name Dispense Instructions for use Diagnosis    acetaminophen 325 MG tablet    TYLENOL    100 tablet    Take 2 tablets (650 mg) by mouth every 4 hours as needed for mild pain or fever    Acute postoperative pain of abdomen       Alcohol Swabs Pads     100 each    1 pad as needed    Post-pancreatectomy diabetes (H)       * amylase-lipase-protease 49578 UNITS Cpep    CREON 12    270 capsule    Take 3-4 capsules (36,000-48,000 Units) by mouth every hour as needed (with snacks)    Acquired total absence of pancreas       * amylase-lipase-protease 05577 UNITS Cpep    ZENPEP    180 capsule    2 capsules (40,000 Units) by Per Feeding Tube route every 4 hours    Acquired total absence of pancreas       aspirin 81 MG EC tablet     30 tablet    Take 1 tablet (81 mg) by mouth daily    High platelet count (H), Acquired asplenia, Post-pancreatectomy  diabetes (H), Pancreatic insufficiency, Anemia       BD SHARPS  Misc     1 each    1 Container as needed    Post-pancreatectomy diabetes (H)       blood glucose monitoring lancets     1 Box    Use to test blood sugar 8 times daily or as directed.    Acquired total absence of pancreas       blood glucose monitoring test strip    FREESTYLE LITE    300 strip    Use to test blood sugars 8 times daily or as directed.    Post-pancreatectomy diabetes (H)       cholecalciferol 2000 UNITS tablet     30 tablet    Take 1 tablet by mouth daily    Acquired total absence of pancreas       fentaNYL 100 mcg/hr 72 hr patch    DURAGESIC    4 patch    Place 1 patch onto the skin every 72 hours    Acquired total absence of pancreas       ferrous sulfate 325 (65 FE) MG Tbec EC tablet     30 tablet    Take 1 tablet (325 mg) by mouth daily    High platelet count (H), Acquired asplenia, Post-pancreatectomy diabetes (H), Pancreatic insufficiency, Anemia       fluconazole 200 MG tablet    DIFLUCAN    30 tablet    Take 1 tablet (200 mg) by mouth daily    Surgical wound infection, initial encounter       FLUoxetine 20 MG/5ML solution    PROzac    75 mL    2.5 mLs (10 mg) by Per J Tube route daily    Acquired total absence of pancreas       folic acid 1 MG tablet    FOLVITE    30 tablet    Take 1 tablet (1 mg) by mouth daily    High platelet count (H), Acquired asplenia, Post-pancreatectomy diabetes (H), Pancreatic insufficiency, Anemia       glucose 40 % Gel gel     3 Tube    Take 15-30 g by mouth every 15 minutes as needed for low blood sugar    Post-pancreatectomy diabetes (H)       * insulin aspart 100 UNIT/ML injection    NovoLOG PEN    3 mL    1 units per 8 grams of carbohydrate.  Only chart total amount of units given.  Do not give if pre-prandial glucose is less than 60 mg/dL.    Post-pancreatectomy diabetes (H)       * insulin aspart 100 UNIT/ML injection    NovoLOG PEN    3 mL    Check blood glucose Q4H and administer based  on blood glucose Notify provider if glucose greater than or equal to 350 mg/dL after administration. -150 = 2 units. -180 = 4 units. -210 = 6 units. -240 = 8 units. -270 = 10 units. -300 = 12 units. -330 = 14 units. BG >330 = 16 units.    Post-pancreatectomy diabetes (H)       * insulin glargine 100 UNIT/ML injection    LANTUS    3 mL    Inject 30 Units Subcutaneous every morning    Post-pancreatectomy diabetes (H)       * insulin glargine 100 UNIT/ML injection    LANTUS    3 mL    Inject 36 Units Subcutaneous every evening    Post-pancreatectomy diabetes (H)       insulin pen needle 32G X 4 MM     100 each    Use 8 pen needles daily or as directed.    Acquired total absence of pancreas       iohexol 140 MG/ML Soln solution    OMNIPAQUE    50 mL    Mix according to Pharmacy directions. Drink half of the solution beginning at 5:00 AM 6/21/17 and remainder of solution at 6:00 AM for 7:00 AM scan on 6/21/17    Postoperative wound infection, Post-pancreatectomy diabetes (H), History of pancreatectomy, DVT (deep venous thrombosis) (H), Long term (current) use of anticoagulants       levothyroxine 25 mcg/mL Susp    SYNTHROID    180 mL    6 mLs (150 mcg) by Per J Tube route every morning (before breakfast)    Acquired total absence of pancreas       magnesium hydroxide 400 MG/5ML suspension    MILK OF MAGNESIA    105 mL    30 mLs by Per Feeding Tube route 2 times daily as needed for constipation    Other constipation       methadone HCl 10 MG/5ML Soln     80 mL    Take 10 mg (5 ml) liquid by jejunal tube every twelve hours.    Postoperative pain, Post-pancreatectomy diabetes (H)       metoclopramide 5 MG tablet    REGLAN    90 tablet    Take 1 tablet (5 mg) by mouth 3 times daily as needed    History of pancreatectomy       multivitamin CF formula Caps per capsule     30 capsule    Take 1 capsule by mouth daily    Acquired total absence of pancreas       ondansetron 4 MG tablet     ZOFRAN    24 tablet    Take 1 tablet (4 mg) by mouth every 6 hours as needed for nausea or vomiting    Postoperative nausea       oxyCODONE 5 MG/5ML solution    ROXICODONE    250 mL    2.5-5 mLs (2.5-5 mg) by Per Feeding Tube route every 4 hours as needed for moderate to severe pain    Surgical wound infection, initial encounter       pantoprazole Susp suspension    PROTONIX    1200 mL    20 mLs (40 mg) by Oral or J tube route 2 times daily    Acquired total absence of pancreas       pregabalin 20 MG/ML solution    LYRICA    70 mL    1.25 mLs (25 mg) by Per Feeding Tube route daily as needed    History of pancreatectomy       * QUEtiapine 25 MG tablet    SEROquel    60 tablet    Take 0.5 tablets (12.5 mg) by mouth At Bedtime    Anxiety       * QUEtiapine 25 MG tablet    SEROquel    60 tablet    Take 1 tablet (25 mg) by mouth 2 times daily as needed (Anxiety)    Anxiety       sennosides 8.8 MG/5ML syrup    SENOKOT    600 mL    10 mLs by Per Feeding Tube route 2 times daily    Other constipation       sodium bicarbonate 325 MG tablet     180 tablet    1 tablet q 4 hrs via Jtube. Administration Instructions: Crush 1 tablet and mix into 15 ml of warm water and use this solution to mix with Creon pancreatic enzymes. DO NOT administer directly into Jtube (to be mixed into tube feed formula with Creon enzyme).    High platelet count (H), Acquired asplenia, Post-pancreatectomy diabetes (H), Pancreatic insufficiency       warfarin 5 MG tablet    COUMADIN    30 tablet    Take 1 tablet (5 mg) by mouth daily    DVT (deep venous thrombosis) (H)       * Notice:  This list has 8 medication(s) that are the same as other medications prescribed for you. Read the directions carefully, and ask your doctor or other care provider to review them with you.

## 2017-06-23 ENCOUNTER — TELEPHONE (OUTPATIENT)
Dept: TRANSPLANT | Facility: CLINIC | Age: 57
End: 2017-06-23

## 2017-06-23 ENCOUNTER — ANTICOAGULATION THERAPY VISIT (OUTPATIENT)
Dept: ANTICOAGULATION | Facility: CLINIC | Age: 57
End: 2017-06-23

## 2017-06-23 DIAGNOSIS — Z90.410 POST-PANCREATECTOMY DIABETES (H): ICD-10-CM

## 2017-06-23 DIAGNOSIS — K86.89 PANCREATIC INSUFFICIENCY: ICD-10-CM

## 2017-06-23 DIAGNOSIS — I82.409 DVT (DEEP VENOUS THROMBOSIS) (H): ICD-10-CM

## 2017-06-23 DIAGNOSIS — Z90.410 HISTORY OF PANCREATECTOMY: ICD-10-CM

## 2017-06-23 DIAGNOSIS — E89.1 POST-PANCREATECTOMY DIABETES (H): ICD-10-CM

## 2017-06-23 DIAGNOSIS — E13.9 POST-PANCREATECTOMY DIABETES (H): ICD-10-CM

## 2017-06-23 DIAGNOSIS — I82.452 THROMBOSIS OF LEFT PERONEAL VEIN (H): ICD-10-CM

## 2017-06-23 DIAGNOSIS — E89.1 POST-PANCREATECTOMY DIABETES (H): Primary | ICD-10-CM

## 2017-06-23 DIAGNOSIS — Z79.01 LONG-TERM (CURRENT) USE OF ANTICOAGULANTS: ICD-10-CM

## 2017-06-23 DIAGNOSIS — K59.00 CONSTIPATION: Primary | ICD-10-CM

## 2017-06-23 DIAGNOSIS — I82.402 DEEP VEIN THROMBOSIS (DVT) OF LEFT LOWER EXTREMITY (H): ICD-10-CM

## 2017-06-23 DIAGNOSIS — E13.9 POST-PANCREATECTOMY DIABETES (H): Primary | ICD-10-CM

## 2017-06-23 DIAGNOSIS — Z90.410 POST-PANCREATECTOMY DIABETES (H): Primary | ICD-10-CM

## 2017-06-23 LAB
ERYTHROCYTE [DISTWIDTH] IN BLOOD BY AUTOMATED COUNT: 16.8 % (ref 10–15)
HCT VFR BLD AUTO: 33.7 % (ref 40–53)
HGB BLD-MCNC: 10.3 G/DL (ref 13.3–17.7)
INR PPP: 1.94 (ref 0.86–1.14)
MCH RBC QN AUTO: 25.3 PG (ref 26.5–33)
MCHC RBC AUTO-ENTMCNC: 30.6 G/DL (ref 31.5–36.5)
MCV RBC AUTO: 83 FL (ref 78–100)
PLATELET # BLD AUTO: 862 10E9/L (ref 150–450)
RBC # BLD AUTO: 4.07 10E12/L (ref 4.4–5.9)
WBC # BLD AUTO: 11.7 10E9/L (ref 4–11)

## 2017-06-23 RX ORDER — POLYETHYLENE GLYCOL 3350 17 G/17G
POWDER, FOR SOLUTION ORAL
Qty: 119 G | Refills: 3 | Status: SHIPPED | OUTPATIENT
Start: 2017-06-23 | End: 2017-11-09

## 2017-06-23 NOTE — MR AVS SNAPSHOT
Sohan Arita   6/23/2017   Anticoagulation Therapy Visit    Description:  56 year old male   Provider:  Melissa Núñez, RN   [facility]           INR as of 6/23/2017     Today's INR 1.94!      Anticoagulation Summary as of 6/23/2017     INR goal 2.0-3.0   Today's INR 1.94!   Full instructions 6/23: 5 mg; 6/24: 2.5 mg; 6/25: 2.5 mg; 6/26: 2.5 mg; 6/27: 2.5 mg; Otherwise No maintenance plan   Next INR check 6/28/2017    Indications   Deep vein thrombosis (DVT) of left lower extremity (H) [I82.402]  Long-term (current) use of anticoagulants [Z79.01] [Z79.01]         June 2017 Details    Sun Mon Tue Wed Thu Fri Sat         1               2               3                 4               5               6               7               8               9               10                 11               12               13               14               15               16               17                 18               19               20               21               22               23      5 mg   See details      24      2.5 mg           25      2.5 mg         26      2.5 mg         27      2.5 mg         28            29               30                 Date Details   06/23 This INR check       Date of next INR:  6/28/2017         How to take your warfarin dose     To take:  2.5 mg Take 0.5 of a 5 mg tablet.    To take:  5 mg Take 1 of the 5 mg tablets.

## 2017-06-23 NOTE — TELEPHONE ENCOUNTER
Tanner,    I was able to get you an insulin pen Humulin (aka NPH) to help tide you over while cycling your tube feeds.    Per Lexy Brunson:  Beginning tomorrow morning at 8:00 AM:  1. Turn off your tube feeds. You may unhook yourself from the bag/backpack.  2. Give yourself ONLY 9 units of Lantus.  3. New terminology to learn and differentiate between are carbohydrate Coverage (you are covering the amount of carbohydrates you are eating with Novolog), and Correction, which you have already learned and implemented into your diabetes care.  4. The carbohydrate ratio is: 1 unit of Novolog insulin to 30 grams carbohydrates.  So what this means is you will count the number of carbs you are going to eat BUT DO NOT ADMINISTER THE COVERAGE DOSE OF NOVOLOG UNTIL YOU HAVE FINISHED EATING! You don t want to make the mistake of giving yourself the carbohydrate coverage dose, get too full and are unable to finish your meal. What can happen? You have too much insulin on board and not enough fuel to cover the already given insulin. You blood sugar will DROP. Only give the carbohydrate food coverage After you are finished eating.  5. So at mealtime you have two types of short-acting insulin to think about. The correction, for blood sugars out of target range and carbohydrate coverage. If your blood sugar is for example, 154, according to your sliding scale, you would give a correction dose of 4 units. If you are planning on eating 60 carbs AND finish the entire planned meal then you would give yourself 2 extra units of Novolog to  cover  your food. The total would be 6 units of Novolog total.   6. Go ahead and begin tasting foods that sound good to you. Start with a taste! Let your body relearn to accept food again. It has been a long time so your body/GI system has some learning to do. Think about what food group you are eating. Is it carbohydrate, protein, etc. You will need to take oral enzymes now. Will address this later in the  e-mail.  As the day proceeds continue as you normally do with your medications, blood sugar checks every 4 hours, etc.     Tomorrow night at 8:00 PM.   1. Hook your tube feeds up for the 12 hour overnight run. It s okay to mix two feeds in one bag so you can miss 1 night time feed. Either do it at midnight or 4 AM. Keep the same settings!  2. Administer 13 units of the Humulin insulin. Don t forget to prime and remove the cap!   3. Keep your usual nighttime medication administration routine.     Sunday June 25.  1. At 8:00 AM turn off your tube feeds until 8:00 PM Sunday night.  2. Give the 9 units of Lantus.    Continue each day as is.    Enzymes for oral food intake  Zenpep 20,000. Begin by taking 1-2 with your food at first. You will only be tasting and not taking in a large amount of food. Your main nutrition for the next week will primarily be from your nighttime tube feeds.  At your appointment with Dr. Jaquez 6/29, this can be discussed more after we have seen how your oral food intake is progressing.    Food DIARY AND Calorie Count  Please keep a food diary (I gave you plenty of handouts that are in your folder), and write down everything you are eating and how much. For example   cup, 2 tablespoons, 4 ounces, etc. Write the calorie total as well. In order to stop the tube feeds you must be able to sustain yourself on oral nutrition. Let your body adjust. It is NOT  a race!    For questions over the weekend, call [phone] and ask for the Endocrinologist on-call to call you. Keep a good record of your blood sugars. Ericka is in the office Monday and a wealth of knowledge. Her number is [phone]. Her pager is [phone].  Call her and arrange to e-mail your records to her. She will make sure that Lexy Brunson sees them.    Thank you both and have fun trying food again!    Soni Aleman RN BSN  Phone [phone]. Pager: [phone]  Toll free [phone]. Fax     Transplant Coordinator for Chronic pancreatitis /[facility] Auto Transplant Program  [address] [facility]  PWB Room 2-200. [address] [address]

## 2017-06-23 NOTE — PROGRESS NOTES
ANTICOAGULATION FOLLOW-UP CLINIC VISIT    Patient Name:  Sohan Arita  Date:  6/23/2017  Contact Type:  Telephone    SUBJECTIVE:     Patient Findings     Comments Pt reports MD told him to skip dose of coumadin on 6/22.  Pt has had no coumadin x4 days.            OBJECTIVE    INR   Date Value Ref Range Status   06/23/2017 1.94 (H) 0.86 - 1.14 Final       ASSESSMENT / PLAN  INR assessment THER    Recheck INR In: 5 DAYS    INR Location Clinic      Anticoagulation Summary as of 6/23/2017     INR goal 2.0-3.0   Today's INR 1.94!   Maintenance plan No maintenance plan   Full instructions 6/23: 5 mg; 6/24: 2.5 mg; 6/25: 2.5 mg; 6/26: 2.5 mg; 6/27: 2.5 mg; Otherwise No maintenance plan   Next INR check 6/28/2017   Priority INR   Target end date     Indications   Deep vein thrombosis (DVT) of left lower extremity (H) [I82.402]  Long-term (current) use of anticoagulants [Z79.01] [Z79.01]         Anticoagulation Episode Summary     INR check location     Preferred lab     Send INR reminders to [facility]    Comments       Anticoagulation Care Providers     Provider Role Specialty Phone number    Rodrigo Jaquez MD Responsible Transplant [phone]            See the Encounter Report to view Anticoagulation Flowsheet and Dosing Calendar (Go to Encounters tab in chart review, and find the Anticoagulation Therapy Visit)    Spoke with pt.  Will check next INR when pt returns here for MD appointment next week.     Melissa Núñez RN

## 2017-06-24 ENCOUNTER — TELEPHONE (OUTPATIENT)
Dept: ENDOCRINOLOGY | Facility: CLINIC | Age: 57
End: 2017-06-24

## 2017-06-25 NOTE — TELEPHONE ENCOUNTER
New instruction given to pt yesterday was <  1.  9 units of Lantus in am  2. The carbohydrate ratio is: 1 unit of Novolog insulin to 30 grams carbohydrates.     Tomorrow night at 8:00 PM.   1. Hook your tube feeds up for the 12 hour overnight run. It s okay to mix two feeds in one bag so you can miss 1 night time feed. Either do it at midnight or 4 AM. Keep the same settings!  2. Administer 13 units of the Humulin insulin. Don t forget to prime and remove the cap!   3. Keep your usual nighttime medication administration routine. >    Patient called today at 8:21 PM ands stated that his BG has been low a couple of times; 60's the lowest.  He corrected appropriately. He called because he is concerned about the lows.  He also has increased his activity.     Plan advised.     Reduce morning dose of Lantus to 7 units daily from 9 units.     Continue with all other orders as previously advised.     Call if lows or persistent high.     Verbalized understanding.     He has an appointment scheduled on 6/28/17

## 2017-06-26 ENCOUNTER — TELEPHONE (OUTPATIENT)
Dept: ENDOCRINOLOGY | Facility: CLINIC | Age: 57
End: 2017-06-26

## 2017-06-26 DIAGNOSIS — E13.9 POST-PANCREATECTOMY DIABETES (H): Primary | ICD-10-CM

## 2017-06-26 DIAGNOSIS — E89.1 POST-PANCREATECTOMY DIABETES (H): Primary | ICD-10-CM

## 2017-06-26 DIAGNOSIS — E89.0 POSTOPERATIVE HYPOTHYROIDISM: Primary | ICD-10-CM

## 2017-06-26 DIAGNOSIS — Z90.410 ACQUIRED TOTAL ABSENCE OF PANCREAS: ICD-10-CM

## 2017-06-26 DIAGNOSIS — Z90.410 POST-PANCREATECTOMY DIABETES (H): Primary | ICD-10-CM

## 2017-06-26 RX ORDER — LEVOTHYROXINE SODIUM 200 UG/1
200 TABLET ORAL DAILY
Qty: 60 TABLET | Refills: 0 | Status: SHIPPED | OUTPATIENT
Start: 2017-06-26 | End: 2017-11-09

## 2017-06-26 NOTE — TELEPHONE ENCOUNTER
I spoke with Tanner re high TSHR result, 9.85.  He is concerned in part due to coadministration with other medication in tube feed led to ineffective dosing which is of course possible if not likely.  Nonetheless he is having symptoms and we will attempt to treat them with increased dose and recheck level sooner, in 4 weeks.       - Also he notes, wounds healing well he is hopeful of moving back to [place] prior to our July 18 follow-up.  He is working on finding a local endocrinologist, but has not yet.    He has had some low BG, all continue to be 63- 103, but is reluctant to change dosing again just yet.  Agrees to call in a couple of days if worse or persists.    My best wishes,    Lexy Brunson PA-C, MPAS  [facility]  Diabetes, Endocrinology, and Metabolism  [phone] Appointments/Nurse  [phone] pager  [phone] nurse line  [phone] URGENTafter hours/weekend Endocrinologist on call

## 2017-06-27 ENCOUNTER — TELEPHONE (OUTPATIENT)
Dept: TRANSPLANT | Facility: CLINIC | Age: 57
End: 2017-06-27

## 2017-06-27 NOTE — TELEPHONE ENCOUNTER
Spoke to Tanner yesterday--he reports minimal drainage from his wound, it is closing up nicely, still packing.On overnight tube feeds with stable BS and making slow progress with oral intake.Pain is well controlled on 75mcg Fentanyl patch, not yet taking Methadone.  INR in range and followed by Coumadin clinic.No issues to address today. Aware of upcoming appointments and how to reach me should he need to.

## 2017-06-28 ENCOUNTER — ONCOLOGY VISIT (OUTPATIENT)
Dept: ONCOLOGY | Facility: CLINIC | Age: 57
End: 2017-06-28
Attending: TRANSPLANT SURGERY
Payer: COMMERCIAL

## 2017-06-28 ENCOUNTER — OFFICE VISIT (OUTPATIENT)
Dept: EDUCATION SERVICES | Facility: CLINIC | Age: 57
End: 2017-06-28

## 2017-06-28 VITALS
BODY MASS INDEX: 28.19 KG/M2 | RESPIRATION RATE: 16 BRPM | OXYGEN SATURATION: 95 % | HEART RATE: 80 BPM | DIASTOLIC BLOOD PRESSURE: 79 MMHG | TEMPERATURE: 98.6 F | HEIGHT: 74 IN | SYSTOLIC BLOOD PRESSURE: 136 MMHG | WEIGHT: 219.7 LBS

## 2017-06-28 DIAGNOSIS — N62 HYPERTROPHY OF BREAST: ICD-10-CM

## 2017-06-28 DIAGNOSIS — N64.4 BREAST PAIN, LEFT: Primary | ICD-10-CM

## 2017-06-28 DIAGNOSIS — E13.9 POST-PANCREATECTOMY DIABETES (H): Primary | ICD-10-CM

## 2017-06-28 DIAGNOSIS — Z90.410 POST-PANCREATECTOMY DIABETES (H): Primary | ICD-10-CM

## 2017-06-28 DIAGNOSIS — E89.1 POST-PANCREATECTOMY DIABETES (H): Primary | ICD-10-CM

## 2017-06-28 PROCEDURE — 99212 OFFICE O/P EST SF 10 MIN: CPT | Mod: ZF

## 2017-06-28 ASSESSMENT — PAIN SCALES - GENERAL: PAINLEVEL: WORST PAIN (10)

## 2017-06-28 NOTE — NURSING NOTE
"Oncology Rooming Note    June 28, 2017 9:39 AM   Sohan Arita is a 56 year old male who presents for:    Chief Complaint   Patient presents with     Oncology Clinic Visit     Nipple Pain      Initial Vitals: /79  Pulse 80  Temp 98.6  F (37  C) (Oral)  Resp 16  Ht 1.88 m (6' 2.02\")  Wt 99.7 kg (219 lb 11.2 oz)  SpO2 95%  BMI 28.2 kg/m2 Estimated body mass index is 28.2 kg/(m^2) as calculated from the following:    Height as of this encounter: 1.88 m (6' 2.02\").    Weight as of this encounter: 99.7 kg (219 lb 11.2 oz). Body surface area is 2.28 meters squared.  Worst Pain (10) Comment: alway have pain    No LMP for male patient.  Allergies reviewed: Yes  Medications reviewed: Yes    Medications: Medication refills not needed today.  Pharmacy name entered into EPIC:    [id number] - [place], [place] - [address] [address] [facility] - [place], [place] - [address] [facility] - [place], [place] - [address] [place] [address] AVE AT [address]    Clinical concerns: no new concerns     6 minutes for nursing intake (face to face time)     Nida Rubio CMA                "

## 2017-06-28 NOTE — MR AVS SNAPSHOT
After Visit Summary   6/28/2017    Sohan Arita    MRN: [id number]           Patient Information     Date Of Birth          1960        Visit Information        Provider Department      6/28/2017 3:30 PM Evelyne Diez RD [facility] Diabetes        Today's Diagnoses     Post-pancreatectomy diabetes (H)    -  1       Follow-ups after your visit        Your next 10 appointments already scheduled     Jun 29, 2017  8:30 AM CDT   (Arrive by 8:15 AM)   Return [place] with Rodrigo Jaquez MD   [facility] Solid Organ Transplant ([facility])    [address] [place] [id number] [phone]            Jul 18, 2017  8:30 AM CDT   (Arrive by 8:15 AM)   RETURN DIABETES with Lexy Brunson PA-C   [facility] Endocrinology ([facility])    [address] [place] [id number] [phone]              Future tests that were ordered for you today     Open Future Orders        Priority Expected Expires Ordered    MA Diagnostic Left w/Edy Routine  6/28/2018 6/28/2017            Who to contact     Please call your clinic at [phone] to:    Ask questions about your health    Make or cancel appointments    Discuss your medicines    Learn about your test results    Speak to your doctor   If you have compliments or concerns about an experience at your clinic, or if you wish to file a complaint, please contact [facility] Patient Relations at [phone] or email us at [email or web address]         Additional Information About Your Visit        MyChart Information     ponUp is an electronic gateway that provides easy, online access to your medical records. With ponUp, you can request a clinic appointment, read your test results, renew a prescription or communicate with your care team.     To sign up for ponUp visit the website at [email or web address]    You will be asked to enter the access code listed below, as well as some personal information. Please follow the directions to create your username and password.     Your access code is: [email or web address]  Expires: 2017  6:30 AM     Your access code will  in 90 days. If you need help or a new code, please contact your [facility] or call [phone] for assistance.        Care EveryWhere ID     This is your Care EveryWhere ID. This could be used by other organizations to access your [place] medical records  [id number]         Blood Pressure from Last 3 Encounters:   17 136/79   17 123/74   17 128/79    Weight from Last 3 Encounters:   17 99.7 kg (219 lb 11.2 oz)   17 100.9 kg (222 lb 8 oz)   17 100.9 kg (222 lb 6.4 oz)              We Performed the Following     DIABETES EDUCATION - Individual  []          Today's Medication Changes          These changes are accurate as of: 17  4:50 PM.  If you have any questions, ask your nurse or doctor.               These medicines have changed or have updated prescriptions.        Dose/Directions    fentaNYL 100 mcg/hr 72 hr patch   Commonly known as:  DURAGESIC   This may have changed:  additional instructions   Used for:  Acquired total absence of pancreas        Dose:  1 patch   Place 1 patch onto the skin every 72 hours   Quantity:  4 patch   Refills:  0       magnesium hydroxide 400 MG/5ML suspension   Commonly known as:  MILK OF MAGNESIA   This may have changed:  Another medication with the same name was removed. Continue taking this medication, and follow the directions you see here.   Used for:  Other constipation        Dose:  30 mL   30 mLs by Per Feeding Tube route 2 times daily as needed for constipation   Quantity:  105 mL   Refills:  1       QUEtiapine 25 MG tablet   Commonly known as:  SEROquel   This may have changed:  Another medication with the same name was removed.  Continue taking this medication, and follow the directions you see here.   Used for:  Anxiety        Dose:  12.5 mg   Take 0.5 tablets (12.5 mg) by mouth At Bedtime   Quantity:  60 tablet   Refills:  0       warfarin 5 MG tablet   Commonly known as:  COUMADIN   This may have changed:  how much to take   Used for:  DVT (deep venous thrombosis) (H)        Dose:  5 mg   Take 1 tablet (5 mg) by mouth daily   Quantity:  30 tablet   Refills:  0                Primary Care Provider Office Phone # Fax #    Frandy ORNELAS MD [place] [phone]       [address] [address]        Equal Access to Services     [facility]: Hadii janes Cordon, corina reyes, angelo beltran, luis armando pope . So [facility] [phone].    ATENCIÓN: Si habla español, tiene a welsh disposición servicios gratuitos de asistencia lingüística. [facility] [phone].    We comply with applicable federal civil rights laws and [place] laws. We do not discriminate on the basis of race, color, national origin, age, disability sex, sexual orientation or gender identity.            Thank you!     Thank you for choosing [facility] DIABETES  for your care. Our goal is always to provide you with excellent care. Hearing back from our patients is one way we can continue to improve our services. Please take a few minutes to complete the written survey that you may receive in the mail after your visit with us. Thank you!             Your Updated Medication List - Protect others around you: Learn how to safely use, store and throw away your medicines at www.disposemymeds.org.          This list is accurate as of: 6/28/17  4:50 PM.  Always use your most recent med list.                   Brand Name Dispense Instructions for use Diagnosis    acetaminophen 325 MG tablet    TYLENOL    100 tablet    Take 2 tablets (650 mg) by mouth every 4 hours as needed for mild pain or fever     Acute postoperative pain of abdomen       Alcohol Swabs Pads     100 each    1 pad as needed    Post-pancreatectomy diabetes (H)       * amylase-lipase-protease 16667 UNITS Cpep    CREON 12    270 capsule    Take 3-4 capsules (36,000-48,000 Units) by mouth every hour as needed (with snacks)    Acquired total absence of pancreas       * amylase-lipase-protease 15462 UNITS Cpep    ZENPEP    180 capsule    2 capsules (40,000 Units) by Per Feeding Tube route every 4 hours    Acquired total absence of pancreas       aspirin 81 MG EC tablet     30 tablet    Take 1 tablet (81 mg) by mouth daily    High platelet count (H), Acquired asplenia, Post-pancreatectomy diabetes (H), Pancreatic insufficiency, Anemia       BD SHARPS  Misc     1 each    1 Container as needed    Post-pancreatectomy diabetes (H)       blood glucose monitoring lancets     1 Box    Use to test blood sugar 8 times daily or as directed.    Acquired total absence of pancreas       blood glucose monitoring test strip    FREESTYLE LITE    300 strip    Use to test blood sugars 8 times daily or as directed.    Post-pancreatectomy diabetes (H)       cholecalciferol 2000 UNITS tablet     30 tablet    Take 1 tablet by mouth daily    Acquired total absence of pancreas       fentaNYL 100 mcg/hr 72 hr patch    DURAGESIC    4 patch    Place 1 patch onto the skin every 72 hours    Acquired total absence of pancreas       ferrous sulfate 325 (65 FE) MG Tbec EC tablet     30 tablet    Take 1 tablet (325 mg) by mouth daily    High platelet count (H), Acquired asplenia, Post-pancreatectomy diabetes (H), Pancreatic insufficiency, Anemia       fluconazole 200 MG tablet    DIFLUCAN    30 tablet    Take 1 tablet (200 mg) by mouth daily    Surgical wound infection, initial encounter       FLUoxetine 20 MG/5ML solution    PROzac    75 mL    2.5 mLs (10 mg) by Per J Tube route daily    Acquired total absence of pancreas       folic acid 1 MG tablet    FOLVITE    30 tablet     Take 1 tablet (1 mg) by mouth daily    High platelet count (H), Acquired asplenia, Post-pancreatectomy diabetes (H), Pancreatic insufficiency, Anemia       glucose 40 % Gel gel     3 Tube    Take 15-30 g by mouth every 15 minutes as needed for low blood sugar    Post-pancreatectomy diabetes (H)       * insulin aspart 100 UNIT/ML injection    NovoLOG PEN    3 mL    1 units per 8 grams of carbohydrate.  Only chart total amount of units given.  Do not give if pre-prandial glucose is less than 60 mg/dL.    Post-pancreatectomy diabetes (H)       * insulin aspart 100 UNIT/ML injection    NovoLOG PEN    3 mL    Check blood glucose Q4H and administer based on blood glucose Notify provider if glucose greater than or equal to 350 mg/dL after administration. -150 = 2 units. -180 = 4 units. -210 = 6 units. -240 = 8 units. -270 = 10 units. -300 = 12 units. -330 = 14 units. BG >330 = 16 units.    Post-pancreatectomy diabetes (H)       insulin glargine 100 UNIT/ML injection    LANTUS    3 mL    Inject 9 Units Subcutaneous every morning Please decrease to 9 units daily once tube feedings are reduced to just 12 hours daily.    Post-pancreatectomy diabetes (H)       * insulin isophane human 100 UNIT/ML injection    HumuLIN N KWIKPEN    3 mL    Inject 13 units subcutaneously every 8 PM at the time of beginning nighttime tube feeds.    Post-pancreatectomy diabetes (H)       * insulin  UNIT/ML injection    NovoLIN N VIAL    30 mL    Inject 13 units in the AM    Post-pancreatectomy diabetes (H)       insulin pen needle 32G X 4 MM     100 each    Use 8 pen needles daily or as directed.    Acquired total absence of pancreas       insulin syringes (disposable) U-100 0.5 ML Misc     100 each    Use 1 daily with NPH vials    Post-pancreatectomy diabetes (H)       iohexol 140 MG/ML Soln solution    OMNIPAQUE    50 mL    Mix according to Pharmacy directions. Drink half of the solution  beginning at 5:00 AM 6/21/17 and remainder of solution at 6:00 AM for 7:00 AM scan on 6/21/17    Postoperative wound infection, Post-pancreatectomy diabetes (H), History of pancreatectomy, DVT (deep venous thrombosis) (H), Long term (current) use of anticoagulants       levothyroxine 200 MCG tablet    SYNTHROID/LEVOTHROID    60 tablet    Take 1 tablet (200 mcg) by mouth daily    Postoperative hypothyroidism       magnesium hydroxide 400 MG/5ML suspension    MILK OF MAGNESIA    105 mL    30 mLs by Per Feeding Tube route 2 times daily as needed for constipation    Other constipation       methadone HCl 10 MG/5ML Soln     80 mL    Take 10 mg (5 ml) liquid by jejunal tube every twelve hours.    Postoperative pain, Post-pancreatectomy diabetes (H)       metoclopramide 5 MG tablet    REGLAN    90 tablet    Take 1 tablet (5 mg) by mouth 3 times daily as needed    History of pancreatectomy       multivitamin CF formula Caps per capsule     30 capsule    Take 1 capsule by mouth daily    Acquired total absence of pancreas       ondansetron 4 MG tablet    ZOFRAN    24 tablet    Take 1 tablet (4 mg) by mouth every 6 hours as needed for nausea or vomiting    Postoperative nausea       oxyCODONE 5 MG/5ML solution    ROXICODONE    250 mL    2.5-5 mLs (2.5-5 mg) by Per Feeding Tube route every 4 hours as needed for moderate to severe pain    Surgical wound infection, initial encounter       polyethylene glycol powder    MIRALAX/GLYCOLAX    119 g    Take one capful (17 grams), 1 to 2 times daily as needed for constipation.    Constipation, Post-pancreatectomy diabetes (H), History of pancreatectomy, Pancreatic insufficiency       pregabalin 20 MG/ML solution    LYRICA    70 mL    1.25 mLs (25 mg) by Per Feeding Tube route daily as needed    History of pancreatectomy       QUEtiapine 25 MG tablet    SEROquel    60 tablet    Take 0.5 tablets (12.5 mg) by mouth At Bedtime    Anxiety       sennosides 8.8 MG/5ML syrup    SENOKOT    600  mL    10 mLs by Per Feeding Tube route 2 times daily    Other constipation       sodium bicarbonate 325 MG tablet     180 tablet    1 tablet q 4 hrs via Jtube. Administration Instructions: Crush 1 tablet and mix into 15 ml of warm water and use this solution to mix with Creon pancreatic enzymes. DO NOT administer directly into Jtube (to be mixed into tube feed formula with Creon enzyme).    High platelet count (H), Acquired asplenia, Post-pancreatectomy diabetes (H), Pancreatic insufficiency       warfarin 5 MG tablet    COUMADIN    30 tablet    Take 1 tablet (5 mg) by mouth daily    DVT (deep venous thrombosis) (H)       * Notice:  This list has 6 medication(s) that are the same as other medications prescribed for you. Read the directions carefully, and ask your doctor or other care provider to review them with you.

## 2017-06-28 NOTE — MR AVS SNAPSHOT
After Visit Summary   6/28/2017    Sohan Arita    MRN: [id number]           Patient Information     Date Of Birth          1960        Visit Information        Provider Department      6/28/2017 9:40 AM Amy Corrales MD [facility]        Today's Diagnoses     Breast pain, left    -  1    Hypertrophy of breast           Follow-ups after your visit        Your next 10 appointments already scheduled     Jun 28, 2017  3:30 PM CDT   (Arrive by 3:15 PM)   Office Visit with Evelyne Diez RD   [facility] ([facility])    [address] [place] [id number] [phone]           Bring a current list of meds and any records pertaining to this visit.  For Physicals, please bring immunization records and any forms needing to be filled out.  Please arrive 10 minutes early to complete paperwork.            Jun 29, 2017  8:30 AM CDT   (Arrive by 8:15 AM)   Return Auto [place]let with Rodriog Jaquez MD   [facility] ([facility])    [address] [place] [id number] [phone]            Jul 18, 2017  8:30 AM CDT   (Arrive by 8:15 AM)   RETURN DIABETES with Lexy Brunson PA-C   [facility] ([facility])    [address] [place] [phone] [phone]              Future tests that were ordered for you today     Open Future Orders        Priority Expected Expires Ordered    MA Diagnostic Left w/Edy Routine  6/28/2018 6/28/2017            Who to contact     If you have questions or need follow up information about today's clinic visit or your schedule please contact [facility] directly at [phone].  Normal or non-critical lab and imaging results will be communicated to you by MyChart, letter or phone within 4 business days after the clinic has received the results. If you  "do not hear from us within 7 days, please contact the clinic through [employer] or phone. If you have a critical or abnormal lab result, we will notify you by phone as soon as possible.  Submit refill requests through [employer] or call your pharmacy and they will forward the refill request to us. Please allow 3 business days for your refill to be completed.          Additional Information About Your Visit        ITI TechharWhat They Like Information     [employer] lets you send messages to your doctor, view your test results, renew your prescriptions, schedule appointments and more. To sign up, go to [email or web address] . Click on \"Log in\" on the left side of the screen, which will take you to the Welcome page. Then click on \"Sign up Now\" on the right side of the page.     You will be asked to enter the access code listed below, as well as some personal information. Please follow the directions to create your username and password.     Your access code is: [email or web address]  Expires: 2017  6:30 AM     Your access code will  in 90 days. If you need help or a new code, please call your [place] clinic or [phone].        Care EveryWhere ID     This is your Care EveryWhere ID. This could be used by other organizations to access your [place] medical records  [id number]        Your Vitals Were     Pulse Temperature Respirations Height Pulse Oximetry BMI (Body Mass Index)    80 98.6  F (37  C) (Oral) 16 1.88 m (6' 2.02\") 95% 28.2 kg/m2       Blood Pressure from Last 3 Encounters:   17 136/79   17 123/74   17 128/79    Weight from Last 3 Encounters:   17 99.7 kg (219 lb 11.2 oz)   17 100.9 kg (222 lb 8 oz)   17 100.9 kg (222 lb 6.4 oz)                 Today's Medication Changes          These changes are accurate as of: 17 11:06 AM.  If you have any questions, ask your nurse or doctor.               These medicines have changed or have updated prescriptions.        Dose/Directions    " fentaNYL 100 mcg/hr 72 hr patch   Commonly known as:  DURAGESIC   This may have changed:  additional instructions   Used for:  Acquired total absence of pancreas        Dose:  1 patch   Place 1 patch onto the skin every 72 hours   Quantity:  4 patch   Refills:  0       magnesium hydroxide 400 MG/5ML suspension   Commonly known as:  MILK OF MAGNESIA   This may have changed:  Another medication with the same name was removed. Continue taking this medication, and follow the directions you see here.   Used for:  Other constipation        Dose:  30 mL   30 mLs by Per Feeding Tube route 2 times daily as needed for constipation   Quantity:  105 mL   Refills:  1       QUEtiapine 25 MG tablet   Commonly known as:  SEROquel   This may have changed:  Another medication with the same name was removed. Continue taking this medication, and follow the directions you see here.   Used for:  Anxiety        Dose:  12.5 mg   Take 0.5 tablets (12.5 mg) by mouth At Bedtime   Quantity:  60 tablet   Refills:  0       warfarin 5 MG tablet   Commonly known as:  COUMADIN   This may have changed:  how much to take   Used for:  DVT (deep venous thrombosis) (H)        Dose:  5 mg   Take 1 tablet (5 mg) by mouth daily   Quantity:  30 tablet   Refills:  0                Primary Care Provider Office Phone # Fax #    Frandy Frye [phone]       [address] [address]        Equal Access to Services     SEPIDEH VINCENT AH: Leatha Cordon, waaxda luqadaha, qaybta kaalmada adegeorginayada, luis armando cochran. So [facility] [phone].    ATENCIÓN: Si habla español, tiene a welsh disposición servicios gratuitos de asistencia lingüística. Llame al [phone].    We comply with applicable federal civil rights laws and [place] laws. We do not discriminate on the basis of race, color, national origin, age, disability sex, sexual orientation or gender identity.             Thank you!     Thank you for choosing [facility]  for your care. Our goal is always to provide you with excellent care. Hearing back from our patients is one way we can continue to improve our services. Please take a few minutes to complete the written survey that you may receive in the mail after your visit with us. Thank you!             Your Updated Medication List - Protect others around you: Learn how to safely use, store and throw away your medicines at www.disposemymeds.org.          This list is accurate as of: 6/28/17 11:06 AM.  Always use your most recent med list.                   Brand Name Dispense Instructions for use Diagnosis    acetaminophen 325 MG tablet    TYLENOL    100 tablet    Take 2 tablets (650 mg) by mouth every 4 hours as needed for mild pain or fever    Acute postoperative pain of abdomen       Alcohol Swabs Pads     100 each    1 pad as needed    Post-pancreatectomy diabetes (H)       * amylase-lipase-protease 23285 UNITS Cpep    CREON 12    270 capsule    Take 3-4 capsules (36,000-48,000 Units) by mouth every hour as needed (with snacks)    Acquired total absence of pancreas       * amylase-lipase-protease 63289 UNITS Cpep    ZENPEP    180 capsule    2 capsules (40,000 Units) by Per Feeding Tube route every 4 hours    Acquired total absence of pancreas       aspirin 81 MG EC tablet     30 tablet    Take 1 tablet (81 mg) by mouth daily    High platelet count (H), Acquired asplenia, Post-pancreatectomy diabetes (H), Pancreatic insufficiency, Anemia       BD SHARPS  Misc     1 each    1 Container as needed    Post-pancreatectomy diabetes (H)       blood glucose monitoring lancets     1 Box    Use to test blood sugar 8 times daily or as directed.    Acquired total absence of pancreas       blood glucose monitoring test strip    FREESTYLE LITE    300 strip    Use to test blood sugars 8 times daily or as directed.    Post-pancreatectomy diabetes (H)        cholecalciferol 2000 UNITS tablet     30 tablet    Take 1 tablet by mouth daily    Acquired total absence of pancreas       fentaNYL 100 mcg/hr 72 hr patch    DURAGESIC    4 patch    Place 1 patch onto the skin every 72 hours    Acquired total absence of pancreas       ferrous sulfate 325 (65 FE) MG Tbec EC tablet     30 tablet    Take 1 tablet (325 mg) by mouth daily    High platelet count (H), Acquired asplenia, Post-pancreatectomy diabetes (H), Pancreatic insufficiency, Anemia       fluconazole 200 MG tablet    DIFLUCAN    30 tablet    Take 1 tablet (200 mg) by mouth daily    Surgical wound infection, initial encounter       FLUoxetine 20 MG/5ML solution    PROzac    75 mL    2.5 mLs (10 mg) by Per J Tube route daily    Acquired total absence of pancreas       folic acid 1 MG tablet    FOLVITE    30 tablet    Take 1 tablet (1 mg) by mouth daily    High platelet count (H), Acquired asplenia, Post-pancreatectomy diabetes (H), Pancreatic insufficiency, Anemia       glucose 40 % Gel gel     3 Tube    Take 15-30 g by mouth every 15 minutes as needed for low blood sugar    Post-pancreatectomy diabetes (H)       * insulin aspart 100 UNIT/ML injection    NovoLOG PEN    3 mL    1 units per 8 grams of carbohydrate.  Only chart total amount of units given.  Do not give if pre-prandial glucose is less than 60 mg/dL.    Post-pancreatectomy diabetes (H)       * insulin aspart 100 UNIT/ML injection    NovoLOG PEN    3 mL    Check blood glucose Q4H and administer based on blood glucose Notify provider if glucose greater than or equal to 350 mg/dL after administration. -150 = 2 units. -180 = 4 units. -210 = 6 units. -240 = 8 units. -270 = 10 units. -300 = 12 units. -330 = 14 units. BG >330 = 16 units.    Post-pancreatectomy diabetes (H)       insulin glargine 100 UNIT/ML injection    LANTUS    3 mL    Inject 9 Units Subcutaneous every morning Please decrease to 9 units daily once tube  feedings are reduced to just 12 hours daily.    Post-pancreatectomy diabetes (H)       * insulin isophane human 100 UNIT/ML injection    HumuLIN N KWIKPEN    3 mL    Inject 13 units subcutaneously every 8 PM at the time of beginning nighttime tube feeds.    Post-pancreatectomy diabetes (H)       * insulin  UNIT/ML injection    NovoLIN N VIAL    30 mL    Inject 13 units in the AM    Post-pancreatectomy diabetes (H)       insulin pen needle 32G X 4 MM     100 each    Use 8 pen needles daily or as directed.    Acquired total absence of pancreas       insulin syringes (disposable) U-100 0.5 ML Misc     100 each    Use 1 daily with NPH vials    Post-pancreatectomy diabetes (H)       iohexol 140 MG/ML Soln solution    OMNIPAQUE    50 mL    Mix according to Pharmacy directions. Drink half of the solution beginning at 5:00 AM 6/21/17 and remainder of solution at 6:00 AM for 7:00 AM scan on 6/21/17    Postoperative wound infection, Post-pancreatectomy diabetes (H), History of pancreatectomy, DVT (deep venous thrombosis) (H), Long term (current) use of anticoagulants       levothyroxine 200 MCG tablet    SYNTHROID/LEVOTHROID    60 tablet    Take 1 tablet (200 mcg) by mouth daily    Postoperative hypothyroidism       magnesium hydroxide 400 MG/5ML suspension    MILK OF MAGNESIA    105 mL    30 mLs by Per Feeding Tube route 2 times daily as needed for constipation    Other constipation       methadone HCl 10 MG/5ML Soln     80 mL    Take 10 mg (5 ml) liquid by jejunal tube every twelve hours.    Postoperative pain, Post-pancreatectomy diabetes (H)       metoclopramide 5 MG tablet    REGLAN    90 tablet    Take 1 tablet (5 mg) by mouth 3 times daily as needed    History of pancreatectomy       multivitamin CF formula Caps per capsule     30 capsule    Take 1 capsule by mouth daily    Acquired total absence of pancreas       ondansetron 4 MG tablet    ZOFRAN    24 tablet    Take 1 tablet (4 mg) by mouth every 6 hours as  needed for nausea or vomiting    Postoperative nausea       oxyCODONE 5 MG/5ML solution    ROXICODONE    250 mL    2.5-5 mLs (2.5-5 mg) by Per Feeding Tube route every 4 hours as needed for moderate to severe pain    Surgical wound infection, initial encounter       polyethylene glycol powder    MIRALAX/GLYCOLAX    119 g    Take one capful (17 grams), 1 to 2 times daily as needed for constipation.    Constipation, Post-pancreatectomy diabetes (H), History of pancreatectomy, Pancreatic insufficiency       pregabalin 20 MG/ML solution    LYRICA    70 mL    1.25 mLs (25 mg) by Per Feeding Tube route daily as needed    History of pancreatectomy       QUEtiapine 25 MG tablet    SEROquel    60 tablet    Take 0.5 tablets (12.5 mg) by mouth At Bedtime    Anxiety       sennosides 8.8 MG/5ML syrup    SENOKOT    600 mL    10 mLs by Per Feeding Tube route 2 times daily    Other constipation       sodium bicarbonate 325 MG tablet     180 tablet    1 tablet q 4 hrs via Jtube. Administration Instructions: Crush 1 tablet and mix into 15 ml of warm water and use this solution to mix with Creon pancreatic enzymes. DO NOT administer directly into Jtube (to be mixed into tube feed formula with Creon enzyme).    High platelet count (H), Acquired asplenia, Post-pancreatectomy diabetes (H), Pancreatic insufficiency       warfarin 5 MG tablet    COUMADIN    30 tablet    Take 1 tablet (5 mg) by mouth daily    DVT (deep venous thrombosis) (H)       * Notice:  This list has 6 medication(s) that are the same as other medications prescribed for you. Read the directions carefully, and ask your doctor or other care provider to review them with you.

## 2017-06-28 NOTE — PROGRESS NOTES
Sohan is a 56 year old male who [facility]   - Three weeks woke up with tender, left nipple that is behind the nipple on palpation.    - 1991 had a hard lump excision, benign removed   - Twin brother with similar symptoms and negative w/u in [place]   - Here for medical care:chronic pancreatitis s/p TPAIT 5/15/17 and DVT [on warfarin]. Unable to make a medication connection. [came to [place] May 9th and will likely return to [place] soon].   HPI   Breast Cancer Risk Profile: Age: 56 year old.  Personal History of Cancer: thyroid cancer with thyroidectomy 12/2011. Family History; Breast cancer negative; ovarian cancer negative; Colon Cancer negative.   ROS  General:  None  Head/eyes:  None  Ears/Nose/Throat:  None  Breast: left nipple tenderness.  Cardiovascular:  None  Respiratory:  None  Gastrointestinal:  None  Musculoskeletal:  None  Skin:  None  Neurological:  None  Mental Health:  None  Endocrine:  None  Past Medical History:  Past Medical History:   Diagnosis Date     Depression      Eye injury, penetrating 1978    left eye, blurry vision long distance      Pancreatic disease     pancreatitis     Pancreatitis 2012    outside notes indicate lipase elevations; labs available: 4/11/12 lipase 563 (); 5/7/12 1224 ()     Thyroid cancer (H)      Thyroid disease     thyroid cancer     Ventral hernia 2017    Present for a year, no pain     Past Surgical History:  Past Surgical History:   Procedure Laterality Date     CHOLECYSTECTOMY  4/2012    [place], [place]     debribement eye Left 1978    for eye injury     ENDOSCOPIC RETROGRADE CHOLANGIOPANCREATOGRAM  4/18/2012    with sphincterotomy     ENDOSCOPIC ULTRASOUND UPPER GASTROINTESTINAL TRACT (GI) N/A 11/11/2015    Procedure: ENDOSCOPIC ULTRASOUND, ESOPHAGOSCOPY / UPPER GASTROINTESTINAL TRACT (GI);  Surgeon: Emeka Mcleod MD;  Location: [facility] OR     FEEDING TUBE REPLACEMENT  2/3/2015     HERNIA REPAIR, INGUINAL  RT/LT Left 1980     LAPAROTOMY EXPLORATORY  12/31/2014    WITH sphincterotom (transduodenal), resection of sphincter of Oddi, lysis of adhesions     NERVE BLOCK PERIPHERAL  12/31/2014    U/S guided transversus abdominus plane peripheral field nerve block     PANCREATECTOMY, TRANSPLANT AUTO ISLET CELL, COMBINED N/A 5/15/2017    Procedure: COMBINED PANCREATECTOMY, TRANSPLANT AUTO ISLET CELL;  Open Pancreatectomy, Splenectomy, Gastrojejunostomy Tube Placement , Auto Islet Cell Transplant;  Surgeon: Rodrigo Jaquez MD;  Location: [facility] OR     THYROIDECTOMY  12/28/2011     transduodenal sphincteroplasty  12/31/2014   Medications:  Current Outpatient Prescriptions   Medication Sig Dispense Refill     insulin syringes, disposable, U-100 0.5 ML MISC Use 1 daily with NPH vials 100 each 3     levothyroxine (SYNTHROID/LEVOTHROID) 200 MCG tablet Take 1 tablet (200 mcg) by mouth daily 60 tablet 0     polyethylene glycol (MIRALAX/GLYCOLAX) powder Take one capful (17 grams), 1 to 2 times daily as needed for constipation. 119 g 3     insulin isophane human (HUMULIN N KWIKPEN) 100 UNIT/ML injection Inject 13 units subcutaneously every 8 PM at the time of beginning nighttime tube feeds. 3 mL 1     insulin glargine (LANTUS) 100 UNIT/ML injection Inject 9 Units Subcutaneous every morning Please decrease to 9 units daily once tube feedings are reduced to just 12 hours daily. 3 mL 3     blood glucose monitoring (FREESTYLE LITE) test strip Use to test blood sugars 8 times daily or as directed. 300 strip 3     ondansetron (ZOFRAN) 4 MG tablet Take 1 tablet (4 mg) by mouth every 6 hours as needed for nausea or vomiting 24 tablet 0     fluconazole (DIFLUCAN) 200 MG tablet Take 1 tablet (200 mg) by mouth daily 30 tablet 0     oxyCODONE (ROXICODONE) 5 MG/5ML solution 2.5-5 mLs (2.5-5 mg) by Per Feeding Tube route every 4 hours as needed for moderate to severe pain 250 mL 0     insulin aspart (NOVOLOG PEN) 100 UNIT/ML injection 1 units per 8  grams of carbohydrate.  Only chart total amount of units given.  Do not give if pre-prandial glucose is less than 60 mg/dL. 3 mL 0     fentaNYL (DURAGESIC) 100 mcg/hr 72 hr patch Place 1 patch onto the skin every 72 hours (Patient taking differently: Place 1 patch onto the skin every 72 hours 75 mcg) 4 patch 0     sodium bicarbonate 325 MG tablet 1 tablet q 4 hrs via Jtube. Administration Instructions: Crush 1 tablet and mix into 15 ml of warm water and use this solution to mix with Creon pancreatic enzymes. DO NOT administer directly into Jtube (to be mixed into tube feed formula with Creon enzyme). 180 tablet 3     Alcohol Swabs PADS 1 pad as needed 100 each 3     multivitamin CF formula (DEKAS PLUS) CAPS per capsule Take 1 capsule by mouth daily 30 capsule 3     cholecalciferol 2000 UNITS tablet Take 1 tablet by mouth daily 30 tablet 3     insulin aspart (NOVOLOG PEN) 100 UNIT/ML injection Check blood glucose Q4H and administer based on blood glucose  Notify provider if glucose greater than or equal to 350 mg/dL after administration.  -150 = 2 units.  -180 = 4 units.  -210 = 6 units.  -240 = 8 units.  -270 = 10 units.  -300 = 12 units.  -330 = 14 units.  BG >330 = 16 units. 3 mL 3     warfarin (COUMADIN) 5 MG tablet Take 1 tablet (5 mg) by mouth daily (Patient taking differently: Take 2.5 mg by mouth daily ) 30 tablet 0     aspirin 81 MG EC tablet Take 1 tablet (81 mg) by mouth daily 30 tablet 1     ferrous sulfate 325 (65 FE) MG TBEC EC tablet Take 1 tablet (325 mg) by mouth daily 30 tablet 1     folic acid (FOLVITE) 1 MG tablet Take 1 tablet (1 mg) by mouth daily 30 tablet 1     glucose 40 % GEL gel Take 15-30 g by mouth every 15 minutes as needed for low blood sugar 3 Tube 1     amylase-lipase-protease (CREON 12) 88001 UNITS CPEP Take 3-4 capsules (36,000-48,000 Units) by mouth every hour as needed (with snacks) 270 capsule 3     magnesium hydroxide (MILK OF MAGNESIA)  400 MG/5ML suspension 30 mLs by Per Feeding Tube route 2 times daily as needed for constipation 105 mL 1     sennosides (SENOKOT) 8.8 MG/5ML syrup 10 mLs by Per Feeding Tube route 2 times daily 600 mL 3     Sharps Container (BD SHARPS ) MISC 1 Container as needed 1 each 3     insulin pen needle 32G X 4 MM Use 8 pen needles daily or as directed. 100 each 3     blood glucose monitoring (ACCU-CHEK FASTCLIX) lancets Use to test blood sugar 8 times daily or as directed. 1 Box 3     insulin NPH (NOVOLIN N VIAL) 100 UNIT/ML injection Inject 13 units in the AM (Patient not taking: Reported on 6/28/2017) 30 mL 3     methadone HCl 10 MG/5ML SOLN Take 10 mg (5 ml) liquid by jejunal tube every twelve hours. (Patient not taking: Reported on 6/28/2017) 80 mL 0     iohexol (OMNIPAQUE) 140 MG/ML SOLN solution Mix according to Pharmacy directions. Drink half of the solution beginning at 5:00 AM 6/21/17 and remainder of solution at 6:00 AM for 7:00 AM scan on 6/21/17 (Patient not taking: Reported on 6/28/2017) 50 mL 0     metoclopramide (REGLAN) 5 MG tablet Take 1 tablet (5 mg) by mouth 3 times daily as needed (Patient not taking: Reported on 6/28/2017) 90 tablet 1     amylase-lipase-protease (ZENPEP) 09209 UNITS CPEP 2 capsules (40,000 Units) by Per Feeding Tube route every 4 hours (Patient not taking: Reported on 6/28/2017) 180 capsule 0     QUEtiapine (SEROQUEL) 25 MG tablet Take 0.5 tablets (12.5 mg) by mouth At Bedtime (Patient not taking: Reported on 6/28/2017) 60 tablet 0     acetaminophen (TYLENOL) 325 MG tablet Take 2 tablets (650 mg) by mouth every 4 hours as needed for mild pain or fever (Patient not taking: Reported on 6/28/2017) 100 tablet 0     pregabalin (LYRICA) 20 MG/ML solution 1.25 mLs (25 mg) by Per Feeding Tube route daily as needed (Patient not taking: Reported on 6/28/2017) 70 mL 3     [DISCONTINUED] QUEtiapine (SEROQUEL) 25 MG tablet Take 1 tablet (25 mg) by mouth 2 times daily as needed (Anxiety) 60  "tablet 0     FLUoxetine (PROZAC) 20 MG/5ML solution 2.5 mLs (10 mg) by Per J Tube route daily 75 mL 3     Family Hx:   Family History   Problem Relation Age of Onset     Pancreatitis No family hx of      DIABETES No family hx of    Social Hx:   Pancreatectomy: May 15, 2017. Physician in [place].   Social History   Substance Use Topics     Smoking status: Never Smoker     Smokeless tobacco: Never Used     Alcohol use No     Vitals:  /79  Pulse 80  Temp 98.6  F (37  C) (Oral)  Resp 16  Ht 1.88 m (6' 2.02\")  Wt 99.7 kg (219 lb 11.2 oz)  SpO2 95%  BMI 28.2 kg/m2   Physical Exam:  Constitutional: no distress  Pyschological: Alert/Oriented  Eyes: anicteric, normal extra-ocular movements  Breast: Examined in a sitting and lying position.  Symmetrical without visible distortion, swelling or rashes.  No nipple inversion, nipple discharge, breast dimpling or puckering. Nipple is not tender to palpate - pain with deep compression of the nipple - no masses noted. Breast tissue is homogeneous.  Skin: no concerning lesions, no jaundice  Neurological: Normal gait, no tremor  RESULTS:    Mammogram and US on Left: gynecomastia L >R. negative  Diagnoses and associated orders for this visit:  Breast pain, left with gynecomastia L > R.   -     Bilateral MA Diagnostic and left US:   1) Reviewed images today with radiologist and no concerning findings.    2) Reassurance to patient that images and Breast Exam are normal  3) Discussed unclear etiology for breast pain but typically breast cancer and breast pain are not correlated.   4) If symptoms persists after 6 month he will follow-up with his PCP back home.     "

## 2017-06-28 NOTE — Clinical Note
AMILCAR Barnett, I saw Sohan today and he will be calling you as his FBS are a bit elevated and he could use an increase in his NPH. Also he thinks his Lantus should be decreased as he can't walk without getting low shortly after starting. I didn't want to make any changes as I didn't want to interfere with your protocols. Thanks Ericka. Evelyne

## 2017-06-28 NOTE — PROGRESS NOTES
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    He is accompanied by spouse    Patient Problem List and Family Medical History reviewed for relevant medical history, current medical status, and diabetes risk factors.    Current Diabetes Management per Patient:  Taking diabetes medications? 7 units Lantus AM, Novolo unit/30 grams carbohydrate, correction Novolo units/20>121mg/dl/bg, 13 units NPH prior to nocturnal tube feeding    Past Diabetes Education: Newly diagnosed    Patient glucose self monitoring as follows: every 4 hours per TPAIT protocol.   BG results:    : 160, 96, 132  : 136, 156, 99, 101, 140, 150  : 146, 103, 101, 110, 137, 155  : 137, 75, 65, 70, 110, 139  : 147, 120, 101, 119, 114, 123    Vitals:  There were no vitals taken for this visit.  Estimated body mass index is 28.2 kg/(m^2) as calculated from the following:    Height as of an earlier encounter on 17: 1.88 m (6' 2.02\").    Weight as of an earlier encounter on 17: 99.7 kg (219 lb 11.2 oz).   Last 3 BP:   BP Readings from Last 3 Encounters:   17 136/79   17 123/74   17 128/79     History   Smoking Status     Never Smoker   Smokeless Tobacco     Never Used       Labs:  Lab Results   Component Value Date    A1C 5.3 2017     Lab Results   Component Value Date     2017     Lab Results   Component Value Date    LDL 94 05/10/2017     HDL Cholesterol   Date Value Ref Range Status   05/10/2017 43 >39 mg/dL Final   ]  GFR Estimate   Date Value Ref Range Status   2017 88 >60 mL/min/1.7m2 Final     Comment:     Non  GFR Calc     GFR Estimate If Black   Date Value Ref Range Status   2017 >90   GFR Calc   >60 mL/min/1.7m2 Final     Lab Results   Component Value Date    CR 0.89 2017     No results found for: " MICROALBUMIN    Nutrition Review:  Sohan is here with his wife for diabetes education for s/p TPAIT 5/15/17. He brings in food and bg logs for review. He is on nocturnal tube feedings of Impact Peptide from 8PM-8AM, and is now tolerating a regular diet, in small amounts. He takes his Novolog after he eats as he is not sure how much volume he can tolerate. He tells me prior to his TPAIT, he was able to eat everything but in small quantities.     Diet Recall:   Breakfast:1/2 english muffin with egg, or oatmeal/banana, orange juice, yogurt  Lunch:1/2 ham sandwich, or granola/2% milk or rice/zucchini/meat sauce or pb/j sandwich  Dinner:grilled chicken or pork, banana or chicken and rice or taco salad      Physical Activity:    Has been walking with his wife Maribell      Gave Sohan  written and verbal information on general healthy eating, low sat/trans fat & carbohydrate counting. Reviewed how to access nutrition information/carbohydrates when eating out in restaurants using phone apps and other web sites. Reviewed low bg signs/symptoms/treatment and instructed Sohan to always carry carbohydrate with him when away from home and when driving. Reviewed exercise and carb replacement rec's. Encouraged Sohan to f/u with Ericka Regan RN as he thinks his insulin needs adjusting. Asked many good questions.       Education provided today on:  AADE Self-Care Behaviors:  Healthy Eating: carbohydrate counting, heart healthy diet, eating out and label reading  Problem Solving: low blood glucose - causes, signs/symptoms, treatment and prevention and carrying a carbohydrate source at all times    Pt verbalized understanding of concepts discussed and recommendations provided today.       ASSESSMENT: Verbalized healthy diet and carb counting basics correctly today. Sohan may need his evening NPH increased and his Lantus decreased as he gets low blood sugars quickly with short walks. I will contact Ericka Regan.        PLAN:  Healthy diet review  Carb counting review  Low bg review  Exercise and carb replacement rec's reviewed    FOLLOW-UP:  Follow-up as needed.   Chart routed to referring provider.    Time Spent: 60 minutes  Encounter Type: Individual    Any diabetes medication dose changes were made via the CDE Protocol and Collaborative Practice Agreement with the patient's referring provider. A copy of this encounter was shared with the provider.

## 2017-06-29 ENCOUNTER — OFFICE VISIT (OUTPATIENT)
Dept: TRANSPLANT | Facility: CLINIC | Age: 57
End: 2017-06-29
Attending: TRANSPLANT SURGERY
Payer: COMMERCIAL

## 2017-06-29 ENCOUNTER — ANTICOAGULATION THERAPY VISIT (OUTPATIENT)
Dept: ANTICOAGULATION | Facility: CLINIC | Age: 57
End: 2017-06-29

## 2017-06-29 VITALS
RESPIRATION RATE: 16 BRPM | DIASTOLIC BLOOD PRESSURE: 76 MMHG | TEMPERATURE: 98.1 F | WEIGHT: 220 LBS | HEART RATE: 77 BPM | OXYGEN SATURATION: 97 % | SYSTOLIC BLOOD PRESSURE: 114 MMHG | HEIGHT: 74 IN | BODY MASS INDEX: 28.23 KG/M2

## 2017-06-29 DIAGNOSIS — Z79.01 LONG-TERM (CURRENT) USE OF ANTICOAGULANTS: ICD-10-CM

## 2017-06-29 DIAGNOSIS — K86.89 PANCREATIC INSUFFICIENCY: ICD-10-CM

## 2017-06-29 DIAGNOSIS — E13.9 POST-PANCREATECTOMY DIABETES (H): Primary | ICD-10-CM

## 2017-06-29 DIAGNOSIS — E89.1 POST-PANCREATECTOMY DIABETES (H): Primary | ICD-10-CM

## 2017-06-29 DIAGNOSIS — Z98.890 POST-OPERATIVE STATE: Primary | ICD-10-CM

## 2017-06-29 DIAGNOSIS — Z90.410 POST-PANCREATECTOMY DIABETES (H): Primary | ICD-10-CM

## 2017-06-29 DIAGNOSIS — Z98.890 POST-OPERATIVE STATE: ICD-10-CM

## 2017-06-29 DIAGNOSIS — I82.402 DEEP VEIN THROMBOSIS (DVT) OF LEFT LOWER EXTREMITY (H): ICD-10-CM

## 2017-06-29 DIAGNOSIS — R10.9 ABDOMINAL PAIN: Primary | ICD-10-CM

## 2017-06-29 LAB
ALBUMIN SERPL-MCNC: 3.2 G/DL (ref 3.4–5)
ALP SERPL-CCNC: 99 U/L (ref 40–150)
ALT SERPL W P-5'-P-CCNC: 38 U/L (ref 0–70)
ANION GAP SERPL CALCULATED.3IONS-SCNC: 6 MMOL/L (ref 3–14)
AST SERPL W P-5'-P-CCNC: 26 U/L (ref 0–45)
BASOPHILS # BLD AUTO: 0.1 10E9/L (ref 0–0.2)
BASOPHILS NFR BLD AUTO: 0.9 %
BILIRUB SERPL-MCNC: 0.3 MG/DL (ref 0.2–1.3)
BUN SERPL-MCNC: 26 MG/DL (ref 7–30)
CALCIUM SERPL-MCNC: 9.4 MG/DL (ref 8.5–10.1)
CHLORIDE SERPL-SCNC: 100 MMOL/L (ref 94–109)
CO2 SERPL-SCNC: 30 MMOL/L (ref 20–32)
CREAT SERPL-MCNC: 0.88 MG/DL (ref 0.66–1.25)
DIFFERENTIAL METHOD BLD: ABNORMAL
EOSINOPHIL # BLD AUTO: 1.2 10E9/L (ref 0–0.7)
EOSINOPHIL NFR BLD AUTO: 10 %
ERYTHROCYTE [DISTWIDTH] IN BLOOD BY AUTOMATED COUNT: 16.5 % (ref 10–15)
GFR SERPL CREATININE-BSD FRML MDRD: 90 ML/MIN/1.7M2
GLUCOSE SERPL-MCNC: 136 MG/DL (ref 70–99)
HCT VFR BLD AUTO: 33.9 % (ref 40–53)
HGB BLD-MCNC: 10.3 G/DL (ref 13.3–17.7)
IMM GRANULOCYTES # BLD: 0.1 10E9/L (ref 0–0.4)
IMM GRANULOCYTES NFR BLD: 0.4 %
INR PPP: 2.28 (ref 0.86–1.14)
LYMPHOCYTES # BLD AUTO: 2 10E9/L (ref 0.8–5.3)
LYMPHOCYTES NFR BLD AUTO: 16.5 %
MCH RBC QN AUTO: 24.7 PG (ref 26.5–33)
MCHC RBC AUTO-ENTMCNC: 30.4 G/DL (ref 31.5–36.5)
MCV RBC AUTO: 81 FL (ref 78–100)
MONOCYTES # BLD AUTO: 1.3 10E9/L (ref 0–1.3)
MONOCYTES NFR BLD AUTO: 10.4 %
NEUTROPHILS # BLD AUTO: 7.5 10E9/L (ref 1.6–8.3)
NEUTROPHILS NFR BLD AUTO: 61.8 %
NRBC # BLD AUTO: 0 10*3/UL
NRBC BLD AUTO-RTO: 0 /100
PLATELET # BLD AUTO: 822 10E9/L (ref 150–450)
POTASSIUM SERPL-SCNC: 4.4 MMOL/L (ref 3.4–5.3)
PROT SERPL-MCNC: 8.1 G/DL (ref 6.8–8.8)
RBC # BLD AUTO: 4.17 10E12/L (ref 4.4–5.9)
SODIUM SERPL-SCNC: 136 MMOL/L (ref 133–144)
WBC # BLD AUTO: 12.1 10E9/L (ref 4–11)

## 2017-06-29 PROCEDURE — 85610 PROTHROMBIN TIME: CPT | Performed by: TRANSPLANT SURGERY

## 2017-06-29 PROCEDURE — 99211 OFF/OP EST MAY X REQ PHY/QHP: CPT

## 2017-06-29 PROCEDURE — 85025 COMPLETE CBC W/AUTO DIFF WBC: CPT | Performed by: TRANSPLANT SURGERY

## 2017-06-29 PROCEDURE — 80053 COMPREHEN METABOLIC PANEL: CPT | Performed by: TRANSPLANT SURGERY

## 2017-06-29 PROCEDURE — 36415 COLL VENOUS BLD VENIPUNCTURE: CPT | Performed by: TRANSPLANT SURGERY

## 2017-06-29 RX ORDER — FENTANYL 25 UG/1
PATCH TRANSDERMAL
Qty: 12 PATCH | Refills: 0 | Status: SHIPPED | OUTPATIENT
Start: 2017-06-29 | End: 2017-07-05

## 2017-06-29 NOTE — MR AVS SNAPSHOT
"              After Visit Summary   6/29/2017    Sohan Arita    MRN: [id number]           Patient Information     Date Of Birth          1960        Visit Information        Provider Department      6/29/2017 8:30 AM Rodrigo Jaquez MD [facility] Solid [facility] Transplant        Today's Diagnoses     Post-pancreatectomy diabetes (H)    -  1    Pancreatic insufficiency           Follow-ups after your visit        Your next 10 appointments already scheduled     Jul 18, 2017  8:30 AM CDT   (Arrive by 8:15 AM)   RETURN DIABETES with Lexy Brunson PA-C   [facility] End[facility]ology ([facility])    [address] [place] [id number] [phone]              Future tests that were ordered for you today     Open Future Orders        Priority Expected Expires Ordered    MA Diagnostic Left w/Edy Routine  6/28/2018 6/28/2017            Who to contact     If you have questions or need follow up information about today's clinic visit or your schedule please contact [facility] directly at [phone].  Normal or non-critical lab and imaging results will be communicated to you by [employer]hart, letter or phone within 4 business days after the clinic has received the results. If you do not hear from us within 7 days, please contact the clinic through "[employer]t or phone. If you have a critical or abnormal lab result, we will notify you by phone as soon as possible.  Submit refill requests through MobilePro or call your pharmacy and they will forward the refill request to us. Please allow 3 business days for your refill to be completed.          Additional Information About Your Visit        SafetyCulturehart Information     MobilePro lets you send messages to your doctor, view your test results, renew your prescriptions, schedule appointments and more. To sign up, go to [email or web address] . Click on \"Log in\" on the left side of the screen, which will take " "you to the Welcome page. Then click on \"Sign up Now\" on the right side of the page.     You will be asked to enter the access code listed below, as well as some personal information. Please follow the directions to create your username and password.     Your access code is: [email or web address]  Expires: 2017  6:30 AM     Your access code will  in 90 days. If you need help or a new code, please call your [facility] clinic or [phone].        Care EveryWhere ID     This is your Care EveryWhere ID. This could be used by other organizations to access your [facility] medical records  [id number]        Your Vitals Were     Pulse Temperature Respirations Height Pulse Oximetry BMI (Body Mass Index)    77 98.1  F (36.7  C) (Oral) 16 1.88 m (6' 2\") 97% 28.25 kg/m2       Blood Pressure from Last 3 Encounters:   17 114/76   17 136/79   17 123/74    Weight from Last 3 Encounters:   17 99.8 kg (220 lb)   17 99.7 kg (219 lb 11.2 oz)   17 100.9 kg (222 lb 8 oz)              Today, you had the following     No orders found for display         Today's Medication Changes          These changes are accurate as of: 17 10:19 AM.  If you have any questions, ask your nurse or doctor.               These medicines have changed or have updated prescriptions.        Dose/Directions    * fentaNYL 100 mcg/hr 72 hr patch   Commonly known as:  DURAGESIC   This may have changed:  additional instructions   Used for:  Acquired total absence of pancreas        Dose:  1 patch   Place 1 patch onto the skin every 72 hours   Quantity:  4 patch   Refills:  0       * fentaNYL 25 mcg/hr 72 hr patch   Commonly known as:  DURAGESIC   This may have changed:  You were already taking a medication with the same name, and this prescription was added. Make sure you understand how and when to take each.   Used for:  Abdominal pain   Changed by:  Trista Regan RN        Dose is 75 mcg/hr every 72 hours   Quantity:  12 " patch   Refills:  0       warfarin 5 MG tablet   Commonly known as:  COUMADIN   This may have changed:  how much to take   Used for:  DVT (deep venous thrombosis) (H)        Dose:  5 mg   Take 1 tablet (5 mg) by mouth daily   Quantity:  30 tablet   Refills:  0       * Notice:  This list has 2 medication(s) that are the same as other medications prescribed for you. Read the directions carefully, and ask your doctor or other care provider to review them with you.         Where to get your medicines      Some of these will need a paper prescription and others can be bought over the counter.  Ask your nurse if you have questions.     Bring a paper prescription for each of these medications     fentaNYL 25 mcg/hr 72 hr patch                Primary Care Provider Office Phone # Fax #    Frandy ORNELAS MD [facility] [phone]       [place] [address] [address]  [facility] [address]        Equal Access to Services     SEPIDEH VINCENT : Leatha Cordon, corina reyes, angelo beltran, luis armando pope . So [facility] [phone].    ATENCIÓN: Si habla español, tiene a welsh disposición servicios gratuitos de asistencia lingüística. Joseph al [phone].    We comply with applicable federal civil rights laws and [place] laws. We do not discriminate on the basis of race, color, national origin, age, disability sex, sexual orientation or gender identity.            Thank you!     Thank you for choosing [facility]  for your care. Our goal is always to provide you with excellent care. Hearing back from our patients is one way we can continue to improve our services. Please take a few minutes to complete the written survey that you may receive in the mail after your visit with us. Thank you!             Your Updated Medication List - Protect others around you: Learn how to safely use, store and throw away your medicines at www.disposemymeds.org.           This list is accurate as of: 6/29/17 10:19 AM.  Always use your most recent med list.                   Brand Name Dispense Instructions for use Diagnosis    acetaminophen 325 MG tablet    TYLENOL    100 tablet    Take 2 tablets (650 mg) by mouth every 4 hours as needed for mild pain or fever    Acute postoperative pain of abdomen       Alcohol Swabs Pads     100 each    1 pad as needed    Post-pancreatectomy diabetes (H)       * amylase-lipase-protease 83037 UNITS Cpep    CREON 12    270 capsule    Take 3-4 capsules (36,000-48,000 Units) by mouth every hour as needed (with snacks)    Acquired total absence of pancreas       * amylase-lipase-protease 39026 UNITS Cpep    ZENPEP    180 capsule    2 capsules (40,000 Units) by Per Feeding Tube route every 4 hours    Acquired total absence of pancreas       aspirin 81 MG EC tablet     30 tablet    Take 1 tablet (81 mg) by mouth daily    High platelet count (H), Acquired asplenia, Post-pancreatectomy diabetes (H), Pancreatic insufficiency, Anemia       BD SHARPS  Misc     1 each    1 Container as needed    Post-pancreatectomy diabetes (H)       blood glucose monitoring lancets     1 Box    Use to test blood sugar 8 times daily or as directed.    Acquired total absence of pancreas       blood glucose monitoring test strip    FREESTYLE LITE    300 strip    Use to test blood sugars 8 times daily or as directed.    Post-pancreatectomy diabetes (H)       cholecalciferol 2000 UNITS tablet     30 tablet    Take 1 tablet by mouth daily    Acquired total absence of pancreas       * fentaNYL 100 mcg/hr 72 hr patch    DURAGESIC    4 patch    Place 1 patch onto the skin every 72 hours    Acquired total absence of pancreas       * fentaNYL 25 mcg/hr 72 hr patch    DURAGESIC    12 patch    Dose is 75 mcg/hr every 72 hours    Abdominal pain       ferrous sulfate 325 (65 FE) MG Tbec EC tablet     30 tablet    Take 1 tablet (325 mg) by mouth daily    High platelet count (H),  Acquired asplenia, Post-pancreatectomy diabetes (H), Pancreatic insufficiency, Anemia       fluconazole 200 MG tablet    DIFLUCAN    30 tablet    Take 1 tablet (200 mg) by mouth daily    Surgical wound infection, initial encounter       FLUoxetine 20 MG/5ML solution    PROzac    75 mL    2.5 mLs (10 mg) by Per J Tube route daily    Acquired total absence of pancreas       folic acid 1 MG tablet    FOLVITE    30 tablet    Take 1 tablet (1 mg) by mouth daily    High platelet count (H), Acquired asplenia, Post-pancreatectomy diabetes (H), Pancreatic insufficiency, Anemia       glucose 40 % Gel gel     3 Tube    Take 15-30 g by mouth every 15 minutes as needed for low blood sugar    Post-pancreatectomy diabetes (H)       * insulin aspart 100 UNIT/ML injection    NovoLOG PEN    3 mL    1 units per 8 grams of carbohydrate.  Only chart total amount of units given.  Do not give if pre-prandial glucose is less than 60 mg/dL.    Post-pancreatectomy diabetes (H)       * insulin aspart 100 UNIT/ML injection    NovoLOG PEN    3 mL    Check blood glucose Q4H and administer based on blood glucose Notify provider if glucose greater than or equal to 350 mg/dL after administration. -150 = 2 units. -180 = 4 units. -210 = 6 units. -240 = 8 units. -270 = 10 units. -300 = 12 units. -330 = 14 units. BG >330 = 16 units.    Post-pancreatectomy diabetes (H)       insulin glargine 100 UNIT/ML injection    LANTUS    3 mL    Inject 9 Units Subcutaneous every morning Please decrease to 9 units daily once tube feedings are reduced to just 12 hours daily.    Post-pancreatectomy diabetes (H)       * insulin isophane human 100 UNIT/ML injection    HumuLIN N KWIKPEN    3 mL    Inject 13 units subcutaneously every 8 PM at the time of beginning nighttime tube feeds.    Post-pancreatectomy diabetes (H)       * insulin  UNIT/ML injection    NovoLIN N VIAL    30 mL    Inject 13 units in the AM     Post-pancreatectomy diabetes (H)       insulin pen needle 32G X 4 MM     100 each    Use 8 pen needles daily or as directed.    Acquired total absence of pancreas       insulin syringes (disposable) U-100 0.5 ML Misc     100 each    Use 1 daily with NPH vials    Post-pancreatectomy diabetes (H)       iohexol 140 MG/ML Soln solution    OMNIPAQUE    50 mL    Mix according to Pharmacy directions. Drink half of the solution beginning at 5:00 AM 6/21/17 and remainder of solution at 6:00 AM for 7:00 AM scan on 6/21/17    Postoperative wound infection, Post-pancreatectomy diabetes (H), History of pancreatectomy, DVT (deep venous thrombosis) (H), Long term (current) use of anticoagulants       levothyroxine 200 MCG tablet    SYNTHROID/LEVOTHROID    60 tablet    Take 1 tablet (200 mcg) by mouth daily    Postoperative hypothyroidism       magnesium hydroxide 400 MG/5ML suspension    MILK OF MAGNESIA    105 mL    30 mLs by Per Feeding Tube route 2 times daily as needed for constipation    Other constipation       methadone HCl 10 MG/5ML Soln     80 mL    Take 10 mg (5 ml) liquid by jejunal tube every twelve hours.    Postoperative pain, Post-pancreatectomy diabetes (H)       metoclopramide 5 MG tablet    REGLAN    90 tablet    Take 1 tablet (5 mg) by mouth 3 times daily as needed    History of pancreatectomy       multivitamin CF formula Caps per capsule     30 capsule    Take 1 capsule by mouth daily    Acquired total absence of pancreas       ondansetron 4 MG tablet    ZOFRAN    24 tablet    Take 1 tablet (4 mg) by mouth every 6 hours as needed for nausea or vomiting    Postoperative nausea       oxyCODONE 5 MG/5ML solution    ROXICODONE    250 mL    2.5-5 mLs (2.5-5 mg) by Per Feeding Tube route every 4 hours as needed for moderate to severe pain    Surgical wound infection, initial encounter       polyethylene glycol powder    MIRALAX/GLYCOLAX    119 g    Take one capful (17 grams), 1 to 2 times daily as needed for  constipation.    Constipation, Post-pancreatectomy diabetes (H), History of pancreatectomy, Pancreatic insufficiency       pregabalin 20 MG/ML solution    LYRICA    70 mL    1.25 mLs (25 mg) by Per Feeding Tube route daily as needed    History of pancreatectomy       QUEtiapine 25 MG tablet    SEROquel    60 tablet    Take 0.5 tablets (12.5 mg) by mouth At Bedtime    Anxiety       sennosides 8.8 MG/5ML syrup    SENOKOT    600 mL    10 mLs by Per Feeding Tube route 2 times daily    Other constipation       sodium bicarbonate 325 MG tablet     180 tablet    1 tablet q 4 hrs via Jtube. Administration Instructions: Crush 1 tablet and mix into 15 ml of warm water and use this solution to mix with Creon pancreatic enzymes. DO NOT administer directly into Jtube (to be mixed into tube feed formula with Creon enzyme).    High platelet count (H), Acquired asplenia, Post-pancreatectomy diabetes (H), Pancreatic insufficiency       warfarin 5 MG tablet    COUMADIN    30 tablet    Take 1 tablet (5 mg) by mouth daily    DVT (deep venous thrombosis) (H)       * Notice:  This list has 8 medication(s) that are the same as other medications prescribed for you. Read the directions carefully, and ask your doctor or other care provider to review them with you.

## 2017-06-29 NOTE — PROGRESS NOTES
"Sp TPIAT 5/15  Doing fine, finally.  Complicated by subfascial fluid that grew candida.  Had a wound separation, granulating well.  On nocturnal feeds only.  Pain controlled with Fentanyl patch (75), no supplements.  Bowels: a bit constipated  Sugars pretty well controlled. On 9 L and 14 novolog    Exam:  /76  Pulse 77  Temp 98.1  F (36.7  C) (Oral)  Resp 16  Ht 1.88 m (6' 2\")  Wt 99.8 kg (220 lb)  SpO2 97%  BMI 28.25 kg/m2  Wound granulating, no drainage.    Orders Only on 06/23/2017   Component Date Value Ref Range Status     INR 06/23/2017 1.94* 0.86 - 1.14 Final     WBC 06/23/2017 11.7* 4.0 - 11.0 10e9/L Final     RBC Count 06/23/2017 4.07* 4.4 - 5.9 10e12/L Final     Hemoglobin 06/23/2017 10.3* 13.3 - 17.7 g/dL Final     Hematocrit 06/23/2017 33.7* 40.0 - 53.0 % Final     MCV 06/23/2017 83  78 - 100 fl Final     MCH 06/23/2017 25.3* 26.5 - 33.0 pg Final     MCHC 06/23/2017 30.6* 31.5 - 36.5 g/dL Final     RDW 06/23/2017 16.8* 10.0 - 15.0 % Final     Platelet Count 06/23/2017 862* 150 - 450 10e9/L Final     P.  Start weaning off nocturnal feeds.  Continue coumadin  Continue wound pack  No change narcs  Can prob plan to leave within 1-2 weeks.  Increase miralax for bowel function.    "

## 2017-06-29 NOTE — MR AVS SNAPSHOT
Sohan Arita   6/29/2017   Anticoagulation Therapy Visit    Description:  56 year old male   Provider:  Cristina Faust, RN   Department:  [facility]           INR as of 6/29/2017     Today's INR 2.28      Anticoagulation Summary as of 6/29/2017     INR goal 2.0-3.0   Today's INR 2.28   Full instructions 6/29: 2.5 mg; 6/30: 2.5 mg; 7/1: 2.5 mg; 7/2: 2.5 mg; 7/3: 2.5 mg; 7/4: 2.5 mg; 7/5: 2.5 mg; Otherwise No maintenance plan   Next INR check 7/6/2017    Indications   Deep vein thrombosis (DVT) of left lower extremity (H) [I82.402]  Long-term (current) use of anticoagulants [Z79.01] [Z79.01]         June 2017 Details    Sun Mon Tue Wed Thu Fri Sat         1               2               3                 4               5               6               7               8               9               10                 11               12               13               14               15               16               17                 18               19               20               21               22               23               24                 25               26               27               28               29      2.5 mg   See details      30      2.5 mg           Date Details   06/29 This INR check               How to take your warfarin dose     To take:  2.5 mg Take 0.5 of a 5 mg tablet.           July 2017 Details    Sun Mon Tue Wed Thu Fri Sat           1      2.5 mg           2      2.5 mg         3      2.5 mg         4      2.5 mg         5      2.5 mg         6            7               8                 9               10               11               12               13               14               15                 16               17               18               19               20               21               22                 23               24               25               26               27               28               29                 30                31                     Date Details   No additional details    Date of next INR:  7/6/2017         How to take your warfarin dose     To take:  2.5 mg Take 0.5 of a 5 mg tablet.

## 2017-06-29 NOTE — LETTER
"6/29/2017       RE: Sohan Arita  [address]  [facility] [address]     Dear Colleague,    Thank you for referring your patient, Sohan Arita, to the [facility] at [facility]. Please see a copy of my visit note below.    Sp TPIAT 5/15  Doing fine, finally.  Complicated by subfascial fluid that grew candida.  Had a wound separation, granulating well.  On nocturnal feeds only.  Pain controlled with Fentanyl patch (75), no supplements.  Bowels: a bit constipated  Sugars pretty well controlled. On 9 L and 14 novolog    Exam:  /76  Pulse 77  Temp 98.1  F (36.7  C) (Oral)  Resp 16  Ht 1.88 m (6' 2\")  Wt 99.8 kg (220 lb)  SpO2 97%  BMI 28.25 kg/m2  Wound granulating, no drainage.    Orders Only on 06/23/2017   Component Date Value Ref Range Status     INR 06/23/2017 1.94* 0.86 - 1.14 Final     WBC 06/23/2017 11.7* 4.0 - 11.0 10e9/L Final     RBC Count 06/23/2017 4.07* 4.4 - 5.9 10e12/L Final     Hemoglobin 06/23/2017 10.3* 13.3 - 17.7 g/dL Final     Hematocrit 06/23/2017 33.7* 40.0 - 53.0 % Final     MCV 06/23/2017 83  78 - 100 fl Final     MCH 06/23/2017 25.3* 26.5 - 33.0 pg Final     MCHC 06/23/2017 30.6* 31.5 - 36.5 g/dL Final     RDW 06/23/2017 16.8* 10.0 - 15.0 % Final     Platelet Count 06/23/2017 862* 150 - 450 10e9/L Final     P.  Start weaning off nocturnal feeds.  Continue coumadin  Continue wound pack  No change narcs  Can prob plan to leave within 1-2 weeks.  Increase miralax for bowel function.      Again, thank you for allowing me to participate in the care of your patient.      Sincerely,    Rodrigo Jaquez MD      "

## 2017-06-29 NOTE — PROGRESS NOTES
ANTICOAGULATION FOLLOW-UP CLINIC VISIT    Patient Name:  Sohan Arita  Date:  6/29/2017  Contact Type:  Telephone    SUBJECTIVE:     Patient Findings     Positives Change in medications    Comments Pt will D/C Protonix and is continuing his Fluconazole with no end date. Pt might be moving back to [place] next week and is unsure of the plan for his Warfarin therapy. Pt will see MD next week and will ask if the U is going to have to continue to monitor him or if he should have his PCP monitor him.            OBJECTIVE    INR   Date Value Ref Range Status   06/29/2017 2.28 (H) 0.86 - 1.14 Final       ASSESSMENT / PLAN  INR assessment THER    Recheck INR In: 1 WEEK    INR Location Clinic      Anticoagulation Summary as of 6/29/2017     INR goal 2.0-3.0   Today's INR 2.28   Maintenance plan No maintenance plan   Full instructions 6/29: 2.5 mg; 6/30: 2.5 mg; 7/1: 2.5 mg; 7/2: 2.5 mg; 7/3: 2.5 mg; 7/4: 2.5 mg; 7/5: 2.5 mg; Otherwise No maintenance plan   Next INR check 7/6/2017   Priority INR   Target end date     Indications   Deep vein thrombosis (DVT) of left lower extremity (H) [I82.402]  Long-term (current) use of anticoagulants [Z79.01] [Z79.01]         Anticoagulation Episode Summary     INR check location     Preferred lab     Send INR reminders to [facility]    Comments       Anticoagulation Care Providers     Provider Role Specialty Phone number    Rodrigo Jaquez MD Responsible Transplant [phone]            See the Encounter Report to view Anticoagulation Flowsheet and Dosing Calendar (Go to Encounters tab in chart review, and find the Anticoagulation Therapy Visit)    Spoke with patient. Gave them their lab results and new warfarin recommendation.  No changes in health, medication, or diet. No missed doses, no falls. No signs or symptoms of bleed or clotting.     Cristina Faust RN

## 2017-06-29 NOTE — NURSING NOTE
"Chief Complaint   Patient presents with     TP-IAT Transplant     POD 45       Initial /76  Pulse 77  Temp 98.1  F (36.7  C) (Oral)  Resp 16  Ht 1.88 m (6' 2\")  Wt 99.8 kg (220 lb)  SpO2 97%  BMI 28.25 kg/m2 Estimated body mass index is 28.25 kg/(m^2) as calculated from the following:    Height as of this encounter: 1.88 m (6' 2\").    Weight as of this encounter: 99.8 kg (220 lb).  Medication Reconciliation: complete    "

## 2017-06-30 ENCOUNTER — DOCUMENTATION ONLY (OUTPATIENT)
Dept: TRANSPLANT | Facility: CLINIC | Age: 57
End: 2017-06-30

## 2017-06-30 ENCOUNTER — TELEPHONE (OUTPATIENT)
Dept: TRANSPLANT | Facility: CLINIC | Age: 57
End: 2017-06-30

## 2017-07-03 ENCOUNTER — TELEPHONE (OUTPATIENT)
Dept: TRANSPLANT | Facility: CLINIC | Age: 57
End: 2017-07-03

## 2017-07-03 LAB
FUNGUS SPEC CULT: ABNORMAL
FUNGUS SPEC CULT: ABNORMAL
FUNGUS SPEC CULT: NORMAL
MICRO REPORT STATUS: ABNORMAL
MICRO REPORT STATUS: ABNORMAL
MICRO REPORT STATUS: NORMAL
SPECIMEN SOURCE: ABNORMAL
SPECIMEN SOURCE: ABNORMAL
SPECIMEN SOURCE: NORMAL

## 2017-07-03 NOTE — TELEPHONE ENCOUNTER
"Spoke with Tanner, continues to struggle with constipation--has self de impacted himself. Now seems to have sorted this out. Some mild nausea today,he reduced his Fentanyl  to 50mcg ( skipped 75 step). Struggles about 48 hours after med decreases \" it messes with me \". Took a very small dose of oxycodone.Plans to wait 14 days before his next decrease.    Based on his verbal report seems to be eating a normal balanced diet.    No major issues to report.          "

## 2017-07-05 ENCOUNTER — TELEPHONE (OUTPATIENT)
Dept: TRANSPLANT | Facility: CLINIC | Age: 57
End: 2017-07-05

## 2017-07-05 DIAGNOSIS — E13.9 POST-PANCREATECTOMY DIABETES (H): ICD-10-CM

## 2017-07-05 DIAGNOSIS — K21.9 GERD (GASTROESOPHAGEAL REFLUX DISEASE): Primary | ICD-10-CM

## 2017-07-05 DIAGNOSIS — R10.84 GENERALIZED ABDOMINAL PAIN: ICD-10-CM

## 2017-07-05 DIAGNOSIS — E89.1 POST-PANCREATECTOMY DIABETES (H): ICD-10-CM

## 2017-07-05 DIAGNOSIS — Z90.410 POST-PANCREATECTOMY DIABETES (H): ICD-10-CM

## 2017-07-05 RX ORDER — FENTANYL 25 UG/1
PATCH TRANSDERMAL
Qty: 12 PATCH | Refills: 0
Start: 2017-07-05 | End: 2017-11-09

## 2017-07-06 ENCOUNTER — ANTICOAGULATION THERAPY VISIT (OUTPATIENT)
Dept: ANTICOAGULATION | Facility: CLINIC | Age: 57
End: 2017-07-06

## 2017-07-06 ENCOUNTER — OFFICE VISIT (OUTPATIENT)
Dept: TRANSPLANT | Facility: CLINIC | Age: 57
End: 2017-07-06
Attending: TRANSPLANT SURGERY
Payer: COMMERCIAL

## 2017-07-06 VITALS
WEIGHT: 213.6 LBS | RESPIRATION RATE: 16 BRPM | DIASTOLIC BLOOD PRESSURE: 81 MMHG | HEART RATE: 71 BPM | HEIGHT: 74 IN | SYSTOLIC BLOOD PRESSURE: 139 MMHG | BODY MASS INDEX: 27.41 KG/M2 | OXYGEN SATURATION: 97 %

## 2017-07-06 DIAGNOSIS — I82.402 DEEP VEIN THROMBOSIS (DVT) OF LEFT LOWER EXTREMITY (H): ICD-10-CM

## 2017-07-06 DIAGNOSIS — D64.9 ANEMIA: Primary | ICD-10-CM

## 2017-07-06 DIAGNOSIS — E13.9 POST-PANCREATECTOMY DIABETES (H): Primary | ICD-10-CM

## 2017-07-06 DIAGNOSIS — Z90.410 POST-PANCREATECTOMY DIABETES (H): Primary | ICD-10-CM

## 2017-07-06 DIAGNOSIS — E89.1 POST-PANCREATECTOMY DIABETES (H): Primary | ICD-10-CM

## 2017-07-06 DIAGNOSIS — I82.452 THROMBOSIS OF LEFT PERONEAL VEIN (H): ICD-10-CM

## 2017-07-06 DIAGNOSIS — Z79.01 LONG-TERM (CURRENT) USE OF ANTICOAGULANTS: ICD-10-CM

## 2017-07-06 DIAGNOSIS — K21.9 GERD (GASTROESOPHAGEAL REFLUX DISEASE): Primary | ICD-10-CM

## 2017-07-06 LAB
BASOPHILS # BLD AUTO: 0.1 10E9/L (ref 0–0.2)
BASOPHILS NFR BLD AUTO: 1.3 %
DIFFERENTIAL METHOD BLD: ABNORMAL
EOSINOPHIL # BLD AUTO: 0.5 10E9/L (ref 0–0.7)
EOSINOPHIL NFR BLD AUTO: 6.3 %
ERYTHROCYTE [DISTWIDTH] IN BLOOD BY AUTOMATED COUNT: 16.5 % (ref 10–15)
HCT VFR BLD AUTO: 35.8 % (ref 40–53)
HGB BLD-MCNC: 10.8 G/DL (ref 13.3–17.7)
IMM GRANULOCYTES # BLD: 0 10E9/L (ref 0–0.4)
IMM GRANULOCYTES NFR BLD: 0.4 %
INR PPP: 3.06 (ref 0.86–1.14)
LYMPHOCYTES # BLD AUTO: 2 10E9/L (ref 0.8–5.3)
LYMPHOCYTES NFR BLD AUTO: 24.1 %
MCH RBC QN AUTO: 24.4 PG (ref 26.5–33)
MCHC RBC AUTO-ENTMCNC: 30.2 G/DL (ref 31.5–36.5)
MCV RBC AUTO: 81 FL (ref 78–100)
MONOCYTES # BLD AUTO: 0.9 10E9/L (ref 0–1.3)
MONOCYTES NFR BLD AUTO: 10.6 %
NEUTROPHILS # BLD AUTO: 4.7 10E9/L (ref 1.6–8.3)
NEUTROPHILS NFR BLD AUTO: 57.3 %
NRBC # BLD AUTO: 0 10*3/UL
NRBC BLD AUTO-RTO: 0 /100
PLATELET # BLD AUTO: 808 10E9/L (ref 150–450)
RBC # BLD AUTO: 4.42 10E12/L (ref 4.4–5.9)
WBC # BLD AUTO: 8.2 10E9/L (ref 4–11)

## 2017-07-06 PROCEDURE — 36415 COLL VENOUS BLD VENIPUNCTURE: CPT | Performed by: TRANSPLANT SURGERY

## 2017-07-06 PROCEDURE — 85025 COMPLETE CBC W/AUTO DIFF WBC: CPT | Performed by: TRANSPLANT SURGERY

## 2017-07-06 RX ORDER — WARFARIN SODIUM 2.5 MG/1
TABLET ORAL
Qty: 30 TABLET | Refills: 1 | Status: SHIPPED | OUTPATIENT
Start: 2017-07-06 | End: 2017-08-31

## 2017-07-06 NOTE — LETTER
"7/6/2017       RE: Sohan Arita  [address] [facility] [address]     Dear Colleague,    Thank you for referring your patient, Sohan Arita, to the [facility] TRANSPL[facility] at [facility]. Please see a copy of my visit note below.    Chronic Pancreatitis Group Progress Note    I had the pleasure of seeing Mr. Sohan Arita today. 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 ***   Interval events since last visit with me:   ER visits = ***, reasons: ***  Inpatient admissions = ***, reasons ***  The patient reports their current symptoms to be:   GI function:   Nausea:   []  none    []  rare    []  often    []  daily  Comment: ***  Vomiting: []  none    []  rare    []  often    []  daily   Comment: ***  Last BM: {TIME INTERVAL LIST:880216}.  Stools are {STOOL OANH:441853}.   Appetite: {GOOD/FAIR/POOR:848579::\"good\"}  Tube feeds at *** cc/hr, *** hr cycle.  Oral food intake: {DIET AMOUNT:309469}  Pancreatic exocrine insufficiency:   Takes ({PANCREATIC ENZYMES:710700}), *** with meals, *** with snacks.  Greasy stools: []  none   []  rarely    []  several times/wk   []  daily      Comment: ***  Diabetes management:   Long acting insulin: {INSULINS:305359} units/day  Short acting insulin: {INSULINS:365933} units/day  Blood sugars typically range from *** to ***.   Lab Results   Component Value Date    A1C 5.3 05/16/2017    A1C 5.6 05/10/2017    A1C 5.4 09/10/2015      Pain management:   Pain rating (0=no pain, 10=unbearable): ***  Long acting agent: {NARCOTICS:170522} *** mg {FREQUENCY5:881026}.  Short acting agent: {NARCOTICS:115932} *** mg {FREQUENCY5:241692}.  Daily 'Uptime': ***  Walking: distance ***, *** times per week  Prescription Medications as of 7/6/2017             warfarin (COUMADIN) 2.5 MG tablet Take 1 tablet daily or as directed by coumadin clinic.    fentaNYL (DURAGESIC) 25 mcg/hr 72 hr patch " Dose is 50 mcg/hr every 72 hours    insulin glargine (LANTUS) 100 UNIT/ML injection 7 units daily    insulin aspart (NOVOLOG PEN) 100 UNIT/ML injection 1 units per 30 grams of carbohydrate.  Only chart total amount of units given.  Do not give if pre-prandial glucose is less than 60 mg/dL.    omeprazole (PRILOSEC) 20 MG CR capsule Take 1 capsule (20 mg) by mouth daily    levothyroxine (SYNTHROID/LEVOTHROID) 200 MCG tablet Take 1 tablet (200 mcg) by mouth daily    polyethylene glycol (MIRALAX/GLYCOLAX) powder Take one capful (17 grams), 1 to 2 times daily as needed for constipation.    blood glucose monitoring (FREESTYLE LITE) test strip Use to test blood sugars 8 times daily or as directed.    ondansetron (ZOFRAN) 4 MG tablet Take 1 tablet (4 mg) by mouth every 6 hours as needed for nausea or vomiting    fluconazole (DIFLUCAN) 200 MG tablet Take 1 tablet (200 mg) by mouth daily    oxyCODONE (ROXICODONE) 5 MG/5ML solution 2.5-5 mLs (2.5-5 mg) by Per Feeding Tube route every 4 hours as needed for moderate to severe pain    Alcohol Swabs PADS 1 pad as needed    insulin aspart (NOVOLOG PEN) 100 UNIT/ML injection Check blood glucose Q4H and administer based on blood glucose  Notify provider if glucose greater than or equal to 350 mg/dL after administration.  -150 = 2 units.  -180 = 4 units.  -210 = 6 units.  -240 = 8 units.  -270 = 10 units.  -300 = 12 units.  -330 = 14 units.  BG >330 = 16 units.    warfarin (COUMADIN) 5 MG tablet Take 1 tablet (5 mg) by mouth daily    aspirin 81 MG EC tablet Take 1 tablet (81 mg) by mouth daily    ferrous sulfate 325 (65 FE) MG TBEC EC tablet Take 1 tablet (325 mg) by mouth daily    FLUoxetine (PROZAC) 20 MG/5ML solution 2.5 mLs (10 mg) by Per J Tube route daily    glucose 40 % GEL gel Take 15-30 g by mouth every 15 minutes as needed for low blood sugar    amylase-lipase-protease (CREON 12) 58732 UNITS CPEP Take 3-4 capsules (36,000-48,000  "Units) by mouth every hour as needed (with snacks)    magnesium hydroxide (MILK OF MAGNESIA) 400 MG/5ML suspension 30 mLs by Per Feeding Tube route 2 times daily as needed for constipation    sennosides (SENOKOT) 8.8 MG/5ML syrup 10 mLs by Per Feeding Tube route 2 times daily    Sharps Container (BD SHARPS ) MISC 1 Container as needed    insulin pen needle 32G X 4 MM Use 8 pen needles daily or as directed.    blood glucose monitoring (ACCU-CHEK FASTCLIX) lancets Use to test blood sugar 8 times daily or as directed.        Physical exam:   General Appearance: {Distress levels:094433::\"in no apparent distress.\"}       Skin: Normal, no rashes or jaundice  Heart: Regular rhythm.  Lungs: no audible wheezes or increased work of breathing.  Abdomen: The abdomen is {ABDOMEN INSPECTION:602}, and the wound is {INCISION HEALIN}, {WITH:606973} hernia.  ***Tube exit site {IS/IS NOT:9024} clean. The abdomen is {TENDER/NON-TENDER (NO DEFAULT):881269}, {ABDOMEN LOCATION:605}.    Edema: {ABSENT:409945}    Chronic Pancreatitis Latest Ref Rng & Units 2017   Weight - 100.925 kg - 99.655 kg 99.791 kg -   GLUCOSE 70 - 99 mg/dL 124(H) - - 136(H) -   HGB 13.3 - 17.7 g/dL 10.0(L) 10.3(L) - 10.3(L) 10.8(L)    - 450 10e9/L 852(H) 862(H) - 822(H) 808(H)   ALBUMIN 3.4 - 5.0 g/dL 3.3(L) - - 3.2(L) -   PREALBUMIN 15 - 45 mg/dL - - - - -       Assessment and Plan: He is doing { :6992888} s/p TP-IAT.  Interval progress has been {GOOD/FAIR/POOR:845310::\"good\"}.  Postoperative gastric ileus: ***  Pancreatic exocrine insufficiency: ***  Malnutrition: ***  Diabetes mellitus: ***  Abdominal pain management: ***  Followup: I will see him again in clinic in *** {TIME FRAME:779811::\"years\"}    Total time: *** min, Counselling Time: *** min.  {[facility] TRANSPLANT MD SIGNATURE:284050009}    I saw Dr Arita with Eileen Santana and Ericka Regan.  He is ready to go back to [place].  Pain good " "control  DM only on 7u lantus  Bowel ok    /81  Pulse 71  Resp 16  Ht 1.88 m (6' 2\")  Wt 96.9 kg (213 lb 9.6 oz)  SpO2 97%  BMI 27.42 kg/m2  Wound: healing. Sinus in upper wound quit draining.  Leg no edema.    Orders Only on 07/06/2017   Component Date Value Ref Range Status     INR 07/06/2017 3.06* 0.86 - 1.14 Final     WBC 07/06/2017 8.2  4.0 - 11.0 10e9/L Final     RBC Count 07/06/2017 4.42  4.4 - 5.9 10e12/L Final     Hemoglobin 07/06/2017 10.8* 13.3 - 17.7 g/dL Final     Hematocrit 07/06/2017 35.8* 40.0 - 53.0 % Final     MCV 07/06/2017 81  78 - 100 fl Final     MCH 07/06/2017 24.4* 26.5 - 33.0 pg Final     MCHC 07/06/2017 30.2* 31.5 - 36.5 g/dL Final     RDW 07/06/2017 16.5* 10.0 - 15.0 % Final     Platelet Count 07/06/2017 808* 150 - 450 10e9/L Final     Diff Method 07/06/2017 Automated Method   Final     % Neutrophils 07/06/2017 57.3  % Final     % Lymphocytes 07/06/2017 24.1  % Final     % Monocytes 07/06/2017 10.6  % Final     % Eosinophils 07/06/2017 6.3  % Final     % Basophils 07/06/2017 1.3  % Final     % Immature Granulocytes 07/06/2017 0.4  % Final     Nucleated RBCs 07/06/2017 0  0 /100 Final     Absolute Neutrophil 07/06/2017 4.7  1.6 - 8.3 10e9/L Final     Absolute Lymphocytes 07/06/2017 2.0  0.8 - 5.3 10e9/L Final     Absolute Monocytes 07/06/2017 0.9  0.0 - 1.3 10e9/L Final     Absolute Eosinophils 07/06/2017 0.5  0.0 - 0.7 10e9/L Final     Absolute Basophils 07/06/2017 0.1  0.0 - 0.2 10e9/L Final     Abs Immature Granulocytes 07/06/2017 0.0  0 - 0.4 10e9/L Final     Absolute Nucleated RBC 07/06/2017 0.0   Final     P:  Ok to return home.  G tube removed.  Routine followup for TPIAT    Again, thank you for allowing me to participate in the care of your patient.      Sincerely,    Rodrigo Jaquez MD      "

## 2017-07-06 NOTE — MR AVS SNAPSHOT
Sohan Arita   7/6/2017   Anticoagulation Therapy Visit    Description:  56 year old male   Provider:  Amelia Rolon, RN   Department:  [facility]           INR as of 7/6/2017     Today's INR 3.06!      Anticoagulation Summary as of 7/6/2017     INR goal 2.0-3.0   Today's INR 3.06!   Full instructions 7/6: 1.25 mg; 7/7: 2.5 mg; 7/8: 2.5 mg; 7/9: 2.5 mg; 7/10: 2.5 mg; 7/11: 2.5 mg; 7/12: 2.5 mg   Next INR check 7/13/2017    Indications   Deep vein thrombosis (DVT) of left lower extremity (H) [I82.402]  Long-term (current) use of anticoagulants [Z79.01] [Z79.01]         July 2017 Details    Sun Mon Tue Wed Thu Fri Sat           1                 2               3               4               5               6      1.25 mg   See details      7      2.5 mg         8      2.5 mg           9      2.5 mg         10      2.5 mg         11      2.5 mg         12      2.5 mg         13            14               15                 16               17               18               19               20               21               22                 23               24               25               26               27               28               29                 30               31                     Date Details   07/06 This INR check       Date of next INR:  7/13/2017         How to take your warfarin dose     To take:  1.25 mg Take 0.5 of a 2.5 mg tablet.    To take:  2.5 mg Take 1 of the 2.5 mg tablets.

## 2017-07-06 NOTE — NURSING NOTE
"Chief Complaint   Patient presents with     TP-IAT Transplant     POD 52       Initial /81  Pulse 71  Resp 16  Ht 1.88 m (6' 2\")  Wt 96.9 kg (213 lb 9.6 oz)  SpO2 97%  BMI 27.42 kg/m2 Estimated body mass index is 27.42 kg/(m^2) as calculated from the following:    Height as of this encounter: 1.88 m (6' 2\").    Weight as of this encounter: 96.9 kg (213 lb 9.6 oz).      "

## 2017-07-06 NOTE — LETTER
"  7/6/2017      RE: Sohan Arita  [address] [facility] [address]       Chronic Pancreatitis Group Progress Note    I had the pleasure of seeing Mr. Sohan Arita today. 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 ***   Interval events since last visit with me:   ER visits = ***, reasons: ***  Inpatient admissions = ***, reasons ***  The patient reports their current symptoms to be:   GI function:   Nausea:   []  none    []  rare    []  often    []  daily  Comment: ***  Vomiting: []  none    []  rare    []  often    []  daily   Comment: ***  Last BM: {TIME INTERVAL LIST:700750}.  Stools are {STOOL OANH:909269}.   Appetite: {GOOD/FAIR/POOR:327423::\"good\"}  Tube feeds at *** cc/hr, *** hr cycle.  Oral food intake: {DIET AMOUNT:598294}  Pancreatic exocrine insufficiency:   Takes ({PANCREATIC ENZYMES:740873}), *** with meals, *** with snacks.  Greasy stools: []  none   []  rarely    []  several times/wk   []  daily      Comment: ***  Diabetes management:   Long acting insulin: {INSULINS:219605} units/day  Short acting insulin: {INSULINS:384726} units/day  Blood sugars typically range from *** to ***.   Lab Results   Component Value Date    A1C 5.3 05/16/2017    A1C 5.6 05/10/2017    A1C 5.4 09/10/2015      Pain management:   Pain rating (0=no pain, 10=unbearable): ***  Long acting agent: {NARCOTICS:388999} *** mg {FREQUENCY5:694059}.  Short acting agent: {NARCOTICS:840807} *** mg {FREQUENCY5:432363}.  Daily 'Uptime': ***  Walking: distance ***, *** times per week  Prescription Medications as of 7/6/2017             warfarin (COUMADIN) 2.5 MG tablet Take 1 tablet daily or as directed by coumadin clinic.    fentaNYL (DURAGESIC) 25 mcg/hr 72 hr patch Dose is 50 mcg/hr every 72 hours    insulin glargine (LANTUS) 100 UNIT/ML injection 7 units daily    insulin aspart (NOVOLOG PEN) 100 UNIT/ML injection 1 units per 30 grams of carbohydrate.  Only chart total amount of " units given.  Do not give if pre-prandial glucose is less than 60 mg/dL.    omeprazole (PRILOSEC) 20 MG CR capsule Take 1 capsule (20 mg) by mouth daily    levothyroxine (SYNTHROID/LEVOTHROID) 200 MCG tablet Take 1 tablet (200 mcg) by mouth daily    polyethylene glycol (MIRALAX/GLYCOLAX) powder Take one capful (17 grams), 1 to 2 times daily as needed for constipation.    blood glucose monitoring (FREESTYLE LITE) test strip Use to test blood sugars 8 times daily or as directed.    ondansetron (ZOFRAN) 4 MG tablet Take 1 tablet (4 mg) by mouth every 6 hours as needed for nausea or vomiting    fluconazole (DIFLUCAN) 200 MG tablet Take 1 tablet (200 mg) by mouth daily    oxyCODONE (ROXICODONE) 5 MG/5ML solution 2.5-5 mLs (2.5-5 mg) by Per Feeding Tube route every 4 hours as needed for moderate to severe pain    Alcohol Swabs PADS 1 pad as needed    insulin aspart (NOVOLOG PEN) 100 UNIT/ML injection Check blood glucose Q4H and administer based on blood glucose  Notify provider if glucose greater than or equal to 350 mg/dL after administration.  -150 = 2 units.  -180 = 4 units.  -210 = 6 units.  -240 = 8 units.  -270 = 10 units.  -300 = 12 units.  -330 = 14 units.  BG >330 = 16 units.    warfarin (COUMADIN) 5 MG tablet Take 1 tablet (5 mg) by mouth daily    aspirin 81 MG EC tablet Take 1 tablet (81 mg) by mouth daily    ferrous sulfate 325 (65 FE) MG TBEC EC tablet Take 1 tablet (325 mg) by mouth daily    FLUoxetine (PROZAC) 20 MG/5ML solution 2.5 mLs (10 mg) by Per J Tube route daily    glucose 40 % GEL gel Take 15-30 g by mouth every 15 minutes as needed for low blood sugar    amylase-lipase-protease (CREON 12) 72910 UNITS CPEP Take 3-4 capsules (36,000-48,000 Units) by mouth every hour as needed (with snacks)    magnesium hydroxide (MILK OF MAGNESIA) 400 MG/5ML suspension 30 mLs by Per Feeding Tube route 2 times daily as needed for constipation    sennosides (SENOKOT) 8.8  "MG/5ML syrup 10 mLs by Per Feeding Tube route 2 times daily    Sharps Container (BD SHARPS ) MISC 1 Container as needed    insulin pen needle 32G X 4 MM Use 8 pen needles daily or as directed.    blood glucose monitoring (ACCU-CHEK FASTCLIX) lancets Use to test blood sugar 8 times daily or as directed.        Physical exam:   General Appearance: {Distress levels:534011::\"in no apparent distress.\"}       Skin: Normal, no rashes or jaundice  Heart: Regular rhythm.  Lungs: no audible wheezes or increased work of breathing.  Abdomen: The abdomen is {ABDOMEN INSPECTION:602}, and the wound is {INCISION HEALIN}, {WITH:802736} hernia.  ***Tube exit site {IS/IS NOT:9024} clean. The abdomen is {TENDER/NON-TENDER (NO DEFAULT):505678}, {ABDOMEN LOCATION:605}.    Edema: {ABSENT:748004}    Chronic Pancreatitis Latest Ref Rng & Units 2017   Weight - 100.925 kg - 99.655 kg 99.791 kg -   GLUCOSE 70 - 99 mg/dL 124(H) - - 136(H) -   HGB 13.3 - 17.7 g/dL 10.0(L) 10.3(L) - 10.3(L) 10.8(L)    - 450 10e9/L 852(H) 862(H) - 822(H) 808(H)   ALBUMIN 3.4 - 5.0 g/dL 3.3(L) - - 3.2(L) -   PREALBUMIN 15 - 45 mg/dL - - - - -       Assessment and Plan: He is doing { :3470797} s/p TP-IAT.  Interval progress has been {GOOD/FAIR/POOR:995282::\"good\"}.  Postoperative gastric ileus: ***  Pancreatic exocrine insufficiency: ***  Malnutrition: ***  Diabetes mellitus: ***  Abdominal pain management: ***  Followup: I will see him again in clinic in *** {TIME FRAME:652918::\"years\"}    Total time: *** min, Counselling Time: *** min.  {[facility] TRANSPLANT MD SIGNATURE:198887712}    I saw Dr Arita with Eileen Santana and Ericka Regan.  He is ready to go back to [place].  Pain good control  DM only on 7u lantus  Bowel ok    /81  Pulse 71  Resp 16  Ht 1.88 m (6' 2\")  Wt 96.9 kg (213 lb 9.6 oz)  SpO2 97%  BMI 27.42 kg/m2  Wound: healing. Sinus in upper wound quit draining.  Leg no " edema.    Orders Only on 07/06/2017   Component Date Value Ref Range Status     INR 07/06/2017 3.06* 0.86 - 1.14 Final     WBC 07/06/2017 8.2  4.0 - 11.0 10e9/L Final     RBC Count 07/06/2017 4.42  4.4 - 5.9 10e12/L Final     Hemoglobin 07/06/2017 10.8* 13.3 - 17.7 g/dL Final     Hematocrit 07/06/2017 35.8* 40.0 - 53.0 % Final     MCV 07/06/2017 81  78 - 100 fl Final     MCH 07/06/2017 24.4* 26.5 - 33.0 pg Final     MCHC 07/06/2017 30.2* 31.5 - 36.5 g/dL Final     RDW 07/06/2017 16.5* 10.0 - 15.0 % Final     Platelet Count 07/06/2017 808* 150 - 450 10e9/L Final     Diff Method 07/06/2017 Automated Method   Final     % Neutrophils 07/06/2017 57.3  % Final     % Lymphocytes 07/06/2017 24.1  % Final     % Monocytes 07/06/2017 10.6  % Final     % Eosinophils 07/06/2017 6.3  % Final     % Basophils 07/06/2017 1.3  % Final     % Immature Granulocytes 07/06/2017 0.4  % Final     Nucleated RBCs 07/06/2017 0  0 /100 Final     Absolute Neutrophil 07/06/2017 4.7  1.6 - 8.3 10e9/L Final     Absolute Lymphocytes 07/06/2017 2.0  0.8 - 5.3 10e9/L Final     Absolute Monocytes 07/06/2017 0.9  0.0 - 1.3 10e9/L Final     Absolute Eosinophils 07/06/2017 0.5  0.0 - 0.7 10e9/L Final     Absolute Basophils 07/06/2017 0.1  0.0 - 0.2 10e9/L Final     Abs Immature Granulocytes 07/06/2017 0.0  0 - 0.4 10e9/L Final     Absolute Nucleated RBC 07/06/2017 0.0   Final     P:  Ok to return home.  G tube removed.  Routine followup for TPIAT    Rodrigo Jaquez MD

## 2017-07-06 NOTE — PROGRESS NOTES
[facility]ronic Pancreatitis [facility] Progress Note     I had the pleasure of seeing Mr. Sohan Arita today. He is 52 days status post total pancreatectomy with islet autotransplant.    Interval events since last visit with me:   ER visits = 0, reasons: NA  Inpatient admissions = 0, reasons NA  The patient reports their current symptoms to be:   GI function:   Nausea:   [x]  none    []  rare    []  often    []  daily  Comment:  Vomiting: [x]  none    []  rare    []  often    []  daily   Comment:   Last BM: Today.  Stools are soft, frequency : .   Appetite: good    Oral food intake: 4-6 per day  Pancreatic exocrine insufficiency:   Takes (Creon 12), 3 with meals, 3 with snacks.  Greasy stools: [x]  none   []  rarely    []  several times/wk   []  daily      Comment:   Diabetes management:   Long acting insulin: Lantus--7 units/day  Short acting insulin: Aspart 5units/day  Blood sugars typically range from 80 to 120.   Lab Results   Component Value Date    A1C 5.3 05/16/2017    A1C 5.6 05/10/2017    A1C 5.4 09/10/2015      Pain management:   Pain rating (0=no pain, 10=unbearable): 4  Long acting agent: fentanyl 50mcg every 72 hours  Short acting agent: oxycodone 5 mg every 6 hours .  Daily 'Uptime': most of the day  Walking: distance 1 mile several times per week  Prescription Medications as of 7/6/2017             warfarin (COUMADIN) 2.5 MG tablet Take 1 tablet daily or as directed by coumadin clinic.    fentaNYL (DURAGESIC) 25 mcg/hr 72 hr patch Dose is 50 mcg/hr every 72 hours    insulin glargine (LANTUS) 100 UNIT/ML injection 7 units daily    insulin aspart (NOVOLOG PEN) 100 UNIT/ML injection 1 units per 30 grams of carbohydrate.  Only chart total amount of units given.  Do not give if pre-prandial glucose is less than 60 mg/dL.    omeprazole (PRILOSEC) 20 MG CR capsule Take 1 capsule (20 mg) by mouth daily    levothyroxine (SYNTHROID/LEVOTHROID) 200 MCG tablet Take 1 tablet (200 mcg) by mouth daily    polyethylene glycol  (MIRALAX/GLYCOLAX) powder Take one capful (17 grams), 1 to 2 times daily as needed for constipation.    blood glucose monitoring (FREESTYLE LITE) test strip Use to test blood sugars 8 times daily or as directed.    ondansetron (ZOFRAN) 4 MG tablet Take 1 tablet (4 mg) by mouth every 6 hours as needed for nausea or vomiting    fluconazole (DIFLUCAN) 200 MG tablet Take 1 tablet (200 mg) by mouth daily    oxyCODONE (ROXICODONE) 5 MG/5ML solution 2.5-5 mLs (2.5-5 mg) by Per Feeding Tube route every 4 hours as needed for moderate to severe pain    Alcohol Swabs PADS 1 pad as needed    insulin aspart (NOVOLOG PEN) 100 UNIT/ML injection Check blood glucose Q4H and administer based on blood glucose  Notify provider if glucose greater than or equal to 350 mg/dL after administration.  -150 = 2 units.  -180 = 4 units.  -210 = 6 units.  -240 = 8 units.  -270 = 10 units.  -300 = 12 units.  -330 = 14 units.  BG >330 = 16 units.    warfarin (COUMADIN) 5 MG tablet Take 1 tablet (5 mg) by mouth daily    aspirin 81 MG EC tablet Take 1 tablet (81 mg) by mouth daily    ferrous sulfate 325 (65 FE) MG TBEC EC tablet Take 1 tablet (325 mg) by mouth daily    FLUoxetine (PROZAC) 20 MG/5ML solution 2.5 mLs (10 mg) by Per J Tube route daily    glucose 40 % GEL gel Take 15-30 g by mouth every 15 minutes as needed for low blood sugar    amylase-lipase-protease (CREON 12) 53783 UNITS CPEP Take 3-4 capsules (36,000-48,000 Units) by mouth every hour as needed (with snacks)    magnesium hydroxide (MILK OF MAGNESIA) 400 MG/5ML suspension 30 mLs by Per Feeding Tube route 2 times daily as needed for constipation    sennosides (SENOKOT) 8.8 MG/5ML syrup 10 mLs by Per Feeding Tube route 2 times daily    Sharps Container (BD SHARPS ) MISC 1 Container as needed    insulin pen needle 32G X 4 MM Use 8 pen needles daily or as directed.    blood glucose monitoring (ACCU-CHEK FASTCLIX) lancets Use to test  blood sugar 8 times daily or as directed.        Physical exam:   General Appearance: in no apparent distress.       Skin: Normal, no rashes or jaundice  Heart: Regular rhythm.  Lungs: no audible wheezes or increased work of breathing.  Abdomen: The abdomen is flat, and the wound is Healing well, without hernia.  G Tube exit site is clean. The abdomen is non-tender, generalized.    Edema: absent.    Chronic Pancreatitis Latest Ref Rng & Units 6/19/2017 6/23/2017 6/28/2017 6/29/2017 7/6/2017   Weight - 100.925 kg - 99.655 kg 99.791 kg -   GLUCOSE 70 - 99 mg/dL 124(H) - - 136(H) -   HGB 13.3 - 17.7 g/dL 10.0(L) 10.3(L) - 10.3(L) 10.8(L)    - 450 10e9/L 852(H) 862(H) - 822(H) 808(H)   ALBUMIN 3.4 - 5.0 g/dL 3.3(L) - - 3.2(L) -   PREALBUMIN 15 - 45 mg/dL - - - - -       Assessment and Plan: He is doing well s/p TP-IAT.  Interval progress has been good.  Postoperative gastric ileus: none  Pancreatic exocrine insufficiency: no change doing well  Malnutrition: continue small frequent meals   Diabetes mellitus: well controlled  Abdominal pain management: no changed  Followup: I will see him again in clinic in 1 year    Total time: 25 min, Counselling Time: 15 min.

## 2017-07-06 NOTE — PROGRESS NOTES
ANTICOAGULATION FOLLOW-UP CLINIC VISIT    Patient Name:  Sohan Arita  Date:  7/6/2017  Contact Type:  Telephone    SUBJECTIVE:     Patient Findings     Comments Patient will know later today if he is going back to [place].  When he does go back he will have his PCP monitor INR's and warfarin dosing.  Tanner reports that his ASA might be stopped.           OBJECTIVE    INR   Date Value Ref Range Status   07/06/2017 3.06 (H) 0.86 - 1.14 Final       ASSESSMENT / PLAN  INR assessment THER    Recheck INR In: 1 WEEK    INR Location Clinic      Anticoagulation Summary as of 7/6/2017     INR goal 2.0-3.0   Today's INR 3.06!   Maintenance plan No maintenance plan   Full instructions 7/6: 1.25 mg; 7/7: 2.5 mg; 7/8: 2.5 mg; 7/9: 2.5 mg; 7/10: 2.5 mg; 7/11: 2.5 mg; 7/12: 2.5 mg   Plan last modified Amelia Rolon RN (7/6/2017)   Next INR check 7/13/2017   Priority INR   Target end date     Indications   Deep vein thrombosis (DVT) of left lower extremity (H) [I82.402]  Long-term (current) use of anticoagulants [Z79.01] [Z79.01]         Anticoagulation Episode Summary     INR check location     Preferred lab     Send INR reminders to [facility]    Comments       Anticoagulation Care Providers     Provider Role Specialty Phone number    GiseleRodrigo MD Responsible Transplant [phone]            See the Encounter Report to view Anticoagulation Flowsheet and Dosing Calendar (Go to Encounters tab in chart review, and find the Anticoagulation Therapy Visit)    Spoke with Tanner.    Amelia Rolon RN     Tanner is returning to [place] tomorrow.  Patients PCP will take over warfarin management.  Dr. Frandy Frye [phone].  Flowsheet faxed.  Clinic to follow up with Tanner on 7/13 to make sure transition is smooth to PCP.

## 2017-07-06 NOTE — LETTER
"7/6/2017      RE: Sohan Arita  [address] [facility] [address]       Chronic Pancreatitis Group Progress Note    I had the pleasure of seeing Mr. Sohan Arita today. 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 ***   Interval events since last visit with me:   ER visits = ***, reasons: ***  Inpatient admissions = ***, reasons ***  The patient reports their current symptoms to be:   GI function:   Nausea:   []  none    []  rare    []  often    []  daily  Comment: ***  Vomiting: []  none    []  rare    []  often    []  daily   Comment: ***  Last BM: {TIME INTERVAL LIST:918484}.  Stools are {STOOL OANH:992754}.   Appetite: {GOOD/FAIR/POOR:045084::\"good\"}  Tube feeds at *** cc/hr, *** hr cycle.  Oral food intake: {DIET AMOUNT:734281}  Pancreatic exocrine insufficiency:   Takes ({PANCREATIC ENZYMES:539708}), *** with meals, *** with snacks.  Greasy stools: []  none   []  rarely    []  several times/wk   []  daily      Comment: ***  Diabetes management:   Long acting insulin: {INSULINS:695219} units/day  Short acting insulin: {INSULINS:339240} units/day  Blood sugars typically range from *** to ***.   Lab Results   Component Value Date    A1C 5.3 05/16/2017    A1C 5.6 05/10/2017    A1C 5.4 09/10/2015      Pain management:   Pain rating (0=no pain, 10=unbearable): ***  Long acting agent: {NARCOTICS:594679} *** mg {FREQUENCY5:194781}.  Short acting agent: {NARCOTICS:132875} *** mg {FREQUENCY5:657799}.  Daily 'Uptime': ***  Walking: distance ***, *** times per week  Prescription Medications as of 7/6/2017             warfarin (COUMADIN) 2.5 MG tablet Take 1 tablet daily or as directed by coumadin clinic.    fentaNYL (DURAGESIC) 25 mcg/hr 72 hr patch Dose is 50 mcg/hr every 72 hours    insulin glargine (LANTUS) 100 UNIT/ML injection 7 units daily    insulin aspart (NOVOLOG PEN) 100 UNIT/ML injection 1 units per 30 grams of carbohydrate.  Only chart total amount of " units given.  Do not give if pre-prandial glucose is less than 60 mg/dL.    omeprazole (PRILOSEC) 20 MG CR capsule Take 1 capsule (20 mg) by mouth daily    levothyroxine (SYNTHROID/LEVOTHROID) 200 MCG tablet Take 1 tablet (200 mcg) by mouth daily    polyethylene glycol (MIRALAX/GLYCOLAX) powder Take one capful (17 grams), 1 to 2 times daily as needed for constipation.    blood glucose monitoring (FREESTYLE LITE) test strip Use to test blood sugars 8 times daily or as directed.    ondansetron (ZOFRAN) 4 MG tablet Take 1 tablet (4 mg) by mouth every 6 hours as needed for nausea or vomiting    fluconazole (DIFLUCAN) 200 MG tablet Take 1 tablet (200 mg) by mouth daily    oxyCODONE (ROXICODONE) 5 MG/5ML solution 2.5-5 mLs (2.5-5 mg) by Per Feeding Tube route every 4 hours as needed for moderate to severe pain    Alcohol Swabs PADS 1 pad as needed    insulin aspart (NOVOLOG PEN) 100 UNIT/ML injection Check blood glucose Q4H and administer based on blood glucose  Notify provider if glucose greater than or equal to 350 mg/dL after administration.  -150 = 2 units.  -180 = 4 units.  -210 = 6 units.  -240 = 8 units.  -270 = 10 units.  -300 = 12 units.  -330 = 14 units.  BG >330 = 16 units.    warfarin (COUMADIN) 5 MG tablet Take 1 tablet (5 mg) by mouth daily    aspirin 81 MG EC tablet Take 1 tablet (81 mg) by mouth daily    ferrous sulfate 325 (65 FE) MG TBEC EC tablet Take 1 tablet (325 mg) by mouth daily    FLUoxetine (PROZAC) 20 MG/5ML solution 2.5 mLs (10 mg) by Per J Tube route daily    glucose 40 % GEL gel Take 15-30 g by mouth every 15 minutes as needed for low blood sugar    amylase-lipase-protease (CREON 12) 54874 UNITS CPEP Take 3-4 capsules (36,000-48,000 Units) by mouth every hour as needed (with snacks)    magnesium hydroxide (MILK OF MAGNESIA) 400 MG/5ML suspension 30 mLs by Per Feeding Tube route 2 times daily as needed for constipation    sennosides (SENOKOT) 8.8  "MG/5ML syrup 10 mLs by Per Feeding Tube route 2 times daily    Sharps Container (BD SHARPS ) MISC 1 Container as needed    insulin pen needle 32G X 4 MM Use 8 pen needles daily or as directed.    blood glucose monitoring (ACCU-CHEK FASTCLIX) lancets Use to test blood sugar 8 times daily or as directed.        Physical exam:   General Appearance: {Distress levels:657694::\"in no apparent distress.\"}       Skin: Normal, no rashes or jaundice  Heart: Regular rhythm.  Lungs: no audible wheezes or increased work of breathing.  Abdomen: The abdomen is {ABDOMEN INSPECTION:602}, and the wound is {INCISION HEALIN}, {WITH:603010} hernia.  ***Tube exit site {IS/IS NOT:9024} clean. The abdomen is {TENDER/NON-TENDER (NO DEFAULT):156534}, {ABDOMEN LOCATION:605}.    Edema: {ABSENT:129506}    Chronic Pancreatitis Latest Ref Rng & Units 2017   Weight - 100.925 kg - 99.655 kg 99.791 kg -   GLUCOSE 70 - 99 mg/dL 124(H) - - 136(H) -   HGB 13.3 - 17.7 g/dL 10.0(L) 10.3(L) - 10.3(L) 10.8(L)    - 450 10e9/L 852(H) 862(H) - 822(H) 808(H)   ALBUMIN 3.4 - 5.0 g/dL 3.3(L) - - 3.2(L) -   PREALBUMIN 15 - 45 mg/dL - - - - -       Assessment and Plan: He is doing { :6119260} s/p TP-IAT.  Interval progress has been {GOOD/FAIR/POOR:598331::\"good\"}.  Postoperative gastric ileus: ***  Pancreatic exocrine insufficiency: ***  Malnutrition: ***  Diabetes mellitus: ***  Abdominal pain management: ***  Followup: I will see him again in clinic in *** {TIME FRAME:549874::\"years\"}    Total time: *** min, Counselling Time: *** min.  {[facility] TRANSPLANT MD SIGNATURE:970751884}    I saw Dr Arita with Eileen Santana and Ericka Regan.  He is ready to go back to [place].  Pain good control  DM only on 7u lantus  Bowel ok    /81  Pulse 71  Resp 16  Ht 1.88 m (6' 2\")  Wt 96.9 kg (213 lb 9.6 oz)  SpO2 97%  BMI 27.42 kg/m2  Wound: healing. Sinus in upper wound quit draining.  Leg no " edema.    Orders Only on 07/06/2017   Component Date Value Ref Range Status     INR 07/06/2017 3.06* 0.86 - 1.14 Final     WBC 07/06/2017 8.2  4.0 - 11.0 10e9/L Final     RBC Count 07/06/2017 4.42  4.4 - 5.9 10e12/L Final     Hemoglobin 07/06/2017 10.8* 13.3 - 17.7 g/dL Final     Hematocrit 07/06/2017 35.8* 40.0 - 53.0 % Final     MCV 07/06/2017 81  78 - 100 fl Final     MCH 07/06/2017 24.4* 26.5 - 33.0 pg Final     MCHC 07/06/2017 30.2* 31.5 - 36.5 g/dL Final     RDW 07/06/2017 16.5* 10.0 - 15.0 % Final     Platelet Count 07/06/2017 808* 150 - 450 10e9/L Final     Diff Method 07/06/2017 Automated Method   Final     % Neutrophils 07/06/2017 57.3  % Final     % Lymphocytes 07/06/2017 24.1  % Final     % Monocytes 07/06/2017 10.6  % Final     % Eosinophils 07/06/2017 6.3  % Final     % Basophils 07/06/2017 1.3  % Final     % Immature Granulocytes 07/06/2017 0.4  % Final     Nucleated RBCs 07/06/2017 0  0 /100 Final     Absolute Neutrophil 07/06/2017 4.7  1.6 - 8.3 10e9/L Final     Absolute Lymphocytes 07/06/2017 2.0  0.8 - 5.3 10e9/L Final     Absolute Monocytes 07/06/2017 0.9  0.0 - 1.3 10e9/L Final     Absolute Eosinophils 07/06/2017 0.5  0.0 - 0.7 10e9/L Final     Absolute Basophils 07/06/2017 0.1  0.0 - 0.2 10e9/L Final     Abs Immature Granulocytes 07/06/2017 0.0  0 - 0.4 10e9/L Final     Absolute Nucleated RBC 07/06/2017 0.0   Final     P:  Ok to return home.  G tube removed.  Routine followup for TPIAT    Rodrigo Jaquez MD

## 2017-07-06 NOTE — PROGRESS NOTES
"I saw Dr Arita with Eileen Santana and Ericka Regan.  He is ready to go back to [place].  Pain good control  DM only on 7u lantus  Bowel ok    /81  Pulse 71  Resp 16  Ht 1.88 m (6' 2\")  Wt 96.9 kg (213 lb 9.6 oz)  SpO2 97%  BMI 27.42 kg/m2  Wound: healing. Sinus in upper wound quit draining.  Leg no edema.    Orders Only on 07/06/2017   Component Date Value Ref Range Status     INR 07/06/2017 3.06* 0.86 - 1.14 Final     WBC 07/06/2017 8.2  4.0 - 11.0 10e9/L Final     RBC Count 07/06/2017 4.42  4.4 - 5.9 10e12/L Final     Hemoglobin 07/06/2017 10.8* 13.3 - 17.7 g/dL Final     Hematocrit 07/06/2017 35.8* 40.0 - 53.0 % Final     MCV 07/06/2017 81  78 - 100 fl Final     MCH 07/06/2017 24.4* 26.5 - 33.0 pg Final     MCHC 07/06/2017 30.2* 31.5 - 36.5 g/dL Final     RDW 07/06/2017 16.5* 10.0 - 15.0 % Final     Platelet Count 07/06/2017 808* 150 - 450 10e9/L Final     Diff Method 07/06/2017 Automated Method   Final     % Neutrophils 07/06/2017 57.3  % Final     % Lymphocytes 07/06/2017 24.1  % Final     % Monocytes 07/06/2017 10.6  % Final     % Eosinophils 07/06/2017 6.3  % Final     % Basophils 07/06/2017 1.3  % Final     % Immature Granulocytes 07/06/2017 0.4  % Final     Nucleated RBCs 07/06/2017 0  0 /100 Final     Absolute Neutrophil 07/06/2017 4.7  1.6 - 8.3 10e9/L Final     Absolute Lymphocytes 07/06/2017 2.0  0.8 - 5.3 10e9/L Final     Absolute Monocytes 07/06/2017 0.9  0.0 - 1.3 10e9/L Final     Absolute Eosinophils 07/06/2017 0.5  0.0 - 0.7 10e9/L Final     Absolute Basophils 07/06/2017 0.1  0.0 - 0.2 10e9/L Final     Abs Immature Granulocytes 07/06/2017 0.0  0 - 0.4 10e9/L Final     Absolute Nucleated RBC 07/06/2017 0.0   Final     P:  Ok to return home.  G tube removed.  Routine followup for TPIAT  "

## 2017-07-06 NOTE — MR AVS SNAPSHOT
After Visit Summary   7/6/2017    Sohan Arita    MRN: [id number]           Patient Information     Date Of Birth          1960        Visit Information        Provider Department      7/6/2017 11:45 AM Rodrigo Jaquez MD [facility]        Today's Diagnoses     Post-pancreatectomy diabetes (H)    -  1       Follow-ups after your visit        Your next 10 appointments already scheduled     Jun 21, 2018  9:30 AM CDT   LAB with  LAB   [facility] ([facility])    [address] [place] [address]  1st Floor  [facility] [address] [phone]           Please do not eat 10-12 hours before your appointment if you are coming in fasting for labs on lipids, cholesterol, or glucose (sugar). This does not apply to pregnant women. Water, hot tea and black coffee (with nothing added) are okay. Do not drink other fluids, diet soda or chew gum.            Jun 21, 2018 10:00 AM CDT   Nurse Visit with [facility] Nurse   [facility] ([facility])    [address] [place] [address]  [address]  [facility] [address] [phone]            Jun 21, 2018 11:00 AM CDT   (Arrive by 10:45 AM)   Return [place] with Rodrigo Jaquez MD   [facility] ([facility])    [address] [place] [address]  [address]  [facility] [address] [phone]              Who to contact     If you have questions or need follow up information about today's clinic visit or your schedule please contact [facility] directly at [phone].  Normal or non-critical lab and imaging results will be communicated to you by MyChart, letter or phone within 4 business days after the clinic has received the results. If you do not hear from us within 7 days, please contact the clinic through MyChart or phone. If you have a critical or abnormal lab result, we will notify you by phone as soon as  "possible.  Submit refill requests through [employer] or call your pharmacy and they will forward the refill request to us. Please allow 3 business days for your refill to be completed.          Additional Information About Your Visit        [employer] Information     [employer] gives you secure access to your electronic health record. If you see a primary care provider, you can also send messages to your care team and make appointments. If you have questions, please call your primary care clinic.  If you do not have a primary care provider, please call [phone] and they will assist you.        Care EveryWhere ID     This is your Care EveryWhere ID. This could be used by other organizations to access your [facility] medical records  [id number]        Your Vitals Were     Pulse Respirations Height Pulse Oximetry BMI (Body Mass Index)       71 16 1.88 m (6' 2\") 97% 27.42 kg/m2        Blood Pressure from Last 3 Encounters:   11/09/17 119/78   11/09/17 119/78   07/06/17 139/81    Weight from Last 3 Encounters:   11/09/17 88.5 kg (195 lb)   11/09/17 88.8 kg (195 lb 12.8 oz)   11/09/17 88.8 kg (195 lb 12.8 oz)              Today, you had the following     No orders found for display         Today's Medication Changes          These changes are accurate as of 7/6/17 11:59 PM.  If you have any questions, ask your nurse or doctor.               These medicines have changed or have updated prescriptions.        Dose/Directions    * omeprazole 20 MG CR capsule   Commonly known as:  priLOSEC   This may have changed:  Another medication with the same name was added. Make sure you understand how and when to take each.   Used for:  GERD (gastroesophageal reflux disease)   Changed by:  Trista Regan, RN        Dose:  20 mg   Take 1 capsule (20 mg) by mouth daily   Quantity:  30 capsule   Refills:  0       * omeprazole 20 MG CR capsule   Commonly known as:  priLOSEC   This may have changed:  You were already taking a medication with " the same name, and this prescription was added. Make sure you understand how and when to take each.   Used for:  GERD (gastroesophageal reflux disease)   Changed by:  Trista Regan RN        Dose:  20 mg   Take 1 capsule (20 mg) by mouth 2 times daily   Quantity:  60 capsule   Refills:  3       * warfarin 5 MG tablet   Commonly known as:  COUMADIN   This may have changed:  how much to take   Used for:  DVT (deep venous thrombosis) (H)        Dose:  5 mg   Take 1 tablet (5 mg) by mouth daily   Quantity:  30 tablet   Refills:  0       * warfarin 2.5 MG tablet   Commonly known as:  COUMADIN   This may have changed:  You were already taking a medication with the same name, and this prescription was added. Make sure you understand how and when to take each.   Used for:  Long-term (current) use of anticoagulants, Deep vein thrombosis (DVT) of left lower extremity (H)   Changed by:  Amelia Rolon RN        Take 1 tablet daily or as directed by coumadin clinic.   Quantity:  30 tablet   Refills:  1       * Notice:  This list has 4 medication(s) that are the same as other medications prescribed for you. Read the directions carefully, and ask your doctor or other care provider to review them with you.         Where to get your medicines      These medications were sent to [address] [address]    Hours:  TRANSPLANT PHONE NUMBER [phone] Phone:  [phone]     omeprazole 20 MG CR capsule    warfarin 2.5 MG tablet                Primary Care Provider Office Phone # Fax #    Frandy Frye [phone]       [address] [address]        Equal Access to Services     SEPIDEH VINCENT : corina Bone qaybta kaalmada adeegyada, waxay idiin hayaan adeeg kharash la'aan ah. So [facility] [phone].    ATENCIÓN: Arnel flower,  tiene a welsh disposición servicios gratuitos de asistencia lingüística. Joseph romano [phone].    We comply with applicable federal civil rights laws and [place] laws. We do not discriminate on the basis of race, color, national origin, age, disability, sex, sexual orientation, or gender identity.            Thank you!     Thank you for choosing [facility]  for your care. Our goal is always to provide you with excellent care. Hearing back from our patients is one way we can continue to improve our services. Please take a few minutes to complete the written survey that you may receive in the mail after your visit with us. Thank you!             Your Updated Medication List - Protect others around you: Learn how to safely use, store and throw away your medicines at www.disposemymeds.org.          This list is accurate as of 7/6/17 11:59 PM.  Always use your most recent med list.                   Brand Name Dispense Instructions for use Diagnosis    Alcohol Swabs Pads     100 each    1 pad as needed    Post-pancreatectomy diabetes (H)       amylase-lipase-protease 49109 units Cpep    CREON 12    270 capsule    Take 3-4 capsules (36,000-48,000 Units) by mouth every hour as needed (with snacks)    Acquired total absence of pancreas       aspirin 81 MG EC tablet     30 tablet    Take 1 tablet (81 mg) by mouth daily    High platelet count (H), Acquired asplenia, Post-pancreatectomy diabetes (H), Pancreatic insufficiency, Anemia       BD SHARPS  Misc     1 each    1 Container as needed    Post-pancreatectomy diabetes (H)       blood glucose monitoring lancets     1 Box    Use to test blood sugar 8 times daily or as directed.    Acquired total absence of pancreas       blood glucose monitoring test strip    FREESTYLE LITE    300 strip    Use to test blood sugars 8 times daily or as directed.    Post-pancreatectomy diabetes (H)       fentaNYL 25 mcg/hr 72 hr patch    DURAGESIC    12 patch     Dose is 50 mcg/hr every 72 hours    Generalized abdominal pain       ferrous sulfate 325 (65 Fe) MG Tbec EC tablet     30 tablet    Take 1 tablet (325 mg) by mouth daily    High platelet count (H), Acquired asplenia, Post-pancreatectomy diabetes (H), Pancreatic insufficiency, Anemia       fluconazole 200 MG tablet    DIFLUCAN    30 tablet    Take 1 tablet (200 mg) by mouth daily    Surgical wound infection, initial encounter       FLUoxetine 20 MG/5ML solution    PROzac    75 mL    2.5 mLs (10 mg) by Per J Tube route daily    Acquired total absence of pancreas       glucose 40 % Gel gel     3 Tube    Take 15-30 g by mouth every 15 minutes as needed for low blood sugar    Post-pancreatectomy diabetes (H)       insulin aspart 100 UNIT/ML injection    NovoLOG PEN    3 mL    Check blood glucose Q4H and administer based on blood glucose Notify provider if glucose greater than or equal to 350 mg/dL after administration. -150 = 2 units. -180 = 4 units. -210 = 6 units. -240 = 8 units. -270 = 10 units. -300 = 12 units. -330 = 14 units. BG >330 = 16 units.    Post-pancreatectomy diabetes (H)       insulin pen needle 32G X 4 MM     100 each    Use 8 pen needles daily or as directed.    Acquired total absence of pancreas       levothyroxine 200 MCG tablet    SYNTHROID/LEVOTHROID    60 tablet    Take 1 tablet (200 mcg) by mouth daily    Postoperative hypothyroidism       magnesium hydroxide 400 MG/5ML suspension    MILK OF MAGNESIA    105 mL    30 mLs by Per Feeding Tube route 2 times daily as needed for constipation    Other constipation       * omeprazole 20 MG CR capsule    priLOSEC    30 capsule    Take 1 capsule (20 mg) by mouth daily    GERD (gastroesophageal reflux disease)       * omeprazole 20 MG CR capsule    priLOSEC    60 capsule    Take 1 capsule (20 mg) by mouth 2 times daily    GERD (gastroesophageal reflux disease)       ondansetron 4 MG tablet    ZOFRAN    24 tablet     Take 1 tablet (4 mg) by mouth every 6 hours as needed for nausea or vomiting    Postoperative nausea       oxyCODONE 5 MG/5ML solution    ROXICODONE    250 mL    2.5-5 mLs (2.5-5 mg) by Per Feeding Tube route every 4 hours as needed for moderate to severe pain    Surgical wound infection, initial encounter       polyethylene glycol powder    MIRALAX/GLYCOLAX    119 g    Take one capful (17 grams), 1 to 2 times daily as needed for constipation.    Constipation, Post-pancreatectomy diabetes (H), History of pancreatectomy, Pancreatic insufficiency       sennosides 8.8 MG/5ML syrup    SENOKOT    600 mL    10 mLs by Per Feeding Tube route 2 times daily    Other constipation       * warfarin 5 MG tablet    COUMADIN    30 tablet    Take 1 tablet (5 mg) by mouth daily    DVT (deep venous thrombosis) (H)       * warfarin 2.5 MG tablet    COUMADIN    30 tablet    Take 1 tablet daily or as directed by coumadin clinic.    Long-term (current) use of anticoagulants, Deep vein thrombosis (DVT) of left lower extremity (H)       * Notice:  This list has 4 medication(s) that are the same as other medications prescribed for you. Read the directions carefully, and ask your doctor or other care provider to review them with you.

## 2017-07-07 ENCOUNTER — TELEPHONE (OUTPATIENT)
Dept: TRANSPLANT | Facility: CLINIC | Age: 57
End: 2017-07-07

## 2017-07-07 NOTE — TELEPHONE ENCOUNTER
Patient discharged to local care providers.    Reviewed the following written materials which were given to patient and faxed to local providers.    Op note  Pathology report  Emergency letter  Immunization documentation & copy of current  CDC ecommendations.  PCP /local provider letter /information  Medication lists  Alert list    Tanner is aware that he needs to stay on Coumadin for 3 months --he will monitor remotely with Coumadin clinic. Informed by e-mail that [facility] vitamins have Vit K ( may interefere with INR )    He has enough pain medication for several weeks--will self wean.    Will do 3 month MMT locally and return at 6 months to [place] for follow up.

## 2017-07-07 NOTE — PROGRESS NOTES
This is a recent snapshot of the patient's [facility] medical record.  For current drug dose and complete information and questions, call [phone] or In Basket pool, [facility] home infusion ([id number])  CSN Number:  [id number]

## 2017-07-07 NOTE — PROGRESS NOTES
This is a recent snapshot of the patient's [facility] medical record.  For current drug dose and complete information and questions, call [phone] or In Basket pool, [facility] home infusion ([id number])  CSN Number:  [id number]

## 2017-07-11 ENCOUNTER — ANTICOAGULATION THERAPY VISIT (OUTPATIENT)
Dept: ANTICOAGULATION | Facility: CLINIC | Age: 57
End: 2017-07-11

## 2017-07-11 DIAGNOSIS — Z79.01 LONG-TERM (CURRENT) USE OF ANTICOAGULANTS: ICD-10-CM

## 2017-07-11 DIAGNOSIS — I82.402 DEEP VEIN THROMBOSIS (DVT) OF LEFT LOWER EXTREMITY (H): ICD-10-CM

## 2017-07-11 NOTE — MR AVS SNAPSHOT
Sohan Powers Arita   7/11/2017   Anticoagulation Therapy Visit    Description:  56 year old male   Provider:  Cristina Faust, RN   Department:  [facility]           INR as of 7/11/2017     Today's INR       Anticoagulation Summary as of 7/11/2017     INR goal 2.0-3.0   Today's INR    Next INR check     Indications   Deep vein thrombosis (DVT) of left lower extremity (H) [I82.402]  Long-term (current) use of anticoagulants [Z79.01] [Z79.01]         July 2017 Details    Sun Mon Tue Wed Thu Fri Sat           1                 2               3               4               5               6      1.25 mg   See details      7      2.5 mg         8      2.5 mg           9      2.5 mg         10      2.5 mg         11      2.5 mg         12      2.5 mg         13            14               15                 16               17               18               19               20               21               22                 23               24               25               26               27               28               29                 30               31                     Date Details   07/06 This INR check       Date of next INR:  7/13/2017         How to take your warfarin dose     To take:  1.25 mg Take 0.5 of a 2.5 mg tablet.    To take:  2.5 mg Take 1 of the 2.5 mg tablets.

## 2017-07-11 NOTE — PROGRESS NOTES
Ericka from Transplant called reporting that PCP in [place] doesn't want to take over pt's Warfarin dosing and would prefer us to follow the pt. Pt is on Warfarin until August. Pt is going to get labs drawn Wednesday 7/12 and these will be faxed to us. Ericka is sending orders and let pt know to call us too.

## 2017-07-13 ENCOUNTER — ANTICOAGULATION THERAPY VISIT (OUTPATIENT)
Dept: ANTICOAGULATION | Facility: CLINIC | Age: 57
End: 2017-07-13

## 2017-07-13 ENCOUNTER — TELEPHONE (OUTPATIENT)
Dept: TRANSPLANT | Facility: CLINIC | Age: 57
End: 2017-07-13

## 2017-07-13 DIAGNOSIS — I82.402 DEEP VEIN THROMBOSIS (DVT) OF LEFT LOWER EXTREMITY (H): ICD-10-CM

## 2017-07-13 DIAGNOSIS — Z79.01 LONG-TERM (CURRENT) USE OF ANTICOAGULANTS: ICD-10-CM

## 2017-07-13 LAB — INR PPP: 4

## 2017-07-13 NOTE — TELEPHONE ENCOUNTER
Called patient on request from Ericka Regan, transplant nurse coordinator. She recently learned that his identical twin brother has same symptoms of abdominal and back pain, nausea especially after eating, etc. He has been diagnosed with IBS and not pancreatitis due to normal CT scan and lack of elevated enzymes, but Tanner feels that his symptoms mirror his and he has been misdiagnosed. His brother is following up with a GI provider later this month. Patient reports that he was first diagnosed with pancreatitis 4/10/12, but that looking back he was experiencing symptoms as far back as childhood. He had his first abdominal ultrasound for investigation in 2000.     Earlier onset than previously appreciated and a likely family history in identical twin sibling points at a possible genetic basis or contribution. Discussed option of genetic testing and Tanner was very interested, especially for information for his brother and children.  Provided genetic counselor contact information and asked him to call me to coordinate a GC visit along with his next 6 month transplant follow-up visit to [place] once scheduled.     Roxie Mathis MS, [facility]  Genetic Counselor  [facility]

## 2017-07-13 NOTE — PROGRESS NOTES
ANTICOAGULATION FOLLOW-UP CLINIC VISIT    Patient Name:  Sohan Arita  Date:  7/13/2017  Contact Type:  Telephone    SUBJECTIVE:     Patient Findings     Comments Last day of Fluconazole is tomorrow.           OBJECTIVE    INR   Date Value Ref Range Status   07/13/2017 4.0  Final       ASSESSMENT / PLAN  INR assessment SUPRA    Recheck INR In: 5 DAYS    INR Location Outside lab      Anticoagulation Summary as of 7/13/2017     INR goal 2.0-3.0   Today's INR 4.0!   Maintenance plan No maintenance plan   Full instructions 7/13: Hold; 7/14: 2.5 mg; 7/15: 2.5 mg; 7/16: 2.5 mg; 7/17: 2.5 mg   Plan last modified Amelia Rolon RN (7/6/2017)   Next INR check 7/18/2017   Priority INR   Target end date     Indications   Deep vein thrombosis (DVT) of left lower extremity (H) [I82.402]  Long-term (current) use of anticoagulants [Z79.01] [Z79.01]         Anticoagulation Episode Summary     INR check location     Preferred lab     Send INR reminders to [facility]    Comments       Anticoagulation Care Providers     Provider Role Specialty Phone number    GiseleRodrigo MD Responsible Transplant [phone]            See the Encounter Report to view Anticoagulation Flowsheet and Dosing Calendar (Go to Encounters tab in chart review, and find the Anticoagulation Therapy Visit)    Spoke with Bridgette Rolon RN

## 2017-07-13 NOTE — TELEPHONE ENCOUNTER
Left VMM and contact information with Coumadin clinic for update regarding Dr. Arita coag results drawn at his local [place] clinic.

## 2017-07-13 NOTE — MR AVS SNAPSHOT
Sohan Arita   7/13/2017   Anticoagulation Therapy Visit    Description:  56 year old male   Provider:  Amelia Rolon, RN   [facility]           INR as of 7/13/2017     Today's INR 4.0!      Anticoagulation Summary as of 7/13/2017     INR goal 2.0-3.0   Today's INR 4.0!   Full instructions 7/13: Hold; 7/14: 2.5 mg; 7/15: 2.5 mg; 7/16: 2.5 mg; 7/17: 2.5 mg   Next INR check 7/18/2017    Indications   Deep vein thrombosis (DVT) of left lower extremity (H) [I82.402]  Long-term (current) use of anticoagulants [Z79.01] [Z79.01]         July 2017 Details    Sun Mon Tue Wed Thu Fri Sat           1                 2               3               4               5               6               7               8                 9               10               11               12               13      Hold   See details      14      2.5 mg         15      2.5 mg           16      2.5 mg         17      2.5 mg         18            19               20               21               22                 23               24               25               26               27               28               29                 30               31                     Date Details   07/13 This INR check       Date of next INR:  7/18/2017         How to take your warfarin dose     To take:  2.5 mg Take 1 of the 2.5 mg tablets.    Hold Do not take your warfarin dose. See the Details table to the right for additional instructions.

## 2017-07-14 ENCOUNTER — TELEPHONE (OUTPATIENT)
Dept: TRANSPLANT | Facility: CLINIC | Age: 57
End: 2017-07-14

## 2017-07-17 DIAGNOSIS — E89.1 POST-PANCREATECTOMY DIABETES (H): Primary | ICD-10-CM

## 2017-07-17 DIAGNOSIS — Z90.410 POST-PANCREATECTOMY DIABETES (H): Primary | ICD-10-CM

## 2017-07-17 DIAGNOSIS — E13.9 POST-PANCREATECTOMY DIABETES (H): Primary | ICD-10-CM

## 2017-07-17 DIAGNOSIS — E13.9 POST-PANCREATECTOMY DIABETES (H): ICD-10-CM

## 2017-07-17 DIAGNOSIS — K86.1 CHRONIC PANCREATITIS (H): ICD-10-CM

## 2017-07-17 DIAGNOSIS — Z84.89 FAMILY HISTORY OF IDENTICAL TWINS: ICD-10-CM

## 2017-07-17 DIAGNOSIS — Z90.410 HISTORY OF PANCREATECTOMY: ICD-10-CM

## 2017-07-17 DIAGNOSIS — E89.1 POST-PANCREATECTOMY DIABETES (H): ICD-10-CM

## 2017-07-17 DIAGNOSIS — Z90.410 POST-PANCREATECTOMY DIABETES (H): ICD-10-CM

## 2017-07-18 ENCOUNTER — ANTICOAGULATION THERAPY VISIT (OUTPATIENT)
Dept: ANTICOAGULATION | Facility: CLINIC | Age: 57
End: 2017-07-18

## 2017-07-18 DIAGNOSIS — Z79.01 LONG-TERM (CURRENT) USE OF ANTICOAGULANTS: ICD-10-CM

## 2017-07-18 DIAGNOSIS — I82.402 DEEP VEIN THROMBOSIS (DVT) OF LEFT LOWER EXTREMITY (H): ICD-10-CM

## 2017-07-18 LAB — INR PPP: 6.1

## 2017-07-18 NOTE — MR AVS SNAPSHOT
Sohan Arita   7/18/2017   Anticoagulation Therapy Visit    Description:  56 year old male   Provider:  Amelia Rolon, RN   Department:  [facility]           INR as of 7/18/2017     Today's INR 6.1!      Anticoagulation Summary as of 7/18/2017     INR goal 2.0-3.0   Today's INR 6.1!   Full instructions 7/18: Hold; 7/19: Hold   Next INR check 7/20/2017    Indications   Deep vein thrombosis (DVT) of left lower extremity (H) [I82.402]  Long-term (current) use of anticoagulants [Z79.01] [Z79.01]         July 2017 Details    Sun Mon Tue Wed Thu Fri Sat           1                 2               3               4               5               6               7               8                 9               10               11               12               13               14               15                 16               17               18      Hold   See details      19      Hold         20            21               22                 23               24               25               26               27               28               29                 30               31                     Date Details   07/18 This INR check       Date of next INR:  7/20/2017         How to take your warfarin dose     Hold Do not take your warfarin dose. See the Details table to the right for additional instructions.

## 2017-07-18 NOTE — PROGRESS NOTES
ANTICOAGULATION FOLLOW-UP CLINIC VISIT    Patient Name:  Sohan Arita  Date:  7/18/2017  Contact Type:  Telephone    SUBJECTIVE:     Patient Findings     Comments Confirmed that Tanner stopped Fluconazole X 5 days ago.  Reviewed signs and symptoms of abnormal bruising and bleeding and when to present to the ER.           OBJECTIVE    INR   Date Value Ref Range Status   07/18/2017 6.1  Final       ASSESSMENT / PLAN  INR assessment SUPRA    Recheck INR In: 2 DAYS    INR Location Outside lab      Anticoagulation Summary as of 7/18/2017     INR goal 2.0-3.0   Today's INR 6.1!   Maintenance plan No maintenance plan   Full instructions 7/18: Hold; 7/19: Hold   Plan last modified Amelia Rolon RN (7/6/2017)   Next INR check 7/20/2017   Priority INR   Target end date     Indications   Deep vein thrombosis (DVT) of left lower extremity (H) [I82.402]  Long-term (current) use of anticoagulants [Z79.01] [Z79.01]         Anticoagulation Episode Summary     INR check location     Preferred lab     [facility]    Comments       Anticoagulation Care Providers     Provider Role Specialty Phone number    Rodrigo Jaquez MD Responsible Transplant [phone]            See the Encounter Report to view Anticoagulation Flowsheet and Dosing Calendar (Go to Encounters tab in chart review, and find the Anticoagulation Therapy Visit)    Spoke with Bridgette Rolon, MARLON

## 2017-07-20 ENCOUNTER — ANTICOAGULATION THERAPY VISIT (OUTPATIENT)
Dept: ANTICOAGULATION | Facility: CLINIC | Age: 57
End: 2017-07-20

## 2017-07-20 DIAGNOSIS — I82.402 DEEP VEIN THROMBOSIS (DVT) OF LEFT LOWER EXTREMITY (H): ICD-10-CM

## 2017-07-20 DIAGNOSIS — Z79.01 LONG-TERM (CURRENT) USE OF ANTICOAGULANTS: ICD-10-CM

## 2017-07-20 LAB — INR PPP: 2

## 2017-07-20 NOTE — MR AVS SNAPSHOT
Sohan Arita   7/20/2017   Anticoagulation Therapy Visit    Description:  56 year old male   Provider:  Cristina Faust, RN   Department:  [facility]           INR as of 7/20/2017     Today's INR 2.00      Anticoagulation Summary as of 7/20/2017     INR goal 2.0-3.0   Today's INR 2.00   Full instructions 7/20: 2.5 mg; 7/21: 2.5 mg; 7/22: 2.5 mg; 7/23: 2.5 mg; 7/24: 2.5 mg; 7/25: 2.5 mg; 7/26: 2.5 mg   Next INR check 7/27/2017    Indications   Deep vein thrombosis (DVT) of left lower extremity (H) [I82.402]  Long-term (current) use of anticoagulants [Z79.01] [Z79.01]         July 2017 Details    Sun Mon Tue Wed Thu Fri Sat           1                 2               3               4               5               6               7               8                 9               10               11               12               13               14               15                 16               17               18               19               20      2.5 mg   See details      21      2.5 mg         22      2.5 mg           23      2.5 mg         24      2.5 mg         25      2.5 mg         26      2.5 mg         27            28               29                 30               31                     Date Details   07/20 This INR check       Date of next INR:  7/27/2017         How to take your warfarin dose     To take:  2.5 mg Take 1 of the 2.5 mg tablets.

## 2017-07-20 NOTE — PROGRESS NOTES
ANTICOAGULATION FOLLOW-UP CLINIC VISIT    Patient Name:  Sohan Arita  Date:  7/20/2017  Contact Type:  Telephone    SUBJECTIVE:     Patient Findings     Positives Change in diet/appetite, Antibiotic use or infection    Comments Fluconazole is finished and due to high INR pt increased amounts of spinach. Pt also reports with being on Jantoven brand of Coumadin that he has been having s/sx of abdominal bloating and flatulence. Pt is suppose to be reassessed in August for his DVT and will just continue this for now. If in August he has to continue his Jantoven he will speak to MD about other anticoagulations               OBJECTIVE    INR   Date Value Ref Range Status   07/20/2017 2.00  Final     Comment:     Outside Lab        ASSESSMENT / PLAN  INR assessment THER    Recheck INR In: 1 WEEK    INR Location Outside lab      Anticoagulation Summary as of 7/20/2017     INR goal 2.0-3.0   Today's INR 2.00   Maintenance plan No maintenance plan   Full instructions 7/20: 2.5 mg; 7/21: 2.5 mg; 7/22: 2.5 mg; 7/23: 2.5 mg; 7/24: 2.5 mg; 7/25: 2.5 mg; 7/26: 2.5 mg   Plan last modified Amelia Rolon RN (7/6/2017)   Next INR check 7/27/2017   Priority INR   Target end date     Indications   Deep vein thrombosis (DVT) of left lower extremity (H) [I82.402]  Long-term (current) use of anticoagulants [Z79.01] [Z79.01]         Anticoagulation Episode Summary     INR check location     Preferred lab     Send INR reminders to [facility]    Comments       Anticoagulation Care Providers     Provider Role Specialty Phone number    Rodrigo Jaquez MD Responsible Transplant [phone]            See the Encounter Report to view Anticoagulation Flowsheet and Dosing Calendar (Go to Encounters tab in chart review, and find the Anticoagulation Therapy Visit)    Spoke with patient. Gave them their lab results and new warfarin recommendation.  No changes in health, medication, or diet. No missed doses, no falls.  No signs or symptoms of bleed or clotting.     Cristina Faust, RN

## 2017-07-21 ENCOUNTER — TELEPHONE (OUTPATIENT)
Dept: TRANSPLANT | Facility: CLINIC | Age: 57
End: 2017-07-21

## 2017-07-21 NOTE — TELEPHONE ENCOUNTER
"Returned Tanner's call. Tanner left me a message saying that he saw his local pain management provider and that he told Tanner that he \"has a problem with his hand\". Asked Tanner to return my call to further clarify and if any plan was discussed. Provided my contact information.  "

## 2017-07-23 NOTE — PROGRESS NOTES
S/p TPIAT    No fevers, pain is controlled, tolerates TFs    Af, Vsst    A&Ox3  Non-labored breathing  Soft abd, wound is being packed    WBC 13.5    A/P: 55 yo gentleman, s/p TPIAT, with wound ifx.  - continue wound packing  - no change to pain meds  - advance diet  - coumadin for DVT    CHIO Mendez

## 2017-07-24 DIAGNOSIS — Z53.9 ERRONEOUS ENCOUNTER--DISREGARD: Primary | ICD-10-CM

## 2017-07-27 ENCOUNTER — ANTICOAGULATION THERAPY VISIT (OUTPATIENT)
Dept: ANTICOAGULATION | Facility: CLINIC | Age: 57
End: 2017-07-27

## 2017-07-27 DIAGNOSIS — I82.402 DEEP VEIN THROMBOSIS (DVT) OF LEFT LOWER EXTREMITY (H): ICD-10-CM

## 2017-07-27 DIAGNOSIS — Z79.01 LONG-TERM (CURRENT) USE OF ANTICOAGULANTS: ICD-10-CM

## 2017-07-27 LAB — INR PPP: 1.7

## 2017-07-27 NOTE — PROGRESS NOTES
ANTICOAGULATION FOLLOW-UP CLINIC VISIT    Patient Name:  Sohan Arita  Date:  7/27/2017  Contact Type:  Telephone    SUBJECTIVE:     Patient Findings     Positives Missed doses    Comments Patient reports missing his warfarin dose on 7/26/17           OBJECTIVE    INR   Date Value Ref Range Status   07/27/2017 1.7  Final       ASSESSMENT / PLAN  INR assessment SUB    Recheck INR In: 5 DAYS    INR Location Outside lab      Anticoagulation Summary as of 7/27/2017     INR goal 2.0-3.0   Today's INR 1.7!   Maintenance plan No maintenance plan   Full instructions 7/27: 5 mg; 7/28: 2.5 mg; 7/29: 2.5 mg; 7/30: 2.5 mg; 7/31: 2.5 mg   Plan last modified Amelia Rolon RN (7/6/2017)   Next INR check 8/1/2017   Priority INR   Target end date     Indications   Deep vein thrombosis (DVT) of left lower extremity (H) [I82.402]  Long-term (current) use of anticoagulants [Z79.01] [Z79.01]         Anticoagulation Episode Summary     INR check location     Preferred lab     Send INR reminders to [facility]    Comments       Anticoagulation Care Providers     Provider Role Specialty Phone number    Rodrigo Jaquez MD Responsible Transplant [phone]            See the Encounter Report to view Anticoagulation Flowsheet and Dosing Calendar (Go to Encounters tab in chart review, and find the Anticoagulation Therapy Visit)    Spoke with patient. Gave them their lab results and new warfarin recommendation.  No changes in health, medication, or diet. No falls. No signs or symptoms of bleed or clotting.      Eduardo Mejia [facility]

## 2017-07-27 NOTE — MR AVS SNAPSHOT
Sohan Arita   7/27/2017   Anticoagulation Therapy Visit    Description:  56 year old male   Provider:  Eduardo Mejia [facility]   Department:  [facility]           INR as of 7/27/2017     Today's INR 1.7!      Anticoagulation Summary as of 7/27/2017     INR goal 2.0-3.0   Today's INR 1.7!   Full instructions 7/27: 5 mg; 7/28: 2.5 mg; 7/29: 2.5 mg; 7/30: 2.5 mg; 7/31: 2.5 mg   Next INR check 8/1/2017    Indications   Deep vein thrombosis (DVT) of left lower extremity (H) [I82.402]  Long-term (current) use of anticoagulants [Z79.01] [Z79.01]         July 2017 Details    Sun Mon Tue Wed Thu Fri Sat           1                 2               3               4               5               6               7               8                 9               10               11               12               13               14               15                 16               17               18               19               20               21               22                 23               24               25               26               27      5 mg   See details      28      2.5 mg         29      2.5 mg           30      2.5 mg         31      2.5 mg               Date Details   07/27 This INR check               How to take your warfarin dose     To take:  2.5 mg Take 1 of the 2.5 mg tablets.    To take:  5 mg Take 2 of the 2.5 mg tablets.           August 2017 Details    Sun Mon Tue Wed Thu Fri Sat       1            2               3               4               5                 6               7               8               9               10               11               12                 13               14               15               16               17               18               19                 20               21               22               23               24               25               26                 27               28               29               30                31                  Date Details   No additional details    Date of next INR:  8/1/2017

## 2017-07-30 ENCOUNTER — TELEPHONE (OUTPATIENT)
Dept: TRANSPLANT | Facility: CLINIC | Age: 57
End: 2017-07-30

## 2017-07-30 NOTE — TELEPHONE ENCOUNTER
8:34 AM  July 30, 2017  Returned Mr. Arita' call.  Concerned that his most recent Hgb is now 8.8 (was 10.9 two weeks ago).  Lab result is not yet posted in Epic.  No evidence of active bleeding.  Will call his coordinator in AM.  Kaylynn Frost RN, CCTC  On Call Organ Coordinator

## 2017-07-31 ENCOUNTER — TELEPHONE (OUTPATIENT)
Dept: TRANSPLANT | Facility: CLINIC | Age: 57
End: 2017-07-31

## 2017-07-31 ENCOUNTER — ANTICOAGULATION THERAPY VISIT (OUTPATIENT)
Dept: ANTICOAGULATION | Facility: CLINIC | Age: 57
End: 2017-07-31

## 2017-07-31 DIAGNOSIS — I82.402 DEEP VEIN THROMBOSIS (DVT) OF LEFT LOWER EXTREMITY (H): ICD-10-CM

## 2017-07-31 DIAGNOSIS — Z79.01 LONG-TERM (CURRENT) USE OF ANTICOAGULANTS: ICD-10-CM

## 2017-07-31 LAB — INR PPP: 1.6

## 2017-07-31 NOTE — MR AVS SNAPSHOT
Sohan Powers Arita   7/31/2017   Anticoagulation Therapy Visit    Description:  56 year old male   Provider:  Jeri Aguila, RN   Department:  [facility]           INR as of 7/31/2017     Today's INR No new INR was available at the time of this encounter.      Anticoagulation Summary as of 7/31/2017     INR goal 2.0-3.0   Today's INR No new INR was available at the time of this encounter.   Full instructions 7/31: 2.5 mg   Next INR check     Indications   Deep vein thrombosis (DVT) of left lower extremity (H) [I82.402]  Long-term (current) use of anticoagulants [Z79.01] [Z79.01]         July 2017 Details    Sun Mon Tue Wed Thu Fri Sat           1                 2               3               4               5               6               7               8                 9               10               11               12               13               14               15                 16               17               18               19               20               21               22                 23               24               25               26               27               28               29                 30               31      2.5 mg   See details            Date Details   07/31 This INR check      Date of next INR: No date specified         How to take your warfarin dose     To take:  2.5 mg Take 1 of the 2.5 mg tablets.

## 2017-07-31 NOTE — TELEPHONE ENCOUNTER
Updated Jeri at the [facility] about Tanner holding his Coumadin dose 7/30/17 due to 2 point drop in hemoglobin in two weeks and that he is following up with local PCP/GI providers today.   She will follow up with Tanner and Jeri requested an update if Tanner is admitted.

## 2017-07-31 NOTE — PROGRESS NOTES
ANTICOAGULATION FOLLOW-UP CLINIC VISIT    Patient Name:  Sohan Arita  Date:  7/31/2017  Contact Type:  Telephone    SUBJECTIVE:     Patient Findings     Positives Change in medications (he takes lyrica 100 mg once/day,  his levothyroxine dose has decreased from 200 mcg to 150), Missed doses (7/26/17 and 7/30/17)    Comments Tanner held his warfarin dose 7/30/17.  He missed a dose of coumadin 7/26/17.  4:22 PM:  Tanner called back:  His hgb today is 10.8, so his GI doctor does not think he has a bleed.            OBJECTIVE    INR   Date Value Ref Range Status   07/31/2017 1.6  Final       ASSESSMENT / PLAN  INR assessment SUB    Recheck INR In: 4 DAYS    INR Location Outside lab      Anticoagulation Summary as of 7/31/2017     INR goal 2.0-3.0   Today's INR 1.6!   Maintenance plan No maintenance plan   Full instructions 7/31: 5 mg; 8/1: 2.5 mg; 8/2: 2.5 mg; 8/3: 2.5 mg   Plan last modified Amelia Rolon RN (7/6/2017)   Next INR check 8/4/2017   Priority INR   Target end date     Indications   Deep vein thrombosis (DVT) of left lower extremity (H) [I82.402]  Long-term (current) use of anticoagulants [Z79.01] [Z79.01]         Anticoagulation Episode Summary     INR check location     Preferred lab     Send INR reminders to [facility]    Comments       Anticoagulation Care Providers     Provider Role Specialty Phone number    Rodrigo Jaquez MD Responsible Transplant [phone]            See the Encounter Report to view Anticoagulation Flowsheet and Dosing Calendar (Go to Encounters tab in chart review, and find the Anticoagulation Therapy Visit)    Earlier today, transplant coordinator called the [facility].  Tanner had called and was concerned because his hgb dropped from 10.9 to 8.8.  He had held his coumadin dose (on his own) 7/30/17 and ate a lot of spinach and kale.  His GI physician wanted him to recheck his hgb today before they made a plan.  Hgb today was 10.8, so they do not think he has  a bleed.  Warfarin dosing given to him after getting this result.  Per Tanner, he only held his dose yesterday and did not miss warfarin dose on 7/29/17.  He will recheck INR 8/4/17.      Jeri Agulia RN

## 2017-07-31 NOTE — TELEPHONE ENCOUNTER
"Tanner paged this RN today reporting that his hemoglobin has dropped 2 points in two week and is currently 8.8. This value, per Tanner was from labs drawn 7/27/17 locally. He reports feeling fatigue, weak, and complaining of \"belly cramping\". He reported holding his Coumadin dose last PM 7/30/17. He is to see his local PCP/GI providers and possible interventions of CT/Ultrasound, EGD. Tanner will keep this RN informed.  "

## 2017-07-31 NOTE — PROGRESS NOTES
Received a call from transplant coordinator, Soni.  Tanner reported his hemoglobin has dropped to 8.8 (was 10.9 two weeks ago).  He held his coumadin last night, 7/30/17.  He has also been feeling weak and fatigued.  He has spoken to GI physician in [place], and is going in for more blood work today.  They will decide what to do based on blood work results.    Called and spoke with Tanner's wife, Maribell.  She reports Tanner might have held his coumadin both 7/29 and 7/30.  She will call the [facility] this afternoon to inform what plan is.  Will give coumadin dosing at that time, if appropriate.   Jeri Aguila RN

## 2017-08-02 ENCOUNTER — TELEPHONE (OUTPATIENT)
Dept: TRANSPLANT | Facility: CLINIC | Age: 57
End: 2017-08-02

## 2017-08-02 NOTE — TELEPHONE ENCOUNTER
"Spoke with Tanner. 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 He had his CBC/P rechecked at a different lab and received different results. His Hbg went from 10.9 to 10.8 Not 8.8. Per Tanner, other values were stable with the exception of his WBC went from 8.0 to mid 11\"s. He increased his oral iron from daily to BID. He will have labs including INR Friday, 8/4. He has spoken with our Coumadin clinic staff and has resumed taking his daily 2.4 mg Coumadin dose. He reports stable blood sugars 105-125, with highest at 127. Checking them before meals, 2 hours post prandial, and at HS. He self increased his Lantus from 7 units per day to 10 units per day. Novolog daily dose averages 6-8 units most entirely for meal carb coverage. Today he reports feeling \"overall good with more energy\". Before he was suffering from \"belly pain, cramping, fatigue, and gas\". This episode has improved. Tanner reports doing trimming of his yard bushes and changed the fuel filters in his truck. He reports that took a lot out of him. 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 We discussed a balance for steady and safe recovery, work, and home chores. The decision was to keep with the paperwork while at work for 2 more weeks. He returned to work about July 20/2 weeks ago per Tanner. 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 We discussed balancing his home responsibilities and consider finding help to do his yard work so he can see patients as the two may be too much for him. Tanner verbalized understanding. We agreed to check-in weekly and discuss his progress. Tanner had no other concerns or questions.  "

## 2017-08-03 ENCOUNTER — TELEPHONE (OUTPATIENT)
Dept: TRANSPLANT | Facility: CLINIC | Age: 57
End: 2017-08-03

## 2017-08-04 ENCOUNTER — ANTICOAGULATION THERAPY VISIT (OUTPATIENT)
Dept: ANTICOAGULATION | Facility: CLINIC | Age: 57
End: 2017-08-04

## 2017-08-04 ENCOUNTER — TELEPHONE (OUTPATIENT)
Dept: TRANSPLANT | Facility: CLINIC | Age: 57
End: 2017-08-04

## 2017-08-04 DIAGNOSIS — Z79.01 LONG-TERM (CURRENT) USE OF ANTICOAGULANTS: ICD-10-CM

## 2017-08-04 DIAGNOSIS — I82.402 DEEP VEIN THROMBOSIS (DVT) OF LEFT LOWER EXTREMITY (H): ICD-10-CM

## 2017-08-04 LAB — INR PPP: 1.5

## 2017-08-04 NOTE — PROGRESS NOTES
ANTICOAGULATION FOLLOW-UP CLINIC VISIT    Patient Name:  Sohan Arita  Date:  8/4/2017  Contact Type:  Telephone    SUBJECTIVE:     Patient Findings     Positives Inflammation    Comments Patient has an infected wound which was opened in the ER on 8/3/17   No new antibiotics           OBJECTIVE    INR   Date Value Ref Range Status   08/04/2017 1.5  Final       ASSESSMENT / PLAN  INR assessment SUB    Recheck INR In: 3 DAYS    INR Location Outside lab      Anticoagulation Summary as of 8/4/2017     INR goal 2.0-3.0   Today's INR 1.5!   Maintenance plan No maintenance plan   Full instructions 8/4: 3.75 mg; 8/5: 3.75 mg; 8/6: 3.75 mg   Plan last modified Amelia Rolon RN (7/6/2017)   Next INR check 8/7/2017   Priority INR   Target end date     Indications   Deep vein thrombosis (DVT) of left lower extremity (H) [I82.402]  Long-term (current) use of anticoagulants [Z79.01] [Z79.01]         Anticoagulation Episode Summary     INR check location     Preferred lab     Send INR reminders to [facility]    Comments       Anticoagulation Care Providers     Provider Role Specialty Phone number    Rodrigo Jaquez MD Responsible Transplant [phone]            See the Encounter Report to view Anticoagulation Flowsheet and Dosing Calendar (Go to Encounters tab in chart review, and find the Anticoagulation Therapy Visit)    Spoke with patient. Gave him a new warfarin regimen. No missed doses, no falls. No signs or symptoms of bleed or clotting.      Eduardo Mejia [facility]

## 2017-08-04 NOTE — TELEPHONE ENCOUNTER
Received a call from patient re: wound.  He reports that since this morning, his abdominal wound has been protruding more and uncomfortable.  Noted it to be quite red and firm this evening.  Patient reports feeling warm but is afebrile.    He went into the local ER in [facility] (currently there), will have coordinator f/u in the morning.

## 2017-08-04 NOTE — MR AVS SNAPSHOT
Sohan Powers Arita   8/4/2017   Anticoagulation Therapy Visit    Description:  56 year old male   Provider:  Eduardo Mejia, [facility]   Department:  [facility]           INR as of 8/4/2017     Today's INR 1.5!      Anticoagulation Summary as of 8/4/2017     INR goal 2.0-3.0   Today's INR 1.5!   Full instructions 8/4: 3.75 mg; 8/5: 3.75 mg; 8/6: 3.75 mg   Next INR check 8/7/2017    Indications   Deep vein thrombosis (DVT) of left lower extremity (H) [I82.402]  Long-term (current) use of anticoagulants [Z79.01] [Z79.01]         August 2017 Details    Sun Mon Tue Wed Thu Fri Sat       1               2               3               4      3.75 mg   See details      5      3.75 mg           6      3.75 mg         7            8               9               10               11               12                 13               14               15               16               17               18               19                 20               21               22               23               24               25               26                 27               28               29               30               31                  Date Details   08/04 This INR check       Date of next INR:  8/7/2017         How to take your warfarin dose     To take:  3.75 mg Take 1.5 of the 2.5 mg tablets.

## 2017-08-04 NOTE — TELEPHONE ENCOUNTER
"Call from Tanner( see previous documentation ) He went to [facility] [place] last night with increasing abdominal pain, malaise, no fever  and \"bulge\" in incisional area. Wound opened up in ER with copious amounts of purulent drainage. Sent home with open, packed wound. Records requested .CT images will be overnighted. Waiting for call from local Surgeon, Dr Rocco Felder. Will inform Dr Jaquez .  "

## 2017-08-05 ENCOUNTER — TRANSFERRED RECORDS (OUTPATIENT)
Dept: HEALTH INFORMATION MANAGEMENT | Facility: CLINIC | Age: 57
End: 2017-08-05

## 2017-08-07 ENCOUNTER — TELEPHONE (OUTPATIENT)
Dept: TRANSPLANT | Facility: CLINIC | Age: 57
End: 2017-08-07

## 2017-08-08 ENCOUNTER — ANTICOAGULATION THERAPY VISIT (OUTPATIENT)
Dept: ANTICOAGULATION | Facility: CLINIC | Age: 57
End: 2017-08-08

## 2017-08-08 DIAGNOSIS — Z79.01 LONG-TERM (CURRENT) USE OF ANTICOAGULANTS: ICD-10-CM

## 2017-08-08 DIAGNOSIS — I82.402 DEEP VEIN THROMBOSIS (DVT) OF LEFT LOWER EXTREMITY (H): ICD-10-CM

## 2017-08-08 LAB — INR PPP: 1.9

## 2017-08-08 NOTE — MR AVS SNAPSHOT
Sohan Arita   8/8/2017   Anticoagulation Therapy Visit    Description:  56 year old male   Provider:  Cristina Faust, RN   Department:  [facility]           INR as of 8/8/2017     Today's INR 1.90!      Anticoagulation Summary as of 8/8/2017     INR goal 2.0-3.0   Today's INR 1.90!   Full instructions 8/8: 3.75 mg; 8/9: 3.75 mg; 8/10: 3.75 mg   Next INR check 8/11/2017    Indications   Deep vein thrombosis (DVT) of left lower extremity (H) [I82.402]  Long-term (current) use of anticoagulants [Z79.01] [Z79.01]         August 2017 Details    Sun Mon Tue Wed Thu Fri Sat       1               2               3               4               5                 6               7               8      3.75 mg   See details      9      3.75 mg         10      3.75 mg         11            12                 13               14               15               16               17               18               19                 20               21               22               23               24               25               26                 27               28               29               30               31                  Date Details   08/08 This INR check       Date of next INR:  8/11/2017         How to take your warfarin dose     To take:  3.75 mg Take 1.5 of the 2.5 mg tablets.

## 2017-08-08 NOTE — PROGRESS NOTES
ANTICOAGULATION FOLLOW-UP CLINIC VISIT    Patient Name:  Sohan Arita  Date:  8/8/2017  Contact Type:  Telephone    SUBJECTIVE:     Patient Findings     Positives Antibiotic use or infection    Comments Pt reports he is on Levaquin for his infection of his open wound. Pt thinks that the doctors will want to operate more, but that it might just be superficial. Writer explained if we need to interrupt Warfarin to have pt call us with this info so we can create a plan.           OBJECTIVE    INR   Date Value Ref Range Status   08/08/2017 1.90  Final     Comment:     Outside Lab        ASSESSMENT / PLAN  INR assessment THER    Recheck INR In: 3 DAYS    INR Location Outside lab      Anticoagulation Summary as of 8/8/2017     INR goal 2.0-3.0   Today's INR 1.90!   Maintenance plan No maintenance plan   Full instructions 8/8: 3.75 mg; 8/9: 3.75 mg; 8/10: 3.75 mg   Plan last modified Amelia Rolon RN (7/6/2017)   Next INR check 8/11/2017   Priority INR   Target end date     Indications   Deep vein thrombosis (DVT) of left lower extremity (H) [I82.402]  Long-term (current) use of anticoagulants [Z79.01] [Z79.01]         Anticoagulation Episode Summary     INR check location     Preferred lab     Send INR reminders to [facility]    Comments Uses labs in [place] and calls us with the results      Anticoagulation Care Providers     Provider Role Specialty Phone number    Rodrigo Jaquez MD Responsible Transplant [phone]            See the Encounter Report to view Anticoagulation Flowsheet and Dosing Calendar (Go to Encounters tab in chart review, and find the Anticoagulation Therapy Visit)    Spoke with patient. Gave them their lab results and new warfarin recommendation.  No changes in health, medication, or diet. No missed doses, no falls. No signs or symptoms of bleed or clotting.     Cristina Faust, RN

## 2017-08-09 ENCOUNTER — TELEPHONE (OUTPATIENT)
Dept: TRANSPLANT | Facility: CLINIC | Age: 57
End: 2017-08-09

## 2017-08-09 NOTE — TELEPHONE ENCOUNTER
Spoke with Stephanie in the [facility] regarding the status of the abdominal CT completed 8/4/17. The film room staff reported they sent out our in regular mail at 11:21 AM 8/4/17. The disc was specifically requested to be sent overnight. Stephanie in the film room stated that they do not use any carrier other than regular mail. Updated Tanner and he stated he was not surprised. He offered to go to the hospital and ask for a CT and overnight it to us. Through discussion, will wait for delivery and if not received today will revisit his offer to get a CT and sent to us overnight.

## 2017-08-09 NOTE — TELEPHONE ENCOUNTER
Spoke with Tanner. He reports feeling weak. He described the incisional wound focusing on the top/cephalid area as progressing. He stated that he thinks the wound is not draining as much and is now more bloody in color and not as yellow and oily looking. He will meet with the local surgeon who has been following Tanner since the recurrence of the infection.His blood sugars have stayed stable mostly in the low 100's. This morning his fasting was 130. He does not state he has much of an appetite but ate 2 scrambled eggs with 1/2 piece of jain, 1 piece of toast, 1/3 cup of milk, and water for breakfast today. He feels he is keeping up with his nutrition. He reports fully formed stool that is pasty at times and light green. He is taking iron 325 mg tablets 1-2 tablets per day. He is not on any bowel regimen and if he has a bout of constipation it will resolve on it's own. Pain medications include Fentanyl patch 25 mcg. He has liquid oxycodone and every 2-3 days will take 2-2.5 mg (1ml/1mg), dose but reports the wound packing is not as painful as it was. He will drop his Fentanyl patch to 12.5 mg in the near future when his wound infection stabilizes. We discussed enzyme dosing and he denies greasy/oily stools, but will consider if he needs to increase his dose by one with meals. He will mange this.    No CT disc done 8/4/17 from [facility] has arrived to [facility]. Will follow up and track location.

## 2017-08-10 ENCOUNTER — TELEPHONE (OUTPATIENT)
Dept: TRANSPLANT | Facility: CLINIC | Age: 57
End: 2017-08-10

## 2017-08-10 NOTE — TELEPHONE ENCOUNTER
Called [facility] in [place], [place] inquiring about the location of the requested imaging CT abdomen and pelvis done 8/3/17. This RN spoke with Stephanie in the film room Tuesday, August 8 as we had not received the disc. Was told by Stephanie that it was sent out 8/4/17 at 11:21 AM by regular [employer] mail and not by overnight . No disc has arrived as of 8/10. This RN called and spoke with Sunita the Film room supervisor who sill burn a new disc, take it to their mailroom, obtain a tracking number, place it in the hands of a mailroom staff who will make sure it is sent out today by overnight . Sunita will then call this RN to provide a tracking number. This RN requested that Sunita search into what happened that the disc was not sent by overnight  as requested on the fax request 8/4/17. Sunita assured this RN that she would look into the root cause so this does not happen again. 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 This RN will follow.

## 2017-08-11 ENCOUNTER — ANTICOAGULATION THERAPY VISIT (OUTPATIENT)
Dept: ANTICOAGULATION | Facility: CLINIC | Age: 57
End: 2017-08-11

## 2017-08-11 ENCOUNTER — DOCUMENTATION ONLY (OUTPATIENT)
Dept: TRANSPLANT | Facility: CLINIC | Age: 57
End: 2017-08-11

## 2017-08-11 ENCOUNTER — TELEPHONE (OUTPATIENT)
Dept: TRANSPLANT | Facility: CLINIC | Age: 57
End: 2017-08-11

## 2017-08-11 DIAGNOSIS — Z79.01 LONG-TERM (CURRENT) USE OF ANTICOAGULANTS: ICD-10-CM

## 2017-08-11 DIAGNOSIS — I82.402 DEEP VEIN THROMBOSIS (DVT) OF LEFT LOWER EXTREMITY (H): ICD-10-CM

## 2017-08-11 LAB — INR PPP: 1.7

## 2017-08-11 NOTE — PROGRESS NOTES
ANTICOAGULATION FOLLOW-UP CLINIC VISIT    Patient Name:  Sohan Arita  Date:  8/11/2017  Contact Type:  Telephone    SUBJECTIVE:     Patient Findings     Positives Unexplained INR or factor level change    Comments levaquin continues x 4 more days            OBJECTIVE    INR   Date Value Ref Range Status   08/11/2017 1.7  Final       ASSESSMENT / PLAN  INR assessment SUB    Recheck INR In: 5 DAYS    INR Location Outside lab      Anticoagulation Summary as of 8/11/2017     INR goal 2.0-3.0   Today's INR 1.7!   Maintenance plan No maintenance plan   Full instructions 8/11: 5 mg; 8/12: 5 mg; 8/13: 5 mg; 8/14: 3.75 mg   Plan last modified Amelia Rolon RN (7/6/2017)   Next INR check 8/15/2017   Priority INR   Target end date     Indications   Deep vein thrombosis (DVT) of left lower extremity (H) [I82.402]  Long-term (current) use of anticoagulants [Z79.01] [Z79.01]         Anticoagulation Episode Summary     INR check location     Preferred lab     Send INR reminders to [facility]    Comments Uses labs in [place] and calls us with the results      Anticoagulation Care Providers     Provider Role Specialty Phone number    Rodrigo Jaquez MD Responsible Transplant [phone]            See the Encounter Report to view Anticoagulation Flowsheet and Dosing Calendar (Go to Encounters tab in chart review, and find the Anticoagulation Therapy Visit)    Spoke with patient. Gave them their lab results and new warfarin recommendation.  No changes in health, medication, or diet. No missed doses, no falls. No signs or symptoms of bleed or clotting.  Pt calls in with this INR result    Melissa Núñez RN

## 2017-08-11 NOTE — MR AVS SNAPSHOT
Sohan Arita   8/11/2017   Anticoagulation Therapy Visit    Description:  56 year old male   Provider:  Melissa Núñez, RN   [facility]           INR as of 8/11/2017     Today's INR 1.7!      Anticoagulation Summary as of 8/11/2017     INR goal 2.0-3.0   Today's INR 1.7!   Full instructions 8/11: 5 mg; 8/12: 5 mg; 8/13: 5 mg; 8/14: 3.75 mg   Next INR check 8/15/2017    Indications   Deep vein thrombosis (DVT) of left lower extremity (H) [I82.402]  Long-term (current) use of anticoagulants [Z79.01] [Z79.01]         August 2017 Details    Sun Mon Tue Wed Thu Fri Sat       1               2               3               4               5                 6               7               8               9               10               11      5 mg   See details      12      5 mg           13      5 mg         14      3.75 mg         15            16               17               18               19                 20               21               22               23               24               25               26                 27               28               29               30               31                  Date Details   08/11 This INR check       Date of next INR:  8/15/2017         How to take your warfarin dose     To take:  3.75 mg Take 1.5 of the 2.5 mg tablets.    To take:  5 mg Take 2 of the 2.5 mg tablets.

## 2017-08-11 NOTE — TELEPHONE ENCOUNTER
Tanner called to provide update as he just left his follow up appointment with Dr. Felder, local surgeon who has been following Tanner. Tanner stated that this Dr. Felder felt the infections was better and that to keep as he has been doing, packing and antibiotics. Tanner also stated that this surgeon no longer wishes to care for him due to his complex medical history and wants to refer him to another facility, [facility] to a pancreas surgeon there. Tanner stated he has no insurance coverage there and prefers to manage as he currently is and use his in-network hospital if needed.

## 2017-08-11 NOTE — PROGRESS NOTES
Received CD Disc of CT abdomen/pelvis from [facility]. Disc sent to Alex in film room with appropriate paperwork with stat loading. Will notify Sylvester Jaquez and Andrea when ready to view in Epic.

## 2017-08-14 ENCOUNTER — TELEPHONE (OUTPATIENT)
Dept: TRANSPLANT | Facility: CLINIC | Age: 57
End: 2017-08-14

## 2017-08-14 NOTE — TELEPHONE ENCOUNTER
"Returning Tanner's call. He was inquiring about his CT results sent to us from [place], [place]. Dr. Mendez was notified Friday, 8/11/17 that the CT was loaded in PACS. This RN will follow up to get information to answer Tanner's questions. Tanner reports feeling weak and that his \"iron levels are poor\". He has been working with his PCP about getting iron infusions beginning this Wednesday 8/16/17.  "

## 2017-08-15 ENCOUNTER — TELEPHONE (OUTPATIENT)
Dept: TRANSPLANT | Facility: CLINIC | Age: 57
End: 2017-08-15

## 2017-08-17 ENCOUNTER — TRANSFERRED RECORDS (OUTPATIENT)
Dept: HEALTH INFORMATION MANAGEMENT | Facility: CLINIC | Age: 57
End: 2017-08-17

## 2017-08-31 ENCOUNTER — ANTICOAGULATION THERAPY VISIT (OUTPATIENT)
Dept: ANTICOAGULATION | Facility: CLINIC | Age: 57
End: 2017-08-31

## 2017-08-31 ENCOUNTER — TELEPHONE (OUTPATIENT)
Dept: TRANSPLANT | Facility: CLINIC | Age: 57
End: 2017-08-31

## 2017-08-31 DIAGNOSIS — I82.402 DEEP VEIN THROMBOSIS (DVT) OF LEFT LOWER EXTREMITY (H): ICD-10-CM

## 2017-08-31 DIAGNOSIS — Z79.01 LONG-TERM (CURRENT) USE OF ANTICOAGULANTS: ICD-10-CM

## 2017-08-31 RX ORDER — WARFARIN SODIUM 2.5 MG/1
TABLET ORAL
Qty: 30 TABLET | Refills: 3 | Status: SHIPPED | OUTPATIENT
Start: 2017-08-31 | End: 2017-11-09

## 2017-08-31 NOTE — PROGRESS NOTES
Paged Dr. Jaquez.  Patient stated he was only to be on Coumadin for 90 days.  Dr Jaquez called back and stated that the duration is 6 months minimal.  Refilled patient prescription and recommended he get an INR as soon as possible.  ANTICOAGULATION FOLLOW-UP CLINIC VISIT    Patient Name:  Sohan Arita  Date:  8/31/2017  Contact Type:  Telephone    SUBJECTIVE:     Patient Findings     Positives Missed doses    Comments Patient ran out of Warfarin on Monday.  Missed doses on Tuesday and Wednesday.  Phoned Dr. Jaquez.  Team stated patient needs to continue Coumadin.  Has a wound that has been bleeding.           OBJECTIVE    INR   Date Value Ref Range Status   08/11/2017 1.7  Final       ASSESSMENT / PLAN  No question data found.  Anticoagulation Summary as of 8/31/2017     INR goal 2.0-3.0   Today's INR No new INR was available at the time of this encounter.   Maintenance plan No maintenance plan   Full instructions 8/31: 5 mg   Plan last modified Amelia Rolon RN (7/6/2017)   Next INR check    Priority INR   Target end date     Indications   Deep vein thrombosis (DVT) of left lower extremity (H) [I82.402]  Long-term (current) use of anticoagulants [Z79.01] [Z79.01]         Anticoagulation Episode Summary     INR check location     Preferred lab     Send INR reminders to [facility]    Comments Uses labs in [place] and calls us with the results      Anticoagulation Care Providers     Provider Role Specialty Phone number    Rodrigo Jaquez MD Responsible Transplant [phone]            See the Encounter Report to view Anticoagulation Flowsheet and Dosing Calendar (Go to Encounters tab in chart review, and find the Anticoagulation Therapy Visit)    Spoke to Tanner.  Refilled 2.5 mg tablets.  He missed two doses this week.  Will call with INR results tomorrow 9/1.  Has a wound he says is bleeding and has moderate drainage.      Panfilo Borges RN

## 2017-08-31 NOTE — TELEPHONE ENCOUNTER
"Spoke with Tanner. He reports that his \"wound continues pouring a bunch of stuff. Might be a hair better\". He has been seeing a surgeon Dr. Avery Bustos in [place], [place]. Tanner stated he is pleased with his care \"and he's not afraid of me\". Tanner gave him this RN's number to call as he is wishing to speak with Dr. Jaquez or Dr. Mendez about coordinating a plan of wound care.   Tanner stated he has completed his Boost test locally but not all values have returned. Tanner stated that he ran out of Coumadin Monday or Tuesday of this week and hasn't requested a refill as he is approaching 90-days of therapy and that 9/2/17 would be 92 days. His last INR from last week was 2.4. This RN advised Tanner to call the Coumadin Clinic here to discuss with them as to next steps. Tanner had a repeat CT scan done 4-5 days ago and will get a disc sent to us and his Boost test results when complete.   Per a previous discussion, Tanner has decided to hold returning to [place] at this time for treatment. He is comfortable with his current team.   "

## 2017-08-31 NOTE — MR AVS SNAPSHOT
Sohan Powers Juan J   8/31/2017   Anticoagulation Therapy Visit    Description:  56 year old male   Provider:  Rodrigo Jaquez MD   Department:  [facility]           INR as of 8/31/2017     Today's INR No new INR was available at the time of this encounter.      Anticoagulation Summary as of 8/31/2017     INR goal 2.0-3.0   Today's INR No new INR was available at the time of this encounter.   Full instructions 8/31: 5 mg   Next INR check     Indications   Deep vein thrombosis (DVT) of left lower extremity (H) [I82.402]  Long-term (current) use of anticoagulants [Z79.01] [Z79.01]         August 2017 Details    Sun Mon Tue Wed Thu Fri Sat       1               2               3               4               5                 6               7               8               9               10               11               12                 13               14               15               16               17               18               19                 20               21               22               23               24               25               26                 27               28               29               30               31      5 mg   See details         Date Details   08/31 This INR check      Date of next INR: No date specified         How to take your warfarin dose     To take:  5 mg Take 2 of the 2.5 mg tablets.

## 2017-09-05 ENCOUNTER — ANTICOAGULATION THERAPY VISIT (OUTPATIENT)
Dept: ANTICOAGULATION | Facility: CLINIC | Age: 57
End: 2017-09-05

## 2017-09-05 DIAGNOSIS — I82.402 DEEP VEIN THROMBOSIS (DVT) OF LEFT LOWER EXTREMITY (H): ICD-10-CM

## 2017-09-05 DIAGNOSIS — Z79.01 LONG-TERM (CURRENT) USE OF ANTICOAGULANTS: ICD-10-CM

## 2017-09-05 LAB — INR PPP: 1.4

## 2017-09-05 NOTE — MR AVS SNAPSHOT
Sohan Casillasvictor manuel Arita   9/5/2017   Anticoagulation Therapy Visit    Description:  56 year old male   Provider:  Panfilo Borges, RN   Department:  [facility]           INR as of 9/5/2017     Today's INR 1.4!      Anticoagulation Summary as of 9/5/2017     INR goal 2.0-3.0   Today's INR 1.4!   Full instructions 9/5: 5 mg; 9/6: 5 mg; 9/7: 5 mg   Next INR check 9/8/2017    Indications   Deep vein thrombosis (DVT) of left lower extremity (H) [I82.402]  Long-term (current) use of anticoagulants [Z79.01] [Z79.01]         September 2017 Details    Sun Mon Tue Wed Thu Fri Sat          1               2                 3               4               5      5 mg   See details      6      5 mg         7      5 mg         8            9                 10               11               12               13               14               15               16                 17               18               19               20               21               22               23                 24               25               26               27               28               29               30                Date Details   09/05 This INR check       Date of next INR:  9/8/2017         How to take your warfarin dose     To take:  5 mg Take 2 of the 2.5 mg tablets.

## 2017-09-05 NOTE — PROGRESS NOTES
ANTICOAGULATION FOLLOW-UP CLINIC VISIT    Patient Name:  Sohan Arita  Date:  9/5/2017  Contact Type:  Telephone    SUBJECTIVE:     Patient Findings     Positives Unexplained INR or factor level change    Comments Dr. Arita did not heed recommendations of Anticoagulation clinic.  He took 2.5mg instead of 5mg.           OBJECTIVE    INR   Date Value Ref Range Status   09/05/2017 1.4  Final       ASSESSMENT / PLAN  INR assessment SUB    Recheck INR In: 3 DAYS    INR Location Outside lab      Anticoagulation Summary as of 9/5/2017     INR goal 2.0-3.0   Today's INR 1.4!   Maintenance plan No maintenance plan   Full instructions 9/5: 5 mg; 9/6: 5 mg; 9/7: 5 mg   Plan last modified Amelia Rolon RN (7/6/2017)   Next INR check 9/8/2017   Priority INR   Target end date     Indications   Deep vein thrombosis (DVT) of left lower extremity (H) [I82.402]  Long-term (current) use of anticoagulants [Z79.01] [Z79.01]         Anticoagulation Episode Summary     INR check location     Preferred lab     Send INR reminders to [facility]    Comments Uses labs in [place] and calls us with the results      Anticoagulation Care Providers     Provider Role Specialty Phone number    Rodrigo Jaquez MD Responsible Transplant [phone]            See the Encounter Report to view Anticoagulation Flowsheet and Dosing Calendar (Go to Encounters tab in chart review, and find the Anticoagulation Therapy Visit)    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  He stated he took 2.5mg instead of 5mg of Warfarin since last Thursday.  Recommended that he take 5mg today, Wednesday and Thursday.  Will phone his results to us on Friday.  Reported he has a wound that is healing, with copious drainage.  Recommended he monitor the wound for bleeding.  No changes in medication, diet or health.  [place]    Panfilo Borges, MARLON

## 2017-09-20 ENCOUNTER — TELEPHONE (OUTPATIENT)
Dept: TRANSPLANT | Facility: CLINIC | Age: 57
End: 2017-09-20

## 2017-09-20 ENCOUNTER — ANTICOAGULATION THERAPY VISIT (OUTPATIENT)
Dept: ANTICOAGULATION | Facility: CLINIC | Age: 57
End: 2017-09-20

## 2017-09-20 DIAGNOSIS — I82.402 DEEP VEIN THROMBOSIS (DVT) OF LEFT LOWER EXTREMITY (H): ICD-10-CM

## 2017-09-20 DIAGNOSIS — Z79.01 LONG-TERM (CURRENT) USE OF ANTICOAGULANTS: ICD-10-CM

## 2017-09-20 LAB — INR PPP: 1.2

## 2017-09-20 NOTE — PROGRESS NOTES
ANTICOAGULATION FOLLOW-UP CLINIC VISIT    Patient Name:  Sohan Arita  Date:  9/20/2017  Contact Type:  Telephone    SUBJECTIVE:     Patient Findings     Comments Tanner reports that he has been taking 2.5mg daily.  Writer pointed out that 2.5mg daily clearly is not enough warfarin for him and Dr. Arita is now in agreement.            OBJECTIVE    INR   Date Value Ref Range Status   09/20/2017 1.2  Final       ASSESSMENT / PLAN  INR assessment SUB    Recheck INR In: 1 WEEK    INR Location Home INR      Anticoagulation Summary as of 9/20/2017     INR goal 2.0-3.0   Today's INR 1.2!   Maintenance plan No maintenance plan   Full instructions 9/20: 5 mg; 9/21: 3.75 mg; 9/22: 3.75 mg; 9/23: 3.75 mg; 9/24: 3.75 mg; 9/25: 3.75 mg; 9/26: 3.75 mg   Plan last modified Amelia Rolon RN (7/6/2017)   Next INR check 9/27/2017   Priority INR   Target end date     Indications   Deep vein thrombosis (DVT) of left lower extremity (H) [I82.402]  Long-term (current) use of anticoagulants [Z79.01] [Z79.01]         Anticoagulation Episode Summary     INR check location     Preferred lab     Send INR reminders to [facility]    Comments Uses labs in [place] and calls us with the results      Anticoagulation Care Providers     Provider Role Specialty Phone number    Rodrigo Jaquez MD Responsible Transplant [phone]            See the Encounter Report to view Anticoagulation Flowsheet and Dosing Calendar (Go to Encounters tab in chart review, and find the Anticoagulation Therapy Visit)    Spoke with Dr. Arita.    Amelia Rolon, RN

## 2017-09-20 NOTE — MR AVS SNAPSHOT
Sohan Arita   9/20/2017   Anticoagulation Therapy Visit    Description:  56 year old male   Provider:  Amelia Rolon, RN   [facility]           INR as of 9/20/2017     Today's INR 1.2!      Anticoagulation Summary as of 9/20/2017     INR goal 2.0-3.0   Today's INR 1.2!   Full instructions 9/20: 5 mg; 9/21: 3.75 mg; 9/22: 3.75 mg; 9/23: 3.75 mg; 9/24: 3.75 mg; 9/25: 3.75 mg; 9/26: 3.75 mg   Next INR check 9/27/2017    Indications   Deep vein thrombosis (DVT) of left lower extremity (H) [I82.402]  Long-term (current) use of anticoagulants [Z79.01] [Z79.01]         September 2017 Details    Sun Mon Tue Wed Thu Fri Sat          1               2                 3               4               5               6               7               8               9                 10               11               12               13               14               15               16                 17               18               19               20      5 mg   See details      21      3.75 mg         22      3.75 mg         23      3.75 mg           24      3.75 mg         25      3.75 mg         26      3.75 mg         27            28               29               30                Date Details   09/20 This INR check       Date of next INR:  9/27/2017         How to take your warfarin dose     To take:  3.75 mg Take 1.5 of the 2.5 mg tablets.    To take:  5 mg Take 2 of the 2.5 mg tablets.

## 2017-09-20 NOTE — TELEPHONE ENCOUNTER
Patient phoned in yesterday and stated that he is wondering if he can get a return to work letter. 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 Patient was informed that per Ericka, she will call him on Friday to discuss with him. Patient voided understanding.

## 2017-09-25 ENCOUNTER — TELEPHONE (OUTPATIENT)
Dept: TRANSPLANT | Facility: CLINIC | Age: 57
End: 2017-09-25

## 2017-09-25 NOTE — TELEPHONE ENCOUNTER
Called Tanner to discuss return to work letter and plans. At this point  he is feeling pretty miserable abs he is withdrawing from the last of his narcotics. He will call me when he is ready for the letter.  Other issues:   Profound fatigue: Will obtain iron study results , could also be weight loss.  Weight loss; Current weight 189 pounds, no steatorhea but occasional pale stools. Current enzymes use Zenpep 60,000 per meal . Advised to increase, explained dosing of 1678-4586 Lipase eq /kg per meal and suggested he is under dosed. He will increase.

## 2017-10-02 ENCOUNTER — TRANSFERRED RECORDS (OUTPATIENT)
Dept: HEALTH INFORMATION MANAGEMENT | Facility: CLINIC | Age: 57
End: 2017-10-02

## 2017-10-02 ENCOUNTER — TELEPHONE (OUTPATIENT)
Dept: TRANSPLANT | Facility: CLINIC | Age: 57
End: 2017-10-02

## 2017-10-02 NOTE — TELEPHONE ENCOUNTER
Call from Tanner. He went to ER last night with extreme flank pain and has a kidney stone--as yet not passed. He has a strong family history of such, he plans to save the stone for analysis when he finally passes it. He is disappointed because he had to resort to IV pain medication after 13 days of not taking anything .CT done in ER shows considerable improvement re thepreviously concerning  fluid collections/abcess .

## 2017-10-03 ENCOUNTER — ANTICOAGULATION THERAPY VISIT (OUTPATIENT)
Dept: ANTICOAGULATION | Facility: CLINIC | Age: 57
End: 2017-10-03

## 2017-10-03 DIAGNOSIS — I82.402 DEEP VEIN THROMBOSIS (DVT) OF LEFT LOWER EXTREMITY (H): ICD-10-CM

## 2017-10-03 DIAGNOSIS — Z79.01 LONG-TERM (CURRENT) USE OF ANTICOAGULANTS: ICD-10-CM

## 2017-10-03 NOTE — PROGRESS NOTES
10/3 Tanner called the Coumadin clinic.  He is having a procedure today.  He was instructed to hold his Coumadin on Sunday and Monday.  Requested patient ask the provider when he should restart his Coumadin.  Tanner stated he will phone the Coumadin clinic today or tomorrow after his procedure.

## 2017-10-03 NOTE — MR AVS SNAPSHOT
Sohan Gio Arita   10/3/2017   Anticoagulation Therapy Visit    Description:  56 year old male   Provider:  Panfilo Borges, RN   Department:  [facility]           INR as of 10/3/2017     Today's INR       Anticoagulation Summary as of 10/3/2017     INR goal 2.0-3.0   Today's INR    Full instructions No maintenance plan   Next INR check 10/4/2017    Indications   Deep vein thrombosis (DVT) of left lower extremity (H) [I82.402]  Long-term (current) use of anticoagulants [Z79.01] [Z79.01]         October 2017 Details    Sun Mon Tue Wed Thu Fri Sat     1               2               3         See details      4            5               6               7                 8               9               10               11               12               13               14                 15               16               17               18               19               20               21                 22               23               24               25               26               27               28                 29               30               31                    Date Details   10/03 This INR check       Date of next INR:  10/4/2017

## 2017-10-04 ENCOUNTER — TELEPHONE (OUTPATIENT)
Dept: TRANSPLANT | Facility: CLINIC | Age: 57
End: 2017-10-04

## 2017-10-04 ENCOUNTER — ANTICOAGULATION THERAPY VISIT (OUTPATIENT)
Dept: ANTICOAGULATION | Facility: CLINIC | Age: 57
End: 2017-10-04

## 2017-10-04 DIAGNOSIS — Z79.01 LONG-TERM (CURRENT) USE OF ANTICOAGULANTS: ICD-10-CM

## 2017-10-04 DIAGNOSIS — I82.402 DEEP VEIN THROMBOSIS (DVT) OF LEFT LOWER EXTREMITY (H): ICD-10-CM

## 2017-10-04 LAB — INR PPP: 1.4

## 2017-10-04 NOTE — TELEPHONE ENCOUNTER
"Call from Tanner : He is alone at home and the top of his wound has opened up and is \"pouring yellow cloudy pus \" He has soaked though at least a handful of 4x4 equivalent wipes. He feels miserable, when he presses on this area  and it keeps secreting this fluid. He also has  katiana hematuria after a cystoscopy yesterday to try and  basket a kidney stone. This was seen on CT Monday, this CT apparently showed marked improvement of his  previously noted wound abscess. Have faxed request for report and CT to be faxed/overnighted to me. The phone call was cut short when his wife called but I advised him to go to ER for assessment.  "

## 2017-10-04 NOTE — MR AVS SNAPSHOT
Sohan Arita   10/4/2017   Anticoagulation Therapy Visit    Description:  56 year old male   Provider:  Eduardo Mejia, [facility]   Department:  [facility]           INR as of 10/4/2017     Today's INR 1.4! (10/3/2017)      Anticoagulation Summary as of 10/4/2017     INR goal 2.0-3.0   Today's INR 1.4! (10/3/2017)   Full instructions 10/4: Hold   Next INR check 10/6/2017    Indications   Deep vein thrombosis (DVT) of left lower extremity (H) [I82.402]  Long-term (current) use of anticoagulants [Z79.01] [Z79.01]         October 2017 Details    Sun Mon Tue Wed Thu Fri Sat     1               2               3               4      Hold   See details      5               6            7                 8               9               10               11               12               13               14                 15               16               17               18               19               20               21                 22               23               24               25               26               27               28                 29               30               31                    Date Details   10/04 This INR check       Date of next INR:  10/6/2017         How to take your warfarin dose     Hold Do not take your warfarin dose. See the Details table to the right for additional instructions.

## 2017-10-04 NOTE — PROGRESS NOTES
Spoke with Sohan today. He said when he urinates he is bleeding. He has an appointment with this Urologist on Friday 10/6/17.  He currently has a stent placed and he is hoping to have that removed on Friday. 10/6. At this time no further procedures are planned.  Sohan has not received any direction from the Urologist about restarting his warfarin. I told him to hold the warfarin today.

## 2017-10-04 NOTE — Clinical Note
FYI, I also sent a message to Ericka Regan so she may ask you about this.  My thought is to hold warfarin today and possible tomorrow and restart after patient sees urologist on Fri 10/6. Please advise. Thanks, Onur [phone]

## 2017-10-05 ENCOUNTER — ANTICOAGULATION THERAPY VISIT (OUTPATIENT)
Dept: ANTICOAGULATION | Facility: CLINIC | Age: 57
End: 2017-10-05

## 2017-10-05 DIAGNOSIS — I82.402 DEEP VEIN THROMBOSIS (DVT) OF LEFT LOWER EXTREMITY (H): ICD-10-CM

## 2017-10-05 DIAGNOSIS — Z79.01 LONG-TERM (CURRENT) USE OF ANTICOAGULANTS: ICD-10-CM

## 2017-10-05 NOTE — MR AVS SNAPSHOT
Sohan Ruffins   10/5/2017   Anticoagulation Therapy Visit    Description:  56 year old male   Provider:  [facility]   Department:  [facility]           INR as of 10/5/2017     Today's INR       Anticoagulation Summary as of 10/5/2017     INR goal 2.0-3.0   Today's INR    Full instructions 10/5: Hold   Next INR check 10/6/2017    Indications   Deep vein thrombosis (DVT) of left lower extremity (H) [I82.402]  Long-term (current) use of anticoagulants [Z79.01] [Z79.01]         October 2017 Details    Sun Mon Tue Wed Thu Fri Sat     1               2               3               4               5      Hold   See details      6            7                 8               9               10               11               12               13               14                 15               16               17               18               19               20               21                 22               23               24               25               26               27               28                 29               30               31                    Date Details   10/05 This INR check       Date of next INR:  10/6/2017         How to take your warfarin dose     Hold Do not take your warfarin dose. See the Details table to the right for additional instructions.

## 2017-10-05 NOTE — PROGRESS NOTES
Spoke with Tanner today. His bleeding has improved, however, his wound has burst and is infected. He said tomorrow he may have surgery to clean the wound and put in a drain.  He was told by the physician to hold his warfarin tonight. He suspects a drain will be put in and a line to administer IV antibiotics.  He is not sure if he will be admitted to the local hospital.   He will keep us informed, We will also follow up by calling him tomorrow.

## 2017-10-05 NOTE — Clinical Note
GINA Barnett Doug is scheduled for a procedure tomorrow for his wound, He was told by  His provider to hold the warfarin. Rich

## 2017-10-06 ENCOUNTER — ANTICOAGULATION THERAPY VISIT (OUTPATIENT)
Dept: ANTICOAGULATION | Facility: CLINIC | Age: 57
End: 2017-10-06

## 2017-10-06 DIAGNOSIS — Z79.01 LONG-TERM (CURRENT) USE OF ANTICOAGULANTS: ICD-10-CM

## 2017-10-06 DIAGNOSIS — I82.402 DEEP VEIN THROMBOSIS (DVT) OF LEFT LOWER EXTREMITY (H): ICD-10-CM

## 2017-10-06 NOTE — PROGRESS NOTES
10/6 left a message for Tanner to call the Anticoagulation clinic to receive recommendations for Coumadin dosing over the weekend.

## 2017-10-06 NOTE — MR AVS SNAPSHOT
Sohan Ruffins   10/6/2017   Anticoagulation Therapy Visit    Description:  56 year old male   Provider:  Amelia Rolon, RN   Department:  [facility]           INR as of 10/6/2017     Today's INR       Anticoagulation Summary as of 10/6/2017     INR goal 2.0-3.0   Today's INR    Next INR check     Indications   Deep vein thrombosis (DVT) of left lower extremity (H) [I82.402]  Long-term (current) use of anticoagulants [Z79.01] [Z79.01]         October 2017 Details    Sun Mon Tue Wed Thu Fri Sat     1               2               3               4               5      Hold   See details      6            7                 8               9               10               11               12               13               14                 15               16               17               18               19               20               21                 22               23               24               25               26               27               28                 29               30               31                    Date Details   10/05 This INR check       Date of next INR:  10/6/2017         How to take your warfarin dose     Hold Do not take your warfarin dose. See the Details table to the right for additional instructions.

## 2017-10-13 ENCOUNTER — TELEPHONE (OUTPATIENT)
Dept: TRANSPLANT | Facility: CLINIC | Age: 57
End: 2017-10-13

## 2017-10-13 ENCOUNTER — ANTICOAGULATION THERAPY VISIT (OUTPATIENT)
Dept: ANTICOAGULATION | Facility: CLINIC | Age: 57
End: 2017-10-13

## 2017-10-13 DIAGNOSIS — Z79.01 LONG-TERM (CURRENT) USE OF ANTICOAGULANTS: ICD-10-CM

## 2017-10-13 DIAGNOSIS — I82.402 DEEP VEIN THROMBOSIS (DVT) OF LEFT LOWER EXTREMITY (H): ICD-10-CM

## 2017-10-13 NOTE — PROGRESS NOTES
I spoke with Sohan today. His Urologist is OK with him restarting his warfarin. He had his abdominal wound debrided  And and has a wound vac. Currently he has some slight red drainage.  He will monitor and let the appropriate providers know if there is any increase in bleeding. He is currently on IV antibiotics. Ericka Regan RN and Dr Jaquez will be notified that warfarin was restarted.

## 2017-10-13 NOTE — Clinical Note
GINA, I told Sohan to restart his warfarin today. He will monitor the drainage in his wound vac. Did not bridge with Lovenox because he recently bleed after one dose by his local provider.

## 2017-10-13 NOTE — MR AVS SNAPSHOT
Sohan Arita   10/13/2017   Anticoagulation Therapy Visit    Description:  56 year old male   Provider:  [facility]   Department:  [facility]           INR as of 10/13/2017     Today's INR No new INR was available at the time of this encounter.      Anticoagulation Summary as of 10/13/2017     INR goal 2.0-3.0   Today's INR No new INR was available at the time of this encounter.   Full instructions 10/13: 5 mg; 10/14: 5 mg; 10/15: 2.5 mg; 10/16: 2.5 mg   Next INR check 10/17/2017    Indications   Deep vein thrombosis (DVT) of left lower extremity (H) [I82.402]  Long-term (current) use of anticoagulants [Z79.01] [Z79.01]         October 2017 Details    Sun Mon Tue Wed Thu Fri Sat     1               2               3               4               5               6               7                 8               9               10               11               12               13      5 mg   See details      14      5 mg           15      2.5 mg         16      2.5 mg         17            18               19               20               21                 22               23               24               25               26               27               28                 29               30               31                    Date Details   10/13 This INR check       Date of next INR:  10/17/2017         How to take your warfarin dose     To take:  2.5 mg Take 1 of the 2.5 mg tablets.    To take:  5 mg Take 2 of the 2.5 mg tablets.

## 2017-10-13 NOTE — TELEPHONE ENCOUNTER
Call from Tanner yesterday: he had an I & D of wound 10/6/17~ .9.6s3p1am deep. He has a wound vac, PICC for Iv abx and is feeling much better.He asked about resumption of anticoagulation and I referred him to medication monitoring clinic who are following him for this.

## 2017-11-06 ENCOUNTER — TELEPHONE (OUTPATIENT)
Dept: TRANSPLANT | Facility: CLINIC | Age: 57
End: 2017-11-06

## 2017-11-06 DIAGNOSIS — S31.109A OPEN ABDOMINAL WALL WOUND: Primary | ICD-10-CM

## 2017-11-06 NOTE — TELEPHONE ENCOUNTER
Call from Tanner Friday 11/4/17, stated he has recent C diff.He has stopped his coumadin but not informed anyone. He had hematuria after his kidney stone and some black stools. His wound is healing , currently 3cm x 4cm wide/long . He would like his wound vac changed when he is here for his 6 month follow up. This has been scheduled.

## 2017-11-09 ENCOUNTER — ALLIED HEALTH/NURSE VISIT (OUTPATIENT)
Dept: TRANSPLANT | Facility: CLINIC | Age: 57
End: 2017-11-09
Attending: TRANSPLANT SURGERY
Payer: COMMERCIAL

## 2017-11-09 ENCOUNTER — OFFICE VISIT (OUTPATIENT)
Dept: ENDOCRINOLOGY | Facility: CLINIC | Age: 57
End: 2017-11-09

## 2017-11-09 ENCOUNTER — OFFICE VISIT (OUTPATIENT)
Dept: WOUND CARE | Facility: CLINIC | Age: 57
End: 2017-11-09

## 2017-11-09 VITALS — HEIGHT: 74 IN | WEIGHT: 195.8 LBS | BODY MASS INDEX: 25.13 KG/M2

## 2017-11-09 VITALS
BODY MASS INDEX: 25.03 KG/M2 | WEIGHT: 195 LBS | HEIGHT: 74 IN | DIASTOLIC BLOOD PRESSURE: 78 MMHG | HEART RATE: 61 BPM | SYSTOLIC BLOOD PRESSURE: 119 MMHG

## 2017-11-09 VITALS
RESPIRATION RATE: 16 BRPM | DIASTOLIC BLOOD PRESSURE: 78 MMHG | HEART RATE: 61 BPM | OXYGEN SATURATION: 100 % | HEIGHT: 74 IN | BODY MASS INDEX: 25.13 KG/M2 | SYSTOLIC BLOOD PRESSURE: 119 MMHG | WEIGHT: 195.8 LBS

## 2017-11-09 DIAGNOSIS — E89.1 POST-PANCREATECTOMY DIABETES (H): ICD-10-CM

## 2017-11-09 DIAGNOSIS — Z90.410 HISTORY OF PANCREATECTOMY: ICD-10-CM

## 2017-11-09 DIAGNOSIS — Z90.410 POST-PANCREATECTOMY DIABETES (H): ICD-10-CM

## 2017-11-09 DIAGNOSIS — E13.9 POST-PANCREATECTOMY DIABETES (H): ICD-10-CM

## 2017-11-09 DIAGNOSIS — E89.1 POST-PANCREATECTOMY DIABETES (H): Primary | ICD-10-CM

## 2017-11-09 DIAGNOSIS — S31.109A OPEN ABDOMINAL WALL WOUND, INITIAL ENCOUNTER: Primary | ICD-10-CM

## 2017-11-09 DIAGNOSIS — K86.1 CHRONIC PANCREATITIS (H): ICD-10-CM

## 2017-11-09 DIAGNOSIS — Z90.410 POST-PANCREATECTOMY DIABETES (H): Primary | ICD-10-CM

## 2017-11-09 DIAGNOSIS — Z84.89 FAMILY HISTORY OF IDENTICAL TWINS: ICD-10-CM

## 2017-11-09 DIAGNOSIS — E13.9 POST-PANCREATECTOMY DIABETES (H): Primary | ICD-10-CM

## 2017-11-09 DIAGNOSIS — E03.8 OTHER SPECIFIED HYPOTHYROIDISM: Primary | ICD-10-CM

## 2017-11-09 DIAGNOSIS — K86.1 IDIOPATHIC CHRONIC PANCREATITIS (H): Primary | ICD-10-CM

## 2017-11-09 LAB
ALBUMIN SERPL-MCNC: 3.5 G/DL (ref 3.4–5)
ALP SERPL-CCNC: 107 U/L (ref 40–150)
ALT SERPL W P-5'-P-CCNC: 56 U/L (ref 0–70)
ANION GAP SERPL CALCULATED.3IONS-SCNC: 6 MMOL/L (ref 3–14)
AST SERPL W P-5'-P-CCNC: 34 U/L (ref 0–45)
BASOPHILS # BLD AUTO: 0.1 10E9/L (ref 0–0.2)
BASOPHILS NFR BLD AUTO: 1.2 %
BILIRUB SERPL-MCNC: 0.4 MG/DL (ref 0.2–1.3)
BUN SERPL-MCNC: 16 MG/DL (ref 7–30)
C PEPTIDE SERPL-MCNC: 1.3 NG/ML (ref 0.9–6.9)
C PEPTIDE SERPL-MCNC: 2.8 NG/ML (ref 0.9–6.9)
C PEPTIDE SERPL-MCNC: 3 NG/ML (ref 0.9–6.9)
CALCIUM SERPL-MCNC: 9.1 MG/DL (ref 8.5–10.1)
CHLORIDE SERPL-SCNC: 104 MMOL/L (ref 94–109)
CO2 SERPL-SCNC: 28 MMOL/L (ref 20–32)
CREAT SERPL-MCNC: 0.76 MG/DL (ref 0.66–1.25)
DIFFERENTIAL METHOD BLD: ABNORMAL
EOSINOPHIL # BLD AUTO: 0.4 10E9/L (ref 0–0.7)
EOSINOPHIL NFR BLD AUTO: 4.2 %
ERYTHROCYTE [DISTWIDTH] IN BLOOD BY AUTOMATED COUNT: 22.4 % (ref 10–15)
FERRITIN SERPL-MCNC: 285 NG/ML (ref 26–388)
GFR SERPL CREATININE-BSD FRML MDRD: >90 ML/MIN/1.7M2
GLUCOSE SERPL-MCNC: 183 MG/DL (ref 70–99)
GLUCOSE SERPL-MCNC: 250 MG/DL (ref 70–99)
GLUCOSE SERPL-MCNC: 312 MG/DL (ref 70–99)
HBA1C MFR BLD: 7.4 % (ref 4.3–6)
HCT VFR BLD AUTO: 42.9 % (ref 40–53)
HGB BLD-MCNC: 13.6 G/DL (ref 13.3–17.7)
IMM GRANULOCYTES # BLD: 0 10E9/L (ref 0–0.4)
IMM GRANULOCYTES NFR BLD: 0.4 %
IRON SATN MFR SERPL: 23 % (ref 15–46)
IRON SERPL-MCNC: 62 UG/DL (ref 35–180)
LYMPHOCYTES # BLD AUTO: 2.8 10E9/L (ref 0.8–5.3)
LYMPHOCYTES NFR BLD AUTO: 30.7 %
MCH RBC QN AUTO: 25.7 PG (ref 26.5–33)
MCHC RBC AUTO-ENTMCNC: 31.7 G/DL (ref 31.5–36.5)
MCV RBC AUTO: 81 FL (ref 78–100)
MONOCYTES # BLD AUTO: 0.9 10E9/L (ref 0–1.3)
MONOCYTES NFR BLD AUTO: 9.6 %
NEUTROPHILS # BLD AUTO: 5 10E9/L (ref 1.6–8.3)
NEUTROPHILS NFR BLD AUTO: 53.9 %
NRBC # BLD AUTO: 0 10*3/UL
NRBC BLD AUTO-RTO: 0 /100
PLATELET # BLD AUTO: 489 10E9/L (ref 150–450)
POTASSIUM SERPL-SCNC: 4.1 MMOL/L (ref 3.4–5.3)
PREALB SERPL IA-MCNC: 24 MG/DL (ref 15–45)
PROT SERPL-MCNC: 7.4 G/DL (ref 6.8–8.8)
RBC # BLD AUTO: 5.29 10E12/L (ref 4.4–5.9)
SODIUM SERPL-SCNC: 138 MMOL/L (ref 133–144)
TIBC SERPL-MCNC: 265 UG/DL (ref 240–430)
TSH SERPL DL<=0.005 MIU/L-ACNC: 0.35 MU/L (ref 0.4–4)
VIT B12 SERPL-MCNC: 424 PG/ML (ref 193–986)
WBC # BLD AUTO: 9.2 10E9/L (ref 4–11)

## 2017-11-09 PROCEDURE — 84681 ASSAY OF C-PEPTIDE: CPT | Performed by: TRANSPLANT SURGERY

## 2017-11-09 PROCEDURE — 83036 HEMOGLOBIN GLYCOSYLATED A1C: CPT | Performed by: TRANSPLANT SURGERY

## 2017-11-09 PROCEDURE — 96040 ZZH GENETIC COUNSELING, EACH 30 MINUTES: CPT | Mod: ZF | Performed by: GENETIC COUNSELOR, MS

## 2017-11-09 PROCEDURE — 82607 VITAMIN B-12: CPT | Performed by: TRANSPLANT SURGERY

## 2017-11-09 PROCEDURE — 36415 COLL VENOUS BLD VENIPUNCTURE: CPT | Performed by: TRANSPLANT SURGERY

## 2017-11-09 PROCEDURE — 84630 ASSAY OF ZINC: CPT | Performed by: TRANSPLANT SURGERY

## 2017-11-09 PROCEDURE — 99212 OFFICE O/P EST SF 10 MIN: CPT | Mod: ZF

## 2017-11-09 PROCEDURE — 83550 IRON BINDING TEST: CPT | Performed by: TRANSPLANT SURGERY

## 2017-11-09 PROCEDURE — 84446 ASSAY OF VITAMIN E: CPT | Performed by: TRANSPLANT SURGERY

## 2017-11-09 PROCEDURE — 84590 ASSAY OF VITAMIN A: CPT | Performed by: TRANSPLANT SURGERY

## 2017-11-09 PROCEDURE — 83540 ASSAY OF IRON: CPT | Performed by: TRANSPLANT SURGERY

## 2017-11-09 PROCEDURE — 82747 ASSAY OF FOLIC ACID RBC: CPT | Performed by: TRANSPLANT SURGERY

## 2017-11-09 PROCEDURE — 82306 VITAMIN D 25 HYDROXY: CPT | Performed by: TRANSPLANT SURGERY

## 2017-11-09 PROCEDURE — 82728 ASSAY OF FERRITIN: CPT | Performed by: TRANSPLANT SURGERY

## 2017-11-09 PROCEDURE — 84134 ASSAY OF PREALBUMIN: CPT | Performed by: TRANSPLANT SURGERY

## 2017-11-09 PROCEDURE — 80053 COMPREHEN METABOLIC PANEL: CPT | Performed by: TRANSPLANT SURGERY

## 2017-11-09 PROCEDURE — 85025 COMPLETE CBC W/AUTO DIFF WBC: CPT | Performed by: TRANSPLANT SURGERY

## 2017-11-09 PROCEDURE — 84443 ASSAY THYROID STIM HORMONE: CPT | Performed by: TRANSPLANT SURGERY

## 2017-11-09 PROCEDURE — 82947 ASSAY GLUCOSE BLOOD QUANT: CPT | Performed by: TRANSPLANT SURGERY

## 2017-11-09 RX ORDER — CHOLECALCIFEROL (VITAMIN D3) 50 MCG
2000 TABLET ORAL 2 TIMES DAILY
Qty: 90 TABLET | Refills: 3 | COMMUNITY
Start: 2017-11-09

## 2017-11-09 RX ORDER — MULTIPLE VITAMINS W/ MINERALS TAB 9MG-400MCG
1 TAB ORAL DAILY
Qty: 90 EACH | Refills: 3 | COMMUNITY
Start: 2017-11-09

## 2017-11-09 RX ORDER — CALCIUM CARBONATE/VITAMIN D3 500-10/5ML
LIQUID (ML) ORAL
Qty: 90 CAPSULE | Refills: 3 | COMMUNITY
Start: 2017-11-09 | End: 2017-11-09

## 2017-11-09 RX ORDER — LEVOTHYROXINE SODIUM 137 UG/1
137 TABLET ORAL DAILY
Qty: 90 TABLET | Refills: 3 | Status: SHIPPED | OUTPATIENT
Start: 2017-11-09

## 2017-11-09 RX ORDER — FLUOXETINE 10 MG/1
10 CAPSULE ORAL DAILY
Qty: 90 CAPSULE | Refills: 3 | COMMUNITY
Start: 2017-11-09

## 2017-11-09 ASSESSMENT — PAIN SCALES - GENERAL
PAINLEVEL: MILD PAIN (2)
PAINLEVEL: NO PAIN (0)

## 2017-11-09 NOTE — NURSING NOTE
Sohan Arita arrived fasting for labs, vitals obtained, instructions provided for mixed meal test.  Patient stated understanding of the plan.  Fasting labs drawn, patient consumed 360 ml of BOOST per providers instruction.  Patient instructed to return to the lab at 1hr (8:25 AM) and 2hr (9:25 AM) post prandial and remain fasting until completion of the test with exception of enzymes needed or pain medication.

## 2017-11-09 NOTE — PROGRESS NOTES
"TPIAT performed 5/15  Complicated by subfascial fluid collection/yeast and enterococcus.    Had a recent bout of C Diff.  O/W doing much better.    Pain: no narcotics  Only modest pain.  Wants to return to work.    Gluc: with c diff had trouble, but usually FBS is <100  Has C peptide.    Bowel: coming off c diff.    Current Outpatient Prescriptions   Medication     insulin glargine (LANTUS) 100 UNIT/ML injection     insulin aspart (NOVOLOG PEN) 100 UNIT/ML injection     levothyroxine (SYNTHROID/LEVOTHROID) 137 MCG tablet     Cholecalciferol (VITAMIN D) 2000 UNITS tablet     multivitamin, therapeutic with minerals (MULTI-VITAMIN) TABS tablet     FLUoxetine (PROZAC) 10 MG capsule     Zinc 50 MG CAPS     [DISCONTINUED] insulin glargine (LANTUS) 100 UNIT/ML injection     omeprazole (PRILOSEC) 20 MG CR capsule     [DISCONTINUED] insulin aspart (NOVOLOG PEN) 100 UNIT/ML injection     [DISCONTINUED] levothyroxine (SYNTHROID/LEVOTHROID) 200 MCG tablet     blood glucose monitoring (FREESTYLE LITE) test strip     ondansetron (ZOFRAN) 4 MG tablet     Alcohol Swabs PADS     [DISCONTINUED] insulin aspart (NOVOLOG PEN) 100 UNIT/ML injection     ferrous sulfate 325 (65 FE) MG TBEC EC tablet     glucose 40 % GEL gel     amylase-lipase-protease (CREON 12) 84733 UNITS CPEP     Sharps Container (BD SHARPS ) MISC     insulin pen needle 32G X 4 MM     blood glucose monitoring (ACCU-CHEK FASTCLIX) lancets     No current facility-administered medications for this visit.          /78  Pulse 61  Resp 16  Ht 1.88 m (6' 2\")  Wt 88.8 kg (195 lb 12.8 oz)  SpO2 100%  BMI 25.14 kg/m2  abd soft and nontender  Incision almost healed.    Orders Only on 11/09/2017   Component Date Value Ref Range Status     Glucose 11/09/2017 250* 70 - 99 mg/dL Final     C Peptide 11/09/2017 2.8  0.9 - 6.9 ng/mL Final     Results for OCONNELL FINESSE [facility] (MRN [id number]) as of 11/9/2017 16:36   Ref. Range 11/9/2017 07:12   Sodium Latest Ref " Range: 133 - 144 mmol/L 138   Potassium Latest Ref Range: 3.4 - 5.3 mmol/L 4.1   Chloride Latest Ref Range: 94 - 109 mmol/L 104   Carbon Dioxide Latest Ref Range: 20 - 32 mmol/L 28   Urea Nitrogen Latest Ref Range: 7 - 30 mg/dL 16   Creatinine Latest Ref Range: 0.66 - 1.25 mg/dL 0.76   GFR Estimate Latest Ref Range: >60 mL/min/1.7m2 >90   GFR Estimate If Black Latest Ref Range: >60 mL/min/1.7m2 >90   Calcium Latest Ref Range: 8.5 - 10.1 mg/dL 9.1   Anion Gap Latest Ref Range: 3 - 14 mmol/L 6   Albumin Latest Ref Range: 3.4 - 5.0 g/dL 3.5   Prealbumin Latest Ref Range: 15 - 45 mg/dL 24   Protein Total Latest Ref Range: 6.8 - 8.8 g/dL 7.4   Bilirubin Total Latest Ref Range: 0.2 - 1.3 mg/dL 0.4   Alkaline Phosphatase Latest Ref Range: 40 - 150 U/L 107   ALT Latest Ref Range: 0 - 70 U/L 56   AST Latest Ref Range: 0 - 45 U/L 34   Hemoglobin A1C Latest Ref Range: 4.3 - 6.0 % 7.4 (H)   C-Peptide Latest Ref Range: 0.9 - 6.9 ng/mL 1.3     Spent 20 minutes, >15 in counseling.  I/P doing well (finally).  Surgical issues appear to be finally abating.  Needs a bit better glu control.  To see Dr. Yin  RTC at next visit for study.

## 2017-11-09 NOTE — LETTER
"11/9/2017       RE: Sohan Arita  [address]  [facility] [address]     Dear Colleague,    Thank you for referring your patient, Sohan Arita, to the [facility] at [facility]. Please see a copy of my visit note below.    Genetic Counseling Consultation    This note is a summary of Sohan Arita s visit to [facility] on November 9, 2017. He was referred to our clinic by Dr. 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 Genetic testing and a genetic counseling consultation was recommended to aid in better understanding the cause the pancreatitis, as well as provide additional information helpful in medical management and genetic risks for family members.     Summary of visit:  1. Genetics of pancreatitis were discussed, including known involved genes, inheritance patterns, and impact on family members.    2.Sohan had orders entered for genetic testing of genes involved in pancreatitis. He wasn't interested in giving blood again today, so he either may return for the blood draw tomorrow, or arrange for a remote draw once he is home. Once blood is draw for testing and insurance is verified, results will be available in 3-5 weeks, at which time we will contact the patient.     Personal Medical History:  Tanner reports that he had abdominal pain starting at age 14-15 years old. He would get a \"sick belly\" for 1-2 weeks at a time, and then it would resolve. This occurred approximately 4-6 times a year.  He was first officially diagnosed with pancreatitis on 4/10/2012, and he continued to have identifiable recurrent episodes which eventually progressed to being chronic in nature. He has a TP-IAT at our facility on 5/15/2017 and " unfortunately had some complications of DVT and infection after that. In regards to the etiology of his pancreatitis, it remains unknown. Excessive use of alcohol or other triggering factors is denied and work-up for pancreatitis appears to be otherwise negative for known causative factors.    Other medical history is remarkabel for thyroid cancer with thyroidectomy in December 2011. Tanner also reports that he had a significant amount of mucus production as a child.     Family History:  A detailed family history was taken and scanned into Sohan s medical record. See scanned copy for complete history.   1. Sohan has four brothers and two sisters who are healthy. Most notably, his twin brother has been diagnosed with IBS and has recently begun exhibiting similar symptoms and pain as Tanner. Two siblings have diabetes, as does a niece or nephew. Tanner also has four children, one of whom has gastrointestinal issues, and another with allergies and asthma. The other two are healthy.  1. Maternal history is remarkable for mother with abdominal pain, kidney stones, PMR, and hypoglycemia. Grandfather had a cholecystectomy in his 20s.   2. Paternal history is remarkable for father with kidney stones and bladder cancer. An uncle passed away at 11 from cancer, and another uncle had diabetes and passed from melanoma. A great uncle is reported to have pancreatic cancer.  3. Family history is negative for pancreatitis, cystic fibrosis, recurrent respiratory illness, infertility, need for pancreatic enzymes, or consanguinity. Heritage is [place] and [place].    General Genetic Background Information  Genes are the instruction code for our body, and tell our body how to grow and function. Our genes are in every cell of our body, and are packaged in our chromosomes.  Genes and chromosomes come in pairs. We inherit one copy out of each pair from our mother and one copy of each pair from our father. No one has control over which copy  they pass on to their child since it is a random process.   Every person has two copies of each gene.     Genetics of Pancreatitis  Causes of pancreatitis, focusing on hereditary pancreatitis, were discussed today.  Hereditary pancreatitis can be defined based on family history criteria or on genetic findings in an individual. There is evidence that chronic pancreatitis or recurrent acute pancreatitis can be caused by genetic changes (mutations) in the cationic trypsinogen gene, PRSS1. Other genes, pancreatic secretory trypsin inhibitor (SPINK1) and chymotrypsin C (CTRC) have been described to have an association with pancreatitis. Additionally idiopathic pancreatitis has been found to be associated with changes in the gene for cystic fibrosis (CFTR) as well. The variability in the inheritance of genes associated with hereditary pancreatitis was discussed. Hereditary pancreatitis is a disease that is primarily inherited in an autosomal dominant manner, meaning that a change in one copy of a gene, like TWUB5xus SPINK1, is needed to develop the condition. The other gene known to be associated with hereditary pancreatitis, CFTR, typically causes disease when inherited in an autosomal recessive manner, meaning that an individual must inherit a change in the CFTR gene from both their mother and father. We discussed the complexity of genetic testing for hereditary pancreatitis and that further discussion would be needed when we have the results.    If a known disease causing change was found, this would carry up to a 50% potential risk for future children and siblings to inherit the genetic change, and a 50% chance of not inheriting it. Not all individuals with genetic changes in these genes go on to develop pancreatitis, and each gene is associated with a different risk for the development of pancreatitis. If a change is found in the CFTR gene, this result would have additional implications and they will be discussed in  detail if applicable.     Genetic Testing  There are genetic blood tests available that can help determine if an individual has changes in their PRSS1, SPINK1, CTRC, and CFTR genes. As the cause of Sohan Arita's pancreatitis is unclear and there is a potential family history, comprehensive genetic testing for all four genes would be the most useful as this will give us the most information. The laboratory that we utilize for this testing is [facility].  As you were uncertain about insurance coverage for this testing, insurance billing directly from [facility] was elected. [employer] will pre-verify that insurance will cover the testing, and if out of pocket expenses are over $100 they will call the family for decision on how to proceed. If a letter of medical necessity is required, they will also let us know prior to proceeding with the clinical testing. As Tanner had just had a blood draw today he deferred another draw today. He will either have it done tomorrow, or we can request that [employer] help arrange a remote draw once he is back back home.    Result Outcomes  Once results are available, we discussed the following possibilities:  1. One disease causing mutation found: Individuals with typical hereditary pancreatitis typically have one disease causing change detected.  Depending on the change found and other factors, individuals are at risk for development of pancreatitis, and other family members would be at risk.  2. One or two possibly contributory mutation found:  This result has various implications depending the specific change found.  The change(s) may contribute to disease.   3. Two mutations in the gene for CF:  Individuals with cystic fibrosis or cystic fibrosis-related disorder usually have two mutations in this gene, one inherited from their mother and one from their father. Depending on the changes in the gene and other factors, some people may have respiratory, GI symptoms, and/or  infertility issues.   4. No mutations in these genes: No genetic evidence to support hereditary pancreatitis at this time. It is possible we will learn more about the genetics of pancreatitis in the future and be able to offer more comprehensive genetic testing.   5. The finding of an unknown change: This happens when the laboratory detects a change but they do not know if it is found only in individuals with the condition, or if the change is found in individuals without pancreatitis as well. If you have this type of result, we will discuss it further at that time.    Plan  1. Orders placed for pancreatits genetic testing, including the PRSS1, SPINK1, CTRC and CFTR genes. Draw date is TBD. Addie will perform an insurance pre-verification prior to testing to ensure insurance coverage. Results will take approximately 3-5 weeks and we will notify you of the results as soon as we receive them.  This test can detect almost all changes in the known genes associated with hereditary pancreatitis; there remain genetic contributions that are not well understood.   2. Additional medical management recommendations or family member recommendations will be discussed in the future based on results.     It was a pleasure to meet with Sohan Arita and his wife, Maribell. Thank you for allowing me to participate in the care of your patient. If they have any further questions I encouraged them to call me at .  Roxie Mathis MS, [facility]  Genetic Counselor  [facility]  Time spent in consultation was approximately 60 minutes.    CC:    ANÍBAL GODINEZ MD, thank you for allowing me to participate in the care of your patient.      Sincerely,    Roxie Mathis GC

## 2017-11-09 NOTE — NURSING NOTE
"Chief Complaint   Patient presents with     TP-IAT Transplant     POD 6 months       Initial /78  Pulse 61  Resp 16  Ht 1.88 m (6' 2\")  Wt 88.8 kg (195 lb 12.8 oz)  SpO2 100%  BMI 25.14 kg/m2 Estimated body mass index is 25.14 kg/(m^2) as calculated from the following:    Height as of this encounter: 1.88 m (6' 2\").    Weight as of this encounter: 88.8 kg (195 lb 12.8 oz).      "

## 2017-11-09 NOTE — MR AVS SNAPSHOT
After Visit Summary   11/9/2017    Sohan Arita    MRN: [id number]           Patient Information     Date Of Birth          1960        Visit Information        Provider Department      11/9/2017 1:00 PM Viky Flowers PA-C [facility]        Today's Diagnoses     Other specified hypothyroidism    -  1    Post-pancreatectomy diabetes (H)           Follow-ups after your visit        Future tests that were ordered for you today     Open Future Orders        Priority Expected Expires Ordered    Pancreatitis Plus, CFTR PRSS1 SPINK1 CTRC to [employer] (#[id number]): Laboratory Miscellaneous Order Routine 11/9/2017 11/9/2018 11/9/2017            Who to contact     Please call your clinic at [phone] to:    Ask questions about your health    Make or cancel appointments    Discuss your medicines    Learn about your test results    Speak to your doctor   If you have compliments or concerns about an experience at your clinic, or if you wish to file a complaint, please contact [facility] Patient Relations at [phone] or email us at [email or web address]         Additional Information About Your Visit        MyChart Information     Gema gives you secure access to your electronic health record. If you see a primary care provider, you can also send messages to your care team and make appointments. If you have questions, please call your primary care clinic.  If you do not have a primary care provider, please call [phone] and they will assist you.      Gema is an electronic gateway that provides easy, online access to your medical records. With Gema, you can request a clinic appointment, read your test results, renew a prescription or communicate with your care team.     To access your existing account, please contact your [facility] or call [phone] for assistance.        Care EveryWhere ID  "    This is your Care EveryWhere ID. This could be used by other organizations to access your [place] medical records  [id number]        Your Vitals Were     Pulse Height BMI (Body Mass Index)             61 1.88 m (6' 2\") 25.04 kg/m2          Blood Pressure from Last 3 Encounters:   11/09/17 119/78   11/09/17 119/78   07/06/17 139/81    Weight from Last 3 Encounters:   11/09/17 88.5 kg (195 lb)   11/09/17 88.8 kg (195 lb 12.8 oz)   11/09/17 88.8 kg (195 lb 12.8 oz)              Today, you had the following     No orders found for display         Today's Medication Changes          These changes are accurate as of: 11/9/17 11:59 PM.  If you have any questions, ask your nurse or doctor.               These medicines have changed or have updated prescriptions.        Dose/Directions    insulin aspart 100 UNIT/ML injection   Commonly known as:  NovoLOG PEN   This may have changed:    - additional instructions  - Another medication with the same name was removed. Continue taking this medication, and follow the directions you see here.   Used for:  Post-pancreatectomy diabetes (H)   Changed by:  Viky Flowers PA-C        1 unit/9 gms CHO with meals,plus correction insulin. Pt uses approx 30 units in 24 hrs.   Quantity:  3 mL   Refills:  0       insulin glargine 100 UNIT/ML injection   Commonly known as:  LANTUS   This may have changed:  additional instructions   Used for:  Post-pancreatectomy diabetes (H)   Changed by:  Viky Flowers PA-C        16 units SQ each am.   Quantity:  3 mL   Refills:  3       levothyroxine 137 MCG tablet   Commonly known as:  SYNTHROID/LEVOTHROID   This may have changed:    - medication strength  - how much to take   Used for:  Other specified hypothyroidism   Changed by:  Viky Flowers PA-C        Dose:  137 mcg   Take 1 tablet (137 mcg) by mouth daily   Quantity:  90 tablet   Refills:  3       omeprazole 20 MG CR capsule   Commonly known as:  priLOSEC   This may " have changed:  Another medication with the same name was removed. Continue taking this medication, and follow the directions you see here.   Used for:  GERD (gastroesophageal reflux disease)   Changed by:  Trista Regan RN        Dose:  20 mg   Take 1 capsule (20 mg) by mouth daily   Quantity:  30 capsule   Refills:  0       Zinc 50 MG Caps   This may have changed:  Another medication with the same name was removed. Continue taking this medication, and follow the directions you see here.   Changed by:  Viky Flowers PA-C        Daily.   Quantity:  90 capsule   Refills:  3         Stop taking these medicines if you haven't already. Please contact your care team if you have questions.     aspirin 81 MG EC tablet   Stopped by:  Viky Flowers PA-C           fentaNYL 25 mcg/hr 72 hr patch   Commonly known as:  DURAGESIC   Stopped by:  Viky Flowers PA-C           fluconazole 200 MG tablet   Commonly known as:  DIFLUCAN   Stopped by:  Viky Flowers PA-C           magnesium hydroxide 400 MG/5ML suspension   Commonly known as:  MILK OF MAGNESIA   Stopped by:  Viky Flowers PA-C           oxyCODONE 5 MG/5ML solution   Commonly known as:  ROXICODONE   Stopped by:  Viky Flowers PA-C           polyethylene glycol powder   Commonly known as:  MIRALAX/GLYCOLAX   Stopped by:  Viky Flowers PA-C           sennosides 8.8 MG/5ML syrup   Commonly known as:  SENOKOT   Stopped by:  Viky Flowers PA-C           warfarin 2.5 MG tablet   Commonly known as:  COUMADIN   Stopped by:  Viky Flowers PA-C           warfarin 5 MG tablet   Commonly known as:  COUMADIN   Stopped by:  Viky Flowers PA-C                Where to get your medicines      These medications were sent to [id number] - [facility], [place] - [address] [facility] [address]     Phone:  [phone]     levothyroxine 137 MCG tablet                 Primary Care Provider Office Phone # Fax #    Frandy JOHNSON Frye [phone]       [place] [address]  [facility] [address]        Equal Access to Services     SEPIDEH GARNERMARINA : Hadii janes woodward myron Cordon, corina reyes, qatalta kasvenda ruby, luis armando oliveros ruddygeorgina perdomo laSaleemzhane cochran. So [facility] [phone].    ATENCIÓN: Si habla español, tiene a welsh disposición servicios gratuitos de asistencia lingüística. Llame al [phone].    We comply with applicable federal civil rights laws and [place] laws. We do not discriminate on the basis of race, color, national origin, age, disability, sex, sexual orientation, or gender identity.            Thank you!     Thank you for choosing [facility]  for your care. Our goal is always to provide you with excellent care. Hearing back from our patients is one way we can continue to improve our services. Please take a few minutes to complete the written survey that you may receive in the mail after your visit with us. Thank you!             Your Updated Medication List - Protect others around you: Learn how to safely use, store and throw away your medicines at www.disposemymeds.org.          This list is accurate as of: 11/9/17 11:59 PM.  Always use your most recent med list.                   Brand Name Dispense Instructions for use Diagnosis    Alcohol Swabs Pads     100 each    1 pad as needed    Post-pancreatectomy diabetes (H)       amylase-lipase-protease 85111 UNITS Cpep    CREON 12    270 capsule    Take 3-4 capsules (36,000-48,000 Units) by mouth every hour as needed (with snacks)    Acquired total absence of pancreas       BD SHARPS  Misc     1 each    1 Container as needed    Post-pancreatectomy diabetes (H)       blood glucose monitoring lancets     1 Box    Use to test blood sugar 8 times daily or as directed.    Acquired total absence of pancreas       blood glucose monitoring test strip    FREESTYLE  LITE    300 strip    Use to test blood sugars 8 times daily or as directed.    Post-pancreatectomy diabetes (H)       ferrous sulfate 325 (65 FE) MG Tbec EC tablet     30 tablet    Take 1 tablet (325 mg) by mouth daily    High platelet count (H), Acquired asplenia, Post-pancreatectomy diabetes (H), Pancreatic insufficiency, Anemia       * FLUoxetine 20 MG/5ML solution    PROzac    75 mL    2.5 mLs (10 mg) by Per J Tube route daily    Acquired total absence of pancreas       * FLUoxetine 10 MG capsule    PROzac    90 capsule    Take 1 capsule (10 mg) by mouth daily        glucose 40 % Gel gel     3 Tube    Take 15-30 g by mouth every 15 minutes as needed for low blood sugar    Post-pancreatectomy diabetes (H)       insulin aspart 100 UNIT/ML injection    NovoLOG PEN    3 mL    1 unit/9 gms CHO with meals,plus correction insulin. Pt uses approx 30 units in 24 hrs.    Post-pancreatectomy diabetes (H)       insulin glargine 100 UNIT/ML injection    LANTUS    3 mL    16 units SQ each am.    Post-pancreatectomy diabetes (H)       insulin pen needle 32G X 4 MM     100 each    Use 8 pen needles daily or as directed.    Acquired total absence of pancreas       levothyroxine 137 MCG tablet    SYNTHROID/LEVOTHROID    90 tablet    Take 1 tablet (137 mcg) by mouth daily    Other specified hypothyroidism       Multi-vitamin Tabs tablet     90 each    Take 1 tablet by mouth daily        omeprazole 20 MG CR capsule    priLOSEC    30 capsule    Take 1 capsule (20 mg) by mouth daily    GERD (gastroesophageal reflux disease)       ondansetron 4 MG tablet    ZOFRAN    24 tablet    Take 1 tablet (4 mg) by mouth every 6 hours as needed for nausea or vomiting    Postoperative nausea       vitamin D 2000 UNITS tablet     90 tablet    Take 2,000 Units by mouth 2 times daily        Zinc 50 MG Caps     90 capsule    Daily.        * Notice:  This list has 2 medication(s) that are the same as other medications prescribed for you. Read the  directions carefully, and ask your doctor or other care provider to review them with you.

## 2017-11-09 NOTE — MR AVS SNAPSHOT
After Visit Summary   11/9/2017    Sohan Arita    MRN: [id number]           Patient Information     Date Of Birth          1960        Visit Information        Provider Department      11/9/2017 3:30 PM Anusha Rivera RN [facility]        Today's Diagnoses     Open abdominal wall wound, initial encounter    -  1       Follow-ups after your visit        Your next 10 appointments already scheduled     Nov 09, 2017  3:30 PM CST   NEW WOUND NURSE VISIT with [facility] ([facility])    [address] [place] [address]  [address] [facility] [address] [phone]              Future tests that were ordered for you today     Open Future Orders        Priority Expected Expires Ordered    Pancreatitis Plus, CFTR PRSS1 SPINK1 CTRC to [employer] (#8022): Laboratory Miscellaneous Order Routine 11/9/2017 11/9/2018 11/9/2017            Who to contact     Please call your clinic at [phone] to:    Ask questions about your health    Make or cancel appointments    Discuss your medicines    Learn about your test results    Speak to your doctor   If you have compliments or concerns about an experience at your clinic, or if you wish to file a complaint, please contact [facility] Patient Relations at [phone] or email us at [email or web address]         Additional Information About Your Visit        MyChart Information     Flaviar gives you secure access to your electronic health record. If you see a primary care provider, you can also send messages to your care team and make appointments. If you have questions, please call your primary care clinic.  If you do not have a primary care provider, please call [phone] and they will assist you.      Flaviar is an electronic gateway that provides easy, online access to your medical records. With Flaviar, you can request a clinic  appointment, read your test results, renew a prescription or communicate with your care team.     To access your existing account, please contact your [facility] or call [phone] for assistance.        Care EveryWhere ID     This is your Care EveryWhere ID. This could be used by other organizations to access your [place] medical records  [id number]         Blood Pressure from Last 3 Encounters:   11/09/17 119/78   11/09/17 119/78   07/06/17 139/81    Weight from Last 3 Encounters:   11/09/17 88.5 kg (195 lb)   11/09/17 88.8 kg (195 lb 12.8 oz)   11/09/17 88.8 kg (195 lb 12.8 oz)              Today, you had the following     No orders found for display         Today's Medication Changes          These changes are accurate as of: 11/9/17  2:49 PM.  If you have any questions, ask your nurse or doctor.               These medicines have changed or have updated prescriptions.        Dose/Directions    insulin aspart 100 UNIT/ML injection   Commonly known as:  NovoLOG PEN   This may have changed:    - additional instructions  - Another medication with the same name was removed. Continue taking this medication, and follow the directions you see here.   Used for:  Post-pancreatectomy diabetes (H)   Changed by:  Viky Flowers PA-C        1 unit/9 gms CHO with meals,plus correction insulin. Pt uses approx 30 units in 24 hrs.   Quantity:  3 mL   Refills:  0       insulin glargine 100 UNIT/ML injection   Commonly known as:  LANTUS   This may have changed:  additional instructions   Used for:  Post-pancreatectomy diabetes (H)   Changed by:  Viky Flowers PA-C        16 units SQ each am.   Quantity:  3 mL   Refills:  3       levothyroxine 137 MCG tablet   Commonly known as:  SYNTHROID/LEVOTHROID   This may have changed:    - medication strength  - how much to take   Used for:  Other specified hypothyroidism   Changed by:  Viky Flowers PA-C        Dose:  137 mcg    Take 1 tablet (137 mcg) by mouth daily   Quantity:  90 tablet   Refills:  3       omeprazole 20 MG CR capsule   Commonly known as:  priLOSEC   This may have changed:  Another medication with the same name was removed. Continue taking this medication, and follow the directions you see here.   Used for:  GERD (gastroesophageal reflux disease)   Changed by:  Trista Regan RN        Dose:  20 mg   Take 1 capsule (20 mg) by mouth daily   Quantity:  30 capsule   Refills:  0       Zinc 50 MG Caps   This may have changed:  Another medication with the same name was removed. Continue taking this medication, and follow the directions you see here.   Changed by:  Viky Flowers PA-C        Daily.   Quantity:  90 capsule   Refills:  3         Stop taking these medicines if you haven't already. Please contact your care team if you have questions.     aspirin 81 MG EC tablet   Stopped by:  Viky Flowers PA-C           fentaNYL 25 mcg/hr 72 hr patch   Commonly known as:  DURAGESIC   Stopped by:  Viky Flowers PA-C           fluconazole 200 MG tablet   Commonly known as:  DIFLUCAN   Stopped by:  Viky Flowers PA-C           magnesium hydroxide 400 MG/5ML suspension   Commonly known as:  MILK OF MAGNESIA   Stopped by:  Viky Flowers PA-C           oxyCODONE 5 MG/5ML solution   Commonly known as:  ROXICODONE   Stopped by:  Viky Flowers PA-C           polyethylene glycol powder   Commonly known as:  MIRALAX/GLYCOLAX   Stopped by:  Viky Folwers PA-C           sennosides 8.8 MG/5ML syrup   Commonly known as:  SENOKOT   Stopped by:  Viky Flowers PA-C           warfarin 2.5 MG tablet   Commonly known as:  COUMADIN   Stopped by:  Viky Flowers PA-C           warfarin 5 MG tablet   Commonly known as:  COUMADIN   Stopped by:  Viky Flowers PA-C                Where to get your medicines      These medications were sent to [facility]# [id number] -  [facility] [address]  [address] [facility] [address]     Phone:  [phone]     levothyroxine 137 MCG tablet                Primary Care Provider Office Phone # Fax #    Frandy Frye [phone]       [address]  [facility] [address]        Equal Access to Services     DALTONKIMBERLY MELISA : Hadii aad ku hadasho Soomaali, waaxda luqadaha, qaybta kaalmada adeegyada, waxay idiin hayaan adeeg khyumikevin laSaleemconorn . So [facility] [phone].    ATENCIÓN: Si habla español, tiene a welsh disposición servicios gratuitos de asistencia lingüística. Llame al [phone].    We comply with applicable federal civil rights laws and [place] laws. We do not discriminate on the basis of race, color, national origin, age, disability, sex, sexual orientation, or gender identity.            Thank you!     Thank you for choosing [facility]  for your care. Our goal is always to provide you with excellent care. Hearing back from our patients is one way we can continue to improve our services. Please take a few minutes to complete the written survey that you may receive in the mail after your visit with us. Thank you!             Your Updated Medication List - Protect others around you: Learn how to safely use, store and throw away your medicines at www.disposemymeds.org.          This list is accurate as of: 11/9/17  2:49 PM.  Always use your most recent med list.                   Brand Name Dispense Instructions for use Diagnosis    Alcohol Swabs Pads     100 each    1 pad as needed    Post-pancreatectomy diabetes (H)       amylase-lipase-protease 60248 UNITS Cpep    CREON 12    270 capsule    Take 3-4 capsules (36,000-48,000 Units) by mouth every hour as needed (with snacks)    Acquired total absence of pancreas       BD SHARPS  Misc     1 each    1 Container as needed    Post-pancreatectomy diabetes (H)       blood glucose monitoring lancets     1 Box     Use to test blood sugar 8 times daily or as directed.    Acquired total absence of pancreas       blood glucose monitoring test strip    FREESTYLE LITE    300 strip    Use to test blood sugars 8 times daily or as directed.    Post-pancreatectomy diabetes (H)       ferrous sulfate 325 (65 FE) MG Tbec EC tablet     30 tablet    Take 1 tablet (325 mg) by mouth daily    High platelet count (H), Acquired asplenia, Post-pancreatectomy diabetes (H), Pancreatic insufficiency, Anemia       * FLUoxetine 20 MG/5ML solution    PROzac    75 mL    2.5 mLs (10 mg) by Per J Tube route daily    Acquired total absence of pancreas       * FLUoxetine 10 MG capsule    PROzac    90 capsule    Take 1 capsule (10 mg) by mouth daily        glucose 40 % Gel gel     3 Tube    Take 15-30 g by mouth every 15 minutes as needed for low blood sugar    Post-pancreatectomy diabetes (H)       insulin aspart 100 UNIT/ML injection    NovoLOG PEN    3 mL    1 unit/9 gms CHO with meals,plus correction insulin. Pt uses approx 30 units in 24 hrs.    Post-pancreatectomy diabetes (H)       insulin glargine 100 UNIT/ML injection    LANTUS    3 mL    16 units SQ each am.    Post-pancreatectomy diabetes (H)       insulin pen needle 32G X 4 MM     100 each    Use 8 pen needles daily or as directed.    Acquired total absence of pancreas       levothyroxine 137 MCG tablet    SYNTHROID/LEVOTHROID    90 tablet    Take 1 tablet (137 mcg) by mouth daily    Other specified hypothyroidism       Multi-vitamin Tabs tablet     90 each    Take 1 tablet by mouth daily        omeprazole 20 MG CR capsule    priLOSEC    30 capsule    Take 1 capsule (20 mg) by mouth daily    GERD (gastroesophageal reflux disease)       ondansetron 4 MG tablet    ZOFRAN    24 tablet    Take 1 tablet (4 mg) by mouth every 6 hours as needed for nausea or vomiting    Postoperative nausea       vitamin D 2000 UNITS tablet     90 tablet    Take 2,000 Units by mouth 2 times daily        Zinc 50 MG  Caps     90 capsule    Daily.        * Notice:  This list has 2 medication(s) that are the same as other medications prescribed for you. Read the directions carefully, and ask your doctor or other care provider to review them with you.

## 2017-11-09 NOTE — MR AVS SNAPSHOT
After Visit Summary   11/9/2017    Sohan Arita    MRN: [id number]           Patient Information     Date Of Birth          1960        Visit Information        Provider Department      11/9/2017 7:00 AM Nurse, [place] [facility] Solid Organ Transplant        Today's Diagnoses     Post-pancreatectomy diabetes (H)    -  1       Follow-ups after your visit        Your next 10 appointments already scheduled     Nov 09, 2017  9:45 AM CST   (Arrive by 9:30 AM)   Return Auto [place]let with Rodrigo Jaquez MD   [facility] Solid Organ Transplant ([facility])    [address] [place] [address]  3rd [facility] [id number] [phone]            Nov 09, 2017 11:00 AM CST   (Arrive by 10:45 AM)   Genetic Counseling with Roxie Mathis GC   [facility] Solid Organ Transplant ([facility])    [address] [place] [address] [id number] [phone]            Nov 09, 2017  1:00 PM CST   (Arrive by 12:45 PM)   RETURN DIABETES with Viky Flowers PA-C   [facility] Endocrinology ([facility])    [address] [place] [address] [id number] [phone]            Nov 09, 2017  3:30 PM CST   NEW WOUND NURSE VISIT with Anusha Rivera RN   [facility] Wound Ostomy ([facility])    [address] [place] [id number] [phone]              Who to contact     If you have questions or need follow up information about today's clinic visit or your schedule please contact [facility] SOLID ORGAN TRANSPLANT directly at [phone].  Normal or non-critical lab and imaging results will be communicated to you by MyChart, letter or phone within 4 business days after the clinic has received the results. If you do not hear from us within 7 days, please contact the clinic through MyChart or phone. If you have a critical or abnormal lab result, we will notify you by  "phone as soon as possible.  Submit refill requests through Mobvoi or call your pharmacy and they will forward the refill request to us. Please allow 3 business days for your refill to be completed.          Additional Information About Your Visit        Mobvoi Information     Mobvoi gives you secure access to your electronic health record. If you see a primary care provider, you can also send messages to your care team and make appointments. If you have questions, please call your primary care clinic.  If you do not have a primary care provider, please call [phone] and they will assist you.        Care EveryWhere ID     This is your Care EveryWhere ID. This could be used by other organizations to access your [facility] medical records  [id number]        Your Vitals Were     Height BMI (Body Mass Index)                1.88 m (6' 2\") 25.14 kg/m2           Blood Pressure from Last 3 Encounters:   07/06/17 139/81   06/29/17 114/76   06/28/17 136/79    Weight from Last 3 Encounters:   11/09/17 88.8 kg (195 lb 12.8 oz)   07/06/17 96.9 kg (213 lb 9.6 oz)   06/29/17 99.8 kg (220 lb)              Today, you had the following     No orders found for display         Today's Medication Changes          These changes are accurate as of: 11/9/17  7:35 AM.  If you have any questions, ask your nurse or doctor.               These medicines have changed or have updated prescriptions.        Dose/Directions    * warfarin 5 MG tablet   Commonly known as:  COUMADIN   This may have changed:  how much to take   Used for:  DVT (deep venous thrombosis) (H)   Changed by:  Faye Cui RN        Dose:  5 mg   Take 1 tablet (5 mg) by mouth daily   Quantity:  30 tablet   Refills:  0       * warfarin 2.5 MG tablet   Commonly known as:  COUMADIN   This may have changed:  Another medication with the same name was changed. Make sure you understand how and when to take each.   Used for:  Long-term (current) use of anticoagulants, Deep " vein thrombosis (DVT) of left lower extremity (H)   Changed by:  Rodrigo Jaquez MD        Take 1-2 tablet daily or as directed by coumadin clinic.   Quantity:  30 tablet   Refills:  3       * Notice:  This list has 2 medication(s) that are the same as other medications prescribed for you. Read the directions carefully, and ask your doctor or other care provider to review them with you.             Primary Care Provider Office Phone # Fax #    Frandy ORNELAS MD [place] [phone]       [address]  [facility] [address]        Equal Access to Services     [facility]: Hadii aad ku hadasho Soomaali, waaxda luqadaha, qaybta kaalmada adexin, luis armando pope . So [facility] [phone].    ATENCIÓN: Si habla español, tiene a welsh disposición servicios gratuitos de asistencia lingüística. [facility] [phone].    We comply with applicable federal civil rights laws and [place] laws. We do not discriminate on the basis of race, color, national origin, age, disability, sex, sexual orientation, or gender identity.            Thank you!     Thank you for choosing [facility] SOLID ORGAN TRANSPLANT  for your care. Our goal is always to provide you with excellent care. Hearing back from our patients is one way we can continue to improve our services. Please take a few minutes to complete the written survey that you may receive in the mail after your visit with us. Thank you!             Your Updated Medication List - Protect others around you: Learn how to safely use, store and throw away your medicines at www.disposemymeds.org.          This list is accurate as of: 11/9/17  7:35 AM.  Always use your most recent med list.                   Brand Name Dispense Instructions for use Diagnosis    Alcohol Swabs Pads     100 each    1 pad as needed    Post-pancreatectomy diabetes (H)       amylase-lipase-protease 72469 UNITS Cpep    CREON 12    270 capsule    Take 3-4  capsules (36,000-48,000 Units) by mouth every hour as needed (with snacks)    Acquired total absence of pancreas       aspirin 81 MG EC tablet     30 tablet    Take 1 tablet (81 mg) by mouth daily    High platelet count (H), Acquired asplenia, Post-pancreatectomy diabetes (H), Pancreatic insufficiency, Anemia       BD SHARPS  Misc     1 each    1 Container as needed    Post-pancreatectomy diabetes (H)       blood glucose monitoring lancets     1 Box    Use to test blood sugar 8 times daily or as directed.    Acquired total absence of pancreas       blood glucose monitoring test strip    FREESTYLE LITE    300 strip    Use to test blood sugars 8 times daily or as directed.    Post-pancreatectomy diabetes (H)       fentaNYL 25 mcg/hr 72 hr patch    DURAGESIC    12 patch    Dose is 50 mcg/hr every 72 hours    Generalized abdominal pain       ferrous sulfate 325 (65 FE) MG Tbec EC tablet     30 tablet    Take 1 tablet (325 mg) by mouth daily    High platelet count (H), Acquired asplenia, Post-pancreatectomy diabetes (H), Pancreatic insufficiency, Anemia       fluconazole 200 MG tablet    DIFLUCAN    30 tablet    Take 1 tablet (200 mg) by mouth daily    Surgical wound infection, initial encounter       FLUoxetine 20 MG/5ML solution    PROzac    75 mL    2.5 mLs (10 mg) by Per J Tube route daily    Acquired total absence of pancreas       glucose 40 % Gel gel     3 Tube    Take 15-30 g by mouth every 15 minutes as needed for low blood sugar    Post-pancreatectomy diabetes (H)       * insulin aspart 100 UNIT/ML injection    NovoLOG PEN    3 mL    Check blood glucose Q4H and administer based on blood glucose Notify provider if glucose greater than or equal to 350 mg/dL after administration. -150 = 2 units. -180 = 4 units. -210 = 6 units. -240 = 8 units. -270 = 10 units. -300 = 12 units. -330 = 14 units. BG >330 = 16 units.    Post-pancreatectomy diabetes (H)       * insulin  aspart 100 UNIT/ML injection    NovoLOG PEN    3 mL    1 units per 30 grams of carbohydrate.  Only chart total amount of units given.  Do not give if pre-prandial glucose is less than 60 mg/dL.    Post-pancreatectomy diabetes (H)       insulin glargine 100 UNIT/ML injection    LANTUS    3 mL    8 units daily    Post-pancreatectomy diabetes (H)       insulin pen needle 32G X 4 MM     100 each    Use 8 pen needles daily or as directed.    Acquired total absence of pancreas       levothyroxine 200 MCG tablet    SYNTHROID/LEVOTHROID    60 tablet    Take 1 tablet (200 mcg) by mouth daily    Postoperative hypothyroidism       magnesium hydroxide 400 MG/5ML suspension    MILK OF MAGNESIA    105 mL    30 mLs by Per Feeding Tube route 2 times daily as needed for constipation    Other constipation       * omeprazole 20 MG CR capsule    priLOSEC    30 capsule    Take 1 capsule (20 mg) by mouth daily    GERD (gastroesophageal reflux disease)       * omeprazole 20 MG CR capsule    priLOSEC    60 capsule    Take 1 capsule (20 mg) by mouth 2 times daily    GERD (gastroesophageal reflux disease)       ondansetron 4 MG tablet    ZOFRAN    24 tablet    Take 1 tablet (4 mg) by mouth every 6 hours as needed for nausea or vomiting    Postoperative nausea       oxyCODONE 5 MG/5ML solution    ROXICODONE    250 mL    2.5-5 mLs (2.5-5 mg) by Per Feeding Tube route every 4 hours as needed for moderate to severe pain    Surgical wound infection, initial encounter       polyethylene glycol powder    MIRALAX/GLYCOLAX    119 g    Take one capful (17 grams), 1 to 2 times daily as needed for constipation.    Constipation, Post-pancreatectomy diabetes (H), History of pancreatectomy, Pancreatic insufficiency       sennosides 8.8 MG/5ML syrup    SENOKOT    600 mL    10 mLs by Per Feeding Tube route 2 times daily    Other constipation       * warfarin 5 MG tablet    COUMADIN    30 tablet    Take 1 tablet (5 mg) by mouth daily    DVT (deep venous  thrombosis) (H)       * warfarin 2.5 MG tablet    COUMADIN    30 tablet    Take 1-2 tablet daily or as directed by coumadin clinic.    Long-term (current) use of anticoagulants, Deep vein thrombosis (DVT) of left lower extremity (H)       * Notice:  This list has 6 medication(s) that are the same as other medications prescribed for you. Read the directions carefully, and ask your doctor or other care provider to review them with you.

## 2017-11-09 NOTE — MR AVS SNAPSHOT
After Visit Summary   11/9/2017    Sohan Arita    MRN: [id number]           Patient Information     Date Of Birth          1960        Visit Information        Provider Department      11/9/2017 11:00 AM Roxie Mathis, [facility] Solid Organ Transplant        Today's Diagnoses     Idiopathic chronic pancreatitis (H)    -  1    Post-pancreatectomy diabetes (H)           Follow-ups after your visit        Who to contact     If you have questions or need follow up information about today's clinic visit or your schedule please contact [facility] SOLID ORGAN TRANSPLANT directly at [phone].  Normal or non-critical lab and imaging results will be communicated to you by Luahart, letter or phone within 4 business days after the clinic has received the results. If you do not hear from us within 7 days, please contact the clinic through [employer] or phone. If you have a critical or abnormal lab result, we will notify you by phone as soon as possible.  Submit refill requests through uniRow or call your pharmacy and they will forward the refill request to us. Please allow 3 business days for your refill to be completed.          Additional Information About Your Visit        MyChart Information     uniRow gives you secure access to your electronic health record. If you see a primary care provider, you can also send messages to your care team and make appointments. If you have questions, please call your primary care clinic.  If you do not have a primary care provider, please call [phone] and they will assist you.        Care EveryWhere ID     This is your Care EveryWhere ID. This could be used by other organizations to access your [facility] medical records  [id number]         Blood Pressure from Last 3 Encounters:   11/09/17 119/78   11/09/17 119/78   07/06/17 139/81    Weight from Last 3 Encounters:   11/09/17 195 lb (88.5 kg)   11/09/17 195 lb 12.8 oz (88.8 kg)   11/09/17 195 lb 12.8  oz (88.8 kg)                 Today's Medication Changes          These changes are accurate as of: 11/9/17 11:59 PM.  If you have any questions, ask your nurse or doctor.               These medicines have changed or have updated prescriptions.        Dose/Directions    insulin aspart 100 UNIT/ML injection   Commonly known as:  NovoLOG PEN   This may have changed:    - additional instructions  - Another medication with the same name was removed. Continue taking this medication, and follow the directions you see here.   Used for:  Post-pancreatectomy diabetes (H)   Changed by:  Viky Flowers PA-C        1 unit/9 gms CHO with meals,plus correction insulin. Pt uses approx 30 units in 24 hrs.   Quantity:  3 mL   Refills:  0       insulin glargine 100 UNIT/ML injection   Commonly known as:  LANTUS   This may have changed:  additional instructions   Used for:  Post-pancreatectomy diabetes (H)   Changed by:  Viky Flowers PA-C        16 units SQ each am.   Quantity:  3 mL   Refills:  3       levothyroxine 137 MCG tablet   Commonly known as:  SYNTHROID/LEVOTHROID   This may have changed:    - medication strength  - how much to take   Used for:  Other specified hypothyroidism   Changed by:  Viky Flowers PA-C        Dose:  137 mcg   Take 1 tablet (137 mcg) by mouth daily   Quantity:  90 tablet   Refills:  3       omeprazole 20 MG CR capsule   Commonly known as:  priLOSEC   This may have changed:  Another medication with the same name was removed. Continue taking this medication, and follow the directions you see here.   Used for:  GERD (gastroesophageal reflux disease)   Changed by:  Trista Regan RN        Dose:  20 mg   Take 1 capsule (20 mg) by mouth daily   Quantity:  30 capsule   Refills:  0       Zinc 50 MG Caps   This may have changed:  Another medication with the same name was removed. Continue taking this medication, and follow the directions you see here.   Changed by:  Viky Flowers  KATRINA Hendrix        Daily.   Quantity:  90 capsule   Refills:  3         Stop taking these medicines if you haven't already. Please contact your care team if you have questions.     aspirin 81 MG EC tablet   Stopped by:  Viky Flowers PA-C           fentaNYL 25 mcg/hr 72 hr patch   Commonly known as:  DURAGESIC   Stopped by:  Viky Flowers PA-C           fluconazole 200 MG tablet   Commonly known as:  DIFLUCAN   Stopped by:  Viky Flowers PA-C           magnesium hydroxide 400 MG/5ML suspension   Commonly known as:  MILK OF MAGNESIA   Stopped by:  Viky Flowers PA-C           oxyCODONE 5 MG/5ML solution   Commonly known as:  ROXICODONE   Stopped by:  Viky Flowers PA-C           polyethylene glycol powder   Commonly known as:  MIRALAX/GLYCOLAX   Stopped by:  Viky Flowers PA-C           sennosides 8.8 MG/5ML syrup   Commonly known as:  SENOKOT   Stopped by:  Viky Flowers PA-C           warfarin 2.5 MG tablet   Commonly known as:  COUMADIN   Stopped by:  Viky Flowers PA-C           warfarin 5 MG tablet   Commonly known as:  COUMADIN   Stopped by:  Viky Flowers PA-C                Where to get your medicines      These medications were sent to [id number] - [facility] [address]  [address] [facility] [address]     Phone:  [phone]     levothyroxine 137 MCG tablet                Primary Care Provider Office Phone # Fax #    Frandy Frye [phone]       [address] [address]        Equal Access to Services     [facility]IMBER[facility]INC[facility] : Hadii janes Cordon, kaceyda eric, qaybta kaalluis armando johnson. So [facility] [phone].    ATENCIÓN: Si habla español, tiene a welsh disposición servicios gratuitos de asistencia lingüística. Llame al [phone].    We comply with applicable federal civil rights  laws and [place] laws. We do not discriminate on the basis of race, color, national origin, age, disability, sex, sexual orientation, or gender identity.            Thank you!     Thank you for choosing [facility] SOLID ORGAN TRANSPLANT  for your care. Our goal is always to provide you with excellent care. Hearing back from our patients is one way we can continue to improve our services. Please take a few minutes to complete the written survey that you may receive in the mail after your visit with us. Thank you!             Your Updated Medication List - Protect others around you: Learn how to safely use, store and throw away your medicines at www.disposemymeds.org.          This list is accurate as of: 11/9/17 11:59 PM.  Always use your most recent med list.                   Brand Name Dispense Instructions for use Diagnosis    Alcohol Swabs Pads     100 each    1 pad as needed    Post-pancreatectomy diabetes (H)       amylase-lipase-protease 19023 UNITS Cpep    CREON 12    270 capsule    Take 3-4 capsules (36,000-48,000 Units) by mouth every hour as needed (with snacks)    Acquired total absence of pancreas       BD SHARPS  Misc     1 each    1 Container as needed    Post-pancreatectomy diabetes (H)       blood glucose monitoring lancets     1 Box    Use to test blood sugar 8 times daily or as directed.    Acquired total absence of pancreas       blood glucose monitoring test strip    FREESTYLE LITE    300 strip    Use to test blood sugars 8 times daily or as directed.    Post-pancreatectomy diabetes (H)       ferrous sulfate 325 (65 FE) MG Tbec EC tablet     30 tablet    Take 1 tablet (325 mg) by mouth daily    High platelet count (H), Acquired asplenia, Post-pancreatectomy diabetes (H), Pancreatic insufficiency, Anemia       * FLUoxetine 20 MG/5ML solution    PROzac    75 mL    2.5 mLs (10 mg) by Per J Tube route daily    Acquired total absence of pancreas       * FLUoxetine 10 MG capsule    PROzac     90 capsule    Take 1 capsule (10 mg) by mouth daily        glucose 40 % Gel gel     3 Tube    Take 15-30 g by mouth every 15 minutes as needed for low blood sugar    Post-pancreatectomy diabetes (H)       insulin aspart 100 UNIT/ML injection    NovoLOG PEN    3 mL    1 unit/9 gms CHO with meals,plus correction insulin. Pt uses approx 30 units in 24 hrs.    Post-pancreatectomy diabetes (H)       insulin glargine 100 UNIT/ML injection    LANTUS    3 mL    16 units SQ each am.    Post-pancreatectomy diabetes (H)       insulin pen needle 32G X 4 MM     100 each    Use 8 pen needles daily or as directed.    Acquired total absence of pancreas       levothyroxine 137 MCG tablet    SYNTHROID/LEVOTHROID    90 tablet    Take 1 tablet (137 mcg) by mouth daily    Other specified hypothyroidism       Multi-vitamin Tabs tablet     90 each    Take 1 tablet by mouth daily        omeprazole 20 MG CR capsule    priLOSEC    30 capsule    Take 1 capsule (20 mg) by mouth daily    GERD (gastroesophageal reflux disease)       ondansetron 4 MG tablet    ZOFRAN    24 tablet    Take 1 tablet (4 mg) by mouth every 6 hours as needed for nausea or vomiting    Postoperative nausea       vitamin D 2000 UNITS tablet     90 tablet    Take 2,000 Units by mouth 2 times daily        Zinc 50 MG Caps     90 capsule    Daily.        * Notice:  This list has 2 medication(s) that are the same as other medications prescribed for you. Read the directions carefully, and ask your doctor or other care provider to review them with you.

## 2017-11-09 NOTE — NURSING NOTE
Chief Complaint   Patient presents with     RECHECK     F/U DIABETES     Melani Craft, [facility]  Endocrinology & Diabetes 3G

## 2017-11-09 NOTE — PROGRESS NOTES
Patient comes to wound clinic for wound assessment per request of dr. Jaquez. He has history of a Open surgical wound due to total pancreatectomy and post op wound infections. He is being treated in [place] but is traveling here this week  Clean gloves are donned and current dressing removed. Previous treatment includes: wound vac change  This is treatment week:since early October    Objective findings:      Location: midline abd wound     Wound measured.: 2 cm x 1 cm x 2.5 cm , Full thickness.    Wound description: no sign of infection    Tenderness:sometimes    Odor: none     Drainage is scant, serosanguinous     Tunneling:  N/A      Undermining: N/A    Wound Base: moist, granulation and slough deep in the wound. Pt had been treated with endoform to induce healing    Slough appearance:  adherent   10  %    Periwound Skin: intact,      Color: normal and consistent with surrounding tissue     Lab assessment Not Applicable    Subjective finding:     Pt states: doing fine    Patient is assessed for discomfort which is: minimal    Today's status of the wound: initial assessment    Treatment:Wound Vac placement: Non-excisional debridement: Wound washed with Microklenz soaked gauze and irrigated with Microklenz. Patient is assessed for discomfort, which is minimal, and understanding of wound vac change procedure.  4% Lidocaine Solution  is not used for removal of sponge. Hair is not shaved . No-sting barrier film is applied. Sterile gloves are donned.Wound is repacked with black foam and covered with adhesive film. 2 cm hole hole is cut in the adhesive film on top of which suction device is placed. Amount of sponges used are noted to be 1 . Vacuum pump is turned on  continuous suction at 125 mmHg and seal is confirmed. Instructions and warnings reiterated.      PLAN: Dressing changes 3 times/week with home care.     Pt received the following instructions:    Dr. Saeed was available for supervision of care if needed or if  questions should arise and regarding plan of care.  Anusha Rivera RN, CWON

## 2017-11-09 NOTE — LETTER
"11/9/2017       RE: Sohan Arita  [address]  [facility] [address]     Dear Colleague,    Thank you for referring your patient, Sohan Arita, to the [facility] SOLID ORGAN TRANSPLANT at [facility]. Please see a copy of my visit note below.    TPIAT performed 5/15  Complicated by subfascial fluid collection/yeast and enterococcus.    Had a recent bout of C Diff.  O/W doing much better.    Pain: no narcotics  Only modest pain.  Wants to return to work.    Gluc: with c diff had trouble, but usually FBS is <100  Has C peptide.    Bowel: coming off c diff.    Current Outpatient Prescriptions   Medication     insulin glargine (LANTUS) 100 UNIT/ML injection     insulin aspart (NOVOLOG PEN) 100 UNIT/ML injection     levothyroxine (SYNTHROID/LEVOTHROID) 137 MCG tablet     Cholecalciferol (VITAMIN D) 2000 UNITS tablet     multivitamin, therapeutic with minerals (MULTI-VITAMIN) TABS tablet     FLUoxetine (PROZAC) 10 MG capsule     Zinc 50 MG CAPS     [DISCONTINUED] insulin glargine (LANTUS) 100 UNIT/ML injection     omeprazole (PRILOSEC) 20 MG CR capsule     [DISCONTINUED] insulin aspart (NOVOLOG PEN) 100 UNIT/ML injection     [DISCONTINUED] levothyroxine (SYNTHROID/LEVOTHROID) 200 MCG tablet     blood glucose monitoring (FREESTYLE LITE) test strip     ondansetron (ZOFRAN) 4 MG tablet     Alcohol Swabs PADS     [DISCONTINUED] insulin aspart (NOVOLOG PEN) 100 UNIT/ML injection     ferrous sulfate 325 (65 FE) MG TBEC EC tablet     glucose 40 % GEL gel     amylase-lipase-protease (CREON 12) 79172 UNITS CPEP     Sharps Container (BD SHARPS ) MISC     insulin pen needle 32G X 4 MM     blood glucose monitoring (ACCU-CHEK FASTCLIX) lancets     No current facility-administered medications for this visit.          /78  Pulse 61  Resp 16  Ht 1.88 m (6' 2\")  Wt 88.8 kg (195 lb 12.8 oz)  SpO2 100%  BMI 25.14 kg/m2  abd soft and nontender  Incision almost " healed.    Orders Only on 11/09/2017   Component Date Value Ref Range Status     Glucose 11/09/2017 250* 70 - 99 mg/dL Final     C Peptide 11/09/2017 2.8  0.9 - 6.9 ng/mL Final     Results for FINESSE OCONNELL (MRN [id number]) as of 11/9/2017 16:36   Ref. Range 11/9/2017 07:12   Sodium Latest Ref Range: 133 - 144 mmol/L 138   Potassium Latest Ref Range: 3.4 - 5.3 mmol/L 4.1   Chloride Latest Ref Range: 94 - 109 mmol/L 104   Carbon Dioxide Latest Ref Range: 20 - 32 mmol/L 28   Urea Nitrogen Latest Ref Range: 7 - 30 mg/dL 16   Creatinine Latest Ref Range: 0.66 - 1.25 mg/dL 0.76   GFR Estimate Latest Ref Range: >60 mL/min/1.7m2 >90   GFR Estimate If Black Latest Ref Range: >60 mL/min/1.7m2 >90   Calcium Latest Ref Range: 8.5 - 10.1 mg/dL 9.1   Anion Gap Latest Ref Range: 3 - 14 mmol/L 6   Albumin Latest Ref Range: 3.4 - 5.0 g/dL 3.5   Prealbumin Latest Ref Range: 15 - 45 mg/dL 24   Protein Total Latest Ref Range: 6.8 - 8.8 g/dL 7.4   Bilirubin Total Latest Ref Range: 0.2 - 1.3 mg/dL 0.4   Alkaline Phosphatase Latest Ref Range: 40 - 150 U/L 107   ALT Latest Ref Range: 0 - 70 U/L 56   AST Latest Ref Range: 0 - 45 U/L 34   Hemoglobin A1C Latest Ref Range: 4.3 - 6.0 % 7.4 (H)   C-Peptide Latest Ref Range: 0.9 - 6.9 ng/mL 1.3     Spent 20 minutes, >15 in counseling.  I/P doing well (finally).  Surgical issues appear to be finally abating.  Needs a bit better glu control.  To see Dr. Yin  RTCIARAN at next visit for study.    Again, thank you for allowing me to participate in the care of your patient.      Sincerely,    Rodrigo Jaquez MD

## 2017-11-09 NOTE — MR AVS SNAPSHOT
"              After Visit Summary   11/9/2017    Sohan Arita    MRN: [id number]           Patient Information     Date Of Birth          1960        Visit Information        Provider Department      11/9/2017 9:45 AM Rodrigo Jaquez MD [facility] Solid Organ Transplant        Today's Diagnoses     Post-pancreatectomy diabetes (H)    -  1       Follow-ups after your visit        Who to contact     If you have questions or need follow up information about today's clinic visit or your schedule please contact [facility] SOLID ORGAN TRANSPLANT directly at [phone].  Normal or non-critical lab and imaging results will be communicated to you by [employer], letter or phone within 4 business days after the clinic has received the results. If you do not hear from us within 7 days, please contact the clinic through [employer] or phone. If you have a critical or abnormal lab result, we will notify you by phone as soon as possible.  Submit refill requests through [employer] or call your pharmacy and they will forward the refill request to us. Please allow 3 business days for your refill to be completed.          Additional Information About Your Visit        MyChart Information     [employer] gives you secure access to your electronic health record. If you see a primary care provider, you can also send messages to your care team and make appointments. If you have questions, please call your primary care clinic.  If you do not have a primary care provider, please call [phone] and they will assist you.        Care EveryWhere ID     This is your Care EveryWhere ID. This could be used by other organizations to access your [facility] medical records  [id number]        Your Vitals Were     Pulse Respirations Height Pulse Oximetry BMI (Body Mass Index)       61 16 1.88 m (6' 2\") 100% 25.14 kg/m2        Blood Pressure from Last 3 Encounters:   11/09/17 119/78   11/09/17 119/78   07/06/17 139/81    Weight from Last 3 " Encounters:   11/09/17 88.5 kg (195 lb)   11/09/17 88.8 kg (195 lb 12.8 oz)   11/09/17 88.8 kg (195 lb 12.8 oz)                 Today's Medication Changes          These changes are accurate as of: 11/9/17 11:59 PM.  If you have any questions, ask your nurse or doctor.               These medicines have changed or have updated prescriptions.        Dose/Directions    insulin aspart 100 UNIT/ML injection   Commonly known as:  NovoLOG PEN   This may have changed:    - additional instructions  - Another medication with the same name was removed. Continue taking this medication, and follow the directions you see here.   Used for:  Post-pancreatectomy diabetes (H)   Changed by:  Viky Flowers PA-C        1 unit/9 gms CHO with meals,plus correction insulin. Pt uses approx 30 units in 24 hrs.   Quantity:  3 mL   Refills:  0       insulin glargine 100 UNIT/ML injection   Commonly known as:  LANTUS   This may have changed:  additional instructions   Used for:  Post-pancreatectomy diabetes (H)   Changed by:  Viky Flowers PA-C        16 units SQ each am.   Quantity:  3 mL   Refills:  3       levothyroxine 137 MCG tablet   Commonly known as:  SYNTHROID/LEVOTHROID   This may have changed:    - medication strength  - how much to take   Used for:  Other specified hypothyroidism   Changed by:  Viky Flowers PA-C        Dose:  137 mcg   Take 1 tablet (137 mcg) by mouth daily   Quantity:  90 tablet   Refills:  3       omeprazole 20 MG CR capsule   Commonly known as:  priLOSEC   This may have changed:  Another medication with the same name was removed. Continue taking this medication, and follow the directions you see here.   Used for:  GERD (gastroesophageal reflux disease)   Changed by:  Trista Regan RN        Dose:  20 mg   Take 1 capsule (20 mg) by mouth daily   Quantity:  30 capsule   Refills:  0       Zinc 50 MG Caps   This may have changed:  Another medication with the same name was removed.  Continue taking this medication, and follow the directions you see here.   Changed by:  Viky Flowers PA-C        Daily.   Quantity:  90 capsule   Refills:  3         Stop taking these medicines if you haven't already. Please contact your care team if you have questions.     aspirin 81 MG EC tablet   Stopped by:  Viky Flowers PA-C           fentaNYL 25 mcg/hr 72 hr patch   Commonly known as:  DURAGESIC   Stopped by:  Viky Flowers PA-C           fluconazole 200 MG tablet   Commonly known as:  DIFLUCAN   Stopped by:  Viky Flowers PA-C           magnesium hydroxide 400 MG/5ML suspension   Commonly known as:  MILK OF MAGNESIA   Stopped by:  Viky Flowers PA-C           oxyCODONE 5 MG/5ML solution   Commonly known as:  ROXICODONE   Stopped by:  Viky Flowers PA-C           polyethylene glycol powder   Commonly known as:  MIRALAX/GLYCOLAX   Stopped by:  Viky Flowers PA-C           sennosides 8.8 MG/5ML syrup   Commonly known as:  SENOKOT   Stopped by:  Viky Flowers PA-C           warfarin 2.5 MG tablet   Commonly known as:  COUMADIN   Stopped by:  Viky Flowers PA-C           warfarin 5 MG tablet   Commonly known as:  COUMADIN   Stopped by:  Viky Flowers PA-C                Where to get your medicines      These medications were sent to [id number] - [facility] [address]  [address] [facility] [address]     Phone:  [phone]     levothyroxine 137 MCG tablet                Primary Care Provider Office Phone # Fax #    Frandy Frye [phone]       [facility] [address]  [facility] [address]        Equal Access to Services     SEPIDEH VINCENT : Leatha Cordon, corina reyes, angelo kaalmaadam beltran, luis armando cochran. So [facility] [phone].    ATENCIÓN: Si habla español, tiene a welsh disposición servicios  holly de asistencia lingüística. Joseph romano [phone].    We comply with applicable federal civil rights laws and [place] laws. We do not discriminate on the basis of race, color, national origin, age, disability, sex, sexual orientation, or gender identity.            Thank you!     Thank you for choosing [facility] SOLID ORGAN TRANSPLANT  for your care. Our goal is always to provide you with excellent care. Hearing back from our patients is one way we can continue to improve our services. Please take a few minutes to complete the written survey that you may receive in the mail after your visit with us. Thank you!             Your Updated Medication List - Protect others around you: Learn how to safely use, store and throw away your medicines at www.disposemymeds.org.          This list is accurate as of: 11/9/17 11:59 PM.  Always use your most recent med list.                   Brand Name Dispense Instructions for use Diagnosis    Alcohol Swabs Pads     100 each    1 pad as needed    Post-pancreatectomy diabetes (H)       amylase-lipase-protease 72297 UNITS Cpep    CREON 12    270 capsule    Take 3-4 capsules (36,000-48,000 Units) by mouth every hour as needed (with snacks)    Acquired total absence of pancreas       BD SHARPS  Misc     1 each    1 Container as needed    Post-pancreatectomy diabetes (H)       blood glucose monitoring lancets     1 Box    Use to test blood sugar 8 times daily or as directed.    Acquired total absence of pancreas       blood glucose monitoring test strip    FREESTYLE LITE    300 strip    Use to test blood sugars 8 times daily or as directed.    Post-pancreatectomy diabetes (H)       ferrous sulfate 325 (65 FE) MG Tbec EC tablet     30 tablet    Take 1 tablet (325 mg) by mouth daily    High platelet count (H), Acquired asplenia, Post-pancreatectomy diabetes (H), Pancreatic insufficiency, Anemia       * FLUoxetine 20 MG/5ML solution    PROzac    75 mL    2.5 mLs (10 mg)  by Per J Tube route daily    Acquired total absence of pancreas       * FLUoxetine 10 MG capsule    PROzac    90 capsule    Take 1 capsule (10 mg) by mouth daily        glucose 40 % Gel gel     3 Tube    Take 15-30 g by mouth every 15 minutes as needed for low blood sugar    Post-pancreatectomy diabetes (H)       insulin aspart 100 UNIT/ML injection    NovoLOG PEN    3 mL    1 unit/9 gms CHO with meals,plus correction insulin. Pt uses approx 30 units in 24 hrs.    Post-pancreatectomy diabetes (H)       insulin glargine 100 UNIT/ML injection    LANTUS    3 mL    16 units SQ each am.    Post-pancreatectomy diabetes (H)       insulin pen needle 32G X 4 MM     100 each    Use 8 pen needles daily or as directed.    Acquired total absence of pancreas       levothyroxine 137 MCG tablet    SYNTHROID/LEVOTHROID    90 tablet    Take 1 tablet (137 mcg) by mouth daily    Other specified hypothyroidism       Multi-vitamin Tabs tablet     90 each    Take 1 tablet by mouth daily        omeprazole 20 MG CR capsule    priLOSEC    30 capsule    Take 1 capsule (20 mg) by mouth daily    GERD (gastroesophageal reflux disease)       ondansetron 4 MG tablet    ZOFRAN    24 tablet    Take 1 tablet (4 mg) by mouth every 6 hours as needed for nausea or vomiting    Postoperative nausea       vitamin D 2000 UNITS tablet     90 tablet    Take 2,000 Units by mouth 2 times daily        Zinc 50 MG Caps     90 capsule    Daily.        * Notice:  This list has 2 medication(s) that are the same as other medications prescribed for you. Read the directions carefully, and ask your doctor or other care provider to review them with you.

## 2017-11-09 NOTE — LETTER
11/9/2017       RE: Sohan Arita  [address]  [facility] [address]     Dear Colleague,    Thank you for referring your patient, Sohan Arita, to the [facility] ENDOCRINOLOGY at [facility]. Please see a copy of my visit note below.    HPI  Sohan Arita is a 56 year old male with secondary diabetes here today for a follow up visit.  He was last seen in our clinic by Lexy Brunson PA-C in June 2017.  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  His postop course complicated by wound infection, thrombosis and C. Difficile infection.  He state he is finally feeling well and plans to return to [place] tomorrow.  For his diabetes management, he is currently taking Lantus 16 units SQ each am and Novolog 1 unit/9 gms CHO for meal coverage with correction insulin ( 2 units/30 for BG >120 ).   He has been taking his insulin after he eats and often checks his blood sugar after meals, so several of his blood sugar values are postprandial.    Pt's A1C is 7.4 % today.  I reviewed his glucose meter download today and his average glucose was 154 with SD 52 for the past month.  His fasting blood sugar values have been 125, 172, 107, 120, 120, 107, 108, 117, 140, and 119.  His blood sugar values throughout the day and evening ranged from .  As above, several of his blood sugar values during the day and evening are post prandial.  No frequent hypoglycemia.  On ROS today, he reports feeling better overall.  His wound is healing.  He is eating better.  No diarrhea at this time.  No bleeding.  Mild nausea which he related is from antibiotics.  He report mild intermittent abd discomfort.  Not taking any pain medications.  Less headaches.  Some intermittent blurred vision.  Patient denies vomiting, shortness of breath at rest, chronic cough, chest pain, hematochezia, hematuria at this time, dysuria,  numbness or tingling in his feet or hands.  No for ulcers.  Mood improved.    Diabetes Care  Retinopathy: none; he will schedule an Oph exam.  Nephropathy:none; he states his urine microalbuminuria was negative in 9/2017 at his local clinic.  Neuropathy: none.  Foot Exam: No ulcers.  Normal monofilament exam.  Taking aspirin: no.  Lipids: LDL 94 IN 5/2017.     ROS  Please see under HPI.    Allergies  No Known Allergies    Medications( pt's med list was reviewed and updated today ).  Current Outpatient Prescriptions   Medication Sig Dispense Refill     insulin glargine (LANTUS) 100 UNIT/ML injection 16 units SQ each am. 3 mL 3     insulin aspart (NOVOLOG PEN) 100 UNIT/ML injection 1 unit/9 gms CHO with meals,plus correction insulin. Pt uses approx 30 units in 24 hrs. 3 mL 0     levothyroxine (SYNTHROID/LEVOTHROID) 137 MCG tablet Take 1 tablet (137 mcg) by mouth daily 90 tablet 3     Cholecalciferol (VITAMIN D) 2000 UNITS tablet Take 2,000 Units by mouth 2 times daily 90 tablet 3     multivitamin, therapeutic with minerals (MULTI-VITAMIN) TABS tablet Take 1 tablet by mouth daily 90 each 3     FLUoxetine (PROZAC) 10 MG capsule Take 1 capsule (10 mg) by mouth daily 90 capsule 3     Zinc 50 MG CAPS Daily. 90 capsule 3     omeprazole (PRILOSEC) 20 MG CR capsule Take 1 capsule (20 mg) by mouth daily 30 capsule      blood glucose monitoring (FREESTYLE LITE) test strip Use to test blood sugars 8 times daily or as directed. 300 strip 3     ondansetron (ZOFRAN) 4 MG tablet Take 1 tablet (4 mg) by mouth every 6 hours as needed for nausea or vomiting 24 tablet 0     Alcohol Swabs PADS 1 pad as needed 100 each 3     ferrous sulfate 325 (65 FE) MG TBEC EC tablet Take 1 tablet (325 mg) by mouth daily 30 tablet 1     glucose 40 % GEL gel Take 15-30 g by mouth every 15 minutes as needed for low blood sugar 3 Tube 1     amylase-lipase-protease (CREON 12) 87463 UNITS CPEP Take 3-4 capsules (36,000-48,000 Units) by mouth every hour as  needed (with snacks) 270 capsule 3     Sharps Container (BD SHARPS ) MISC 1 Container as needed 1 each 3     insulin pen needle 32G X 4 MM Use 8 pen needles daily or as directed. 100 each 3     blood glucose monitoring (ACCU-CHEK FASTCLIX) lancets Use to test blood sugar 8 times daily or as directed. 1 Box 3     Family History  family history is negative for Pancreatitis and DIABETES.    Social History   reports that he has never smoked. He has never used smokeless tobacco. He reports that he does not drink alcohol or use illicit drugs.     Past Medical History  Past Medical History:   Diagnosis Date     Depression      Eye injury, penetrating 1978    left eye, blurry vision long distance      Pancreatic disease     pancreatitis     Pancreatitis 2012    outside notes indicate lipase elevations; labs available: 4/11/12 lipase 563 (); 5/7/12 1224 ()     Thyroid cancer (H)      Thyroid disease     thyroid cancer     Ventral hernia 2017    Present for a year, no pain       Past Surgical History:   Procedure Laterality Date     [facility]    [facility]     ENDOSCOPIC RETROGRADE CHOLANGIOPANCREATOGRAM  4/18/2012    with sphincterotomy     ENDOSCOPIC ULTRASOUND UPPER GASTROINTESTINAL TRACT (GI) N/A 11/11/2015    Procedure: ENDOSCOPIC ULTRASOUND, ESOPHAGOSCOPY / UPPER GASTROINTESTINAL TRACT (GI);  Surgeon: Emeka Mcleod MD;  Location: [facility] OR     FEEDING TUBE REPLACEMENT  2/3/2015     HERNIA REPAIR, INGUINAL RT/LT Left 1980     LAPAROTOMY EXPLORATORY  12/31/2014    WITH sphincterotom (transduodenal), resection of sphincter of Oddi, lysis of adhesions     NERVE BLOCK PERIPHERAL  12/31/2014    U/S guided transversus abdominus plane peripheral field nerve block     PANCREATECTOMY, TRANSPLANT AUTO ISLET CELL, COMBINED N/A 5/15/2017    Procedure: COMBINED PANCREATECTOMY, TRANSPLANT AUTO ISLET CELL;  Open Pancreatectomy,  "Splenectomy, Gastrojejunostomy Tube Placement , Auto Islet Cell Transplant;  Surgeon: Rodrigo Jaquez MD;  Location: [facility] OR     THYROIDECTOMY  12/28/2011     transduodenal sphincteroplasty  12/31/2014     Physical Exam  /78  Pulse 61  Ht 1.88 m (6' 2\")  Wt 88.5 kg (195 lb)  BMI 25.04 kg/m2  Body mass index is 25.04 kg/(m^2).    GENERAL : In no apparent distress at this time.  FEET:  No ulcers; normal monofilamentous exam.    RESULTS  Creatinine   Date Value Ref Range Status   11/09/2017 0.76 0.66 - 1.25 mg/dL Final     GFR Estimate   Date Value Ref Range Status   11/09/2017 >90 >60 mL/min/1.7m2 Final     Comment:     Non  GFR Calc     Hemoglobin A1C   Date Value Ref Range Status   11/09/2017 7.4 (H) 4.3 - 6.0 % Final     Potassium   Date Value Ref Range Status   11/09/2017 4.1 3.4 - 5.3 mmol/L Final     ALT   Date Value Ref Range Status   11/09/2017 56 0 - 70 U/L Final     AST   Date Value Ref Range Status   11/09/2017 34 0 - 45 U/L Final     TSH   Date Value Ref Range Status   11/09/2017 0.35 (L) 0.40 - 4.00 mU/L Final     T4 Free   Date Value Ref Range Status   06/21/2017 1.11 0.76 - 1.46 ng/dL Final       Cholesterol   Date Value Ref Range Status   05/10/2017 157 <200 mg/dL Final   09/10/2015 146 <200 mg/dL Final     Comment:     LDL Cholesterol is the primary guide to therapy.   The NCEP recommends further evaluation of: patients with cholesterol greater   than 200 mg/dL if additional risk factors are present, cholesterol greater   than   240 mg/dL, triglycerides greater than 150 mg/dL, or HDL less than 40 mg/dL.       HDL Cholesterol   Date Value Ref Range Status   05/10/2017 43 >39 mg/dL Final   09/10/2015 39 (L) >40 mg/dL Final     LDL Cholesterol Calculated   Date Value Ref Range Status   05/10/2017 94 <100 mg/dL Final     Comment:     Desirable:       <100 mg/dl   09/10/2015 86 0 - 129 mg/dL Final     Comment:     LDL Cholesterol is the primary guide to therapy: " LDL-cholesterol goal in high   risk patients is <100 mg/dL and in very high risk patients is <70 mg/dL.       Triglycerides   Date Value Ref Range Status   05/10/2017 98 <150 mg/dL Final   09/10/2015 109 0 - 150 mg/dL Final     Cholesterol/HDL Ratio   Date Value Ref Range Status   09/10/2015 3.8 0.0 - 5.0 Final     A1C    7.4   11/9/2017    ASSESSMENT/PLAN:    1.  SECONDARY DIABETES:  Secondary diabetes following total pancreatectomy.  Hx of chronic pancreatitis.  Pt had islet cell auto transplant in May 2017 and is insulin requiring at this time.  No change in insulin doses today.  I did ask patient to take his insulin at the time of his meals and to check his blood sugar fasting each am and prelunch and predinner daily.  He will schedule an Oph exam once he returns to [place].  His feet are in good condition.  Pt's urine microalbuminuria was negative in 9/2017.   His creat is 0.76 with GFR > 90 mL/min today.  Pt is normotensive today.  He had the flu vaccine this season.    2.  S/P AUTO ISLET CELL TRANSPLANT: Pt followed here by transplant staff.    3.  HYPOTHYROIDISM: Pt is currently taking Levothyroxine 150 mcg po daily.  His TSH is low at 0.35 mU/L today.  I asked him to reduce his Levothyroxine dose 137 mcg daily and to have his TSH and FT4 rechecked in 8-10 weeks.    4.  Return to Endocrine Clinic here if in town. Otherwise, he will be seeing his Endo staff in [place] when he returns home.  I told patient to send me a MyChart note if he has any questions regarding his diabetes care.  He is aware that he needs to have his TSH/FT4 rechecked in 8-10 weeks.    Viky Flowers PA-C

## 2017-11-10 LAB
DEPRECATED CALCIDIOL+CALCIFEROL SERPL-MC: <41 UG/L (ref 20–75)
VITAMIN D2 SERPL-MCNC: <5 UG/L
VITAMIN D3 SERPL-MCNC: 36 UG/L

## 2017-11-10 NOTE — PROGRESS NOTES
HPI  Sohan Arita is a 56 year old male with secondary diabetes here today for a follow up visit.  He was last seen in our clinic by Lexy Brunson PA-C in June 2017.  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  His postop course complicated by wound infection, thrombosis and C. Difficile infection.  He state he is finally feeling well and plans to return to [place] tomorrow.  For his diabetes management, he is currently taking Lantus 16 units SQ each am and Novolog 1 unit/9 gms CHO for meal coverage with correction insulin ( 2 units/30 for BG >120 ).   He has been taking his insulin after he eats and often checks his blood sugar after meals, so several of his blood sugar values are postprandial.    Pt's A1C is 7.4 % today.  I reviewed his glucose meter download today and his average glucose was 154 with SD 52 for the past month.  His fasting blood sugar values have been 125, 172, 107, 120, 120, 107, 108, 117, 140, and 119.  His blood sugar values throughout the day and evening ranged from .  As above, several of his blood sugar values during the day and evening are post prandial.  No frequent hypoglycemia.  On ROS today, he reports feeling better overall.  His wound is healing.  He is eating better.  No diarrhea at this time.  No bleeding.  Mild nausea which he related is from antibiotics.  He report mild intermittent abd discomfort.  Not taking any pain medications.  Less headaches.  Some intermittent blurred vision.  Patient denies vomiting, shortness of breath at rest, chronic cough, chest pain, hematochezia, hematuria at this time, dysuria, numbness or tingling in his feet or hands.  No for ulcers.  Mood improved.    Diabetes Care  Retinopathy: none; he will schedule an Oph exam.  Nephropathy:none; he states his urine microalbuminuria was negative in 9/2017 at his local clinic.  Neuropathy: none.  Foot Exam: No ulcers.  Normal  monofilament exam.  Taking aspirin: no.  Lipids: LDL 94 IN 5/2017.     ROS  Please see under HPI.    Allergies  No Known Allergies    Medications( pt's med list was reviewed and updated today ).  Current Outpatient Prescriptions   Medication Sig Dispense Refill     insulin glargine (LANTUS) 100 UNIT/ML injection 16 units SQ each am. 3 mL 3     insulin aspart (NOVOLOG PEN) 100 UNIT/ML injection 1 unit/9 gms CHO with meals,plus correction insulin. Pt uses approx 30 units in 24 hrs. 3 mL 0     levothyroxine (SYNTHROID/LEVOTHROID) 137 MCG tablet Take 1 tablet (137 mcg) by mouth daily 90 tablet 3     Cholecalciferol (VITAMIN D) 2000 UNITS tablet Take 2,000 Units by mouth 2 times daily 90 tablet 3     multivitamin, therapeutic with minerals (MULTI-VITAMIN) TABS tablet Take 1 tablet by mouth daily 90 each 3     FLUoxetine (PROZAC) 10 MG capsule Take 1 capsule (10 mg) by mouth daily 90 capsule 3     Zinc 50 MG CAPS Daily. 90 capsule 3     omeprazole (PRILOSEC) 20 MG CR capsule Take 1 capsule (20 mg) by mouth daily 30 capsule      blood glucose monitoring (FREESTYLE LITE) test strip Use to test blood sugars 8 times daily or as directed. 300 strip 3     ondansetron (ZOFRAN) 4 MG tablet Take 1 tablet (4 mg) by mouth every 6 hours as needed for nausea or vomiting 24 tablet 0     Alcohol Swabs PADS 1 pad as needed 100 each 3     ferrous sulfate 325 (65 FE) MG TBEC EC tablet Take 1 tablet (325 mg) by mouth daily 30 tablet 1     glucose 40 % GEL gel Take 15-30 g by mouth every 15 minutes as needed for low blood sugar 3 Tube 1     amylase-lipase-protease (CREON 12) 70788 UNITS CPEP Take 3-4 capsules (36,000-48,000 Units) by mouth every hour as needed (with snacks) 270 capsule 3     Sharps Container (BD SHARPS ) MISC 1 Container as needed 1 each 3     insulin pen needle 32G X 4 MM Use 8 pen needles daily or as directed. 100 each 3     blood glucose monitoring (ACCU-CHEK FASTCLIX) lancets Use to test blood sugar 8 times  "daily or as directed. 1 Box 3       Family History  family history is negative for Pancreatitis and DIABETES.    Social History   reports that he has never smoked. He has never used smokeless tobacco. He reports that he does not drink alcohol or use illicit drugs.     Past Medical History  Past Medical History:   Diagnosis Date     Depression      Eye injury, penetrating 1978    left eye, blurry vision long distance      Pancreatic disease     pancreatitis     Pancreatitis 2012    outside notes indicate lipase elevations; labs available: 4/11/12 lipase 563 (); 5/7/12 1224 ()     Thyroid cancer (H)      Thyroid disease     thyroid cancer     Ventral hernia 2017    Present for a year, no pain       Past Surgical History:   Procedure Laterality Date     CHOLECYSTECTOMY  4/2012    [place], [place]     debribement eye Left 1978    for eye injury     ENDOSCOPIC RETROGRADE CHOLANGIOPANCREATOGRAM  4/18/2012    with sphincterotomy     ENDOSCOPIC ULTRASOUND UPPER GASTROINTESTINAL TRACT (GI) N/A 11/11/2015    Procedure: ENDOSCOPIC ULTRASOUND, ESOPHAGOSCOPY / UPPER GASTROINTESTINAL TRACT (GI);  Surgeon: Emeka Mcleod MD;  Location: [facility]     FEEDING TUBE REPLACEMENT  2/3/2015     HERNIA REPAIR, INGUINAL RT/LT Left 1980     LAPAROTOMY EXPLORATORY  12/31/2014    WITH sphincterotom (transduodenal), resection of sphincter of Oddi, lysis of adhesions     NERVE BLOCK PERIPHERAL  12/31/2014    U/S guided transversus abdominus plane peripheral field nerve block     PANCREATECTOMY, TRANSPLANT AUTO ISLET CELL, COMBINED N/A 5/15/2017    Procedure: COMBINED PANCREATECTOMY, TRANSPLANT AUTO ISLET CELL;  Open Pancreatectomy, Splenectomy, Gastrojejunostomy Tube Placement , Auto Islet Cell Transplant;  Surgeon: Rodrigo Jaquez MD;  Location: [facility] OR     THYROIDECTOMY  12/28/2011     transduodenal sphincteroplasty  12/31/2014       Physical Exam  /78  Pulse 61  Ht 1.88 m (6' 2\")  Wt 88.5 kg (195 lb)  BMI " 25.04 kg/m2  Body mass index is 25.04 kg/(m^2).    GENERAL : In no apparent distress at this time.  FEET:  No ulcers; normal monofilamentous exam.    RESULTS  Creatinine   Date Value Ref Range Status   11/09/2017 0.76 0.66 - 1.25 mg/dL Final     GFR Estimate   Date Value Ref Range Status   11/09/2017 >90 >60 mL/min/1.7m2 Final     Comment:     Non  GFR Calc     Hemoglobin A1C   Date Value Ref Range Status   11/09/2017 7.4 (H) 4.3 - 6.0 % Final     Potassium   Date Value Ref Range Status   11/09/2017 4.1 3.4 - 5.3 mmol/L Final     ALT   Date Value Ref Range Status   11/09/2017 56 0 - 70 U/L Final     AST   Date Value Ref Range Status   11/09/2017 34 0 - 45 U/L Final     TSH   Date Value Ref Range Status   11/09/2017 0.35 (L) 0.40 - 4.00 mU/L Final     T4 Free   Date Value Ref Range Status   06/21/2017 1.11 0.76 - 1.46 ng/dL Final       Cholesterol   Date Value Ref Range Status   05/10/2017 157 <200 mg/dL Final   09/10/2015 146 <200 mg/dL Final     Comment:     LDL Cholesterol is the primary guide to therapy.   The NCEP recommends further evaluation of: patients with cholesterol greater   than 200 mg/dL if additional risk factors are present, cholesterol greater   than   240 mg/dL, triglycerides greater than 150 mg/dL, or HDL less than 40 mg/dL.       HDL Cholesterol   Date Value Ref Range Status   05/10/2017 43 >39 mg/dL Final   09/10/2015 39 (L) >40 mg/dL Final     LDL Cholesterol Calculated   Date Value Ref Range Status   05/10/2017 94 <100 mg/dL Final     Comment:     Desirable:       <100 mg/dl   09/10/2015 86 0 - 129 mg/dL Final     Comment:     LDL Cholesterol is the primary guide to therapy: LDL-cholesterol goal in high   risk patients is <100 mg/dL and in very high risk patients is <70 mg/dL.       Triglycerides   Date Value Ref Range Status   05/10/2017 98 <150 mg/dL Final   09/10/2015 109 0 - 150 mg/dL Final     Cholesterol/HDL Ratio   Date Value Ref Range Status   09/10/2015 3.8 0.0 - 5.0  Final     A1C    7.4   11/9/2017    ASSESSMENT/PLAN:    1.  SECONDARY DIABETES:  Secondary diabetes following total pancreatectomy.  Hx of chronic pancreatitis.  Pt had islet cell auto transplant in May 2017 and is insulin requiring at this time.  No change in insulin doses today.  I did ask patient to take his insulin at the time of his meals and to check his blood sugar fasting each am and prelunch and predinner daily.  He will schedule an Oph exam once he returns to [place].  His feet are in good condition.  Pt's urine microalbuminuria was negative in 9/2017.   His creat is 0.76 with GFR > 90 mL/min today.  Pt is normotensive today.  He had the flu vaccine this season.    2.  S/P AUTO ISLET CELL TRANSPLANT: Pt followed here by transplant staff.    3.  HYPOTHYROIDISM: Pt is currently taking Levothyroxine 150 mcg po daily.  His TSH is low at 0.35 mU/L today.  I asked him to reduce his Levothyroxine dose 137 mcg daily and to have his TSH and FT4 rechecked in 8-10 weeks.    4.  Return to Endocrine Clinic here if in town. Otherwise, he will be seeing his Endo staff in [place] when he returns home.  I told patient to send me a MyChart note if he has any questions regarding his diabetes care.  He is aware that he needs to have his TSH/FT4 rechecked in 8-10 weeks.

## 2017-11-11 LAB
FOLATE RBC-MCNC: 631 NG/ML
HCT VFR BLD CALC: NORMAL %
ZINC SERPL-MCNC: 113 UG/DL (ref 60–120)

## 2017-11-12 LAB
A-TOCOPHEROL VIT E SERPL-MCNC: 9.5 MG/L (ref 5.5–18)
ANNOTATION COMMENT IMP: NORMAL
BETA+GAMMA TOCOPHEROL SERPL-MCNC: 0.5 MG/L (ref 0–6)
RETINYL PALMITATE SERPL-MCNC: <0.02 MG/L (ref 0–0.1)
VIT A SERPL-MCNC: 0.55 MG/L (ref 0.3–1.2)

## 2017-11-22 NOTE — PROGRESS NOTES
"Genetic Counseling Consultation    This note is a summary of Sohan Arita s visit [facility] on November 9, 2017. He was referred to our clinic by Dr. 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 Genetic testing and a genetic counseling consultation was recommended to aid in better understanding the cause the pancreatitis, as well as provide additional information helpful in medical management and genetic risks for family members.     Summary of visit:  1. Genetics of pancreatitis were discussed, including known involved genes, inheritance patterns, and impact on family members.    2.Sohan had orders entered for genetic testing of genes involved in pancreatitis. He wasn't interested in giving blood again today, so he either may return for the blood draw tomorrow, or arrange for a remote draw once he is home. Once blood is draw for testing and insurance is verified, results will be available in 3-5 weeks, at which time we will contact the patient.     Personal Medical History:  Tanner reports that he had abdominal pain starting at age 14-15 years old. He would get a \"sick belly\" for 1-2 weeks at a time, and then it would resolve. This occurred approximately 4-6 times a year.  He was first officially diagnosed with pancreatitis on 4/10/2012, and he continued to have identifiable recurrent episodes which eventually progressed to being chronic in nature. He has a TP-IAT at our facility on 5/15/2017 and unfortunately had some complications of DVT and infection after that. In regards to the etiology of his pancreatitis, it remains unknown. Excessive use of alcohol or other triggering factors is denied and work-up for pancreatitis appears to be otherwise negative for known causative factors.    Other medical " history is remarkabel for thyroid cancer with thyroidectomy in December 2011. Tanner also reports that he had a significant amount of mucus production as a child.     Family History:  A detailed family history was taken and scanned into Sohan s medical record. See scanned copy for complete history.   1. Sohan has four brothers and two sisters who are healthy. Most notably, his twin brother has been diagnosed with IBS and has recently begun exhibiting similar symptoms and pain as Tanner. Two siblings have diabetes, as does a niece or nephew. Tanner also has four children, one of whom has gastrointestinal issues, and another with allergies and asthma. The other two are healthy.  1. Maternal history is remarkable for mother with abdominal pain, kidney stones, PMR, and hypoglycemia. Grandfather had a cholecystectomy in his 20s.   2. Paternal history is remarkable for father with kidney stones and bladder cancer. An uncle passed away at 11 from cancer, and another uncle had diabetes and passed from melanoma. A great uncle is reported to have pancreatic cancer.  3. Family history is negative for pancreatitis, cystic fibrosis, recurrent respiratory illness, infertility, need for pancreatic enzymes, or consanguinity. Heritage is Turkish and [place].    General Genetic Background Information  Genes are the instruction code for our body, and tell our body how to grow and function. Our genes are in every cell of our body, and are packaged in our chromosomes.  Genes and chromosomes come in pairs. We inherit one copy out of each pair from our mother and one copy of each pair from our father. No one has control over which copy they pass on to their child since it is a random process.   Every person has two copies of each gene.     Genetics of Pancreatitis  Causes of pancreatitis, focusing on hereditary pancreatitis, were discussed today.  Hereditary pancreatitis can be defined based on family history criteria or on genetic findings  in an individual. There is evidence that chronic pancreatitis or recurrent acute pancreatitis can be caused by genetic changes (mutations) in the cationic trypsinogen gene, PRSS1. Other genes, pancreatic secretory trypsin inhibitor (SPINK1) and chymotrypsin C (CTRC) have been described to have an association with pancreatitis. Additionally idiopathic pancreatitis has been found to be associated with changes in the gene for cystic fibrosis (CFTR) as well. The variability in the inheritance of genes associated with hereditary pancreatitis was discussed. Hereditary pancreatitis is a disease that is primarily inherited in an autosomal dominant manner, meaning that a change in one copy of a gene, like [id number] SPINK1, is needed to develop the condition. The other gene known to be associated with hereditary pancreatitis, CFTR, typically causes disease when inherited in an autosomal recessive manner, meaning that an individual must inherit a change in the CFTR gene from both their mother and father. We discussed the complexity of genetic testing for hereditary pancreatitis and that further discussion would be needed when we have the results.    If a known disease causing change was found, this would carry up to a 50% potential risk for future children and siblings to inherit the genetic change, and a 50% chance of not inheriting it. Not all individuals with genetic changes in these genes go on to develop pancreatitis, and each gene is associated with a different risk for the development of pancreatitis. If a change is found in the CFTR gene, this result would have additional implications and they will be discussed in detail if applicable.     Genetic Testing  There are genetic blood tests available that can help determine if an individual has changes in their PRSS1, SPINK1, CTRC, and CFTR genes. As the cause of Sohan Arita's pancreatitis is unclear and there is a potential family history, comprehensive genetic  testing for all four genes would be the most useful as this will give us the most information. The laboratory that we utilize for this testing is [facility].  As you were uncertain about insurance coverage for this testing, insurance billing directly from [facility] was elected. [employer] will pre-verify that insurance will cover the testing, and if out of pocket expenses are over $100 they will call the family for decision on how to proceed. If a letter of medical necessity is required, they will also let us know prior to proceeding with the clinical testing. As Tanner had just had a blood draw today he deferred another draw today. He will either have it done tomorrow, or we can request that [employer] help arrange a remote draw once he is back back home.    Result Outcomes  Once results are available, we discussed the following possibilities:  1. One disease causing mutation found: Individuals with typical hereditary pancreatitis typically have one disease causing change detected.  Depending on the change found and other factors, individuals are at risk for development of pancreatitis, and other family members would be at risk.  2. One or two possibly contributory mutation found:  This result has various implications depending the specific change found.  The change(s) may contribute to disease.   3. Two mutations in the gene for CF:  Individuals with cystic fibrosis or cystic fibrosis-related disorder usually have two mutations in this gene, one inherited from their mother and one from their father. Depending on the changes in the gene and other factors, some people may have respiratory, GI symptoms, and/or infertility issues.   4. No mutations in these genes: No genetic evidence to support hereditary pancreatitis at this time. It is possible we will learn more about the genetics of pancreatitis in the future and be able to offer more comprehensive genetic testing.   5. The finding of an unknown change: This happens  when the laboratory detects a change but they do not know if it is found only in individuals with the condition, or if the change is found in individuals without pancreatitis as well. If you have this type of result, we will discuss it further at that time.    Plan  1. Orders placed for pancreatits genetic testing, including the PRSS1, SPINK1, CTRC and CFTR genes. Draw date is TBD. Addie will perform an insurance pre-verification prior to testing to ensure insurance coverage. Results will take approximately 3-5 weeks and we will notify you of the results as soon as we receive them.  This test can detect almost all changes in the known genes associated with hereditary pancreatitis; there remain genetic contributions that are not well understood.   2. Additional medical management recommendations or family member recommendations will be discussed in the future based on results.     It was a pleasure to meet with Sohan Arita and his wife, Maribell. Thank you for allowing me to participate in the care of your patient. If they have any further questions I encouraged them to call me at .  Roxie Mathis MS, [facility] [place]  Genetic Counselor  [facility]  Time spent in consultation was approximately 60 minutes.    CC:    HERBERT LOPEZ, ANÍBAL

## 2017-12-12 ENCOUNTER — TELEPHONE (OUTPATIENT)
Dept: TRANSPLANT | Facility: CLINIC | Age: 57
End: 2017-12-12

## 2017-12-12 NOTE — TELEPHONE ENCOUNTER
Call from Tanner requesting a letter permitting him to return to work full time. I suggested he contatc his local surgeon/wound clinic who did his last surgery and  have been following his wound.He reports a second c difficile infection treated with Vancomycin.His blood sugars have been much better once this resolved. (  )Remains on 10/10 Lantus, correction scale  & ICR of 1 unit for 9 grms CHO. He does not check his BS consistently. I reminded him fasting , before meals and bed. Also mentioned that if his A1c stayed in the 7.5 range he will damage his islet cell function.He has an endocrinology appointment in 2 weeks.

## 2017-12-15 DIAGNOSIS — K86.1 IDIOPATHIC CHRONIC PANCREATITIS (H): ICD-10-CM

## 2018-01-08 ENCOUNTER — ANTICOAGULATION THERAPY VISIT (OUTPATIENT)
Dept: ANTICOAGULATION | Facility: CLINIC | Age: 58
End: 2018-01-08

## 2018-01-08 DIAGNOSIS — Z79.01 LONG-TERM (CURRENT) USE OF ANTICOAGULANTS: ICD-10-CM

## 2018-01-08 DIAGNOSIS — I82.402 DEEP VEIN THROMBOSIS (DVT) OF LEFT LOWER EXTREMITY (H): ICD-10-CM

## 2018-01-08 NOTE — MR AVS SNAPSHOT
Sohan Gio Arita   1/8/2018   Anticoagulation Therapy Visit    Description:  57 year old male   Provider:  Eduardo Mejia [facility]   Department:  [facility]           INR as of 1/8/2018     Today's INR No new INR was available at the time of this encounter.      Anticoagulation Summary as of 1/8/2018     INR goal 2.0-3.0   Today's INR No new INR was available at the time of this encounter.   Full instructions No maintenance plan   Next INR check     Indications   Deep vein thrombosis (DVT) of left lower extremity (H) [I82.402]  Long-term (current) use of anticoagulants [Z79.01] [Z79.01]         Anticoagulation Episode Summary     Resolved date 1/8/2018    Resolved reason Therapy  Complete

## 2018-01-10 LAB
LAB SCANNED RESULT: NORMAL
MISCELLANEOUS TEST: NORMAL

## 2018-01-12 ENCOUNTER — TELEPHONE (OUTPATIENT)
Dept: TRANSPLANT | Facility: CLINIC | Age: 58
End: 2018-01-12

## 2018-01-12 NOTE — LETTER
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 You were previously referred for genetic counseling and testing by Dr. Jaquez due to your history of chronic pancreatitis and related symptoms in your twin brother.  We discussed that your genetic testing results were negative, meaning that no mutations or unknown variants were found in the genes examined.     As we reviewed, you had genetic testing for four genes associated with chronic pancreatitis or recurrent acute pancreatitis: the cationic trypsinogen gene (PRSS1), the pancreatic secretory trypsin inhibitor gene (SPINK1), chymotrypcin C (CTRC), and the gene for cystic fibrosis (CFTR). This test is expected to detect 99% of mutations in the PRSS1, SPINK1, and CTRC gene, and 97-98% in the CFTR gene. Since no mutations or variants were detected, this significantly reduces the liklihood that your symptoms are related to mutations in any of these genes. However, while these results reduce the chance that there is a genetic component, they do not eliminate the possibility entirely as we are still learning about the genetics of pancreatitis.  It would still be important for other relatives to be aware of the family history of pancreatitis. We may have better or additional genetic testing for pancreatitis in the future.     If you have any additional questions about these results, please don't hesitate to contact me at [phone]. Best wishes to you.     Sincerely,    Roxie Mathis MS, [facility] [place]  Genetic Counselor  [facility]

## 2018-01-12 NOTE — TELEPHONE ENCOUNTER
Called patient to notify him of his pancreatitis genetic testing results which were negative for any mutations, variations, or deletions/duplications in any of the testing genes (PRSS1, SPINK1, CFTR, CTRC). No answer, so left message requesting he call me back to discuss.     Roxie Mathis MS, [facility] [place]  Genetic Counselor  [facility]

## 2018-01-15 NOTE — TELEPHONE ENCOUNTER
Returned phone call from Tanner to discuss his pancreatitis genetic testing results, which were negative for any mutations or variants in the PRSS1, SPINK1, CTRC, CFTR genes. Discussed that this doesn't rule out a genetic cause or contribution entirely, but does reduce the chance, and does nearly rule out pancreatitis caused by one of these genes. Tanner expressed understanding of the information and appreciation for the call. Will mail copy of results along with a results letter to his home.     Roxie Mathis MS, [facility] [place]  Genetic Counselor  [facility]

## 2018-01-25 NOTE — BRIEF OP NOTE
Aminah from [address] calling to brittaney a verbal for PT twice a week for four weeks .  Please advise Interventional Radiology Brief Post Procedure Note    Procedure: CT SUBDIAPHRAGM ABSCESS DRAIN W CATH PLACE    Proceduralist: Lesia Arceo MD    Assistant: Garrick Phan MD and None    Time Out: Prior to the start of the procedure and with procedural staff participation, I verbally confirmed the patient s identity using two indicators, relevant allergies, that the procedure was appropriate and matched the consent or emergent situation, and that the correct equipment/implants were available. Immediately prior to starting the procedure I conducted the Time Out with the procedural staff and re-confirmed the patient s name, procedure, and site/side. (The Joint Commission universal protocol was followed.)  Yes    Sedation: IR Nurse Monitored Care   Post Procedure Summary:  Prior to the start of the procedure and with procedural staff participation, I verbally confirmed the patient s identity using two indicators, relevant allergies, that the procedure was appropriate and matched the consent or emergent situation, and that the correct equipment/implants were available. Immediately prior to starting the procedure I conducted the Time Out with the procedural staff and re-confirmed the patient s name, procedure, and site/side. (The Joint Commission universal protocol was followed.)  Yes       Sedatives: Fentanyl and Midazolam (Versed)    Vital signs, airway and pulse oximetry were monitored and remained stable throughout the procedure and sedation was maintained until the procedure was complete.  The patient was monitored by staff until sedation discharge criteria were met.    Patient tolerance: Patient tolerated the procedure well with no immediate complications.    Time of sedation in minutes: 45 Minutes minutes from beginning to end of physician one to one monitoring.        Findings: 30 cc necrotic fluid aspirated with difficulty from small anterior RUQ fluid collection.    Estimated Blood Loss:  Minimal      SPECIMENS: Fluid and/or tissue for Gram stain and culture    Complications: 1. None     Condition: Stable    Plan: Small amount of thick necrotic fluid aspirated from RUQ fluid collection.  RUQ drain to suction, chart outputs  If outputs are low (<10 cc per day) may consider up-sizing versus discontinuing drain    Comments: See dictated procedure note for full details.    Garrick Phan MD

## 2018-06-21 ENCOUNTER — OFFICE VISIT (OUTPATIENT)
Dept: TRANSPLANT | Facility: CLINIC | Age: 58
End: 2018-06-21
Attending: PEDIATRICS
Payer: COMMERCIAL

## 2018-06-21 ENCOUNTER — ALLIED HEALTH/NURSE VISIT (OUTPATIENT)
Dept: TRANSPLANT | Facility: CLINIC | Age: 58
End: 2018-06-21
Attending: TRANSPLANT SURGERY

## 2018-06-21 ENCOUNTER — OFFICE VISIT (OUTPATIENT)
Dept: TRANSPLANT | Facility: CLINIC | Age: 58
End: 2018-06-21
Attending: TRANSPLANT SURGERY

## 2018-06-21 ENCOUNTER — APPOINTMENT (OUTPATIENT)
Dept: LAB | Facility: CLINIC | Age: 58
End: 2018-06-21

## 2018-06-21 ENCOUNTER — HOSPITAL ENCOUNTER (OUTPATIENT)
Facility: CLINIC | Age: 58
Setting detail: SPECIMEN
Discharge: HOME | End: 2018-06-21
Admitting: PEDIATRICS
Payer: COMMERCIAL

## 2018-06-21 VITALS
OXYGEN SATURATION: 93 % | HEIGHT: 74 IN | BODY MASS INDEX: 26.86 KG/M2 | WEIGHT: 209.3 LBS | RESPIRATION RATE: 20 BRPM | HEART RATE: 54 BPM | DIASTOLIC BLOOD PRESSURE: 85 MMHG | SYSTOLIC BLOOD PRESSURE: 121 MMHG

## 2018-06-21 VITALS — WEIGHT: 209.3 LBS | BODY MASS INDEX: 26.86 KG/M2 | HEIGHT: 74 IN

## 2018-06-21 DIAGNOSIS — Z90.410 POST-PANCREATECTOMY DIABETES (H): Primary | ICD-10-CM

## 2018-06-21 DIAGNOSIS — E13.9 POST-PANCREATECTOMY DIABETES (H): Primary | ICD-10-CM

## 2018-06-21 DIAGNOSIS — E89.1 POST-PANCREATECTOMY DIABETES (H): Primary | ICD-10-CM

## 2018-06-21 DIAGNOSIS — E13.9 POST-PANCREATECTOMY DIABETES (H): ICD-10-CM

## 2018-06-21 DIAGNOSIS — K86.89 PANCREATIC INSUFFICIENCY: ICD-10-CM

## 2018-06-21 DIAGNOSIS — Z98.890 POST-OPERATIVE STATE: ICD-10-CM

## 2018-06-21 DIAGNOSIS — Z90.410 HISTORY OF PANCREATECTOMY: ICD-10-CM

## 2018-06-21 DIAGNOSIS — Z90.410 POST-PANCREATECTOMY DIABETES (H): ICD-10-CM

## 2018-06-21 DIAGNOSIS — E89.1 POST-PANCREATECTOMY DIABETES (H): ICD-10-CM

## 2018-06-21 LAB
ALBUMIN SERPL-MCNC: 3.7 G/DL (ref 3.4–5)
ALP SERPL-CCNC: 99 U/L (ref 40–150)
ALT SERPL W P-5'-P-CCNC: 46 U/L (ref 0–70)
ANION GAP SERPL CALCULATED.3IONS-SCNC: 5 MMOL/L (ref 3–14)
AST SERPL W P-5'-P-CCNC: 31 U/L (ref 0–45)
BASOPHILS # BLD AUTO: 0.1 10E9/L (ref 0–0.2)
BASOPHILS NFR BLD AUTO: 1.1 %
BILIRUB SERPL-MCNC: 0.6 MG/DL (ref 0.2–1.3)
BUN SERPL-MCNC: 14 MG/DL (ref 7–30)
C PEPTIDE SERPL-MCNC: 0.3 NG/ML (ref 0.9–6.9)
CALCIUM SERPL-MCNC: 8.8 MG/DL (ref 8.5–10.1)
CHLORIDE SERPL-SCNC: 109 MMOL/L (ref 94–109)
CO2 SERPL-SCNC: 26 MMOL/L (ref 20–32)
CREAT SERPL-MCNC: 0.88 MG/DL (ref 0.66–1.25)
DIFFERENTIAL METHOD BLD: ABNORMAL
EOSINOPHIL # BLD AUTO: 0.4 10E9/L (ref 0–0.7)
EOSINOPHIL NFR BLD AUTO: 4.3 %
ERYTHROCYTE [DISTWIDTH] IN BLOOD BY AUTOMATED COUNT: 15.2 % (ref 10–15)
FERRITIN SERPL-MCNC: 58 NG/ML (ref 26–388)
GFR SERPL CREATININE-BSD FRML MDRD: 89 ML/MIN/1.7M2
GLUCOSE SERPL-MCNC: 197 MG/DL (ref 70–99)
GLUCOSE SERPL-MCNC: 225 MG/DL (ref 70–99)
GLUCOSE SERPL-MCNC: 93 MG/DL (ref 70–99)
HBA1C MFR BLD: 7.7 % (ref 0–5.6)
HCT VFR BLD AUTO: 43.1 % (ref 40–53)
HGB BLD-MCNC: 14.7 G/DL (ref 13.3–17.7)
IMM GRANULOCYTES # BLD: 0 10E9/L (ref 0–0.4)
IMM GRANULOCYTES NFR BLD: 0.2 %
IRON SATN MFR SERPL: 28 % (ref 15–46)
IRON SERPL-MCNC: 98 UG/DL (ref 35–180)
LYMPHOCYTES # BLD AUTO: 3.3 10E9/L (ref 0.8–5.3)
LYMPHOCYTES NFR BLD AUTO: 40.1 %
MCH RBC QN AUTO: 29.7 PG (ref 26.5–33)
MCHC RBC AUTO-ENTMCNC: 34.1 G/DL (ref 31.5–36.5)
MCV RBC AUTO: 87 FL (ref 78–100)
MONOCYTES # BLD AUTO: 0.8 10E9/L (ref 0–1.3)
MONOCYTES NFR BLD AUTO: 10.1 %
NEUTROPHILS # BLD AUTO: 3.6 10E9/L (ref 1.6–8.3)
NEUTROPHILS NFR BLD AUTO: 44.2 %
NRBC # BLD AUTO: 0 10*3/UL
NRBC BLD AUTO-RTO: 0 /100
PLATELET # BLD AUTO: 355 10E9/L (ref 150–450)
POTASSIUM SERPL-SCNC: 4.2 MMOL/L (ref 3.4–5.3)
PREALB SERPL IA-MCNC: 23 MG/DL (ref 15–45)
PROT SERPL-MCNC: 7.3 G/DL (ref 6.8–8.8)
RBC # BLD AUTO: 4.95 10E12/L (ref 4.4–5.9)
SODIUM SERPL-SCNC: 140 MMOL/L (ref 133–144)
TIBC SERPL-MCNC: 348 UG/DL (ref 240–430)
VIT B12 SERPL-MCNC: 442 PG/ML (ref 193–986)
WBC # BLD AUTO: 8.2 10E9/L (ref 4–11)

## 2018-06-21 PROCEDURE — 82306 VITAMIN D 25 HYDROXY: CPT | Performed by: PEDIATRICS

## 2018-06-21 PROCEDURE — 84446 ASSAY OF VITAMIN E: CPT | Performed by: PEDIATRICS

## 2018-06-21 PROCEDURE — 82607 VITAMIN B-12: CPT | Performed by: PEDIATRICS

## 2018-06-21 PROCEDURE — 83540 ASSAY OF IRON: CPT | Performed by: PEDIATRICS

## 2018-06-21 PROCEDURE — 82947 ASSAY GLUCOSE BLOOD QUANT: CPT | Performed by: PEDIATRICS

## 2018-06-21 PROCEDURE — 82542 COL CHROMOTOGRAPHY QUAL/QUAN: CPT | Performed by: PEDIATRICS

## 2018-06-21 PROCEDURE — 84630 ASSAY OF ZINC: CPT | Performed by: PEDIATRICS

## 2018-06-21 PROCEDURE — 83036 HEMOGLOBIN GLYCOSYLATED A1C: CPT | Performed by: PEDIATRICS

## 2018-06-21 PROCEDURE — 80053 COMPREHEN METABOLIC PANEL: CPT | Performed by: PEDIATRICS

## 2018-06-21 PROCEDURE — 83550 IRON BINDING TEST: CPT | Performed by: PEDIATRICS

## 2018-06-21 PROCEDURE — 84590 ASSAY OF VITAMIN A: CPT | Performed by: PEDIATRICS

## 2018-06-21 PROCEDURE — 84681 ASSAY OF C-PEPTIDE: CPT | Performed by: PEDIATRICS

## 2018-06-21 PROCEDURE — 82747 ASSAY OF FOLIC ACID RBC: CPT | Performed by: PEDIATRICS

## 2018-06-21 PROCEDURE — 82728 ASSAY OF FERRITIN: CPT | Performed by: PEDIATRICS

## 2018-06-21 PROCEDURE — 84134 ASSAY OF PREALBUMIN: CPT | Performed by: PEDIATRICS

## 2018-06-21 PROCEDURE — 36415 COLL VENOUS BLD VENIPUNCTURE: CPT | Performed by: PEDIATRICS

## 2018-06-21 PROCEDURE — 85025 COMPLETE CBC W/AUTO DIFF WBC: CPT | Performed by: PEDIATRICS

## 2018-06-21 ASSESSMENT — PAIN SCALES - GENERAL: PAINLEVEL: MILD PAIN (2)

## 2018-06-21 NOTE — NURSING NOTE
"Vitals taken at earlier visit today:  Vitals: /85 (Patient Position: Sitting)  Pulse 54  Resp 20  Ht 1.88 m (6' 2\")  Wt 94.9 kg (209 lb 4.8 oz)  SpO2 93%  BMI 26.87 kg/m2  "

## 2018-06-21 NOTE — PROGRESS NOTES
Over a year from [facility].  Back to work full time, changing to a [facility] clinic in [place].  Able to perform all routine daily activities.  Eating well, still on long acting insulin (16u L) with supplement  Off narcotics; uses tylenol and ibuprofen.  Bowel function ok    Overall feels he can do what he wants.  He has a large upper midline hernia that he does not find problematic.    I/P  No surgical issues at present, except a hernia that he doesn't want fixed.  RTC prn

## 2018-06-21 NOTE — LETTER
6/21/2018       RE: Sohan Arita  [address] [facility] [address]     Dear Colleague,    Thank you for referring your patient, Sohan Arita, to the [facility]ID [facility]GAN TRANSPL[facility] at [facility]. Please see a copy of my visit note below.    [facility]  Islet Autotransplant, Diabetes Follow Up    Problem List:  Patient Active Problem List   Diagnosis     Chronic pancreatitis (H)     Acquired total absence of pancreas     Post-pancreatectomy diabetes (H)     Acute postoperative pain of abdomen     Dehydration     Deep vein thrombosis (DVT) of left lower extremity (H)     Surgical wound infection     Long-term (current) use of anticoagulants [Z79.01]       HPI:  Sohan is a 57 year old male here for follow up of total pancreatectomy, islet cell autotransplant, splenectomy, duodenojejunostomy, Dhara-Y reconstruction, liver biopsy (needle core), gastro-jejunostomy tube performed on 5/15/2017.  At the time of the procedure, the patient received 437,700 IEQ, or 4,347 IEQ/kg body weight, with 252,400 islets or 2506 islets/kg unadjusted for size, so he had relatively large islets.  He had high portal pressure and so a portion of this tissue was placed in the peritoneum.  He did then develop an infected fluid collection after surgery.    At today's visit, Tanner is 1 year post surgery and has recovered well.  He is struggling with diabetes management-- A1c was up as high as 7.9% and today is 7.7%.  Suprisingly his average BG does not match this-- his meter BG is 120s.  Suspect post meal hyperglycemia. He is eating a lot and frequently-- no longer has pain to eat so he is always hungry. He takes his meal coverage either right before eating or at the end. He is having hypoglycemia to 60s and 70s with activity and says he probably over-treats these episodes because they bother him.  With regards to pain he is still having some abdominal  zachariahan, mostly different from the pancreatitis, and he will take up to 2 advil and 2 tylenol a week but no narcotics for this. He is working and is busy working in the yard around the house. He has had intercurrent health issues unrelated to TPIAT-- prostatitis x 3, influenza, fracture related to nail gun (finger).     Diabetes history:  Current insulin regimen:  Lantus 10 unit AM/ 10 unit PM  Novolog 1 unit per 10 grams, and 2 units per 30 mg/dL >120 mg/dL    Recent hemoglobin A1c levels:  Lab Results   Component Value Date    A1C 7.7 2018    A1C 7.4 2017    A1C 5.3 2017    A1C 5.6 05/10/2017    A1C 5.4 09/10/2015     Hypoglycemia history:  Yes, mild.  The patient has had 0 episodes of severe hypoglycemia (seizure, coma, or neuroglycopenic symptoms severe enough to require assistance from another person).  Blood sugars were reviewed from the patient records and/or the meter download.    Average B SD44  Number of blood sugar checks per day 2      Review of systems:  Review Of Systems  Skin: negative  Eyes: negative  Ears/Nose/Throat: negative  Respiratory: No shortness of breath, dyspnea on exertion, cough, or hemoptysis  Cardiovascular: negative  Gastrointestinal: as above  Genitourinary: negative except prostate issue,  Musculoskeletal: as above and fracture  Neurologic: negative  Psychiatric: negative  Hematologic/Lymphatic/Immunologic: negative  Endocrine: as above    Past Medical and Surgical History:  Past Medical History:   Diagnosis Date     Depression      Eye injury, penetrating     left eye, blurry vision long distance      Pancreatic disease     pancreatitis     Pancreatitis     outside notes indicate lipase elevations; labs available: 12 lipase 563 (); 12 1224 ()     Thyroid cancer (H)      Thyroid disease     thyroid cancer     Ventral hernia 2017    Present for a year, no pain     Past Surgical History:   Procedure Laterality Date     CHOLECYSTECTOMY  " 4/2012    [place], [place]     debribement eye Left 1978    for eye injury     ENDOSCOPIC RETROGRADE CHOLANGIOPANCREATOGRAM  4/18/2012    with sphincterotomy     ENDOSCOPIC ULTRASOUND UPPER GASTROINTESTINAL TRACT (GI) N/A 11/11/2015    Procedure: ENDOSCOPIC ULTRASOUND, ESOPHAGOSCOPY / UPPER GASTROINTESTINAL TRACT (GI);  Surgeon: Emeka Mcleod MD;  Location: [facility] OR     FEEDING TUBE REPLACEMENT  2/3/2015     HERNIA REPAIR, INGUINAL RT/LT Left 1980     LAPAROTOMY EXPLORATORY  12/31/2014    WITH sphincterotom (transduodenal), resection of sphincter of Oddi, lysis of adhesions     NERVE BLOCK PERIPHERAL  12/31/2014    U/S guided transversus abdominus plane peripheral field nerve block     PANCREATECTOMY, TRANSPLANT AUTO ISLET CELL, COMBINED N/A 5/15/2017    Procedure: COMBINED PANCREATECTOMY, TRANSPLANT AUTO ISLET CELL;  Open Pancreatectomy, Splenectomy, Gastrojejunostomy Tube Placement , Auto Islet Cell Transplant;  Surgeon: Rodrigo Jaquez MD;  Location: [facility] OR     THYROIDECTOMY  12/28/2011     transduodenal sphincteroplasty  12/31/2014       Family History:  New changes since last visit:  none  Family History   Problem Relation Age of Onset     Pancreatitis No family hx of      Diabetes No family hx of        Social History:  Social History     Social History Narrative    Sohan is , 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  He is a non-smoker and has never drank alcohol. He is a twin.       Working -- changing jobs to [facility]    Physical Exam:  Vitals: /85 (Patient Position: Sitting)  Pulse 54  Resp 20  Ht 1.88 m (6' 2\")  Wt 94.9 kg (209 lb 4.8 oz)  SpO2 93%  BMI 26.87 kg/m2  BMI= Body mass index is 26.87 kg/(m^2).  General:  Well-appearing, NAD  Injection sites:  Intact without lipohypertrophy  Abd:  Incision with fibrous scar, hernia defect  Psych:  Communicative, with normal affect         Assessment:  1.  Post pancreatectomy diabetes mellitus, s/p total pancreatectomy and islet " autotransplant.    Sohan is a 57 year old with history of chronic pancreatitis who is s/p total pancreatectomy and islet autotransplant.  He had a moderate islet mass but many large islets.  He has relatively high insulin requirements, and A1c above goal.  The meter average is much lower than the A1c so I suspect he has early post prandial hyperglycemia.  I would strongly support CGM, either Dexcom G6 or 670g pump. He did start process for 670g but did not complete it for financial reasons and is now leaning towards staying on MDI and getting Dexcom.    Plan:  1.  Changes to current diabetes regimen:  -- Move Humalog to pre-meal, and take 10 minutes before meal when possible.  -- discussed increase PM Lantus to 12 units if continues to have the higher AM numbers but we think the couple times above 120 mg/dL were related to eating without coverage  -- work on more discrete meals, less snacking.    2.  Frequency of blood sugar checks:  Needs strips for at least 4-6 times per day.  Would agree with Dexcom G6 or 670g pump .  He is leaning towards maintaining on MDI and getting Dexcom due to expense.    3.  Continue routine follow up for autoislet transplant patients:  Mixed meal test (6 mL/kg BoostHP to max of 360 mL) at 3 months, 6 months, and once a year post transplant.  Hemoglobin A1c levels at these time points and quarterly.    4.  Other issues addressed today:  none    Follow up:  1 year    Contact me for questions at [phone] or [phone].  Emergency number to reach pediatric endocrinology after hours is [phone].        Trixie Yin MD  , Pediatric Endocrinology and Diabetes  [facility]  [facility]    I spent 25 minutes face to face with this patient, with more than 50% of my time spent in counseling on DM management as above.

## 2018-06-21 NOTE — PROGRESS NOTES
[facility]  Islet Autotransplant, Diabetes Follow Up    Problem List:  Patient Active Problem List   Diagnosis     Chronic pancreatitis (H)     Acquired total absence of pancreas     Post-pancreatectomy diabetes (H)     Acute postoperative pain of abdomen     Dehydration     Deep vein thrombosis (DVT) of left lower extremity (H)     Surgical wound infection     Long-term (current) use of anticoagulants [Z79.01]       HPI:  Sohan is a 57 year old male here for follow up of total pancreatectomy, islet cell autotransplant, splenectomy, duodenojejunostomy, Dhara-Y reconstruction, liver biopsy (needle core), gastro-jejunostomy tube performed on 5/15/2017.  At the time of the procedure, the patient received 437,700 IEQ, or 4,347 IEQ/kg body weight, with 252,400 islets or 2506 islets/kg unadjusted for size, so he had relatively large islets.  He had high portal pressure and so a portion of this tissue was placed in the peritoneum.  He did then develop an infected fluid collection after surgery.    At today's visit, Tanner is 1 year post surgery and has recovered well.  He is struggling with diabetes management-- A1c was up as high as 7.9% and today is 7.7%.  Suprisingly his average BG does not match this-- his meter BG is 120s.  Suspect post meal hyperglycemia. He is eating a lot and frequently-- no longer has pain to eat so he is always hungry. He takes his meal coverage either right before eating or at the end. He is having hypoglycemia to 60s and 70s with activity and says he probably over-treats these episodes because they bother him.  With regards to pain he is still having some abdominal pian, mostly different from the pancreatitis, and he will take up to 2 advil and 2 tylenol a week but no narcotics for this. He is working and is busy working in the yard around the house. He has had intercurrent health issues unrelated to TPIAT-- prostatitis x 3, influenza, fracture related to nail gun  (finger).     Diabetes history:  Current insulin regimen:  Lantus 10 unit AM/ 10 unit PM  Novolog 1 unit per 10 grams, and 2 units per 30 mg/dL >120 mg/dL    Recent hemoglobin A1c levels:  Lab Results   Component Value Date    A1C 7.7 2018    A1C 7.4 2017    A1C 5.3 2017    A1C 5.6 05/10/2017    A1C 5.4 09/10/2015     Hypoglycemia history:  Yes, mild.  The patient has had 0 episodes of severe hypoglycemia (seizure, coma, or neuroglycopenic symptoms severe enough to require assistance from another person).  Blood sugars were reviewed from the patient records and/or the meter download.    Average B SD44  Number of blood sugar checks per day 2      Review of systems:  Review Of Systems  Skin: negative  Eyes: negative  Ears/Nose/Throat: negative  Respiratory: No shortness of breath, dyspnea on exertion, cough, or hemoptysis  Cardiovascular: negative  Gastrointestinal: as above  Genitourinary: negative except prostate issue,  Musculoskeletal: as above and fracture  Neurologic: negative  Psychiatric: negative  Hematologic/Lymphatic/Immunologic: negative  Endocrine: as above    Past Medical and Surgical History:  Past Medical History:   Diagnosis Date     Depression      Eye injury, penetrating     left eye, blurry vision long distance      Pancreatic disease     pancreatitis     Pancreatitis     outside notes indicate lipase elevations; labs available: 12 lipase 563 (); 12 1224 ()     Thyroid cancer (H)      Thyroid disease     thyroid cancer     Ventral hernia     Present for a year, no pain     Past Surgical History:   Procedure Laterality Date     CHOLECYSTECTOMY  2012    [place], [place]     debribement eye Left     for eye injury     ENDOSCOPIC RETROGRADE CHOLANGIOPANCREATOGRAM  2012    with sphincterotomy     ENDOSCOPIC ULTRASOUND UPPER GASTROINTESTINAL TRACT (GI) N/A 2015    Procedure: ENDOSCOPIC ULTRASOUND, ESOPHAGOSCOPY / UPPER  "GASTROINTESTINAL TRACT (GI);  Surgeon: Emeka Mcleod MD;  Location: [facility] OR     FEEDING TUBE REPLACEMENT  2/3/2015     HERNIA REPAIR, INGUINAL RT/LT Left 1980     LAPAROTOMY EXPLORATORY  12/31/2014    WITH sphincterotom (transduodenal), resection of sphincter of Oddi, lysis of adhesions     NERVE BLOCK PERIPHERAL  12/31/2014    U/S guided transversus abdominus plane peripheral field nerve block     PANCREATECTOMY, TRANSPLANT AUTO ISLET CELL, COMBINED N/A 5/15/2017    Procedure: COMBINED PANCREATECTOMY, TRANSPLANT AUTO ISLET CELL;  Open Pancreatectomy, Splenectomy, Gastrojejunostomy Tube Placement , Auto Islet Cell Transplant;  Surgeon: Rodrigo Jaquez MD;  Location: [facility] OR     THYROIDECTOMY  12/28/2011     transduodenal sphincteroplasty  12/31/2014       Family History:  New changes since last visit:  none  Family History   Problem Relation Age of Onset     Pancreatitis No family hx of      Diabetes No family hx of        Social History:  Social History     Social History Narrative    Sohan is , 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  He is a non-smoker and has never drank alcohol. He is a twin.       Working -- changing jobs to [facility]    Physical Exam:  Vitals: /85 (Patient Position: Sitting)  Pulse 54  Resp 20  Ht 1.88 m (6' 2\")  Wt 94.9 kg (209 lb 4.8 oz)  SpO2 93%  BMI 26.87 kg/m2  BMI= Body mass index is 26.87 kg/(m^2).  General:  Well-appearing, NAD  Injection sites:  Intact without lipohypertrophy  Abd:  Incision with fibrous scar, hernia defect  Psych:  Communicative, with normal affect         Assessment:  1.  Post pancreatectomy diabetes mellitus, s/p total pancreatectomy and islet autotransplant.    Sohan is a 57 year old with history of chronic pancreatitis who is s/p total pancreatectomy and islet autotransplant.  He had a moderate islet mass but many large islets.  He has relatively high insulin requirements, and A1c above goal.  The meter average is much lower than the " A1c so I suspect he has early post prandial hyperglycemia.  I would strongly support CGM, either Dexcom G6 or 670g pump. He did start process for 670g but did not complete it for financial reasons and is now leaning towards staying on MDI and getting Dexcom.    Plan:  1.  Changes to current diabetes regimen:  -- Move Humalog to pre-meal, and take 10 minutes before meal when possible.  -- discussed increase PM Lantus to 12 units if continues to have the higher AM numbers but we think the couple times above 120 mg/dL were related to eating without coverage  -- work on more discrete meals, less snacking.    2.  Frequency of blood sugar checks:  Needs strips for at least 4-6 times per day.  Would agree with Dexcom G6 or 670g pump .  He is leaning towards maintaining on MDI and getting Dexcom due to expense.    3.  Continue routine follow up for autoislet transplant patients:  Mixed meal test (6 mL/kg BoostHP to max of 360 mL) at 3 months, 6 months, and once a year post transplant.  Hemoglobin A1c levels at these time points and quarterly.    4.  Other issues addressed today:  none    Follow up:  1 year    Contact me for questions at [phone] or [phone].  Emergency number to reach pediatric endocrinology after hours is [phone].        Trixie Yin MD  , Pediatric Endocrinology and Diabetes  [facility] [place]  [facility]    I spent 25 minutes face to face with this patient, with more than 50% of my time spent in counseling on DM management as above.

## 2018-06-21 NOTE — MR AVS SNAPSHOT
After Visit Summary   6/21/2018    Sohan Arita    MRN: [id number]           Patient Information     Date Of Birth          1960        Visit Information        Provider Department      6/21/2018 11:00 AM Rodrigo Jaquez MD [facility] Solid Organ Transplant        Today's Diagnoses     Post-pancreatectomy diabetes (H)    -  1    Post-operative state        History of pancreatectomy           Follow-ups after your visit        Who to contact     If you have questions or need follow up information about today's clinic visit or your schedule please contact [facility] SOLID ORGAN TRANSPLANT directly at [phone].  Normal or non-critical lab and imaging results will be communicated to you by Cellabushart, letter or phone within 4 business days after the clinic has received the results. If you do not hear from us within 7 days, please contact the clinic through [email or web address] or phone. If you have a critical or abnormal lab result, we will notify you by phone as soon as possible.  Submit refill requests through LendingRobot or call your pharmacy and they will forward the refill request to us. Please allow 3 business days for your refill to be completed.          Additional Information About Your Visit        MyChart Information     LendingRobot gives you secure access to your electronic health record. If you see a primary care provider, you can also send messages to your care team and make appointments. If you have questions, please call your primary care clinic.  If you do not have a primary care provider, please call [phone] and they will assist you.        Care EveryWhere ID     This is your Care EveryWhere ID. This could be used by other organizations to access your [facility] medical records  [id number]         Blood Pressure from Last 3 Encounters:   06/21/18 121/85   11/09/17 119/78   11/09/17 119/78    Weight from Last 3 Encounters:   06/21/18 94.9 kg (209 lb 4.8 oz)   06/21/18 94.9 kg (209 lb  4.8 oz)   11/09/17 88.5 kg (195 lb)              Today, you had the following     No orders found for display         Today's Medication Changes          These changes are accurate as of 6/21/18  1:47 PM.  If you have any questions, ask your nurse or doctor.               Start taking these medicines.        Dose/Directions    BOOST HIGH PROTEIN Liqd   Used for:  Post-pancreatectomy diabetes (H), Pancreatic insufficiency   Started by:  Trista Regan RN        After above baseline labs are drawn, give: 6 mL/kg to maximum of 360 mL; the beverage is to be consumed within 5 minutes.   Refills:  3            Where to get your medicines      Some of these will need a paper prescription and others can be bought over the counter.  Ask your nurse if you have questions.     You don't need a prescription for these medications     BOOST HIGH PROTEIN Liqd                Primary Care Provider Office Phone # Fax #    Frandy Y MD Uday [phone]       [address]  [facility] [address]        Equal Access to Services     SEPIDEH VINCENT AH: Hadii janes ku hadasho Soomaali, waaxda luqadaha, qaybta kaalmada adeegyada, waxay taoin arlin pope . So [facility] [phone].    ATENCIÓN: Si habla español, tiene a welsh disposición servicios gratuitos de asistencia lingüística. Llame al [phone].    We comply with applicable federal civil rights laws and [place] laws. We do not discriminate on the basis of race, color, national origin, age, disability, sex, sexual orientation, or gender identity.            Thank you!     Thank you for choosing [facility] SOLID ORGAN TRANSPLANT  for your care. Our goal is always to provide you with excellent care. Hearing back from our patients is one way we can continue to improve our services. Please take a few minutes to complete the written survey that you may receive in the mail after your visit with us. Thank you!             Your Updated  Medication List - Protect others around you: Learn how to safely use, store and throw away your medicines at www.disposemymeds.org.          This list is accurate as of 6/21/18  1:47 PM.  Always use your most recent med list.                   Brand Name Dispense Instructions for use Diagnosis    Alcohol Swabs Pads     100 each    1 pad as needed    Post-pancreatectomy diabetes (H)       amylase-lipase-protease 49282 units Cpep    CREON 12    270 capsule    Take 3-4 capsules (36,000-48,000 Units) by mouth every hour as needed (with snacks)    Acquired total absence of pancreas       BD SHARPS  Misc     1 each    1 Container as needed    Post-pancreatectomy diabetes (H)       blood glucose monitoring lancets     1 Box    Use to test blood sugar 8 times daily or as directed.    Acquired total absence of pancreas       blood glucose monitoring test strip    FREESTYLE LITE    300 strip    Use to test blood sugars 8 times daily or as directed.    Post-pancreatectomy diabetes (H)       BOOST HIGH PROTEIN Liqd      After above baseline labs are drawn, give: 6 mL/kg to maximum of 360 mL; the beverage is to be consumed within 5 minutes.    Post-pancreatectomy diabetes (H), Pancreatic insufficiency       ferrous sulfate 325 (65 Fe) MG Tbec EC tablet     30 tablet    Take 1 tablet (325 mg) by mouth daily    High platelet count (H), Acquired asplenia, Post-pancreatectomy diabetes (H), Pancreatic insufficiency, Anemia       FLUoxetine 10 MG capsule    PROzac    90 capsule    Take 1 capsule (10 mg) by mouth daily        glucose 40 % Gel gel     3 Tube    Take 15-30 g by mouth every 15 minutes as needed for low blood sugar    Post-pancreatectomy diabetes (H)       insulin aspart 100 UNIT/ML injection    NovoLOG PEN    3 mL    1 unit/9 gms CHO with meals,plus correction insulin. Pt uses approx 30 units in 24 hrs.    Post-pancreatectomy diabetes (H)       insulin glargine 100 UNIT/ML injection    LANTUS    3 mL    Inject 10  Units Subcutaneous 2 times daily 16 units SQ each am.    Post-pancreatectomy diabetes (H)       insulin pen needle 32G X 4 MM     100 each    Use 8 pen needles daily or as directed.    Acquired total absence of pancreas       levothyroxine 137 MCG tablet    SYNTHROID/LEVOTHROID    90 tablet    Take 1 tablet (137 mcg) by mouth daily    Other specified hypothyroidism       Multi-vitamin Tabs tablet     90 each    Take 1 tablet by mouth daily        omeprazole 20 MG CR capsule    priLOSEC    30 capsule    Take 1 capsule (20 mg) by mouth daily    GERD (gastroesophageal reflux disease)       ondansetron 4 MG tablet    ZOFRAN    24 tablet    Take 1 tablet (4 mg) by mouth every 6 hours as needed for nausea or vomiting    Postoperative nausea       vitamin D 2000 units tablet     90 tablet    Take 2,000 Units by mouth 2 times daily        Zinc 50 MG Caps     90 capsule    Daily.

## 2018-06-21 NOTE — MR AVS SNAPSHOT
"              After Visit Summary   6/21/2018    Sohan Arita    MRN: [id number]           Patient Information     Date Of Birth          1960        Visit Information        Provider Department      6/21/2018 11:40 AM Trixie Yin MD [facility] Solid Organ Transplant        Today's Diagnoses     Post-pancreatectomy diabetes (H)    -  1       Follow-ups after your visit        Who to contact     If you have questions or need follow up information about today's clinic visit or your schedule please contact [facility] SOLID ORGAN TRANSPLANT directly at [phone].  Normal or non-critical lab and imaging results will be communicated to you by GuardiCorehart, letter or phone within 4 business days after the clinic has received the results. If you do not hear from us within 7 days, please contact the clinic through OpenSignalt or phone. If you have a critical or abnormal lab result, we will notify you by phone as soon as possible.  Submit refill requests through Differential or call your pharmacy and they will forward the refill request to us. Please allow 3 business days for your refill to be completed.          Additional Information About Your Visit        MyChart Information     Differential gives you secure access to your electronic health record. If you see a primary care provider, you can also send messages to your care team and make appointments. If you have questions, please call your primary care clinic.  If you do not have a primary care provider, please call [phone] and they will assist you.        Care EveryWhere ID     This is your Care EveryWhere ID. This could be used by other organizations to access your [facility] medical records  [id number]        Your Vitals Were     Pulse Respirations Height Pulse Oximetry BMI (Body Mass Index)       54 20 1.88 m (6' 2\") 93% 26.87 kg/m2        Blood Pressure from Last 3 Encounters:   06/21/18 121/85   11/09/17 119/78   11/09/17 119/78    Weight from Last 3 " Encounters:   06/21/18 94.9 kg (209 lb 4.8 oz)   06/21/18 94.9 kg (209 lb 4.8 oz)   11/09/17 88.5 kg (195 lb)              Today, you had the following     No orders found for display         Today's Medication Changes          These changes are accurate as of 6/21/18  2:11 PM.  If you have any questions, ask your nurse or doctor.               Start taking these medicines.        Dose/Directions    BOOST HIGH PROTEIN Liqd   Used for:  Post-pancreatectomy diabetes (H), Pancreatic insufficiency   Started by:  Trista Regan, RN        After above baseline labs are drawn, give: 6 mL/kg to maximum of 360 mL; the beverage is to be consumed within 5 minutes.   Refills:  3            Where to get your medicines      Some of these will need a paper prescription and others can be bought over the counter.  Ask your nurse if you have questions.     You don't need a prescription for these medications     BOOST HIGH PROTEIN Liqd                Primary Care Provider Office Phone # Fax #    Frandy Y MD Uday [phone]       [address] [address]        Equal Access to Services     [facility]IMBER[facility] : Hadii janes Cordon, corina reyes, angelo beltran, luis armando pope . So [facility] [place] [facility] [phone].    ATENCIÓN: Si habla español, tiene a welsh disposición servicios gratuitos de asistencia lingüística. Llame al [phone].    We comply with applicable federal civil rights laws and [place] laws. We do not discriminate on the basis of race, color, national origin, age, disability, sex, sexual orientation, or gender identity.            Thank you!     Thank you for choosing [facility] SOLID ORGAN TRANSPLANT  for your care. Our goal is always to provide you with excellent care. Hearing back from our patients is one way we can continue to improve our services. Please take a few minutes to complete the written survey that you may receive in the  mail after your visit with us. Thank you!             Your Updated Medication List - Protect others around you: Learn how to safely use, store and throw away your medicines at www.disposemymeds.org.          This list is accurate as of 6/21/18  2:11 PM.  Always use your most recent med list.                   Brand Name Dispense Instructions for use Diagnosis    Alcohol Swabs Pads     100 each    1 pad as needed    Post-pancreatectomy diabetes (H)       amylase-lipase-protease 70695 units Cpep    CREON 12    270 capsule    Take 3-4 capsules (36,000-48,000 Units) by mouth every hour as needed (with snacks)    Acquired total absence of pancreas       BD SHARPS  Misc     1 each    1 Container as needed    Post-pancreatectomy diabetes (H)       blood glucose monitoring lancets     1 Box    Use to test blood sugar 8 times daily or as directed.    Acquired total absence of pancreas       blood glucose monitoring test strip    FREESTYLE LITE    300 strip    Use to test blood sugars 8 times daily or as directed.    Post-pancreatectomy diabetes (H)       BOOST HIGH PROTEIN Liqd      After above baseline labs are drawn, give: 6 mL/kg to maximum of 360 mL; the beverage is to be consumed within 5 minutes.    Post-pancreatectomy diabetes (H), Pancreatic insufficiency       ferrous sulfate 325 (65 Fe) MG Tbec EC tablet     30 tablet    Take 1 tablet (325 mg) by mouth daily    High platelet count (H), Acquired asplenia, Post-pancreatectomy diabetes (H), Pancreatic insufficiency, Anemia       FLUoxetine 10 MG capsule    PROzac    90 capsule    Take 1 capsule (10 mg) by mouth daily        glucose 40 % Gel gel     3 Tube    Take 15-30 g by mouth every 15 minutes as needed for low blood sugar    Post-pancreatectomy diabetes (H)       insulin aspart 100 UNIT/ML injection    NovoLOG PEN    3 mL    1 unit/9 gms CHO with meals,plus correction insulin. Pt uses approx 30 units in 24 hrs.    Post-pancreatectomy diabetes (H)        insulin glargine 100 UNIT/ML injection    LANTUS    3 mL    Inject 10 Units Subcutaneous 2 times daily 16 units SQ each am.    Post-pancreatectomy diabetes (H)       insulin pen needle 32G X 4 MM     100 each    Use 8 pen needles daily or as directed.    Acquired total absence of pancreas       levothyroxine 137 MCG tablet    SYNTHROID/LEVOTHROID    90 tablet    Take 1 tablet (137 mcg) by mouth daily    Other specified hypothyroidism       Multi-vitamin Tabs tablet     90 each    Take 1 tablet by mouth daily        omeprazole 20 MG CR capsule    priLOSEC    30 capsule    Take 1 capsule (20 mg) by mouth daily    GERD (gastroesophageal reflux disease)       ondansetron 4 MG tablet    ZOFRAN    24 tablet    Take 1 tablet (4 mg) by mouth every 6 hours as needed for nausea or vomiting    Postoperative nausea       vitamin D 2000 units tablet     90 tablet    Take 2,000 Units by mouth 2 times daily        Zinc 50 MG Caps     90 capsule    Daily.

## 2018-06-21 NOTE — LETTER
6/21/2018       RE: Sohan Arita  [address] [facility] [address]     Dear Colleague,    Thank you for referring your patient, Sohan Arita, to the [facility] SOL[facility] ORGAN TRANSPL[facility] at [facility]. Please see a copy of my visit note below.    Over a year from [facility].  Back to work full time, changing to a [facility] in [place].  Able to perform all routine daily activities.  Eating well, still on long acting insulin (16u L) with supplement  Off narcotics; uses tylenol and ibuprofen.  Bowel function ok    Overall feels he can do what he wants.  He has a large upper midline hernia that he does not find problematic.    I/P  No surgical issues at present, except a hernia that he doesn't want fixed.  RTC prn      Again, thank you for allowing me to participate in the care of your patient.      Sincerely,    Rodrigo Jaquez MD

## 2018-06-22 LAB
C PEPTIDE SERPL-MCNC: 1.4 NG/ML (ref 0.9–6.9)
C PEPTIDE SERPL-MCNC: 2.4 NG/ML (ref 0.9–6.9)
FOLATE RBC-MCNC: 548 NG/ML
HCT VFR BLD CALC: NORMAL %

## 2018-06-23 LAB — ZINC SERPL-MCNC: 74 UG/DL (ref 60–120)

## 2018-06-24 LAB
A-TOCOPHEROL VIT E SERPL-MCNC: 7 MG/L (ref 5.5–18)
ANNOTATION COMMENT IMP: NORMAL
BETA+GAMMA TOCOPHEROL SERPL-MCNC: 0.7 MG/L (ref 0–6)
RETINYL PALMITATE SERPL-MCNC: <0.02 MG/L (ref 0–0.1)
VIT A SERPL-MCNC: 0.47 MG/L (ref 0.3–1.2)

## 2018-06-26 LAB
A-LINOLENATE SERPL-SCNC: 86 NMOL/ML (ref 50–130)
AA SERPL-SCNC: 600 NMOL/ML (ref 520–1490)
ARACHIDATE SERPL-SCNC: 28 NMOL/ML (ref 50–90)
CLINICAL BIOCHEMIST REVIEW: ABNORMAL
DHA SERPL-SCNC: 171 NMOL/ML (ref 30–250)
DOCOSAPENTAENATE W6 SERPL-SCNC: 23 NMOL/ML (ref 10–70)
DOCOSATETRAENOATE SERPL-SCNC: 23 NMOL/ML (ref 10–80)
DOCOSENOATE SERPL-SCNC: 6 NMOL/ML (ref 4–13)
DPA SERPL-SCNC: 67 NMOL/ML (ref 20–210)
EPA SERPL-SCNC: 101 NMOL/ML (ref 14–100)
FA SERPL-SCNC: 9.5 MMOL/L (ref 7.3–16.8)
G-LINOLENATE SERPL-SCNC: 53 NMOL/ML (ref 16–150)
HEXADECENOATE SERPL-SCNC: 42 NMOL/ML (ref 25–105)
HOMO-G LINOLENATE SERPL-SCNC: 138 NMOL/ML (ref 50–250)
LAURATE SERPL-SCNC: 16 NMOL/ML (ref 6–90)
LINOLEATE SERPL-SCNC: 2718 NMOL/ML (ref 2270–3850)
MEAD ACID SERPL-SCNC: 17 NMOL/ML (ref 7–30)
MONOUNSAT FA SERPL-SCNC: 2.5 MMOL/L (ref 1.3–5.8)
MYRISTATE SERPL-SCNC: 139 NMOL/ML (ref 30–450)
NERVONATE SERPL-SCNC: 84 NMOL/ML (ref 60–100)
OCTADECANOATE SERPL-SCNC: 725 NMOL/ML (ref 590–1170)
OLEATE SERPL-SCNC: 1911 NMOL/ML (ref 650–3500)
PALMITATE SERPL-SCNC: 2050 NMOL/ML (ref 1480–3730)
PALMITOLEATE SERPL-SCNC: 155 NMOL/ML (ref 110–1130)
POLYUNSAT FA SERPL-SCNC: 4 MMOL/L (ref 3.2–5.8)
SAT FA SERPL-SCNC: 3.1 MMOL/L (ref 2.5–5.5)
TRIENOATE/AA SERPL-SRTO: 0.03 {RATIO} (ref 0.01–0.04)
VACCENATE SERPL-SCNC: 250 NMOL/ML (ref 280–740)
W3 FA SERPL-SCNC: 0.4 MMOL/L (ref 0.2–0.5)
W6 FA SERPL-SCNC: 3.6 MMOL/L (ref 3–5.4)

## 2018-06-28 LAB
DEPRECATED CALCIDIOL+CALCIFEROL SERPL-MC: <39 UG/L (ref 20–75)
VITAMIN D2 SERPL-MCNC: <5 UG/L
VITAMIN D3 SERPL-MCNC: 34 UG/L

## 2018-09-17 ENCOUNTER — DOCUMENTATION ONLY (OUTPATIENT)
Dept: TRANSPLANT | Facility: CLINIC | Age: 58
End: 2018-09-17

## 2018-09-28 ENCOUNTER — TELEPHONE (OUTPATIENT)
Dept: TRANSPLANT | Facility: CLINIC | Age: 58
End: 2018-09-28

## 2018-09-28 NOTE — TELEPHONE ENCOUNTER
Got a call from Tanner Arita wife, Maribell.( 9/25/18) She said he has had 6 surgeries since his TP-IAT. Abscess drainages, repair of hernia with Mesh recently . He went on vacation to [place] and now is back in hospital with fevers and WBC of 20,000.He has been treated with IV antibiotics/PICC. She was not 100 % clear but wanted to know why he seems to be so susceptible to infections. He has not seen ID.     Reviewed with Dr Jaquez, not much to offer in terms of advice without medical records but happy to review. I left a voice mail with Maribell today stating this.

## 2019-11-07 ENCOUNTER — TELEPHONE (OUTPATIENT)
Dept: TRANSPLANT | Facility: CLINIC | Age: 59
End: 2019-11-07

## 2019-11-07 NOTE — TELEPHONE ENCOUNTER
"Spoke to Tanner on Tuesday 11/5/19 re his recent lab results and elevated HgB A1c.He appears very frustrated with his local care. Stated \" no one has checked his feet or eyes\" He is no longer seeing an endocrinologist and his Insulin is being prescribed by his PCP. He did not elaborate when I asked him what his testing regimen is. I offered him endocrinology follow up here at the [facility] but with the proviso he commits to in person visits twice a year. He expressed avid interest in this. He had little time to talk so e-mail below sent yesterday( and read last night )        Good afternoon Tanner,    I am sorry you are having such a tough time getting care locally.     I know you are very busy but you expressed a willingness to come back to [place].    I could get you in to see Dr. Yin (and )    December 19th /Jan 2nd / Jan 16th   Would any of these dates work I think it is very important that someone helps you get your blood sugars under control.    Can I suggest that you start checking 4 times a day?  Fasting in the morning   Before lunch, dinner and at  bedtime. If you do this consistently for a week then we may be able to see a pattern and advise.    What is your current long acting dose ?  Insulin to carbohydrate ratio  Current correction scale ?  "

## 2019-11-18 ENCOUNTER — TELEPHONE (OUTPATIENT)
Dept: TRANSPLANT | Facility: CLINIC | Age: 59
End: 2019-11-18

## 2019-11-18 NOTE — TELEPHONE ENCOUNTER
LVM to follow up on email sent 10 days ago. Trying to get Tanner to return for follow up . Left my direct contact information.

## 2019-11-20 ENCOUNTER — TELEPHONE (OUTPATIENT)
Dept: TRANSPLANT | Facility: CLINIC | Age: 59
End: 2019-11-20

## 2019-11-20 NOTE — TELEPHONE ENCOUNTER
Resent e-mail from a couple of weeks ago to try and encourage Tanner to schedule follow up appointment with Dr Yin.

## 2019-11-26 ENCOUNTER — TELEPHONE (OUTPATIENT)
Dept: TRANSPLANT | Facility: CLINIC | Age: 59
End: 2019-11-26

## 2019-11-26 NOTE — TELEPHONE ENCOUNTER
"Follow up call to Tanner.  He reports ongoing issues with a Kidney stone which is going to \"removed\". He also has prostate issues.  He was in an appointment when I called him so he agreed that I would call him, Tuesday December 10th before 10am as this is his late start.  "

## 2019-12-10 ENCOUNTER — TELEPHONE (OUTPATIENT)
Dept: TRANSPLANT | Facility: CLINIC | Age: 59
End: 2019-12-10

## 2019-12-10 NOTE — TELEPHONE ENCOUNTER
Spoke with Tanner today.   He has no more UTI's since he passed a large kidney stone. He has taken over his own diabetic care. He has a minimed pump and david CGM.Basal rate 0.9 units /hours ( Fiasp ) and also takes Lantus 12units BID.BS now average 150-170.  He will come for 2 year follow up May 15th next year.

## 2019-12-21 NOTE — TELEPHONE ENCOUNTER
As discussed with Tanner on the phone:    Tonight , reduce tube feed rate to 35 mls/hr ( from 75)  and also reduce NPH dose to 6 ( from 13). No change in Lantus.      If all goes well and you continue to increase your oral intake then we will plan on stopping NPH and tube feeds on Monday.  I will check in with you then.    Let me know if you have any questions.    Ericka       negative...

## 2020-02-06 NOTE — PLAN OF CARE
Problem: Goal Outcome Summary  Goal: Goal Outcome Summary  PT 4A: Pt tolerated session fairly. Limited mainly by pain but otherwise strong. Requires physical assist to move so will continue to follow to progress mobility. Expect pt to be able to d/c to local hotel with assist from wife as needed pending increased amb/stair tolerance.       Birth Control Pills Pregnancy And Lactation Text: This medication should be avoided if pregnant and for the first 30 days post-partum.

## 2020-03-02 ENCOUNTER — HEALTH MAINTENANCE LETTER (OUTPATIENT)
Age: 60
End: 2020-03-02

## 2020-05-21 ENCOUNTER — VIRTUAL VISIT (OUTPATIENT)
Dept: TRANSPLANT | Facility: CLINIC | Age: 60
End: 2020-05-21
Attending: PEDIATRICS

## 2020-05-21 DIAGNOSIS — E13.9 POST-PANCREATECTOMY DIABETES (H): ICD-10-CM

## 2020-05-21 DIAGNOSIS — Z90.410 POST-PANCREATECTOMY DIABETES (H): ICD-10-CM

## 2020-05-21 DIAGNOSIS — Z90.410 POST-PANCREATECTOMY DIABETES (H): Primary | ICD-10-CM

## 2020-05-21 DIAGNOSIS — K43.2 INCISIONAL HERNIA, WITHOUT OBSTRUCTION OR GANGRENE: ICD-10-CM

## 2020-05-21 DIAGNOSIS — E13.9 POST-PANCREATECTOMY DIABETES (H): Primary | ICD-10-CM

## 2020-05-21 DIAGNOSIS — E89.1 POST-PANCREATECTOMY DIABETES (H): ICD-10-CM

## 2020-05-21 DIAGNOSIS — K86.81 EXOCRINE PANCREATIC INSUFFICIENCY: ICD-10-CM

## 2020-05-21 DIAGNOSIS — Z90.410 HISTORY OF PANCREATECTOMY: Primary | ICD-10-CM

## 2020-05-21 DIAGNOSIS — E89.1 POST-PANCREATECTOMY DIABETES (H): Primary | ICD-10-CM

## 2020-05-21 RX ORDER — PANCRELIPASE LIPASE, PANCRELIPASE PROTEASE, PANCRELIPASE AMYLASE 40000; 126000; 168000 [USP'U]/1; [USP'U]/1; [USP'U]/1
CAPSULE, DELAYED RELEASE ORAL
COMMUNITY
Start: 2020-02-04

## 2020-05-21 RX ORDER — DOCUSATE SODIUM 100 MG/1
100 CAPSULE, LIQUID FILLED ORAL 2 TIMES DAILY
COMMUNITY

## 2020-05-21 NOTE — PATIENT INSTRUCTIONS
"1)  We discussed working on taking all insulin, eating more structured meals and less snacking (but fruit is good! Just not munching all day).    2)  Change Novolog to 1 unit per 8 grams routinely, 1 unit per 10-12 grams with activity when you are dropping low.  Your scale you are using is about 1 unit per 25 mg/dL above 150 mg/dL for correction    3)  I think it is worthwhile to consider again in future an insulin pump.  There are different types of pumps and different types of sites.  You could try Tslim which has integration with Dexcom and use a different type of site (like \"Contact Detach\") or you could try OmniPod that has an all automated catheter insertion so most individuals do not have many pump site failures, though they will sometimes still occur.    4)  Based on your last vitamin levels (late ones last year), you are doing OK on the multivitamin.  You are due up now in near future for repeat labs as you are at your yearly bobo.   "

## 2020-05-21 NOTE — PROGRESS NOTES
[facility]  Islet Autotransplant, Diabetes Follow Up    Problem List:  Patient Active Problem List   Diagnosis     Chronic pancreatitis (H)     Acquired total absence of pancreas     Post-pancreatectomy diabetes (H)     Acute postoperative pain of abdomen     Dehydration     Deep vein thrombosis (DVT) of left lower extremity (H)     Surgical wound infection     Long-term (current) use of anticoagulants [Z79.01]       HPI:  Sohan is a 59 year old male here for follow up of total pancreatectomy, islet cell autotransplant, splenectomy, duodenojejunostomy, Dhara-Y reconstruction, liver biopsy (needle core), gastro-jejunostomy tube performed on 5/15/2017.  At the time of the procedure, the patient received 437,700 IEQ, or 4,347 IEQ/kg body weight, with 252,400 islets or 2506 islets/kg unadjusted for size, so he had relatively large islets.  He had high portal pressure and so a portion of this tissue was placed in the peritoneum.  He did then develop an infected fluid collection after surgery.    At today's visit, Tanner has not seen me since 2018.  After that visit, he did try a 670G pump but was frustrated by sensor and technical issues, and then did eventually stop using pump also because of repeated pump site failures. He then used Yina, stopped it for a while due to accuracy issues, and re-started Yina once they released the 14 day wear version.    He has some mild abdominal pain, sometimes takes amytriptilline or tylenol but no opioids and is much better from pancreatitis standpoint.     Working full time.  He is very busy and attributes a lot of his poor diabetes control to just not be disciplined about his regimen.  Reports concerns with eating habits-- food choices are often fruit, also bread, but does do a lot of grazing across the day.  Sometimes forgets to give rapid acting coverage.  Overall pattern below from Yina shows much too often in high range, mostly is daytime after  eating.  He also has lows, which he says are usually exercise related.    Enzymes ZenPep 40, 2 per day.  MVI:  Only over the counter MVI no inidivual vitamins.    He has been hospitalized repeatedly in [place] up until January when he had TURP surgery and now has done better.  His last labs were Nov which were delayed 2 year labs.      Diabetes history:  Current insulin regimen:  Lantus 30  , fiasp 30 to 40 units per day, at 1 per 10g, 5-10 if high above 250 without using a specific scale.  TDD about 65 unit per day.            Lab Results   Component Value Date    A1C 7.7 06/21/2018    A1C 7.4 11/09/2017    A1C 5.3 05/16/2017    A1C 5.6 05/10/2017    A1C 5.4 09/10/2015         Hypoglycemia history:  Yes, mild to moderate.  The patient has had 0 episodes of severe hypoglycemia (seizure, coma, or neuroglycopenic symptoms severe enough to require assistance from another person).  Blood sugars were reviewed from the patient records and/or the meter download.          Review of systems:  Review Of Systems  Skin: negative  Eyes: negative  Ears/Nose/Throat: negative  Respiratory: No shortness of breath, dyspnea on exertion, cough, or hemoptysis  Cardiovascular: negative  Gastrointestinal: as above  Genitourinary: negative except prostate issue,  Musculoskeletal: as above and fracture  Neurologic: negative  Psychiatric: negative  Hematologic/Lymphatic/Immunologic: negative  Endocrine: as above    Past Medical and Surgical History:  Past Medical History:   Diagnosis Date     Depression      Eye injury, penetrating 1978    left eye, blurry vision long distance      Pancreatic disease     pancreatitis     Pancreatitis 2012    outside notes indicate lipase elevations; labs available: 4/11/12 lipase 563 (); 5/7/12 1224 ()     Thyroid cancer (H)      Thyroid disease     thyroid cancer     Ventral hernia 2017    Present for a year, no pain     Past Surgical History:   Procedure Laterality Date     CHOLECYSTECTOMY  4/2012     [place], [place]     debribement eye Left 1978    for eye injury     ENDOSCOPIC RETROGRADE CHOLANGIOPANCREATOGRAM  4/18/2012    with sphincterotomy     ENDOSCOPIC ULTRASOUND UPPER GASTROINTESTINAL TRACT (GI) N/A 11/11/2015    Procedure: ENDOSCOPIC ULTRASOUND, ESOPHAGOSCOPY / UPPER GASTROINTESTINAL TRACT (GI);  Surgeon: Emeka Mcleod MD;  Location: [facility] OR     FEEDING TUBE REPLACEMENT  2/3/2015     HERNIA REPAIR, INGUINAL RT/LT Left 1980     LAPAROTOMY EXPLORATORY  12/31/2014    WITH sphincterotom (transduodenal), resection of sphincter of Oddi, lysis of adhesions     NERVE BLOCK PERIPHERAL  12/31/2014    U/S guided transversus abdominus plane peripheral field nerve block     PANCREATECTOMY, TRANSPLANT AUTO ISLET CELL, COMBINED N/A 5/15/2017    Procedure: COMBINED PANCREATECTOMY, TRANSPLANT AUTO ISLET CELL;  Open Pancreatectomy, Splenectomy, Gastrojejunostomy Tube Placement , Auto Islet Cell Transplant;  Surgeon: Rodrigo Jaquez MD;  Location: [facility] OR     THYROIDECTOMY  12/28/2011     transduodenal sphincteroplasty  12/31/2014       Family History:  New changes since last visit:  none  Family History   Problem Relation Age of Onset     Pancreatitis No family hx of      Diabetes No family hx of        Social History:  Social History     Social History Narrative    Sohan is , 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  He is a non-smoker and has never drank alcohol. He is a twin.       Working -- changing jobs to [employer]    Physical Exam:  Vitals: There were no vitals taken for this visit.  BMI= There is no height or weight on file to calculate BMI.  General:  Well-appearing, NAD  Head: NC/AT  Eyes:  Sclera white  Lungs:  No distressed breathing.   Psych:  Communicative, with normal affect       Results  Component      Latest Ref Rng & Units 10/26/2019 10/26/2019           9:09 AM  9:49 AM   WBC Count (External)      3.6 - 10.6 K/uL  9.3   RBC Count (External)      4.50 - 5.90 M/UL  5.20    Hemoglobin (External)      13.5 - 17.5 g/dL  15.6   Hematocrit (External)      41.0 - 53.0 %  45.4   MCV (External)      80.0 - 100.0 fL  87.3   MCH (External)      25.0 - 34.0 pg  30.0   MCHC (External)      32.0 - 36.0 g/dL  34.4   RDW (External)      11.3 - 15.6 %  14.7   Platelet Count (External)      150 - 400 K/uL  378   Nucleated RBCs (External)      Ref ranges not provided by the testing lab /100 WBC  0.0   % Neutrophils (External)      42.0 - 72.0 %  39.8 (L)   % Lymphocytes (External)      18.0 - 45.0 %  44.3   % Monocytes (External)      2.0 - 12.0 %  10.9   % Eosinophils (External)      0.0 - 5.0 %  3.8   % Basophils (External)      0.0 - 2.0 %  1.0   % Immature Granulocytes (External)      0.0 - 0.5 %  0.2   Absolute Neutrophils (External)      1.8 - 6.8 K/uL  3.7   Absolute Lymphocytes (External)      1.2 - 3.4 K/uL  4.1 (H)   Absolute Monocytes (External)      0.2 - 0.9 K/uL  1.0 (H)   Absolute Eosinophils (External)      0.0 - 0.5 K/uL  0.4   Absolute Basophils (External)      0.0 - 0.1 K/uL  0.1   Absolute Immature Granulocytes (External)      0.00 - 0.04 K/uL  0.02   Method (External)        Auto   Sodium (External)      137 - 146 mmol/L  138   Potassium (External)      3.5 - 5.0 mmol/L  4.3   Chloride (External)      102 - 111 mmol/L  103   CO2 (External)      19 - 30 mmol/L  26   Anion Gap (External)      3 - 16 mmol/L  9   Glucose (External)      65 - 99 mg/dL  258 (H)   Urea Nitrogen (External)      8 - 20 mg/dL  15   Creatinine (External)      0.77 - 1.35 mg/dL  0.94   GFR Estimated (External)      >60 ml/min/1.73m2  89   Calcium (External)      8.4 - 10.4 mg/dL  9.9   Protein Total (External)      6.0 - 8.4 g/dL  7.3   Albumin (External)      3.5 - 5.2 g/dL  4.3   Bilirubin Total (External)      0.2 - 1.3 mg/dL  0.8   Bilirubin Direct (External)      0.0 - 0.5 mg/dL  0.3   Alk Phosphatase (External)      40 - 120 U/L  119   ALT (External)      0 - 55 U/L  41   AST (External)      9 - 40 U/L   27   Iron (External)      49 - 181 ug/dl 118    Iron Binding Cap (External)      238 - 453 ug/dl 350    Iron Saturation % (External)      20 - 50 % 34    Vitamin A (External)      0.30 - 1.20 mg/L  0.49   Vitamin A Interp (External)        Normal   Vitamin E (External)      5.5 - 18.0 mg/L  8.7   Vitamin E Gamma (External)      0.0 - 6.0 mg/L  0.4   C Peptide (External)      0.8 - 3.5  0.7 (L)   Vitamin B12 (External)      345 - 1,485 pg/mL  611   Ferritin (External)      37 - 400 ng/mL  85   Hemoglobin A1C (External)      <5.7 %  9.7 (H)   Transferrin (External)      190 - 362 mg/dL  280   Prealbumin (External)      17.0 - 36.0 mg/dL  20.0   Vitamin D Deficiency Screening (External)      30 - 80 ng/mL  29 (L)   Scan Lab Results (External)      60.0 - 120.0 ug/dL  87.8     Component      Latest Ref Rng & Units 10/26/2019           9:49 AM   WBC Count (External)      3.6 - 10.6 K/uL    RBC Count (External)      4.50 - 5.90 M/UL    Hemoglobin (External)      13.5 - 17.5 g/dL    Hematocrit (External)      41.0 - 53.0 %    MCV (External)      80.0 - 100.0 fL    MCH (External)      25.0 - 34.0 pg    MCHC (External)      32.0 - 36.0 g/dL    RDW (External)      11.3 - 15.6 %    Platelet Count (External)      150 - 400 K/uL    Nucleated RBCs (External)      Ref ranges not provided by the testing lab /100 WBC    % Neutrophils (External)      42.0 - 72.0 %    % Lymphocytes (External)      18.0 - 45.0 %    % Monocytes (External)      2.0 - 12.0 %    % Eosinophils (External)      0.0 - 5.0 %    % Basophils (External)      0.0 - 2.0 %    % Immature Granulocytes (External)      0.0 - 0.5 %    Absolute Neutrophils (External)      1.8 - 6.8 K/uL    Absolute Lymphocytes (External)      1.2 - 3.4 K/uL    Absolute Monocytes (External)      0.2 - 0.9 K/uL    Absolute Eosinophils (External)      0.0 - 0.5 K/uL    Absolute Basophils (External)      0.0 - 0.1 K/uL    Absolute Immature Granulocytes (External)      0.00 - 0.04 K/uL     Method (External)          Sodium (External)      137 - 146 mmol/L    Potassium (External)      3.5 - 5.0 mmol/L    Chloride (External)      102 - 111 mmol/L    CO2 (External)      19 - 30 mmol/L    Anion Gap (External)      3 - 16 mmol/L    Glucose (External)      65 - 99 mg/dL    Urea Nitrogen (External)      8 - 20 mg/dL    Creatinine (External)      0.77 - 1.35 mg/dL    GFR Estimated (External)      >60 ml/min/1.73m2    Calcium (External)      8.4 - 10.4 mg/dL    Protein Total (External)      6.0 - 8.4 g/dL    Albumin (External)      3.5 - 5.2 g/dL    Bilirubin Total (External)      0.2 - 1.3 mg/dL    Bilirubin Direct (External)      0.0 - 0.5 mg/dL    Alk Phosphatase (External)      40 - 120 U/L    ALT (External)      0 - 55 U/L    AST (External)      9 - 40 U/L    Iron (External)      49 - 181 ug/dl    Iron Binding Cap (External)      238 - 453 ug/dl    Iron Saturation % (External)      20 - 50 %    Vitamin A (External)      0.30 - 1.20 mg/L    Vitamin A Interp (External)          Vitamin E (External)      5.5 - 18.0 mg/L    Vitamin E Gamma (External)      0.0 - 6.0 mg/L    C Peptide (External)      0.8 - 3.5    Vitamin B12 (External)      345 - 1,485 pg/mL    Ferritin (External)      37 - 400 ng/mL    Hemoglobin A1C (External)      <5.7 %    Transferrin (External)      190 - 362 mg/dL    Prealbumin (External)      17.0 - 36.0 mg/dL    Vitamin D Deficiency Screening (External)      30 - 80 ng/mL    Scan Lab Results (External)      60.0 - 120.0 ug/dL Multiple values see image     Component      Latest Ref Rng & Units 10/26/2019 10/26/2019          11:02 AM 12:03 PM   WBC Count (External)      3.6 - 10.6 K/uL     RBC Count (External)      4.50 - 5.90 M/UL     Hemoglobin (External)      13.5 - 17.5 g/dL     Hematocrit (External)      41.0 - 53.0 %     MCV (External)      80.0 - 100.0 fL     MCH (External)      25.0 - 34.0 pg     MCHC (External)      32.0 - 36.0 g/dL     RDW (External)      11.3 - 15.6 %      Platelet Count (External)      150 - 400 K/uL     Nucleated RBCs (External)      Ref ranges not provided by the testing lab /100 WBC     % Neutrophils (External)      42.0 - 72.0 %     % Lymphocytes (External)      18.0 - 45.0 %     % Monocytes (External)      2.0 - 12.0 %     % Eosinophils (External)      0.0 - 5.0 %     % Basophils (External)      0.0 - 2.0 %     % Immature Granulocytes (External)      0.0 - 0.5 %     Absolute Neutrophils (External)      1.8 - 6.8 K/uL     Absolute Lymphocytes (External)      1.2 - 3.4 K/uL     Absolute Monocytes (External)      0.2 - 0.9 K/uL     Absolute Eosinophils (External)      0.0 - 0.5 K/uL     Absolute Basophils (External)      0.0 - 0.1 K/uL     Absolute Immature Granulocytes (External)      0.00 - 0.04 K/uL     Method (External)           Sodium (External)      137 - 146 mmol/L     Potassium (External)      3.5 - 5.0 mmol/L     Chloride (External)      102 - 111 mmol/L     CO2 (External)      19 - 30 mmol/L     Anion Gap (External)      3 - 16 mmol/L     Glucose (External)      65 - 99 mg/dL 447 (H) 371 (H)   Urea Nitrogen (External)      8 - 20 mg/dL     Creatinine (External)      0.77 - 1.35 mg/dL     GFR Estimated (External)      >60 ml/min/1.73m2     Calcium (External)      8.4 - 10.4 mg/dL     Protein Total (External)      6.0 - 8.4 g/dL     Albumin (External)      3.5 - 5.2 g/dL     Bilirubin Total (External)      0.2 - 1.3 mg/dL     Bilirubin Direct (External)      0.0 - 0.5 mg/dL     Alk Phosphatase (External)      40 - 120 U/L     ALT (External)      0 - 55 U/L     AST (External)      9 - 40 U/L     Iron (External)      49 - 181 ug/dl     Iron Binding Cap (External)      238 - 453 ug/dl     Iron Saturation % (External)      20 - 50 %     Vitamin A (External)      0.30 - 1.20 mg/L     Vitamin A Interp (External)           Vitamin E (External)      5.5 - 18.0 mg/L     Vitamin E Gamma (External)      0.0 - 6.0 mg/L     C Peptide (External)      0.8 - 3.5 0.6 (L)  "1.0   Vitamin B12 (External)      345 - 1,485 pg/mL     Ferritin (External)      37 - 400 ng/mL     Hemoglobin A1C (External)      <5.7 %     Transferrin (External)      190 - 362 mg/dL     Prealbumin (External)      17.0 - 36.0 mg/dL     Vitamin D Deficiency Screening (External)      30 - 80 ng/mL     Scan Lab Results (External)      60.0 - 120.0 ug/dL         Assessment:  1.  Post pancreatectomy diabetes mellitus, s/p total pancreatectomy and islet autotransplant.    Sohan is a 59 year old with history of chronic pancreatitis who is s/p total pancreatectomy and islet autotransplant.  He had a moderate islet mass but many large islets.      Main issues in glycemic control are daytime hyper and hypoglycemia, more often hyperglycemia.  This appears to be inadequate meal coverage and may include forgetting coverage for some of food. Discussed working on more routine meals and covering everything. Dose recommendations as below.        Plan:  1.   Patient Instructions   1)  We discussed working on taking all insulin, eating more structured meals and less snacking (but fruit is good! Just not munching all day).    2)  Change Novolog to 1 unit per 8 grams routinely, 1 unit per 10-12 grams with activity when you are dropping low.  Your scale you are using is about 1 unit per 25 mg/dL above 150 mg/dL for correction    3)  I think it is worthwhile to consider again in future an insulin pump.  There are different types of pumps and different types of sites.  You could try Tslim which has integration with Dexcom and use a different type of site (like \"Contact Detach\") or you could try OmniPod that has an all automated catheter insertion so most individuals do not have many pump site failures, though they will sometimes still occur.    4)  Based on your last vitamin levels (late ones last year), you are doing OK on the multivitamin.  You are due up now in near future for repeat labs as you are at your yearly bobo.         2.  " Frequency of blood sugar checks:  Needs strips for at least 4-6 times per day. He is leaning towards maintaining on MDI and getting Dexcom due to expense.    3.  Continue routine follow up for autoislet transplant patients:  Mixed meal test (6 mL/kg BoostHP to max of 360 mL) at 3 months, 6 months, and once a year post transplant.  Hemoglobin A1c levels at these time points and quarterly.    4.  Other issues addressed today:  none    Follow up:  1 year    Contact me for questions at [phone] or [phone].  Emergency number to reach pediatric endocrinology after hours is [phone].        Trixie Yin MD  , Pediatric Endocrinology and Diabetes  [facility]  [facility]

## 2020-05-21 NOTE — PROGRESS NOTES
"Sohan Arita is a 59 year old male who is being evaluated via a billable video visit.      The patient has been notified of following:     \"This video visit will be conducted via a call between you and your physician/provider. We have found that certain health care needs can be provided without the need for an in-person physical exam.  This service lets us provide the care you need with a video conversation.  If a prescription is necessary we can send it directly to your pharmacy.  If lab work is needed we can place an order for that and you can then stop by our lab to have the test done at a later time.    Video visits are billed at different rates depending on your insurance coverage.  Please reach out to your insurance provider with any questions.    If during the course of the call the physician/provider feels a video visit is not appropriate, you will not be charged for this service.\"    Patient has given verbal consent for Video visit? Yes    How would you like to obtain your AVS? [facility]    Patient would like the video invitation sent by: Send to e-mail at: [email or web address]    Will anyone else be joining your video visit? No        Video-Visit Details    Type of service:  Video Visit    Video Start Time: 2:20pm  Video End Time: 2:55pm    Originating Location (pt. Location): Home    Distant Location (provider location):  [facility]     Platform used for Video Visit: Jacobo Yin MD        "

## 2020-05-21 NOTE — PROGRESS NOTES
"Sohan Arita is a 59 year old male who is being evaluated via a billable video visit.      The patient has been notified of following:     \"This video visit will be conducted via a call between you and your physician/provider. We have found that certain health care needs can be provided without the need for an in-person physical exam.  This service lets us provide the care you need with a video conversation.  If a prescription is necessary we can send it directly to your pharmacy.  If lab work is needed we can place an order for that and you can then stop by our lab to have the test done at a later time.    Video visits are billed at different rates depending on your insurance coverage.  Please reach out to your insurance provider with any questions.    If during the course of the call the physician/provider feels a video visit is not appropriate, you will not be charged for this service.\"    Patient has given verbal consent for Video visit? Yes    How would you like to obtain your AVS? Mail a copy    Patient would like the video invitation sent by: Send to e-mail at: [email or web address]    Will anyone else be joining your video visit? No        Video-Visit Details    Type of service:  Video Visit    Video Start Time: 1010  Video End Time: 1030    Originating Location (pt. Location): Home    Distant Location (provider location):  [facility]     Platform used for Video Visit: Jacobo Jaquez MD      SP TPIAT three years ago.  Course complicated by subfascial infection and subsequent incisional hernia.  Re: pancreatitic pain, he is satisfied with the outcome.  Pain: only takes AMitryptiline in evening to help sleep.  No narcs  Bowel function: OK.  Takes Zenpep 70803, 2 with meals.    DM: 30 unit(s) lantus and 30-40 unit(s) supplement.  HgB A1C high.  He admits to not adhering to regimens.  C peptide: 1. (not sure if fasting/stimulated), but his insulin requirement " makes it irrelevant.    Now that hernia is repaired, he has few issues.  He had kidney stones which required attention, but otherwise doing quite well.    We spoke at length re: surgeries and durability.  He is very satisfied with the resolution of his abdominal pain and ability to resume active lifestyle.  He looks reasonably fit, not too much weight gain.  20 min, almost all in counseling

## 2020-05-21 NOTE — LETTER
"    5/21/2020         RE: Sohan Arita  [address] [place] [id number]        Dear Colleague,    Thank you for referring your patient, Sohan Arita, to the [facility]. Please see a copy of my visit note below.    Sohan Arita is a 59 year old male who is being evaluated via a billable video visit.      The patient has been notified of following:     \"This video visit will be conducted via a call between you and your physician/provider. We have found that certain health care needs can be provided without the need for an in-person physical exam.  This service lets us provide the care you need with a video conversation.  If a prescription is necessary we can send it directly to your pharmacy.  If lab work is needed we can place an order for that and you can then stop by our lab to have the test done at a later time.    Video visits are billed at different rates depending on your insurance coverage.  Please reach out to your insurance provider with any questions.    If during the course of the call the physician/provider feels a video visit is not appropriate, you will not be charged for this service.\"    Patient has given verbal consent for Video visit? Yes    How would you like to obtain your AVS? Mail a copy    Patient would like the video invitation sent by: Send to e-mail at: [email or web address]    Will anyone else be joining your video visit? No        Video-Visit Details    Type of service:  Video Visit    Video Start Time: 1010  Video End Time: 1030    Originating Location (pt. Location): Home    Distant Location (provider location):  [facility]     Platform used for Video Visit: Jacobo Jaquez MD      SP TPIAT three years ago.  Course complicated by subfascial infection and subsequent incisional hernia.  Re: pancreatitic pain, he is satisfied with the outcome.  Pain: only takes AMitryptiline in evening to help " sleep.  No narcs  Bowel function: OK.  Takes Zenpep 78659, 2 with meals.    DM: 30 unit(s) lantus and 30-40 unit(s) supplement.  HgB A1C high.  He admits to not adhering to regimens.  C peptide: 1. (not sure if fasting/stimulated), but his insulin requirement makes it irrelevant.    Now that hernia is repaired, he has few issues.  He had kidney stones which required attention, but otherwise doing quite well.    We spoke at length re: surgeries and durability.  He is very satisfied with the resolution of his abdominal pain and ability to resume active lifestyle.  He looks reasonably fit, not too much weight gain.  20 min, almost all in counseling      Again, thank you for allowing me to participate in the care of your patient.        Sincerely,        Rodrigo Jaquez MD

## 2020-05-22 ENCOUNTER — VIRTUAL VISIT (OUTPATIENT)
Dept: EDUCATION SERVICES | Facility: CLINIC | Age: 60
End: 2020-05-22

## 2020-05-22 DIAGNOSIS — Z90.410 POST-PANCREATECTOMY DIABETES (H): Primary | ICD-10-CM

## 2020-05-22 DIAGNOSIS — E89.1 POST-PANCREATECTOMY DIABETES (H): Primary | ICD-10-CM

## 2020-05-22 DIAGNOSIS — E13.9 POST-PANCREATECTOMY DIABETES (H): Primary | ICD-10-CM

## 2020-05-22 NOTE — PROGRESS NOTES
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    Diabetes Symptoms & Complications:     Patient Problem List and Family Medical History reviewed for relevant medical history, current medical status, and diabetes risk factors.    Vitals:  There were no vitals taken for this visit.  Estimated body mass index is 26.87 kg/m  as calculated from the following:    Height as of 6/21/18: 1.88 m (6' 2\").    Weight as of 6/21/18: 94.9 kg (209 lb 4.8 oz).   Last 3 BP:   BP Readings from Last 3 Encounters:   06/21/18 121/85   11/09/17 119/78   11/09/17 119/78       History   Smoking Status     Never Smoker   Smokeless Tobacco     Never Used       Labs:  Lab Results   Component Value Date    A1C 7.7 06/21/2018     Lab Results   Component Value Date     06/21/2018     Lab Results   Component Value Date    LDL 94 05/10/2017     HDL Cholesterol   Date Value Ref Range Status   05/10/2017 43 >39 mg/dL Final   ]  GFR Estimate   Date Value Ref Range Status   06/21/2018 89 >60 mL/min/1.7m2 Final     Comment:     Non  GFR Calc     GFR Estimate If Black   Date Value Ref Range Status   06/21/2018 >90 >60 mL/min/1.7m2 Final     Comment:      GFR Calc     Lab Results   Component Value Date    CR 0.88 06/21/2018     No results found for: MICROALBUMIN    Healthy Eating:  Sohan says \"I am 1 of those patients who knows what I need to do but does not always do it\"  He feels confident about his carb counting.  Sometimes challenged by foods that do not have labels.  His main thought today on what he would like to changes to cut back " "on \"junk\" food.  He works very long days and does not have much routine to his diet.  Is often grabbing and going.  His breakfast is generally healthy but low in protein which may be contributing to hunger and cravings later in the day.    Monitoring:  Reviewed yesterday by physician    Taking Medications:  Diabetes Medication(s)     Diabetic Other       glucose 40 % GEL gel    Take 15-30 g by mouth every 15 minutes as needed for low blood sugar    Insulin       insulin aspart (FIASP) 100 UNIT/ML vial    USE IN PUMP 40 UNITS DAILY (MAXIMUM OF 60 UNITS A DAY)     insulin aspart (NOVOLOG PEN) 100 UNIT/ML injection    1 unit/9 gms CHO with meals,plus correction insulin. Pt uses approx 30 units in 24 hrs.     insulin glargine (LANTUS) 100 UNIT/ML injection    Inject 10 Units Subcutaneous 2 times daily 16 units SQ each am.        Healthy Coping:     Patient Activation Measure Survey Score:  No flowsheet data found.      INTERVENTIONS:    Education provided today on:  AADE Self-Care Behaviors:  Healthy Eating:   General healthy eating  Ideas for nutritious protein options for breakfast  Online sources for carbohydrate counting, particularly for when eating out or when foods do not have nutrition facts labels    Opportunities for ongoing education and support in diabetes-self management were discussed.    Pt verbalized understanding of concepts discussed and recommendations provided today.       Education Materials Provided:  Updated guide to carbohydrate counting sent to patient's preferred email      ASSESSMENT:    Patient's most recent   Lab Results   Component Value Date    A1C 7.7 06/21/2018    is not meeting goal of <7.0    Time Spent: 25 minutes  Encounter Type: Individual    Any diabetes medication dose changes were made via the CDE Protocol and Collaborative Practice Agreement with the patient's referring provider. A copy of this encounter was shared with the provider.      "

## 2020-05-23 NOTE — PROGRESS NOTES
Diabetes Education Note:    Tanner was unaware that he had an appointment this morning and was working when I contacted him, so we agreed that he will call and reschedule when he is available.  He has no interest in getting an insulin pump at this time.      No charge.  No visit.

## 2020-12-20 ENCOUNTER — HEALTH MAINTENANCE LETTER (OUTPATIENT)
Age: 60
End: 2020-12-20

## 2021-04-24 ENCOUNTER — HEALTH MAINTENANCE LETTER (OUTPATIENT)
Age: 61
End: 2021-04-24

## 2021-10-03 ENCOUNTER — HEALTH MAINTENANCE LETTER (OUTPATIENT)
Age: 61
End: 2021-10-03

## 2021-10-22 ENCOUNTER — TRANSFERRED RECORDS (OUTPATIENT)
Dept: HEALTH INFORMATION MANAGEMENT | Facility: CLINIC | Age: 61
End: 2021-10-22

## 2021-10-22 LAB
ALT SERPL-CCNC: 96 U/L (ref 7–40)
AST SERPL-CCNC: 91 U/L (ref 13–40)
CREATININE (EXTERNAL): 0.87 MG/DL (ref 0.7–1.3)
GFR ESTIMATED (EXTERNAL): >60 ML/MIN/1.73M2
GLUCOSE (EXTERNAL): 226 MG/DL (ref 74–106)
POTASSIUM (EXTERNAL): 3.7 MMOL/L (ref 3.4–5.1)

## 2021-10-23 ENCOUNTER — TRANSFERRED RECORDS (OUTPATIENT)
Dept: HEALTH INFORMATION MANAGEMENT | Facility: CLINIC | Age: 61
End: 2021-10-23

## 2021-10-25 ENCOUNTER — TELEPHONE (OUTPATIENT)
Dept: TRANSPLANT | Facility: CLINIC | Age: 61
End: 2021-10-25

## 2021-10-25 ENCOUNTER — TRANSFERRED RECORDS (OUTPATIENT)
Dept: HEALTH INFORMATION MANAGEMENT | Facility: CLINIC | Age: 61
End: 2021-10-25

## 2021-10-25 NOTE — TELEPHONE ENCOUNTER
Call from Sohan who was in the ER Friday with rigors and r sided abdominal pain. He stated his LFT's were elevated ( 90's )  But not his WBC. His temp was 100.8 with buprofen onboard. He said he had an abnormal CT . He was given a dose of IV Rocephrin and sent out with a prescription for Augmentin 875mg twice a day. He sill does not feel ( or sound ) well. He has ordered repeat labs ( Comp met panel ) . I suggested he also order a CBC and et me know the results. I have requested the written reports.

## 2021-10-28 ENCOUNTER — HOSPITAL ENCOUNTER (INPATIENT)
Dept: GENERAL RADIOLOGY | Facility: CLINIC | Age: 61
End: 2021-10-28
Attending: SURGERY

## 2021-10-28 PROCEDURE — 999N000122 CT OUTSIDE READ

## 2021-11-28 ENCOUNTER — HEALTH MAINTENANCE LETTER (OUTPATIENT)
Age: 61
End: 2021-11-28

## 2022-03-20 ENCOUNTER — HEALTH MAINTENANCE LETTER (OUTPATIENT)
Age: 62
End: 2022-03-20

## 2022-05-15 ENCOUNTER — HEALTH MAINTENANCE LETTER (OUTPATIENT)
Age: 62
End: 2022-05-15

## 2022-07-10 ENCOUNTER — HEALTH MAINTENANCE LETTER (OUTPATIENT)
Age: 62
End: 2022-07-10

## 2022-09-10 ENCOUNTER — HEALTH MAINTENANCE LETTER (OUTPATIENT)
Age: 62
End: 2022-09-10

## 2023-01-22 ENCOUNTER — HEALTH MAINTENANCE LETTER (OUTPATIENT)
Age: 63
End: 2023-01-22

## 2023-01-23 NOTE — TELEPHONE ENCOUNTER
These pictures were taken after Tanner had showered, groomed, walked 1/4 to the [facility] for a haircut and back. He is not concerned as just wanted to make sure to report how much drainage he is getting our of the wound. No further drainage/bleeding at this time. He feels good and so far today has drank 34 ounces of water. He is not concerned. Will share with Sylvester Mendez and Gisele.   No

## 2023-04-30 ENCOUNTER — HEALTH MAINTENANCE LETTER (OUTPATIENT)
Age: 63
End: 2023-04-30

## 2023-06-03 ENCOUNTER — HEALTH MAINTENANCE LETTER (OUTPATIENT)
Age: 63
End: 2023-06-03

## 2023-10-01 ENCOUNTER — HEALTH MAINTENANCE LETTER (OUTPATIENT)
Age: 63
End: 2023-10-01

## 2024-02-18 ENCOUNTER — HEALTH MAINTENANCE LETTER (OUTPATIENT)
Age: 64
End: 2024-02-18

## 2024-07-07 ENCOUNTER — HEALTH MAINTENANCE LETTER (OUTPATIENT)
Age: 64
End: 2024-07-07

## (undated) DEVICE — DRAIN BLAKE 19FR W/TROCAR SIL

## (undated) DEVICE — SURGICEL ABSORBABLE HEMOSTAT SNOW 4"X4" 2083

## (undated) DEVICE — PREP CHLORAPREP 26ML TINTED ORANGE  260815

## (undated) DEVICE — SOL NACL 0.9% IRRIG 1000ML BOTTLE 2F7124

## (undated) DEVICE — SU SILK 2-0 SH 30" K833H

## (undated) DEVICE — LINEN TOWEL PACK X6 WHITE 5487

## (undated) DEVICE — SU PDS II 6-0 RB-2DA 30" Z149H

## (undated) DEVICE — TUBE FEEDING 08FR 20" 461701

## (undated) DEVICE — SU ETHILON 2-0 FS 18" 664H

## (undated) DEVICE — SUCTION MANIFOLD DORNOCH ULTRA CART UL-CL500

## (undated) DEVICE — DRAPE ISOLATION BAG 1003

## (undated) DEVICE — STPL LINEAR 30 X 3.5MM TA3035S

## (undated) DEVICE — CONNECTOR STOPCOCK 3 WAY MALE LL HI-FLO MX9311L

## (undated) DEVICE — DRSG MEDIPORE 3 1/2X13 3/4" 3573

## (undated) DEVICE — SU PDS II 4-0 SH 27" Z315H

## (undated) DEVICE — SOL WATER IRRIG 1000ML BOTTLE 2F7114

## (undated) DEVICE — SU SILK 4-0 TIE 12X30" A303H

## (undated) DEVICE — MANOMETER VENOUS PRESSURE W/4-WAY STOPCOCK 35ML MX441

## (undated) DEVICE — SU PDS II 0 TP-1 60" Z991G

## (undated) DEVICE — Device

## (undated) DEVICE — SPONGE LAP 18X18" X8435

## (undated) DEVICE — ESU GROUND PAD ADULT W/CORD E7507

## (undated) DEVICE — SU SILK 0 TIE 6X30" A306H

## (undated) DEVICE — SU PROLENE 4-0 SHDA 36" 8521H

## (undated) DEVICE — SU SILK 3-0 TIE 12X30" A304H

## (undated) DEVICE — DRAPE SLUSH/WARMER 66X44" ORS-320

## (undated) DEVICE — DRAIN JACKSON PRATT RESERVOIR 100ML SU130-1305

## (undated) DEVICE — BLADE CLIPPER SGL USE 9680

## (undated) DEVICE — SUCTION TIP POOLE K770

## (undated) DEVICE — STPL 60 X 3.8MM GIA6038S

## (undated) DEVICE — SU PROLENE 6-0 RB-2DA 30" 8711H

## (undated) DEVICE — TUBING IV 69" STERILE 1C8160S

## (undated) DEVICE — CLIP HORIZON LG ORANGE 004200

## (undated) DEVICE — SU SILK 1 TIE 6X30" A307H

## (undated) DEVICE — STPL ENDO HANDLE GIA ULTRA SHORT EGIAUSHORT

## (undated) DEVICE — SYR BULB IRRIG 50ML LATEX FREE 0035280

## (undated) DEVICE — SU PROLENE 5-0 RB-1DA 36"  8556H

## (undated) DEVICE — NDL ANGIOCATH 18GA 1.25" 4055

## (undated) DEVICE — ESU LIGASURE IMPACT OPEN SEALER/DVDR CVD LG JAW LF4418

## (undated) DEVICE — TUBE TRANS GASTROJEJUNOSTOMY 18FRX45CM 0250-18

## (undated) DEVICE — SU VICRYL 3-0 SH 27" J316H

## (undated) DEVICE — STPL ENDO RELOAD GIA 60X2.5MM ROTIC 030457

## (undated) DEVICE — TUBING PRESSURE MONITOR M/F CONNECTOR 48" 50P148

## (undated) DEVICE — CLIP HORIZON MED BLUE 002200

## (undated) DEVICE — DECANTER BAG 2002S

## (undated) DEVICE — SU SILK 3-0 SH CR 8X18" C013D

## (undated) DEVICE — DRSG TELFA 3X8" 1238

## (undated) DEVICE — SU SILK 2-0 TIE 12X30" A305H

## (undated) DEVICE — LINEN TOWEL PACK X30 5481

## (undated) DEVICE — SU SILK 2-0 SH CR 5X18" C0125

## (undated) DEVICE — SU CHROMIC 4-0 RB-1 27" U203H

## (undated) DEVICE — SU PDS II 4-0 RB-1 27" Z304H

## (undated) DEVICE — NDL BX TRU CUT 14GA 4.5" 2N2702X

## (undated) DEVICE — STPL RELOAD 60 X 3.8MM GIA6038L

## (undated) DEVICE — SYR 50ML LL W/O NDL 309653

## (undated) RX ORDER — FENTANYL CITRATE 50 UG/ML
INJECTION, SOLUTION INTRAMUSCULAR; INTRAVENOUS
Status: DISPENSED
Start: 2017-06-07

## (undated) RX ORDER — HYDROMORPHONE HYDROCHLORIDE 1 MG/ML
INJECTION, SOLUTION INTRAMUSCULAR; INTRAVENOUS; SUBCUTANEOUS
Status: DISPENSED
Start: 2017-05-15

## (undated) RX ORDER — DEXTROSE MONOHYDRATE 25 G/50ML
INJECTION, SOLUTION INTRAVENOUS
Status: DISPENSED
Start: 2017-06-07

## (undated) RX ORDER — DEXTROSE, SODIUM CHLORIDE, SODIUM LACTATE, POTASSIUM CHLORIDE, AND CALCIUM CHLORIDE 5; .6; .31; .03; .02 G/100ML; G/100ML; G/100ML; G/100ML; G/100ML
INJECTION, SOLUTION INTRAVENOUS
Status: DISPENSED
Start: 2017-05-15

## (undated) RX ORDER — FENTANYL CITRATE 50 UG/ML
INJECTION, SOLUTION INTRAMUSCULAR; INTRAVENOUS
Status: DISPENSED
Start: 2017-06-05

## (undated) RX ORDER — FENTANYL CITRATE 50 UG/ML
INJECTION, SOLUTION INTRAMUSCULAR; INTRAVENOUS
Status: DISPENSED
Start: 2017-05-15

## (undated) RX ORDER — ALBUMIN, HUMAN INJ 5% 5 %
SOLUTION INTRAVENOUS
Status: DISPENSED
Start: 2017-05-15

## (undated) RX ORDER — KETAMINE HYDROCHLORIDE 10 MG/ML
INJECTION, SOLUTION INTRAMUSCULAR; INTRAVENOUS
Status: DISPENSED
Start: 2017-05-15